# Patient Record
Sex: FEMALE | Race: WHITE | Employment: OTHER | URBAN - NONMETROPOLITAN AREA
[De-identification: names, ages, dates, MRNs, and addresses within clinical notes are randomized per-mention and may not be internally consistent; named-entity substitution may affect disease eponyms.]

---

## 2017-01-03 ENCOUNTER — OFFICE VISIT (OUTPATIENT)
Dept: CARDIOLOGY | Age: 72
End: 2017-01-03

## 2017-01-03 VITALS
DIASTOLIC BLOOD PRESSURE: 52 MMHG | SYSTOLIC BLOOD PRESSURE: 102 MMHG | BODY MASS INDEX: 35.16 KG/M2 | HEART RATE: 56 BPM | WEIGHT: 211 LBS | HEIGHT: 65 IN

## 2017-01-03 DIAGNOSIS — I10 ESSENTIAL HYPERTENSION: Primary | ICD-10-CM

## 2017-01-03 DIAGNOSIS — I42.9 CARDIOMYOPATHY (HCC): ICD-10-CM

## 2017-01-03 PROCEDURE — 99214 OFFICE O/P EST MOD 30 MIN: CPT | Performed by: NUCLEAR MEDICINE

## 2017-01-03 RX ORDER — LOSARTAN POTASSIUM 25 MG/1
25 TABLET ORAL DAILY
COMMUNITY
End: 2017-01-03 | Stop reason: SDUPTHER

## 2017-01-03 RX ORDER — CARVEDILOL 3.12 MG/1
3.12 TABLET ORAL 2 TIMES DAILY WITH MEALS
Qty: 60 TABLET | Refills: 11 | Status: CANCELLED | OUTPATIENT
Start: 2017-01-03

## 2017-01-03 RX ORDER — GLIMEPIRIDE 4 MG/1
4 TABLET ORAL 2 TIMES DAILY
COMMUNITY
End: 2017-05-10

## 2017-01-03 RX ORDER — ENALAPRIL MALEATE 2.5 MG/1
1.25 TABLET ORAL 2 TIMES DAILY
Qty: 30 TABLET | Refills: 11 | Status: CANCELLED | OUTPATIENT
Start: 2017-01-03

## 2017-01-03 RX ORDER — LOSARTAN POTASSIUM 25 MG/1
25 TABLET ORAL DAILY
Qty: 90 TABLET | Refills: 1 | Status: SHIPPED | OUTPATIENT
Start: 2017-01-03 | End: 2017-06-16 | Stop reason: SDUPTHER

## 2017-01-03 RX ORDER — PANTOPRAZOLE SODIUM 40 MG/1
40 GRANULE, DELAYED RELEASE ORAL 2 TIMES DAILY
COMMUNITY
End: 2018-05-09 | Stop reason: ALTCHOICE

## 2017-01-03 RX ORDER — CARVEDILOL 3.12 MG/1
3.12 TABLET ORAL 2 TIMES DAILY WITH MEALS
Qty: 180 TABLET | Refills: 1 | Status: ON HOLD | OUTPATIENT
Start: 2017-01-03 | End: 2017-06-13 | Stop reason: HOSPADM

## 2017-01-04 RX ORDER — LOSARTAN POTASSIUM 25 MG/1
25 TABLET ORAL DAILY
Qty: 30 TABLET | Refills: 0 | Status: ON HOLD | OUTPATIENT
Start: 2017-01-04 | End: 2017-06-13 | Stop reason: HOSPADM

## 2017-01-16 LAB
ALBUMIN SERPL-MCNC: 4.3 G/DL
ALP BLD-CCNC: 120 U/L
ALT SERPL-CCNC: 16 U/L
AST SERPL-CCNC: 24 U/L
BASOPHILS ABSOLUTE: NORMAL /ΜL
BASOPHILS RELATIVE PERCENT: NORMAL %
BILIRUB SERPL-MCNC: 0.5 MG/DL (ref 0.1–1.4)
BUN BLDV-MCNC: 33 MG/DL
CALCIUM SERPL-MCNC: 9.5 MG/DL
CHLORIDE BLD-SCNC: 99 MMOL/L
CO2: 32.2 MMOL/L
CREAT SERPL-MCNC: 1.5 MG/DL
EOSINOPHILS ABSOLUTE: NORMAL /ΜL
EOSINOPHILS RELATIVE PERCENT: NORMAL %
GFR CALCULATED: 36
GLUCOSE BLD-MCNC: 65 MG/DL
HCT VFR BLD CALC: 37.9 % (ref 36–46)
HEMOGLOBIN: 12.7 G/DL (ref 12–16)
LYMPHOCYTES ABSOLUTE: NORMAL /ΜL
LYMPHOCYTES RELATIVE PERCENT: NORMAL %
MCH RBC QN AUTO: NORMAL PG
MCHC RBC AUTO-ENTMCNC: NORMAL G/DL
MCV RBC AUTO: NORMAL FL
MONOCYTES ABSOLUTE: NORMAL /ΜL
MONOCYTES RELATIVE PERCENT: NORMAL %
NEUTROPHILS ABSOLUTE: NORMAL /ΜL
NEUTROPHILS RELATIVE PERCENT: NORMAL %
PLATELET # BLD: 188 K/ΜL
PMV BLD AUTO: NORMAL FL
POTASSIUM SERPL-SCNC: 3.9 MMOL/L
RBC # BLD: 4.12 10^6/ΜL
SODIUM BLD-SCNC: 143 MMOL/L
TOTAL PROTEIN: 7.1
WBC # BLD: 6 10^3/ML

## 2017-03-29 ENCOUNTER — TELEPHONE (OUTPATIENT)
Dept: CARDIOLOGY | Age: 72
End: 2017-03-29

## 2017-05-05 LAB
BUN BLDV-MCNC: 24 MG/DL
CALCIUM SERPL-MCNC: 9 MG/DL
CHLORIDE BLD-SCNC: 102 MMOL/L
CO2: 27.4 MMOL/L
CREAT SERPL-MCNC: 1.2 MG/DL
GFR CALCULATED: 47
GLUCOSE BLD-MCNC: 84 MG/DL
POTASSIUM SERPL-SCNC: 4.1 MMOL/L
SODIUM BLD-SCNC: 145 MMOL/L

## 2017-05-10 ENCOUNTER — OFFICE VISIT (OUTPATIENT)
Dept: NEPHROLOGY | Age: 72
End: 2017-05-10

## 2017-05-10 VITALS
HEART RATE: 74 BPM | BODY MASS INDEX: 37.48 KG/M2 | OXYGEN SATURATION: 92 % | DIASTOLIC BLOOD PRESSURE: 74 MMHG | SYSTOLIC BLOOD PRESSURE: 102 MMHG | WEIGHT: 225.2 LBS

## 2017-05-10 DIAGNOSIS — N28.9 ACUTE ON CHRONIC RENAL INSUFFICIENCY: Primary | ICD-10-CM

## 2017-05-10 DIAGNOSIS — N18.9 ACUTE ON CHRONIC RENAL INSUFFICIENCY: Primary | ICD-10-CM

## 2017-05-10 PROCEDURE — 99213 OFFICE O/P EST LOW 20 MIN: CPT | Performed by: INTERNAL MEDICINE

## 2017-05-10 RX ORDER — CETIRIZINE HYDROCHLORIDE, PSEUDOEPHEDRINE HYDROCHLORIDE 5; 120 MG/1; MG/1
1 TABLET, FILM COATED, EXTENDED RELEASE ORAL DAILY PRN
COMMUNITY
End: 2019-06-09

## 2017-05-10 RX ORDER — DOCUSATE SODIUM 100 MG/1
100 CAPSULE, LIQUID FILLED ORAL 3 TIMES DAILY PRN
COMMUNITY

## 2017-05-10 ASSESSMENT — ENCOUNTER SYMPTOMS
ABDOMINAL DISTENTION: 0
COUGH: 0
COLOR CHANGE: 0
BLOOD IN STOOL: 0
SHORTNESS OF BREATH: 0
WHEEZING: 0
SORE THROAT: 0
VOMITING: 0
ABDOMINAL PAIN: 0
CHEST TIGHTNESS: 0
BACK PAIN: 0
FACIAL SWELLING: 0
GASTROINTESTINAL NEGATIVE: 1
NAUSEA: 0
RESPIRATORY NEGATIVE: 1
CONSTIPATION: 0
DIARRHEA: 0

## 2017-05-30 ENCOUNTER — TELEPHONE (OUTPATIENT)
Dept: CARDIOLOGY | Age: 72
End: 2017-05-30

## 2017-06-15 ENCOUNTER — TELEPHONE (OUTPATIENT)
Dept: CARDIOLOGY | Age: 72
End: 2017-06-15

## 2017-06-16 RX ORDER — LOSARTAN POTASSIUM 25 MG/1
TABLET ORAL
Qty: 90 TABLET | Refills: 0 | Status: SHIPPED | OUTPATIENT
Start: 2017-06-16 | End: 2018-02-01 | Stop reason: ALTCHOICE

## 2017-06-16 RX ORDER — LOSARTAN POTASSIUM 25 MG/1
TABLET ORAL
Qty: 90 TABLET | Refills: 3 | Status: SHIPPED | OUTPATIENT
Start: 2017-06-16 | End: 2017-06-16 | Stop reason: SDUPTHER

## 2017-07-18 ENCOUNTER — OFFICE VISIT (OUTPATIENT)
Dept: CARDIOLOGY CLINIC | Age: 72
End: 2017-07-18
Payer: MEDICARE

## 2017-07-18 VITALS
HEIGHT: 66 IN | WEIGHT: 217 LBS | DIASTOLIC BLOOD PRESSURE: 80 MMHG | BODY MASS INDEX: 34.87 KG/M2 | HEART RATE: 64 BPM | SYSTOLIC BLOOD PRESSURE: 104 MMHG

## 2017-07-18 DIAGNOSIS — R00.1 BRADYCARDIA: ICD-10-CM

## 2017-07-18 DIAGNOSIS — I42.9 CARDIOMYOPATHY (HCC): ICD-10-CM

## 2017-07-18 DIAGNOSIS — I10 ESSENTIAL HYPERTENSION: Primary | ICD-10-CM

## 2017-07-18 PROCEDURE — 99213 OFFICE O/P EST LOW 20 MIN: CPT | Performed by: NUCLEAR MEDICINE

## 2017-08-09 ENCOUNTER — HOSPITAL ENCOUNTER (OUTPATIENT)
Dept: NURSING | Age: 72
Discharge: HOME | End: 2017-08-09
Payer: MEDICARE

## 2017-08-09 VITALS
DIASTOLIC BLOOD PRESSURE: 60 MMHG | HEIGHT: 66 IN | BODY MASS INDEX: 36.32 KG/M2 | WEIGHT: 226 LBS | TEMPERATURE: 97.9 F | SYSTOLIC BLOOD PRESSURE: 111 MMHG | HEART RATE: 63 BPM | RESPIRATION RATE: 18 BRPM | OXYGEN SATURATION: 96 %

## 2017-08-09 DIAGNOSIS — D80.9 SELECTIVE IMMUNOGLOBULIN DEFICIENCY (HCC): ICD-10-CM

## 2017-08-09 PROCEDURE — 96415 CHEMO IV INFUSION ADDL HR: CPT

## 2017-08-09 PROCEDURE — 96413 CHEMO IV INFUSION 1 HR: CPT

## 2017-08-09 PROCEDURE — 6360000002 HC RX W HCPCS: Performed by: INTERNAL MEDICINE

## 2017-08-09 RX ORDER — METHYLPREDNISOLONE SODIUM SUCCINATE 125 MG/2ML
125 INJECTION, POWDER, LYOPHILIZED, FOR SOLUTION INTRAMUSCULAR; INTRAVENOUS ONCE
Status: CANCELLED | OUTPATIENT
Start: 2017-08-09 | End: 2017-08-09

## 2017-08-09 RX ORDER — DEXTROSE MONOHYDRATE 50 MG/ML
INJECTION, SOLUTION INTRAVENOUS ONCE
Status: CANCELLED | OUTPATIENT
Start: 2017-08-09 | End: 2017-08-09

## 2017-08-09 RX ORDER — HEPARIN SODIUM (PORCINE) LOCK FLUSH IV SOLN 100 UNIT/ML 100 UNIT/ML
500 SOLUTION INTRAVENOUS PRN
Status: CANCELLED | OUTPATIENT
Start: 2017-08-09

## 2017-08-09 RX ORDER — 0.9 % SODIUM CHLORIDE 0.9 %
10 VIAL (ML) INJECTION ONCE
Status: CANCELLED | OUTPATIENT
Start: 2017-08-09 | End: 2017-08-09

## 2017-08-09 RX ORDER — 0.9 % SODIUM CHLORIDE 0.9 %
10 VIAL (ML) INJECTION PRN
Status: CANCELLED | OUTPATIENT
Start: 2017-08-09

## 2017-08-09 RX ORDER — DIPHENHYDRAMINE HYDROCHLORIDE 50 MG/ML
50 INJECTION INTRAMUSCULAR; INTRAVENOUS ONCE
Status: CANCELLED | OUTPATIENT
Start: 2017-08-09 | End: 2017-08-09

## 2017-08-09 RX ORDER — 0.9 % SODIUM CHLORIDE 0.9 %
5 VIAL (ML) INJECTION PRN
Status: CANCELLED | OUTPATIENT
Start: 2017-08-09

## 2017-08-09 RX ORDER — DIPHENHYDRAMINE HCL 25 MG
25 TABLET ORAL ONCE
Status: CANCELLED | OUTPATIENT
Start: 2017-08-09 | End: 2017-08-09

## 2017-08-09 RX ORDER — ACETAMINOPHEN 325 MG/1
650 TABLET ORAL ONCE
Status: CANCELLED | OUTPATIENT
Start: 2017-08-09 | End: 2017-08-09

## 2017-08-09 RX ORDER — 0.9 % SODIUM CHLORIDE 0.9 %
5 VIAL (ML) INJECTION PRN
Status: DISCONTINUED | OUTPATIENT
Start: 2017-08-09 | End: 2017-08-10 | Stop reason: HOSPADM

## 2017-08-09 RX ORDER — SODIUM CHLORIDE 9 MG/ML
INJECTION, SOLUTION INTRAVENOUS CONTINUOUS
Status: CANCELLED | OUTPATIENT
Start: 2017-08-09

## 2017-08-09 RX ADMIN — IMMUNE GLOBULIN (HUMAN) 20 G: 10 INJECTION INTRAVENOUS; SUBCUTANEOUS at 12:58

## 2017-08-09 RX ADMIN — IMMUNE GLOBULIN (HUMAN) 20 G: 10 INJECTION INTRAVENOUS; SUBCUTANEOUS at 11:45

## 2017-08-09 ASSESSMENT — PAIN SCALES - GENERAL: PAINLEVEL_OUTOF10: 0

## 2017-08-09 NOTE — PROGRESS NOTES
__m__ Safety:       (Environmental)   [place] to environment   Ensure ID band is correct and in place/ allergy band as needed   Assess for fall risk   Initiate fall precautions as applicable (fall band, side rails, etc.)   Call light within reach   Bed in low position/ wheels locked    ____ Pain:        Assess pain level and characteristics   Administer analgesics as ordered   Assess effectiveness of pain management and report to MD as needed    ____ Knowledge Deficit:   Assess baseline knowledge   Provide teaching at level of understanding   Provide teaching via preferred learning method   Evaluate teaching effectiveness    ____ Hemodynamic/Respiratory Status:       (Pre and Post Procedure Monitoring)   Assess/Monitor vital signs and LOC   Assess Baseline SpO2 prior to any sedation   Obtain weight/height   Assess vital signs/ LOC until patient meets discharge criteria   Monitor procedure site and notify MD of any issues    ____ Infection-Risk of Central Venous Catheter:   Monitor for infection signs and symptoms (catheter site redness, temperature elevation, etc)   Assess for infection risks   Educate regarding infection prevention   Manage central venous catheter (flushes/ dressing changes per protocol)

## 2017-08-09 NOTE — PROGRESS NOTES
Patient being discharged via self in stable condition. Written and verbal instructions given to patient, she voices no questions are concerns.

## 2017-08-09 NOTE — IP AVS SNAPSHOT
Patient Information     Patient Name NAVARRO Webb 1945         This is your updated medication list to keep with you all times      ASK your doctor about these medications     acetaminophen 325 MG tablet   Commonly known as:  TYLENOL   Take 2 tablets by mouth every 4 hours as needed for Pain       ALPRAZolam 0.5 MG tablet   Commonly known as:  XANAX       aspirin 81 MG EC tablet   Take 1 tablet by mouth daily       atorvastatin 20 MG tablet   Commonly known as:  LIPITOR       bumetanide 1 MG tablet   Commonly known as:  BUMEX   Take 1 tablet by mouth daily       cetirizine-psuedoephedrine 5-120 MG per extended release tablet   Commonly known as:  ZYRTEC-D       docusate sodium 100 MG capsule   Commonly known as:  COLACE       ferrous sulfate 325 (65 Fe) MG tablet       Immune Globulin (Human) in dextrose solution       levothyroxine 75 MCG tablet   Commonly known as:  SYNTHROID       losartan 25 MG tablet   Commonly known as:  COZAAR   TAKE 1 TABLET EVERY DAY       pantoprazole sodium 40 MG Pack packet   Commonly known as:  PROTONIX       potassium chloride 20 MEQ extended release tablet   Commonly known as:  KLOR-CON M   Take 0.5 tablets by mouth 2 times daily (with meals)       pregabalin 150 MG capsule   Commonly known as:  Rea Gomez

## 2017-08-09 NOTE — IP AVS SNAPSHOT
After Visit Summary  (Discharge Instructions)    Medication List for Home    Based on the information you provided to us as well as any changes during this visit, the following is your updated medication list.  Compare this with your prescription bottles at home. If you have any questions or concerns, contact your primary care physician's office.              Daily Medication List (This medication list can be shared with any healthcare provider who is helping you manage your medications)      ASK your doctor about these medications if you have questions        Last Dose    Next Dose Due AM NOON PM NIGHT    acetaminophen 325 MG tablet   Commonly known as:  TYLENOL   Take 2 tablets by mouth every 4 hours as needed for Pain                                         ALPRAZolam 0.5 MG tablet   Commonly known as:  XANAX   Take 0.5 mg by mouth 3 times daily as needed for Sleep                                         aspirin 81 MG EC tablet   Take 1 tablet by mouth daily                                         atorvastatin 20 MG tablet   Commonly known as:  LIPITOR   Take 20 mg by mouth daily                                         bumetanide 1 MG tablet   Commonly known as:  BUMEX   Take 1 tablet by mouth daily                                         cetirizine-psuedoephedrine 5-120 MG per extended release tablet   Commonly known as:  ZYRTEC-D   Take 1 tablet by mouth daily as needed for Allergies                                         docusate sodium 100 MG capsule   Commonly known as:  COLACE   Take 100 mg by mouth 2 times daily                                         ferrous sulfate 325 (65 Fe) MG tablet   Take 325 mg by mouth 3 times daily (with meals)                                         Immune Globulin (Human) in dextrose solution   Infuse 10 g intravenously every 30 days                                         levothyroxine 75 MCG tablet   Commonly known as:  SYNTHROID   Take 75 mcg by mouth Daily losartan 25 MG tablet   Commonly known as:  COZAAR   TAKE 1 TABLET EVERY DAY                                         pantoprazole sodium 40 MG Pack packet   Commonly known as:  PROTONIX   Take 40 mg by mouth 2 times daily                                         potassium chloride 20 MEQ extended release tablet   Commonly known as:  KLOR-CON M   Take 0.5 tablets by mouth 2 times daily (with meals)                                         pregabalin 150 MG capsule   Commonly known as:  LYRICA   Take 150 mg by mouth 3 times daily                                                 Allergies as of 2017        Reactions    Silicone Rash      Immunizations as of 2017     Name Date Dose VIS Date Route    Pneumococcal Polysaccharide ([id number]) 3/31/2016 0.5 mL 2015 Intramuscular    Tdap (Boostrix, Adacel) 3/31/2016 0.5 mL 2015 Intramuscular      Last Vitals          Most Recent Value    Temp  97.9 °F (36.6 °C)    Pulse  60    Resp  18    BP  113/63         After Visit Summary    This summary was created for you. Thank you for entrusting your care to us. The following information includes details about your hospital/visit stay along with steps you should take to help with your recovery once you leave the hospital.  In this packet, you will find information about the topics listed below:    · Instructions about your medications including a list of your home medications  · A summary of your hospital visit  · Follow-up appointments once you have left the hospital  · Your care plan at home      You may receive a survey regarding the care you received during your stay. Your input is valuable to us. We encourage you to complete and return your survey in the envelope provided. We hope you will choose us in the future for your healthcare needs.           Patient Information     Patient Name NAVARRO Delgado 1945      Care Provided at:     Name Address Phone [address] [address] [address] [phone]            Your Visit    Here you will find information about your visit, including the reason for your visit. Please take this sheet with you when you visit your doctor or other health care provider in the future. It will help determine the best possible medical care for you at that time. If you have any questions once you leave the hospital, please call the department phone number listed below. Why you were here     Your primary diagnosis was:  Not on File      Visit Information     Date & Time Department Dept. Phone    8/9/2017 [facility] [phone]       Follow-up Appointments    Below is a list of your follow-up and future appointments. This may not be a complete list as you may have made appointments directly with providers that we are not aware of or your providers may have made some for you. Please call your providers to confirm appointments. It is important to keep your appointments. Please bring your current insurance card, photo ID, co-pay, and all medication bottles to your appointment. If self-pay, payment is expected at the time of service. Future Appointments     9/6/2017 11:00 AM     Appointment with STR EXAM ROOM 5 at [facility] [place] [address] ([phone])   Please report to second floor, 2C (main elevators, turn right)   [address] [place] [address]       1/9/2018 1:15 PM     Appointment with Pop Jacinto MD at [facility] ([phone])   Please arrive 15 minutes prior to appointment time, bring insurance card and photo ID. Please arrive 15 minutes prior to appointment time, bring insurance card and photo ID.    [address] [id number]       5/9/2018 1:20 PM     Appointment with Radha Carr DO at [address] [place] [address] ([phone])   Please arrive 15 minutes prior to appointment time, bring insurance card and photo ID. Please arrive 15 minutes prior to appointment time, bring insurance card and photo ID. Amie Lora 4         Preventive Care        Date Due    Diabetic Foot Exam 6/23/1954    Eye Exam By An Eye Doctor 6/23/1955    Mammograms are recommended every 2 years for low/average risk patients aged 48 - 69, and every year for high risk patients per updated national guidelines. However these guidelines can be individualized by your provider. 6/23/1994    Colonoscopy 6/23/1994    Zoster Vaccine 6/23/2004    Osteoporosis screening or a bone density scan (Dexa) is recommended once at age 72. Based upon the results and risk factors for bone loss, your provider will recommend whether this needs to be repeated. 6/23/2009    Cholesterol Screening 3/23/2017    Pneumococcal Vaccines (two) for age 72 years & over with: cerebrospinal fluid leaks, cochlear implants, hemoglobinopathies,  asplenia, immunodeficiencies, HIV infection, or chronic renal failure (2 of 2 - PCV13) 3/31/2017    Hemoglobin A1C (Test For Long-Term Glucose Control) 7/18/2017    Yearly Flu Vaccine (1) 8/1/2017    Tetanus Combination Vaccine (2 - Td) 3/31/2026                 Care Plan Once You Return Home    This section includes instructions you will need to follow once you leave the hospital.  Your care team will discuss these with you, so you and those caring for you know how to best care for your health needs at home. This section may also include educational information about certain health topics that may be of help to you. Discharge Instructions       ACTIVITY:  Continue usual care with your doctor. Call your doctor immediately if any severe problems or go to the nearest emergency room. Next appt Wednesday the 6th of September at 11:00am    I have been treated and hereby acknowledge receiving this instruction sheet.                             Important information for a smoker SMOKING: QUIT SMOKING. THIS IS THE MOST IMPORTANT ACTION YOU CAN TAKE TO IMPROVE YOUR CURRENT AND FUTURE HEALTH. Call the [facility] [place] NOW ([phone])    Smoking harms nonsmokers. When nonsmokers are around people who smoke, they absorb nicotine, carbon monoxide, and other ingredients of tobacco smoke. DO NOT SMOKE AROUND CHILDREN     Children exposed to secondhand smoke are at an increased risk of:  Sudden Infant Death Syndrome (SIDS), acute respiratory infections, inflammation of the middle ear, and severe asthma. Over a longer time, it causes heart disease and lung cancer. There is no safe level of exposure to secondhand smoke. [employer] Signup     Our records indicate that you have an active [employer] account. You can view your After Visit Summary by going to [email or web address] system and logging in with your [employer] username and password. If you don't have a [employer] username and password but a parent or guardian has access to your record, the parent or guardian should login with their own [employer] username and password and access your record to view the After Visit Summary. Additional Information  If you have questions, please contact the physician practice where you receive care. Remember, [employer] is NOT to be used for urgent needs. For medical emergencies, dial 911. For questions regarding your [employer] account call [phone]. If you have a clinical question, please call your doctor's office. View your information online  ? Review your current list of  medications, immunization, and allergies. ? Review your future test results online . ?  Review your discharge instructions provided by your caregivers at discharge    Certain functionality such as prescription refills, scheduling appointments or sending messages to your provider are not activated if your provider does not use [employer] in his/her office    For questions regarding your MyChart account call [phone]. If you have a clinical question, please call your doctor's office. The information on all pages of the After Visit Summary has been reviewed with me, the patient and/or responsible adult, by my health care provider(s). I had the opportunity to ask questions regarding this information. I understand I should dispose of my armband safely at home to protect my health information. A complete copy of the After Visit Summary has been given to me, the patient and/or responsible adult.            Patient Signature/Responsible Adult:____________________    Clinician Signature:_____________________    Date:_____________________    Time:_____________________

## 2017-08-09 NOTE — PROGRESS NOTES
Patient admitted to room 1 for a IV infusion, vitals are stable. Patient offered rights and responsibilities.

## 2017-08-09 NOTE — IP AVS SNAPSHOT
Patient Information     Patient Name NAVARRO Delgado 1945      Important Information for Heart Failure       If your condition worsens or if you have any concerns, call your doctor or seek emergency medical services (dial 9--) as needed. If you have any of the following symptoms/conditions, call your doctor. Call your primary care physician to obtain results of outstanding lab tests, cultures, x-rays, or other tests. If you have a current diagnosis or history of any of the following, please review the information carefully. HEART FAILURE PATIENTS:   DISCHARGE WEIGHT: Weight: 226 lb (102.5 kg)  Record daily weights. Notify your doctor if you have a weight gain 2 pounds in a day, or 3-5 pounds in 1 week. Notify your doctor for increased shortness of breath, or swelling in legs or feet. Follow a low sodium diet. Resume normal activity unless otherwise instructed by your doctor.

## 2017-08-23 RX ORDER — CARVEDILOL 3.12 MG/1
TABLET ORAL
Qty: 180 TABLET | Refills: 1 | Status: SHIPPED | OUTPATIENT
Start: 2017-08-23 | End: 2017-09-12 | Stop reason: ALTCHOICE

## 2017-09-06 ENCOUNTER — HOSPITAL ENCOUNTER (OUTPATIENT)
Dept: NURSING | Age: 72
Discharge: HOME | End: 2017-09-06
Payer: MEDICARE

## 2017-09-06 VITALS
WEIGHT: 226.6 LBS | HEART RATE: 56 BPM | DIASTOLIC BLOOD PRESSURE: 67 MMHG | TEMPERATURE: 98.4 F | BODY MASS INDEX: 36.57 KG/M2 | RESPIRATION RATE: 18 BRPM | SYSTOLIC BLOOD PRESSURE: 105 MMHG

## 2017-09-06 DIAGNOSIS — D80.9 SELECTIVE IMMUNOGLOBULIN DEFICIENCY (HCC): ICD-10-CM

## 2017-09-06 PROCEDURE — 6360000002 HC RX W HCPCS: Performed by: INTERNAL MEDICINE

## 2017-09-06 PROCEDURE — 2580000003 HC RX 258: Performed by: INTERNAL MEDICINE

## 2017-09-06 PROCEDURE — 96413 CHEMO IV INFUSION 1 HR: CPT

## 2017-09-06 PROCEDURE — 6370000000 HC RX 637 (ALT 250 FOR IP): Performed by: INTERNAL MEDICINE

## 2017-09-06 PROCEDURE — 96415 CHEMO IV INFUSION ADDL HR: CPT

## 2017-09-06 RX ORDER — 0.9 % SODIUM CHLORIDE 0.9 %
5 VIAL (ML) INJECTION PRN
Status: CANCELLED | OUTPATIENT
Start: 2017-09-06

## 2017-09-06 RX ORDER — SODIUM CHLORIDE 9 MG/ML
INJECTION, SOLUTION INTRAVENOUS CONTINUOUS
Status: CANCELLED | OUTPATIENT
Start: 2017-09-06

## 2017-09-06 RX ORDER — DIPHENHYDRAMINE HYDROCHLORIDE 50 MG/ML
50 INJECTION INTRAMUSCULAR; INTRAVENOUS ONCE
Status: CANCELLED | OUTPATIENT
Start: 2017-09-06 | End: 2017-09-06

## 2017-09-06 RX ORDER — ACETAMINOPHEN 325 MG/1
650 TABLET ORAL ONCE
Status: CANCELLED | OUTPATIENT
Start: 2017-09-06 | End: 2017-09-06

## 2017-09-06 RX ORDER — 0.9 % SODIUM CHLORIDE 0.9 %
10 VIAL (ML) INJECTION PRN
Status: CANCELLED | OUTPATIENT
Start: 2017-09-06

## 2017-09-06 RX ORDER — DEXTROSE MONOHYDRATE 50 MG/ML
INJECTION, SOLUTION INTRAVENOUS ONCE
Status: CANCELLED | OUTPATIENT
Start: 2017-09-06 | End: 2017-09-06

## 2017-09-06 RX ORDER — DIPHENHYDRAMINE HCL 25 MG
25 TABLET ORAL ONCE
Status: CANCELLED | OUTPATIENT
Start: 2017-09-06 | End: 2017-09-06

## 2017-09-06 RX ORDER — DIPHENHYDRAMINE HCL 25 MG
25 TABLET ORAL ONCE
Status: COMPLETED | OUTPATIENT
Start: 2017-09-06 | End: 2017-09-06

## 2017-09-06 RX ORDER — 0.9 % SODIUM CHLORIDE 0.9 %
10 VIAL (ML) INJECTION ONCE
Status: CANCELLED | OUTPATIENT
Start: 2017-09-06 | End: 2017-09-06

## 2017-09-06 RX ORDER — HEPARIN SODIUM (PORCINE) LOCK FLUSH IV SOLN 100 UNIT/ML 100 UNIT/ML
500 SOLUTION INTRAVENOUS PRN
Status: CANCELLED | OUTPATIENT
Start: 2017-09-06

## 2017-09-06 RX ORDER — SODIUM CHLORIDE 0.9 % (FLUSH) 0.9 %
10 SYRINGE (ML) INJECTION PRN
Status: DISCONTINUED | OUTPATIENT
Start: 2017-09-06 | End: 2017-09-07 | Stop reason: HOSPADM

## 2017-09-06 RX ORDER — ACETAMINOPHEN 325 MG/1
650 TABLET ORAL ONCE
Status: COMPLETED | OUTPATIENT
Start: 2017-09-06 | End: 2017-09-06

## 2017-09-06 RX ORDER — METHYLPREDNISOLONE SODIUM SUCCINATE 125 MG/2ML
125 INJECTION, POWDER, LYOPHILIZED, FOR SOLUTION INTRAMUSCULAR; INTRAVENOUS ONCE
Status: CANCELLED | OUTPATIENT
Start: 2017-09-06 | End: 2017-09-06

## 2017-09-06 RX ADMIN — Medication 10 ML: at 11:40

## 2017-09-06 RX ADMIN — DIPHENHYDRAMINE HCL 25 MG: 25 TABLET ORAL at 11:29

## 2017-09-06 RX ADMIN — ACETAMINOPHEN 650 MG: 325 TABLET ORAL at 11:29

## 2017-09-06 RX ADMIN — IMMUNE GLOBULIN (HUMAN) 20 G: 10 INJECTION INTRAVENOUS; SUBCUTANEOUS at 13:27

## 2017-09-06 RX ADMIN — IMMUNE GLOBULIN (HUMAN) 20 G: 10 INJECTION INTRAVENOUS; SUBCUTANEOUS at 12:01

## 2017-09-06 ASSESSMENT — PAIN SCALES - GENERAL
PAINLEVEL_OUTOF10: 0
PAINLEVEL_OUTOF10: 0

## 2017-09-06 NOTE — IP AVS SNAPSHOT
After Visit Summary  (Discharge Instructions)    Medication List for Home    Based on the information you provided to us as well as any changes during this visit, the following is your updated medication list.  Compare this with your prescription bottles at home. If you have any questions or concerns, contact your primary care physician's office.              Daily Medication List (This medication list can be shared with any healthcare provider who is helping you manage your medications)      ASK your doctor about these medications if you have questions        Last Dose    Next Dose Due AM NOON PM NIGHT    acetaminophen 325 MG tablet   Commonly known as:  TYLENOL   Take 2 tablets by mouth every 4 hours as needed for Pain                650 mg on 9/6/2017 11:29 AM                            ALPRAZolam 0.5 MG tablet   Commonly known as:  XANAX   Take 0.5 mg by mouth 3 times daily as needed for Sleep                                         aspirin 81 MG EC tablet   Take 1 tablet by mouth daily                                         atorvastatin 20 MG tablet   Commonly known as:  LIPITOR   Take 20 mg by mouth daily                                         bumetanide 1 MG tablet   Commonly known as:  BUMEX   Take 1 tablet by mouth daily                                         carvedilol 3.125 MG tablet   Commonly known as:  COREG   TAKE 1 TABLET TWICE DAILY WITH MEALS                                         cetirizine-psuedoephedrine 5-120 MG per extended release tablet   Commonly known as:  ZYRTEC-D   Take 1 tablet by mouth daily as needed for Allergies                                         docusate sodium 100 MG capsule   Commonly known as:  COLACE   Take 100 mg by mouth 2 times daily                                         ferrous sulfate 325 (65 Fe) MG tablet   Take 325 mg by mouth 3 times daily (with meals)                                         Immune Globulin (Human) in dextrose solution Infuse 10 g intravenously every 30 days                                         levothyroxine 75 MCG tablet   Commonly known as:  SYNTHROID   Take 75 mcg by mouth Daily                                         losartan 25 MG tablet   Commonly known as:  COZAAR   TAKE 1 TABLET EVERY DAY                                         pantoprazole sodium 40 MG Pack packet   Commonly known as:  PROTONIX   Take 40 mg by mouth 2 times daily                                         potassium chloride 20 MEQ extended release tablet   Commonly known as:  KLOR-CON M   Take 0.5 tablets by mouth 2 times daily (with meals)                                         pregabalin 150 MG capsule   Commonly known as:  LYRICA   Take 150 mg by mouth 3 times daily                                                 Allergies as of 9/6/2017        Reactions    Silicone Rash      Immunizations as of 9/6/2017     Name Date Dose VIS Date Route    Pneumococcal Polysaccharide ([id number]) 3/31/2016 0.5 mL 4/24/2015 Intramuscular    Tdap (Boostrix, Adacel) 3/31/2016 0.5 mL 2/24/2015 Intramuscular      Last Vitals          Most Recent Value    Temp  98.4 °F (36.9 °C)    Pulse  56    Resp  18    BP  105/67         After Visit Summary    This summary was created for you. Thank you for entrusting your care to us. The following information includes details about your hospital/visit stay along with steps you should take to help with your recovery once you leave the hospital.  In this packet, you will find information about the topics listed below:    · Instructions about your medications including a list of your home medications  · A summary of your hospital visit  · Follow-up appointments once you have left the hospital  · Your care plan at home      You may receive a survey regarding the care you received during your stay. Your input is valuable to us. We encourage you to complete and return your survey in the envelope provided. We hope you will choose us in the future for your healthcare needs. Patient Information     Patient Name NAVARRO Denny 1945      Care Provided at:     Name Address Phone       [address] [phone]            Your Visit    Here you will find information about your visit, including the reason for your visit. Please take this sheet with you when you visit your doctor or other health care provider in the future. It will help determine the best possible medical care for you at that time. If you have any questions once you leave the hospital, please call the department phone number listed below. Why you were here     Your primary diagnosis was:  Not on File      Visit Information     Date & Time Department Dept. Phone    2017 [facility] [phone]       Follow-up Appointments    Below is a list of your follow-up and future appointments. This may not be a complete list as you may have made appointments directly with providers that we are not aware of or your providers may have made some for you. Please call your providers to confirm appointments. It is important to keep your appointments. Please bring your current insurance card, photo ID, co-pay, and all medication bottles to your appointment. If self-pay, payment is expected at the time of service.         Future Appointments     10/4/2017 11:00 AM     Appointment with [facility] EXAM ROOM 1 at [facility] ([phone])   PREPS:  * Bring list of current medications * Please report to second floor, 2C (main elevators, turn right) 10 minutes prior to appointment [facility])   [id number]       2018 1:15 PM     Appointment with Emily Oneil MD at [facility] ([phone])   Please arrive 15 minutes prior to appointment time, bring insurance card and photo ID. Please arrive 15 minutes prior to appointment time, bring insurance card and photo ID. [id number] [id number]       5/9/2018 1:20 PM     Appointment with Etta Dockery DO at [address] [place] [address] ([phone])   Please arrive 15 minutes prior to appointment time, bring insurance card and photo ID. Please arrive 15 minutes prior to appointment time, bring insurance card and photo ID. [address]         Preventive Care        Date Due    Diabetic Foot Exam 6/23/1954    Eye Exam By An Eye Doctor 6/23/1955    Mammograms are recommended every 2 years for low/average risk patients aged 48 - 69, and every year for high risk patients per updated national guidelines. However these guidelines can be individualized by your provider. 6/23/1994    Colonoscopy 6/23/1994    Zoster Vaccine 6/23/2004    Osteoporosis screening or a bone density scan (Dexa) is recommended once at age 72. Based upon the results and risk factors for bone loss, your provider will recommend whether this needs to be repeated. 6/23/2009    Cholesterol Screening 3/23/2017    Pneumococcal Vaccines (two) for age 72 years & over with: cerebrospinal fluid leaks, cochlear implants, hemoglobinopathies,  asplenia, immunodeficiencies, HIV infection, or chronic renal failure (2 of 2 - PCV13) 3/31/2017    Hemoglobin A1C (Test For Long-Term Glucose Control) 7/18/2017    Yearly Flu Vaccine (1) 8/1/2017    Tetanus Combination Vaccine (2 - Td) 3/31/2026                 Care Plan Once You Return Home    This section includes instructions you will need to follow once you leave the hospital.  Your care team will discuss these with you, so you and those caring for you know how to best care for your health needs at home. This section may also include educational information about certain health topics that may be of help to you.           Discharge Instructions ACTIVITY:  Continue usual care with your doctor. Call your doctor immediately if any severe problems or go to the nearest emergency room. I have been treated and hereby acknowledge receiving this instruction sheet. Important information for a smoker       SMOKING: QUIT SMOKING. THIS IS THE MOST IMPORTANT ACTION YOU CAN TAKE TO IMPROVE YOUR CURRENT AND FUTURE HEALTH. Call the [facility] NOW ([phone])    Smoking harms nonsmokers. When nonsmokers are around people who smoke, they absorb nicotine, carbon monoxide, and other ingredients of tobacco smoke. DO NOT SMOKE AROUND CHILDREN     Children exposed to secondhand smoke are at an increased risk of:  Sudden Infant Death Syndrome (SIDS), acute respiratory infections, inflammation of the middle ear, and severe asthma. Over a longer time, it causes heart disease and lung cancer. There is no safe level of exposure to secondhand smoke. [email or web address] Signup     Our records indicate that you have an active [email or web address] account. You can view your After Visit Summary by going to [email or web address] and logging in with your [email or web address] username and password. If you don't have a [employer] username and password but a parent or guardian has access to your record, the parent or guardian should login with their own [email or web address] username and password and access your record to view the After Visit Summary. Additional Information  If you have questions, please contact the physician practice where you receive care. Remember, [email or web address] is NOT to be used for urgent needs. For medical emergencies, dial 911. For questions regarding your [email or web address] account call [phone]. If you have a clinical question, please call your doctor's office. View your information online  ? Review your current list of  medications, immunization, and allergies. ? Review your future test results online . ? Review your discharge instructions provided by your caregivers at discharge    Certain functionality such as prescription refills, scheduling appointments or sending messages to your provider are not activated if your provider does not use [facility] in his/her office    For questions regarding your MyChart account call [phone]. If you have a clinical question, please call your doctor's office. The information on all pages of the After Visit Summary has been reviewed with me, the patient and/or responsible adult, by my health care provider(s). I had the opportunity to ask questions regarding this information. I understand I should dispose of my armband safely at home to protect my health information. A complete copy of the After Visit Summary has been given to me, the patient and/or responsible adult.            Patient Signature/Responsible Adult:____________________    Clinician Signature:_____________________    Date:_____________________    Time:_____________________

## 2017-09-06 NOTE — IP AVS SNAPSHOT
Patient Information     Patient Name NAVARRO Vasquez 1945         This is your updated medication list to keep with you all times      ASK your doctor about these medications     acetaminophen 325 MG tablet   Commonly known as:  TYLENOL   Take 2 tablets by mouth every 4 hours as needed for Pain       ALPRAZolam 0.5 MG tablet   Commonly known as:  XANAX       aspirin 81 MG EC tablet   Take 1 tablet by mouth daily       atorvastatin 20 MG tablet   Commonly known as:  LIPITOR       bumetanide 1 MG tablet   Commonly known as:  BUMEX   Take 1 tablet by mouth daily       carvedilol 3.125 MG tablet   Commonly known as:  COREG   TAKE 1 TABLET TWICE DAILY WITH MEALS       cetirizine-psuedoephedrine 5-120 MG per extended release tablet   Commonly known as:  ZYRTEC-D       docusate sodium 100 MG capsule   Commonly known as:  COLACE       ferrous sulfate 325 (65 Fe) MG tablet       Immune Globulin (Human) in dextrose solution       levothyroxine 75 MCG tablet   Commonly known as:  SYNTHROID       losartan 25 MG tablet   Commonly known as:  COZAAR   TAKE 1 TABLET EVERY DAY       pantoprazole sodium 40 MG Pack packet   Commonly known as:  PROTONIX       potassium chloride 20 MEQ extended release tablet   Commonly known as:  KLOR-CON M   Take 0.5 tablets by mouth 2 times daily (with meals)       pregabalin 150 MG capsule   Commonly known as:  Taisha Urena

## 2017-09-06 NOTE — PROGRESS NOTES
1115:  Patient admitted to [address] for IVIG infusion. PATIENT RIGHTS AND RESPONSIBILITIES SHEET OFFERED TO PT TO READ.  1129:  Pre meds given--see MAR. Call light in reach. 1311:  IV infusing without problems. 2:00 PM  Infusion complete. Patient tolerated well.  1408:  DISCHARGE INSTRUCTIONS PROVIDED TO PATIENT, AND SHE VERBALIZED UNDERSTANDING. PT DISCHARGED AMBULATORY IN SATISFACTORY CONDITION.       __m__ Safety:       (Environmental)   [place]ner to environment   Ensure ID band is correct and in place/ allergy band as needed   Assess for fall risk   Initiate fall precautions as applicable (fall band, side rails, etc.)   Call light within reach   Bed in low position/ wheels locked    __m__ Pain:        Assess pain level and characteristics   Administer analgesics as ordered   Assess effectiveness of pain management and report to MD as needed    __m__ Knowledge Deficit:   Assess baseline knowledge   Provide teaching at level of understanding   Provide teaching via preferred learning method   Evaluate teaching effectiveness    __m__ Hemodynamic/Respiratory Status:       (Pre and Post Procedure Monitoring)   Assess/Monitor vital signs and LOC   Assess Baseline SpO2 prior to any sedation   Obtain weight/height   Assess vital signs/ LOC until patient meets discharge criteria   Monitor procedure site and notify MD of any issues

## 2017-09-06 NOTE — IP AVS SNAPSHOT
Patient Information     Patient Name NAVARRO Powers 1945      Important Information for Heart Failure       If your condition worsens or if you have any concerns, call your doctor or seek emergency medical services (dial 9--) as needed. If you have any of the following symptoms/conditions, call your doctor. Call your primary care physician to obtain results of outstanding lab tests, cultures, x-rays, or other tests. If you have a current diagnosis or history of any of the following, please review the information carefully. HEART FAILURE PATIENTS:   DISCHARGE WEIGHT: Weight: 226 lb 9.6 oz (102.8 kg)  Record daily weights. Notify your doctor if you have a weight gain 2 pounds in a day, or 3-5 pounds in 1 week. Notify your doctor for increased shortness of breath, or swelling in legs or feet. Follow a low sodium diet. Resume normal activity unless otherwise instructed by your doctor.

## 2017-09-07 ENCOUNTER — TELEPHONE (OUTPATIENT)
Dept: CARDIOLOGY CLINIC | Age: 72
End: 2017-09-07

## 2017-09-07 LAB
BASOPHILS ABSOLUTE: NORMAL /ΜL
BASOPHILS RELATIVE PERCENT: NORMAL %
BUN BLDV-MCNC: 50 MG/DL
CALCIUM SERPL-MCNC: NORMAL MG/DL
CHLORIDE BLD-SCNC: 99 MMOL/L
CO2: 29.8 MMOL/L
CREAT SERPL-MCNC: 1.5 MG/DL
CREATININE: NORMAL MG/DL
EOSINOPHILS ABSOLUTE: NORMAL /ΜL
EOSINOPHILS RELATIVE PERCENT: NORMAL %
GFR CALCULATED: NORMAL
GLUCOSE BLD-MCNC: NORMAL MG/DL
HCT VFR BLD CALC: 41.7 % (ref 36–46)
HEMOGLOBIN: 13.6 G/DL (ref 12–16)
LYMPHOCYTES ABSOLUTE: NORMAL /ΜL
LYMPHOCYTES RELATIVE PERCENT: NORMAL %
MCH RBC QN AUTO: NORMAL PG
MCHC RBC AUTO-ENTMCNC: NORMAL G/DL
MCV RBC AUTO: NORMAL FL
MONOCYTES ABSOLUTE: NORMAL /ΜL
MONOCYTES RELATIVE PERCENT: NORMAL %
NEUTROPHILS ABSOLUTE: NORMAL /ΜL
NEUTROPHILS RELATIVE PERCENT: NORMAL %
PLATELET # BLD: 146 K/ΜL
PMV BLD AUTO: NORMAL FL
POTASSIUM SERPL-SCNC: 4.2 MMOL/L
RBC # BLD: 4.32 10^6/ΜL
SODIUM BLD-SCNC: 141 MMOL/L
WBC # BLD: 4 10^3/ML

## 2017-09-11 ENCOUNTER — TELEPHONE (OUTPATIENT)
Dept: NEPHROLOGY | Age: 72
End: 2017-09-11

## 2017-09-11 ENCOUNTER — TELEPHONE (OUTPATIENT)
Dept: CARDIOLOGY CLINIC | Age: 72
End: 2017-09-11

## 2017-09-11 DIAGNOSIS — N18.30 CHRONIC KIDNEY DISEASE, STAGE III (MODERATE) (HCC): Primary | ICD-10-CM

## 2017-09-12 ENCOUNTER — OFFICE VISIT (OUTPATIENT)
Dept: CARDIOLOGY CLINIC | Age: 72
End: 2017-09-12
Payer: MEDICARE

## 2017-09-12 VITALS
HEART RATE: 76 BPM | WEIGHT: 228 LBS | BODY MASS INDEX: 36.64 KG/M2 | HEIGHT: 66 IN | SYSTOLIC BLOOD PRESSURE: 97 MMHG | DIASTOLIC BLOOD PRESSURE: 67 MMHG

## 2017-09-12 DIAGNOSIS — I95.0 IDIOPATHIC HYPOTENSION: ICD-10-CM

## 2017-09-12 DIAGNOSIS — I42.0 DILATED CARDIOMYOPATHY (HCC): Primary | ICD-10-CM

## 2017-09-12 DIAGNOSIS — R42 DIZZINESS: ICD-10-CM

## 2017-09-12 DIAGNOSIS — R00.1 BRADYCARDIA: ICD-10-CM

## 2017-09-12 PROCEDURE — 99214 OFFICE O/P EST MOD 30 MIN: CPT | Performed by: NUCLEAR MEDICINE

## 2017-09-12 RX ORDER — GLIMEPIRIDE 2 MG/1
2 TABLET ORAL 2 TIMES DAILY
Status: ON HOLD | COMMUNITY
End: 2018-05-13

## 2017-09-18 LAB
BUN BLDV-MCNC: 29 MG/DL
CALCIUM SERPL-MCNC: 8.9 MG/DL
CHLORIDE BLD-SCNC: 102 MMOL/L
CO2: 31 MMOL/L
CREAT SERPL-MCNC: 1.1 MG/DL
GFR CALCULATED: 52
GLUCOSE BLD-MCNC: 105 MG/DL
POTASSIUM SERPL-SCNC: 4.2 MMOL/L
SODIUM BLD-SCNC: 145 MMOL/L

## 2017-10-04 ENCOUNTER — HOSPITAL ENCOUNTER (OUTPATIENT)
Dept: NURSING | Age: 72
Discharge: HOME | End: 2017-10-04
Payer: MEDICARE

## 2017-10-04 VITALS
RESPIRATION RATE: 16 BRPM | HEIGHT: 66 IN | HEART RATE: 62 BPM | DIASTOLIC BLOOD PRESSURE: 63 MMHG | BODY MASS INDEX: 37.12 KG/M2 | TEMPERATURE: 97.5 F | OXYGEN SATURATION: 93 % | SYSTOLIC BLOOD PRESSURE: 133 MMHG | WEIGHT: 231 LBS

## 2017-10-04 DIAGNOSIS — D80.9 SELECTIVE IMMUNOGLOBULIN DEFICIENCY (HCC): ICD-10-CM

## 2017-10-04 LAB
IGA: 106 MG/DL (ref 70–400)
IGG: 875 MG/DL (ref 700–1600)
IGM: 48 MG/DL (ref 40–230)

## 2017-10-04 PROCEDURE — 2580000003 HC RX 258: Performed by: INTERNAL MEDICINE

## 2017-10-04 PROCEDURE — 36415 COLL VENOUS BLD VENIPUNCTURE: CPT

## 2017-10-04 PROCEDURE — 96415 CHEMO IV INFUSION ADDL HR: CPT

## 2017-10-04 PROCEDURE — 82784 ASSAY IGA/IGD/IGG/IGM EACH: CPT

## 2017-10-04 PROCEDURE — 6360000002 HC RX W HCPCS: Performed by: INTERNAL MEDICINE

## 2017-10-04 PROCEDURE — 96413 CHEMO IV INFUSION 1 HR: CPT

## 2017-10-04 RX ORDER — DIPHENHYDRAMINE HCL 25 MG
25 TABLET ORAL ONCE
Status: CANCELLED | OUTPATIENT
Start: 2017-10-04 | End: 2017-10-04

## 2017-10-04 RX ORDER — DEXTROSE MONOHYDRATE 50 MG/ML
INJECTION, SOLUTION INTRAVENOUS ONCE
Status: CANCELLED | OUTPATIENT
Start: 2017-10-04 | End: 2017-10-04

## 2017-10-04 RX ORDER — SODIUM CHLORIDE 9 MG/ML
INJECTION, SOLUTION INTRAVENOUS CONTINUOUS
Status: CANCELLED | OUTPATIENT
Start: 2017-10-04

## 2017-10-04 RX ORDER — 0.9 % SODIUM CHLORIDE 0.9 %
10 VIAL (ML) INJECTION ONCE
Status: CANCELLED | OUTPATIENT
Start: 2017-10-04 | End: 2017-10-04

## 2017-10-04 RX ORDER — 0.9 % SODIUM CHLORIDE 0.9 %
10 VIAL (ML) INJECTION PRN
Status: DISCONTINUED | OUTPATIENT
Start: 2017-10-04 | End: 2017-10-05 | Stop reason: HOSPADM

## 2017-10-04 RX ORDER — NITROFURANTOIN 25; 75 MG/1; MG/1
100 CAPSULE ORAL 2 TIMES DAILY
COMMUNITY
End: 2017-10-28

## 2017-10-04 RX ORDER — HEPARIN SODIUM (PORCINE) LOCK FLUSH IV SOLN 100 UNIT/ML 100 UNIT/ML
500 SOLUTION INTRAVENOUS PRN
Status: CANCELLED | OUTPATIENT
Start: 2017-10-04

## 2017-10-04 RX ORDER — DIPHENHYDRAMINE HCL 25 MG
25 TABLET ORAL ONCE
Status: DISCONTINUED | OUTPATIENT
Start: 2017-10-04 | End: 2017-10-05 | Stop reason: HOSPADM

## 2017-10-04 RX ORDER — ACETAMINOPHEN 325 MG/1
650 TABLET ORAL ONCE
Status: CANCELLED | OUTPATIENT
Start: 2017-10-04 | End: 2017-10-04

## 2017-10-04 RX ORDER — ACETAMINOPHEN 325 MG/1
650 TABLET ORAL ONCE
Status: DISCONTINUED | OUTPATIENT
Start: 2017-10-04 | End: 2017-10-05 | Stop reason: HOSPADM

## 2017-10-04 RX ORDER — METHYLPREDNISOLONE SODIUM SUCCINATE 125 MG/2ML
125 INJECTION, POWDER, LYOPHILIZED, FOR SOLUTION INTRAMUSCULAR; INTRAVENOUS ONCE
Status: CANCELLED | OUTPATIENT
Start: 2017-10-04 | End: 2017-10-04

## 2017-10-04 RX ORDER — 0.9 % SODIUM CHLORIDE 0.9 %
5 VIAL (ML) INJECTION PRN
Status: CANCELLED | OUTPATIENT
Start: 2017-10-04

## 2017-10-04 RX ORDER — DIPHENHYDRAMINE HYDROCHLORIDE 50 MG/ML
50 INJECTION INTRAMUSCULAR; INTRAVENOUS ONCE
Status: CANCELLED | OUTPATIENT
Start: 2017-10-04 | End: 2017-10-04

## 2017-10-04 RX ORDER — BENZONATATE 100 MG/1
200 CAPSULE ORAL 3 TIMES DAILY PRN
COMMUNITY
End: 2019-01-21 | Stop reason: ALTCHOICE

## 2017-10-04 RX ORDER — 0.9 % SODIUM CHLORIDE 0.9 %
10 VIAL (ML) INJECTION PRN
Status: CANCELLED | OUTPATIENT
Start: 2017-10-04

## 2017-10-04 RX ADMIN — IMMUNE GLOBULIN (HUMAN) 20 G: 10 INJECTION INTRAVENOUS; SUBCUTANEOUS at 12:51

## 2017-10-04 RX ADMIN — IMMUNE GLOBULIN (HUMAN) 20 G: 10 INJECTION INTRAVENOUS; SUBCUTANEOUS at 11:37

## 2017-10-04 RX ADMIN — Medication 10 ML: at 11:35

## 2017-10-04 NOTE — IP AVS SNAPSHOT
Patient Information     Patient Name NAVARRO Forde 1945      Important Information for Heart Failure       If your condition worsens or if you have any concerns, call your doctor or seek emergency medical services (dial 9-1-1) as needed. If you have any of the following symptoms/conditions, call your doctor. Call your primary care physician to obtain results of outstanding lab tests, cultures, x-rays, or other tests. If you have a current diagnosis or history of any of the following, please review the information carefully. HEART FAILURE PATIENTS:   DISCHARGE WEIGHT: Weight: 231 lb (104.8 kg)  Record daily weights. Notify your doctor if you have a weight gain 2 pounds in a day, or 3-5 pounds in 1 week. Notify your doctor for increased shortness of breath, or swelling in legs or feet. Follow a low sodium diet. Resume normal activity unless otherwise instructed by your doctor.

## 2017-10-04 NOTE — PROGRESS NOTES
Pt here for IVIG. Pt familiar with procedure. Rights and responsibilities reviewed with patient. Pt states at present she is a patient at [facility]. Pt recently was in hospital. Pt had pulmonary emboli and urinary infection. 1130: Lab here. Lab work drawn. 1137: IVIG started as per protocol    1145: Lunch given. No complaints. 1315: Up to bathroom with assistance. Voided. 1332:Infusion complete. Pt tolerated well. Pt assisted to wheelchair.  Pt discharged in stable condition

## 2017-10-04 NOTE — PROGRESS NOTES
_M___ Safety:       (Environmental)   Protivin to environment   Ensure ID band is correct and in place/ allergy band as needed   Assess for fall risk   Initiate fall precautions as applicable (fall band, side rails, etc.)   Call light within reach   Bed in low position/ wheels locked    _M___ Pain:        Assess pain level and characteristics   Administer analgesics as ordered   Assess effectiveness of pain management and report to MD as needed    _M___ Knowledge Deficit:   Assess baseline knowledge   Provide teaching at level of understanding   Provide teaching via preferred learning method   Evaluate teaching effectiveness    _M___ Hemodynamic/Respiratory Status:       (Pre and Post Procedure Monitoring)   Assess/Monitor vital signs and LOC   Assess Baseline SpO2    Obtain weight/height   Assess vital signs/ LOC until patient meets discharge criteria    notify MD of any issues     _

## 2017-10-04 NOTE — IP AVS SNAPSHOT
Patient Information     Patient Name NAVARRO Trotter 1945         This is your updated medication list to keep with you all times      ASK your doctor about these medications     acetaminophen 325 MG tablet   Commonly known as:  TYLENOL   Take 2 tablets by mouth every 4 hours as needed for Pain       ALPRAZolam 0.5 MG tablet   Commonly known as:  XANAX       aspirin 81 MG EC tablet   Take 1 tablet by mouth daily       atorvastatin 20 MG tablet   Commonly known as:  LIPITOR       benzonatate 100 MG capsule   Commonly known as:  TESSALON       bumetanide 1 MG tablet   Commonly known as:  BUMEX   Take 1 tablet by mouth daily       cetirizine-psuedoephedrine 5-120 MG per extended release tablet   Commonly known as:  ZYRTEC-D       docusate sodium 100 MG capsule   Commonly known as:  COLACE       ferrous sulfate 325 (65 Fe) MG tablet       glimepiride 2 MG tablet   Commonly known as:  AMARYL       Immune Globulin (Human) in dextrose solution       levothyroxine 75 MCG tablet   Commonly known as:  SYNTHROID       losartan 25 MG tablet   Commonly known as:  COZAAR   TAKE 1 TABLET EVERY DAY       magnesium hydroxide 2400 MG/10ML Susp   Commonly known as:  MILK OF MAGNESIA CONCENTRATE       nitrofurantoin (macrocrystal-monohydrate) 100 MG capsule   Commonly known as:  MACROBID       pantoprazole sodium 40 MG Pack packet   Commonly known as:  PROTONIX       potassium chloride 20 MEQ extended release tablet   Commonly known as:  KLOR-CON M   Take 0.5 tablets by mouth 2 times daily (with meals)       pregabalin 150 MG capsule   Commonly known as:  LYRICA       rivaroxaban 20 MG Tabs tablet   Commonly known as:  Nimo Meo

## 2017-10-04 NOTE — IP AVS SNAPSHOT
After Visit Summary  (Discharge Instructions)    Medication List for Home    Based on the information you provided to us as well as any changes during this visit, the following is your updated medication list.  Compare this with your prescription bottles at home. If you have any questions or concerns, contact your primary care physician's office.              Daily Medication List (This medication list can be shared with any healthcare provider who is helping you manage your medications)      ASK your doctor about these medications if you have questions        Last Dose    Next Dose Due AM NOON PM NIGHT    acetaminophen 325 MG tablet   Commonly known as:  TYLENOL   Take 2 tablets by mouth every 4 hours as needed for Pain                                         ALPRAZolam 0.5 MG tablet   Commonly known as:  XANAX   Take 0.5 mg by mouth 3 times daily as needed for Sleep                                         aspirin 81 MG EC tablet   Take 1 tablet by mouth daily                                         atorvastatin 20 MG tablet   Commonly known as:  LIPITOR   Take 20 mg by mouth daily                                         benzonatate 100 MG capsule   Commonly known as:  TESSALON   Take 200 mg by mouth 3 times daily as needed for Cough                                         bumetanide 1 MG tablet   Commonly known as:  BUMEX   Take 1 tablet by mouth daily                                         cetirizine-psuedoephedrine 5-120 MG per extended release tablet   Commonly known as:  ZYRTEC-D   Take 1 tablet by mouth daily as needed for Allergies                                         docusate sodium 100 MG capsule   Commonly known as:  COLACE   Take 100 mg by mouth 2 times daily                                         ferrous sulfate 325 (65 Fe) MG tablet   Take 325 mg by mouth 3 times daily (with meals)                                         glimepiride 2 MG tablet   Commonly known as:  AMARYL Take 2 mg by mouth 2 times daily                                         Immune Globulin (Human) in dextrose solution   Infuse 10 g intravenously every 30 days                                         levothyroxine 75 MCG tablet   Commonly known as:  SYNTHROID   Take 75 mcg by mouth Daily                                         losartan 25 MG tablet   Commonly known as:  COZAAR   TAKE 1 TABLET EVERY DAY                                         magnesium hydroxide 2400 MG/10ML Susp   Commonly known as:  MILK OF MAGNESIA CONCENTRATE   Take 30 mLs by mouth once as needed                                         nitrofurantoin (macrocrystal-monohydrate) 100 MG capsule   Commonly known as:  MACROBID   Take 100 mg by mouth 2 times daily Last dose October 6                                         pantoprazole sodium 40 MG Pack packet   Commonly known as:  PROTONIX   Take 40 mg by mouth 2 times daily                                         potassium chloride 20 MEQ extended release tablet   Commonly known as:  KLOR-CON M   Take 0.5 tablets by mouth 2 times daily (with meals)                                         pregabalin 150 MG capsule   Commonly known as:  LYRICA   Take 150 mg by mouth 3 times daily                                         rivaroxaban 20 MG Tabs tablet   Commonly known as:  XARELTO   Take 15 mg by mouth every 24 hours Will start 20 mg on October 18                                                 Allergies as of 10/4/2017        Reactions    Silicone Rash      Immunizations as of 10/4/2017     Name Date Dose VIS Date Route    Pneumococcal Polysaccharide ([id number]) 3/31/2016 0.5 mL 4/24/2015 Intramuscular    Tdap (Boostrix, Adacel) 3/31/2016 0.5 mL 2/24/2015 Intramuscular      Last Vitals          Most Recent Value    Temp  98 °F (36.7 °C)    Pulse  63    Resp  16    BP  (!)  118/55         After Visit Summary    This summary was created for you. Thank you for entrusting your care to us. The following information includes details about your hospital/visit stay along with steps you should take to help with your recovery once you leave the hospital.  In this packet, you will find information about the topics listed below:    · Instructions about your medications including a list of your home medications  · A summary of your hospital visit  · Follow-up appointments once you have left the hospital  · Your care plan at home      You may receive a survey regarding the care you received during your stay. Your input is valuable to us. We encourage you to complete and return your survey in the envelope provided. We hope you will choose us in the future for your healthcare needs. Patient Information     Patient Name NAVARRO Denny 1945      Care Provided at:     Name Address Phone       [address] [phone]            Your Visit    Here you will find information about your visit, including the reason for your visit. Please take this sheet with you when you visit your doctor or other health care provider in the future. It will help determine the best possible medical care for you at that time. If you have any questions once you leave the hospital, please call the department phone number listed below. Why you were here     Your primary diagnosis was:  Not on File      Visit Information     Date & Time Department Dept. Phone    10/4/2017 Sis Larson [facility] [phone]       Follow-up Appointments    Below is a list of your follow-up and future appointments. This may not be a complete list as you may have made appointments directly with providers that we are not aware of or your providers may have made some for you. Please call your providers to confirm appointments. It is important to keep your appointments.  Please bring your current insurance card, photo ID, co-pay, and all medication bottles to your appointment. If self-pay, payment is expected at the time of service. Future Appointments     11/1/2017 11:00 AM     Appointment with [facility] EXAM ROOM 2 at [facility] ([phone])   PATIENT PREPS: * Arrive 30 minutes prior to second floor, 2C (main elevators, turn right)   [phone]       12/19/2017 11:00 AM     Appointment with Ashley Trejo MD at [facility] ([phone])   Please arrive 15 minutes prior to appointment, bring photo ID and insurance card. Please arrive 15 minutes prior to appointment, bring photo ID and insurance card. [phone] [email or web address] [facility]       1/9/2018 1:15 PM     Appointment with Ashley Trejo MD at [facility] ([phone])   Please arrive 15 minutes prior to appointment time, bring insurance card and photo ID. Please arrive 15 minutes prior to appointment time, bring insurance card and photo ID. [phone]       5/9/2018 1:20 PM     Appointment with Edie Wright DO at [address] [place] [address] ([phone])   Please arrive 15 minutes prior to appointment time, bring insurance card and photo ID. Please arrive 15 minutes prior to appointment time, bring insurance card and photo ID. Amie Lora 4         Preventive Care        Date Due    Diabetic Foot Exam 6/23/1954    Eye Exam By An Eye Doctor 6/23/1955    Mammograms are recommended every 2 years for low/average risk patients aged 48 - 69, and every year for high risk patients per updated national guidelines. However these guidelines can be individualized by your provider. 6/23/1994    Colonoscopy 6/23/1994    Zoster Vaccine 6/23/2004    Osteoporosis screening or a bone density scan (Dexa) is recommended once at age 72.   Based upon the results and risk factors for bone loss, your provider will recommend whether this needs to be repeated. 6/23/2009    Cholesterol Screening 3/23/2017    Pneumococcal Vaccines (two) for age 72 years & over with: cerebrospinal fluid leaks, cochlear implants, hemoglobinopathies,  asplenia, immunodeficiencies, HIV infection, or chronic renal failure (2 of 2 - PCV13) 3/31/2017    Hemoglobin A1C (Test For Long-Term Glucose Control) 7/18/2017    Yearly Flu Vaccine (1) 9/1/2017    Tetanus Combination Vaccine (2 - Td) 3/31/2026                 Care Plan Once You Return Home    This section includes instructions you will need to follow once you leave the hospital.  Your care team will discuss these with you, so you and those caring for you know how to best care for your health needs at home. This section may also include educational information about certain health topics that may be of help to you. Discharge Instructions       ACTIVITY:  Continue usual care with your doctor. Call your doctor immediately if any severe problems or go to the nearest emergency room. I have been treated and hereby acknowledge receiving this instruction sheet. RETURN AS SCHEDULED. COPY OF ORDER IN PACKET REGARDING PRE MEDICATIONS BEFORE IVIG                            Important information for a smoker       SMOKING: QUIT SMOKING. THIS IS THE MOST IMPORTANT ACTION YOU CAN TAKE TO IMPROVE YOUR CURRENT AND FUTURE HEALTH. Call the [facility] ([phone])    Smoking harms nonsmokers. When nonsmokers are around people who smoke, they absorb nicotine, carbon monoxide, and other ingredients of tobacco smoke. DO NOT SMOKE AROUND CHILDREN     Children exposed to secondhand smoke are at an increased risk of:  Sudden Infant Death Syndrome (SIDS), acute respiratory infections, inflammation of the middle ear, and severe asthma.   Over a longer time, it causes heart disease and lung cancer. There is no safe level of exposure to secondhand smoke. MyChart Signup     Our records indicate that you have an active Smovehart account. You can view your After Visit Summary by going to [email or web address] and logging in with your [employer] username and password. If you don't have a [employer] username and password but a parent or guardian has access to your record, the parent or guardian should login with their own [employer] username and password and access your record to view the After Visit Summary. Additional Information  If you have questions, please contact the physician practice where you receive care. Remember, Smovehart is NOT to be used for urgent needs. For medical emergencies, dial 911. For questions regarding your Smovehart account call [phone]. If you have a clinical question, please call your doctor's office. View your information online  ? Review your current list of  medications, immunization, and allergies. ? Review your future test results online . ? Review your discharge instructions provided by your caregivers at discharge    Certain functionality such as prescription refills, scheduling appointments or sending messages to your provider are not activated if your provider does not use [employer] in his/her office    For questions regarding your MyChart account call [phone]. If you have a clinical question, please call your doctor's office. The information on all pages of the After Visit Summary has been reviewed with me, the patient and/or responsible adult, by my health care provider(s). I had the opportunity to ask questions regarding this information. I understand I should dispose of my armband safely at home to protect my health information. A complete copy of the After Visit Summary has been given to me, the patient and/or responsible adult.

## 2017-10-28 ENCOUNTER — HOSPITAL ENCOUNTER (INPATIENT)
Age: 72
LOS: 4 days | Discharge: HOME | DRG: 261 | End: 2017-11-01
Attending: INTERNAL MEDICINE | Admitting: INTERNAL MEDICINE
Payer: MEDICARE

## 2017-10-28 ENCOUNTER — APPOINTMENT (OUTPATIENT)
Dept: GENERAL RADIOLOGY | Age: 72
DRG: 261 | End: 2017-10-28
Payer: MEDICARE

## 2017-10-28 DIAGNOSIS — I49.8 OTHER CARDIAC ARRHYTHMIA: Primary | ICD-10-CM

## 2017-10-28 DIAGNOSIS — R00.1 SYMPTOMATIC BRADYCARDIA: ICD-10-CM

## 2017-10-28 DIAGNOSIS — R53.1 GENERAL WEAKNESS: ICD-10-CM

## 2017-10-28 DIAGNOSIS — R00.8 VENTRICULAR BIGEMINY SEEN ON CARDIAC MONITOR: ICD-10-CM

## 2017-10-28 DIAGNOSIS — R53.83 FATIGUE, UNSPECIFIED TYPE: ICD-10-CM

## 2017-10-28 PROBLEM — I49.5 TACHYCARDIA-BRADYCARDIA SYNDROME (HCC): Status: ACTIVE | Noted: 2017-10-28

## 2017-10-28 LAB
ALBUMIN SERPL-MCNC: 3.7 G/DL (ref 3.5–5.1)
ALLEN TEST: POSITIVE
ALP BLD-CCNC: 89 U/L (ref 38–126)
ALT SERPL-CCNC: 13 U/L (ref 11–66)
ANION GAP SERPL CALCULATED.3IONS-SCNC: 15 MEQ/L (ref 8–16)
AST SERPL-CCNC: 24 U/L (ref 5–40)
BACTERIA: ABNORMAL
BASE EXCESS (CALCULATED): 2.7 MMOL/L (ref -2.5–2.5)
BASOPHILS # BLD: 0.9 %
BASOPHILS ABSOLUTE: 0 THOU/MM3 (ref 0–0.1)
BILIRUB SERPL-MCNC: 0.6 MG/DL (ref 0.3–1.2)
BILIRUBIN DIRECT: < 0.2 MG/DL (ref 0–0.3)
BILIRUBIN URINE: NEGATIVE
BLOOD, URINE: ABNORMAL
BUN BLDV-MCNC: 24 MG/DL (ref 7–22)
CALCIUM SERPL-MCNC: 9.2 MG/DL (ref 8.5–10.5)
CASTS: ABNORMAL /LPF
CASTS: ABNORMAL /LPF
CHARACTER, URINE: CLEAR
CHLORIDE BLD-SCNC: 102 MEQ/L (ref 98–111)
CO2: 25 MEQ/L (ref 23–33)
COLLECTED BY:: ABNORMAL
COLOR: YELLOW
CREAT SERPL-MCNC: 0.9 MG/DL (ref 0.4–1.2)
CRYSTALS: ABNORMAL
DEVICE: ABNORMAL
EOSINOPHIL # BLD: 2.7 %
EOSINOPHILS ABSOLUTE: 0.1 THOU/MM3 (ref 0–0.4)
EPITHELIAL CELLS, UA: ABNORMAL /HPF
GFR SERPL CREATININE-BSD FRML MDRD: 61 ML/MIN/1.73M2
GLUCOSE BLD-MCNC: 139 MG/DL (ref 70–108)
GLUCOSE, URINE: NEGATIVE MG/DL
HCO3: 28 MMOL/L (ref 23–28)
HCT VFR BLD CALC: 41.8 % (ref 37–47)
HEMOGLOBIN: 14.3 GM/DL (ref 12–16)
IFIO2: 28
KETONES, URINE: NEGATIVE
LEUKOCYTE ESTERASE, URINE: NEGATIVE
LYMPHOCYTES # BLD: 27.1 %
LYMPHOCYTES ABSOLUTE: 1.4 THOU/MM3 (ref 1–4.8)
MAGNESIUM: 2 MG/DL (ref 1.6–2.4)
MCH RBC QN AUTO: 31.8 PG (ref 27–31)
MCHC RBC AUTO-ENTMCNC: 34.3 GM/DL (ref 33–37)
MCV RBC AUTO: 92.8 FL (ref 81–99)
MISCELLANEOUS LAB TEST RESULT: ABNORMAL
MONOCYTES # BLD: 10.8 %
MONOCYTES ABSOLUTE: 0.6 THOU/MM3 (ref 0.4–1.3)
NITRITE, URINE: NEGATIVE
NUCLEATED RED BLOOD CELLS: 0 /100 WBC
O2 SATURATION: 96 %
OSMOLALITY CALCULATION: 289.4 MOSMOL/KG (ref 275–300)
PCO2: 45 MMHG (ref 35–45)
PDW BLD-RTO: 13.3 % (ref 11.5–14.5)
PH BLOOD GAS: 7.4 (ref 7.35–7.45)
PH UA: 5.5
PLATELET # BLD: 168 THOU/MM3 (ref 130–400)
PMV BLD AUTO: 8.2 MCM (ref 7.4–10.4)
PO2: 80 MMHG (ref 71–104)
POTASSIUM SERPL-SCNC: 4.4 MEQ/L (ref 3.5–5.2)
PRO-BNP: 380.1 PG/ML (ref 0–900)
PROTEIN UA: NEGATIVE MG/DL
RBC # BLD: 4.51 MILL/MM3 (ref 4.2–5.4)
RBC URINE: ABNORMAL /HPF
RENAL EPITHELIAL, UA: ABNORMAL
SEG NEUTROPHILS: 58.5 %
SEGMENTED NEUTROPHILS ABSOLUTE COUNT: 3.1 THOU/MM3 (ref 1.8–7.7)
SODIUM BLD-SCNC: 142 MEQ/L (ref 135–145)
SOURCE, BLOOD GAS: ABNORMAL
SPECIFIC GRAVITY UA: 1.02 (ref 1–1.03)
TOTAL PROTEIN: 6.9 G/DL (ref 6.1–8)
TROPONIN T: < 0.01 NG/ML
TSH SERPL DL<=0.05 MIU/L-ACNC: 1.18 UIU/ML (ref 0.4–4.2)
UROBILINOGEN, URINE: 0.2 EU/DL
WBC # BLD: 5.3 THOU/MM3 (ref 4.8–10.8)
WBC UA: ABNORMAL /HPF
YEAST: ABNORMAL

## 2017-10-28 PROCEDURE — 2580000003 HC RX 258: Performed by: INTERNAL MEDICINE

## 2017-10-28 PROCEDURE — 36415 COLL VENOUS BLD VENIPUNCTURE: CPT

## 2017-10-28 PROCEDURE — 83880 ASSAY OF NATRIURETIC PEPTIDE: CPT

## 2017-10-28 PROCEDURE — 6370000000 HC RX 637 (ALT 250 FOR IP): Performed by: NURSE PRACTITIONER

## 2017-10-28 PROCEDURE — 82803 BLOOD GASES ANY COMBINATION: CPT

## 2017-10-28 PROCEDURE — 80053 COMPREHEN METABOLIC PANEL: CPT

## 2017-10-28 PROCEDURE — 6370000000 HC RX 637 (ALT 250 FOR IP): Performed by: INTERNAL MEDICINE

## 2017-10-28 PROCEDURE — 1200000003 HC TELEMETRY R&B

## 2017-10-28 PROCEDURE — 84484 ASSAY OF TROPONIN QUANT: CPT

## 2017-10-28 PROCEDURE — 71020 XR CHEST STANDARD TWO VW: CPT

## 2017-10-28 PROCEDURE — 93005 ELECTROCARDIOGRAM TRACING: CPT

## 2017-10-28 PROCEDURE — 84443 ASSAY THYROID STIM HORMONE: CPT

## 2017-10-28 PROCEDURE — 2580000003 HC RX 258: Performed by: NURSE PRACTITIONER

## 2017-10-28 PROCEDURE — 81001 URINALYSIS AUTO W/SCOPE: CPT

## 2017-10-28 PROCEDURE — 83735 ASSAY OF MAGNESIUM: CPT

## 2017-10-28 PROCEDURE — 82248 BILIRUBIN DIRECT: CPT

## 2017-10-28 PROCEDURE — 99285 EMERGENCY DEPT VISIT HI MDM: CPT

## 2017-10-28 PROCEDURE — 36600 WITHDRAWAL OF ARTERIAL BLOOD: CPT

## 2017-10-28 PROCEDURE — 85025 COMPLETE CBC W/AUTO DIFF WBC: CPT

## 2017-10-28 PROCEDURE — 99222 1ST HOSP IP/OBS MODERATE 55: CPT | Performed by: INTERNAL MEDICINE

## 2017-10-28 RX ORDER — ASPIRIN 81 MG/1
81 TABLET ORAL DAILY
Status: DISCONTINUED | OUTPATIENT
Start: 2017-10-28 | End: 2017-11-01 | Stop reason: HOSPADM

## 2017-10-28 RX ORDER — POTASSIUM CHLORIDE 20 MEQ/1
10 TABLET, EXTENDED RELEASE ORAL 2 TIMES DAILY WITH MEALS
Status: DISCONTINUED | OUTPATIENT
Start: 2017-10-28 | End: 2017-11-01 | Stop reason: HOSPADM

## 2017-10-28 RX ORDER — HYDROCODONE BITARTRATE AND ACETAMINOPHEN 5; 325 MG/1; MG/1
1 TABLET ORAL EVERY 6 HOURS PRN
Status: DISCONTINUED | OUTPATIENT
Start: 2017-10-28 | End: 2017-10-30

## 2017-10-28 RX ORDER — 0.9 % SODIUM CHLORIDE 0.9 %
1000 INTRAVENOUS SOLUTION INTRAVENOUS ONCE
Status: COMPLETED | OUTPATIENT
Start: 2017-10-28 | End: 2017-10-28

## 2017-10-28 RX ORDER — ONDANSETRON 2 MG/ML
4 INJECTION INTRAMUSCULAR; INTRAVENOUS EVERY 6 HOURS PRN
Status: DISCONTINUED | OUTPATIENT
Start: 2017-10-28 | End: 2017-10-30

## 2017-10-28 RX ORDER — LOSARTAN POTASSIUM 25 MG/1
25 TABLET ORAL DAILY
Status: DISCONTINUED | OUTPATIENT
Start: 2017-10-28 | End: 2017-11-01 | Stop reason: HOSPADM

## 2017-10-28 RX ORDER — PREGABALIN 75 MG/1
150 CAPSULE ORAL 3 TIMES DAILY
Status: DISCONTINUED | OUTPATIENT
Start: 2017-10-28 | End: 2017-11-01 | Stop reason: HOSPADM

## 2017-10-28 RX ORDER — GLIMEPIRIDE 2 MG/1
2 TABLET ORAL 2 TIMES DAILY
Status: DISCONTINUED | OUTPATIENT
Start: 2017-10-28 | End: 2017-11-01 | Stop reason: HOSPADM

## 2017-10-28 RX ORDER — SODIUM CHLORIDE 0.9 % (FLUSH) 0.9 %
10 SYRINGE (ML) INJECTION EVERY 12 HOURS SCHEDULED
Status: DISCONTINUED | OUTPATIENT
Start: 2017-10-28 | End: 2017-10-30

## 2017-10-28 RX ORDER — SODIUM CHLORIDE 0.9 % (FLUSH) 0.9 %
10 SYRINGE (ML) INJECTION PRN
Status: DISCONTINUED | OUTPATIENT
Start: 2017-10-28 | End: 2017-10-30

## 2017-10-28 RX ORDER — BUMETANIDE 1 MG/1
1 TABLET ORAL DAILY
Status: DISCONTINUED | OUTPATIENT
Start: 2017-10-28 | End: 2017-11-01 | Stop reason: HOSPADM

## 2017-10-28 RX ORDER — LEVOTHYROXINE SODIUM 0.07 MG/1
75 TABLET ORAL DAILY
Status: DISCONTINUED | OUTPATIENT
Start: 2017-10-28 | End: 2017-11-01 | Stop reason: HOSPADM

## 2017-10-28 RX ORDER — ACETAMINOPHEN 325 MG/1
650 TABLET ORAL ONCE
Status: COMPLETED | OUTPATIENT
Start: 2017-10-28 | End: 2017-10-28

## 2017-10-28 RX ORDER — FENTANYL 25 UG/H
1 PATCH TRANSDERMAL
Status: DISCONTINUED | OUTPATIENT
Start: 2017-10-29 | End: 2017-10-28 | Stop reason: ALTCHOICE

## 2017-10-28 RX ADMIN — GLIMEPIRIDE 2 MG: 2 TABLET ORAL at 20:42

## 2017-10-28 RX ADMIN — ASPIRIN 81 MG: 81 TABLET ORAL at 17:35

## 2017-10-28 RX ADMIN — ACETAMINOPHEN 650 MG: 325 TABLET ORAL at 15:09

## 2017-10-28 RX ADMIN — PREGABALIN 150 MG: 75 CAPSULE ORAL at 20:42

## 2017-10-28 RX ADMIN — LEVOTHYROXINE SODIUM 75 MCG: 75 TABLET ORAL at 17:35

## 2017-10-28 RX ADMIN — LOSARTAN POTASSIUM 25 MG: 25 TABLET, FILM COATED ORAL at 17:35

## 2017-10-28 RX ADMIN — SODIUM CHLORIDE 1000 ML: 9 INJECTION, SOLUTION INTRAVENOUS at 12:10

## 2017-10-28 RX ADMIN — Medication 10 ML: at 20:42

## 2017-10-28 RX ADMIN — POTASSIUM CHLORIDE 10 MEQ: 1500 TABLET, EXTENDED RELEASE ORAL at 17:35

## 2017-10-28 RX ADMIN — RIVAROXABAN 15 MG: 15 TABLET, FILM COATED ORAL at 17:35

## 2017-10-28 RX ADMIN — BUMETANIDE 1 MG: 1 TABLET ORAL at 17:35

## 2017-10-28 RX ADMIN — PREGABALIN 150 MG: 75 CAPSULE ORAL at 17:34

## 2017-10-28 ASSESSMENT — ENCOUNTER SYMPTOMS
ABDOMINAL DISTENTION: 0
WHEEZING: 0
SORE THROAT: 0
ABDOMINAL PAIN: 0
BACK PAIN: 1
PHOTOPHOBIA: 0
BLOOD IN STOOL: 0
SHORTNESS OF BREATH: 1
DIARRHEA: 0
COLOR CHANGE: 0
CHEST TIGHTNESS: 0
SINUS PRESSURE: 0
VOMITING: 0
RHINORRHEA: 0
VOICE CHANGE: 0
NAUSEA: 0
CONSTIPATION: 0
COUGH: 1
EYE REDNESS: 0

## 2017-10-28 ASSESSMENT — PAIN SCALES - GENERAL
PAINLEVEL_OUTOF10: 4
PAINLEVEL_OUTOF10: 0
PAINLEVEL_OUTOF10: 2
PAINLEVEL_OUTOF10: 4

## 2017-10-28 ASSESSMENT — PAIN DESCRIPTION - DESCRIPTORS: DESCRIPTORS: ACHING

## 2017-10-28 ASSESSMENT — PAIN DESCRIPTION - LOCATION
LOCATION: BACK
LOCATION: FOOT;BACK
LOCATION: BACK

## 2017-10-28 ASSESSMENT — PAIN DESCRIPTION - PAIN TYPE
TYPE: CHRONIC PAIN
TYPE: ACUTE PAIN
TYPE: CHRONIC PAIN

## 2017-10-28 NOTE — H&P
History & Physical        Patient:  Harper Gomez  YOB: 1945    MRN: [id number]     Acct: [de-identified]    PCP: Sharron Ahuja DO    Date of Admission: 10/28/2017    Date of Service: Pt seen/examined on 10/28/17 and Admitted to Inpatient with expected LOS greater than two midnights due to medical therapy. Chief Complaint:  Generalized weakness, severe fatigue, shortness of breath. History Of Present Illness:     67 y.o. female whose past medical history is significant for multiple comorbidities, as seen below. Approximately 2 weeks ago patient was diagnosed with a pulmonary embolus, hospitalized, acutely treated and continued on Xarelto, subsequently requiring continued physical therapy at a skilled nursing facility. Patient's family reports that she did very well and was discharged home. For the past 4 days however patient's physical status has acutely declined. She complains of generalized weakness and severe fatigue, reporting that she can barely turn herself in bed. Patient has a history of chronic back pain and myalgias but her presenting symptoms are superimposed. In the emergency department patient initially had significant bradycardia with heart rates in the 40s, PVCs and bigeminy. A later EKG shows a heart rate of 83 and a sinus rhythm. Patient seems to episodically revert to the bradycardic arrhythmia. Cardiac enzymes were negative. Patient is being admitted for symptomatic bradycardia and will be seen by Dr. Enzo Deluna, her cardiologist.    I asked patient about her frequently changing cardiac rhythm and she told me that Dr. Enzo Deluna is aware of the phenomenon. However, her presenting symptoms of severe fatigue has actually been going on for a long time and is completely independent of the rhythm changes. She admits that she has never informed Dr Enzo Deluna of these episodes, which generally last about 4 days and have been going on for years.     Past Medical History:          Diagnosis Date    Anemia     CAD (coronary artery disease)     CHF (congestive heart failure) (HCC)     Chronic kidney disease     stage 3 kidney     DDD (degenerative disc disease), lumbar     Depression     GERD (gastroesophageal reflux disease)     History of blood transfusion     Hx of blood clots 09/2017    pulmonary emboli    Hyperlipidemia     Hypertension     Hyperthyroidism     Movement disorder     DDD    Neuropathy ([address]     Obesity     Pneumonia 2016    sepsis    Sleep apnea     usually wears cpap at night    Status post right and left heart catheterization     Type II or unspecified type diabetes mellitus without mention of complication, not stated as uncontrolled        Past Surgical History:          Procedure Laterality Date    ABDOMEN SURGERY      ACHILLES TENDON SURGERY Bilateral     BLADDER SUSPENSION      CARDIAC SURGERY  2016    [facility]    HAMMER TOE SURGERY Right     HERNIA REPAIR      HIATAL HERNIA REPAIR  09/06/2016    Laparoscopic Robotic with Nissen Fundoplication - Dr. Liz Baxter      right    TENDON RELEASE Left 08/09/2016    left foot    TUBAL LIGATION         Medications Prior to Admission:      Prior to Admission medications    Medication Sig Start Date End Date Taking?  Authorizing Provider   rivaroxaban (XARELTO) 20 MG TABS tablet Take 15 mg by mouth every 24 hours Will start 20 mg on October 18   Yes Historical Provider, MD   benzonatate (TESSALON) 100 MG capsule Take 200 mg by mouth 3 times daily as needed for Cough   Yes Historical Provider, MD   glimepiride (AMARYL) 2 MG tablet Take 2 mg by mouth 2 times daily   Yes Historical Provider, MD   losartan (COZAAR) 25 MG tablet TAKE 1 TABLET EVERY DAY 6/16/17  Yes Neo Gan MD   cetirizine-psuedoephedrine (ZYRTEC-D) 5-120 MG per extended release tablet Take 1 tablet by mouth daily as needed for Allergies   Yes Historical Provider, MD   pantoprazole sodium (PROTONIX) 40 MG PACK packet Take 40 mg by mouth 2 times daily    Yes Historical Provider, MD   ALPRAZolam (XANAX) 0.5 MG tablet Take 0.5 mg by mouth 3 times daily as needed for Sleep   Yes Historical Provider, MD   atorvastatin (LIPITOR) 20 MG tablet Take 20 mg by mouth daily   Yes Historical Provider, MD   bumetanide (BUMEX) 1 MG tablet Take 1 tablet by mouth daily 5/6/16  Yes Darinel Storey MD   potassium chloride SA (K-DUR;KLOR-CON M) 20 MEQ tablet Take 0.5 tablets by mouth 2 times daily (with meals) 5/6/16  Yes Darinel Storey MD   aspirin 81 MG EC tablet Take 1 tablet by mouth daily 4/5/16  Yes Diann Winters PA-C   ferrous sulfate 325 (65 FE) MG tablet Take 325 mg by mouth 3 times daily (with meals)   Yes Historical Provider, MD   levothyroxine (SYNTHROID) 75 MCG tablet Take 75 mcg by mouth Daily   Yes Historical Provider, MD   pregabalin (LYRICA) 150 MG capsule Take 150 mg by mouth 3 times daily   Yes Historical Provider, MD   magnesium hydroxide (MILK OF MAGNESIA CONCENTRATE) 2400 MG/10ML SUSP Take 30 mLs by mouth once as needed    Historical Provider, MD   Immune Globulin (Human) in dextrose solution Infuse 10 g intravenously every 30 days    Historical Provider, MD   docusate sodium (COLACE) 100 MG capsule Take 100 mg by mouth 2 times daily    Historical Provider, MD   acetaminophen (TYLENOL) 325 MG tablet Take 2 tablets by mouth every 4 hours as needed for Pain 8/18/16   Callie Paris MD       Allergies:  Silicone    Social History:      The patient currently lives at home. TOBACCO:   reports that she quit smoking about 11 years ago. Her smoking use included Cigarettes. She has a 30.00 pack-year smoking history. She has never used smokeless tobacco.  ETOH:   reports that she does not drink alcohol.       Family History:      Positive as follows:        Problem Relation Age of Onset    Cancer Mother     Heart Disease Mother     High Blood Pressure Mother    [employer] Diabetes Mother     Vision Loss Mother     Stroke Mother     COPD Father     Diabetes Father     COPD Brother        Diet:  DIET GENERAL; No Added Salt (3-4 GM)    REVIEW OF SYSTEMS:   Pertinent positives as noted in the HPI. All other systems reviewed and negative. PHYSICAL EXAM:    /65   Pulse 74   Temp 97.9 °F (36.6 °C) (Oral)   Resp 16   Ht 5' 5\" (1.651 m)   Wt 241 lb 6.5 oz (109.5 kg)   SpO2 91%   BMI 40.17 kg/m²     General appearance: Exogenously obese. Some discomfort due to chronic back pain, appears stated age and cooperative. HEENT:  Normal cephalic, atraumatic without obvious deformity. Pupils equal, round, and reactive to light. Extra ocular muscles intact. Conjunctivae/corneas clear. Neck: Supple, with full range of motion. No jugular venous distention. Trachea midline. Respiratory:  Normal respiratory effort. Clear to auscultation, bilaterally without Rales/Wheezes/Rhonchi. Cardiovascular:  Regular rate and rhythm with normal S1/S2 without murmurs, rubs or gallops. Abdomen: Obese, soft, non-tender, non-distended with normal bowel sounds. Musculoskeletal:  No clubbing, cyanosis or edema bilaterally. Full range of motion without deformity. Skin: Skin color, texture, turgor normal.  No rashes or lesions. Neurologic:  Neurovascularly intact without any focal sensory/motor deficits. Cranial nerves: II-XII intact, grossly non-focal.  Psychiatric:  Alert and oriented, thought content appropriate, normal insight  Capillary Refill: Brisk,< 3 seconds   Peripheral Pulses: +2 palpable, equal bilaterally       Labs:     Recent Labs      10/28/17   1210   WBC  5.3   HGB  14.3   HCT  41.8   PLT  168     Recent Labs      10/28/17   1210   NA  142   K  4.4   CL  102   CO2  25   BUN  24*   CREATININE  0.9   CALCIUM  9.2     Recent Labs      10/28/17   1210   AST  24   ALT  13   BILIDIR  <0.2   BILITOT  0.6   ALKPHOS  89     No results for input(s): INR in the last 72 hours.   No results for input(s): Machelle Pruitt in the last 72 hours. Urinalysis:      Lab Results   Component Value Date    NITRU NEGATIVE 10/28/2017    WBCUA NONE SEEN 10/28/2017    BACTERIA NONE 10/28/2017    RBCUA 3-5 10/28/2017    BLOODU MODERATE 10/28/2017    SPECGRAV 1.019 10/28/2017    GLUCOSEU NEGATIVE 08/11/2016       Radiology:       XR CHEST STANDARD (2 VW)   Final Result      Low lung volumes and linear markings in the lung bases which likely represents scarring or atelectasis. Underlying infectious infiltrate is not excluded. **This report has been created using voice recognition software. It may contain minor errors which are inherent in voice recognition technology. **      Final report electronically signed by Dr. Nadeen Campos on 10/28/2017 12:53 PM               DVT prophylaxis: [] Lovenox                                 [] SCDs                                 [] SQ Heparin                                 [] Encourage ambulation           [] Already on Anticoagulation    Code Status: Full Code      PT/OT Eval Status: Requested. Disposition:    [] Home       [] TCU       [] Rehab       [] Psych       [] SNF       [] [place]       [] Other-    ASSESSMENT:    Active Hospital Problems    Diagnosis Date Noted    Tachycardia-bradycardia syndrome ([facility] [address] [I49.5] 10/28/2017     Priority: Medium    Symptomatic sinus bradycardia [R00.1] 10/28/2017    Cardiomyopathy, dilated ([address] [I42.0]     Coronary artery disease involving native coronary artery of native heart without angina pectoris [I25.10]     Type 2 diabetes mellitus without complication ([address] [U49.0]     Essential (primary) hypertension [I10] 02/14/2016    CKD (chronic kidney disease) stage 3, GFR 30-59 ml/min [N18.3] 02/27/2013       PLAN:    Patient possibly has the bradycardia, tachycardia-type syndrome or a variant. However the periodic onset of severe fatigue does not appear directly related.   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 Dr. Figueroa Sylvain he is consulted. Chronic back pain. I had a discussion with patient and she is hesitant to take by mouth narcotics for her discomfort. She has tried locally placed lidocaine patches but reports that these do not stay on her back. I suggested the trial of a fentanyl patch. We will request physical therapy evaluation. Thank you Estela Elizalde DO for the opportunity to be involved in this patient's care.     Electronically signed by Pinkey Leyden, MD on 10/28/2017 at 11:22 PM

## 2017-10-28 NOTE — ED PROVIDER NOTES
change. Generalized malaise    HENT: Negative for congestion, hearing loss, postnasal drip, rhinorrhea, sinus pressure, sore throat and voice change. Eyes: Negative for photophobia, redness and visual disturbance. Respiratory: Positive for cough and shortness of breath. Negative for chest tightness and wheezing. Cardiovascular: Negative for chest pain and palpitations. Gastrointestinal: Negative for abdominal distention, abdominal pain, blood in stool, constipation, diarrhea, nausea and vomiting. Endocrine: Negative for cold intolerance, heat intolerance, polydipsia, polyphagia and polyuria. Genitourinary: Negative for difficulty urinating, dysuria, flank pain, frequency and vaginal pain. Musculoskeletal: Positive for back pain. Negative for arthralgias, gait problem, joint swelling, neck pain and neck stiffness. Skin: Negative for color change and rash. Allergic/Immunologic: Negative for immunocompromised state. Neurological: Positive for weakness. Negative for dizziness, tremors, light-headedness, numbness and headaches. Hematological: Does not bruise/bleed easily. Psychiatric/Behavioral: Negative for behavioral problems, confusion, decreased concentration, hallucinations, self-injury and suicidal ideas. The patient is not nervous/anxious. PAST MEDICAL HISTORY    has a past medical history of Anemia; CAD (coronary artery disease); CHF (congestive heart failure) ([address] Chronic kidney disease; DDD (degenerative disc disease), lumbar; Depression; GERD (gastroesophageal reflux disease); History of blood transfusion; Hx of blood clots; Hyperlipidemia; Hypertension; Hyperthyroidism; Movement disorder; Neuropathy ([address] Obesity; Pneumonia; Sleep apnea; Status post right and left heart catheterization; and Type II or unspecified type diabetes mellitus without mention of complication, not stated as uncontrolled.     SURGICAL HISTORY      has a past surgical history that includes Rotator cuff repair; Tubal ligation; Hysterectomy; bladder suspension; tendon release (Left, 08/09/2016); Achilles tendon surgery (Bilateral); Cardiac surgery (2016); Hammer toe surgery (Right); and hiatal hernia repair (09/06/2016). CURRENT MEDICATIONS       Previous Medications    ACETAMINOPHEN (TYLENOL) 325 MG TABLET    Take 2 tablets by mouth every 4 hours as needed for Pain    ALPRAZOLAM (XANAX) 0.5 MG TABLET    Take 0.5 mg by mouth 3 times daily as needed for Sleep    ASPIRIN 81 MG EC TABLET    Take 1 tablet by mouth daily    ATORVASTATIN (LIPITOR) 20 MG TABLET    Take 20 mg by mouth daily    BENZONATATE (TESSALON) 100 MG CAPSULE    Take 200 mg by mouth 3 times daily as needed for Cough    BUMETANIDE (BUMEX) 1 MG TABLET    Take 1 tablet by mouth daily    CETIRIZINE-PSUEDOEPHEDRINE (ZYRTEC-D) 5-120 MG PER EXTENDED RELEASE TABLET    Take 1 tablet by mouth daily as needed for Allergies    DOCUSATE SODIUM (COLACE) 100 MG CAPSULE    Take 100 mg by mouth 2 times daily    FERROUS SULFATE 325 (65 FE) MG TABLET    Take 325 mg by mouth 3 times daily (with meals)    GLIMEPIRIDE (AMARYL) 2 MG TABLET    Take 2 mg by mouth 2 times daily    IMMUNE GLOBULIN (HUMAN) IN DEXTROSE SOLUTION    Infuse 10 g intravenously every 30 days    LEVOTHYROXINE (SYNTHROID) 75 MCG TABLET    Take 75 mcg by mouth Daily    LOSARTAN (COZAAR) 25 MG TABLET    TAKE 1 TABLET EVERY DAY    MAGNESIUM HYDROXIDE (MILK OF MAGNESIA CONCENTRATE) 2400 MG/10ML SUSP    Take 30 mLs by mouth once as needed    PANTOPRAZOLE SODIUM (PROTONIX) 40 MG PACK PACKET    Take 40 mg by mouth 2 times daily     POTASSIUM CHLORIDE SA (K-DUR;KLOR-CON M) 20 MEQ TABLET    Take 0.5 tablets by mouth 2 times daily (with meals)    PREGABALIN (LYRICA) 150 MG CAPSULE    Take 150 mg by mouth 3 times daily    RIVAROXABAN (XARELTO) 20 MG TABS TABLET    Take 15 mg by mouth every 24 hours Will start 20 mg on October 18       ALLERGIES     is allergic to silicone.     FAMILY HISTORY final impression and disposition and plan was determined by myself. CRITICAL CARE:   None    CONSULTS:  Dr. Umer Gardner:  None    FINAL IMPRESSION      1. Other cardiac arrhythmia    2. Ventricular bigeminy seen on cardiac monitor    3. Symptomatic bradycardia    4. Fatigue, unspecified type    5. General weakness          DISPOSITION/PLAN   Admission     PATIENT REFERRED TO:  Andregonzalez Fernandez DO [id number]            DISCHARGE MEDICATIONS:  New Prescriptions    No medications on file       (Please note that portions of this note were completed with a voice recognition program.  Efforts were made to edit the dictations but occasionally words are mis-transcribed.)    This document serves as a record of the services and decisions personally performed and made by [facility] RADHA Ramirez. It was created on their behalf by Kaveh Kiran, a trained medical scribe. The creation of this document is based the provider's statements to the medical scribe. Signed by: Jose Elias Jean Baptiste, 3:17 PM 10/28/17     Provider: The information in this document, created by the medical scribe for me, accurately reflects the services I personally performed and the decisions made by me.       Angelo Ramirez CNP 10/28/17 3:17 PM             Angelo Ramirez NP  10/28/17 1537

## 2017-10-29 ENCOUNTER — APPOINTMENT (OUTPATIENT)
Dept: GENERAL RADIOLOGY | Age: 72
DRG: 261 | End: 2017-10-29
Payer: MEDICARE

## 2017-10-29 LAB
EKG ATRIAL RATE: 80 BPM
EKG ATRIAL RATE: 83 BPM
EKG P AXIS: 26 DEGREES
EKG P AXIS: 26 DEGREES
EKG P-R INTERVAL: 150 MS
EKG P-R INTERVAL: 160 MS
EKG Q-T INTERVAL: 376 MS
EKG Q-T INTERVAL: 398 MS
EKG QRS DURATION: 88 MS
EKG QRS DURATION: 94 MS
EKG QTC CALCULATION (BAZETT): 441 MS
EKG QTC CALCULATION (BAZETT): 459 MS
EKG R AXIS: -45 DEGREES
EKG R AXIS: -59 DEGREES
EKG T AXIS: 47 DEGREES
EKG T AXIS: 58 DEGREES
EKG VENTRICULAR RATE: 80 BPM
EKG VENTRICULAR RATE: 83 BPM
FOLATE: 19.3 NG/ML (ref 4.8–24.2)
HCT VFR BLD CALC: 38.2 % (ref 37–47)
HEMOGLOBIN: 12.9 GM/DL (ref 12–16)
MCH RBC QN AUTO: 30.9 PG (ref 27–31)
MCHC RBC AUTO-ENTMCNC: 33.7 GM/DL (ref 33–37)
MCV RBC AUTO: 91.8 FL (ref 81–99)
PDW BLD-RTO: 13.5 % (ref 11.5–14.5)
PLATELET # BLD: 168 THOU/MM3 (ref 130–400)
PMV BLD AUTO: 7.9 MCM (ref 7.4–10.4)
RBC # BLD: 4.16 MILL/MM3 (ref 4.2–5.4)
TOTAL CK: 58 U/L (ref 30–135)
VITAMIN B-12: 666 PG/ML (ref 211–911)
WBC # BLD: 4.2 THOU/MM3 (ref 4.8–10.8)

## 2017-10-29 PROCEDURE — 85027 COMPLETE CBC AUTOMATED: CPT

## 2017-10-29 PROCEDURE — 82550 ASSAY OF CK (CPK): CPT

## 2017-10-29 PROCEDURE — 36415 COLL VENOUS BLD VENIPUNCTURE: CPT

## 2017-10-29 PROCEDURE — 6370000000 HC RX 637 (ALT 250 FOR IP): Performed by: INTERNAL MEDICINE

## 2017-10-29 PROCEDURE — 72220 X-RAY EXAM SACRUM TAILBONE: CPT

## 2017-10-29 PROCEDURE — 1200000003 HC TELEMETRY R&B

## 2017-10-29 PROCEDURE — 99223 1ST HOSP IP/OBS HIGH 75: CPT | Performed by: INTERNAL MEDICINE

## 2017-10-29 PROCEDURE — 72100 X-RAY EXAM L-S SPINE 2/3 VWS: CPT

## 2017-10-29 PROCEDURE — 99232 SBSQ HOSP IP/OBS MODERATE 35: CPT | Performed by: FAMILY MEDICINE

## 2017-10-29 PROCEDURE — 82746 ASSAY OF FOLIC ACID SERUM: CPT

## 2017-10-29 PROCEDURE — 82607 VITAMIN B-12: CPT

## 2017-10-29 PROCEDURE — 93005 ELECTROCARDIOGRAM TRACING: CPT

## 2017-10-29 PROCEDURE — 2580000003 HC RX 258: Performed by: INTERNAL MEDICINE

## 2017-10-29 RX ADMIN — Medication 10 ML: at 20:15

## 2017-10-29 RX ADMIN — GLIMEPIRIDE 2 MG: 2 TABLET ORAL at 08:48

## 2017-10-29 RX ADMIN — ASPIRIN 81 MG: 81 TABLET ORAL at 08:48

## 2017-10-29 RX ADMIN — POTASSIUM CHLORIDE 10 MEQ: 1500 TABLET, EXTENDED RELEASE ORAL at 08:48

## 2017-10-29 RX ADMIN — LOSARTAN POTASSIUM 25 MG: 25 TABLET, FILM COATED ORAL at 08:48

## 2017-10-29 RX ADMIN — BUMETANIDE 1 MG: 1 TABLET ORAL at 08:48

## 2017-10-29 RX ADMIN — GLIMEPIRIDE 2 MG: 2 TABLET ORAL at 20:15

## 2017-10-29 RX ADMIN — PREGABALIN 150 MG: 75 CAPSULE ORAL at 20:15

## 2017-10-29 RX ADMIN — PREGABALIN 150 MG: 75 CAPSULE ORAL at 12:15

## 2017-10-29 RX ADMIN — PREGABALIN 150 MG: 75 CAPSULE ORAL at 08:48

## 2017-10-29 RX ADMIN — HYDROCODONE BITARTRATE AND ACETAMINOPHEN 1 TABLET: 5; 325 TABLET ORAL at 18:07

## 2017-10-29 RX ADMIN — HYDROCODONE BITARTRATE AND ACETAMINOPHEN 1 TABLET: 5; 325 TABLET ORAL at 00:02

## 2017-10-29 RX ADMIN — HYDROCODONE BITARTRATE AND ACETAMINOPHEN 1 TABLET: 5; 325 TABLET ORAL at 12:15

## 2017-10-29 RX ADMIN — LEVOTHYROXINE SODIUM 75 MCG: 75 TABLET ORAL at 05:29

## 2017-10-29 ASSESSMENT — PAIN DESCRIPTION - PAIN TYPE
TYPE: CHRONIC PAIN

## 2017-10-29 ASSESSMENT — PAIN SCALES - GENERAL
PAINLEVEL_OUTOF10: 4
PAINLEVEL_OUTOF10: 2
PAINLEVEL_OUTOF10: 0
PAINLEVEL_OUTOF10: 5
PAINLEVEL_OUTOF10: 5
PAINLEVEL_OUTOF10: 2
PAINLEVEL_OUTOF10: 0

## 2017-10-29 ASSESSMENT — PAIN DESCRIPTION - LOCATION
LOCATION: BACK

## 2017-10-29 NOTE — PROGRESS NOTES
Hospitalist Progress Note    Patient:  Armond Nolasco      Unit/Bed:6K-10/010-A    YOB: 1945    MRN: [id number]       Acct: [de-identified]     PCP: Kameron Khan DO    Date of Admission: 10/28/2017    Chief Complaint: F/U     Subjective: Pt seen and examined bedside. Pt still having low back pain. She states she fell a few days ago and since then she has been having low back pain and feels weak. Medications:  Reviewed    Infusion Medications    Scheduled Medications    [START ON 10/30/2017] influenza virus vaccine  0.5 mL Intramuscular Once    aspirin  81 mg Oral Daily    bumetanide  1 mg Oral Daily    glimepiride  2 mg Oral BID    levothyroxine  75 mcg Oral Daily    losartan  25 mg Oral Daily    potassium chloride  10 mEq Oral BID WC    pregabalin  150 mg Oral TID    rivaroxaban  15 mg Oral Q24H    sodium chloride flush  10 mL Intravenous 2 times per day     PRN Meds: sodium chloride flush, magnesium hydroxide, ondansetron, HYDROcodone 5 mg - acetaminophen      Intake/Output Summary (Last 24 hours) at 10/29/17 0956  Last data filed at 10/29/17 0322   Gross per 24 hour   Intake              510 ml   Output             1750 ml   Net            -1240 ml       Diet:  DIET GENERAL; No Added Salt (3-4 GM)    Exam:  /65   Pulse 73   Temp 98 °F (36.7 °C) (Oral)   Resp 16   Ht 5' 5\" (1.651 m)   Wt 237 lb 10.5 oz (107.8 kg)   SpO2 91%   BMI 39.55 kg/m²     General appearance: No apparent distress, appears stated age and cooperative. HEENT: Pupils equal, round, and reactive to light. Conjunctivae/corneas clear. Neck: Supple, with full range of motion. No jugular venous distention. Trachea midline. Respiratory:  Normal respiratory effort. Clear to auscultation, bilaterally without Rales/Wheezes/Rhonchi. Cardiovascular: Regular rate and rhythm with normal S1/S2 without murmurs, rubs or gallops.   Abdomen: Soft, non-tender, non-distended with normal bowel Care Facility       [] Other-    Code Status: Full Code    PT/OT Eval Status: ordered    Assessment/Plan:  1) Tachy-Jose Alejandro Syndrome  - Cardiology consulted for further input --> pending    2) Weakness  - PT consulted --> pending  - TSH WNL  - Check B12 and folate levels    3) Back pain  - Pt stated that she had a recent fall  - Low back pain is uncontrolled  - I will get a lumbar spine/sacral Xray  - Start patient on PRN tramadol and percocet for moderate/severe pain    4) DM2  - Continue with Amaryl    5) HTN  - Continue with Cozaar    6) Hypothyroidism  - Continue with synthroid  - TSH WNL    Electronically signed by Noah Hart MD on 10/29/2017 at 9:56 AM

## 2017-10-29 NOTE — CONSULTS
Aggressive risk factor management. Medical management. Echocardiogram: 6/2017:  Technically difficult examination.   Ejection fraction is visually estimated at 45%.   There was mild global hypokinesis of the left ventricle.   Moderately dilated left atrium.   Mild mitral regurgitation is present. Exercise stress test None. Holter monitor 6/2017:  CONCLUSION:  1. Sinus rhythm. 2.  Occasional to frequent PVCs. 3.  No sustained arrhythmias. 4.  No pathologic bradycardia  5.   Clinical correlation is recommended    EKG: normal sinus rhythm, LAFB    Past Medical History:    Past Medical History:   Diagnosis Date    Anemia     CAD (coronary artery disease)     CHF (congestive heart failure) (HCC)     Chronic kidney disease     stage 3 kidney     DDD (degenerative disc disease), lumbar     Depression     GERD (gastroesophageal reflux disease)     History of blood transfusion     Hx of blood clots 09/2017    pulmonary emboli    Hyperlipidemia     Hypertension     Hyperthyroidism     Movement disorder     DDD    Neuropathy ([address]     Obesity     Pneumonia 2016    sepsis    Sleep apnea     usually wears cpap at night    Status post right and left heart catheterization     Type II or unspecified type diabetes mellitus without mention of complication, not stated as uncontrolled        Past Surgical History:    Past Surgical History:   Procedure Laterality Date    ABDOMEN SURGERY      ACHILLES TENDON SURGERY Bilateral     BLADDER SUSPENSION      CARDIAC SURGERY  2016    [facility]    HAMMER TOE SURGERY Right     HERNIA REPAIR      HIATAL HERNIA REPAIR  09/06/2016    Laparoscopic Robotic with Nissen Fundoplication - Dr. Mehreen Albert      right    TENDON RELEASE Left 08/09/2016    left foot    TUBAL LIGATION         Medications Prior to Admission:    Prescriptions Prior to Admission: rivaroxaban (XARELTO) 20 MG TABS tablet, Take 15 mg by mouth every 24 hours Will start 20 mg on October 18  benzonatate (TESSALON) 100 MG capsule, Take 200 mg by mouth 3 times daily as needed for Cough  glimepiride (AMARYL) 2 MG tablet, Take 2 mg by mouth 2 times daily  losartan (COZAAR) 25 MG tablet, TAKE 1 TABLET EVERY DAY  cetirizine-psuedoephedrine (ZYRTEC-D) 5-120 MG per extended release tablet, Take 1 tablet by mouth daily as needed for Allergies  pantoprazole sodium (PROTONIX) 40 MG PACK packet, Take 40 mg by mouth 2 times daily   ALPRAZolam (XANAX) 0.5 MG tablet, Take 0.5 mg by mouth 3 times daily as needed for Sleep  atorvastatin (LIPITOR) 20 MG tablet, Take 20 mg by mouth daily  bumetanide (BUMEX) 1 MG tablet, Take 1 tablet by mouth daily  potassium chloride SA (K-DUR;KLOR-CON M) 20 MEQ tablet, Take 0.5 tablets by mouth 2 times daily (with meals)  aspirin 81 MG EC tablet, Take 1 tablet by mouth daily  ferrous sulfate 325 (65 FE) MG tablet, Take 325 mg by mouth 3 times daily (with meals)  levothyroxine (SYNTHROID) 75 MCG tablet, Take 75 mcg by mouth Daily  pregabalin (LYRICA) 150 MG capsule, Take 150 mg by mouth 3 times daily  magnesium hydroxide (MILK OF MAGNESIA CONCENTRATE) 2400 MG/10ML SUSP, Take 30 mLs by mouth once as needed  Immune Globulin (Human) in dextrose solution, Infuse 10 g intravenously every 30 days  docusate sodium (COLACE) 100 MG capsule, Take 100 mg by mouth 2 times daily  acetaminophen (TYLENOL) 325 MG tablet, Take 2 tablets by mouth every 4 hours as needed for Pain    Allergies:    Silicone    Social History:    reports that she quit smoking about 11 years ago. Her smoking use included Cigarettes. She has a 30.00 pack-year smoking history. She has never used smokeless tobacco. She reports that she does not drink alcohol or use drugs.     Family History:   family history includes COPD in her brother and father; Cancer in her mother; Diabetes in her father and mother; Heart Disease in her mother; High Blood Pressure in her mother; Stroke in her mother; Vision Loss in her mother. REVIEW OF SYSTEMS:  As above in the HPI, otherwise negative    PHYSICAL EXAM:    Vitals:  /84   Pulse 93   Temp 98.9 °F (37.2 °C) (Oral)   Resp 18   Ht 5' 5\" (1.651 m)   Wt 237 lb 10.5 oz (107.8 kg)   SpO2 90%   BMI 39.55 kg/m²     General Appearance: Alert and responsive, well developed and well- nourished, in no form of acute distress  Head: normocephalic and atraumatic  Eyes: pupils equal, round, and reactive to light, extraocular eye movements intact, conjunctivae normal  Neck: supple and non-tender without mass, no thyromegaly or thyroid nodules, no cervical lymphadenopathy  Pulmonary/Chest: clear to auscultation bilaterally- no wheezes, rales or rhonchi, normal air movement, no respiratory distress  Cardiovascular: Carotid and Femoral arteries are well felt, No bruit, NO JVD,   normal rate, regular rhythm, normal S1 and S2, no murmurs, rubs, clicks, or gallops,   Abdomen: soft, non-tender, non-distended, normal bowel sounds, no masses or organomegaly  OLLIE: no flank or CVA tenderness  Skin: warm and dry, no rash or erythema, No subcutaneous nodules  Extremities: no cyanosis or clubbing , NO Pedal/ptetibial edema, NO muscle or joint tenderness  Musculoskeletal: normal range of motion, no joint swelling, deformity or tenderness  CNS: AAOx3, Cranial nerves intact, sensation intact to touch and pain, Normal muscle strength and tone, DTR intact, No focal sign   Psychiatry: Normal Judgement, appropriate mood and affect.   LABS:  Lab Results   Component Value Date    CKTOTAL 58 10/29/2017    CKTOTAL 1,071 03/23/2016    CKTOTAL 1,473 03/23/2016    TROPONINT < 0.010 10/28/2017    TROPONINT 0.174 08/11/2016    TROPONINT 0.234 08/11/2016     CBC:   Lab Results   Component Value Date    WBC 4.2 10/29/2017    RBC 4.16 10/29/2017    HGB 12.9 10/29/2017    HCT 38.2 10/29/2017    MCV 91.8 10/29/2017    MCH 30.9 10/29/2017    MCHC 33.7 10/29/2017    RDW 13.5 10/29/2017    PLT 168 10/29/2017    MPV 7.9 10/29/2017     BMP:    Lab Results   Component Value Date     10/28/2017    K 4.4 10/28/2017     10/28/2017    CO2 25 10/28/2017    BUN 24 10/28/2017    LABALBU 3.7 10/28/2017    CREATININE 0.9 10/28/2017    CALCIUM 9.2 10/28/2017    LABGLOM 61 10/28/2017    GLUCOSE 139 10/28/2017     Magnesium:    Lab Results   Component Value Date    MG 2.0 10/28/2017    MG 1.8 06/13/2017    MG 1.9 06/12/2017     Creatinine:   Lab Results   Component Value Date    CREATININE 0.9 10/28/2017    CREATININE 1.1 09/18/2017    CREATININE 1.5 09/07/2017     Hepatic Function Panel:    Lab Results   Component Value Date    ALKPHOS 89 10/28/2017    ALT 13 10/28/2017    AST 24 10/28/2017    PROT 6.9 10/28/2017    BILITOT 0.6 10/28/2017    BILIDIR <0.2 10/28/2017    LABALBU 3.7 10/28/2017       Warfarin PT/INR:   Lab Results   Component Value Date    INR 1.03 08/11/2016    INR 1.13 07/18/2016    INR 0.97 06/06/2016     HgBA1c:    Lab Results   Component Value Date    LABA1C 6.0 07/18/2016     FLP:    Lab Results   Component Value Date    TRIG 77 03/23/2016    HDL 49 03/23/2016    LDLCALC 63 03/23/2016     TSH:    Lab Results   Component Value Date    TSH 1.180 10/28/2017         ASSESSMENT:    The patient symptoms are non-specific for a cardiac cause. She is in SR now with HR in the 60's and she feels tired  Her LVEF improved significantly according to last echocardiogram in 6/2017 with LVEF of 45%. I think we need more documentation of bradycardia before we insert a permanent pacemaker. She did have a HOlter monitor in June which showed no significant bradycardia.       Patient Active Problem List    Diagnosis Date Noted    Symptomatic sinus bradycardia 10/28/2017     Priority: Low    Tachycardia-bradycardia syndrome ([facility] 10/28/2017     Priority: Low    Bradycardia      Priority: Low    S/P Nissen fundoplication (without gastrostomy tube) procedure      Priority: Low    Gastroesophageal reflux disease involving native coronary artery of native heart without angina pectoris      Priority: Low    Left ventricular systolic dysfunction 13/93/3575     Priority: Low    Pneumonia of left lung due to infectious organism 03/23/2016     Priority: Low    Type 2 diabetes mellitus without complication ([facility]      Priority: Low    Septic shock (HCC) 03/22/2016     Priority: Low    Elevated troponin 03/22/2016     Priority: Low    Pneumonia of both lower lobes due to infectious organism 03/22/2016     Priority: Low    GABBY (acute kidney injury) ([facility]      Priority: Low    Essential (primary) hypertension 02/14/2016     Priority: Low    Sepsis with hypotension ([facility] 02/14/2016     Priority: Low    CKD (chronic kidney disease) stage 3, GFR 30-59 ml/min 02/27/2013     Priority: Low    Benign essential tremor 10/10/2012     Priority: Low         PLAN:    Plan loop recorder implantation in a.m. Continue to monitor cardiac rhythm. Of note, the patient is on Xarelto and she took it this morning at 9:30 a.m. We will proceed with Loop implant tomorrow after noon.       Aubree Phillips MD, [facility] [place]  12:29 PM  10/29/2017

## 2017-10-29 NOTE — PROGRESS NOTES
Pt in Ventricular Bigeminy. Dr Greer Coe notified and he said he notified Dr Jameson Favre. EKG obtained. No new orders.

## 2017-10-29 NOTE — PLAN OF CARE
Problem: Pain:  Goal: Pain level will decrease  Pain level will decrease   Outcome: Ongoing  Pt receiving prn Norco for back pain. Pain goal 2-3. Problem: Falls - Risk of  Goal: Absence of falls  Outcome: Ongoing  No falls this shift. Call light in reach. Bed alarm on. Falling star protocol in place. Pt pivots to bedside commode with x1 assist.     Problem: Discharge Planning:  Goal: Participates in care planning  Participates in care planning   Outcome: Ongoing  Pt and family active in care planning    Problem: Tissue Perfusion, Altered:  Goal: Circulatory function within specified parameters  Circulatory function within specified parameters   Outcome: Ongoing  Cardiology consulted. Cardiac monitor being placed tomorrow afternoon. Comments: Care plan reviewed with patient and family. Patient and family verbalize understanding of the plan of care and contribute to goal setting.

## 2017-10-30 ENCOUNTER — APPOINTMENT (OUTPATIENT)
Dept: CARDIAC CATH/INVASIVE PROCEDURES | Age: 72
DRG: 261 | End: 2017-10-30
Payer: MEDICARE

## 2017-10-30 LAB
APTT: 27 SECONDS (ref 22–38)
EKG ATRIAL RATE: 70 BPM
EKG ATRIAL RATE: 84 BPM
EKG P AXIS: 29 DEGREES
EKG P AXIS: 35 DEGREES
EKG P-R INTERVAL: 154 MS
EKG P-R INTERVAL: 170 MS
EKG Q-T INTERVAL: 380 MS
EKG Q-T INTERVAL: 404 MS
EKG QRS DURATION: 88 MS
EKG QRS DURATION: 94 MS
EKG QTC CALCULATION (BAZETT): 410 MS
EKG QTC CALCULATION (BAZETT): 477 MS
EKG R AXIS: -45 DEGREES
EKG R AXIS: -61 DEGREES
EKG T AXIS: 62 DEGREES
EKG T AXIS: 98 DEGREES
EKG VENTRICULAR RATE: 70 BPM
EKG VENTRICULAR RATE: 84 BPM
INR BLD: 1 (ref 0.85–1.13)

## 2017-10-30 PROCEDURE — 1200000003 HC TELEMETRY R&B

## 2017-10-30 PROCEDURE — 6370000000 HC RX 637 (ALT 250 FOR IP): Performed by: INTERNAL MEDICINE

## 2017-10-30 PROCEDURE — A6219 GAUZE <= 16 SQ IN W/BORDER: HCPCS

## 2017-10-30 PROCEDURE — 33282 HC IMPLANTABLE LOOP RECORDER: CPT | Performed by: INTERNAL MEDICINE

## 2017-10-30 PROCEDURE — 93005 ELECTROCARDIOGRAM TRACING: CPT

## 2017-10-30 PROCEDURE — 2580000003 HC RX 258: Performed by: INTERNAL MEDICINE

## 2017-10-30 PROCEDURE — 6360000002 HC RX W HCPCS: Performed by: INTERNAL MEDICINE

## 2017-10-30 PROCEDURE — 2500000003 HC RX 250 WO HCPCS

## 2017-10-30 PROCEDURE — 85730 THROMBOPLASTIN TIME PARTIAL: CPT

## 2017-10-30 PROCEDURE — 33282 PR IMPLANTATION PT-ACTIVATED CARDIAC EVENT RECORDER: CPT | Performed by: INTERNAL MEDICINE

## 2017-10-30 PROCEDURE — 85610 PROTHROMBIN TIME: CPT

## 2017-10-30 PROCEDURE — C1764 EVENT RECORDER, CARDIAC: HCPCS

## 2017-10-30 PROCEDURE — 0JH602Z INSERTION OF MONITORING DEVICE INTO CHEST SUBCUTANEOUS TISSUE AND FASCIA, OPEN APPROACH: ICD-10-PCS | Performed by: INTERNAL MEDICINE

## 2017-10-30 PROCEDURE — 36415 COLL VENOUS BLD VENIPUNCTURE: CPT

## 2017-10-30 PROCEDURE — 6360000002 HC RX W HCPCS

## 2017-10-30 PROCEDURE — 99233 SBSQ HOSP IP/OBS HIGH 50: CPT | Performed by: INTERNAL MEDICINE

## 2017-10-30 RX ORDER — SODIUM CHLORIDE 0.9 % (FLUSH) 0.9 %
10 SYRINGE (ML) INJECTION EVERY 12 HOURS SCHEDULED
Status: DISCONTINUED | OUTPATIENT
Start: 2017-10-30 | End: 2017-11-01 | Stop reason: HOSPADM

## 2017-10-30 RX ORDER — SODIUM CHLORIDE 0.9 % (FLUSH) 0.9 %
10 SYRINGE (ML) INJECTION PRN
Status: DISCONTINUED | OUTPATIENT
Start: 2017-10-30 | End: 2017-11-01 | Stop reason: HOSPADM

## 2017-10-30 RX ORDER — SODIUM CHLORIDE 0.9 % (FLUSH) 0.9 %
10 SYRINGE (ML) INJECTION PRN
Status: DISCONTINUED | OUTPATIENT
Start: 2017-10-30 | End: 2017-10-30 | Stop reason: SDUPTHER

## 2017-10-30 RX ORDER — HYDROCODONE BITARTRATE AND ACETAMINOPHEN 5; 325 MG/1; MG/1
2 TABLET ORAL EVERY 4 HOURS PRN
Status: DISCONTINUED | OUTPATIENT
Start: 2017-10-30 | End: 2017-11-01 | Stop reason: HOSPADM

## 2017-10-30 RX ORDER — ACETAMINOPHEN 325 MG/1
650 TABLET ORAL EVERY 4 HOURS PRN
Status: DISCONTINUED | OUTPATIENT
Start: 2017-10-30 | End: 2017-11-01 | Stop reason: HOSPADM

## 2017-10-30 RX ORDER — HYDROCODONE BITARTRATE AND ACETAMINOPHEN 5; 325 MG/1; MG/1
1 TABLET ORAL EVERY 4 HOURS PRN
Status: DISCONTINUED | OUTPATIENT
Start: 2017-10-30 | End: 2017-11-01 | Stop reason: HOSPADM

## 2017-10-30 RX ORDER — SODIUM CHLORIDE 0.9 % (FLUSH) 0.9 %
10 SYRINGE (ML) INJECTION EVERY 12 HOURS SCHEDULED
Status: DISCONTINUED | OUTPATIENT
Start: 2017-10-30 | End: 2017-10-30 | Stop reason: SDUPTHER

## 2017-10-30 RX ORDER — SODIUM CHLORIDE 9 MG/ML
INJECTION, SOLUTION INTRAVENOUS CONTINUOUS
Status: DISCONTINUED | OUTPATIENT
Start: 2017-10-30 | End: 2017-10-30

## 2017-10-30 RX ORDER — CHLORHEXIDINE GLUCONATE 4 G/100ML
SOLUTION TOPICAL ONCE
Status: COMPLETED | OUTPATIENT
Start: 2017-10-30 | End: 2017-10-30

## 2017-10-30 RX ORDER — ONDANSETRON 2 MG/ML
4 INJECTION INTRAMUSCULAR; INTRAVENOUS EVERY 6 HOURS PRN
Status: DISCONTINUED | OUTPATIENT
Start: 2017-10-30 | End: 2017-11-01 | Stop reason: HOSPADM

## 2017-10-30 RX ORDER — DOCUSATE SODIUM 100 MG/1
100 CAPSULE, LIQUID FILLED ORAL 2 TIMES DAILY
Status: DISCONTINUED | OUTPATIENT
Start: 2017-10-30 | End: 2017-11-01 | Stop reason: HOSPADM

## 2017-10-30 RX ADMIN — HYDROCODONE BITARTRATE AND ACETAMINOPHEN 1 TABLET: 5; 325 TABLET ORAL at 14:24

## 2017-10-30 RX ADMIN — DOCUSATE SODIUM 100 MG: 100 CAPSULE ORAL at 19:54

## 2017-10-30 RX ADMIN — SODIUM CHLORIDE: 9 INJECTION, SOLUTION INTRAVENOUS at 03:50

## 2017-10-30 RX ADMIN — HYDROCODONE BITARTRATE AND ACETAMINOPHEN 1 TABLET: 5; 325 TABLET ORAL at 16:11

## 2017-10-30 RX ADMIN — PREGABALIN 150 MG: 75 CAPSULE ORAL at 19:54

## 2017-10-30 RX ADMIN — HYDROCODONE BITARTRATE AND ACETAMINOPHEN 1 TABLET: 5; 325 TABLET ORAL at 08:30

## 2017-10-30 RX ADMIN — LEVOTHYROXINE SODIUM 75 MCG: 75 TABLET ORAL at 05:28

## 2017-10-30 RX ADMIN — PREGABALIN 150 MG: 75 CAPSULE ORAL at 08:29

## 2017-10-30 RX ADMIN — PREGABALIN 150 MG: 75 CAPSULE ORAL at 16:11

## 2017-10-30 RX ADMIN — CEFAZOLIN SODIUM 2 G: 2 SOLUTION INTRAVENOUS at 16:20

## 2017-10-30 RX ADMIN — CEFAZOLIN SODIUM 2 G: 2 SOLUTION INTRAVENOUS at 21:52

## 2017-10-30 RX ADMIN — CHLORHEXIDINE GLUCONATE: 4 SOLUTION TOPICAL at 04:00

## 2017-10-30 RX ADMIN — GLIMEPIRIDE 2 MG: 2 TABLET ORAL at 19:54

## 2017-10-30 RX ADMIN — LOSARTAN POTASSIUM 25 MG: 25 TABLET, FILM COATED ORAL at 08:29

## 2017-10-30 ASSESSMENT — PAIN SCALES - GENERAL
PAINLEVEL_OUTOF10: 3
PAINLEVEL_OUTOF10: 2
PAINLEVEL_OUTOF10: 0
PAINLEVEL_OUTOF10: 3
PAINLEVEL_OUTOF10: 5
PAINLEVEL_OUTOF10: 6
PAINLEVEL_OUTOF10: 5
PAINLEVEL_OUTOF10: 9
PAINLEVEL_OUTOF10: 0
PAINLEVEL_OUTOF10: 5
PAINLEVEL_OUTOF10: 0

## 2017-10-30 ASSESSMENT — PAIN DESCRIPTION - PAIN TYPE
TYPE: CHRONIC PAIN
TYPE: CHRONIC PAIN

## 2017-10-30 ASSESSMENT — PAIN DESCRIPTION - LOCATION: LOCATION: BACK

## 2017-10-30 NOTE — OP NOTE
[address]         CARDIAC CATHETERIZATION LABORATORY       PATIENT NAME: Harley Issa      : 1945      MEDICAL RECORD NO: [id number]      ROOM: 6K-10[facility] [place]      ACCOUNT NUMBER: [de-identified]      DATE of the procedure: 10/30/17        CARDIOLOGIST: James Borden M.D., [facility] [place]    PROCEDURE PERFORMED:     1. Loop recorder implantation using  Medtronic Reveal Flukle, Model number [id number] serial number [id number]. 2. Device programming. BRIEF HISTORY: Harley Issa is a 67 y.o. y.o. who was evaluated for recurrent weakness and bradycardia episodes. No arrhythmia was documented. We decided to proceed with loop recorder implantation to rule out arrhythmic cause of her symptoms that might benefit from pacemaker implantation. .    DESCRIPTION OF PROCEDURE:   After informed written consent was obtained, the patient was brought to the Cardiac Catheterization Laboratory in no apparent distress. The left chest was prepped and draped in a sterile fashion and 2 percent lidocaine was used anesthetize the skin and subcutaneous tissues along 1 cm segment which was perpendicular to the midclavicular line and slightly above and medial to the left nipple. An incision was created using the blade provided by the Reveal LINQ device and the device was inserted under the skin using the provided kit. Staples were applied at the end  of the procedure over the small incision created by the blade. The patient tolerated procedure well and was transferred to her room in  stable condition. The device used in this setting was a Medtronic [employer], serial number [id number]. The device was programmed in fast VT zone of 260 msec and a VT zone of 340 msec. Also it was programmed in savanna zone of 40 beats per minute and in asystole for 3 seconds. CONCLUSION: Successful uncomplicated loop recorder implantation using Medtronic Reveal LINQ as described above.     James Borden M.D.,

## 2017-10-30 NOTE — CARE COORDINATION
10/30/17, 11:32 AM      Robson Miller       Admitted from:  ED 10/28/2017/ 1118 Hospital day: 2   Location: -10/010-A Reason for admit: Symptomatic bradycardia [R00.1] Status: IP  Admit order signed?: yes  PMH:  has a past medical history of Anemia; CAD (coronary artery disease); CHF (congestive heart failure) ([address] Chronic kidney disease; DDD (degenerative disc disease), lumbar; Depression; GERD (gastroesophageal reflux disease); History of blood transfusion; Hx of blood clots; Hyperlipidemia; Hypertension; Hyperthyroidism; Movement disorder; Neuropathy ([address] Obesity; Pneumonia; Sleep apnea; Status post right and left heart catheterization; and Type II or unspecified type diabetes mellitus without mention of complication, not stated as uncontrolled. Procedure: Planning loop recorder today  Medications:  Scheduled Meds:   ceFAZolin (ANCEF) IVPB  2 g Intravenous On Call to OR    influenza virus vaccine  0.5 mL Intramuscular Once    aspirin  81 mg Oral Daily    bumetanide  1 mg Oral Daily    glimepiride  2 mg Oral BID    levothyroxine  75 mcg Oral Daily    losartan  25 mg Oral Daily    potassium chloride  10 mEq Oral BID WC    pregabalin  150 mg Oral TID    rivaroxaban  15 mg Oral Q24H    sodium chloride flush  10 mL Intravenous 2 times per day     Continuous Infusions:   sodium chloride 75 mL/hr at 10/30/17 0350      Pertinent Info/Orders/Treatment Plan:   IVF. Cardiology consult. Telemetry. O2. PT. Diet: Diet NPO Effective Now   DVT Prophylaxis: Xarelto stand-alone if home medication  Smoking status:  reports that she quit smoking about 11 years ago. Her smoking use included Cigarettes. She has a 30.00 pack-year smoking history.  She has never used smokeless tobacco.   Influenza Vaccination Screening Completed: yes  Pneumonia Vaccination Screening Completed: yes  PCP: Jorje Ghosh,   Readmission:   no  Risk Score: 27.75     Discharge Planning  Current Residence:  Private Residence  Living

## 2017-10-30 NOTE — PLAN OF CARE
Problem: Pain:  Goal: Pain level will decrease  Pain level will decrease   Outcome: Ongoing  Pt complaining of chronic back pain. Norco available q4h prn. Patient satisfied. Goal: Control of acute pain  Control of acute pain   Outcome: Met This Shift  Denies presence of any acute pain. Goal: Control of chronic pain  Control of chronic pain   Outcome: Ongoing  Pt complaining of chronic back pain. Norco available q4h prn. Patient satisfied. Problem: Falls - Risk of  Intervention: Fall risk assessment  Pt able to ambulate 1 assist to [facility]. Intervention: Fall precautions  Hourly rounding and bed alarm in place. Call light reinforced. Goal: Absence of falls  Outcome: Ongoing  No falls this shift, bed alarm on, and falling star precautions in place. Problem: Discharge Planning:  Goal: Participates in care planning  Participates in care planning   Outcome: Ongoing  Pt updated on plan for loop recorder placement tomorrow, encouraged to express concerns. Verbalizes understanding. Goal: Discharged to appropriate level of care  Discharged to appropriate level of care   Outcome: Ongoing  Pt plans for return home with daughter. Remains very weak at this time. Only able to get up to bedside commode. Problem: Tissue Perfusion, Altered:  Goal: Circulatory function within specified parameters  Circulatory function within specified parameters   Outcome: Ongoing  Vitals stable. Pt switching between sinus rhythm and vent. Bigeminy throughout night. Plan for loop recorder placement tomorrow afternoon. Comments: Care plan reviewed with patient. Patient verbalizes understanding of the plan of care and contribute to goal setting.

## 2017-10-30 NOTE — CARE COORDINATION
DISCHARGE BARRIERS  10/30/17, 11:35 AM    Reason for Referral: Current with [facility]  Mental Status: Patient is alert and oriented  Decision Making: Patient is making her own decisions along with children. Family/Social/Home Environment: Assessment completed with Patient. Patient resides with her daughter who assists her with some daily ADL's depending on how she feels. Patient stated that she will do her own laundry and does cook some meals herself. Patient denied any other needs at home. Patient was recently discharged from [facility] in [place], [place]. Current Services: [facility] [address] and PT/OT and was admitted just over a week ago. Current Equipment:walker, cane  Payment Source: [employer]  Concerns or Barriers to Discharge: not at this time  Collabrative List of ECF/HH were provided: current with home health. Teach Back Method used with Patient regarding care plan and discharge plan. Patient and son verbalize understanding of the plan of care and contribute to goal setting. Anticipated Needs/Discharge Plan: Patient discharge home with daughter and current with [facility].     Electronically signed by KRISTI Bowman, JESSIE on 10/30/2017 at 11:35 AM

## 2017-10-30 NOTE — PROGRESS NOTES
Hospitalist Progress Note    Patient:  Armond Nolasco      Unit/Bed:6K-10/010-A    YOB: 1945    MRN: [id number]       Acct: [de-identified]     PCP: Kameron Khan DO    Date of Admission: 10/28/2017    Chief Complaint: weak, bradycardia (similar sxs on prior admission)    Hospital Course: Adm with above; no savanna whiile here. To have event recorder. PT/OT       Subjective:  Still feels weak       Medications:  Reviewed    Infusion Medications    sodium chloride 75 mL/hr at 10/30/17 0350     Scheduled Medications    ceFAZolin (ANCEF) IVPB  2 g Intravenous On Call to OR    rivaroxaban  15 mg Oral Q24H    influenza virus vaccine  0.5 mL Intramuscular Once    aspirin  81 mg Oral Daily    bumetanide  1 mg Oral Daily    glimepiride  2 mg Oral BID    levothyroxine  75 mcg Oral Daily    losartan  25 mg Oral Daily    potassium chloride  10 mEq Oral BID WC    pregabalin  150 mg Oral TID    sodium chloride flush  10 mL Intravenous 2 times per day     PRN Meds: sodium chloride flush, magnesium hydroxide, ondansetron, HYDROcodone 5 mg - acetaminophen      Intake/Output Summary (Last 24 hours) at 10/30/17 1200  Last data filed at 10/30/17 0409   Gross per 24 hour   Intake              650 ml   Output              400 ml   Net              250 ml       Diet:  Diet NPO Effective Now    Exam:  BP (!) 98/57   Pulse 66   Temp 97.5 °F (36.4 °C) (Oral)   Resp 16   Ht 5' 5\" (1.651 m)   Wt 236 lb 5.3 oz (107.2 kg)   SpO2 95%   BMI 39.33 kg/m²     General appearance: No apparent distress, appears stated age and cooperative. overweight  HEENT: Pupils equal, round, and reactive to light. Conjunctivae/corneas clear. Neck: Supple, with full range of motion. No jugular venous distention. Trachea midline. Respiratory:  Normal respiratory effort. Clear to auscultation, bilaterally without Rales/Wheezes/Rhonchi.   Cardiovascular: Regular rate and rhythm with normal S1/S2 without murmurs, rubs or gallops. Abdomen: Soft, non-tender, non-distended with normal bowel sounds. obese  Musculoskeletal: No clubbing, cyanosis or edema bilaterally. Full range of motion without deformity. Skin: Skin color, texture, turgor normal.  No rashes or lesions. Neurologic:  Neurovascularly intact without any focal sensory/motor deficits. Cranial nerves: II-XII intact, grossly non-focal.  Psychiatric: Alert and oriented, thought content appropriate, normal insight        Labs:   Recent Labs      10/28/17   1210  10/29/17   0528   WBC  5.3  4.2*   HGB  14.3  12.9   HCT  41.8  38.2   PLT  168  168     Recent Labs      10/28/17   1210   NA  142   K  4.4   CL  102   CO2  25   BUN  24*   CREATININE  0.9   CALCIUM  9.2     Recent Labs      10/28/17   1210   AST  24   ALT  13   BILIDIR  <0.2   BILITOT  0.6   ALKPHOS  89     Recent Labs      10/30/17   0357   INR  1.00     Recent Labs      10/29/17   1020   CKTOTAL  58       Urinalysis:    Lab Results   Component Value Date    NITRU NEGATIVE 10/28/2017    WBCUA NONE SEEN 10/28/2017    BACTERIA NONE 10/28/2017    RBCUA 3-5 10/28/2017    BLOODU MODERATE 10/28/2017    SPECGRAV 1.019 10/28/2017    GLUCOSEU NEGATIVE 08/11/2016       Radiology:  XR SACRUM COCCYX (MIN 2 VIEWS)   Final Result   1. No acute fracture or malalignment. **This report has been created using voice recognition software. It may contain minor errors which are inherent in voice recognition technology. **      Final report electronically signed by Dr. Viji Anglin on 10/29/2017 12:39 PM      XR LUMBAR SPINE (2-3 VIEWS)   Final Result   1. No acute fracture or malalignment. 2. Multilevel degenerative disc disease, most pronounced at the L1-L2 level. 3. Moderate lower lumbar facet arthrosis. **This report has been created using voice recognition software. It may contain minor errors which are inherent in voice recognition technology. **      Final report electronically signed by Dr. Viji Anglin on 10/29/2017 12:37 PM      XR CHEST STANDARD (2 VW)   Final Result      Low lung volumes and linear markings in the lung bases which likely represents scarring or atelectasis. Underlying infectious infiltrate is not excluded. **This report has been created using voice recognition software. It may contain minor errors which are inherent in voice recognition technology. **      Final report electronically signed by Dr. Mic Dai on 10/28/2017 12:53 PM          Diet: Diet NPO Effective Now    DVT prophylaxis: [] Lovenox                                 [] SCDs                                 [] SQ Heparin                                 [] Encourage ambulation           [x] Already on Anticoagulation     Disposition:    [x] Home       [] TCU       [] Rehab       [] Psych       [] SNF       [] [place]       [] Other-    Code Status: Full Code    PT/OT Eval Status:  ordered      Assessment/Plan:    Anticipated Discharge in : several days    C[address] Problems    Diagnosis Date Noted    Arrhythmia [I49.9]      Priority: High    Symptomatic sinus bradycardia [R00.1] 10/28/2017    Tachycardia-bradycardia syndrome ([address] [I49.5] 10/28/2017    Cardiomyopathy, dilated ([address] [I42.0]     Coronary artery disease involving native coronary artery of native heart without angina pectoris [I25.10]     Type 2 diabetes mellitus without complication ([address] [U20.3]     Essential (primary) hypertension [I10] 02/14/2016    CKD (chronic kidney disease) stage 3, GFR 30-59 ml/min [N18.3] 02/27/2013       1. Carol Loots-? Due to bigem  2. Weakness-PT/OT  3.  Dm- fairly well controlled        Electronically signed by Madalyn Moarn MD on 10/30/2017 at 12:00 PM

## 2017-10-30 NOTE — FLOWSHEET NOTE
1633- Time out complete. 1634-Procedure started. Versed and Fentanyl given per Melvin Flores RN.  1636-Lidocaine 2% 20 ml given SQ in left chest for LINQ insertion  1637-LINQ device deployed under skin in left chest.  6 staples put in site. 1638-Procedure complete. Dressing applied of folded 4 x 4 and tegaderm dressing. 1640-Report given to 6K RN and care turned over to [facility].

## 2017-10-30 NOTE — H&P
information:       MEDICATIONS   Coumadin Use Last 7 Days [x]No []Yes  Antiplatelet drug therapy use last 7 days  []No [x]Yes  Other anticoagulant use last 7 days  [x]No []Yes      Current Facility-Administered Medications:     0.9 % sodium chloride infusion, , Intravenous, Continuous, Melanie Ku MD, Last Rate: 75 mL/hr at 10/30/17 0350    ceFAZolin (ANCEF) 2 g in dextrose 3 % 50 mL IVPB (duplex), 2 g, Intravenous, On Call to OR, Rukhsana Hou MD, Last Rate: 100 mL/hr at 10/30/17 1620, 2 g at 10/30/17 1620    [START ON 10/31/2017] rivaroxaban (XARELTO) tablet 15 mg, 15 mg, Oral, Q24H, Melanie Ku MD    HYDROcodone-acetaminophen (NORCO) 5-325 MG per tablet 1 tablet, 1 tablet, Oral, Q4H PRN, 1 tablet at 10/30/17 1611 **OR** HYDROcodone-acetaminophen (NORCO) 5-325 MG per tablet 2 tablet, 2 tablet, Oral, Q4H PRN, Melanie Ku MD    influenza quadrivalent split vaccine (FLUZONE;FLUARIX;FLULAVAL;AFLURIA) injection 0.5 mL, 0.5 mL, Intramuscular, Once, Amy Hendricks MD    aspirin EC tablet 81 mg, 81 mg, Oral, Daily, Sawyer Rojas MD, 81 mg at 10/29/17 0848    bumetanide (BUMEX) tablet 1 mg, 1 mg, Oral, Daily, Sawyer Rojas MD, 1 mg at 10/29/17 0848    glimepiride (AMARYL) tablet 2 mg, 2 mg, Oral, BID, Sawyer Rojas MD, 2 mg at 10/29/17 2015    levothyroxine (SYNTHROID) tablet 75 mcg, 75 mcg, Oral, Daily, Sawyer Rojas MD, 75 mcg at 10/30/17 2861    losartan (COZAAR) tablet 25 mg, 25 mg, Oral, Daily, Sawyer Rojas MD, 25 mg at 10/30/17 4984    potassium chloride (KLOR-CON M) extended release tablet 10 mEq, 10 mEq, Oral, BID WC, Sawyer Rojas MD, 10 mEq at 10/29/17 0848    pregabalin (LYRICA) capsule 150 mg, 150 mg, Oral, TID, Sawyer Rojas MD, 150 mg at 10/30/17 1611    sodium chloride flush 0.9 % injection 10 mL, 10 mL, Intravenous, 2 times per day, Tomeka Marinelli MD, 10 mL at 10/29/17 2015    sodium chloride flush 0.9 % injection 10 mL, 10 mL, Intravenous, PRN, Sawyer Rueda MD    magnesium hydroxide (MILK OF MAGNESIA) 400 MG/5ML suspension 30 mL, 30 mL, Oral, Daily PRN, Sawyer Rueda MD    ondansetron [facility]) injection 4 mg, 4 mg, Intravenous, Q6H PRN, Sawyer Rueda MD  Prior to Admission medications    Medication Sig Start Date End Date Taking?  Authorizing Provider   rivaroxaban (XARELTO) 20 MG TABS tablet Take 15 mg by mouth every 24 hours Will start 20 mg on October 18   Yes Historical Provider, MD   benzonatate (TESSALON) 100 MG capsule Take 200 mg by mouth 3 times daily as needed for Cough   Yes Historical Provider, MD   glimepiride (AMARYL) 2 MG tablet Take 2 mg by mouth 2 times daily   Yes Historical Provider, MD   losartan (COZAAR) 25 MG tablet TAKE 1 TABLET EVERY DAY 6/16/17  Yes Teddy Fagan MD   cetirizine-psuedoephedrine (ZYRTEC-D) 5-120 MG per extended release tablet Take 1 tablet by mouth daily as needed for Allergies   Yes Historical Provider, MD   pantoprazole sodium (PROTONIX) 40 MG PACK packet Take 40 mg by mouth 2 times daily    Yes Historical Provider, MD   ALPRAZolam (XANAX) 0.5 MG tablet Take 0.5 mg by mouth 3 times daily as needed for Sleep   Yes Historical Provider, MD   atorvastatin (LIPITOR) 20 MG tablet Take 20 mg by mouth daily   Yes Historical Provider, MD   bumetanide (BUMEX) 1 MG tablet Take 1 tablet by mouth daily 5/6/16  Yes Ryan Mitchell MD   potassium chloride SA (K-DUR;KLOR-CON M) 20 MEQ tablet Take 0.5 tablets by mouth 2 times daily (with meals) 5/6/16  Yes yRan Mitchell MD   aspirin 81 MG EC tablet Take 1 tablet by mouth daily 4/5/16  Yes Yuan Correa PA-C   ferrous sulfate 325 (65 FE) MG tablet Take 325 mg by mouth 3 times daily (with meals)   Yes Historical Provider, MD   levothyroxine (SYNTHROID) 75 MCG tablet Take 75 mcg by mouth Daily   Yes Historical Provider, MD   pregabalin (LYRICA) 150 MG capsule Take 150 mg by mouth 3 times daily   Yes Historical Provider, MD   magnesium hydroxide (MILK OF MAGNESIA CONCENTRATE) 2400 MG/10ML SUSP Take 30 mLs by mouth once as needed    Historical Provider, MD   Immune Globulin (Human) in dextrose solution Infuse 10 g intravenously every 30 days    Historical Provider, MD   docusate sodium (COLACE) 100 MG capsule Take 100 mg by mouth 2 times daily    Historical Provider, MD   acetaminophen (TYLENOL) 325 MG tablet Take 2 tablets by mouth every 4 hours as needed for Pain 8/18/16   Jaxon Burns MD     Additional information:       VITAL SIGNS   Vitals:    10/30/17 1600   BP: (!) 102/54   Pulse: 56   Resp: 16   Temp: 97.3 °F (36.3 °C)   SpO2: 92%       PHYSICAL:   Heart:  [x]Regular rate and rhythm  []Other:    Lungs:  [x]Clear    []Other:    Abdomen: [x]Soft    []Other:    Mental Status: [x]Alert & Oriented  []Other:      PLANNED PROCEDURE   []JAYLEN  []Pacemaker/ICD []Cardioversion  []Cath  []PCI   [x]LOOP RECORDER INSERTION OR REMOVAL  []Peripheral angiography      SEDATION  Planned agent:[x]Midazolam []Meperidine [x]Sublimaze []Morphine  []Diazepam  []Other:     ASA Classification:  []1 []2 [x]3 []4 []5  Class 1: A normal healthy patient  Class 2: Pt with mild to moderate systemic disease  Class 3: Severe systemic disease or disturbance  Class 4: Severe systemic disorders that are already life threatening. Class 5: Moribund pt with little chances of survival, for more than 24 hours. Mallampati I Airway Classification:   []1 []2 [x]3 []4    [x]Pre-procedure diagnostic studies complete and results available. Comment:    [x]Previous sedation/anesthesia experiences assessed. Comment:    [x]The patient is an appropriate candidate to undergo the planned procedure sedation and anesthesia. (Refer to nursing sedation/analgesia documentation record)  [x]Formulation and discussion of sedation/procedure plan, risks, and expectations with patient and/or responsible adult completed.   [x]Patient

## 2017-10-31 LAB — CORTISOL: 2.98 UG/DL

## 2017-10-31 PROCEDURE — 6360000002 HC RX W HCPCS: Performed by: INTERNAL MEDICINE

## 2017-10-31 PROCEDURE — 6370000000 HC RX 637 (ALT 250 FOR IP): Performed by: INTERNAL MEDICINE

## 2017-10-31 PROCEDURE — 90686 IIV4 VACC NO PRSV 0.5 ML IM: CPT | Performed by: FAMILY MEDICINE

## 2017-10-31 PROCEDURE — G8978 MOBILITY CURRENT STATUS: HCPCS

## 2017-10-31 PROCEDURE — 97530 THERAPEUTIC ACTIVITIES: CPT

## 2017-10-31 PROCEDURE — 97161 PT EVAL LOW COMPLEX 20 MIN: CPT

## 2017-10-31 PROCEDURE — 97535 SELF CARE MNGMENT TRAINING: CPT

## 2017-10-31 PROCEDURE — 99232 SBSQ HOSP IP/OBS MODERATE 35: CPT | Performed by: INTERNAL MEDICINE

## 2017-10-31 PROCEDURE — 6360000002 HC RX W HCPCS: Performed by: FAMILY MEDICINE

## 2017-10-31 PROCEDURE — 99024 POSTOP FOLLOW-UP VISIT: CPT | Performed by: NURSE PRACTITIONER

## 2017-10-31 PROCEDURE — 97166 OT EVAL MOD COMPLEX 45 MIN: CPT

## 2017-10-31 PROCEDURE — 36415 COLL VENOUS BLD VENIPUNCTURE: CPT

## 2017-10-31 PROCEDURE — 82533 TOTAL CORTISOL: CPT

## 2017-10-31 PROCEDURE — G8987 SELF CARE CURRENT STATUS: HCPCS

## 2017-10-31 PROCEDURE — G8979 MOBILITY GOAL STATUS: HCPCS

## 2017-10-31 PROCEDURE — G0008 ADMIN INFLUENZA VIRUS VAC: HCPCS | Performed by: FAMILY MEDICINE

## 2017-10-31 PROCEDURE — 2580000003 HC RX 258: Performed by: INTERNAL MEDICINE

## 2017-10-31 PROCEDURE — G8988 SELF CARE GOAL STATUS: HCPCS

## 2017-10-31 PROCEDURE — 1200000003 HC TELEMETRY R&B

## 2017-10-31 RX ADMIN — INFLUENZA A VIRUS A/SINGAPORE/GP1908/2015 IVR-180A (H1N1) ANTIGEN (PROPIOLACTONE INACTIVATED), INFLUENZA A VIRUS A/HONG KONG/4801/2014 X-263B (H3N2) ANTIGEN (PROPIOLACTONE INACTIVATED), INFLUENZA B VIRUS B/BRISBANE/46/2015 ANTIGEN (PROPIOLACTONE INACTIVATED), AND INFLUENZA B VIRUS B/PHUKET/3073/2013 BVR-1B ANTIGEN (PROPIOLACTONE INACTIVATED) 0.5 ML: 15; 15; 15; 15 INJECTION, SUSPENSION INTRAMUSCULAR at 12:36

## 2017-10-31 RX ADMIN — BUMETANIDE 1 MG: 1 TABLET ORAL at 09:35

## 2017-10-31 RX ADMIN — LOSARTAN POTASSIUM 25 MG: 25 TABLET, FILM COATED ORAL at 09:36

## 2017-10-31 RX ADMIN — POTASSIUM CHLORIDE 10 MEQ: 1500 TABLET, EXTENDED RELEASE ORAL at 17:24

## 2017-10-31 RX ADMIN — PREGABALIN 150 MG: 75 CAPSULE ORAL at 14:38

## 2017-10-31 RX ADMIN — PREGABALIN 150 MG: 75 CAPSULE ORAL at 20:46

## 2017-10-31 RX ADMIN — DOCUSATE SODIUM 100 MG: 100 CAPSULE ORAL at 09:36

## 2017-10-31 RX ADMIN — Medication 10 ML: at 22:36

## 2017-10-31 RX ADMIN — GLIMEPIRIDE 2 MG: 2 TABLET ORAL at 20:36

## 2017-10-31 RX ADMIN — HYDROCODONE BITARTRATE AND ACETAMINOPHEN 2 TABLET: 5; 325 TABLET ORAL at 14:38

## 2017-10-31 RX ADMIN — Medication 10 ML: at 09:36

## 2017-10-31 RX ADMIN — HYDROCODONE BITARTRATE AND ACETAMINOPHEN 2 TABLET: 5; 325 TABLET ORAL at 20:37

## 2017-10-31 RX ADMIN — HYDROCODONE BITARTRATE AND ACETAMINOPHEN 1 TABLET: 5; 325 TABLET ORAL at 04:21

## 2017-10-31 RX ADMIN — CEFAZOLIN SODIUM 2 G: 2 SOLUTION INTRAVENOUS at 05:34

## 2017-10-31 RX ADMIN — DOCUSATE SODIUM 100 MG: 100 CAPSULE ORAL at 20:36

## 2017-10-31 RX ADMIN — POTASSIUM CHLORIDE 10 MEQ: 1500 TABLET, EXTENDED RELEASE ORAL at 09:36

## 2017-10-31 RX ADMIN — PREGABALIN 150 MG: 75 CAPSULE ORAL at 09:36

## 2017-10-31 RX ADMIN — ENOXAPARIN SODIUM 40 MG: 40 INJECTION SUBCUTANEOUS at 12:34

## 2017-10-31 RX ADMIN — LEVOTHYROXINE SODIUM 75 MCG: 75 TABLET ORAL at 04:22

## 2017-10-31 RX ADMIN — ASPIRIN 81 MG: 81 TABLET ORAL at 09:35

## 2017-10-31 RX ADMIN — GLIMEPIRIDE 2 MG: 2 TABLET ORAL at 09:36

## 2017-10-31 ASSESSMENT — PAIN DESCRIPTION - LOCATION
LOCATION: BACK
LOCATION: BACK

## 2017-10-31 ASSESSMENT — PAIN DESCRIPTION - PAIN TYPE
TYPE: CHRONIC PAIN
TYPE: CHRONIC PAIN

## 2017-10-31 ASSESSMENT — PAIN SCALES - GENERAL
PAINLEVEL_OUTOF10: 5
PAINLEVEL_OUTOF10: 6
PAINLEVEL_OUTOF10: 7
PAINLEVEL_OUTOF10: 5
PAINLEVEL_OUTOF10: 4
PAINLEVEL_OUTOF10: 0
PAINLEVEL_OUTOF10: 4

## 2017-10-31 ASSESSMENT — PAIN DESCRIPTION - ORIENTATION
ORIENTATION: LOWER
ORIENTATION: LOWER

## 2017-10-31 NOTE — PROGRESS NOTES
Patient alert and oriented x 3. PERRL 5mm to 3mm. Upper extremities full ROM. Skin turgor <3 secs. Cap refill <3 secs. Skin is warm dry and intact. HR 61 normal sinus rhythm, low amplitude. /74. R 16. No shortness of breath, lung sounds clear. Abdominal sounds heard x 4. No masses or tenderness upon palpitation. Lower extremities full ROM. 1 assist walker. Skin turgor <3 secs. Cap refill <3 secs. Pedal pulses palpable. Pt complains of pain located in lower back. Rates 3 on scale 0-10. Pt given heating pad. Call light in reach, bed low position.

## 2017-10-31 NOTE — PROGRESS NOTES
(degenerative disc disease), lumbar     Depression     GERD (gastroesophageal reflux disease)     History of blood transfusion     Hx of blood clots 09/2017    pulmonary emboli    Hyperlipidemia     Hypertension     Hyperthyroidism     Movement disorder     DDD    Neuropathy [address]     Obesity     Pneumonia 2016    sepsis    Sleep apnea     usually wears cpap at night    Status post right and left heart catheterization     Type II or unspecified type diabetes mellitus without mention of complication, not stated as uncontrolled      Past Surgical History:   Procedure Laterality Date    ABDOMEN SURGERY      ACHILLES TENDON SURGERY Bilateral     BLADDER SUSPENSION      CARDIAC SURGERY  2016    [facility]    HAMMER TOE SURGERY Right     HERNIA REPAIR      HIATAL HERNIA REPAIR  09/06/2016    Laparoscopic Robotic with Nissen Fundoplication - Dr. Adhikari Splinter      right    TENDON RELEASE Left 08/09/2016    left foot    TUBAL LIGATION             Subjective  Chart Reviewed: Yes  Patient assessed for rehabilitation services?: Yes  Family / Caregiver Present: Yes    Subjective: RN okayed OT session. Upon arrival patient was sitting up in recliner. Pt was agreeable to OT session.      General:  Overall Orientation Status: Within Functional Limits    Vision: Impaired    Hearing: Within functional limits    Pain:  Pain Assessment  Patient Currently in Pain: Denies       Social/Functional:  Lives With: Daughter (and son in-law)  Type of Home: House  Home Layout: One level  Home Access: Stairs to enter without rails  Entrance Stairs - Number of Steps: 1 SHARON  Home Equipment: Rolling walker     Bathroom Shower/Tub: Tub/Shower unit  Bathroom Toilet: Standard  Bathroom Equipment: Grab bars in shower  Bathroom Accessibility: Accessible     ADL Assistance: Independent  Homemaking Assistance: Needs assistance (Patient reports indep with cooking, Daughter completes

## 2017-10-31 NOTE — PROGRESS NOTES
mention of complication, not stated as uncontrolled      Past Surgical History:   Procedure Laterality Date    ABDOMEN SURGERY      ACHILLES TENDON SURGERY Bilateral     BLADDER SUSPENSION      CARDIAC SURGERY  2016    [facility]    HAMMER TOE SURGERY Right     HERNIA REPAIR      HIATAL HERNIA REPAIR  09/06/2016    Laparoscopic Robotic with Nissen Fundoplication - Dr. Shane Espinosa      right    TENDON RELEASE Left 08/09/2016    left foot    TUBAL LIGATION         Restrictions/Precautions:  Restrictions/Precautions: General Precautions, Fall Risk  Implants present? :  (loop recorded placement. )          Subjective:  Chart Reviewed: Yes  Patient assessed for rehabilitation services?: Yes  Comments: Pt is scheduled for IgG therapy tomorrow (11-1). Subjective: Pt in bed and agrees to therapy. Pt hoping to go home soon. General:  Overall Orientation Status: Within Normal Limits    Vision: Impaired  Vision Exceptions: Wears glasses at all times    Hearing: Within functional limits       Pain:  Yes.   Pain Assessment  Pain Assessment: 0-10  Pain Level: 4 (with transitions)  Pain Type: Chronic pain  Pain Location: Back  Pain Orientation: Lower       Social/Functional History:    Lives With: Daughter (and son in-law)  Type of Home: House  Home Layout: One level  Home Access: Stairs to enter without rails  Entrance Stairs - Number of Steps: 1 SHARON  Home Equipment: Rolling walker     Bathroom Shower/Tub: Tub/Shower unit  Bathroom Toilet: Standard  Bathroom Equipment: Grab bars in shower  Bathroom Accessibility: Accessible       ADL Assistance: Independent  Homemaking Assistance: Needs assistance (Patient reports indep with cooking, Daughter completes cleaning and laundry. )  Homemaking Responsibilities: Yes  Ambulation Assistance: Independent  Transfer Assistance: Independent    Active : No  Occupation: Retired  Additional Comments: PTA pt reports was Indep and measurable functional outcomes    REQUIRES PT FOLLOW UP: Yes  Discharge Recommendations: Continue to assess pending progress, Home with Home health PT, Patient would benefit from continued therapy after discharge    Patient Education:  Patient Education: plan of care and LE exercises    Equipment Recommendations:  Equipment Needed: No    Safety:  Type of devices: All fall risk precautions in place, Call light within reach, Patient at risk for falls, Left in bed, Nurse notified, Bed alarm in place    Plan:  Times per week: 5x GM  Times per day: Daily  Current Treatment Recommendations: Strengthening, Functional Mobility Training, Transfer Training, Endurance Training, Stair training, Gait Training, Safety Education & Training, Patient/Caregiver Education & Training, Home Exercise Program    Goals:  Patient goals : go home  Short term goals  Time Frame for Short term goals: 3 days  Short term goal 1: Pt to be Mod I with supine <> sit to get in/out of bed  Short term goal 2: Pt to be Mod I with sit <> stand to get up to ambulate  Short term goal 3: Pt to ambulate > 300 ft with RW or least restrictive device with Mod I for household and community distances  Short term goal 4: Pt to negotiate 1 step with no rail with AD with SBA for home access  Long term goals  Time Frame for Long term goals : not set due to short ELOS    Evaluation Complexity: Based on the findings of patient history, examination, clinical presentation, and decision making during this evaluation, the evaluation of Jenna Franklin  is of low complexity. PT G-Codes  Functional Limitation: Mobility: Walking and moving around  Mobility: Walking and Moving Around Current Status (): At least 40 percent but less than 60 percent impaired, limited or restricted  Mobility: Walking and Moving Around Goal Status ():  At least 20 percent but less than 40 percent impaired, limited or restricted    AM-PAC Inpatient Mobility without Stair Climbing Raw Score : 16  AM-PAC Inpatient without Stair Climbing T-Scale Score : 45.54  Mobility Inpatient CMS 0-100% Score: 40.64  Mobility Inpatient without Stair CMS G-Code Modifier : Carmen Perez [place] [address]

## 2017-10-31 NOTE — PROGRESS NOTES
no focal findings or movement disorder noted    Medications:    enoxaparin  40 mg Subcutaneous Daily    sodium chloride flush  10 mL Intravenous 2 times per day    docusate sodium  100 mg Oral BID    aspirin  81 mg Oral Daily    bumetanide  1 mg Oral Daily    glimepiride  2 mg Oral BID    levothyroxine  75 mcg Oral Daily    losartan  25 mg Oral Daily    potassium chloride  10 mEq Oral BID WC    pregabalin  150 mg Oral TID          HYDROcodone 5 mg - acetaminophen 1 tablet Q4H PRN   Or     HYDROcodone 5 mg - acetaminophen 2 tablet Q4H PRN   sodium chloride flush 10 mL PRN   acetaminophen 650 mg Q4H PRN   ondansetron 4 mg Q6H PRN       Diagnostics:  EKG:      Echo:   Summary   Technically difficult examination.   Ejection fraction is visually estimated at 45%.   There was mild global hypokinesis of the left ventricle.   Moderately dilated left atrium.   Mild mitral regurgitation is present.      Signature      ----------------------------------------------------------------   Electronically signed by Kena Roach MD (Interpreting   physician) on 06/12/2017           Left Heart Cath: Conclusions      Procedure Summary   Insignificant coronary disease edited. Severely impaired ventricular function. ? TAKOTSUBO SYNDROME. Recommendations   Aggressive risk factor management. Medical management. Estimated Blood Loss:15 ml. Complications:No complications.       Signatures      ----------------------------------------------------------------   Electronically signed by Rafy Vaca MD (Performing   Physician) on 03/23/2016     Lab Data:    Cardiac Enzymes:  Recent Labs      10/29/17   1020   CKTOTAL  58       CBC:   Lab Results   Component Value Date    WBC 4.2 10/29/2017    RBC 4.16 10/29/2017    HGB 12.9 10/29/2017    HCT 38.2 10/29/2017     10/29/2017       CMP:  Lab Results   Component Value Date     10/28/2017    K 4.4 10/28/2017     10/28/2017    CO2 25 10/28/2017

## 2017-11-01 ENCOUNTER — TELEPHONE (OUTPATIENT)
Dept: CARDIOLOGY CLINIC | Age: 72
End: 2017-11-01

## 2017-11-01 VITALS
WEIGHT: 232.5 LBS | TEMPERATURE: 98.2 F | SYSTOLIC BLOOD PRESSURE: 107 MMHG | OXYGEN SATURATION: 94 % | BODY MASS INDEX: 38.74 KG/M2 | HEART RATE: 66 BPM | DIASTOLIC BLOOD PRESSURE: 64 MMHG | HEIGHT: 65 IN | RESPIRATION RATE: 18 BRPM

## 2017-11-01 PROCEDURE — 97110 THERAPEUTIC EXERCISES: CPT

## 2017-11-01 PROCEDURE — 6370000000 HC RX 637 (ALT 250 FOR IP): Performed by: INTERNAL MEDICINE

## 2017-11-01 PROCEDURE — 2580000003 HC RX 258: Performed by: INTERNAL MEDICINE

## 2017-11-01 PROCEDURE — 99238 HOSP IP/OBS DSCHRG MGMT 30/<: CPT | Performed by: INTERNAL MEDICINE

## 2017-11-01 PROCEDURE — 97535 SELF CARE MNGMENT TRAINING: CPT

## 2017-11-01 PROCEDURE — 97116 GAIT TRAINING THERAPY: CPT

## 2017-11-01 PROCEDURE — 6360000002 HC RX W HCPCS: Performed by: INTERNAL MEDICINE

## 2017-11-01 RX ADMIN — PREGABALIN 150 MG: 75 CAPSULE ORAL at 08:45

## 2017-11-01 RX ADMIN — POTASSIUM CHLORIDE 10 MEQ: 1500 TABLET, EXTENDED RELEASE ORAL at 08:45

## 2017-11-01 RX ADMIN — DOCUSATE SODIUM 100 MG: 100 CAPSULE ORAL at 08:45

## 2017-11-01 RX ADMIN — Medication 10 ML: at 08:45

## 2017-11-01 RX ADMIN — ENOXAPARIN SODIUM 40 MG: 40 INJECTION SUBCUTANEOUS at 08:45

## 2017-11-01 RX ADMIN — ASPIRIN 81 MG: 81 TABLET ORAL at 08:45

## 2017-11-01 RX ADMIN — LOSARTAN POTASSIUM 25 MG: 25 TABLET, FILM COATED ORAL at 08:45

## 2017-11-01 RX ADMIN — LEVOTHYROXINE SODIUM 75 MCG: 75 TABLET ORAL at 03:50

## 2017-11-01 RX ADMIN — BUMETANIDE 1 MG: 1 TABLET ORAL at 08:45

## 2017-11-01 RX ADMIN — GLIMEPIRIDE 2 MG: 2 TABLET ORAL at 08:45

## 2017-11-01 RX ADMIN — HYDROCODONE BITARTRATE AND ACETAMINOPHEN 2 TABLET: 5; 325 TABLET ORAL at 00:49

## 2017-11-01 ASSESSMENT — PAIN SCALES - GENERAL
PAINLEVEL_OUTOF10: 7
PAINLEVEL_OUTOF10: 0
PAINLEVEL_OUTOF10: 0

## 2017-11-01 NOTE — CARE COORDINATION
11/1/17, 2:36 PM    DISCHARGE BARRIERS    SW notified Tawanna Knapp that patient was discharged today. AVS has been faxed.

## 2017-11-01 NOTE — PROGRESS NOTES
Discharge teaching and instructions for diagnosis/procedure of bradycardia completed with patient using teachback method. AVS reviewed. Printed prescriptions given to patient. Patient voiced understanding regarding prescriptions, follow up appointments, and care of self at home.  Discharged in a wheelchair to  home with support per family

## 2017-11-01 NOTE — CARE COORDINATION
11/1/17, 2:37 PM    Discharge plan discussed by  and . Discharge plan reviewed with patient/ family. Patient/ family verbalize understanding of discharge plan and are in agreement with plan. Understanding was demonstrated using the teach back method.        IMM Letter  IMM Letter date given[de-identified] 11/01/17  IMM Letter time given[de-identified] 6465

## 2017-11-01 NOTE — PROGRESS NOTES
[facility]  INPATIENT OCCUPATIONAL THERAPY  STRZ RENAL TELEMETRY 6K  DAILY NOTE    Time:  Time In: 5920  Time Out: 1109  Timed Code Treatment Minutes: 55 Minutes  Minutes: 46     Date: 2017  Patient Name: Armond Nolasco,   Gender: female      Room: [facility]10010-A  MRN: [id number]  : 1945  (67 y.o.)  Referring Practitioner: Peggy Reed MD  Diagnosis: Symptomatic Bradycardia  Additional Pertinent Hx: Per ED Note: Armond Nolasco is a 67 y.o. female with a past medical history of DM, Hyperlipidemia, Hypertension, GERD, DDD, CAD, CHF, and CKD who presents to the ED for evaluation of fatigue. The patient has a four day history of severe fatigue, generalized weakness, and shortness of breath. The patient is status post physical and occupational rehabilitation following nursing home placement for a PE. Per family, the patient was doing very well following the physical therapy, however over the past four days has had a significant decline in her energy and mobility. The patient states she has been so fatigued and weak that she can hardly stand, walk, or even roll over in bed. She complains of generalized myalgias, back pain which is chronic and unchanged from baseline, and shortness of breath. Her shortness of breath is mild, however present with any exertion. She does not have any chest pain, fevers, or URI symptoms associated with the shortness of breath. The patient does report having a nonproductive cough and chills. No nausea, emesis, diarrhea, abdominal pain, or urinary symptoms. The patient is still taking Xarelto for treatment of the PE.  She is followed by Dr. Qi Smith (Cardiology)    Restrictions/Precautions:  Restrictions/Precautions: General Precautions, Fall Risk  Implants present? :  (loop recorded placement. )       Past Medical History:   Diagnosis Date    Anemia     CAD (coronary artery disease)     CHF (congestive heart failure) (HCC)     Chronic kidney disease     stage 3 resistive exercises with min vc for safety to increase indep with self cares and transfers. Short term goal 2: Pt will complete standing tolerance x 4 minutes with SBA and min vc for safety to increase indep with grooming tasks. Short term goal 3: Pt will complete functional mobility to/from BR and HH distances with SBA and min vc for safety to increase indep with toileting. Short term goal 4: Pt will complete LB dressing with LHAE PRN and min A to increase indep within home environment. Long term goals  Time Frame for Long term goals : No LTGs d/t short estimated length of stay.

## 2017-11-01 NOTE — PROGRESS NOTES
[facility]  INPATIENT PHYSICAL THERAPY  DAILYNOTE  STRZ RENAL TELEMETRY 6K    Time In: 8349  Time Out: 0800  Timed Code Treatment Minutes: 25 Minutes  Minutes: 25          Date: 2017  Patient Name: Maurice Burns,  Gender:  female        MRN: [id number]  : 1945  (67 y.o.)     Referring Practitioner: Jeff Pal. Martha Sellers MD  Diagnosis: Symptomatic Bradycardia  Additional Pertinent Hx: Per ED note, pt  is a 67 y.o. female with a past medical history of DM, Hyperlipidemia, Hypertension, GERD, DDD, CAD, CHF, and CKD who presents to the ED for evaluation of fatigue. The patient has a four day history of severe fatigue, generalized weakness, and shortness of breath. The patient is status post physical and occupational rehabilitation following nursing home placement for a PE. Per family, the patient was doing very well following the physical therapy, however over the past four days has had a significant decline in her energy and mobility. The patient states she has been so fatigued and weak that she can hardly stand, walk, or even roll over in bed. She complains of generalized myalgias, back pain which is chronic and unchanged from baseline, and shortness of breath. Her shortness of breath is mild, however present with any exertion.      Past Medical History:   Diagnosis Date    Anemia     CAD (coronary artery disease)     CHF (congestive heart failure) (HCC)     Chronic kidney disease     stage 3 kidney     DDD (degenerative disc disease), lumbar     Depression     GERD (gastroesophageal reflux disease)     History of blood transfusion     Hx of blood clots 2017    pulmonary emboli    Hyperlipidemia     Hypertension     Hyperthyroidism     Movement disorder     DDD    Neuropathy ([facility]     Obesity     Pneumonia 2016    sepsis    Sleep apnea     usually wears cpap at night    Status post right and left heart catheterization     Type II or unspecified type diabetes mellitus without

## 2017-11-02 ENCOUNTER — TELEPHONE (OUTPATIENT)
Dept: CARDIOLOGY CLINIC | Age: 72
End: 2017-11-02

## 2017-11-03 NOTE — DISCHARGE SUMMARY
Hospital Medicine Discharge Summary      Patient Identification:   Kena Zuñiga   : 1945  MRN: [id number]   Account: [de-identified]      Patient's PCP: Raysa Ortiz DO    Admit Date: 10/28/2017     Discharge Date: 2017    Admitting Physician: Leny Sheriff MD     Discharge Physician: Reynaldo Pike MD     Discharge Diagnoses: Active Hospital Problems    Diagnosis Date Noted    Arrhythmia [I49.9]      Priority: High    Symptomatic sinus bradycardia [R00.1] 10/28/2017    Tachycardia-bradycardia syndrome ([facility] [address] [I49.5] 10/28/2017    Cardiomyopathy, dilated ([facility] [facility] [I42.0]     Coronary artery disease involving native coronary artery of native heart without angina pectoris [I25.10]     Type 2 diabetes mellitus without complication ([facility] [address] [V04.7]     Essential (primary) hypertension [I10] 2016    CKD (chronic kidney disease) stage 3, GFR 30-59 ml/min [N18.3] 2013       The patient was seen and examined on day of discharge and this discharge summary is in conjunction with any daily progress note from day of discharge. Hospital Course:   Kena Zuñiga is a 67 y.o. female admitted to [address] [place] [address] on 10/28/2017 for feelings of weakness, fatigue, perception of bradycardia at home; these are recurring sxs. She came to ER and [facility] was asked to admit the patient. She was seen by Cardiology who was not convinced she needed any intervention re a bradyarrythmia. A loop recorder was implanted for further monitoring. She has had spells of weakness in the past without obvious explanation. She had had a recent admission at [facility] for dyspnea and was diagnosed with a pulmonary embolus although dopplers were negative. Her xarelto was withheld for the recorder implant and she is scheduled to have a port implanted so it was held till that takes place. She will follow up with her pcp and Cardiology.   She also follows with someone for immunoglobulin infusionsl. Exam:     Vitals:  Vitals:    10/31/17 2145 10/31/17 2355 11/01/17 0345 11/01/17 0830   BP: (!) 146/76 123/65 (!) 101/55 107/64   Pulse: 62 84 60 66   Resp: 18 18 17 18   Temp: 98.7 °F (37.1 °C) 98.7 °F (37.1 °C) 98.4 °F (36.9 °C) 98.2 °F (36.8 °C)   TempSrc: Oral Oral Oral Oral   SpO2: 94% 93% 94% 94%   Weight:   232 lb 8 oz (105.5 kg)    Height:         Weight: Weight: 232 lb 8 oz (105.5 kg)     24 hour intake/output:No intake or output data in the 24 hours ending 11/03/17 1117      General appearance:  No apparent distress, appears stated age and cooperative. Chronically ill appearing. obese      HEENT:  Normal cephalic, atraumatic without obvious deformity. Pupils equal, round, and reactive to light. Extra ocular muscles intact. Conjunctivae/corneas clear. Neck: Supple, with full range of motion. No jugular venous distention. Trachea midline. Respiratory:  Normal respiratory effort. Clear to auscultation, bilaterally without Rales/Wheezes/Rhonchi. Cardiovascular:  Regular rate and rhythm with normal S1/S2 without murmurs, rubs or gallops. Abdomen: Soft, non-tender, non-distended with normal bowel sounds. Musculoskeletal:  No clubbing, cyanosis or edema bilaterally. Full range of motion without deformity. Skin: Skin color, texture, turgor normal.  No rashes or lesions. Neurologic:  Neurovascularly intact without any focal sensory/motor deficits. Cranial nerves: II-XII intact, grossly non-focal.  Psychiatric:  Alert and oriented, thought content appropriate, normal insight        Labs:  For convenience and continuity at follow-up the following most recent labs are provided:      CBC:    Lab Results   Component Value Date    WBC 4.2 10/29/2017    HGB 12.9 10/29/2017    HCT 38.2 10/29/2017     10/29/2017       Renal:    Lab Results   Component Value Date     10/28/2017    K 4.4 10/28/2017     10/28/2017    CO2 25 10/28/2017    BUN 24 10/28/2017 rivaroxaban (XARELTO) 20 MG TABS tablet  Take 1 tablet by mouth every 24 hours Resume day after port placement                 Time Spent on discharge is more than 30 minutes in the examination, evaluation, counseling and review of medications and discharge plan. Signed: Thank you Olivier Interiano DO for the opportunity to be involved in this patient's care.     Electronically signed by Linda Pedroza MD on 11/3/2017 at 11:17 AM

## 2017-11-06 ENCOUNTER — HOSPITAL ENCOUNTER (OUTPATIENT)
Dept: NURSING | Age: 72
Discharge: HOME | End: 2017-11-06

## 2017-11-07 ENCOUNTER — HOSPITAL ENCOUNTER (OUTPATIENT)
Dept: NURSING | Age: 72
Discharge: HOME | End: 2017-11-07
Payer: MEDICARE

## 2017-11-07 VITALS
WEIGHT: 236.6 LBS | HEIGHT: 65 IN | DIASTOLIC BLOOD PRESSURE: 67 MMHG | SYSTOLIC BLOOD PRESSURE: 134 MMHG | RESPIRATION RATE: 18 BRPM | HEART RATE: 55 BPM | TEMPERATURE: 98.1 F | BODY MASS INDEX: 39.42 KG/M2

## 2017-11-07 DIAGNOSIS — D80.9 SELECTIVE IMMUNOGLOBULIN DEFICIENCY (HCC): ICD-10-CM

## 2017-11-07 PROCEDURE — 96415 CHEMO IV INFUSION ADDL HR: CPT

## 2017-11-07 PROCEDURE — 96413 CHEMO IV INFUSION 1 HR: CPT

## 2017-11-07 PROCEDURE — 6360000002 HC RX W HCPCS: Performed by: INTERNAL MEDICINE

## 2017-11-07 RX ORDER — 0.9 % SODIUM CHLORIDE 0.9 %
10 VIAL (ML) INJECTION ONCE
Status: CANCELLED | OUTPATIENT
Start: 2017-11-07 | End: 2017-11-07

## 2017-11-07 RX ORDER — DIPHENHYDRAMINE HYDROCHLORIDE 50 MG/ML
50 INJECTION INTRAMUSCULAR; INTRAVENOUS ONCE
Status: CANCELLED | OUTPATIENT
Start: 2017-11-07 | End: 2017-11-07

## 2017-11-07 RX ORDER — SODIUM CHLORIDE 9 MG/ML
INJECTION, SOLUTION INTRAVENOUS CONTINUOUS
Status: CANCELLED | OUTPATIENT
Start: 2017-11-07

## 2017-11-07 RX ORDER — 0.9 % SODIUM CHLORIDE 0.9 %
5 VIAL (ML) INJECTION PRN
Status: CANCELLED | OUTPATIENT
Start: 2017-11-07

## 2017-11-07 RX ORDER — HEPARIN SODIUM (PORCINE) LOCK FLUSH IV SOLN 100 UNIT/ML 100 UNIT/ML
500 SOLUTION INTRAVENOUS PRN
Status: CANCELLED | OUTPATIENT
Start: 2017-11-07

## 2017-11-07 RX ORDER — EPINEPHRINE 1 MG/ML
0.3 INJECTION, SOLUTION, CONCENTRATE INTRAVENOUS PRN
Status: CANCELLED | OUTPATIENT
Start: 2017-11-07

## 2017-11-07 RX ORDER — METHYLPREDNISOLONE SODIUM SUCCINATE 125 MG/2ML
125 INJECTION, POWDER, LYOPHILIZED, FOR SOLUTION INTRAMUSCULAR; INTRAVENOUS ONCE
Status: CANCELLED | OUTPATIENT
Start: 2017-11-07 | End: 2017-11-07

## 2017-11-07 RX ORDER — DIPHENHYDRAMINE HCL 25 MG
25 TABLET ORAL ONCE
Status: CANCELLED | OUTPATIENT
Start: 2017-11-07 | End: 2017-11-07

## 2017-11-07 RX ORDER — 0.9 % SODIUM CHLORIDE 0.9 %
10 VIAL (ML) INJECTION PRN
Status: CANCELLED | OUTPATIENT
Start: 2017-11-07

## 2017-11-07 RX ORDER — ACETAMINOPHEN 325 MG/1
650 TABLET ORAL ONCE
Status: CANCELLED | OUTPATIENT
Start: 2017-11-07 | End: 2017-11-07

## 2017-11-07 RX ORDER — DEXTROSE MONOHYDRATE 50 MG/ML
INJECTION, SOLUTION INTRAVENOUS ONCE
Status: CANCELLED | OUTPATIENT
Start: 2017-11-07 | End: 2017-11-07

## 2017-11-07 RX ADMIN — IMMUNE GLOBULIN (HUMAN) 20 G: 10 INJECTION INTRAVENOUS; SUBCUTANEOUS at 11:30

## 2017-11-07 RX ADMIN — IMMUNE GLOBULIN (HUMAN) 20 G: 10 INJECTION INTRAVENOUS; SUBCUTANEOUS at 12:51

## 2017-11-07 NOTE — PROGRESS NOTES
10:57 AM PT ADMITTED TO [facility] FOR IVIG INFUSION  10:58 AM PATIENT RIGHTS AND RESPONSIBILITIES SHEET OFFERED TO PT TO READ.   10:58 AM PT STATES THAT SHE HAD A MEDIPORT INSERTED YESTERDAY AT [address]

## 2017-11-07 NOTE — PROGRESS NOTES
1335 Infusion complete. Pt tolerated infusion well with no complaints. Pt discharged ambulatory with instructions with no complaints.        __m__ Safety:       (Environmental)   [place] to environment   Ensure ID band is correct and in place/ allergy band as needed   Assess for fall risk   Initiate fall precautions as applicable (fall band, side rails, etc.)   Call light within reach   Bed in low position/ wheels locked    __m__ Pain:        Assess pain level and characteristics   Administer analgesics as ordered   Assess effectiveness of pain management and report to MD as needed    _m___ Knowledge Deficit:   Assess baseline knowledge   Provide teaching at level of understanding   Provide teaching via preferred learning method   Evaluate teaching effectiveness    __m__ Hemodynamic/Respiratory Status:       (Pre and Post Procedure Monitoring)   Assess/Monitor vital signs and LOC   Assess Baseline SpO2 prior to any sedation   Obtain weight/height   Assess vital signs/ LOC until patient meets discharge criteria   Monitor procedure site and notify MD of any issues

## 2017-11-09 ENCOUNTER — PROCEDURE VISIT (OUTPATIENT)
Dept: CARDIOLOGY CLINIC | Age: 72
End: 2017-11-09

## 2017-11-09 DIAGNOSIS — Z45.09 ENCOUNTER FOR ELECTRONIC ANALYSIS OF REVEAL EVENT RECORDER: Primary | ICD-10-CM

## 2017-11-09 PROBLEM — I48.91 ATRIAL FIBRILLATION (HCC): Status: ACTIVE | Noted: 2017-11-09

## 2017-11-10 ENCOUNTER — NURSE ONLY (OUTPATIENT)
Dept: CARDIOLOGY CLINIC | Age: 72
End: 2017-11-10

## 2017-11-10 PROBLEM — Z45.09 ENCOUNTER FOR ELECTRONIC ANALYSIS OF REVEAL EVENT RECORDER: Status: ACTIVE | Noted: 2017-11-10

## 2017-12-05 ENCOUNTER — HOSPITAL ENCOUNTER (OUTPATIENT)
Dept: NURSING | Age: 72
Discharge: HOME | End: 2017-12-05
Payer: MEDICARE

## 2017-12-05 VITALS
DIASTOLIC BLOOD PRESSURE: 76 MMHG | OXYGEN SATURATION: 95 % | HEART RATE: 63 BPM | TEMPERATURE: 98 F | BODY MASS INDEX: 38.77 KG/M2 | RESPIRATION RATE: 18 BRPM | SYSTOLIC BLOOD PRESSURE: 114 MMHG | WEIGHT: 233 LBS

## 2017-12-05 DIAGNOSIS — D80.9 SELECTIVE IMMUNOGLOBULIN DEFICIENCY (HCC): ICD-10-CM

## 2017-12-05 PROCEDURE — 96413 CHEMO IV INFUSION 1 HR: CPT

## 2017-12-05 PROCEDURE — 96415 CHEMO IV INFUSION ADDL HR: CPT

## 2017-12-05 PROCEDURE — 2580000003 HC RX 258: Performed by: INTERNAL MEDICINE

## 2017-12-05 PROCEDURE — 6370000000 HC RX 637 (ALT 250 FOR IP): Performed by: INTERNAL MEDICINE

## 2017-12-05 PROCEDURE — 6360000002 HC RX W HCPCS: Performed by: INTERNAL MEDICINE

## 2017-12-05 RX ORDER — HEPARIN SODIUM (PORCINE) LOCK FLUSH IV SOLN 100 UNIT/ML 100 UNIT/ML
500 SOLUTION INTRAVENOUS PRN
Status: DISCONTINUED | OUTPATIENT
Start: 2017-12-05 | End: 2017-12-06 | Stop reason: HOSPADM

## 2017-12-05 RX ORDER — ACETAMINOPHEN 325 MG/1
650 TABLET ORAL ONCE
Status: CANCELLED | OUTPATIENT
Start: 2017-12-05 | End: 2017-12-05

## 2017-12-05 RX ORDER — 0.9 % SODIUM CHLORIDE 0.9 %
10 VIAL (ML) INJECTION PRN
Status: DISCONTINUED | OUTPATIENT
Start: 2017-12-05 | End: 2017-12-06 | Stop reason: HOSPADM

## 2017-12-05 RX ORDER — DIPHENHYDRAMINE HYDROCHLORIDE 50 MG/ML
50 INJECTION INTRAMUSCULAR; INTRAVENOUS ONCE
Status: CANCELLED | OUTPATIENT
Start: 2017-12-05 | End: 2017-12-05

## 2017-12-05 RX ORDER — 0.9 % SODIUM CHLORIDE 0.9 %
10 VIAL (ML) INJECTION ONCE
Status: CANCELLED | OUTPATIENT
Start: 2017-12-05 | End: 2017-12-05

## 2017-12-05 RX ORDER — HEPARIN SODIUM (PORCINE) LOCK FLUSH IV SOLN 100 UNIT/ML 100 UNIT/ML
500 SOLUTION INTRAVENOUS PRN
Status: CANCELLED | OUTPATIENT
Start: 2017-12-05

## 2017-12-05 RX ORDER — METHYLPREDNISOLONE SODIUM SUCCINATE 125 MG/2ML
125 INJECTION, POWDER, LYOPHILIZED, FOR SOLUTION INTRAMUSCULAR; INTRAVENOUS ONCE
Status: CANCELLED | OUTPATIENT
Start: 2017-12-05 | End: 2017-12-05

## 2017-12-05 RX ORDER — 0.9 % SODIUM CHLORIDE 0.9 %
5 VIAL (ML) INJECTION PRN
Status: CANCELLED | OUTPATIENT
Start: 2017-12-05

## 2017-12-05 RX ORDER — 0.9 % SODIUM CHLORIDE 0.9 %
10 VIAL (ML) INJECTION PRN
Status: CANCELLED | OUTPATIENT
Start: 2017-12-05

## 2017-12-05 RX ORDER — DEXTROSE MONOHYDRATE 50 MG/ML
INJECTION, SOLUTION INTRAVENOUS ONCE
Status: CANCELLED | OUTPATIENT
Start: 2017-12-05 | End: 2017-12-05

## 2017-12-05 RX ORDER — SODIUM CHLORIDE 9 MG/ML
INJECTION, SOLUTION INTRAVENOUS CONTINUOUS
Status: CANCELLED | OUTPATIENT
Start: 2017-12-05

## 2017-12-05 RX ORDER — DIPHENHYDRAMINE HCL 25 MG
25 TABLET ORAL ONCE
Status: COMPLETED | OUTPATIENT
Start: 2017-12-05 | End: 2017-12-05

## 2017-12-05 RX ORDER — EPINEPHRINE 1 MG/ML
0.3 INJECTION, SOLUTION, CONCENTRATE INTRAVENOUS PRN
Status: CANCELLED | OUTPATIENT
Start: 2017-12-05

## 2017-12-05 RX ORDER — ACETAMINOPHEN 325 MG/1
650 TABLET ORAL ONCE
Status: COMPLETED | OUTPATIENT
Start: 2017-12-05 | End: 2017-12-05

## 2017-12-05 RX ORDER — DIPHENHYDRAMINE HCL 25 MG
25 TABLET ORAL ONCE
Status: CANCELLED | OUTPATIENT
Start: 2017-12-05 | End: 2017-12-05

## 2017-12-05 RX ADMIN — ACETAMINOPHEN 650 MG: 325 TABLET ORAL at 11:00

## 2017-12-05 RX ADMIN — IMMUNE GLOBULIN (HUMAN) 20 G: 10 INJECTION INTRAVENOUS; SUBCUTANEOUS at 12:37

## 2017-12-05 RX ADMIN — Medication 10 ML: at 11:12

## 2017-12-05 RX ADMIN — DIPHENHYDRAMINE HCL 25 MG: 25 TABLET ORAL at 11:00

## 2017-12-05 RX ADMIN — Medication 10 ML: at 13:16

## 2017-12-05 RX ADMIN — HEPARIN 500 UNITS: 100 SYRINGE at 13:16

## 2017-12-05 RX ADMIN — IMMUNE GLOBULIN (HUMAN) 20 G: 10 INJECTION INTRAVENOUS; SUBCUTANEOUS at 11:22

## 2017-12-05 ASSESSMENT — PAIN SCALES - GENERAL: PAINLEVEL_OUTOF10: 0

## 2017-12-05 NOTE — PROGRESS NOTES
1100 pt arrives ambulatory for IVIG infusion. Procedure explained to pt and questions answered. PT RIGHTS AND RESPONSIBILITIES OFFERED TO PT. Pt provided with coffee and blanket. 1200 Pt tolerating infusion well with no complaints. Pt provided with turkey sandwich. 1300 Pt denies needs at this time. 1316 Infusion complete. Pt tolerated it well with no complaints. Vitals stable. Pt discharged ambulatory with instructions with no complaints.      _m___ Safety:       (Environmental)   [place] to environment   Ensure ID band is correct and in place/ allergy band as needed   Assess for fall risk   Initiate fall precautions as applicable (fall band, side rails, etc.)   Call light within reach   Bed in low position/ wheels locked    __m__ Pain:        Assess pain level and characteristics   Administer analgesics as ordered   Assess effectiveness of pain management and report to MD as needed    _m___ Knowledge Deficit:   Assess baseline knowledge   Provide teaching at level of understanding   Provide teaching via preferred learning method   Evaluate teaching effectiveness    m____ Hemodynamic/Respiratory Status:       (Pre and Post Procedure Monitoring)   Assess/Monitor vital signs and LOC   Assess Baseline SpO2 prior to any sedation   Obtain weight/height   Assess vital signs/ LOC until patient meets discharge criteria   Monitor procedure site and notify MD of any issues

## 2017-12-13 ENCOUNTER — TELEPHONE (OUTPATIENT)
Dept: CARDIOLOGY CLINIC | Age: 72
End: 2017-12-13

## 2017-12-13 ENCOUNTER — PROCEDURE VISIT (OUTPATIENT)
Dept: CARDIOLOGY CLINIC | Age: 72
End: 2017-12-13
Payer: MEDICARE

## 2017-12-13 DIAGNOSIS — Z45.09 ENCOUNTER FOR ELECTRONIC ANALYSIS OF REVEAL EVENT RECORDER: Primary | ICD-10-CM

## 2017-12-13 DIAGNOSIS — I49.3 FREQUENT PVCS: ICD-10-CM

## 2017-12-13 PROCEDURE — 93298 REM INTERROG DEV EVAL SCRMS: CPT | Performed by: NUCLEAR MEDICINE

## 2017-12-13 NOTE — TELEPHONE ENCOUNTER
FYI:  Loop    Progress Notes        Battery ok   Known at [facility]/xarelto   4 symptom episodes   Showing NSR/frequent (bigeminal PVC's)

## 2018-01-02 ENCOUNTER — HOSPITAL ENCOUNTER (OUTPATIENT)
Dept: NURSING | Age: 73
Discharge: HOME | End: 2018-01-02
Payer: MEDICARE

## 2018-01-02 VITALS
HEART RATE: 63 BPM | DIASTOLIC BLOOD PRESSURE: 60 MMHG | WEIGHT: 240 LBS | BODY MASS INDEX: 39.94 KG/M2 | RESPIRATION RATE: 20 BRPM | TEMPERATURE: 98 F | SYSTOLIC BLOOD PRESSURE: 145 MMHG

## 2018-01-02 DIAGNOSIS — D80.9 SELECTIVE IMMUNOGLOBULIN DEFICIENCY (HCC): ICD-10-CM

## 2018-01-02 PROCEDURE — 96415 CHEMO IV INFUSION ADDL HR: CPT

## 2018-01-02 PROCEDURE — 6370000000 HC RX 637 (ALT 250 FOR IP): Performed by: INTERNAL MEDICINE

## 2018-01-02 PROCEDURE — 6360000002 HC RX W HCPCS: Performed by: INTERNAL MEDICINE

## 2018-01-02 PROCEDURE — 2580000003 HC RX 258: Performed by: INTERNAL MEDICINE

## 2018-01-02 PROCEDURE — 96413 CHEMO IV INFUSION 1 HR: CPT

## 2018-01-02 RX ORDER — 0.9 % SODIUM CHLORIDE 0.9 %
10 VIAL (ML) INJECTION PRN
Status: CANCELLED | OUTPATIENT
Start: 2018-01-02

## 2018-01-02 RX ORDER — METHYLPREDNISOLONE SODIUM SUCCINATE 125 MG/2ML
125 INJECTION, POWDER, LYOPHILIZED, FOR SOLUTION INTRAMUSCULAR; INTRAVENOUS ONCE
Status: CANCELLED | OUTPATIENT
Start: 2018-01-02 | End: 2018-01-02

## 2018-01-02 RX ORDER — 0.9 % SODIUM CHLORIDE 0.9 %
5 VIAL (ML) INJECTION PRN
Status: CANCELLED | OUTPATIENT
Start: 2018-01-02

## 2018-01-02 RX ORDER — DIPHENHYDRAMINE HYDROCHLORIDE 50 MG/ML
50 INJECTION INTRAMUSCULAR; INTRAVENOUS ONCE
Status: CANCELLED | OUTPATIENT
Start: 2018-01-02 | End: 2018-01-02

## 2018-01-02 RX ORDER — ACETAMINOPHEN 325 MG/1
650 TABLET ORAL ONCE
Status: CANCELLED | OUTPATIENT
Start: 2018-01-02 | End: 2018-01-02

## 2018-01-02 RX ORDER — HEPARIN SODIUM (PORCINE) LOCK FLUSH IV SOLN 100 UNIT/ML 100 UNIT/ML
500 SOLUTION INTRAVENOUS PRN
Status: CANCELLED | OUTPATIENT
Start: 2018-01-02

## 2018-01-02 RX ORDER — 0.9 % SODIUM CHLORIDE 0.9 %
10 VIAL (ML) INJECTION ONCE
Status: CANCELLED | OUTPATIENT
Start: 2018-01-02 | End: 2018-01-02

## 2018-01-02 RX ORDER — 0.9 % SODIUM CHLORIDE 0.9 %
10 VIAL (ML) INJECTION PRN
Status: DISCONTINUED | OUTPATIENT
Start: 2018-01-02 | End: 2018-01-03 | Stop reason: HOSPADM

## 2018-01-02 RX ORDER — HEPARIN SODIUM (PORCINE) LOCK FLUSH IV SOLN 100 UNIT/ML 100 UNIT/ML
500 SOLUTION INTRAVENOUS PRN
Status: DISCONTINUED | OUTPATIENT
Start: 2018-01-02 | End: 2018-01-03 | Stop reason: HOSPADM

## 2018-01-02 RX ORDER — SODIUM CHLORIDE 9 MG/ML
INJECTION, SOLUTION INTRAVENOUS CONTINUOUS
Status: CANCELLED | OUTPATIENT
Start: 2018-01-02

## 2018-01-02 RX ORDER — DIPHENHYDRAMINE HCL 25 MG
25 TABLET ORAL ONCE
Status: COMPLETED | OUTPATIENT
Start: 2018-01-02 | End: 2018-01-02

## 2018-01-02 RX ORDER — ACETAMINOPHEN 325 MG/1
650 TABLET ORAL ONCE
Status: COMPLETED | OUTPATIENT
Start: 2018-01-02 | End: 2018-01-02

## 2018-01-02 RX ORDER — DEXTROSE MONOHYDRATE 50 MG/ML
INJECTION, SOLUTION INTRAVENOUS ONCE
Status: CANCELLED | OUTPATIENT
Start: 2018-01-02 | End: 2018-01-02

## 2018-01-02 RX ORDER — DIPHENHYDRAMINE HCL 25 MG
25 TABLET ORAL ONCE
Status: CANCELLED | OUTPATIENT
Start: 2018-01-02 | End: 2018-01-02

## 2018-01-02 RX ORDER — EPINEPHRINE 1 MG/ML
0.3 INJECTION, SOLUTION, CONCENTRATE INTRAVENOUS PRN
Status: CANCELLED | OUTPATIENT
Start: 2018-01-02

## 2018-01-02 RX ADMIN — ACETAMINOPHEN 650 MG: 325 TABLET ORAL at 10:17

## 2018-01-02 RX ADMIN — HEPARIN 500 UNITS: 100 SYRINGE at 12:40

## 2018-01-02 RX ADMIN — IMMUNE GLOBULIN (HUMAN) 20 G: 10 INJECTION INTRAVENOUS; SUBCUTANEOUS at 10:55

## 2018-01-02 RX ADMIN — Medication 10 ML: at 12:40

## 2018-01-02 RX ADMIN — DIPHENHYDRAMINE HCL 25 MG: 25 TABLET ORAL at 10:17

## 2018-01-02 RX ADMIN — IMMUNE GLOBULIN (HUMAN) 5 G: 10 INJECTION INTRAVENOUS; SUBCUTANEOUS at 10:26

## 2018-01-02 RX ADMIN — IMMUNE GLOBULIN (HUMAN) 20 G: 10 INJECTION INTRAVENOUS; SUBCUTANEOUS at 11:58

## 2018-01-02 RX ADMIN — Medication 10 ML: at 10:23

## 2018-01-02 ASSESSMENT — PAIN SCALES - GENERAL: PAINLEVEL_OUTOF10: 0

## 2018-01-02 ASSESSMENT — PAIN - FUNCTIONAL ASSESSMENT: PAIN_FUNCTIONAL_ASSESSMENT: 0-10

## 2018-01-02 NOTE — PROGRESS NOTES
1005:  ARRIVES AMBULATORY FOR IVIG. PROCESS REVIEWED AND PT RIGHTS AND RESPONSIBILITIES OFFERED TO PT.  1100:  DRINKING FLUIDS NO COMPLAINTS  1130:  WATCHING TV.  NO COMPLAINTS. 1251:  TOLERATED INFUSION WELL. NO COMPLAINTS.   PT DISCHARGED AMBULATORY WITH INSTRUCTIONS.     _M___ Safety:       (Environmental)   [place] to environment   Ensure ID band is correct and in place/ allergy band as needed   Assess for fall risk   Initiate fall precautions as applicable (fall band, side rails, etc.)   Call light within reach   Bed in low position/ wheels locked    __M__ Pain:        Assess pain level and characteristics   Administer analgesics as ordered   Assess effectiveness of pain management and report to MD as needed    __M__ Knowledge Deficit:   Assess baseline knowledge   Provide teaching at level of understanding   Provide teaching via preferred learning method   Evaluate teaching effectiveness    _M___ Hemodynamic/Respiratory Status:       (Pre and Post Procedure Monitoring)   Assess/Monitor vital signs and LOC   Assess Baseline SpO2 prior to any sedation   Obtain weight/height   Assess vital signs/ LOC until patient meets discharge criteria   Monitor procedure site and notify MD of any issues    _M___ Infection-Risk of Central Venous Catheter:  PORT LEFT CHEST   Monitor for infection signs and symptoms (catheter site redness, temperature elevation, etc)   Assess for infection risks   Educate regarding infection prevention   Manage central venous catheter (flushes/ dressing changes per protocol)

## 2018-01-09 ENCOUNTER — OFFICE VISIT (OUTPATIENT)
Dept: CARDIOLOGY CLINIC | Age: 73
End: 2018-01-09
Payer: MEDICARE

## 2018-01-09 VITALS
HEART RATE: 60 BPM | HEIGHT: 66 IN | SYSTOLIC BLOOD PRESSURE: 98 MMHG | WEIGHT: 235 LBS | BODY MASS INDEX: 37.77 KG/M2 | DIASTOLIC BLOOD PRESSURE: 68 MMHG

## 2018-01-09 DIAGNOSIS — I25.10 CORONARY ARTERY DISEASE INVOLVING NATIVE CORONARY ARTERY OF NATIVE HEART WITHOUT ANGINA PECTORIS: ICD-10-CM

## 2018-01-09 DIAGNOSIS — I95.0 IDIOPATHIC HYPOTENSION: ICD-10-CM

## 2018-01-09 DIAGNOSIS — I42.0 DILATED CARDIOMYOPATHY (HCC): Primary | ICD-10-CM

## 2018-01-09 DIAGNOSIS — I48.0 PAROXYSMAL ATRIAL FIBRILLATION (HCC): ICD-10-CM

## 2018-01-09 PROCEDURE — 4040F PNEUMOC VAC/ADMIN/RCVD: CPT | Performed by: NUCLEAR MEDICINE

## 2018-01-09 PROCEDURE — G8417 CALC BMI ABV UP PARAM F/U: HCPCS | Performed by: NUCLEAR MEDICINE

## 2018-01-09 PROCEDURE — G8400 PT W/DXA NO RESULTS DOC: HCPCS | Performed by: NUCLEAR MEDICINE

## 2018-01-09 PROCEDURE — G8427 DOCREV CUR MEDS BY ELIG CLIN: HCPCS | Performed by: NUCLEAR MEDICINE

## 2018-01-09 PROCEDURE — 99214 OFFICE O/P EST MOD 30 MIN: CPT | Performed by: NUCLEAR MEDICINE

## 2018-01-09 PROCEDURE — G8484 FLU IMMUNIZE NO ADMIN: HCPCS | Performed by: NUCLEAR MEDICINE

## 2018-01-09 PROCEDURE — 1090F PRES/ABSN URINE INCON ASSESS: CPT | Performed by: NUCLEAR MEDICINE

## 2018-01-09 PROCEDURE — 1123F ACP DISCUSS/DSCN MKR DOCD: CPT | Performed by: NUCLEAR MEDICINE

## 2018-01-09 PROCEDURE — 3014F SCREEN MAMMO DOC REV: CPT | Performed by: NUCLEAR MEDICINE

## 2018-01-09 PROCEDURE — G8599 NO ASA/ANTIPLAT THER USE RNG: HCPCS | Performed by: NUCLEAR MEDICINE

## 2018-01-09 PROCEDURE — 3017F COLORECTAL CA SCREEN DOC REV: CPT | Performed by: NUCLEAR MEDICINE

## 2018-01-09 PROCEDURE — 1036F TOBACCO NON-USER: CPT | Performed by: NUCLEAR MEDICINE

## 2018-01-09 NOTE — PROGRESS NOTES
[facility]  [address]  [place] [address]  Dept: [phone]  Dept Fax: [phone]  Loc: [phone]    Visit Date: 1/9/2018    Neha Cabrera is a 67 y.o. female who presents today for:  Chief Complaint   Patient presents with    6 Month Follow-Up    Cardiomyopathy    Hypotension     Fatigue  Tired  Low energy   Dyspnea  Known CMP  Improved EF  Arrhythmia  Has reveal device in   No chest pain   Cath with mild CAD  Has some immune deficiency   Tolerance issues to beta blockers  Intermittent A fib on monitor     HPI:  HPI  Past Medical History:   Diagnosis Date    Anemia     Atrial fibrillation ([address] 11/9/2017    CAD (coronary artery disease)     CHF (congestive heart failure) ([address]     Chronic kidney disease     stage 3 kidney     DDD (degenerative disc disease), lumbar     Depression     Frequent PVCs 12/13/2017    GERD (gastroesophageal reflux disease)     History of blood transfusion     Hx of blood clots 09/2017    pulmonary emboli    Hyperlipidemia     Hypertension     Hyperthyroidism     Movement disorder     DDD    Neuropathy ([address]     Obesity     Pneumonia 2016    sepsis    Sleep apnea     usually wears cpap at night    Status post right and left heart catheterization     Type II or unspecified type diabetes mellitus without mention of complication, not stated as uncontrolled       Past Surgical History:   Procedure Laterality Date    ABDOMEN SURGERY      ACHILLES TENDON SURGERY Bilateral     BLADDER SUSPENSION      CARDIAC SURGERY  2016    [facility]    HAMMER TOE SURGERY Right     HERNIA REPAIR      HIATAL HERNIA REPAIR  09/06/2016    Laparoscopic Robotic with Nissen Fundoplication - Dr. Pascale Fairbanks      right    TENDON RELEASE Left 08/09/2016    left foot    TUBAL LIGATION      TUNNELED VENOUS PORT PLACEMENT  11/06/2017     Family History   Problem Relation

## 2018-01-18 DIAGNOSIS — I25.10 CORONARY ARTERY DISEASE INVOLVING NATIVE CORONARY ARTERY OF NATIVE HEART WITHOUT ANGINA PECTORIS: ICD-10-CM

## 2018-01-18 DIAGNOSIS — I95.0 IDIOPATHIC HYPOTENSION: ICD-10-CM

## 2018-01-18 DIAGNOSIS — I48.0 PAROXYSMAL ATRIAL FIBRILLATION (HCC): ICD-10-CM

## 2018-01-18 DIAGNOSIS — I42.0 DILATED CARDIOMYOPATHY (HCC): ICD-10-CM

## 2018-01-24 ENCOUNTER — PROCEDURE VISIT (OUTPATIENT)
Dept: CARDIOLOGY CLINIC | Age: 73
End: 2018-01-24
Payer: MEDICARE

## 2018-01-24 DIAGNOSIS — Z45.09 ENCOUNTER FOR ELECTRONIC ANALYSIS OF REVEAL EVENT RECORDER: Primary | ICD-10-CM

## 2018-01-24 PROCEDURE — 93298 REM INTERROG DEV EVAL SCRMS: CPT | Performed by: INTERNAL MEDICINE

## 2018-01-30 ENCOUNTER — HOSPITAL ENCOUNTER (OUTPATIENT)
Dept: NURSING | Age: 73
Discharge: HOME | End: 2018-01-30
Payer: MEDICARE

## 2018-01-30 VITALS
SYSTOLIC BLOOD PRESSURE: 111 MMHG | HEART RATE: 52 BPM | BODY MASS INDEX: 37.32 KG/M2 | TEMPERATURE: 98.5 F | OXYGEN SATURATION: 94 % | DIASTOLIC BLOOD PRESSURE: 75 MMHG | WEIGHT: 231.2 LBS | RESPIRATION RATE: 18 BRPM

## 2018-01-30 DIAGNOSIS — D80.9 SELECTIVE IMMUNOGLOBULIN DEFICIENCY (HCC): ICD-10-CM

## 2018-01-30 LAB
IGA: 115 MG/DL (ref 70–400)
IGG: 1088 MG/DL (ref 700–1600)
IGM: 59 MG/DL (ref 40–230)

## 2018-01-30 PROCEDURE — 36591 DRAW BLOOD OFF VENOUS DEVICE: CPT

## 2018-01-30 PROCEDURE — 6360000002 HC RX W HCPCS: Performed by: INTERNAL MEDICINE

## 2018-01-30 PROCEDURE — 96413 CHEMO IV INFUSION 1 HR: CPT

## 2018-01-30 PROCEDURE — 96415 CHEMO IV INFUSION ADDL HR: CPT

## 2018-01-30 PROCEDURE — 2580000003 HC RX 258: Performed by: INTERNAL MEDICINE

## 2018-01-30 PROCEDURE — 82784 ASSAY IGA/IGD/IGG/IGM EACH: CPT

## 2018-01-30 RX ORDER — SODIUM CHLORIDE 9 MG/ML
INJECTION, SOLUTION INTRAVENOUS CONTINUOUS
Status: CANCELLED | OUTPATIENT
Start: 2018-01-30

## 2018-01-30 RX ORDER — DIPHENHYDRAMINE HCL 25 MG
25 TABLET ORAL ONCE
Status: CANCELLED | OUTPATIENT
Start: 2018-01-30 | End: 2018-01-30

## 2018-01-30 RX ORDER — DEXTROSE MONOHYDRATE 50 MG/ML
INJECTION, SOLUTION INTRAVENOUS ONCE
Status: CANCELLED | OUTPATIENT
Start: 2018-01-30 | End: 2018-01-30

## 2018-01-30 RX ORDER — 0.9 % SODIUM CHLORIDE 0.9 %
10 VIAL (ML) INJECTION ONCE
Status: CANCELLED | OUTPATIENT
Start: 2018-01-30 | End: 2018-01-30

## 2018-01-30 RX ORDER — METHYLPREDNISOLONE SODIUM SUCCINATE 125 MG/2ML
125 INJECTION, POWDER, LYOPHILIZED, FOR SOLUTION INTRAMUSCULAR; INTRAVENOUS ONCE
Status: CANCELLED | OUTPATIENT
Start: 2018-01-30 | End: 2018-01-30

## 2018-01-30 RX ORDER — 0.9 % SODIUM CHLORIDE 0.9 %
5 VIAL (ML) INJECTION PRN
Status: CANCELLED | OUTPATIENT
Start: 2018-01-30

## 2018-01-30 RX ORDER — HEPARIN SODIUM (PORCINE) LOCK FLUSH IV SOLN 100 UNIT/ML 100 UNIT/ML
500 SOLUTION INTRAVENOUS PRN
Status: CANCELLED | OUTPATIENT
Start: 2018-01-30

## 2018-01-30 RX ORDER — ACETAMINOPHEN 325 MG/1
650 TABLET ORAL ONCE
Status: CANCELLED | OUTPATIENT
Start: 2018-01-30 | End: 2018-01-30

## 2018-01-30 RX ORDER — SODIUM CHLORIDE 0.9 % (FLUSH) 0.9 %
10 SYRINGE (ML) INJECTION PRN
Status: DISCONTINUED | OUTPATIENT
Start: 2018-01-30 | End: 2018-01-31 | Stop reason: HOSPADM

## 2018-01-30 RX ORDER — DIPHENHYDRAMINE HYDROCHLORIDE 50 MG/ML
50 INJECTION INTRAMUSCULAR; INTRAVENOUS ONCE
Status: CANCELLED | OUTPATIENT
Start: 2018-01-30 | End: 2018-01-30

## 2018-01-30 RX ORDER — 0.9 % SODIUM CHLORIDE 0.9 %
10 VIAL (ML) INJECTION PRN
Status: DISCONTINUED | OUTPATIENT
Start: 2018-01-30 | End: 2018-01-31 | Stop reason: HOSPADM

## 2018-01-30 RX ORDER — HEPARIN SODIUM (PORCINE) LOCK FLUSH IV SOLN 100 UNIT/ML 100 UNIT/ML
500 SOLUTION INTRAVENOUS PRN
Status: DISCONTINUED | OUTPATIENT
Start: 2018-01-30 | End: 2018-01-31 | Stop reason: HOSPADM

## 2018-01-30 RX ORDER — 0.9 % SODIUM CHLORIDE 0.9 %
10 VIAL (ML) INJECTION PRN
Status: CANCELLED | OUTPATIENT
Start: 2018-01-30

## 2018-01-30 RX ADMIN — IMMUNE GLOBULIN (HUMAN) 20 G: 10 INJECTION INTRAVENOUS; SUBCUTANEOUS at 10:25

## 2018-01-30 RX ADMIN — IMMUNE GLOBULIN (HUMAN) 20 G: 10 INJECTION INTRAVENOUS; SUBCUTANEOUS at 11:38

## 2018-01-30 RX ADMIN — Medication 10 ML: at 10:24

## 2018-01-30 RX ADMIN — SODIUM CHLORIDE, PRESERVATIVE FREE 500 UNITS: 5 INJECTION INTRAVENOUS at 12:24

## 2018-01-30 RX ADMIN — Medication 10 ML: at 12:16

## 2018-01-30 ASSESSMENT — PAIN SCALES - GENERAL: PAINLEVEL_OUTOF10: 2

## 2018-01-30 ASSESSMENT — PAIN - FUNCTIONAL ASSESSMENT: PAIN_FUNCTIONAL_ASSESSMENT: 0-10

## 2018-01-30 ASSESSMENT — PAIN DESCRIPTION - DESCRIPTORS: DESCRIPTORS: ACHING;DULL

## 2018-01-30 NOTE — PROGRESS NOTES
1005 Patient arrived in [facility] ambulatory for IVIG treatment. PT RIGHTS AND RESPONSIBILITIES OFFERED TO PT.  1100 Patient denies any needs at this time. [place] patient to call out for any needs.  Denies any needs at this time    __m__ Safety:       (Environmental)  [place] to environment   Ensure ID band is correct and in place/ allergy band as needed   Assess for fall risk   Initiate fall precautions as applicable (fall band, side rails, etc.)   Call light within reach   Bed in low position/ wheels locked    __m__ Pain:        Assess pain level and characteristics   Administer analgesics as ordered   Assess effectiveness of pain management and report to MD as needed    __m__ Knowledge Deficit:   Assess baseline knowledge   Provide teaching at level of understanding   Provide teaching via preferred learning method   Evaluate teaching effectiveness    _m___ Hemodynamic/Respiratory Status:       (Pre and Post Procedure Monitoring)   Assess/Monitor vital signs and LOC   Assess Baseline SpO2 prior to any sedation   Obtain weight/height   Assess vital signs/ LOC until patient meets discharge criteria   Monitor procedure site and notify MD of any issues    ___m_ Infection-Risk of Central Venous Catheter:   Monitor for infection signs and symptoms (catheter site redness, temperature elevation, etc)   Assess for infection risks   Educate regarding infection prevention   Manage central venous catheter (flushes/ dressing changes per protocol)

## 2018-01-31 ENCOUNTER — TELEPHONE (OUTPATIENT)
Dept: CARDIOLOGY CLINIC | Age: 73
End: 2018-01-31

## 2018-02-27 ENCOUNTER — HOSPITAL ENCOUNTER (OUTPATIENT)
Dept: NURSING | Age: 73
Discharge: HOME | End: 2018-02-27
Payer: MEDICARE

## 2018-02-27 VITALS
HEART RATE: 61 BPM | TEMPERATURE: 97.7 F | OXYGEN SATURATION: 94 % | SYSTOLIC BLOOD PRESSURE: 134 MMHG | BODY MASS INDEX: 37.67 KG/M2 | RESPIRATION RATE: 18 BRPM | WEIGHT: 233.4 LBS | DIASTOLIC BLOOD PRESSURE: 89 MMHG

## 2018-02-27 DIAGNOSIS — D80.9 SELECTIVE IMMUNOGLOBULIN DEFICIENCY (HCC): ICD-10-CM

## 2018-02-27 PROCEDURE — 96413 CHEMO IV INFUSION 1 HR: CPT

## 2018-02-27 PROCEDURE — 2580000003 HC RX 258: Performed by: INTERNAL MEDICINE

## 2018-02-27 PROCEDURE — 96415 CHEMO IV INFUSION ADDL HR: CPT

## 2018-02-27 PROCEDURE — 6360000002 HC RX W HCPCS: Performed by: INTERNAL MEDICINE

## 2018-02-27 RX ORDER — DIPHENHYDRAMINE HCL 25 MG
25 TABLET ORAL ONCE
Status: CANCELLED | OUTPATIENT
Start: 2018-02-27 | End: 2018-02-27

## 2018-02-27 RX ORDER — 0.9 % SODIUM CHLORIDE 0.9 %
10 VIAL (ML) INJECTION PRN
Status: DISCONTINUED | OUTPATIENT
Start: 2018-02-27 | End: 2018-02-28 | Stop reason: HOSPADM

## 2018-02-27 RX ORDER — HEPARIN SODIUM (PORCINE) LOCK FLUSH IV SOLN 100 UNIT/ML 100 UNIT/ML
500 SOLUTION INTRAVENOUS PRN
Status: CANCELLED | OUTPATIENT
Start: 2018-02-27

## 2018-02-27 RX ORDER — 0.9 % SODIUM CHLORIDE 0.9 %
10 VIAL (ML) INJECTION PRN
Status: CANCELLED | OUTPATIENT
Start: 2018-02-27

## 2018-02-27 RX ORDER — DIPHENHYDRAMINE HYDROCHLORIDE 50 MG/ML
50 INJECTION INTRAMUSCULAR; INTRAVENOUS ONCE
Status: CANCELLED | OUTPATIENT
Start: 2018-02-27 | End: 2018-02-27

## 2018-02-27 RX ORDER — HEPARIN SODIUM (PORCINE) LOCK FLUSH IV SOLN 100 UNIT/ML 100 UNIT/ML
500 SOLUTION INTRAVENOUS PRN
Status: DISCONTINUED | OUTPATIENT
Start: 2018-02-27 | End: 2018-02-28 | Stop reason: HOSPADM

## 2018-02-27 RX ORDER — ACETAMINOPHEN 325 MG/1
650 TABLET ORAL ONCE
Status: CANCELLED | OUTPATIENT
Start: 2018-02-27 | End: 2018-02-27

## 2018-02-27 RX ORDER — DEXTROSE MONOHYDRATE 50 MG/ML
INJECTION, SOLUTION INTRAVENOUS ONCE
Status: CANCELLED | OUTPATIENT
Start: 2018-02-27 | End: 2018-02-27

## 2018-02-27 RX ORDER — SODIUM CHLORIDE 9 MG/ML
INJECTION, SOLUTION INTRAVENOUS CONTINUOUS
Status: CANCELLED | OUTPATIENT
Start: 2018-02-27

## 2018-02-27 RX ORDER — 0.9 % SODIUM CHLORIDE 0.9 %
10 VIAL (ML) INJECTION ONCE
Status: CANCELLED | OUTPATIENT
Start: 2018-02-27 | End: 2018-02-27

## 2018-02-27 RX ORDER — 0.9 % SODIUM CHLORIDE 0.9 %
5 VIAL (ML) INJECTION PRN
Status: CANCELLED | OUTPATIENT
Start: 2018-02-27

## 2018-02-27 RX ORDER — METHYLPREDNISOLONE SODIUM SUCCINATE 125 MG/2ML
125 INJECTION, POWDER, LYOPHILIZED, FOR SOLUTION INTRAMUSCULAR; INTRAVENOUS ONCE
Status: CANCELLED | OUTPATIENT
Start: 2018-02-27 | End: 2018-02-27

## 2018-02-27 RX ADMIN — HEPARIN 500 UNITS: 100 SYRINGE at 12:17

## 2018-02-27 RX ADMIN — IMMUNE GLOBULIN (HUMAN) 20 G: 10 INJECTION INTRAVENOUS; SUBCUTANEOUS at 10:23

## 2018-02-27 RX ADMIN — IMMUNE GLOBULIN (HUMAN) 20 G: 10 INJECTION INTRAVENOUS; SUBCUTANEOUS at 11:39

## 2018-02-27 RX ADMIN — Medication 10 ML: at 12:17

## 2018-02-27 RX ADMIN — Medication 10 ML: at 10:16

## 2018-02-28 ENCOUNTER — PROCEDURE VISIT (OUTPATIENT)
Dept: CARDIOLOGY CLINIC | Age: 73
End: 2018-02-28
Payer: MEDICARE

## 2018-02-28 DIAGNOSIS — Z45.09 ENCOUNTER FOR ELECTRONIC ANALYSIS OF REVEAL EVENT RECORDER: Primary | ICD-10-CM

## 2018-02-28 PROCEDURE — 93298 REM INTERROG DEV EVAL SCRMS: CPT | Performed by: NUCLEAR MEDICINE

## 2018-03-27 ENCOUNTER — HOSPITAL ENCOUNTER (OUTPATIENT)
Dept: NURSING | Age: 73
Discharge: HOME | End: 2018-03-27
Payer: MEDICARE

## 2018-03-27 VITALS
WEIGHT: 230 LBS | RESPIRATION RATE: 16 BRPM | TEMPERATURE: 96.6 F | DIASTOLIC BLOOD PRESSURE: 70 MMHG | HEART RATE: 56 BPM | SYSTOLIC BLOOD PRESSURE: 106 MMHG | BODY MASS INDEX: 37.12 KG/M2

## 2018-03-27 DIAGNOSIS — D80.9 SELECTIVE IMMUNOGLOBULIN DEFICIENCY (HCC): ICD-10-CM

## 2018-03-27 PROCEDURE — 96413 CHEMO IV INFUSION 1 HR: CPT

## 2018-03-27 PROCEDURE — 6360000002 HC RX W HCPCS: Performed by: INTERNAL MEDICINE

## 2018-03-27 PROCEDURE — 96415 CHEMO IV INFUSION ADDL HR: CPT

## 2018-03-27 PROCEDURE — 2580000003 HC RX 258: Performed by: INTERNAL MEDICINE

## 2018-03-27 RX ORDER — HEPARIN SODIUM (PORCINE) LOCK FLUSH IV SOLN 100 UNIT/ML 100 UNIT/ML
500 SOLUTION INTRAVENOUS PRN
Status: CANCELLED | OUTPATIENT
Start: 2018-03-27

## 2018-03-27 RX ORDER — HEPARIN SODIUM (PORCINE) LOCK FLUSH IV SOLN 100 UNIT/ML 100 UNIT/ML
500 SOLUTION INTRAVENOUS PRN
Status: DISCONTINUED | OUTPATIENT
Start: 2018-03-27 | End: 2018-03-28 | Stop reason: HOSPADM

## 2018-03-27 RX ORDER — 0.9 % SODIUM CHLORIDE 0.9 %
5 VIAL (ML) INJECTION PRN
Status: CANCELLED | OUTPATIENT
Start: 2018-03-27

## 2018-03-27 RX ORDER — DIPHENHYDRAMINE HYDROCHLORIDE 50 MG/ML
50 INJECTION INTRAMUSCULAR; INTRAVENOUS ONCE
Status: CANCELLED | OUTPATIENT
Start: 2018-03-27 | End: 2018-03-27

## 2018-03-27 RX ORDER — ESCITALOPRAM OXALATE 10 MG/1
10 TABLET ORAL DAILY
COMMUNITY
End: 2018-06-15

## 2018-03-27 RX ORDER — DEXTROSE MONOHYDRATE 50 MG/ML
INJECTION, SOLUTION INTRAVENOUS ONCE
Status: CANCELLED | OUTPATIENT
Start: 2018-03-27 | End: 2018-03-27

## 2018-03-27 RX ORDER — SODIUM CHLORIDE 9 MG/ML
INJECTION, SOLUTION INTRAVENOUS CONTINUOUS
Status: CANCELLED | OUTPATIENT
Start: 2018-03-27

## 2018-03-27 RX ORDER — 0.9 % SODIUM CHLORIDE 0.9 %
10 VIAL (ML) INJECTION PRN
Status: DISCONTINUED | OUTPATIENT
Start: 2018-03-27 | End: 2018-03-28 | Stop reason: HOSPADM

## 2018-03-27 RX ORDER — ACETAMINOPHEN 325 MG/1
650 TABLET ORAL ONCE
Status: CANCELLED | OUTPATIENT
Start: 2018-03-27 | End: 2018-03-27

## 2018-03-27 RX ORDER — DIPHENHYDRAMINE HCL 25 MG
25 TABLET ORAL ONCE
Status: CANCELLED | OUTPATIENT
Start: 2018-03-27 | End: 2018-03-27

## 2018-03-27 RX ORDER — 0.9 % SODIUM CHLORIDE 0.9 %
10 VIAL (ML) INJECTION PRN
Status: CANCELLED | OUTPATIENT
Start: 2018-03-27

## 2018-03-27 RX ORDER — METHYLPREDNISOLONE SODIUM SUCCINATE 125 MG/2ML
125 INJECTION, POWDER, LYOPHILIZED, FOR SOLUTION INTRAMUSCULAR; INTRAVENOUS ONCE
Status: CANCELLED | OUTPATIENT
Start: 2018-03-27 | End: 2018-03-27

## 2018-03-27 RX ORDER — 0.9 % SODIUM CHLORIDE 0.9 %
10 VIAL (ML) INJECTION ONCE
Status: CANCELLED | OUTPATIENT
Start: 2018-03-27 | End: 2018-03-27

## 2018-03-27 RX ADMIN — HEPARIN 500 UNITS: 100 SYRINGE at 12:21

## 2018-03-27 RX ADMIN — IMMUNE GLOBULIN (HUMAN) 20 G: 10 INJECTION INTRAVENOUS; SUBCUTANEOUS at 11:37

## 2018-03-27 RX ADMIN — Medication 10 ML: at 12:21

## 2018-03-27 RX ADMIN — IMMUNE GLOBULIN (HUMAN) 20 G: 10 INJECTION INTRAVENOUS; SUBCUTANEOUS at 10:24

## 2018-03-27 ASSESSMENT — PAIN - FUNCTIONAL ASSESSMENT: PAIN_FUNCTIONAL_ASSESSMENT: 0-10

## 2018-03-27 ASSESSMENT — PAIN SCALES - GENERAL: PAINLEVEL_OUTOF10: 0

## 2018-03-27 NOTE — PROGRESS NOTES
1230:  PT TOLERATED INFUSION WELL.   PT DISCHARGED AMBULATORY WITH INSTRUCTIONS.      _M___ Safety:       (Environmental)   [place] to environment   Ensure ID band is correct and in place/ allergy band as needed   Assess for fall risk   Initiate fall precautions as applicable (fall band, side rails, etc.)   Call light within reach   Bed in low position/ wheels locked    _M___ Pain:        Assess pain level and characteristics   Administer analgesics as ordered   Assess effectiveness of pain management and report to MD as needed    _M___ Knowledge Deficit:   Assess baseline knowledge   Provide teaching at level of understanding   Provide teaching via preferred learning method   Evaluate teaching effectiveness    _M___ Hemodynamic/Respiratory Status:       (Pre and Post Procedure Monitoring)   Assess/Monitor vital signs and LOC   Assess Baseline SpO2 prior to any sedation   Obtain weight/height   Assess vital signs/ LOC until patient meets discharge criteria   Monitor procedure site and notify MD of any issues    __M__ Infection-Risk of Central Venous Catheter:   Monitor for infection signs and symptoms (catheter site redness, temperature elevation, etc)   Assess for infection risks   Educate regarding infection prevention   Manage central venous catheter (flushes/ dressing changes per protocol)

## 2018-03-27 NOTE — PROGRESS NOTES
36 Alert female admitted for IVIg infusion procedure reviewed with pt verbalized understanding. Pt rights and responsibilities offered to pt to read. Pt takes pre meds prior to arrival.        ___met_ Safety:       (Environmental)  [place] to environment   Ensure ID band is correct and in place/ allergy band as needed   Assess for fall risk   Initiate fall precautions as applicable (fall band, side rails, etc.)   Call light within reach   Bed in low position/ wheels locked    _met___ Pain:        Assess pain level and characteristics   Administer analgesics as ordered   Assess effectiveness of pain management and report to MD as needed    __met__ Knowledge Deficit:   Assess baseline knowledge   Provide teaching at level of understanding   Provide teaching via preferred learning method   Evaluate teaching effectiveness    ___

## 2018-04-05 ENCOUNTER — PROCEDURE VISIT (OUTPATIENT)
Dept: CARDIOLOGY CLINIC | Age: 73
End: 2018-04-05

## 2018-04-05 ENCOUNTER — PROCEDURE VISIT (OUTPATIENT)
Dept: CARDIOLOGY CLINIC | Age: 73
End: 2018-04-05
Payer: MEDICARE

## 2018-04-05 DIAGNOSIS — Z45.09 ENCOUNTER FOR ELECTRONIC ANALYSIS OF REVEAL EVENT RECORDER: Primary | ICD-10-CM

## 2018-04-05 PROCEDURE — 93298 REM INTERROG DEV EVAL SCRMS: CPT | Performed by: NUCLEAR MEDICINE

## 2018-04-24 ENCOUNTER — HOSPITAL ENCOUNTER (OUTPATIENT)
Dept: NURSING | Age: 73
Discharge: HOME | End: 2018-04-24
Payer: MEDICARE

## 2018-04-24 VITALS
TEMPERATURE: 98 F | SYSTOLIC BLOOD PRESSURE: 110 MMHG | RESPIRATION RATE: 16 BRPM | BODY MASS INDEX: 36.93 KG/M2 | DIASTOLIC BLOOD PRESSURE: 53 MMHG | HEART RATE: 64 BPM | OXYGEN SATURATION: 95 % | WEIGHT: 228.8 LBS

## 2018-04-24 DIAGNOSIS — D80.9 SELECTIVE IMMUNOGLOBULIN DEFICIENCY (HCC): ICD-10-CM

## 2018-04-24 PROCEDURE — 96415 CHEMO IV INFUSION ADDL HR: CPT

## 2018-04-24 PROCEDURE — 6360000002 HC RX W HCPCS: Performed by: INTERNAL MEDICINE

## 2018-04-24 PROCEDURE — 96413 CHEMO IV INFUSION 1 HR: CPT

## 2018-04-24 PROCEDURE — 2580000003 HC RX 258: Performed by: INTERNAL MEDICINE

## 2018-04-24 RX ORDER — HEPARIN SODIUM (PORCINE) LOCK FLUSH IV SOLN 100 UNIT/ML 100 UNIT/ML
500 SOLUTION INTRAVENOUS PRN
Status: DISCONTINUED | OUTPATIENT
Start: 2018-04-24 | End: 2018-04-25 | Stop reason: HOSPADM

## 2018-04-24 RX ORDER — DEXTROSE MONOHYDRATE 50 MG/ML
INJECTION, SOLUTION INTRAVENOUS ONCE
Status: CANCELLED | OUTPATIENT
Start: 2018-04-24 | End: 2018-04-24

## 2018-04-24 RX ORDER — 0.9 % SODIUM CHLORIDE 0.9 %
10 VIAL (ML) INJECTION ONCE
Status: CANCELLED | OUTPATIENT
Start: 2018-04-24 | End: 2018-04-24

## 2018-04-24 RX ORDER — DIPHENHYDRAMINE HCL 25 MG
25 TABLET ORAL ONCE
Status: CANCELLED | OUTPATIENT
Start: 2018-04-24 | End: 2018-04-24

## 2018-04-24 RX ORDER — 0.9 % SODIUM CHLORIDE 0.9 %
10 VIAL (ML) INJECTION PRN
Status: CANCELLED | OUTPATIENT
Start: 2018-04-24

## 2018-04-24 RX ORDER — METHYLPREDNISOLONE SODIUM SUCCINATE 125 MG/2ML
125 INJECTION, POWDER, LYOPHILIZED, FOR SOLUTION INTRAMUSCULAR; INTRAVENOUS ONCE
Status: CANCELLED | OUTPATIENT
Start: 2018-04-24 | End: 2018-04-24

## 2018-04-24 RX ORDER — ACETAMINOPHEN 325 MG/1
650 TABLET ORAL ONCE
Status: CANCELLED | OUTPATIENT
Start: 2018-04-24 | End: 2018-04-24

## 2018-04-24 RX ORDER — 0.9 % SODIUM CHLORIDE 0.9 %
10 VIAL (ML) INJECTION PRN
Status: DISCONTINUED | OUTPATIENT
Start: 2018-04-24 | End: 2018-04-25 | Stop reason: HOSPADM

## 2018-04-24 RX ORDER — SODIUM CHLORIDE 9 MG/ML
INJECTION, SOLUTION INTRAVENOUS CONTINUOUS
Status: CANCELLED | OUTPATIENT
Start: 2018-04-24

## 2018-04-24 RX ORDER — HEPARIN SODIUM (PORCINE) LOCK FLUSH IV SOLN 100 UNIT/ML 100 UNIT/ML
500 SOLUTION INTRAVENOUS PRN
Status: CANCELLED | OUTPATIENT
Start: 2018-04-24

## 2018-04-24 RX ORDER — 0.9 % SODIUM CHLORIDE 0.9 %
5 VIAL (ML) INJECTION PRN
Status: CANCELLED | OUTPATIENT
Start: 2018-04-24

## 2018-04-24 RX ORDER — DIPHENHYDRAMINE HYDROCHLORIDE 50 MG/ML
50 INJECTION INTRAMUSCULAR; INTRAVENOUS ONCE
Status: CANCELLED | OUTPATIENT
Start: 2018-04-24 | End: 2018-04-24

## 2018-04-24 RX ADMIN — IMMUNE GLOBULIN (HUMAN) 20 G: 10 INJECTION INTRAVENOUS; SUBCUTANEOUS at 10:35

## 2018-04-24 RX ADMIN — Medication 10 ML: at 12:28

## 2018-04-24 RX ADMIN — IMMUNE GLOBULIN (HUMAN) 20 G: 10 INJECTION INTRAVENOUS; SUBCUTANEOUS at 11:49

## 2018-04-24 RX ADMIN — HEPARIN 500 UNITS: 100 SYRINGE at 12:30

## 2018-04-24 RX ADMIN — Medication 10 ML: at 10:28

## 2018-04-24 ASSESSMENT — PAIN - FUNCTIONAL ASSESSMENT: PAIN_FUNCTIONAL_ASSESSMENT: 0-10

## 2018-05-01 LAB
AVERAGE GLUCOSE: NORMAL
BUN BLDV-MCNC: 44 MG/DL
CALCIUM SERPL-MCNC: 9.1 MG/DL
CHLORIDE BLD-SCNC: 99 MMOL/L
CO2: 29 MMOL/L
CREAT SERPL-MCNC: 1.5 MG/DL
CREATININE, URINE: 99.8
GFR CALCULATED: 36
GLUCOSE BLD-MCNC: 140 MG/DL
GLUCOSE BLD-MCNC: 143 MG/DL
HBA1C MFR BLD: 6.5 %
MICROALBUMIN/CREAT 24H UR: <1.2 MG/G{CREAT}
MICROALBUMIN/CREAT UR-RTO: <12
POTASSIUM SERPL-SCNC: 4.4 MMOL/L
SODIUM BLD-SCNC: 142 MMOL/L

## 2018-05-09 ENCOUNTER — HOSPITAL ENCOUNTER (INPATIENT)
Age: 73
LOS: 4 days | Discharge: HOME | DRG: 377 | End: 2018-05-13
Attending: FAMILY MEDICINE | Admitting: INTERNAL MEDICINE
Payer: MEDICARE

## 2018-05-09 ENCOUNTER — OFFICE VISIT (OUTPATIENT)
Dept: NEPHROLOGY | Age: 73
End: 2018-05-09
Payer: MEDICARE

## 2018-05-09 VITALS
HEART RATE: 43 BPM | OXYGEN SATURATION: 96 % | WEIGHT: 229.8 LBS | BODY MASS INDEX: 37.09 KG/M2 | DIASTOLIC BLOOD PRESSURE: 62 MMHG | SYSTOLIC BLOOD PRESSURE: 110 MMHG

## 2018-05-09 DIAGNOSIS — K92.2 LOWER GI BLEED: Primary | ICD-10-CM

## 2018-05-09 DIAGNOSIS — K62.5 RECTAL BLEEDING: ICD-10-CM

## 2018-05-09 DIAGNOSIS — N39.0 URINARY TRACT INFECTION WITH HEMATURIA, SITE UNSPECIFIED: Primary | ICD-10-CM

## 2018-05-09 DIAGNOSIS — D62 ACUTE BLOOD LOSS ANEMIA: ICD-10-CM

## 2018-05-09 DIAGNOSIS — R31.9 URINARY TRACT INFECTION WITH HEMATURIA, SITE UNSPECIFIED: Primary | ICD-10-CM

## 2018-05-09 LAB
ABO: NORMAL
ALBUMIN SERPL-MCNC: 3.4 G/DL (ref 3.5–5.1)
ALP BLD-CCNC: 82 U/L (ref 38–126)
ALT SERPL-CCNC: 10 U/L (ref 11–66)
AMORPHOUS: ABNORMAL
ANION GAP SERPL CALCULATED.3IONS-SCNC: 12 MEQ/L (ref 8–16)
ANTIBODY SCREEN: NORMAL
APTT: 70.9 SECONDS (ref 22–38)
AST SERPL-CCNC: 16 U/L (ref 5–40)
BACTERIA: ABNORMAL /HPF
BILIRUB SERPL-MCNC: 0.2 MG/DL (ref 0.3–1.2)
BILIRUBIN URINE: NEGATIVE
BLOOD, URINE: ABNORMAL
BUN BLDV-MCNC: 41 MG/DL (ref 7–22)
CALCIUM SERPL-MCNC: 8.4 MG/DL (ref 8.5–10.5)
CASTS 2: ABNORMAL /LPF
CASTS UA: ABNORMAL /LPF
CHARACTER, URINE: ABNORMAL
CHLORIDE BLD-SCNC: 103 MEQ/L (ref 98–111)
CO2: 26 MEQ/L (ref 23–33)
COLOR: YELLOW
CREAT SERPL-MCNC: 1.3 MG/DL (ref 0.4–1.2)
CRYSTALS, UA: ABNORMAL
DIFFERENTIAL, MANUAL: NORMAL
EPITHELIAL CELLS, UA: ABNORMAL /HPF
GFR SERPL CREATININE-BSD FRML MDRD: 40 ML/MIN/1.73M2
GLUCOSE BLD-MCNC: 100 MG/DL (ref 70–108)
GLUCOSE BLD-MCNC: 171 MG/DL (ref 70–108)
GLUCOSE URINE: NEGATIVE MG/DL
HEMOCCULT STL QL: POSITIVE
INR BLD: 1.49 (ref 0.85–1.13)
KETONES, URINE: NEGATIVE
LEUKOCYTE ESTERASE, URINE: ABNORMAL
MISCELLANEOUS 2: ABNORMAL
NITRITE, URINE: NEGATIVE
OSMOLALITY CALCULATION: 295.4 MOSMOL/KG (ref 275–300)
PH UA: 5.5
POTASSIUM SERPL-SCNC: 3.8 MEQ/L (ref 3.5–5.2)
PROTEIN UA: NEGATIVE
RBC URINE: ABNORMAL /HPF
RENAL EPITHELIAL, UA: ABNORMAL
RH FACTOR: NORMAL
SODIUM BLD-SCNC: 141 MEQ/L (ref 135–145)
SPECIFIC GRAVITY, URINE: 1.01 (ref 1–1.03)
TOTAL PROTEIN: 5.6 G/DL (ref 6.1–8)
UROBILINOGEN, URINE: 0.2 EU/DL
WBC UA: ABNORMAL /HPF
YEAST: ABNORMAL

## 2018-05-09 PROCEDURE — 81001 URINALYSIS AUTO W/SCOPE: CPT

## 2018-05-09 PROCEDURE — 93005 ELECTROCARDIOGRAM TRACING: CPT | Performed by: INTERNAL MEDICINE

## 2018-05-09 PROCEDURE — 1036F TOBACCO NON-USER: CPT | Performed by: INTERNAL MEDICINE

## 2018-05-09 PROCEDURE — 1123F ACP DISCUSS/DSCN MKR DOCD: CPT | Performed by: INTERNAL MEDICINE

## 2018-05-09 PROCEDURE — G8427 DOCREV CUR MEDS BY ELIG CLIN: HCPCS | Performed by: INTERNAL MEDICINE

## 2018-05-09 PROCEDURE — 85610 PROTHROMBIN TIME: CPT

## 2018-05-09 PROCEDURE — 87077 CULTURE AEROBIC IDENTIFY: CPT

## 2018-05-09 PROCEDURE — G8599 NO ASA/ANTIPLAT THER USE RNG: HCPCS | Performed by: INTERNAL MEDICINE

## 2018-05-09 PROCEDURE — 86900 BLOOD TYPING SEROLOGIC ABO: CPT

## 2018-05-09 PROCEDURE — G8400 PT W/DXA NO RESULTS DOC: HCPCS | Performed by: INTERNAL MEDICINE

## 2018-05-09 PROCEDURE — 3017F COLORECTAL CA SCREEN DOC REV: CPT | Performed by: INTERNAL MEDICINE

## 2018-05-09 PROCEDURE — 82948 REAGENT STRIP/BLOOD GLUCOSE: CPT

## 2018-05-09 PROCEDURE — 99223 1ST HOSP IP/OBS HIGH 75: CPT | Performed by: INTERNAL MEDICINE

## 2018-05-09 PROCEDURE — 2580000003 HC RX 258: Performed by: INTERNAL MEDICINE

## 2018-05-09 PROCEDURE — 86923 COMPATIBILITY TEST ELECTRIC: CPT

## 2018-05-09 PROCEDURE — 96374 THER/PROPH/DIAG INJ IV PUSH: CPT

## 2018-05-09 PROCEDURE — P9016 RBC LEUKOCYTES REDUCED: HCPCS

## 2018-05-09 PROCEDURE — 36415 COLL VENOUS BLD VENIPUNCTURE: CPT

## 2018-05-09 PROCEDURE — 6370000000 HC RX 637 (ALT 250 FOR IP): Performed by: INTERNAL MEDICINE

## 2018-05-09 PROCEDURE — 4040F PNEUMOC VAC/ADMIN/RCVD: CPT | Performed by: INTERNAL MEDICINE

## 2018-05-09 PROCEDURE — 87186 SC STD MICRODIL/AGAR DIL: CPT

## 2018-05-09 PROCEDURE — 87184 SC STD DISK METHOD PER PLATE: CPT

## 2018-05-09 PROCEDURE — 6360000002 HC RX W HCPCS: Performed by: FAMILY MEDICINE

## 2018-05-09 PROCEDURE — 87086 URINE CULTURE/COLONY COUNT: CPT

## 2018-05-09 PROCEDURE — 99213 OFFICE O/P EST LOW 20 MIN: CPT | Performed by: INTERNAL MEDICINE

## 2018-05-09 PROCEDURE — G8417 CALC BMI ABV UP PARAM F/U: HCPCS | Performed by: INTERNAL MEDICINE

## 2018-05-09 PROCEDURE — C9113 INJ PANTOPRAZOLE SODIUM, VIA: HCPCS | Performed by: FAMILY MEDICINE

## 2018-05-09 PROCEDURE — 99285 EMERGENCY DEPT VISIT HI MDM: CPT

## 2018-05-09 PROCEDURE — 2140000000 HC CCU INTERMEDIATE R&B

## 2018-05-09 PROCEDURE — 6360000002 HC RX W HCPCS: Performed by: INTERNAL MEDICINE

## 2018-05-09 PROCEDURE — 82272 OCCULT BLD FECES 1-3 TESTS: CPT

## 2018-05-09 PROCEDURE — 86901 BLOOD TYPING SEROLOGIC RH(D): CPT

## 2018-05-09 PROCEDURE — 85025 COMPLETE CBC W/AUTO DIFF WBC: CPT

## 2018-05-09 PROCEDURE — C9113 INJ PANTOPRAZOLE SODIUM, VIA: HCPCS | Performed by: INTERNAL MEDICINE

## 2018-05-09 PROCEDURE — 80053 COMPREHEN METABOLIC PANEL: CPT

## 2018-05-09 PROCEDURE — 85730 THROMBOPLASTIN TIME PARTIAL: CPT

## 2018-05-09 PROCEDURE — 1090F PRES/ABSN URINE INCON ASSESS: CPT | Performed by: INTERNAL MEDICINE

## 2018-05-09 PROCEDURE — 36430 TRANSFUSION BLD/BLD COMPNT: CPT

## 2018-05-09 PROCEDURE — 86850 RBC ANTIBODY SCREEN: CPT

## 2018-05-09 RX ORDER — DEXTROSE MONOHYDRATE 50 MG/ML
100 INJECTION, SOLUTION INTRAVENOUS PRN
Status: DISCONTINUED | OUTPATIENT
Start: 2018-05-09 | End: 2018-05-13 | Stop reason: HOSPADM

## 2018-05-09 RX ORDER — LEVOTHYROXINE SODIUM 0.07 MG/1
75 TABLET ORAL DAILY
Status: DISCONTINUED | OUTPATIENT
Start: 2018-05-10 | End: 2018-05-13 | Stop reason: HOSPADM

## 2018-05-09 RX ORDER — ESCITALOPRAM OXALATE 10 MG/1
10 TABLET ORAL DAILY
Status: DISCONTINUED | OUTPATIENT
Start: 2018-05-10 | End: 2018-05-13 | Stop reason: HOSPADM

## 2018-05-09 RX ORDER — SODIUM CHLORIDE 0.9 % (FLUSH) 0.9 %
10 SYRINGE (ML) INJECTION EVERY 12 HOURS SCHEDULED
Status: DISCONTINUED | OUTPATIENT
Start: 2018-05-09 | End: 2018-05-13 | Stop reason: HOSPADM

## 2018-05-09 RX ORDER — ATORVASTATIN CALCIUM 20 MG/1
20 TABLET, FILM COATED ORAL NIGHTLY
Status: DISCONTINUED | OUTPATIENT
Start: 2018-05-09 | End: 2018-05-13 | Stop reason: HOSPADM

## 2018-05-09 RX ORDER — 0.9 % SODIUM CHLORIDE 0.9 %
250 INTRAVENOUS SOLUTION INTRAVENOUS ONCE
Status: DISCONTINUED | OUTPATIENT
Start: 2018-05-09 | End: 2018-05-09 | Stop reason: HOSPADM

## 2018-05-09 RX ORDER — ONDANSETRON 2 MG/ML
4 INJECTION INTRAMUSCULAR; INTRAVENOUS EVERY 6 HOURS PRN
Status: DISCONTINUED | OUTPATIENT
Start: 2018-05-09 | End: 2018-05-13 | Stop reason: HOSPADM

## 2018-05-09 RX ORDER — 0.9 % SODIUM CHLORIDE 0.9 %
250 INTRAVENOUS SOLUTION INTRAVENOUS ONCE
Status: DISCONTINUED | OUTPATIENT
Start: 2018-05-09 | End: 2018-05-12

## 2018-05-09 RX ORDER — DEXTROSE MONOHYDRATE 25 G/50ML
12.5 INJECTION, SOLUTION INTRAVENOUS PRN
Status: DISCONTINUED | OUTPATIENT
Start: 2018-05-09 | End: 2018-05-13 | Stop reason: HOSPADM

## 2018-05-09 RX ORDER — PANTOPRAZOLE SODIUM 40 MG/10ML
40 INJECTION, POWDER, LYOPHILIZED, FOR SOLUTION INTRAVENOUS DAILY
Status: DISCONTINUED | OUTPATIENT
Start: 2018-05-09 | End: 2018-05-09

## 2018-05-09 RX ORDER — SODIUM CHLORIDE 0.9 % (FLUSH) 0.9 %
10 SYRINGE (ML) INJECTION PRN
Status: DISCONTINUED | OUTPATIENT
Start: 2018-05-09 | End: 2018-05-13 | Stop reason: HOSPADM

## 2018-05-09 RX ORDER — PANTOPRAZOLE SODIUM 40 MG/10ML
40 INJECTION, POWDER, LYOPHILIZED, FOR SOLUTION INTRAVENOUS 2 TIMES DAILY
Status: DISCONTINUED | OUTPATIENT
Start: 2018-05-10 | End: 2018-05-09

## 2018-05-09 RX ORDER — NICOTINE POLACRILEX 4 MG
15 LOZENGE BUCCAL PRN
Status: DISCONTINUED | OUTPATIENT
Start: 2018-05-09 | End: 2018-05-13 | Stop reason: HOSPADM

## 2018-05-09 RX ORDER — ALPRAZOLAM 0.5 MG/1
0.5 TABLET ORAL NIGHTLY PRN
Status: ON HOLD | COMMUNITY
Start: 2018-04-11 | End: 2018-09-22

## 2018-05-09 RX ORDER — ACETAMINOPHEN 325 MG/1
650 TABLET ORAL EVERY 4 HOURS PRN
Status: DISCONTINUED | OUTPATIENT
Start: 2018-05-09 | End: 2018-05-13 | Stop reason: HOSPADM

## 2018-05-09 RX ORDER — PREGABALIN 75 MG/1
150 CAPSULE ORAL 3 TIMES DAILY
Status: DISCONTINUED | OUTPATIENT
Start: 2018-05-09 | End: 2018-05-13 | Stop reason: HOSPADM

## 2018-05-09 RX ORDER — ALPRAZOLAM 0.5 MG/1
0.5 TABLET ORAL NIGHTLY PRN
Status: DISCONTINUED | OUTPATIENT
Start: 2018-05-09 | End: 2018-05-13 | Stop reason: HOSPADM

## 2018-05-09 RX ADMIN — Medication 10 ML: at 23:53

## 2018-05-09 RX ADMIN — ATORVASTATIN CALCIUM 20 MG: 20 TABLET, FILM COATED ORAL at 23:53

## 2018-05-09 RX ADMIN — SODIUM CHLORIDE 8 MG/HR: 9 INJECTION, SOLUTION INTRAVENOUS at 20:48

## 2018-05-09 RX ADMIN — PREGABALIN 150 MG: 75 CAPSULE ORAL at 23:53

## 2018-05-09 RX ADMIN — PANTOPRAZOLE SODIUM 40 MG: 40 INJECTION, POWDER, FOR SOLUTION INTRAVENOUS at 17:50

## 2018-05-09 ASSESSMENT — PAIN SCALES - GENERAL
PAINLEVEL_OUTOF10: 0
PAINLEVEL_OUTOF10: 0
PAINLEVEL_OUTOF10: 7

## 2018-05-09 ASSESSMENT — ENCOUNTER SYMPTOMS
NAUSEA: 0
BACK PAIN: 0
BLOOD IN STOOL: 0
SHORTNESS OF BREATH: 1
CHEST TIGHTNESS: 0
ABDOMINAL PAIN: 0
COLOR CHANGE: 0
DIARRHEA: 1
SORE THROAT: 0
CONSTIPATION: 0
RESPIRATORY NEGATIVE: 1
SHORTNESS OF BREATH: 0
COUGH: 0
BLOOD IN STOOL: 1
ABDOMINAL PAIN: 1
NAUSEA: 0
DIARRHEA: 0
COUGH: 0
BACK PAIN: 0
ABDOMINAL DISTENTION: 0
CHEST TIGHTNESS: 0
VOMITING: 0
VOMITING: 0
RHINORRHEA: 0
SORE THROAT: 0
CONSTIPATION: 0
FACIAL SWELLING: 0
WHEEZING: 0
WHEEZING: 0

## 2018-05-09 ASSESSMENT — PAIN DESCRIPTION - ORIENTATION: ORIENTATION: ANTERIOR

## 2018-05-09 ASSESSMENT — PAIN DESCRIPTION - DESCRIPTORS: DESCRIPTORS: ACHING

## 2018-05-09 ASSESSMENT — PAIN DESCRIPTION - LOCATION: LOCATION: HEAD

## 2018-05-09 ASSESSMENT — PAIN DESCRIPTION - PAIN TYPE: TYPE: ACUTE PAIN

## 2018-05-10 ENCOUNTER — ANESTHESIA (OUTPATIENT)
Dept: ENDOSCOPY | Age: 73
DRG: 377 | End: 2018-05-10
Payer: MEDICARE

## 2018-05-10 ENCOUNTER — ANESTHESIA EVENT (OUTPATIENT)
Dept: ENDOSCOPY | Age: 73
DRG: 377 | End: 2018-05-10
Payer: MEDICARE

## 2018-05-10 VITALS
OXYGEN SATURATION: 96 % | DIASTOLIC BLOOD PRESSURE: 61 MMHG | RESPIRATION RATE: 10 BRPM | SYSTOLIC BLOOD PRESSURE: 118 MMHG

## 2018-05-10 LAB
ANION GAP SERPL CALCULATED.3IONS-SCNC: 7 MEQ/L (ref 8–16)
ANISOCYTOSIS: ABNORMAL
ANISOCYTOSIS: ABNORMAL
BASOPHILIA: SLIGHT
BASOPHILS # BLD: 0.4 %
BASOPHILS # BLD: 1 %
BASOPHILS ABSOLUTE: 0 THOU/MM3 (ref 0–0.1)
BASOPHILS ABSOLUTE: 0.1 THOU/MM3 (ref 0–0.1)
BUN BLDV-MCNC: 33 MG/DL (ref 7–22)
CALCIUM SERPL-MCNC: 7.9 MG/DL (ref 8.5–10.5)
CHLORIDE BLD-SCNC: 109 MEQ/L (ref 98–111)
CO2: 27 MEQ/L (ref 23–33)
CREAT SERPL-MCNC: 1.1 MG/DL (ref 0.4–1.2)
EKG ATRIAL RATE: 62 BPM
EKG P AXIS: 34 DEGREES
EKG P-R INTERVAL: 158 MS
EKG Q-T INTERVAL: 402 MS
EKG QRS DURATION: 94 MS
EKG QTC CALCULATION (BAZETT): 475 MS
EKG R AXIS: -24 DEGREES
EKG T AXIS: 81 DEGREES
EKG VENTRICULAR RATE: 84 BPM
EOSINOPHIL # BLD: 0 %
EOSINOPHIL # BLD: 2 %
EOSINOPHILS ABSOLUTE: 0 THOU/MM3 (ref 0–0.4)
EOSINOPHILS ABSOLUTE: 0.1 THOU/MM3 (ref 0–0.4)
GFR SERPL CREATININE-BSD FRML MDRD: 49 ML/MIN/1.73M2
GLUCOSE BLD-MCNC: 106 MG/DL (ref 70–108)
GLUCOSE BLD-MCNC: 142 MG/DL (ref 70–108)
GLUCOSE BLD-MCNC: 78 MG/DL (ref 70–108)
GLUCOSE BLD-MCNC: 83 MG/DL (ref 70–108)
GLUCOSE BLD-MCNC: 89 MG/DL (ref 70–108)
GLUCOSE BLD-MCNC: 90 MG/DL (ref 70–108)
HCT VFR BLD CALC: 18.7 % (ref 37–47)
HCT VFR BLD CALC: 20 % (ref 37–47)
HCT VFR BLD CALC: 22.3 % (ref 37–47)
HCT VFR BLD CALC: 23.3 % (ref 37–47)
HEMOGLOBIN: 6.5 GM/DL (ref 12–16)
HEMOGLOBIN: 6.8 GM/DL (ref 12–16)
HEMOGLOBIN: 7.7 GM/DL (ref 12–16)
HEMOGLOBIN: 7.8 GM/DL (ref 12–16)
INR BLD: 1.23 (ref 0.85–1.13)
LACTIC ACID: 0.9 MMOL/L (ref 0.5–2.2)
LYMPHOCYTES # BLD: 28 %
LYMPHOCYTES # BLD: 37.2 %
LYMPHOCYTES ABSOLUTE: 1.7 THOU/MM3 (ref 1–4.8)
LYMPHOCYTES ABSOLUTE: 1.9 THOU/MM3 (ref 1–4.8)
MCH RBC QN AUTO: 30.6 PG (ref 27–31)
MCH RBC QN AUTO: 32.5 PG (ref 27–31)
MCHC RBC AUTO-ENTMCNC: 33.9 GM/DL (ref 33–37)
MCHC RBC AUTO-ENTMCNC: 34.6 GM/DL (ref 33–37)
MCV RBC AUTO: 90.5 FL (ref 81–99)
MCV RBC AUTO: 94 FL (ref 81–99)
MONOCYTES # BLD: 4 %
MONOCYTES # BLD: 6.5 %
MONOCYTES ABSOLUTE: 0.2 THOU/MM3 (ref 0.4–1.3)
MONOCYTES ABSOLUTE: 0.3 THOU/MM3 (ref 0.4–1.3)
NUCLEATED RED BLOOD CELLS: 0 /100 WBC
NUCLEATED RED BLOOD CELLS: 2 /100 WBC
OSMOLALITY CALCULATION: 291.1 MOSMOL/KG (ref 275–300)
PATHOLOGIST REVIEW: ABNORMAL
PDW BLD-RTO: 14.7 % (ref 11.5–14.5)
PDW BLD-RTO: 17 % (ref 11.5–14.5)
PLATELET # BLD: 172 THOU/MM3 (ref 130–400)
PLATELET # BLD: 190 THOU/MM3 (ref 130–400)
PLATELET ESTIMATE: ADEQUATE
PMV BLD AUTO: 8.2 FL (ref 7.4–10.4)
PMV BLD AUTO: 8.8 FL (ref 7.4–10.4)
POIKILOCYTES: SLIGHT
POTASSIUM SERPL-SCNC: 3.6 MEQ/L (ref 3.5–5.2)
RBC # BLD: 1.99 MILL/MM3 (ref 4.2–5.4)
RBC # BLD: 2.21 MILL/MM3 (ref 4.2–5.4)
SEG NEUTROPHILS: 53.9 %
SEG NEUTROPHILS: 67 %
SEGMENTED NEUTROPHILS ABSOLUTE COUNT: 2.7 THOU/MM3 (ref 1.8–7.7)
SEGMENTED NEUTROPHILS ABSOLUTE COUNT: 4.2 THOU/MM3 (ref 1.8–7.7)
SODIUM BLD-SCNC: 143 MEQ/L (ref 135–145)
WBC # BLD: 5.1 THOU/MM3 (ref 4.8–10.8)
WBC # BLD: 6.2 THOU/MM3 (ref 4.8–10.8)

## 2018-05-10 PROCEDURE — 99232 SBSQ HOSP IP/OBS MODERATE 35: CPT | Performed by: INTERNAL MEDICINE

## 2018-05-10 PROCEDURE — 6370000000 HC RX 637 (ALT 250 FOR IP): Performed by: NURSE PRACTITIONER

## 2018-05-10 PROCEDURE — 2580000003 HC RX 258: Performed by: INTERNAL MEDICINE

## 2018-05-10 PROCEDURE — 3700000000 HC ANESTHESIA ATTENDED CARE: Performed by: INTERNAL MEDICINE

## 2018-05-10 PROCEDURE — 3609012400 HC EGD TRANSORAL BIOPSY SINGLE/MULTIPLE: Performed by: INTERNAL MEDICINE

## 2018-05-10 PROCEDURE — 85025 COMPLETE CBC W/AUTO DIFF WBC: CPT

## 2018-05-10 PROCEDURE — 82948 REAGENT STRIP/BLOOD GLUCOSE: CPT

## 2018-05-10 PROCEDURE — 2500000003 HC RX 250 WO HCPCS: Performed by: NURSE ANESTHETIST, CERTIFIED REGISTERED

## 2018-05-10 PROCEDURE — 85610 PROTHROMBIN TIME: CPT

## 2018-05-10 PROCEDURE — P9016 RBC LEUKOCYTES REDUCED: HCPCS

## 2018-05-10 PROCEDURE — 85014 HEMATOCRIT: CPT

## 2018-05-10 PROCEDURE — 0DB48ZX EXCISION OF ESOPHAGOGASTRIC JUNCTION, VIA NATURAL OR ARTIFICIAL OPENING ENDOSCOPIC, DIAGNOSTIC: ICD-10-PCS | Performed by: INTERNAL MEDICINE

## 2018-05-10 PROCEDURE — 6370000000 HC RX 637 (ALT 250 FOR IP): Performed by: INTERNAL MEDICINE

## 2018-05-10 PROCEDURE — 88305 TISSUE EXAM BY PATHOLOGIST: CPT

## 2018-05-10 PROCEDURE — 3700000001 HC ADD 15 MINUTES (ANESTHESIA): Performed by: INTERNAL MEDICINE

## 2018-05-10 PROCEDURE — 6360000002 HC RX W HCPCS: Performed by: INTERNAL MEDICINE

## 2018-05-10 PROCEDURE — 83605 ASSAY OF LACTIC ACID: CPT

## 2018-05-10 PROCEDURE — 36415 COLL VENOUS BLD VENIPUNCTURE: CPT

## 2018-05-10 PROCEDURE — 93010 ELECTROCARDIOGRAM REPORT: CPT | Performed by: INTERNAL MEDICINE

## 2018-05-10 PROCEDURE — 6360000002 HC RX W HCPCS: Performed by: NURSE ANESTHETIST, CERTIFIED REGISTERED

## 2018-05-10 PROCEDURE — C9113 INJ PANTOPRAZOLE SODIUM, VIA: HCPCS | Performed by: INTERNAL MEDICINE

## 2018-05-10 PROCEDURE — 2580000003 HC RX 258: Performed by: NURSE PRACTITIONER

## 2018-05-10 PROCEDURE — 7100000000 HC PACU RECOVERY - FIRST 15 MIN: Performed by: INTERNAL MEDICINE

## 2018-05-10 PROCEDURE — 80048 BASIC METABOLIC PNL TOTAL CA: CPT

## 2018-05-10 PROCEDURE — 36430 TRANSFUSION BLD/BLD COMPNT: CPT

## 2018-05-10 PROCEDURE — 2140000000 HC CCU INTERMEDIATE R&B

## 2018-05-10 PROCEDURE — 0DB78ZX EXCISION OF STOMACH, PYLORUS, VIA NATURAL OR ARTIFICIAL OPENING ENDOSCOPIC, DIAGNOSTIC: ICD-10-PCS | Performed by: INTERNAL MEDICINE

## 2018-05-10 PROCEDURE — 0DB98ZX EXCISION OF DUODENUM, VIA NATURAL OR ARTIFICIAL OPENING ENDOSCOPIC, DIAGNOSTIC: ICD-10-PCS | Performed by: INTERNAL MEDICINE

## 2018-05-10 PROCEDURE — A4614 HAND-HELD PEFR METER: HCPCS

## 2018-05-10 PROCEDURE — 85018 HEMOGLOBIN: CPT

## 2018-05-10 RX ORDER — LIDOCAINE HYDROCHLORIDE 20 MG/ML
INJECTION, SOLUTION EPIDURAL; INFILTRATION; INTRACAUDAL; PERINEURAL PRN
Status: DISCONTINUED | OUTPATIENT
Start: 2018-05-10 | End: 2018-05-10 | Stop reason: SDUPTHER

## 2018-05-10 RX ORDER — 0.9 % SODIUM CHLORIDE 0.9 %
10 VIAL (ML) INJECTION EVERY 12 HOURS SCHEDULED
Status: DISCONTINUED | OUTPATIENT
Start: 2018-05-10 | End: 2018-05-13 | Stop reason: HOSPADM

## 2018-05-10 RX ORDER — 0.9 % SODIUM CHLORIDE 0.9 %
250 INTRAVENOUS SOLUTION INTRAVENOUS ONCE
Status: COMPLETED | OUTPATIENT
Start: 2018-05-10 | End: 2018-05-10

## 2018-05-10 RX ORDER — SENNA PLUS 8.6 MG/1
15 TABLET ORAL ONCE
Status: COMPLETED | OUTPATIENT
Start: 2018-05-10 | End: 2018-05-10

## 2018-05-10 RX ORDER — POLYETHYLENE GLYCOL 3350 17 G/17G
238 POWDER, FOR SOLUTION ORAL ONCE
Status: COMPLETED | OUTPATIENT
Start: 2018-05-10 | End: 2018-05-10

## 2018-05-10 RX ORDER — 0.9 % SODIUM CHLORIDE 0.9 %
250 INTRAVENOUS SOLUTION INTRAVENOUS ONCE
Status: DISCONTINUED | OUTPATIENT
Start: 2018-05-10 | End: 2018-05-12

## 2018-05-10 RX ORDER — 0.9 % SODIUM CHLORIDE 0.9 %
10 VIAL (ML) INJECTION PRN
Status: DISCONTINUED | OUTPATIENT
Start: 2018-05-10 | End: 2018-05-13 | Stop reason: HOSPADM

## 2018-05-10 RX ORDER — SODIUM CHLORIDE 450 MG/100ML
INJECTION, SOLUTION INTRAVENOUS CONTINUOUS
Status: DISCONTINUED | OUTPATIENT
Start: 2018-05-10 | End: 2018-05-11

## 2018-05-10 RX ADMIN — ACETAMINOPHEN 650 MG: 325 TABLET ORAL at 01:15

## 2018-05-10 RX ADMIN — LEVOTHYROXINE SODIUM 75 MCG: 75 TABLET ORAL at 06:42

## 2018-05-10 RX ADMIN — ALPRAZOLAM 0.5 MG: 0.5 TABLET ORAL at 01:16

## 2018-05-10 RX ADMIN — ACETAMINOPHEN 650 MG: 325 TABLET ORAL at 22:35

## 2018-05-10 RX ADMIN — SODIUM CHLORIDE 250 ML: 9 INJECTION, SOLUTION INTRAVENOUS at 16:58

## 2018-05-10 RX ADMIN — POLYETHYLENE GLYCOL 3350 238 G: 17 POWDER, FOR SOLUTION ORAL at 19:37

## 2018-05-10 RX ADMIN — LIDOCAINE HYDROCHLORIDE 100 MG: 20 INJECTION, SOLUTION EPIDURAL; INFILTRATION; INTRACAUDAL; PERINEURAL at 14:42

## 2018-05-10 RX ADMIN — SENNA 129 MG: 8.6 TABLET, COATED ORAL at 16:54

## 2018-05-10 RX ADMIN — ALPRAZOLAM 0.5 MG: 0.5 TABLET ORAL at 22:35

## 2018-05-10 RX ADMIN — CITROMA MAGNESIUM CITRATE 296 ML: 1.75 LIQUID ORAL at 18:11

## 2018-05-10 RX ADMIN — SODIUM CHLORIDE: 4.5 INJECTION, SOLUTION INTRAVENOUS at 14:12

## 2018-05-10 RX ADMIN — ONDANSETRON 4 MG: 2 INJECTION INTRAMUSCULAR; INTRAVENOUS at 18:28

## 2018-05-10 RX ADMIN — SODIUM CHLORIDE: 4.5 INJECTION, SOLUTION INTRAVENOUS at 22:48

## 2018-05-10 RX ADMIN — ATORVASTATIN CALCIUM 20 MG: 20 TABLET, FILM COATED ORAL at 22:35

## 2018-05-10 RX ADMIN — PREGABALIN 150 MG: 75 CAPSULE ORAL at 09:16

## 2018-05-10 RX ADMIN — PREGABALIN 150 MG: 75 CAPSULE ORAL at 16:53

## 2018-05-10 RX ADMIN — PROPOFOL 50 MG: 10 INJECTION, EMULSION INTRAVENOUS at 14:46

## 2018-05-10 RX ADMIN — PREGABALIN 150 MG: 75 CAPSULE ORAL at 22:35

## 2018-05-10 RX ADMIN — ESCITALOPRAM 10 MG: 10 TABLET, FILM COATED ORAL at 09:16

## 2018-05-10 RX ADMIN — PROPOFOL 50 MG: 10 INJECTION, EMULSION INTRAVENOUS at 14:42

## 2018-05-10 RX ADMIN — SODIUM CHLORIDE 8 MG/HR: 9 INJECTION, SOLUTION INTRAVENOUS at 10:46

## 2018-05-10 ASSESSMENT — PAIN SCALES - GENERAL
PAINLEVEL_OUTOF10: 0
PAINLEVEL_OUTOF10: 5
PAINLEVEL_OUTOF10: 0

## 2018-05-10 ASSESSMENT — PAIN - FUNCTIONAL ASSESSMENT: PAIN_FUNCTIONAL_ASSESSMENT: 0-10

## 2018-05-11 ENCOUNTER — ANESTHESIA (OUTPATIENT)
Dept: ENDOSCOPY | Age: 73
DRG: 377 | End: 2018-05-11
Payer: MEDICARE

## 2018-05-11 ENCOUNTER — PROCEDURE VISIT (OUTPATIENT)
Dept: CARDIOLOGY CLINIC | Age: 73
End: 2018-05-11
Payer: MEDICARE

## 2018-05-11 ENCOUNTER — ANESTHESIA EVENT (OUTPATIENT)
Dept: ENDOSCOPY | Age: 73
DRG: 377 | End: 2018-05-11
Payer: MEDICARE

## 2018-05-11 ENCOUNTER — APPOINTMENT (OUTPATIENT)
Dept: GENERAL RADIOLOGY | Age: 73
DRG: 377 | End: 2018-05-11
Payer: MEDICARE

## 2018-05-11 VITALS
DIASTOLIC BLOOD PRESSURE: 58 MMHG | RESPIRATION RATE: 11 BRPM | OXYGEN SATURATION: 98 % | SYSTOLIC BLOOD PRESSURE: 99 MMHG

## 2018-05-11 DIAGNOSIS — Z45.09 ENCOUNTER FOR ELECTRONIC ANALYSIS OF REVEAL EVENT RECORDER: Primary | ICD-10-CM

## 2018-05-11 LAB
ABO: NORMAL
ANION GAP SERPL CALCULATED.3IONS-SCNC: 9 MEQ/L (ref 8–16)
ANTIBODY SCREEN: NORMAL
BUN BLDV-MCNC: 18 MG/DL (ref 7–22)
CALCIUM SERPL-MCNC: 8 MG/DL (ref 8.5–10.5)
CHLORIDE BLD-SCNC: 110 MEQ/L (ref 98–111)
CO2: 26 MEQ/L (ref 23–33)
CREAT SERPL-MCNC: 1 MG/DL (ref 0.4–1.2)
GFR SERPL CREATININE-BSD FRML MDRD: 54 ML/MIN/1.73M2
GLUCOSE BLD-MCNC: 118 MG/DL (ref 70–108)
GLUCOSE BLD-MCNC: 160 MG/DL (ref 70–108)
GLUCOSE BLD-MCNC: 172 MG/DL (ref 70–108)
GLUCOSE BLD-MCNC: 77 MG/DL (ref 70–108)
GLUCOSE BLD-MCNC: 96 MG/DL (ref 70–108)
HCT VFR BLD CALC: 25.9 % (ref 37–47)
HCT VFR BLD CALC: 26.4 % (ref 37–47)
HEMOGLOBIN: 8.5 GM/DL (ref 12–16)
HEMOGLOBIN: 8.7 GM/DL (ref 12–16)
MAGNESIUM: 2.4 MG/DL (ref 1.6–2.4)
ORGANISM: ABNORMAL
POTASSIUM SERPL-SCNC: 3.7 MEQ/L (ref 3.5–5.2)
RH FACTOR: NORMAL
SODIUM BLD-SCNC: 145 MEQ/L (ref 135–145)
URINE CULTURE REFLEX: ABNORMAL

## 2018-05-11 PROCEDURE — 6370000000 HC RX 637 (ALT 250 FOR IP): Performed by: INTERNAL MEDICINE

## 2018-05-11 PROCEDURE — 2580000003 HC RX 258: Performed by: NURSE ANESTHETIST, CERTIFIED REGISTERED

## 2018-05-11 PROCEDURE — 83735 ASSAY OF MAGNESIUM: CPT

## 2018-05-11 PROCEDURE — 36415 COLL VENOUS BLD VENIPUNCTURE: CPT

## 2018-05-11 PROCEDURE — 0DBK8ZX EXCISION OF ASCENDING COLON, VIA NATURAL OR ARTIFICIAL OPENING ENDOSCOPIC, DIAGNOSTIC: ICD-10-PCS | Performed by: INTERNAL MEDICINE

## 2018-05-11 PROCEDURE — 85014 HEMATOCRIT: CPT

## 2018-05-11 PROCEDURE — 2500000003 HC RX 250 WO HCPCS: Performed by: INTERNAL MEDICINE

## 2018-05-11 PROCEDURE — 6360000002 HC RX W HCPCS: Performed by: INTERNAL MEDICINE

## 2018-05-11 PROCEDURE — 2580000003 HC RX 258: Performed by: NURSE PRACTITIONER

## 2018-05-11 PROCEDURE — 88305 TISSUE EXAM BY PATHOLOGIST: CPT

## 2018-05-11 PROCEDURE — 2140000000 HC CCU INTERMEDIATE R&B

## 2018-05-11 PROCEDURE — 93298 REM INTERROG DEV EVAL SCRMS: CPT | Performed by: NUCLEAR MEDICINE

## 2018-05-11 PROCEDURE — 7100000000 HC PACU RECOVERY - FIRST 15 MIN: Performed by: INTERNAL MEDICINE

## 2018-05-11 PROCEDURE — 86901 BLOOD TYPING SEROLOGIC RH(D): CPT

## 2018-05-11 PROCEDURE — 86850 RBC ANTIBODY SCREEN: CPT

## 2018-05-11 PROCEDURE — 2580000003 HC RX 258: Performed by: INTERNAL MEDICINE

## 2018-05-11 PROCEDURE — C9113 INJ PANTOPRAZOLE SODIUM, VIA: HCPCS | Performed by: INTERNAL MEDICINE

## 2018-05-11 PROCEDURE — 86900 BLOOD TYPING SEROLOGIC ABO: CPT

## 2018-05-11 PROCEDURE — 3700000000 HC ANESTHESIA ATTENDED CARE: Performed by: INTERNAL MEDICINE

## 2018-05-11 PROCEDURE — 71045 X-RAY EXAM CHEST 1 VIEW: CPT

## 2018-05-11 PROCEDURE — 0DBM8ZX EXCISION OF DESCENDING COLON, VIA NATURAL OR ARTIFICIAL OPENING ENDOSCOPIC, DIAGNOSTIC: ICD-10-PCS | Performed by: INTERNAL MEDICINE

## 2018-05-11 PROCEDURE — 7100000001 HC PACU RECOVERY - ADDTL 15 MIN: Performed by: INTERNAL MEDICINE

## 2018-05-11 PROCEDURE — 82948 REAGENT STRIP/BLOOD GLUCOSE: CPT

## 2018-05-11 PROCEDURE — 6360000002 HC RX W HCPCS: Performed by: NURSE ANESTHETIST, CERTIFIED REGISTERED

## 2018-05-11 PROCEDURE — 3609010600 HC COLONOSCOPY POLYPECTOMY SNARE/COLD BIOPSY: Performed by: INTERNAL MEDICINE

## 2018-05-11 PROCEDURE — 85018 HEMOGLOBIN: CPT

## 2018-05-11 PROCEDURE — 93299 PR REM INTERROG ICPMS/SCRMS <30 D TECH REVIEW: CPT | Performed by: NUCLEAR MEDICINE

## 2018-05-11 PROCEDURE — 2700000000 HC OXYGEN THERAPY PER DAY

## 2018-05-11 PROCEDURE — 80048 BASIC METABOLIC PNL TOTAL CA: CPT

## 2018-05-11 PROCEDURE — 99232 SBSQ HOSP IP/OBS MODERATE 35: CPT | Performed by: INTERNAL MEDICINE

## 2018-05-11 PROCEDURE — 3700000001 HC ADD 15 MINUTES (ANESTHESIA): Performed by: INTERNAL MEDICINE

## 2018-05-11 RX ORDER — CIPROFLOXACIN 500 MG/1
500 TABLET, FILM COATED ORAL EVERY 12 HOURS SCHEDULED
Status: DISCONTINUED | OUTPATIENT
Start: 2018-05-11 | End: 2018-05-13 | Stop reason: HOSPADM

## 2018-05-11 RX ORDER — SODIUM CHLORIDE 450 MG/100ML
INJECTION, SOLUTION INTRAVENOUS CONTINUOUS PRN
Status: DISCONTINUED | OUTPATIENT
Start: 2018-05-11 | End: 2018-05-11 | Stop reason: SDUPTHER

## 2018-05-11 RX ORDER — PANTOPRAZOLE SODIUM 40 MG/1
40 TABLET, DELAYED RELEASE ORAL 2 TIMES DAILY
Status: DISCONTINUED | OUTPATIENT
Start: 2018-05-11 | End: 2018-05-13 | Stop reason: HOSPADM

## 2018-05-11 RX ORDER — PROPOFOL 10 MG/ML
INJECTION, EMULSION INTRAVENOUS PRN
Status: DISCONTINUED | OUTPATIENT
Start: 2018-05-11 | End: 2018-05-11 | Stop reason: SDUPTHER

## 2018-05-11 RX ORDER — DEXTROSE, SODIUM CHLORIDE, AND POTASSIUM CHLORIDE 5; .45; .15 G/100ML; G/100ML; G/100ML
INJECTION INTRAVENOUS CONTINUOUS
Status: DISCONTINUED | OUTPATIENT
Start: 2018-05-11 | End: 2018-05-11

## 2018-05-11 RX ORDER — BUMETANIDE 1 MG/1
1 TABLET ORAL DAILY
Status: DISCONTINUED | OUTPATIENT
Start: 2018-05-11 | End: 2018-05-13 | Stop reason: HOSPADM

## 2018-05-11 RX ADMIN — SODIUM CHLORIDE 8 MG/HR: 9 INJECTION, SOLUTION INTRAVENOUS at 03:10

## 2018-05-11 RX ADMIN — PROPOFOL 50 MG: 10 INJECTION, EMULSION INTRAVENOUS at 14:23

## 2018-05-11 RX ADMIN — PROPOFOL 50 MG: 10 INJECTION, EMULSION INTRAVENOUS at 14:42

## 2018-05-11 RX ADMIN — ALPRAZOLAM 0.5 MG: 0.5 TABLET ORAL at 22:00

## 2018-05-11 RX ADMIN — POTASSIUM CHLORIDE, DEXTROSE MONOHYDRATE AND SODIUM CHLORIDE: 150; 5; 450 INJECTION, SOLUTION INTRAVENOUS at 09:04

## 2018-05-11 RX ADMIN — PREGABALIN 150 MG: 75 CAPSULE ORAL at 09:04

## 2018-05-11 RX ADMIN — PREGABALIN 150 MG: 75 CAPSULE ORAL at 17:11

## 2018-05-11 RX ADMIN — CIPROFLOXACIN 500 MG: 500 TABLET, FILM COATED ORAL at 09:04

## 2018-05-11 RX ADMIN — PANTOPRAZOLE SODIUM 40 MG: 40 TABLET, DELAYED RELEASE ORAL at 17:12

## 2018-05-11 RX ADMIN — PROPOFOL 50 MG: 10 INJECTION, EMULSION INTRAVENOUS at 14:26

## 2018-05-11 RX ADMIN — Medication 10 ML: at 22:00

## 2018-05-11 RX ADMIN — PROPOFOL 50 MG: 10 INJECTION, EMULSION INTRAVENOUS at 14:17

## 2018-05-11 RX ADMIN — PROPOFOL 50 MG: 10 INJECTION, EMULSION INTRAVENOUS at 14:37

## 2018-05-11 RX ADMIN — CIPROFLOXACIN 500 MG: 500 TABLET, FILM COATED ORAL at 22:00

## 2018-05-11 RX ADMIN — SODIUM CHLORIDE: 4.5 INJECTION, SOLUTION INTRAVENOUS at 14:09

## 2018-05-11 RX ADMIN — PROPOFOL 50 MG: 10 INJECTION, EMULSION INTRAVENOUS at 14:11

## 2018-05-11 RX ADMIN — ESCITALOPRAM 10 MG: 10 TABLET, FILM COATED ORAL at 09:04

## 2018-05-11 RX ADMIN — BUMETANIDE 1 MG: 1 TABLET ORAL at 17:11

## 2018-05-11 RX ADMIN — PREGABALIN 150 MG: 75 CAPSULE ORAL at 21:59

## 2018-05-11 RX ADMIN — SODIUM CHLORIDE 8 MG/HR: 9 INJECTION, SOLUTION INTRAVENOUS at 12:57

## 2018-05-11 RX ADMIN — ACETAMINOPHEN 650 MG: 325 TABLET ORAL at 21:59

## 2018-05-11 RX ADMIN — ATORVASTATIN CALCIUM 20 MG: 20 TABLET, FILM COATED ORAL at 22:00

## 2018-05-11 RX ADMIN — LEVOTHYROXINE SODIUM 75 MCG: 75 TABLET ORAL at 06:15

## 2018-05-11 RX ADMIN — PROPOFOL 50 MG: 10 INJECTION, EMULSION INTRAVENOUS at 14:13

## 2018-05-11 ASSESSMENT — PAIN SCALES - GENERAL
PAINLEVEL_OUTOF10: 0

## 2018-05-12 ENCOUNTER — APPOINTMENT (OUTPATIENT)
Dept: ULTRASOUND IMAGING | Age: 73
DRG: 377 | End: 2018-05-12
Payer: MEDICARE

## 2018-05-12 ENCOUNTER — APPOINTMENT (OUTPATIENT)
Dept: CT IMAGING | Age: 73
DRG: 377 | End: 2018-05-12
Payer: MEDICARE

## 2018-05-12 LAB
ANION GAP SERPL CALCULATED.3IONS-SCNC: 7 MEQ/L (ref 8–16)
BUN BLDV-MCNC: 15 MG/DL (ref 7–22)
CALCIUM SERPL-MCNC: 7.7 MG/DL (ref 8.5–10.5)
CHLORIDE BLD-SCNC: 107 MEQ/L (ref 98–111)
CO2: 28 MEQ/L (ref 23–33)
CREAT SERPL-MCNC: 1.3 MG/DL (ref 0.4–1.2)
GFR SERPL CREATININE-BSD FRML MDRD: 40 ML/MIN/1.73M2
GLUCOSE BLD-MCNC: 107 MG/DL (ref 70–108)
GLUCOSE BLD-MCNC: 114 MG/DL (ref 70–108)
GLUCOSE BLD-MCNC: 154 MG/DL (ref 70–108)
GLUCOSE BLD-MCNC: 159 MG/DL (ref 70–108)
GLUCOSE BLD-MCNC: 163 MG/DL (ref 70–108)
HCT VFR BLD CALC: 26.7 % (ref 37–47)
HEMOGLOBIN: 8.9 GM/DL (ref 12–16)
MCH RBC QN AUTO: 30.7 PG (ref 27–31)
MCHC RBC AUTO-ENTMCNC: 33.3 GM/DL (ref 33–37)
MCV RBC AUTO: 92.1 FL (ref 81–99)
PDW BLD-RTO: 16 % (ref 11.5–14.5)
PLATELET # BLD: 173 THOU/MM3 (ref 130–400)
PMV BLD AUTO: 8.1 FL (ref 7.4–10.4)
POTASSIUM SERPL-SCNC: 3.9 MEQ/L (ref 3.5–5.2)
RBC # BLD: 2.9 MILL/MM3 (ref 4.2–5.4)
SODIUM BLD-SCNC: 142 MEQ/L (ref 135–145)
WBC # BLD: 5.5 THOU/MM3 (ref 4.8–10.8)

## 2018-05-12 PROCEDURE — 99232 SBSQ HOSP IP/OBS MODERATE 35: CPT | Performed by: INTERNAL MEDICINE

## 2018-05-12 PROCEDURE — 2580000003 HC RX 258: Performed by: NURSE PRACTITIONER

## 2018-05-12 PROCEDURE — 76770 US EXAM ABDO BACK WALL COMP: CPT

## 2018-05-12 PROCEDURE — 80048 BASIC METABOLIC PNL TOTAL CA: CPT

## 2018-05-12 PROCEDURE — 6370000000 HC RX 637 (ALT 250 FOR IP): Performed by: INTERNAL MEDICINE

## 2018-05-12 PROCEDURE — C1751 CATH, INF, PER/CENT/MIDLINE: HCPCS

## 2018-05-12 PROCEDURE — 2140000000 HC CCU INTERMEDIATE R&B

## 2018-05-12 PROCEDURE — 36591 DRAW BLOOD OFF VENOUS DEVICE: CPT

## 2018-05-12 PROCEDURE — 74176 CT ABD & PELVIS W/O CONTRAST: CPT

## 2018-05-12 PROCEDURE — 85027 COMPLETE CBC AUTOMATED: CPT

## 2018-05-12 PROCEDURE — 2580000003 HC RX 258: Performed by: INTERNAL MEDICINE

## 2018-05-12 PROCEDURE — 82948 REAGENT STRIP/BLOOD GLUCOSE: CPT

## 2018-05-12 RX ORDER — GLIMEPIRIDE 2 MG/1
2 TABLET ORAL 2 TIMES DAILY WITH MEALS
Status: DISCONTINUED | OUTPATIENT
Start: 2018-05-12 | End: 2018-05-13 | Stop reason: HOSPADM

## 2018-05-12 RX ORDER — POTASSIUM CHLORIDE 20 MEQ/1
10 TABLET, EXTENDED RELEASE ORAL 2 TIMES DAILY WITH MEALS
Status: DISCONTINUED | OUTPATIENT
Start: 2018-05-12 | End: 2018-05-13 | Stop reason: HOSPADM

## 2018-05-12 RX ORDER — POLYVINYL ALCOHOL 14 MG/ML
1 SOLUTION/ DROPS OPHTHALMIC PRN
Status: DISCONTINUED | OUTPATIENT
Start: 2018-05-12 | End: 2018-05-13 | Stop reason: HOSPADM

## 2018-05-12 RX ADMIN — PREGABALIN 150 MG: 75 CAPSULE ORAL at 21:48

## 2018-05-12 RX ADMIN — INSULIN LISPRO 1 UNITS: 100 INJECTION, SOLUTION INTRAVENOUS; SUBCUTANEOUS at 08:43

## 2018-05-12 RX ADMIN — PREGABALIN 150 MG: 75 CAPSULE ORAL at 08:43

## 2018-05-12 RX ADMIN — POTASSIUM CHLORIDE 10 MEQ: 20 TABLET, EXTENDED RELEASE ORAL at 18:27

## 2018-05-12 RX ADMIN — ESCITALOPRAM 10 MG: 10 TABLET, FILM COATED ORAL at 08:43

## 2018-05-12 RX ADMIN — PANTOPRAZOLE SODIUM 40 MG: 40 TABLET, DELAYED RELEASE ORAL at 04:13

## 2018-05-12 RX ADMIN — ALPRAZOLAM 0.5 MG: 0.5 TABLET ORAL at 21:49

## 2018-05-12 RX ADMIN — INSULIN LISPRO 1 UNITS: 100 INJECTION, SOLUTION INTRAVENOUS; SUBCUTANEOUS at 18:27

## 2018-05-12 RX ADMIN — BUMETANIDE 1 MG: 1 TABLET ORAL at 08:43

## 2018-05-12 RX ADMIN — Medication 10 ML: at 04:14

## 2018-05-12 RX ADMIN — CIPROFLOXACIN 500 MG: 500 TABLET, FILM COATED ORAL at 21:49

## 2018-05-12 RX ADMIN — PANTOPRAZOLE SODIUM 40 MG: 40 TABLET, DELAYED RELEASE ORAL at 18:26

## 2018-05-12 RX ADMIN — Medication 1 UNITS: at 21:49

## 2018-05-12 RX ADMIN — ACETAMINOPHEN 650 MG: 325 TABLET ORAL at 21:49

## 2018-05-12 RX ADMIN — POTASSIUM CHLORIDE 10 MEQ: 20 TABLET, EXTENDED RELEASE ORAL at 14:09

## 2018-05-12 RX ADMIN — ATORVASTATIN CALCIUM 20 MG: 20 TABLET, FILM COATED ORAL at 21:49

## 2018-05-12 RX ADMIN — PREGABALIN 150 MG: 75 CAPSULE ORAL at 14:09

## 2018-05-12 RX ADMIN — Medication 10 ML: at 21:54

## 2018-05-12 RX ADMIN — Medication 10 ML: at 08:45

## 2018-05-12 RX ADMIN — GLIMEPIRIDE 2 MG: 2 TABLET ORAL at 18:26

## 2018-05-12 RX ADMIN — LEVOTHYROXINE SODIUM 75 MCG: 75 TABLET ORAL at 04:13

## 2018-05-12 RX ADMIN — CIPROFLOXACIN 500 MG: 500 TABLET, FILM COATED ORAL at 08:43

## 2018-05-12 ASSESSMENT — PAIN SCALES - GENERAL: PAINLEVEL_OUTOF10: 0

## 2018-05-13 VITALS
TEMPERATURE: 98.4 F | HEIGHT: 66 IN | BODY MASS INDEX: 37.21 KG/M2 | DIASTOLIC BLOOD PRESSURE: 56 MMHG | OXYGEN SATURATION: 95 % | RESPIRATION RATE: 16 BRPM | HEART RATE: 64 BPM | SYSTOLIC BLOOD PRESSURE: 116 MMHG | WEIGHT: 231.5 LBS

## 2018-05-13 LAB
ANION GAP SERPL CALCULATED.3IONS-SCNC: 9 MEQ/L (ref 8–16)
BUN BLDV-MCNC: 19 MG/DL (ref 7–22)
CALCIUM SERPL-MCNC: 8 MG/DL (ref 8.5–10.5)
CHLORIDE BLD-SCNC: 104 MEQ/L (ref 98–111)
CO2: 29 MEQ/L (ref 23–33)
CREAT SERPL-MCNC: 1.2 MG/DL (ref 0.4–1.2)
GFR SERPL CREATININE-BSD FRML MDRD: 44 ML/MIN/1.73M2
GLUCOSE BLD-MCNC: 113 MG/DL (ref 70–108)
GLUCOSE BLD-MCNC: 98 MG/DL (ref 70–108)
HCT VFR BLD CALC: 26.2 % (ref 37–47)
HEMOGLOBIN: 8.7 GM/DL (ref 12–16)
MCH RBC QN AUTO: 30.3 PG (ref 27–31)
MCHC RBC AUTO-ENTMCNC: 33.3 GM/DL (ref 33–37)
MCV RBC AUTO: 91.2 FL (ref 81–99)
PDW BLD-RTO: 16.1 % (ref 11.5–14.5)
PLATELET # BLD: 173 THOU/MM3 (ref 130–400)
PMV BLD AUTO: 7.6 FL (ref 7.4–10.4)
POTASSIUM SERPL-SCNC: 3.8 MEQ/L (ref 3.5–5.2)
RBC # BLD: 2.88 MILL/MM3 (ref 4.2–5.4)
SODIUM BLD-SCNC: 142 MEQ/L (ref 135–145)
WBC # BLD: 4.1 THOU/MM3 (ref 4.8–10.8)

## 2018-05-13 PROCEDURE — 99239 HOSP IP/OBS DSCHRG MGMT >30: CPT | Performed by: INTERNAL MEDICINE

## 2018-05-13 PROCEDURE — 97166 OT EVAL MOD COMPLEX 45 MIN: CPT

## 2018-05-13 PROCEDURE — G8988 SELF CARE GOAL STATUS: HCPCS

## 2018-05-13 PROCEDURE — 6370000000 HC RX 637 (ALT 250 FOR IP): Performed by: INTERNAL MEDICINE

## 2018-05-13 PROCEDURE — 82948 REAGENT STRIP/BLOOD GLUCOSE: CPT

## 2018-05-13 PROCEDURE — G8987 SELF CARE CURRENT STATUS: HCPCS

## 2018-05-13 PROCEDURE — 80048 BASIC METABOLIC PNL TOTAL CA: CPT

## 2018-05-13 PROCEDURE — 85027 COMPLETE CBC AUTOMATED: CPT

## 2018-05-13 PROCEDURE — 97530 THERAPEUTIC ACTIVITIES: CPT

## 2018-05-13 PROCEDURE — 36591 DRAW BLOOD OFF VENOUS DEVICE: CPT

## 2018-05-13 PROCEDURE — G8989 SELF CARE D/C STATUS: HCPCS

## 2018-05-13 PROCEDURE — 6360000002 HC RX W HCPCS: Performed by: INTERNAL MEDICINE

## 2018-05-13 RX ORDER — GLIMEPIRIDE 2 MG/1
2 TABLET ORAL
Qty: 30 TABLET | Refills: 3 | Status: SHIPPED | OUTPATIENT
Start: 2018-05-13 | End: 2018-06-12

## 2018-05-13 RX ORDER — PANTOPRAZOLE SODIUM 40 MG/1
40 TABLET, DELAYED RELEASE ORAL 2 TIMES DAILY
Qty: 60 TABLET | Refills: 0 | Status: ON HOLD | OUTPATIENT
Start: 2018-05-13 | End: 2018-09-22 | Stop reason: HOSPADM

## 2018-05-13 RX ORDER — CIPROFLOXACIN 500 MG/1
500 TABLET, FILM COATED ORAL EVERY 12 HOURS SCHEDULED
Qty: 10 TABLET | Refills: 0 | Status: SHIPPED | OUTPATIENT
Start: 2018-05-13 | End: 2018-05-18

## 2018-05-13 RX ORDER — ATORVASTATIN CALCIUM 20 MG/1
20 TABLET, FILM COATED ORAL NIGHTLY
Qty: 30 TABLET | Refills: 3
Start: 2018-05-13

## 2018-05-13 RX ADMIN — GLIMEPIRIDE 2 MG: 2 TABLET ORAL at 08:34

## 2018-05-13 RX ADMIN — CIPROFLOXACIN 500 MG: 500 TABLET, FILM COATED ORAL at 08:34

## 2018-05-13 RX ADMIN — ESCITALOPRAM 10 MG: 10 TABLET, FILM COATED ORAL at 08:34

## 2018-05-13 RX ADMIN — POTASSIUM CHLORIDE 10 MEQ: 20 TABLET, EXTENDED RELEASE ORAL at 08:35

## 2018-05-13 RX ADMIN — PREGABALIN 150 MG: 75 CAPSULE ORAL at 08:34

## 2018-05-13 RX ADMIN — BUMETANIDE 1 MG: 1 TABLET ORAL at 08:34

## 2018-05-13 RX ADMIN — HEPARIN 500 UNITS: 100 SYRINGE at 11:39

## 2018-05-13 RX ADMIN — PANTOPRAZOLE SODIUM 40 MG: 40 TABLET, DELAYED RELEASE ORAL at 05:35

## 2018-05-13 RX ADMIN — LEVOTHYROXINE SODIUM 75 MCG: 75 TABLET ORAL at 05:35

## 2018-05-13 ASSESSMENT — PAIN SCALES - GENERAL
PAINLEVEL_OUTOF10: 0
PAINLEVEL_OUTOF10: 0

## 2018-05-22 ENCOUNTER — HOSPITAL ENCOUNTER (OUTPATIENT)
Dept: NURSING | Age: 73
Discharge: HOME | End: 2018-05-22
Payer: MEDICARE

## 2018-05-22 VITALS
SYSTOLIC BLOOD PRESSURE: 135 MMHG | TEMPERATURE: 98.2 F | BODY MASS INDEX: 37.28 KG/M2 | RESPIRATION RATE: 16 BRPM | WEIGHT: 231 LBS | DIASTOLIC BLOOD PRESSURE: 83 MMHG | HEART RATE: 54 BPM

## 2018-05-22 DIAGNOSIS — D83.0 COM VARIAB IMMUNODEF W PREDOM ABNLT OF B-CELL NUMS & FUNCTN (HCC): ICD-10-CM

## 2018-05-22 DIAGNOSIS — D80.9 SELECTIVE IMMUNOGLOBULIN DEFICIENCY (HCC): ICD-10-CM

## 2018-05-22 LAB
IGA: 87 MG/DL (ref 70–400)
IGG: 774 MG/DL (ref 700–1600)
IGM: 45 MG/DL (ref 40–230)

## 2018-05-22 PROCEDURE — 6360000002 HC RX W HCPCS: Performed by: INTERNAL MEDICINE

## 2018-05-22 PROCEDURE — 96415 CHEMO IV INFUSION ADDL HR: CPT

## 2018-05-22 PROCEDURE — 2580000003 HC RX 258: Performed by: INTERNAL MEDICINE

## 2018-05-22 PROCEDURE — 36591 DRAW BLOOD OFF VENOUS DEVICE: CPT

## 2018-05-22 PROCEDURE — 82784 ASSAY IGA/IGD/IGG/IGM EACH: CPT

## 2018-05-22 PROCEDURE — 6370000000 HC RX 637 (ALT 250 FOR IP): Performed by: INTERNAL MEDICINE

## 2018-05-22 PROCEDURE — 96413 CHEMO IV INFUSION 1 HR: CPT

## 2018-05-22 RX ORDER — METHYLPREDNISOLONE SODIUM SUCCINATE 125 MG/2ML
125 INJECTION, POWDER, LYOPHILIZED, FOR SOLUTION INTRAMUSCULAR; INTRAVENOUS ONCE
Status: CANCELLED | OUTPATIENT
Start: 2018-05-22 | End: 2018-05-22

## 2018-05-22 RX ORDER — DIPHENHYDRAMINE HCL 25 MG
25 TABLET ORAL ONCE
Status: CANCELLED | OUTPATIENT
Start: 2018-05-22 | End: 2018-05-22

## 2018-05-22 RX ORDER — BUPROPION HYDROCHLORIDE 150 MG/1
150 TABLET ORAL EVERY MORNING
Status: ON HOLD | COMMUNITY
End: 2018-06-15 | Stop reason: HOSPADM

## 2018-05-22 RX ORDER — METHYLPREDNISOLONE SODIUM SUCCINATE 125 MG/2ML
125 INJECTION, POWDER, LYOPHILIZED, FOR SOLUTION INTRAMUSCULAR; INTRAVENOUS ONCE
Status: CANCELLED | OUTPATIENT
Start: 2018-06-22 | End: 2018-06-22

## 2018-05-22 RX ORDER — EPINEPHRINE 1 MG/ML
0.3 INJECTION, SOLUTION, CONCENTRATE INTRAVENOUS PRN
Status: CANCELLED | OUTPATIENT
Start: 2018-06-22

## 2018-05-22 RX ORDER — ACETAMINOPHEN 325 MG/1
650 TABLET ORAL ONCE
Status: COMPLETED | OUTPATIENT
Start: 2018-05-22 | End: 2018-05-22

## 2018-05-22 RX ORDER — 0.9 % SODIUM CHLORIDE 0.9 %
10 VIAL (ML) INJECTION ONCE
Status: CANCELLED | OUTPATIENT
Start: 2018-06-22 | End: 2018-06-22

## 2018-05-22 RX ORDER — SODIUM CHLORIDE 9 MG/ML
INJECTION, SOLUTION INTRAVENOUS CONTINUOUS
Status: CANCELLED | OUTPATIENT
Start: 2018-06-22

## 2018-05-22 RX ORDER — DIPHENHYDRAMINE HCL 25 MG
25 TABLET ORAL ONCE
Status: COMPLETED | OUTPATIENT
Start: 2018-05-22 | End: 2018-05-22

## 2018-05-22 RX ORDER — 0.9 % SODIUM CHLORIDE 0.9 %
5 VIAL (ML) INJECTION PRN
Status: CANCELLED | OUTPATIENT
Start: 2018-05-22

## 2018-05-22 RX ORDER — DIPHENHYDRAMINE HCL 25 MG
25 TABLET ORAL ONCE
Status: CANCELLED | OUTPATIENT
Start: 2018-06-22 | End: 2018-06-22

## 2018-05-22 RX ORDER — SODIUM CHLORIDE 9 MG/ML
INJECTION, SOLUTION INTRAVENOUS CONTINUOUS
Status: CANCELLED | OUTPATIENT
Start: 2018-05-22

## 2018-05-22 RX ORDER — DEXTROSE MONOHYDRATE 50 MG/ML
INJECTION, SOLUTION INTRAVENOUS ONCE
Status: CANCELLED | OUTPATIENT
Start: 2018-05-22 | End: 2018-05-22

## 2018-05-22 RX ORDER — 0.9 % SODIUM CHLORIDE 0.9 %
10 VIAL (ML) INJECTION PRN
Status: DISCONTINUED | OUTPATIENT
Start: 2018-05-22 | End: 2018-05-23 | Stop reason: HOSPADM

## 2018-05-22 RX ORDER — SODIUM CHLORIDE 0.9 % (FLUSH) 0.9 %
10 SYRINGE (ML) INJECTION PRN
Status: CANCELLED | OUTPATIENT
Start: 2018-06-22

## 2018-05-22 RX ORDER — DIPHENHYDRAMINE HYDROCHLORIDE 50 MG/ML
50 INJECTION INTRAMUSCULAR; INTRAVENOUS ONCE
Status: CANCELLED | OUTPATIENT
Start: 2018-06-22 | End: 2018-06-22

## 2018-05-22 RX ORDER — ACETAMINOPHEN 325 MG/1
650 TABLET ORAL ONCE
Status: CANCELLED | OUTPATIENT
Start: 2018-06-22 | End: 2018-06-22

## 2018-05-22 RX ORDER — DIPHENHYDRAMINE HYDROCHLORIDE 50 MG/ML
50 INJECTION INTRAMUSCULAR; INTRAVENOUS ONCE
Status: CANCELLED | OUTPATIENT
Start: 2018-05-22 | End: 2018-05-22

## 2018-05-22 RX ORDER — ACETAMINOPHEN 325 MG/1
650 TABLET ORAL ONCE
Status: CANCELLED | OUTPATIENT
Start: 2018-05-22 | End: 2018-05-22

## 2018-05-22 RX ORDER — 0.9 % SODIUM CHLORIDE 0.9 %
10 VIAL (ML) INJECTION PRN
Status: CANCELLED | OUTPATIENT
Start: 2018-05-22

## 2018-05-22 RX ORDER — HEPARIN SODIUM (PORCINE) LOCK FLUSH IV SOLN 100 UNIT/ML 100 UNIT/ML
500 SOLUTION INTRAVENOUS PRN
Status: CANCELLED | OUTPATIENT
Start: 2018-05-22

## 2018-05-22 RX ORDER — HEPARIN SODIUM (PORCINE) LOCK FLUSH IV SOLN 100 UNIT/ML 100 UNIT/ML
500 SOLUTION INTRAVENOUS PRN
Status: DISCONTINUED | OUTPATIENT
Start: 2018-05-22 | End: 2018-05-23 | Stop reason: HOSPADM

## 2018-05-22 RX ORDER — 0.9 % SODIUM CHLORIDE 0.9 %
10 VIAL (ML) INJECTION ONCE
Status: CANCELLED | OUTPATIENT
Start: 2018-05-22 | End: 2018-05-22

## 2018-05-22 RX ADMIN — DIPHENHYDRAMINE HCL 25 MG: 25 TABLET ORAL at 10:21

## 2018-05-22 RX ADMIN — IMMUNE GLOBULIN (HUMAN) 20 G: 10 INJECTION INTRAVENOUS; SUBCUTANEOUS at 11:56

## 2018-05-22 RX ADMIN — HEPARIN 500 UNITS: 100 SYRINGE at 12:37

## 2018-05-22 RX ADMIN — Medication 10 ML: at 12:37

## 2018-05-22 RX ADMIN — Medication 10 ML: at 10:29

## 2018-05-22 RX ADMIN — ACETAMINOPHEN 650 MG: 325 TABLET ORAL at 10:21

## 2018-05-22 RX ADMIN — IMMUNE GLOBULIN (HUMAN) 20 G: 10 INJECTION INTRAVENOUS; SUBCUTANEOUS at 10:45

## 2018-05-22 ASSESSMENT — PAIN - FUNCTIONAL ASSESSMENT: PAIN_FUNCTIONAL_ASSESSMENT: 0-10

## 2018-05-22 ASSESSMENT — PAIN SCALES - GENERAL: PAINLEVEL_OUTOF10: 0

## 2018-05-22 NOTE — PROGRESS NOTES
[id number] Alert female admitted for IVIG infusion, procedure reviewed with pt verbalized understanding. No new complaints voiced. 1236 Infusion complete tolerated well. Home instructions to pt verbalized understanding. Gait steady. 1240 Discharged ambulatory stable home.                 __met__ Safety:       (Environmental)   [place] to environment   Ensure ID band is correct and in place/ allergy band as needed   Assess for fall risk   Initiate fall precautions as applicable (fall band, side rails, etc.)   Call light within reach   Bed in low position/ wheels locked    __met__ Pain:        Assess pain level and characteristics   Administer analgesics as ordered   Assess effectiveness of pain management and report to MD as needed    __met__ Knowledge Deficit:   Assess baseline knowledge   Provide teaching at level of understanding   Provide teaching via preferred learning method   Evaluate teaching effectiveness    __met____ Infection-Risk of Central Venous Catheter:   Monitor for infection signs and symptoms (catheter site redness, temperature elevation, etc)   Assess for infection risks   Educate regarding infection prevention   Manage central venous catheter (flushes/ dressing changes per protocol)

## 2018-06-12 ENCOUNTER — APPOINTMENT (OUTPATIENT)
Dept: GENERAL RADIOLOGY | Age: 73
DRG: 640 | End: 2018-06-12
Payer: MEDICARE

## 2018-06-12 ENCOUNTER — APPOINTMENT (OUTPATIENT)
Dept: CT IMAGING | Age: 73
DRG: 640 | End: 2018-06-12
Payer: MEDICARE

## 2018-06-12 ENCOUNTER — HOSPITAL ENCOUNTER (INPATIENT)
Age: 73
LOS: 3 days | Discharge: NURSING, REHAB OR LONG-TERM CARE | DRG: 640 | End: 2018-06-15
Attending: FAMILY MEDICINE | Admitting: INTERNAL MEDICINE
Payer: MEDICARE

## 2018-06-12 DIAGNOSIS — R41.82 ALTERED MENTAL STATUS, UNSPECIFIED ALTERED MENTAL STATUS TYPE: Primary | ICD-10-CM

## 2018-06-12 DIAGNOSIS — E86.0 DEHYDRATION: ICD-10-CM

## 2018-06-12 LAB
ALBUMIN SERPL-MCNC: 3.7 G/DL (ref 3.5–5.1)
ALLEN TEST: POSITIVE
ALP BLD-CCNC: 87 U/L (ref 38–126)
ALT SERPL-CCNC: 9 U/L (ref 11–66)
AMORPHOUS: ABNORMAL
AMPHETAMINE+METHAMPHETAMINE URINE SCREEN: NEGATIVE
ANION GAP SERPL CALCULATED.3IONS-SCNC: 15 MEQ/L (ref 8–16)
AST SERPL-CCNC: 20 U/L (ref 5–40)
BACTERIA: ABNORMAL /HPF
BARBITURATE QUANTITATIVE URINE: NEGATIVE
BASE EXCESS (CALCULATED): 2.4 MMOL/L (ref -2.5–2.5)
BENZODIAZEPINE QUANTITATIVE URINE: POSITIVE
BILIRUB SERPL-MCNC: 0.4 MG/DL (ref 0.3–1.2)
BILIRUBIN DIRECT: < 0.2 MG/DL (ref 0–0.3)
BILIRUBIN URINE: NEGATIVE
BLOOD, URINE: ABNORMAL
BUN BLDV-MCNC: 19 MG/DL (ref 7–22)
CALCIUM SERPL-MCNC: 8.8 MG/DL (ref 8.5–10.5)
CANNABINOID QUANTITATIVE URINE: NEGATIVE
CASTS 2: ABNORMAL /LPF
CASTS UA: ABNORMAL /LPF
CHARACTER, URINE: CLEAR
CHLORIDE BLD-SCNC: 105 MEQ/L (ref 98–111)
CO2: 22 MEQ/L (ref 23–33)
COCAINE METABOLITE QUANTITATIVE URINE: NEGATIVE
COLLECTED BY:: ABNORMAL
COLOR: YELLOW
CREAT SERPL-MCNC: 1 MG/DL (ref 0.4–1.2)
CRYSTALS, UA: ABNORMAL
DEVICE: ABNORMAL
EKG ATRIAL RATE: 31 BPM
EKG Q-T INTERVAL: 404 MS
EKG QRS DURATION: 98 MS
EKG QTC CALCULATION (BAZETT): 472 MS
EKG R AXIS: -37 DEGREES
EKG T AXIS: 80 DEGREES
EKG VENTRICULAR RATE: 82 BPM
EPITHELIAL CELLS, UA: ABNORMAL /HPF
GFR SERPL CREATININE-BSD FRML MDRD: 54 ML/MIN/1.73M2
GLUCOSE BLD-MCNC: 118 MG/DL (ref 70–108)
GLUCOSE URINE: NEGATIVE MG/DL
GLUCOSE, WHOLE BLOOD: 115 MG/DL (ref 70–108)
HCO3: 28 MMOL/L (ref 23–28)
IFIO2: 2
KETONES, URINE: NEGATIVE
LACTIC ACID, SEPSIS: 1.5 MMOL/L (ref 0.5–1.9)
LEUKOCYTE ESTERASE, URINE: NEGATIVE
LIPASE: 18.6 U/L (ref 5.6–51.3)
MAGNESIUM: 2.1 MG/DL (ref 1.6–2.4)
MISCELLANEOUS 2: ABNORMAL
NITRITE, URINE: NEGATIVE
O2 SATURATION: 95 %
OPIATES, URINE: NEGATIVE
OSMOLALITY CALCULATION: 286.5 MOSMOL/KG (ref 275–300)
OXYCODONE: NEGATIVE
PCO2: 47 MMHG (ref 35–45)
PH BLOOD GAS: 7.38 (ref 7.35–7.45)
PH UA: 5.5
PHENCYCLIDINE QUANTITATIVE URINE: NEGATIVE
PO2: 80 MMHG (ref 71–104)
POTASSIUM SERPL-SCNC: 4 MEQ/L (ref 3.5–5.2)
PRO-BNP: 515.3 PG/ML (ref 0–900)
PROTEIN UA: NEGATIVE
RBC URINE: ABNORMAL /HPF
RENAL EPITHELIAL, UA: ABNORMAL
SCAN OF BLOOD SMEAR: NORMAL
SODIUM BLD-SCNC: 142 MEQ/L (ref 135–145)
SOURCE, BLOOD GAS: ABNORMAL
SPECIFIC GRAVITY, URINE: 1.01 (ref 1–1.03)
TOTAL CK: 54 U/L (ref 30–135)
TOTAL PROTEIN: 6.9 G/DL (ref 6.1–8)
TROPONIN T: < 0.01 NG/ML
UROBILINOGEN, URINE: 0.2 EU/DL
WBC UA: ABNORMAL /HPF
YEAST: ABNORMAL

## 2018-06-12 PROCEDURE — 83735 ASSAY OF MAGNESIUM: CPT

## 2018-06-12 PROCEDURE — 6360000002 HC RX W HCPCS: Performed by: FAMILY MEDICINE

## 2018-06-12 PROCEDURE — 83605 ASSAY OF LACTIC ACID: CPT

## 2018-06-12 PROCEDURE — 84484 ASSAY OF TROPONIN QUANT: CPT

## 2018-06-12 PROCEDURE — 82550 ASSAY OF CK (CPK): CPT

## 2018-06-12 PROCEDURE — 93005 ELECTROCARDIOGRAM TRACING: CPT | Performed by: FAMILY MEDICINE

## 2018-06-12 PROCEDURE — 80307 DRUG TEST PRSMV CHEM ANLYZR: CPT

## 2018-06-12 PROCEDURE — 71045 X-RAY EXAM CHEST 1 VIEW: CPT

## 2018-06-12 PROCEDURE — 2580000003 HC RX 258: Performed by: INTERNAL MEDICINE

## 2018-06-12 PROCEDURE — 83690 ASSAY OF LIPASE: CPT

## 2018-06-12 PROCEDURE — 82248 BILIRUBIN DIRECT: CPT

## 2018-06-12 PROCEDURE — 82803 BLOOD GASES ANY COMBINATION: CPT

## 2018-06-12 PROCEDURE — 36600 WITHDRAWAL OF ARTERIAL BLOOD: CPT

## 2018-06-12 PROCEDURE — 70450 CT HEAD/BRAIN W/O DYE: CPT

## 2018-06-12 PROCEDURE — 83880 ASSAY OF NATRIURETIC PEPTIDE: CPT

## 2018-06-12 PROCEDURE — 85025 COMPLETE CBC W/AUTO DIFF WBC: CPT

## 2018-06-12 PROCEDURE — 93010 ELECTROCARDIOGRAM REPORT: CPT | Performed by: INTERNAL MEDICINE

## 2018-06-12 PROCEDURE — 36415 COLL VENOUS BLD VENIPUNCTURE: CPT

## 2018-06-12 PROCEDURE — 87040 BLOOD CULTURE FOR BACTERIA: CPT

## 2018-06-12 PROCEDURE — 81001 URINALYSIS AUTO W/SCOPE: CPT

## 2018-06-12 PROCEDURE — 6370000000 HC RX 637 (ALT 250 FOR IP): Performed by: INTERNAL MEDICINE

## 2018-06-12 PROCEDURE — 99223 1ST HOSP IP/OBS HIGH 75: CPT | Performed by: INTERNAL MEDICINE

## 2018-06-12 PROCEDURE — 99285 EMERGENCY DEPT VISIT HI MDM: CPT

## 2018-06-12 PROCEDURE — 82947 ASSAY GLUCOSE BLOOD QUANT: CPT

## 2018-06-12 PROCEDURE — 96365 THER/PROPH/DIAG IV INF INIT: CPT

## 2018-06-12 PROCEDURE — 1210000002 HC MED SURG R&B - NEUROSCIENCE

## 2018-06-12 PROCEDURE — 2580000003 HC RX 258: Performed by: FAMILY MEDICINE

## 2018-06-12 PROCEDURE — 80053 COMPREHEN METABOLIC PANEL: CPT

## 2018-06-12 RX ORDER — ATORVASTATIN CALCIUM 20 MG/1
20 TABLET, FILM COATED ORAL NIGHTLY
Status: DISCONTINUED | OUTPATIENT
Start: 2018-06-13 | End: 2018-06-15 | Stop reason: HOSPADM

## 2018-06-12 RX ORDER — PANTOPRAZOLE SODIUM 40 MG/1
40 TABLET, DELAYED RELEASE ORAL 2 TIMES DAILY
Status: DISCONTINUED | OUTPATIENT
Start: 2018-06-12 | End: 2018-06-12

## 2018-06-12 RX ORDER — DEXTROSE MONOHYDRATE 25 G/50ML
12.5 INJECTION, SOLUTION INTRAVENOUS PRN
Status: DISCONTINUED | OUTPATIENT
Start: 2018-06-12 | End: 2018-06-15 | Stop reason: HOSPADM

## 2018-06-12 RX ORDER — DEXTROSE MONOHYDRATE 50 MG/ML
100 INJECTION, SOLUTION INTRAVENOUS PRN
Status: DISCONTINUED | OUTPATIENT
Start: 2018-06-12 | End: 2018-06-15 | Stop reason: HOSPADM

## 2018-06-12 RX ORDER — POTASSIUM CHLORIDE 20 MEQ/1
40 TABLET, EXTENDED RELEASE ORAL PRN
Status: DISCONTINUED | OUTPATIENT
Start: 2018-06-12 | End: 2018-06-15 | Stop reason: HOSPADM

## 2018-06-12 RX ORDER — NICOTINE POLACRILEX 4 MG
15 LOZENGE BUCCAL PRN
Status: DISCONTINUED | OUTPATIENT
Start: 2018-06-12 | End: 2018-06-15 | Stop reason: HOSPADM

## 2018-06-12 RX ORDER — SODIUM CHLORIDE 9 MG/ML
INJECTION, SOLUTION INTRAVENOUS CONTINUOUS
Status: DISCONTINUED | OUTPATIENT
Start: 2018-06-12 | End: 2018-06-15 | Stop reason: HOSPADM

## 2018-06-12 RX ORDER — SODIUM CHLORIDE 0.9 % (FLUSH) 0.9 %
10 SYRINGE (ML) INJECTION PRN
Status: DISCONTINUED | OUTPATIENT
Start: 2018-06-12 | End: 2018-06-15 | Stop reason: HOSPADM

## 2018-06-12 RX ORDER — SODIUM CHLORIDE 0.9 % (FLUSH) 0.9 %
10 SYRINGE (ML) INJECTION EVERY 12 HOURS SCHEDULED
Status: DISCONTINUED | OUTPATIENT
Start: 2018-06-12 | End: 2018-06-15 | Stop reason: HOSPADM

## 2018-06-12 RX ORDER — POTASSIUM CHLORIDE 7.45 MG/ML
10 INJECTION INTRAVENOUS PRN
Status: DISCONTINUED | OUTPATIENT
Start: 2018-06-12 | End: 2018-06-15 | Stop reason: HOSPADM

## 2018-06-12 RX ORDER — PREGABALIN 75 MG/1
150 CAPSULE ORAL 2 TIMES DAILY
Status: DISCONTINUED | OUTPATIENT
Start: 2018-06-13 | End: 2018-06-15 | Stop reason: HOSPADM

## 2018-06-12 RX ORDER — LEVOTHYROXINE SODIUM 0.07 MG/1
75 TABLET ORAL DAILY
Status: DISCONTINUED | OUTPATIENT
Start: 2018-06-13 | End: 2018-06-15 | Stop reason: HOSPADM

## 2018-06-12 RX ORDER — POTASSIUM CHLORIDE 20MEQ/15ML
40 LIQUID (ML) ORAL PRN
Status: DISCONTINUED | OUTPATIENT
Start: 2018-06-12 | End: 2018-06-15 | Stop reason: HOSPADM

## 2018-06-12 RX ORDER — ALPRAZOLAM 0.5 MG/1
0.5 TABLET ORAL NIGHTLY PRN
Status: DISCONTINUED | OUTPATIENT
Start: 2018-06-13 | End: 2018-06-15 | Stop reason: HOSPADM

## 2018-06-12 RX ORDER — ACETAMINOPHEN 325 MG/1
650 TABLET ORAL EVERY 4 HOURS PRN
Status: DISCONTINUED | OUTPATIENT
Start: 2018-06-12 | End: 2018-06-15 | Stop reason: HOSPADM

## 2018-06-12 RX ORDER — ONDANSETRON 2 MG/ML
4 INJECTION INTRAMUSCULAR; INTRAVENOUS EVERY 6 HOURS PRN
Status: DISCONTINUED | OUTPATIENT
Start: 2018-06-12 | End: 2018-06-15 | Stop reason: HOSPADM

## 2018-06-12 RX ORDER — PANTOPRAZOLE SODIUM 40 MG/10ML
40 INJECTION, POWDER, LYOPHILIZED, FOR SOLUTION INTRAVENOUS 2 TIMES DAILY
Status: DISCONTINUED | OUTPATIENT
Start: 2018-06-13 | End: 2018-06-14 | Stop reason: ALTCHOICE

## 2018-06-12 RX ORDER — 0.9 % SODIUM CHLORIDE 0.9 %
1000 INTRAVENOUS SOLUTION INTRAVENOUS ONCE
Status: COMPLETED | OUTPATIENT
Start: 2018-06-12 | End: 2018-06-12

## 2018-06-12 RX ORDER — FERROUS SULFATE 325(65) MG
325 TABLET ORAL
Status: DISCONTINUED | OUTPATIENT
Start: 2018-06-13 | End: 2018-06-15 | Stop reason: HOSPADM

## 2018-06-12 RX ORDER — BUPROPION HYDROCHLORIDE 150 MG/1
150 TABLET ORAL EVERY MORNING
Status: DISCONTINUED | OUTPATIENT
Start: 2018-06-13 | End: 2018-06-14

## 2018-06-12 RX ADMIN — SODIUM CHLORIDE: 9 INJECTION, SOLUTION INTRAVENOUS at 23:57

## 2018-06-12 RX ADMIN — CEFEPIME HYDROCHLORIDE 2 G: 2 INJECTION, POWDER, FOR SOLUTION INTRAVENOUS at 17:46

## 2018-06-12 RX ADMIN — SODIUM CHLORIDE 1000 ML: 900 INJECTION, SOLUTION INTRAVENOUS at 15:37

## 2018-06-13 ENCOUNTER — APPOINTMENT (OUTPATIENT)
Dept: MRI IMAGING | Age: 73
DRG: 640 | End: 2018-06-13
Payer: MEDICARE

## 2018-06-13 ENCOUNTER — APPOINTMENT (OUTPATIENT)
Dept: INTERVENTIONAL RADIOLOGY/VASCULAR | Age: 73
DRG: 640 | End: 2018-06-13
Payer: MEDICARE

## 2018-06-13 LAB
ANION GAP SERPL CALCULATED.3IONS-SCNC: 11 MEQ/L (ref 8–16)
ANISOCYTOSIS: ABNORMAL
ANISOCYTOSIS: ABNORMAL
AVERAGE GLUCOSE: 102 MG/DL (ref 70–126)
BASOPHILS # BLD: 0.3 %
BASOPHILS # BLD: 0.7 %
BASOPHILS ABSOLUTE: 0 THOU/MM3 (ref 0–0.1)
BASOPHILS ABSOLUTE: 0 THOU/MM3 (ref 0–0.1)
BUN BLDV-MCNC: 14 MG/DL (ref 7–22)
CALCIUM SERPL-MCNC: 8.7 MG/DL (ref 8.5–10.5)
CHLORIDE BLD-SCNC: 110 MEQ/L (ref 98–111)
CO2: 26 MEQ/L (ref 23–33)
CREAT SERPL-MCNC: 0.9 MG/DL (ref 0.4–1.2)
EOSINOPHIL # BLD: 1.4 %
EOSINOPHIL # BLD: 1.9 %
EOSINOPHILS ABSOLUTE: 0.1 THOU/MM3 (ref 0–0.4)
EOSINOPHILS ABSOLUTE: 0.1 THOU/MM3 (ref 0–0.4)
GFR SERPL CREATININE-BSD FRML MDRD: 61 ML/MIN/1.73M2
GLUCOSE BLD-MCNC: 130 MG/DL (ref 70–108)
GLUCOSE BLD-MCNC: 131 MG/DL (ref 70–108)
GLUCOSE BLD-MCNC: 132 MG/DL (ref 70–108)
GLUCOSE BLD-MCNC: 133 MG/DL (ref 70–108)
GLUCOSE BLD-MCNC: 142 MG/DL (ref 70–108)
GLUCOSE BLD-MCNC: 148 MG/DL (ref 70–108)
HBA1C MFR BLD: 5.4 % (ref 4.4–6.4)
HCT VFR BLD CALC: 33.8 % (ref 37–47)
HCT VFR BLD CALC: 33.9 % (ref 37–47)
HEMOGLOBIN: 11.3 GM/DL (ref 12–16)
HEMOGLOBIN: 11.3 GM/DL (ref 12–16)
LYMPHOCYTES # BLD: 12.2 %
LYMPHOCYTES # BLD: 16.8 %
LYMPHOCYTES ABSOLUTE: 0.7 THOU/MM3 (ref 1–4.8)
LYMPHOCYTES ABSOLUTE: 0.8 THOU/MM3 (ref 1–4.8)
MCH RBC QN AUTO: 31 PG (ref 27–31)
MCH RBC QN AUTO: 31.2 PG (ref 27–31)
MCHC RBC AUTO-ENTMCNC: 33.3 GM/DL (ref 33–37)
MCHC RBC AUTO-ENTMCNC: 33.5 GM/DL (ref 33–37)
MCV RBC AUTO: 93.1 FL (ref 81–99)
MCV RBC AUTO: 93.1 FL (ref 81–99)
MONOCYTES # BLD: 5.8 %
MONOCYTES # BLD: 6.3 %
MONOCYTES ABSOLUTE: 0.2 THOU/MM3 (ref 0.4–1.3)
MONOCYTES ABSOLUTE: 0.4 THOU/MM3 (ref 0.4–1.3)
NUCLEATED RED BLOOD CELLS: 0 /100 WBC
NUCLEATED RED BLOOD CELLS: 0 /100 WBC
PATHOLOGIST REVIEW: ABNORMAL
PDW BLD-RTO: 15.2 % (ref 11.5–14.5)
PDW BLD-RTO: 15.9 % (ref 11.5–14.5)
PLATELET # BLD: 156 THOU/MM3 (ref 130–400)
PLATELET # BLD: 74 THOU/MM3 (ref 130–400)
PLATELET ESTIMATE: ABNORMAL
PMV BLD AUTO: 8.4 FL (ref 7.4–10.4)
PMV BLD AUTO: 8.9 FL (ref 7.4–10.4)
POTASSIUM REFLEX MAGNESIUM: 3.8 MEQ/L (ref 3.5–5.2)
RBC # BLD: 3.63 MILL/MM3 (ref 4.2–5.4)
RBC # BLD: 3.64 MILL/MM3 (ref 4.2–5.4)
SEG NEUTROPHILS: 74.8 %
SEG NEUTROPHILS: 79.8 %
SEGMENTED NEUTROPHILS ABSOLUTE COUNT: 3.1 THOU/MM3 (ref 1.8–7.7)
SEGMENTED NEUTROPHILS ABSOLUTE COUNT: 5.4 THOU/MM3 (ref 1.8–7.7)
SODIUM BLD-SCNC: 147 MEQ/L (ref 135–145)
WBC # BLD: 4.2 THOU/MM3 (ref 4.8–10.8)
WBC # BLD: 6.8 THOU/MM3 (ref 4.8–10.8)

## 2018-06-13 PROCEDURE — 6370000000 HC RX 637 (ALT 250 FOR IP): Performed by: INTERNAL MEDICINE

## 2018-06-13 PROCEDURE — 6360000002 HC RX W HCPCS: Performed by: HOSPITALIST

## 2018-06-13 PROCEDURE — C9113 INJ PANTOPRAZOLE SODIUM, VIA: HCPCS | Performed by: HOSPITALIST

## 2018-06-13 PROCEDURE — 2580000003 HC RX 258: Performed by: FAMILY MEDICINE

## 2018-06-13 PROCEDURE — 87040 BLOOD CULTURE FOR BACTERIA: CPT

## 2018-06-13 PROCEDURE — 83036 HEMOGLOBIN GLYCOSYLATED A1C: CPT

## 2018-06-13 PROCEDURE — 1210000002 HC MED SURG R&B - NEUROSCIENCE

## 2018-06-13 PROCEDURE — C1751 CATH, INF, PER/CENT/MIDLINE: HCPCS

## 2018-06-13 PROCEDURE — 93880 EXTRACRANIAL BILAT STUDY: CPT

## 2018-06-13 PROCEDURE — 99232 SBSQ HOSP IP/OBS MODERATE 35: CPT | Performed by: INTERNAL MEDICINE

## 2018-06-13 PROCEDURE — 82948 REAGENT STRIP/BLOOD GLUCOSE: CPT

## 2018-06-13 PROCEDURE — 70551 MRI BRAIN STEM W/O DYE: CPT

## 2018-06-13 PROCEDURE — 2580000003 HC RX 258: Performed by: INTERNAL MEDICINE

## 2018-06-13 PROCEDURE — 85025 COMPLETE CBC W/AUTO DIFF WBC: CPT

## 2018-06-13 PROCEDURE — 80048 BASIC METABOLIC PNL TOTAL CA: CPT

## 2018-06-13 PROCEDURE — 6360000002 HC RX W HCPCS: Performed by: FAMILY MEDICINE

## 2018-06-13 PROCEDURE — 36415 COLL VENOUS BLD VENIPUNCTURE: CPT

## 2018-06-13 RX ADMIN — Medication 10 ML: at 20:05

## 2018-06-13 RX ADMIN — PANTOPRAZOLE SODIUM 40 MG: 40 INJECTION, POWDER, FOR SOLUTION INTRAVENOUS at 20:06

## 2018-06-13 RX ADMIN — PREGABALIN 150 MG: 75 CAPSULE ORAL at 20:06

## 2018-06-13 RX ADMIN — VANCOMYCIN HYDROCHLORIDE 1500 MG: 1 INJECTION, POWDER, LYOPHILIZED, FOR SOLUTION INTRAVENOUS at 00:33

## 2018-06-13 RX ADMIN — PANTOPRAZOLE SODIUM 40 MG: 40 INJECTION, POWDER, FOR SOLUTION INTRAVENOUS at 09:13

## 2018-06-13 RX ADMIN — CEFEPIME HYDROCHLORIDE 2 G: 2 INJECTION, POWDER, FOR SOLUTION INTRAVENOUS at 18:47

## 2018-06-13 RX ADMIN — RIVAROXABAN 20 MG: 20 TABLET, FILM COATED ORAL at 20:05

## 2018-06-13 RX ADMIN — CEFEPIME HYDROCHLORIDE 2 G: 2 INJECTION, POWDER, FOR SOLUTION INTRAVENOUS at 05:56

## 2018-06-13 RX ADMIN — Medication 10 ML: at 01:25

## 2018-06-13 RX ADMIN — Medication 10 ML: at 09:13

## 2018-06-13 RX ADMIN — SODIUM CHLORIDE: 9 INJECTION, SOLUTION INTRAVENOUS at 18:44

## 2018-06-13 RX ADMIN — ATORVASTATIN CALCIUM 20 MG: 20 TABLET, FILM COATED ORAL at 20:06

## 2018-06-13 ASSESSMENT — PAIN SCALES - GENERAL
PAINLEVEL_OUTOF10: 0

## 2018-06-14 LAB
GLUCOSE BLD-MCNC: 115 MG/DL (ref 70–108)
GLUCOSE BLD-MCNC: 144 MG/DL (ref 70–108)
GLUCOSE BLD-MCNC: 153 MG/DL (ref 70–108)
GLUCOSE BLD-MCNC: 167 MG/DL (ref 70–108)
GLUCOSE BLD-MCNC: 180 MG/DL (ref 70–108)

## 2018-06-14 PROCEDURE — C9113 INJ PANTOPRAZOLE SODIUM, VIA: HCPCS | Performed by: HOSPITALIST

## 2018-06-14 PROCEDURE — 2580000003 HC RX 258: Performed by: INTERNAL MEDICINE

## 2018-06-14 PROCEDURE — 6370000000 HC RX 637 (ALT 250 FOR IP): Performed by: HOSPITALIST

## 2018-06-14 PROCEDURE — 92523 SPEECH SOUND LANG COMPREHEN: CPT

## 2018-06-14 PROCEDURE — 97530 THERAPEUTIC ACTIVITIES: CPT

## 2018-06-14 PROCEDURE — 6370000000 HC RX 637 (ALT 250 FOR IP): Performed by: INTERNAL MEDICINE

## 2018-06-14 PROCEDURE — 97161 PT EVAL LOW COMPLEX 20 MIN: CPT

## 2018-06-14 PROCEDURE — A4614 HAND-HELD PEFR METER: HCPCS

## 2018-06-14 PROCEDURE — 6360000002 HC RX W HCPCS: Performed by: HOSPITALIST

## 2018-06-14 PROCEDURE — G8978 MOBILITY CURRENT STATUS: HCPCS

## 2018-06-14 PROCEDURE — 2580000003 HC RX 258: Performed by: FAMILY MEDICINE

## 2018-06-14 PROCEDURE — 99232 SBSQ HOSP IP/OBS MODERATE 35: CPT | Performed by: INTERNAL MEDICINE

## 2018-06-14 PROCEDURE — 82948 REAGENT STRIP/BLOOD GLUCOSE: CPT

## 2018-06-14 PROCEDURE — G8979 MOBILITY GOAL STATUS: HCPCS

## 2018-06-14 PROCEDURE — 1210000002 HC MED SURG R&B - NEUROSCIENCE

## 2018-06-14 PROCEDURE — 6360000002 HC RX W HCPCS: Performed by: FAMILY MEDICINE

## 2018-06-14 RX ORDER — PANTOPRAZOLE SODIUM 40 MG/1
40 TABLET, DELAYED RELEASE ORAL
Status: DISCONTINUED | OUTPATIENT
Start: 2018-06-14 | End: 2018-06-15 | Stop reason: HOSPADM

## 2018-06-14 RX ADMIN — PANTOPRAZOLE SODIUM 40 MG: 40 INJECTION, POWDER, FOR SOLUTION INTRAVENOUS at 09:20

## 2018-06-14 RX ADMIN — LEVOTHYROXINE SODIUM 75 MCG: 75 TABLET ORAL at 11:05

## 2018-06-14 RX ADMIN — RIVAROXABAN 20 MG: 20 TABLET, FILM COATED ORAL at 18:31

## 2018-06-14 RX ADMIN — Medication 1 UNITS: at 21:58

## 2018-06-14 RX ADMIN — ACETAMINOPHEN 650 MG: 325 TABLET ORAL at 21:51

## 2018-06-14 RX ADMIN — ACETAMINOPHEN 650 MG: 325 TABLET ORAL at 08:19

## 2018-06-14 RX ADMIN — PANTOPRAZOLE SODIUM 40 MG: 40 TABLET, DELAYED RELEASE ORAL at 18:31

## 2018-06-14 RX ADMIN — CEFEPIME HYDROCHLORIDE 2 G: 2 INJECTION, POWDER, FOR SOLUTION INTRAVENOUS at 09:19

## 2018-06-14 RX ADMIN — SODIUM CHLORIDE: 9 INJECTION, SOLUTION INTRAVENOUS at 09:20

## 2018-06-14 RX ADMIN — PREGABALIN 150 MG: 75 CAPSULE ORAL at 21:52

## 2018-06-14 RX ADMIN — CEFEPIME HYDROCHLORIDE 2 G: 2 INJECTION, POWDER, FOR SOLUTION INTRAVENOUS at 21:52

## 2018-06-14 RX ADMIN — ATORVASTATIN CALCIUM 20 MG: 20 TABLET, FILM COATED ORAL at 21:51

## 2018-06-14 RX ADMIN — INSULIN LISPRO 1 UNITS: 100 INJECTION, SOLUTION INTRAVENOUS; SUBCUTANEOUS at 18:31

## 2018-06-14 RX ADMIN — PREGABALIN 150 MG: 75 CAPSULE ORAL at 11:05

## 2018-06-14 RX ADMIN — BUPROPION HYDROCHLORIDE 150 MG: 150 TABLET, EXTENDED RELEASE ORAL at 11:05

## 2018-06-14 RX ADMIN — ALPRAZOLAM 0.5 MG: 0.5 TABLET ORAL at 21:53

## 2018-06-14 ASSESSMENT — PAIN SCALES - GENERAL
PAINLEVEL_OUTOF10: 4
PAINLEVEL_OUTOF10: 8
PAINLEVEL_OUTOF10: 0
PAINLEVEL_OUTOF10: 4
PAINLEVEL_OUTOF10: 3

## 2018-06-14 ASSESSMENT — PAIN DESCRIPTION - LOCATION: LOCATION: BACK

## 2018-06-14 ASSESSMENT — PAIN DESCRIPTION - PAIN TYPE: TYPE: CHRONIC PAIN

## 2018-06-15 VITALS
HEIGHT: 63 IN | HEART RATE: 87 BPM | RESPIRATION RATE: 18 BRPM | BODY MASS INDEX: 38.82 KG/M2 | TEMPERATURE: 98.3 F | DIASTOLIC BLOOD PRESSURE: 71 MMHG | SYSTOLIC BLOOD PRESSURE: 153 MMHG | OXYGEN SATURATION: 95 % | WEIGHT: 219.1 LBS

## 2018-06-15 LAB
GLUCOSE BLD-MCNC: 153 MG/DL (ref 70–108)
GLUCOSE BLD-MCNC: 155 MG/DL (ref 70–108)

## 2018-06-15 PROCEDURE — 97127 HC SP THER IVNTJ W/FOCUS COG FUNCJ: CPT

## 2018-06-15 PROCEDURE — 99239 HOSP IP/OBS DSCHRG MGMT >30: CPT | Performed by: INTERNAL MEDICINE

## 2018-06-15 PROCEDURE — 2580000003 HC RX 258: Performed by: FAMILY MEDICINE

## 2018-06-15 PROCEDURE — 97530 THERAPEUTIC ACTIVITIES: CPT

## 2018-06-15 PROCEDURE — 6370000000 HC RX 637 (ALT 250 FOR IP): Performed by: INTERNAL MEDICINE

## 2018-06-15 PROCEDURE — 97535 SELF CARE MNGMENT TRAINING: CPT

## 2018-06-15 PROCEDURE — 99221 1ST HOSP IP/OBS SF/LOW 40: CPT | Performed by: NURSE PRACTITIONER

## 2018-06-15 PROCEDURE — 97166 OT EVAL MOD COMPLEX 45 MIN: CPT

## 2018-06-15 PROCEDURE — G8988 SELF CARE GOAL STATUS: HCPCS

## 2018-06-15 PROCEDURE — 6360000002 HC RX W HCPCS: Performed by: FAMILY MEDICINE

## 2018-06-15 PROCEDURE — 97116 GAIT TRAINING THERAPY: CPT

## 2018-06-15 PROCEDURE — 97110 THERAPEUTIC EXERCISES: CPT

## 2018-06-15 PROCEDURE — 82948 REAGENT STRIP/BLOOD GLUCOSE: CPT

## 2018-06-15 PROCEDURE — 2580000003 HC RX 258: Performed by: INTERNAL MEDICINE

## 2018-06-15 PROCEDURE — 6370000000 HC RX 637 (ALT 250 FOR IP): Performed by: HOSPITALIST

## 2018-06-15 PROCEDURE — 6360000002 HC RX W HCPCS: Performed by: INTERNAL MEDICINE

## 2018-06-15 PROCEDURE — G8987 SELF CARE CURRENT STATUS: HCPCS

## 2018-06-15 RX ORDER — QUETIAPINE FUMARATE 25 MG/1
12.5 TABLET, FILM COATED ORAL 2 TIMES DAILY
Qty: 60 TABLET | Refills: 3 | Status: SHIPPED | OUTPATIENT
Start: 2018-06-15 | End: 2018-07-24

## 2018-06-15 RX ORDER — QUETIAPINE FUMARATE 25 MG/1
12.5 TABLET, FILM COATED ORAL 2 TIMES DAILY
Status: DISCONTINUED | OUTPATIENT
Start: 2018-06-15 | End: 2018-06-15 | Stop reason: HOSPADM

## 2018-06-15 RX ORDER — ESCITALOPRAM OXALATE 10 MG/1
10 TABLET ORAL DAILY
Qty: 30 TABLET | Refills: 3 | Status: SHIPPED | OUTPATIENT
Start: 2018-06-15 | End: 2021-08-26

## 2018-06-15 RX ORDER — ESCITALOPRAM OXALATE 10 MG/1
10 TABLET ORAL DAILY
Status: DISCONTINUED | OUTPATIENT
Start: 2018-06-15 | End: 2018-06-15 | Stop reason: HOSPADM

## 2018-06-15 RX ORDER — HEPARIN SODIUM (PORCINE) LOCK FLUSH IV SOLN 100 UNIT/ML 100 UNIT/ML
500 SOLUTION INTRAVENOUS PRN
Status: DISCONTINUED | OUTPATIENT
Start: 2018-06-15 | End: 2018-06-15 | Stop reason: HOSPADM

## 2018-06-15 RX ADMIN — LEVOTHYROXINE SODIUM 75 MCG: 75 TABLET ORAL at 06:20

## 2018-06-15 RX ADMIN — SODIUM CHLORIDE, PRESERVATIVE FREE 500 UNITS: 5 INJECTION INTRAVENOUS at 14:53

## 2018-06-15 RX ADMIN — SODIUM CHLORIDE: 9 INJECTION, SOLUTION INTRAVENOUS at 12:01

## 2018-06-15 RX ADMIN — PREGABALIN 150 MG: 75 CAPSULE ORAL at 12:01

## 2018-06-15 RX ADMIN — PANTOPRAZOLE SODIUM 40 MG: 40 TABLET, DELAYED RELEASE ORAL at 06:20

## 2018-06-15 RX ADMIN — CEFEPIME HYDROCHLORIDE 2 G: 2 INJECTION, POWDER, FOR SOLUTION INTRAVENOUS at 12:01

## 2018-06-15 RX ADMIN — SODIUM CHLORIDE: 9 INJECTION, SOLUTION INTRAVENOUS at 00:24

## 2018-06-15 RX ADMIN — Medication 10 ML: at 14:53

## 2018-06-15 ASSESSMENT — PAIN SCALES - GENERAL: PAINLEVEL_OUTOF10: 0

## 2018-06-18 LAB
BLOOD CULTURE, ROUTINE: NORMAL
BLOOD CULTURE, ROUTINE: NORMAL

## 2018-06-19 ENCOUNTER — HOSPITAL ENCOUNTER (OUTPATIENT)
Dept: NURSING | Age: 73
Discharge: HOME | End: 2018-06-19
Payer: MEDICARE

## 2018-06-19 LAB
BLOOD CULTURE, ROUTINE: NORMAL
BLOOD CULTURE, ROUTINE: NORMAL

## 2018-06-22 ENCOUNTER — PROCEDURE VISIT (OUTPATIENT)
Dept: CARDIOLOGY CLINIC | Age: 73
End: 2018-06-22
Payer: MEDICARE

## 2018-06-22 DIAGNOSIS — Z45.09 ENCOUNTER FOR ELECTRONIC ANALYSIS OF REVEAL EVENT RECORDER: Primary | ICD-10-CM

## 2018-06-22 PROCEDURE — 93298 REM INTERROG DEV EVAL SCRMS: CPT | Performed by: NUCLEAR MEDICINE

## 2018-06-22 PROCEDURE — 93299 PR REM INTERROG ICPMS/SCRMS <30 D TECH REVIEW: CPT | Performed by: NUCLEAR MEDICINE

## 2018-06-26 ENCOUNTER — HOSPITAL ENCOUNTER (OUTPATIENT)
Dept: NURSING | Age: 73
Discharge: HOME | End: 2018-06-26
Payer: COMMERCIAL

## 2018-06-26 VITALS
SYSTOLIC BLOOD PRESSURE: 126 MMHG | BODY MASS INDEX: 38.97 KG/M2 | TEMPERATURE: 98.5 F | DIASTOLIC BLOOD PRESSURE: 62 MMHG | OXYGEN SATURATION: 99 % | RESPIRATION RATE: 16 BRPM | WEIGHT: 220 LBS | HEART RATE: 69 BPM

## 2018-06-26 DIAGNOSIS — D80.9 SELECTIVE IMMUNOGLOBULIN DEFICIENCY (HCC): ICD-10-CM

## 2018-06-26 PROCEDURE — 6360000002 HC RX W HCPCS: Performed by: INTERNAL MEDICINE

## 2018-06-26 PROCEDURE — 6370000000 HC RX 637 (ALT 250 FOR IP)

## 2018-06-26 PROCEDURE — 96413 CHEMO IV INFUSION 1 HR: CPT

## 2018-06-26 PROCEDURE — 96415 CHEMO IV INFUSION ADDL HR: CPT

## 2018-06-26 PROCEDURE — 2580000003 HC RX 258: Performed by: INTERNAL MEDICINE

## 2018-06-26 RX ORDER — 0.9 % SODIUM CHLORIDE 0.9 %
10 VIAL (ML) INJECTION ONCE
Status: CANCELLED | OUTPATIENT
Start: 2018-06-26 | End: 2018-06-26

## 2018-06-26 RX ORDER — EPINEPHRINE 1 MG/ML
0.3 INJECTION, SOLUTION, CONCENTRATE INTRAVENOUS PRN
Status: CANCELLED | OUTPATIENT
Start: 2018-06-26

## 2018-06-26 RX ORDER — SODIUM CHLORIDE 0.9 % (FLUSH) 0.9 %
10 SYRINGE (ML) INJECTION PRN
Status: CANCELLED | OUTPATIENT
Start: 2018-06-26

## 2018-06-26 RX ORDER — SODIUM CHLORIDE 9 MG/ML
INJECTION, SOLUTION INTRAVENOUS CONTINUOUS
Status: CANCELLED | OUTPATIENT
Start: 2018-06-26

## 2018-06-26 RX ORDER — DIPHENHYDRAMINE HCL 25 MG
25 TABLET ORAL ONCE
Status: CANCELLED | OUTPATIENT
Start: 2018-06-26 | End: 2018-06-26

## 2018-06-26 RX ORDER — DIPHENHYDRAMINE HCL 25 MG
TABLET ORAL
Status: COMPLETED
Start: 2018-06-26 | End: 2018-06-26

## 2018-06-26 RX ORDER — SODIUM CHLORIDE 0.9 % (FLUSH) 0.9 %
10 SYRINGE (ML) INJECTION PRN
Status: DISCONTINUED | OUTPATIENT
Start: 2018-06-26 | End: 2018-06-27 | Stop reason: HOSPADM

## 2018-06-26 RX ORDER — ACETAMINOPHEN 325 MG/1
650 TABLET ORAL ONCE
Status: CANCELLED | OUTPATIENT
Start: 2018-06-26 | End: 2018-06-26

## 2018-06-26 RX ORDER — METHYLPREDNISOLONE SODIUM SUCCINATE 125 MG/2ML
125 INJECTION, POWDER, LYOPHILIZED, FOR SOLUTION INTRAMUSCULAR; INTRAVENOUS ONCE
Status: CANCELLED | OUTPATIENT
Start: 2018-06-26 | End: 2018-06-26

## 2018-06-26 RX ORDER — ACETAMINOPHEN 325 MG/1
TABLET ORAL
Status: COMPLETED
Start: 2018-06-26 | End: 2018-06-26

## 2018-06-26 RX ORDER — DIPHENHYDRAMINE HCL 25 MG
25 TABLET ORAL ONCE
Status: COMPLETED | OUTPATIENT
Start: 2018-06-26 | End: 2018-06-26

## 2018-06-26 RX ORDER — ACETAMINOPHEN 325 MG/1
650 TABLET ORAL ONCE
Status: COMPLETED | OUTPATIENT
Start: 2018-06-26 | End: 2018-06-26

## 2018-06-26 RX ORDER — DIPHENHYDRAMINE HYDROCHLORIDE 50 MG/ML
50 INJECTION INTRAMUSCULAR; INTRAVENOUS ONCE
Status: CANCELLED | OUTPATIENT
Start: 2018-06-26 | End: 2018-06-26

## 2018-06-26 RX ADMIN — Medication 10 ML: at 12:21

## 2018-06-26 RX ADMIN — ACETAMINOPHEN 650 MG: 325 TABLET ORAL at 09:55

## 2018-06-26 RX ADMIN — SODIUM CHLORIDE, PRESERVATIVE FREE 500 UNITS: 5 INJECTION INTRAVENOUS at 12:21

## 2018-06-26 RX ADMIN — Medication 25 MG: at 09:55

## 2018-06-26 RX ADMIN — IMMUNE GLOBULIN (HUMAN) 20 G: 10 INJECTION INTRAVENOUS; SUBCUTANEOUS at 10:30

## 2018-06-26 RX ADMIN — Medication 10 ML: at 10:02

## 2018-06-26 RX ADMIN — IMMUNE GLOBULIN (HUMAN) 20 G: 10 INJECTION INTRAVENOUS; SUBCUTANEOUS at 11:42

## 2018-06-26 RX ADMIN — DIPHENHYDRAMINE HCL 25 MG: 25 TABLET ORAL at 09:55

## 2018-06-26 NOTE — PROGRESS NOTES
0940 Pt arrives to [facility] for IVIG infusion. All questions and concerns addressed. Pt is currently a resident at [facility], ambulated in without assistance  0941PATIENT RIGHTS AND RESPONSIBILITIES SHEET OFFERED TO PT TO READ.  1000 pre meds given, side rail up x1, call light in reach. Refreshments offered  1030 IVIG initiated. 1035 lunch given, side rail up x1, call light in reach  1045 __m__ Safety:       (Environmental)  [place] to environment   Ensure ID band is correct and in place/ allergy band as needed   Assess for fall risk   Initiate fall precautions as applicable (fall band, side rails, etc.)   Call light within reach   Bed in low position/ wheels locked    _m___ Pain:        Assess pain level and characteristics   Administer analgesics as ordered   Assess effectiveness of pain management and report to MD as needed    _m___ Knowledge Deficit:   Assess baseline knowledge   Provide teaching at level of understanding   Provide teaching via preferred learning method   Evaluate teaching effectiveness    __m__ Hemodynamic/Respiratory Status:       (Pre and Post Procedure Monitoring)   Assess/Monitor vital signs and LOC   Assess Baseline SpO2 prior to any sedation   Obtain weight/height   Assess vital signs/ LOC until patient meets discharge criteria   Monitor procedure site and notify MD of any issues    __m__ Infection-Risk of Central Venous Catheter:   Monitor for infection signs and symptoms (catheter site redness, temperature elevation, etc)   Assess for infection risks   Educate regarding infection prevention   Manage central venous catheter (flushes/ dressing changes per protocol)    1115 denies needs, call light in reach.      1145 resting in bed, denies needs    1200 [address] PT-VERBALIZES UNDERSTANDING    1209 attempted to call report to [facility], they will call back  1230 PT DISCHARGED AMBULATORY IN SATISFACTORY CONDITION

## 2018-06-29 ENCOUNTER — CLINICAL DOCUMENTATION (OUTPATIENT)
Dept: FAMILY MEDICINE CLINIC | Age: 73
End: 2018-06-29

## 2018-06-29 VITALS
BODY MASS INDEX: 38.8 KG/M2 | TEMPERATURE: 97.2 F | DIASTOLIC BLOOD PRESSURE: 73 MMHG | HEIGHT: 63 IN | HEART RATE: 56 BPM | SYSTOLIC BLOOD PRESSURE: 136 MMHG | WEIGHT: 219 LBS | RESPIRATION RATE: 18 BRPM

## 2018-06-29 DIAGNOSIS — G47.33 OSA ON CPAP: ICD-10-CM

## 2018-06-29 DIAGNOSIS — R09.02 HYPOXEMIA: ICD-10-CM

## 2018-06-29 DIAGNOSIS — E78.5 DYSLIPIDEMIA: ICD-10-CM

## 2018-06-29 DIAGNOSIS — N17.9 AKI (ACUTE KIDNEY INJURY) (HCC): ICD-10-CM

## 2018-06-29 DIAGNOSIS — I48.0 PAROXYSMAL ATRIAL FIBRILLATION (HCC): ICD-10-CM

## 2018-06-29 DIAGNOSIS — R45.1 AGITATION: ICD-10-CM

## 2018-06-29 DIAGNOSIS — K21.9 GASTROESOPHAGEAL REFLUX DISEASE, ESOPHAGITIS PRESENCE NOT SPECIFIED: ICD-10-CM

## 2018-06-29 DIAGNOSIS — Z87.19 HISTORY OF LOWER GI BLEEDING: ICD-10-CM

## 2018-06-29 DIAGNOSIS — I25.10 ASCVD (ARTERIOSCLEROTIC CARDIOVASCULAR DISEASE): ICD-10-CM

## 2018-06-29 DIAGNOSIS — I50.30 HEART FAILURE WITH PRESERVED EJECTION FRACTION (HCC): ICD-10-CM

## 2018-06-29 DIAGNOSIS — Z86.711 HISTORY OF PULMONARY EMBOLISM: ICD-10-CM

## 2018-06-29 DIAGNOSIS — R41.82 ALTERED MENTAL STATUS, UNSPECIFIED ALTERED MENTAL STATUS TYPE: Primary | ICD-10-CM

## 2018-06-29 DIAGNOSIS — N18.30 CKD (CHRONIC KIDNEY DISEASE) STAGE 3, GFR 30-59 ML/MIN (HCC): ICD-10-CM

## 2018-06-29 DIAGNOSIS — D80.9 SELECTIVE IMMUNOGLOBULIN DEFICIENCY (HCC): ICD-10-CM

## 2018-06-29 DIAGNOSIS — Z86.79 HISTORY OF HYPERTENSION: ICD-10-CM

## 2018-06-29 DIAGNOSIS — F39 MOOD DISORDER (HCC): ICD-10-CM

## 2018-06-29 DIAGNOSIS — E11.9 DIABETES MELLITUS TYPE 2, DIET-CONTROLLED (HCC): ICD-10-CM

## 2018-06-29 DIAGNOSIS — F41.1 GAD (GENERALIZED ANXIETY DISORDER): ICD-10-CM

## 2018-06-29 DIAGNOSIS — R53.81 PHYSICAL DECONDITIONING: ICD-10-CM

## 2018-06-29 DIAGNOSIS — E11.42 DIABETIC PERIPHERAL NEUROPATHY ASSOCIATED WITH TYPE 2 DIABETES MELLITUS (HCC): ICD-10-CM

## 2018-06-29 DIAGNOSIS — Z99.89 OSA ON CPAP: ICD-10-CM

## 2018-06-29 DIAGNOSIS — E03.9 ACQUIRED HYPOTHYROIDISM: ICD-10-CM

## 2018-07-17 ENCOUNTER — OFFICE VISIT (OUTPATIENT)
Dept: CARDIOLOGY CLINIC | Age: 73
End: 2018-07-17
Payer: MEDICARE

## 2018-07-17 VITALS
HEIGHT: 66 IN | SYSTOLIC BLOOD PRESSURE: 122 MMHG | DIASTOLIC BLOOD PRESSURE: 64 MMHG | WEIGHT: 220.8 LBS | HEART RATE: 80 BPM | BODY MASS INDEX: 35.48 KG/M2

## 2018-07-17 DIAGNOSIS — I42.0 DILATED CARDIOMYOPATHY (HCC): ICD-10-CM

## 2018-07-17 DIAGNOSIS — E78.01 FAMILIAL HYPERCHOLESTEROLEMIA: ICD-10-CM

## 2018-07-17 DIAGNOSIS — I10 ESSENTIAL HYPERTENSION: Primary | ICD-10-CM

## 2018-07-17 PROCEDURE — 1090F PRES/ABSN URINE INCON ASSESS: CPT | Performed by: NUCLEAR MEDICINE

## 2018-07-17 PROCEDURE — 3017F COLORECTAL CA SCREEN DOC REV: CPT | Performed by: NUCLEAR MEDICINE

## 2018-07-17 PROCEDURE — G8598 ASA/ANTIPLAT THER USED: HCPCS | Performed by: NUCLEAR MEDICINE

## 2018-07-17 PROCEDURE — G8427 DOCREV CUR MEDS BY ELIG CLIN: HCPCS | Performed by: NUCLEAR MEDICINE

## 2018-07-17 PROCEDURE — 99213 OFFICE O/P EST LOW 20 MIN: CPT | Performed by: NUCLEAR MEDICINE

## 2018-07-17 PROCEDURE — 1123F ACP DISCUSS/DSCN MKR DOCD: CPT | Performed by: NUCLEAR MEDICINE

## 2018-07-17 PROCEDURE — G8417 CALC BMI ABV UP PARAM F/U: HCPCS | Performed by: NUCLEAR MEDICINE

## 2018-07-17 PROCEDURE — 4040F PNEUMOC VAC/ADMIN/RCVD: CPT | Performed by: NUCLEAR MEDICINE

## 2018-07-17 PROCEDURE — 1036F TOBACCO NON-USER: CPT | Performed by: NUCLEAR MEDICINE

## 2018-07-17 PROCEDURE — 1101F PT FALLS ASSESS-DOCD LE1/YR: CPT | Performed by: NUCLEAR MEDICINE

## 2018-07-17 PROCEDURE — G8400 PT W/DXA NO RESULTS DOC: HCPCS | Performed by: NUCLEAR MEDICINE

## 2018-07-17 NOTE — PROGRESS NOTES
[facility]  [facility]  [address] [address]  Dept: [phone]  Dept Fax: [phone]  Loc: [phone]    Visit Date: 7/17/2018    Abelardo Miranda is a 75 y.o. female who presents today for:  Chief Complaint   Patient presents with    Check-Up    Hyperlipidemia    Cardiomyopathy    Hypertension     A fib is fair  Improved CMP  Had issues with beta blockers  Does have pulmonary issues   Chronic dyspnea  On home O2  Has a linq  Following for possible pacer  No indication so far  Chronic dyspnea      HPI:  HPI  Past Medical History:   Diagnosis Date    Anemia     Atrial fibrillation ([address] 11/9/2017    CAD (coronary artery disease)     CHF (congestive heart failure) (HCC)     Chronic kidney disease     stage 3 kidney     DDD (degenerative disc disease), lumbar     Depression     Frequent PVCs 12/13/2017    GERD (gastroesophageal reflux disease)     History of blood transfusion     Hx of blood clots 09/2017    pulmonary emboli    Hyperlipidemia     Hypertension     Hyperthyroidism     Movement disorder     DDD    Neuropathy ([address]     Obesity     Pneumonia 2016    sepsis    Sleep apnea     usually wears cpap at night    Status post right and left heart catheterization     Type II or unspecified type diabetes mellitus without mention of complication, not stated as uncontrolled       Past Surgical History:   Procedure Laterality Date    ABDOMEN SURGERY      ACHILLES TENDON SURGERY Bilateral     BLADDER SUSPENSION      [facility]  [facility]    [facility]    COLONOSCOPY Left 5/11/2018    COLONOSCOPY POLYPECTOMY SNARE/COLD BIOPSY performed by Randell Subramanian MD at [facility]    ENDOSCOPY, COLON, DIAGNOSTIC      GASTRIC FUNDOPLICATION      HAMMER TOE SURGERY Right     HERNIA REPAIR      HIATAL HERNIA REPAIR  09/06/2016    Laparoscopic Robotic with Nissen Fundoplication - Dr. Silverio Mooney 4 hours as needed for Pain 120 tablet 3    bumetanide (BUMEX) 1 MG tablet Take 1 tablet by mouth daily 60 tablet 0    potassium chloride SA (K-DUR;KLOR-CON M) 20 MEQ tablet Take 0.5 tablets by mouth 2 times daily (with meals) 60 tablet 0    ferrous sulfate 325 (65 FE) MG tablet Take 325 mg by mouth 3 times daily (with meals)      levothyroxine (SYNTHROID) 75 MCG tablet Take 75 mcg by mouth Daily      pregabalin (LYRICA) 150 MG capsule Take 150 mg by mouth 3 times daily       No current facility-administered medications for this visit.       Allergies   Allergen Reactions    Bactrim [Sulfamethoxazole-Trimethoprim]      TOLD BY  NEVER TO TAKE AGAIN    Wellbutrin [Bupropion] Other (See Comments)     hallucinations    Silicone Rash     Health Maintenance   Topic Date Due    Diabetic foot exam  06/23/1955    Diabetic retinal exam  06/23/1955    Breast cancer screen  06/23/1994    DEXA (modify frequency per FRAX score)  06/23/2009    Lipid screen  03/23/2017    Low dose CT lung screening  06/07/2017    Flu vaccine (1) 09/01/2018    Diabetic microalbuminuria test  05/01/2019    A1C test (Diabetic or Prediabetic)  06/13/2019    Potassium monitoring  06/13/2019    Creatinine monitoring  06/13/2019    DTaP/Tdap/Td vaccine (2 - Td) 03/31/2026    Colon cancer screen colonoscopy  05/11/2028    Pneumococcal high/highest risk  Completed       Subjective:  Review of Systems  General:   No fever, no chills, some fatigue or weight loss  Pulmonary:    some dyspnea, no wheezing  Cardiac:    Denies recent chest pain,   GI:     No nausea or vomiting, no abdominal pain  Neuro:    No dizziness or light headedness,   Musculoskeletal:  No recent active issues  Extremities:   No edema, good peripheral pulses      Objective:  Physical Exam  BP (!) 160/62   Pulse 80   Ht 5' 6\" (1.676 m)   Wt 220 lb 12.8 oz (100.2 kg)   BMI 35.64 kg/m²   General:   Well developed, well nourished  Lungs:   Clear to auscultation  Heart: Normal S1 S2, Slight murmur. no rubs, no gallops  Abdomen:   Soft, non tender, no organomegalies, positive bowel sounds  Extremities:   No edema, no cyanosis, good peripheral pulses  Neurological:   Awake, alert, oriented. No obvious focal deficits  Musculoskelatal:  No obvious deformities    Assessment:      Diagnosis Orders   1. Essential hypertension     2. Familial hypercholesterolemia     3. Dilated cardiomyopathy ([address]     cardiac fair for now   Needs Bp monitored for now    Plan:  No Follow-up on file. As above  Continue risk factor modification and medical management  Thank you for allowing me to participate in the care of your patient. Please don't hesitate to contact me regarding any further issues related to the patient care    Orders Placed:  No orders of the defined types were placed in this encounter. Medications Prescribed:  No orders of the defined types were placed in this encounter. Discussed use, benefit, and side effects of prescribed medications. All patient questions answered. Pt voiced understanding. Instructed to continue current medications, diet and exercise. Continue risk factor modification and medical management. Patient agreed with treatment plan. Follow up as directed.     Electronically signed by Dominik Wan MD on 7/17/2018 at 1:13 PM

## 2018-07-23 ENCOUNTER — PROCEDURE VISIT (OUTPATIENT)
Dept: CARDIOLOGY CLINIC | Age: 73
End: 2018-07-23
Payer: MEDICARE

## 2018-07-23 DIAGNOSIS — Z45.09 ENCOUNTER FOR ELECTRONIC ANALYSIS OF REVEAL EVENT RECORDER: Primary | ICD-10-CM

## 2018-07-23 PROCEDURE — 93298 REM INTERROG DEV EVAL SCRMS: CPT | Performed by: NUCLEAR MEDICINE

## 2018-07-24 ENCOUNTER — TELEPHONE (OUTPATIENT)
Dept: CARDIOLOGY CLINIC | Age: 73
End: 2018-07-24

## 2018-07-24 ENCOUNTER — HOSPITAL ENCOUNTER (OUTPATIENT)
Dept: NURSING | Age: 73
Discharge: HOME | End: 2018-07-24
Payer: MEDICARE

## 2018-07-24 VITALS
RESPIRATION RATE: 16 BRPM | BODY MASS INDEX: 36.7 KG/M2 | DIASTOLIC BLOOD PRESSURE: 79 MMHG | WEIGHT: 227.4 LBS | TEMPERATURE: 97.5 F | HEART RATE: 54 BPM | OXYGEN SATURATION: 99 % | SYSTOLIC BLOOD PRESSURE: 135 MMHG

## 2018-07-24 DIAGNOSIS — D80.9 SELECTIVE IMMUNOGLOBULIN DEFICIENCY (HCC): ICD-10-CM

## 2018-07-24 PROCEDURE — 96365 THER/PROPH/DIAG IV INF INIT: CPT

## 2018-07-24 PROCEDURE — 96366 THER/PROPH/DIAG IV INF ADDON: CPT

## 2018-07-24 PROCEDURE — 6360000002 HC RX W HCPCS: Performed by: INTERNAL MEDICINE

## 2018-07-24 PROCEDURE — 2709999900 HC NON-CHARGEABLE SUPPLY

## 2018-07-24 PROCEDURE — 6370000000 HC RX 637 (ALT 250 FOR IP): Performed by: INTERNAL MEDICINE

## 2018-07-24 PROCEDURE — 2580000003 HC RX 258: Performed by: INTERNAL MEDICINE

## 2018-07-24 PROCEDURE — 96415 CHEMO IV INFUSION ADDL HR: CPT

## 2018-07-24 PROCEDURE — 96417 CHEMO IV INFUS EACH ADDL SEQ: CPT

## 2018-07-24 RX ORDER — 0.9 % SODIUM CHLORIDE 0.9 %
10 VIAL (ML) INJECTION ONCE
Status: CANCELLED | OUTPATIENT
Start: 2018-07-24 | End: 2018-07-24

## 2018-07-24 RX ORDER — DIPHENHYDRAMINE HCL 25 MG
25 TABLET ORAL ONCE
Status: COMPLETED | OUTPATIENT
Start: 2018-07-24 | End: 2018-07-24

## 2018-07-24 RX ORDER — DIPHENHYDRAMINE HCL 25 MG
25 TABLET ORAL ONCE
Status: CANCELLED | OUTPATIENT
Start: 2018-07-24 | End: 2018-07-24

## 2018-07-24 RX ORDER — SODIUM CHLORIDE 9 MG/ML
INJECTION, SOLUTION INTRAVENOUS CONTINUOUS
Status: CANCELLED | OUTPATIENT
Start: 2018-07-24

## 2018-07-24 RX ORDER — ACETAMINOPHEN 325 MG/1
650 TABLET ORAL ONCE
Status: CANCELLED | OUTPATIENT
Start: 2018-07-24 | End: 2018-07-24

## 2018-07-24 RX ORDER — SODIUM CHLORIDE 0.9 % (FLUSH) 0.9 %
10 SYRINGE (ML) INJECTION PRN
Status: CANCELLED | OUTPATIENT
Start: 2018-07-24

## 2018-07-24 RX ORDER — SODIUM CHLORIDE 0.9 % (FLUSH) 0.9 %
10 SYRINGE (ML) INJECTION PRN
Status: DISCONTINUED | OUTPATIENT
Start: 2018-07-24 | End: 2018-07-25 | Stop reason: HOSPADM

## 2018-07-24 RX ORDER — METHYLPREDNISOLONE SODIUM SUCCINATE 125 MG/2ML
125 INJECTION, POWDER, LYOPHILIZED, FOR SOLUTION INTRAMUSCULAR; INTRAVENOUS ONCE
Status: CANCELLED | OUTPATIENT
Start: 2018-07-24 | End: 2018-07-24

## 2018-07-24 RX ORDER — FUROSEMIDE 20 MG/1
20 TABLET ORAL 2 TIMES DAILY
COMMUNITY
End: 2018-07-25 | Stop reason: ALTCHOICE

## 2018-07-24 RX ORDER — DIPHENHYDRAMINE HYDROCHLORIDE 50 MG/ML
50 INJECTION INTRAMUSCULAR; INTRAVENOUS ONCE
Status: CANCELLED | OUTPATIENT
Start: 2018-07-24 | End: 2018-07-24

## 2018-07-24 RX ORDER — HEPARIN SODIUM (PORCINE) LOCK FLUSH IV SOLN 100 UNIT/ML 100 UNIT/ML
500 SOLUTION INTRAVENOUS ONCE
Status: COMPLETED | OUTPATIENT
Start: 2018-07-24 | End: 2018-07-24

## 2018-07-24 RX ORDER — EPINEPHRINE 1 MG/ML
0.3 INJECTION, SOLUTION, CONCENTRATE INTRAVENOUS PRN
Status: CANCELLED | OUTPATIENT
Start: 2018-07-24

## 2018-07-24 RX ORDER — ACETAMINOPHEN 325 MG/1
650 TABLET ORAL ONCE
Status: COMPLETED | OUTPATIENT
Start: 2018-07-24 | End: 2018-07-24

## 2018-07-24 RX ADMIN — IMMUNE GLOBULIN (HUMAN) 20 G: 10 INJECTION INTRAVENOUS; SUBCUTANEOUS at 10:45

## 2018-07-24 RX ADMIN — DIPHENHYDRAMINE HCL 25 MG: 25 TABLET ORAL at 10:21

## 2018-07-24 RX ADMIN — ACETAMINOPHEN 650 MG: 325 TABLET ORAL at 10:22

## 2018-07-24 RX ADMIN — IMMUNE GLOBULIN (HUMAN) 20 G: 10 INJECTION INTRAVENOUS; SUBCUTANEOUS at 11:59

## 2018-07-24 RX ADMIN — Medication 10 ML: at 12:39

## 2018-07-24 RX ADMIN — Medication 10 ML: at 10:19

## 2018-07-24 RX ADMIN — HEPARIN 500 UNITS: 100 SYRINGE at 12:39

## 2018-07-24 ASSESSMENT — PAIN - FUNCTIONAL ASSESSMENT: PAIN_FUNCTIONAL_ASSESSMENT: 0-10

## 2018-07-24 ASSESSMENT — PAIN SCALES - GENERAL: PAINLEVEL_OUTOF10: 0

## 2018-07-24 NOTE — PROGRESS NOTES
1005 Patient arrived to [facility] via wheelchair for IVIG infusion  PT RIGHTS AND RESPONSIBILITIES OFFERED TO PT.  1045 IVIG started as ordered  1130 Patient denies any needs at this time  1230 Patient resting quietly at this time. 1238 IVIG completed patient tolerated well.    1241 Discharge instructions given to richard verbalized understanding  1315 Patient discharged to nursing home via wheelchair per [facility]    __m__ Safety:       (Environmental)  Oziel Lemus to environment   Ensure ID band is correct and in place/ allergy band as needed   Assess for fall risk   Initiate fall precautions as applicable (fall band, side rails, etc.)   Call light within reach   Bed in low position/ wheels locked    _m___ Pain:        Assess pain level and characteristics   Administer analgesics as ordered   Assess effectiveness of pain management and report to MD as needed    _m___ Knowledge Deficit:   Assess baseline knowledge   Provide teaching at level of understanding   Provide teaching via preferred learning method   Evaluate teaching effectiveness    ___m_ Hemodynamic/Respiratory Status:       (Pre and Post Procedure Monitoring)   Assess/Monitor vital signs and LOC   Assess Baseline SpO2 prior to any sedation   Obtain weight/height   Assess vital signs/ LOC until patient meets discharge criteria   Monitor procedure site and notify MD of any issues    __m__ Infection-Risk of Central Venous Catheter:   Monitor for infection signs and symptoms (catheter site redness, temperature elevation, etc)   Assess for infection risks   Educate regarding infection prevention   Manage central venous catheter (flushes/ dressing changes per protocol)

## 2018-07-25 NOTE — TELEPHONE ENCOUNTER
Sebastian Tyler from [facility] called and stated patient doesn't take Lasix she is on Bumex 1mg daily. Verbal order from Dr. Rose Rothman- Bandar Murdock for 2 days. Sebastian Tyler from [facility] notified. Med list updated. Please agree Dr. Rose Rothman.

## 2018-07-31 NOTE — PROGRESS NOTES
[facility] Progress Note    NAME: Amanda Prince  DATE: 18  ROOM #: [id number]  : 1945  REASON FOR VISIT: Regular Visit  CODE STATUS: FULL CODE    History obtained from chart review, the patient and nursing staff. SUBJECTIVE:  HPI: Amanda Prince is a 75 y.o. female. Pt seen and examined at bedside. LAST VISIT:   Patient admitted to [facility] from  to 6/15 for AMS and GABBY. D/c summary:  Hospital Course:   Stefany Hernandez a 67 y. o. female admitted to [facility] on 2018 for alteration in mental status after she was found down at home. Kalli Manzo was ruled out for CVA.  Per history, patient had recently been prescribed oxygen however oxygen had not been delivered to her home prior to this admission. Yusra Canales does use a CPAP at home.  This was disconnected, patient was hypoxic.  She did have garbled speech on admission.  During the course of her admission, patient was noted to be quite agitated, with intense paranoia.  She was seen by psychiatry who recommended resuming patient's Lexapro, and adding Seroquel.  Patient is being discharged to [facility] for further rehabilitation.     Since admission has done well. Agitation and paranoia have resolved. She is back to baseline mentation. Pt states she'd started on wellbutrin 1 to 2 wks prior to admission and she feels this is what caused her issues. He was on lexapro prior. She remembers bits and pieces of hospitalization. Today she is feeling great. No paranoia . No hallucinations. Asking if can stop Seroquel, doesn't like how it makes her feels. Less anxiety. Not feeling depressed. Denies SI/HI. Using alprazolam a little. Denies SI/HI. Eating and drinking well. Bowels moving fine.       UPDATE TODAY: Mental status con't to be clear. No agitation. No issues since stopping her seroquel. Labs improved, renal fxn normal. Eating well, drinking well. Good progress with PT.  Off O2      DM2: Januvia started last visit at pt request was LABA1C 6.0 07/18/2016       ASSESSMENT & PLAN  1. Altered mental status, unspecified altered mental status type     Resolved  Unclear etiology  Could have been hypoxia or idiosyncratic rxn to wellbutrin  Clear today and has been for a month now  Back to baseline  Doing fine w/o seroquel, con't off of it. con't lexapro and prn alprazolam, ok to inc to bid prn for now. Will monitor     Antipsychotic/Antianxiety/Hypnotic/Psychotropic/Sedation/Antidepressant medications are continued at this time because discontinuation may result in adverse effects or return of concerning behaviors/symptoms.     2. GABBY (acute kidney injury) ([facility]     Resolved  F/u labs 8/13 to monitor     3. Hypoxemia     Resolved  Off O2     4. Physical deconditioning     con't PT/OT  Good progress    5. Diabetes mellitus type 2, diet-controlled ([facility]     At goal  con't Januvia  lyrica for neuropathy as written     6. Diabetic peripheral neuropathy associated with type 2 diabetes mellitus ([facility]     As per # 5     7. Paroxysmal atrial fibrillation (HCC)     con't Xarelto  Monitor for recurrence  On nothing for rate control, but appears in sinus  F/u with cardio     8. Heart failure with preserved ejection fraction (HCC)     Stable  Daily wts   Low salt diet  bumex  Cardio f/u     9. ASCVD (arteriosclerotic cardiovascular disease)     On xarelto, not on asa, recent bleed. Keep cardio f/u  No BB or ACE d/t normal BP's and issues with low previous, per available records     10. History of hypertension     mointor BP's   Resume antihypertensives if needed     11. CKD (chronic kidney disease) stage 3, GFR 30-59 ml/min     Stable  Monitor  Labs ordered     12. Dyslipidemia     con't lipitor      13. Mood disorder ([facility]     As per # 1     14. MIKE (generalized anxiety disorder)     As per # 1     15. Agitation     As per # 1     16. History of pulmonary embolism     con't Xarelto indefinitely      17.  Acquired hypothyroidism     Doing well  con't synthroid     18. Selective immunoglobulin deficiency ([facility]     con't IVIG through Dr. Sebas Barnes     19. History of lower GI bleeding     No recurrence  Monitor  con't iron  F/u labs ordered  CBC imrpoved    20. Gastroesophageal reflux disease, esophagitis presence not specified     con't PPI, doing well     21. BRITTNY on CPAP     con't CPAP use    22. Normocytic anemia    As per # 19    23. Leukopenia, unspecified type    Mild  Comes and goes  Likely d/t # 18  con't IVIG    24.  Thrombocytopenia ([facility]    Very mild  Will monitor for now  If drops more, heme consult      Electronically signed by Sadia Bardales DO on 8/2/2018 at 11:12 AM

## 2018-08-02 ENCOUNTER — CLINICAL DOCUMENTATION (OUTPATIENT)
Dept: FAMILY MEDICINE CLINIC | Age: 73
End: 2018-08-02

## 2018-08-02 VITALS
HEIGHT: 63 IN | WEIGHT: 219.4 LBS | TEMPERATURE: 98.6 F | DIASTOLIC BLOOD PRESSURE: 75 MMHG | RESPIRATION RATE: 18 BRPM | SYSTOLIC BLOOD PRESSURE: 117 MMHG | HEART RATE: 79 BPM | BODY MASS INDEX: 38.88 KG/M2

## 2018-08-02 DIAGNOSIS — D72.819 LEUKOPENIA, UNSPECIFIED TYPE: ICD-10-CM

## 2018-08-02 DIAGNOSIS — R45.1 AGITATION: ICD-10-CM

## 2018-08-02 DIAGNOSIS — N18.30 CKD (CHRONIC KIDNEY DISEASE) STAGE 3, GFR 30-59 ML/MIN (HCC): ICD-10-CM

## 2018-08-02 DIAGNOSIS — E11.69 TYPE 2 DIABETES MELLITUS WITH OTHER SPECIFIED COMPLICATION, WITHOUT LONG-TERM CURRENT USE OF INSULIN (HCC): ICD-10-CM

## 2018-08-02 DIAGNOSIS — R41.82 ALTERED MENTAL STATUS, UNSPECIFIED ALTERED MENTAL STATUS TYPE: Primary | ICD-10-CM

## 2018-08-02 DIAGNOSIS — Z86.711 HISTORY OF PULMONARY EMBOLISM: ICD-10-CM

## 2018-08-02 DIAGNOSIS — F39 MOOD DISORDER (HCC): ICD-10-CM

## 2018-08-02 DIAGNOSIS — R53.81 PHYSICAL DECONDITIONING: ICD-10-CM

## 2018-08-02 DIAGNOSIS — R09.02 HYPOXEMIA: ICD-10-CM

## 2018-08-02 DIAGNOSIS — Z86.79 HISTORY OF HYPERTENSION: ICD-10-CM

## 2018-08-02 DIAGNOSIS — I50.30 HEART FAILURE WITH PRESERVED EJECTION FRACTION (HCC): ICD-10-CM

## 2018-08-02 DIAGNOSIS — G47.33 OSA ON CPAP: ICD-10-CM

## 2018-08-02 DIAGNOSIS — F41.1 GAD (GENERALIZED ANXIETY DISORDER): ICD-10-CM

## 2018-08-02 DIAGNOSIS — I25.10 ASCVD (ARTERIOSCLEROTIC CARDIOVASCULAR DISEASE): ICD-10-CM

## 2018-08-02 DIAGNOSIS — I48.0 PAROXYSMAL ATRIAL FIBRILLATION (HCC): ICD-10-CM

## 2018-08-02 DIAGNOSIS — D80.9 SELECTIVE IMMUNOGLOBULIN DEFICIENCY (HCC): ICD-10-CM

## 2018-08-02 DIAGNOSIS — D69.6 THROMBOCYTOPENIA (HCC): ICD-10-CM

## 2018-08-02 DIAGNOSIS — K21.9 GASTROESOPHAGEAL REFLUX DISEASE, ESOPHAGITIS PRESENCE NOT SPECIFIED: ICD-10-CM

## 2018-08-02 DIAGNOSIS — N17.9 AKI (ACUTE KIDNEY INJURY) (HCC): ICD-10-CM

## 2018-08-02 DIAGNOSIS — Z87.19 HISTORY OF LOWER GI BLEEDING: ICD-10-CM

## 2018-08-02 DIAGNOSIS — D64.9 NORMOCYTIC ANEMIA: ICD-10-CM

## 2018-08-02 DIAGNOSIS — E03.9 ACQUIRED HYPOTHYROIDISM: ICD-10-CM

## 2018-08-02 DIAGNOSIS — E78.5 DYSLIPIDEMIA: ICD-10-CM

## 2018-08-02 DIAGNOSIS — E11.42 DIABETIC PERIPHERAL NEUROPATHY ASSOCIATED WITH TYPE 2 DIABETES MELLITUS (HCC): ICD-10-CM

## 2018-08-02 DIAGNOSIS — Z99.89 OSA ON CPAP: ICD-10-CM

## 2018-08-21 ENCOUNTER — HOSPITAL ENCOUNTER (OUTPATIENT)
Dept: NURSING | Age: 73
Discharge: HOME | End: 2018-08-21
Payer: MEDICARE

## 2018-08-21 RX ORDER — HEPARIN SODIUM (PORCINE) LOCK FLUSH IV SOLN 100 UNIT/ML 100 UNIT/ML
500 SOLUTION INTRAVENOUS ONCE
Status: CANCELLED | OUTPATIENT
Start: 2018-08-21

## 2018-08-27 ENCOUNTER — HOSPITAL ENCOUNTER (OUTPATIENT)
Dept: NURSING | Age: 73
Discharge: HOME | End: 2018-08-27
Payer: MEDICARE

## 2018-08-27 VITALS — WEIGHT: 215 LBS | BODY MASS INDEX: 38.09 KG/M2

## 2018-08-27 DIAGNOSIS — D80.9 SELECTIVE IMMUNOGLOBULIN DEFICIENCY (HCC): ICD-10-CM

## 2018-08-27 RX ORDER — SODIUM CHLORIDE 0.9 % (FLUSH) 0.9 %
10 SYRINGE (ML) INJECTION PRN
Status: DISCONTINUED | OUTPATIENT
Start: 2018-08-27 | End: 2018-08-27

## 2018-08-27 RX ORDER — METHYLPREDNISOLONE SODIUM SUCCINATE 125 MG/2ML
125 INJECTION, POWDER, LYOPHILIZED, FOR SOLUTION INTRAMUSCULAR; INTRAVENOUS ONCE
Status: CANCELLED | OUTPATIENT
Start: 2018-08-27 | End: 2018-08-27

## 2018-08-27 RX ORDER — 0.9 % SODIUM CHLORIDE 0.9 %
10 VIAL (ML) INJECTION ONCE
Status: CANCELLED | OUTPATIENT
Start: 2018-08-27 | End: 2018-08-27

## 2018-08-27 RX ORDER — DIPHENHYDRAMINE HCL 25 MG
25 TABLET ORAL ONCE
Status: CANCELLED | OUTPATIENT
Start: 2018-08-27 | End: 2018-08-27

## 2018-08-27 RX ORDER — HEPARIN SODIUM (PORCINE) LOCK FLUSH IV SOLN 100 UNIT/ML 100 UNIT/ML
500 SOLUTION INTRAVENOUS ONCE
Status: DISCONTINUED | OUTPATIENT
Start: 2018-08-27 | End: 2018-08-27

## 2018-08-27 RX ORDER — ACETAMINOPHEN 325 MG/1
650 TABLET ORAL ONCE
Status: CANCELLED | OUTPATIENT
Start: 2018-08-27 | End: 2018-08-27

## 2018-08-27 RX ORDER — SODIUM CHLORIDE 0.9 % (FLUSH) 0.9 %
10 SYRINGE (ML) INJECTION PRN
Status: CANCELLED | OUTPATIENT
Start: 2018-08-27

## 2018-08-27 RX ORDER — DIPHENHYDRAMINE HYDROCHLORIDE 50 MG/ML
50 INJECTION INTRAMUSCULAR; INTRAVENOUS ONCE
Status: CANCELLED | OUTPATIENT
Start: 2018-08-27 | End: 2018-08-27

## 2018-08-27 RX ORDER — SODIUM CHLORIDE 9 MG/ML
INJECTION, SOLUTION INTRAVENOUS CONTINUOUS
Status: CANCELLED | OUTPATIENT
Start: 2018-08-27

## 2018-08-27 RX ORDER — EPINEPHRINE 1 MG/ML
0.3 INJECTION, SOLUTION, CONCENTRATE INTRAVENOUS PRN
Status: CANCELLED | OUTPATIENT
Start: 2018-08-27

## 2018-08-27 NOTE — PROGRESS NOTES
94[address] [facility] called and stated patient unable to come in today due to ride issues. Patient did call earlier to say she will be here.

## 2018-08-31 ENCOUNTER — HOSPITAL ENCOUNTER (OUTPATIENT)
Dept: NURSING | Age: 73
Discharge: HOME | End: 2018-08-31
Payer: MEDICARE

## 2018-08-31 VITALS
DIASTOLIC BLOOD PRESSURE: 69 MMHG | HEART RATE: 60 BPM | OXYGEN SATURATION: 98 % | BODY MASS INDEX: 38.09 KG/M2 | RESPIRATION RATE: 18 BRPM | WEIGHT: 215 LBS | SYSTOLIC BLOOD PRESSURE: 118 MMHG | TEMPERATURE: 97.7 F

## 2018-08-31 DIAGNOSIS — D80.9 SELECTIVE IMMUNOGLOBULIN DEFICIENCY (HCC): ICD-10-CM

## 2018-08-31 PROCEDURE — 6370000000 HC RX 637 (ALT 250 FOR IP): Performed by: INTERNAL MEDICINE

## 2018-08-31 PROCEDURE — 2709999900 HC NON-CHARGEABLE SUPPLY

## 2018-08-31 PROCEDURE — 6360000002 HC RX W HCPCS: Performed by: INTERNAL MEDICINE

## 2018-08-31 PROCEDURE — 2580000003 HC RX 258: Performed by: INTERNAL MEDICINE

## 2018-08-31 PROCEDURE — 96413 CHEMO IV INFUSION 1 HR: CPT

## 2018-08-31 PROCEDURE — 96415 CHEMO IV INFUSION ADDL HR: CPT

## 2018-08-31 RX ORDER — SODIUM CHLORIDE 0.9 % (FLUSH) 0.9 %
10 SYRINGE (ML) INJECTION PRN
Status: CANCELLED | OUTPATIENT
Start: 2018-08-31

## 2018-08-31 RX ORDER — HEPARIN SODIUM (PORCINE) LOCK FLUSH IV SOLN 100 UNIT/ML 100 UNIT/ML
500 SOLUTION INTRAVENOUS PRN
Status: DISCONTINUED | OUTPATIENT
Start: 2018-08-31 | End: 2018-09-01 | Stop reason: HOSPADM

## 2018-08-31 RX ORDER — ONDANSETRON 4 MG/1
4 TABLET, FILM COATED ORAL EVERY 8 HOURS PRN
COMMUNITY
End: 2019-06-09

## 2018-08-31 RX ORDER — SODIUM CHLORIDE 0.9 % (FLUSH) 0.9 %
10 SYRINGE (ML) INJECTION PRN
Status: DISCONTINUED | OUTPATIENT
Start: 2018-08-31 | End: 2018-09-01 | Stop reason: HOSPADM

## 2018-08-31 RX ORDER — DIPHENHYDRAMINE HYDROCHLORIDE 50 MG/ML
50 INJECTION INTRAMUSCULAR; INTRAVENOUS ONCE
Status: CANCELLED | OUTPATIENT
Start: 2018-08-31 | End: 2018-08-31

## 2018-08-31 RX ORDER — HEPARIN SODIUM (PORCINE) LOCK FLUSH IV SOLN 100 UNIT/ML 100 UNIT/ML
500 SOLUTION INTRAVENOUS PRN
Status: CANCELLED
Start: 2018-08-31

## 2018-08-31 RX ORDER — M-VIT,TX,IRON,MINS/CALC/FOLIC 27MG-0.4MG
1 TABLET ORAL DAILY
COMMUNITY

## 2018-08-31 RX ORDER — SODIUM CHLORIDE 9 MG/ML
INJECTION, SOLUTION INTRAVENOUS CONTINUOUS
Status: CANCELLED | OUTPATIENT
Start: 2018-08-31

## 2018-08-31 RX ORDER — ACETAMINOPHEN 325 MG/1
650 TABLET ORAL ONCE
Status: COMPLETED | OUTPATIENT
Start: 2018-08-31 | End: 2018-08-31

## 2018-08-31 RX ORDER — DIPHENHYDRAMINE HCL 25 MG
25 TABLET ORAL ONCE
Status: COMPLETED | OUTPATIENT
Start: 2018-08-31 | End: 2018-08-31

## 2018-08-31 RX ORDER — CALCIUM CARBONATE 200(500)MG
1 TABLET,CHEWABLE ORAL 2 TIMES DAILY
COMMUNITY
End: 2019-06-09

## 2018-08-31 RX ORDER — DIPHENHYDRAMINE HCL 25 MG
25 TABLET ORAL ONCE
Status: CANCELLED | OUTPATIENT
Start: 2018-08-31 | End: 2018-08-31

## 2018-08-31 RX ORDER — ACETAMINOPHEN 325 MG/1
650 TABLET ORAL ONCE
Status: CANCELLED | OUTPATIENT
Start: 2018-08-31 | End: 2018-08-31

## 2018-08-31 RX ORDER — EPINEPHRINE 1 MG/ML
0.3 INJECTION, SOLUTION, CONCENTRATE INTRAVENOUS PRN
Status: CANCELLED | OUTPATIENT
Start: 2018-08-31

## 2018-08-31 RX ORDER — METHYLPREDNISOLONE SODIUM SUCCINATE 125 MG/2ML
125 INJECTION, POWDER, LYOPHILIZED, FOR SOLUTION INTRAMUSCULAR; INTRAVENOUS ONCE
Status: CANCELLED | OUTPATIENT
Start: 2018-08-31 | End: 2018-08-31

## 2018-08-31 RX ORDER — 0.9 % SODIUM CHLORIDE 0.9 %
10 VIAL (ML) INJECTION ONCE
Status: CANCELLED | OUTPATIENT
Start: 2018-08-31 | End: 2018-08-31

## 2018-08-31 RX ADMIN — Medication 10 ML: at 11:55

## 2018-08-31 RX ADMIN — IMMUNE GLOBULIN (HUMAN) 20 G: 10 INJECTION INTRAVENOUS; SUBCUTANEOUS at 09:59

## 2018-08-31 RX ADMIN — HEPARIN 500 UNITS: 100 SYRINGE at 11:55

## 2018-08-31 RX ADMIN — IMMUNE GLOBULIN (HUMAN) 20 G: 10 INJECTION INTRAVENOUS; SUBCUTANEOUS at 11:12

## 2018-08-31 RX ADMIN — DIPHENHYDRAMINE HCL 25 MG: 25 TABLET ORAL at 09:35

## 2018-08-31 RX ADMIN — ACETAMINOPHEN 650 MG: 325 TABLET ORAL at 09:35

## 2018-08-31 ASSESSMENT — PAIN SCALES - GENERAL: PAINLEVEL_OUTOF10: 0

## 2018-08-31 ASSESSMENT — PAIN - FUNCTIONAL ASSESSMENT: PAIN_FUNCTIONAL_ASSESSMENT: 0-10

## 2018-08-31 NOTE — PROGRESS NOTES
0930 Alert female admitted for IVIG procedure reviewed with pt verbalized understanding. Pt rights and responsibilities offered to pt to read. 1100 No complaints voiced. 1158 Infusion complete tolerated well. Home instructions to pt verbalized understanding. Gait steady. 1205 Discharged ambulatory stable home with family.       __met__ Safety:       (Environmental)   [place] to environment   Ensure ID band is correct and in place/ allergy band as needed   Assess for fall risk   Initiate fall precautions as applicable (fall band, side rails, etc.)   Call light within reach   Bed in low position/ wheels locked    __met__ Pain:        Assess pain level and characteristics   Administer analgesics as ordered   Assess effectiveness of pain management and report to MD as needed    __met__ Knowledge Deficit:   Assess baseline knowledge   Provide teaching at level of understanding   Provide teaching via preferred learning method   Evaluate teaching effectiveness    ___met____ Infection-Risk of Central Venous Catheter:   Monitor for infection signs and symptoms (catheter site redness, temperature elevation, etc)   Assess for infection risks   Educate regarding infection prevention   Manage central venous catheter (flushes/ dressing changes per protocol)

## 2018-09-04 ENCOUNTER — PROCEDURE VISIT (OUTPATIENT)
Dept: CARDIOLOGY CLINIC | Age: 73
End: 2018-09-04
Payer: MEDICARE

## 2018-09-04 DIAGNOSIS — R00.2 PALPITATIONS: ICD-10-CM

## 2018-09-04 DIAGNOSIS — I49.5 TACHYCARDIA-BRADYCARDIA SYNDROME (HCC): Primary | ICD-10-CM

## 2018-09-04 DIAGNOSIS — I49.9 CARDIAC ARRHYTHMIA, UNSPECIFIED CARDIAC ARRHYTHMIA TYPE: ICD-10-CM

## 2018-09-04 PROCEDURE — 93298 REM INTERROG DEV EVAL SCRMS: CPT | Performed by: NUCLEAR MEDICINE

## 2018-09-04 PROCEDURE — 93299 PR REM INTERROG ICPMS/SCRMS <30 D TECH REVIEW: CPT | Performed by: NUCLEAR MEDICINE

## 2018-09-04 NOTE — PROGRESS NOTES
REVEAL BATTERY OK  1 SYMPTOM EPISODE MARKED SHOWING PVC's  KNOWN ATRIAL FIB/XARELTO  LIFETIME AFIB BURDEN 0.1%

## 2018-09-20 ENCOUNTER — APPOINTMENT (OUTPATIENT)
Dept: GENERAL RADIOLOGY | Age: 73
DRG: 813 | End: 2018-09-20
Payer: MEDICARE

## 2018-09-20 ENCOUNTER — HOSPITAL ENCOUNTER (INPATIENT)
Age: 73
LOS: 3 days | Discharge: NURSING, REHAB OR LONG-TERM CARE | DRG: 813 | End: 2018-09-23
Attending: INTERNAL MEDICINE | Admitting: INTERNAL MEDICINE
Payer: MEDICARE

## 2018-09-20 DIAGNOSIS — K92.2 GASTROINTESTINAL HEMORRHAGE, UNSPECIFIED GASTROINTESTINAL HEMORRHAGE TYPE: ICD-10-CM

## 2018-09-20 DIAGNOSIS — R52 PAIN: ICD-10-CM

## 2018-09-20 DIAGNOSIS — N17.9 ACUTE RENAL FAILURE, UNSPECIFIED ACUTE RENAL FAILURE TYPE (HCC): ICD-10-CM

## 2018-09-20 DIAGNOSIS — D50.8 OTHER IRON DEFICIENCY ANEMIA: ICD-10-CM

## 2018-09-20 DIAGNOSIS — F41.9 ANXIETY: ICD-10-CM

## 2018-09-20 DIAGNOSIS — R42 DIZZINESS: Primary | ICD-10-CM

## 2018-09-20 LAB
ALBUMIN SERPL-MCNC: 3.7 G/DL (ref 3.5–5.1)
ALP BLD-CCNC: 86 U/L (ref 38–126)
ALT SERPL-CCNC: 10 U/L (ref 11–66)
ANION GAP SERPL CALCULATED.3IONS-SCNC: 17 MEQ/L (ref 8–16)
AST SERPL-CCNC: 18 U/L (ref 5–40)
BACTERIA: ABNORMAL /HPF
BASE EXCESS (CALCULATED): 4.6 MMOL/L (ref -2.5–2.5)
BASOPHILS # BLD: 0.5 %
BASOPHILS ABSOLUTE: 0 THOU/MM3 (ref 0–0.1)
BILIRUB SERPL-MCNC: 0.2 MG/DL (ref 0.3–1.2)
BILIRUBIN DIRECT: < 0.2 MG/DL (ref 0–0.3)
BILIRUBIN URINE: NEGATIVE
BLOOD, URINE: ABNORMAL
BUN BLDV-MCNC: 25 MG/DL (ref 7–22)
CALCIUM SERPL-MCNC: 9 MG/DL (ref 8.5–10.5)
CASTS 2: ABNORMAL /LPF
CASTS UA: ABNORMAL /LPF
CHARACTER, URINE: CLEAR
CHLORIDE BLD-SCNC: 99 MEQ/L (ref 98–111)
CO2: 26 MEQ/L (ref 23–33)
COLLECTED BY:: ABNORMAL
COLOR: YELLOW
CREAT SERPL-MCNC: 1.5 MG/DL (ref 0.4–1.2)
CRYSTALS, UA: ABNORMAL
D-DIMER QUANTITATIVE: 254 NG/ML FEU (ref 0–500)
DEVICE: ABNORMAL
EKG ATRIAL RATE: 79 BPM
EKG P AXIS: 14 DEGREES
EKG P-R INTERVAL: 146 MS
EKG Q-T INTERVAL: 382 MS
EKG QRS DURATION: 94 MS
EKG QTC CALCULATION (BAZETT): 438 MS
EKG R AXIS: -23 DEGREES
EKG T AXIS: 85 DEGREES
EKG VENTRICULAR RATE: 79 BPM
EOSINOPHIL # BLD: 1 %
EOSINOPHILS ABSOLUTE: 0.1 THOU/MM3 (ref 0–0.4)
EPITHELIAL CELLS, UA: ABNORMAL /HPF
ERYTHROCYTE [DISTWIDTH] IN BLOOD BY AUTOMATED COUNT: 15.4 % (ref 11.5–14.5)
ERYTHROCYTE [DISTWIDTH] IN BLOOD BY AUTOMATED COUNT: 49.8 FL (ref 35–45)
FERRITIN: 84 NG/ML (ref 10–291)
FERRITIN: 85 NG/ML (ref 10–291)
GFR SERPL CREATININE-BSD FRML MDRD: 34 ML/MIN/1.73M2
GLUCOSE BLD-MCNC: 129 MG/DL (ref 70–108)
GLUCOSE BLD-MCNC: 176 MG/DL (ref 70–108)
GLUCOSE URINE: NEGATIVE MG/DL
HCO3: 28 MMOL/L (ref 23–28)
HCT VFR BLD CALC: 24 % (ref 37–47)
HEMOCCULT STL QL: POSITIVE
HEMOGLOBIN: 8 GM/DL (ref 12–16)
IMMATURE GRANS (ABS): 0.03 THOU/MM3 (ref 0–0.07)
IMMATURE GRANULOCYTES: 0.4 %
IRON SATURATION: 18 % (ref 20–50)
IRON: 39 UG/DL (ref 50–170)
IRON: 47 UG/DL (ref 50–170)
KETONES, URINE: NEGATIVE
LEUKOCYTE ESTERASE, URINE: ABNORMAL
LIPASE: 24.9 U/L (ref 5.6–51.3)
LYMPHOCYTES # BLD: 18.5 %
LYMPHOCYTES ABSOLUTE: 1.5 THOU/MM3 (ref 1–4.8)
MCH RBC QN AUTO: 30.5 PG (ref 26–33)
MCHC RBC AUTO-ENTMCNC: 33.3 GM/DL (ref 32.2–35.5)
MCV RBC AUTO: 91.6 FL (ref 81–99)
MISCELLANEOUS 2: ABNORMAL
MONOCYTES # BLD: 6.3 %
MONOCYTES ABSOLUTE: 0.5 THOU/MM3 (ref 0.4–1.3)
NITRITE, URINE: NEGATIVE
NUCLEATED RED BLOOD CELLS: 0 /100 WBC
O2 SATURATION: 97 %
OSMOLALITY CALCULATION: 291.8 MOSMOL/KG (ref 275–300)
PCO2: 35 MMHG (ref 35–45)
PH BLOOD GAS: 7.51 (ref 7.35–7.45)
PH UA: 6.5
PLATELET # BLD: 193 THOU/MM3 (ref 130–400)
PMV BLD AUTO: 10.1 FL (ref 9.4–12.4)
PO2: 82 MMHG (ref 71–104)
POTASSIUM SERPL-SCNC: 4.1 MEQ/L (ref 3.5–5.2)
PRO-BNP: 447.4 PG/ML (ref 0–900)
PROTEIN UA: NEGATIVE
RBC # BLD: 2.62 MILL/MM3 (ref 4.2–5.4)
RBC URINE: ABNORMAL /HPF
RENAL EPITHELIAL, UA: ABNORMAL
SEG NEUTROPHILS: 73.3 %
SEGMENTED NEUTROPHILS ABSOLUTE COUNT: 5.8 THOU/MM3 (ref 1.8–7.7)
SODIUM BLD-SCNC: 142 MEQ/L (ref 135–145)
SOURCE, BLOOD GAS: ABNORMAL
SPECIFIC GRAVITY, URINE: 1.01 (ref 1–1.03)
TOTAL IRON BINDING CAPACITY: 262 UG/DL (ref 171–450)
TOTAL PROTEIN: 6.3 G/DL (ref 6.1–8)
TROPONIN T: < 0.01 NG/ML
UROBILINOGEN, URINE: 0.2 EU/DL
WBC # BLD: 7.9 THOU/MM3 (ref 4.8–10.8)
WBC UA: ABNORMAL /HPF
YEAST: ABNORMAL

## 2018-09-20 PROCEDURE — 2580000003 HC RX 258: Performed by: INTERNAL MEDICINE

## 2018-09-20 PROCEDURE — 84484 ASSAY OF TROPONIN QUANT: CPT

## 2018-09-20 PROCEDURE — 85025 COMPLETE CBC W/AUTO DIFF WBC: CPT

## 2018-09-20 PROCEDURE — 82948 REAGENT STRIP/BLOOD GLUCOSE: CPT

## 2018-09-20 PROCEDURE — 93010 ELECTROCARDIOGRAM REPORT: CPT | Performed by: NUCLEAR MEDICINE

## 2018-09-20 PROCEDURE — C9113 INJ PANTOPRAZOLE SODIUM, VIA: HCPCS | Performed by: INTERNAL MEDICINE

## 2018-09-20 PROCEDURE — 82728 ASSAY OF FERRITIN: CPT

## 2018-09-20 PROCEDURE — 83880 ASSAY OF NATRIURETIC PEPTIDE: CPT

## 2018-09-20 PROCEDURE — 6370000000 HC RX 637 (ALT 250 FOR IP): Performed by: INTERNAL MEDICINE

## 2018-09-20 PROCEDURE — 85014 HEMATOCRIT: CPT

## 2018-09-20 PROCEDURE — 85379 FIBRIN DEGRADATION QUANT: CPT

## 2018-09-20 PROCEDURE — 85018 HEMOGLOBIN: CPT

## 2018-09-20 PROCEDURE — 87086 URINE CULTURE/COLONY COUNT: CPT

## 2018-09-20 PROCEDURE — 82248 BILIRUBIN DIRECT: CPT

## 2018-09-20 PROCEDURE — 6360000002 HC RX W HCPCS: Performed by: INTERNAL MEDICINE

## 2018-09-20 PROCEDURE — 80053 COMPREHEN METABOLIC PANEL: CPT

## 2018-09-20 PROCEDURE — 36415 COLL VENOUS BLD VENIPUNCTURE: CPT

## 2018-09-20 PROCEDURE — 82272 OCCULT BLD FECES 1-3 TESTS: CPT

## 2018-09-20 PROCEDURE — 99285 EMERGENCY DEPT VISIT HI MDM: CPT

## 2018-09-20 PROCEDURE — 83550 IRON BINDING TEST: CPT

## 2018-09-20 PROCEDURE — 83690 ASSAY OF LIPASE: CPT

## 2018-09-20 PROCEDURE — 1200000003 HC TELEMETRY R&B

## 2018-09-20 PROCEDURE — 93005 ELECTROCARDIOGRAM TRACING: CPT | Performed by: INTERNAL MEDICINE

## 2018-09-20 PROCEDURE — 82803 BLOOD GASES ANY COMBINATION: CPT

## 2018-09-20 PROCEDURE — 2709999900 HC NON-CHARGEABLE SUPPLY

## 2018-09-20 PROCEDURE — 81001 URINALYSIS AUTO W/SCOPE: CPT

## 2018-09-20 PROCEDURE — 83540 ASSAY OF IRON: CPT

## 2018-09-20 PROCEDURE — 36600 WITHDRAWAL OF ARTERIAL BLOOD: CPT

## 2018-09-20 PROCEDURE — 71045 X-RAY EXAM CHEST 1 VIEW: CPT

## 2018-09-20 RX ORDER — SODIUM CHLORIDE 450 MG/100ML
INJECTION, SOLUTION INTRAVENOUS CONTINUOUS
Status: DISCONTINUED | OUTPATIENT
Start: 2018-09-20 | End: 2018-09-23 | Stop reason: HOSPADM

## 2018-09-20 RX ORDER — 0.9 % SODIUM CHLORIDE 0.9 %
1000 INTRAVENOUS SOLUTION INTRAVENOUS ONCE
Status: DISCONTINUED | OUTPATIENT
Start: 2018-09-20 | End: 2018-09-20

## 2018-09-20 RX ORDER — PREGABALIN 75 MG/1
150 CAPSULE ORAL 3 TIMES DAILY
Status: DISCONTINUED | OUTPATIENT
Start: 2018-09-20 | End: 2018-09-23 | Stop reason: HOSPADM

## 2018-09-20 RX ORDER — ALPRAZOLAM 0.5 MG/1
0.5 TABLET ORAL NIGHTLY PRN
Status: DISCONTINUED | OUTPATIENT
Start: 2018-09-20 | End: 2018-09-23 | Stop reason: HOSPADM

## 2018-09-20 RX ORDER — ACETAMINOPHEN 325 MG/1
650 TABLET ORAL EVERY 4 HOURS PRN
Status: DISCONTINUED | OUTPATIENT
Start: 2018-09-20 | End: 2018-09-23 | Stop reason: HOSPADM

## 2018-09-20 RX ORDER — TRAZODONE HYDROCHLORIDE 100 MG/1
100 TABLET ORAL NIGHTLY
COMMUNITY

## 2018-09-20 RX ORDER — M-VIT,TX,IRON,MINS/CALC/FOLIC 27MG-0.4MG
1 TABLET ORAL DAILY
Status: DISCONTINUED | OUTPATIENT
Start: 2018-09-20 | End: 2018-09-23 | Stop reason: HOSPADM

## 2018-09-20 RX ORDER — LEVOTHYROXINE SODIUM 0.07 MG/1
75 TABLET ORAL DAILY
Status: DISCONTINUED | OUTPATIENT
Start: 2018-09-21 | End: 2018-09-23 | Stop reason: HOSPADM

## 2018-09-20 RX ORDER — ESCITALOPRAM OXALATE 10 MG/1
10 TABLET ORAL DAILY
Status: DISCONTINUED | OUTPATIENT
Start: 2018-09-20 | End: 2018-09-23 | Stop reason: HOSPADM

## 2018-09-20 RX ORDER — MULTIVIT-MIN/IRON/FOLIC ACID/K 18-600-40
1 CAPSULE ORAL 2 TIMES DAILY
COMMUNITY
End: 2020-12-21

## 2018-09-20 RX ORDER — INSULIN GLARGINE 100 [IU]/ML
16 INJECTION, SOLUTION SUBCUTANEOUS NIGHTLY
Status: ON HOLD | COMMUNITY
End: 2018-09-22 | Stop reason: HOSPADM

## 2018-09-20 RX ORDER — FLUTICASONE PROPIONATE 50 MCG
1 SPRAY, SUSPENSION (ML) NASAL DAILY
COMMUNITY
End: 2020-12-21

## 2018-09-20 RX ORDER — ATORVASTATIN CALCIUM 20 MG/1
20 TABLET, FILM COATED ORAL NIGHTLY
Status: DISCONTINUED | OUTPATIENT
Start: 2018-09-20 | End: 2018-09-23 | Stop reason: HOSPADM

## 2018-09-20 RX ADMIN — SODIUM CHLORIDE 8 MG/HR: 9 INJECTION, SOLUTION INTRAVENOUS at 21:15

## 2018-09-20 RX ADMIN — SODIUM CHLORIDE 1000 ML: 9 INJECTION, SOLUTION INTRAVENOUS at 17:25

## 2018-09-20 RX ADMIN — ESCITALOPRAM OXALATE 10 MG: 10 TABLET, FILM COATED ORAL at 21:15

## 2018-09-20 RX ADMIN — SODIUM CHLORIDE: 4.5 INJECTION, SOLUTION INTRAVENOUS at 21:15

## 2018-09-20 RX ADMIN — LINAGLIPTIN 5 MG: 5 TABLET, FILM COATED ORAL at 21:15

## 2018-09-20 RX ADMIN — ATORVASTATIN CALCIUM 20 MG: 20 TABLET, FILM COATED ORAL at 21:15

## 2018-09-20 RX ADMIN — ALPRAZOLAM 0.5 MG: 0.5 TABLET ORAL at 21:16

## 2018-09-20 RX ADMIN — PREGABALIN 150 MG: 75 CAPSULE ORAL at 21:16

## 2018-09-20 RX ADMIN — MULTIPLE VITAMINS W/ MINERALS TAB 1 TABLET: TAB at 21:15

## 2018-09-20 RX ADMIN — ACETAMINOPHEN 650 MG: 325 TABLET ORAL at 21:17

## 2018-09-20 ASSESSMENT — ENCOUNTER SYMPTOMS
CHEST TIGHTNESS: 0
ABDOMINAL PAIN: 0
WHEEZING: 0
COUGH: 0
DIARRHEA: 0
VOMITING: 0
SHORTNESS OF BREATH: 1
NAUSEA: 0
SORE THROAT: 0
RHINORRHEA: 0
BACK PAIN: 0
CONSTIPATION: 0
BLOOD IN STOOL: 1

## 2018-09-20 ASSESSMENT — PAIN SCALES - GENERAL
PAINLEVEL_OUTOF10: 2
PAINLEVEL_OUTOF10: 0
PAINLEVEL_OUTOF10: 5

## 2018-09-20 NOTE — PROGRESS NOTES
5 - This RN spoke with \"December\" at [facility] regarding patient [facility], stated she would fax it over. 2123 - Patient states she wears home CPAP at night with 3L O2 bled in. States her son in unable to bring machine in until tomorrow, Friday 09/21/2018. Sabine Andrews RN offered to obtain hospital CPAP machine for overnight. Patient states that she prefers a nasal cannula for tonight. Will continue to assess SpO2.

## 2018-09-20 NOTE — ED NOTES
Patient transported to [facility] via cart in stable condition. Patient monitored on cardiac telemetry.      Contact made with floor prior to transport        RAMAN Meng-P  09/20/18 1185

## 2018-09-20 NOTE — ED PROVIDER NOTES
Genitourinary: Negative for difficulty urinating, dysuria and hematuria. Musculoskeletal: Negative for back pain, joint swelling, neck pain and neck stiffness. Skin: Negative for pallor and rash. Neurological: Positive for dizziness. Negative for syncope, weakness, light-headedness, numbness and headaches. Hematological: Negative for adenopathy. Psychiatric/Behavioral: Negative for confusion and suicidal ideas. The patient is not nervous/anxious. PAST MEDICAL HISTORY    has a past medical history of Anemia; Atrial fibrillation ([address] CAD (coronary artery disease); CHF (congestive heart failure) ([facility] [address] Chronic kidney disease; DDD (degenerative disc disease), lumbar; Depression; Frequent PVCs; GERD (gastroesophageal reflux disease); History of blood transfusion; Hx of blood clots; Hyperlipidemia; Hypertension; Hyperthyroidism; Movement disorder; Neuropathy; Obesity; Pneumonia; Sleep apnea; Status post right and left heart catheterization; and Type II or unspecified type diabetes mellitus without mention of complication, not stated as uncontrolled. SURGICAL HISTORY      has a past surgical history that includes Rotator cuff repair; Tubal ligation; Hysterectomy; bladder suspension; tendon release (Left, 08/09/2016); Achilles tendon surgery (Bilateral); Cardiac surgery (2016); Hammer toe surgery (Right); hiatal hernia repair (09/06/2016); hernia repair; Abdomen surgery; Tunneled venous port placement (11/06/2017); Upper gastrointestinal endoscopy (Left, 5/10/2018); Colonoscopy (Left, 5/11/2018); Endoscopy, colon, diagnostic; and Gastric fundoplication.     CURRENT MEDICATIONS       Current Discharge Medication List      CONTINUE these medications which have NOT CHANGED    Details   Multiple Vitamins-Minerals (THERAPEUTIC MULTIVITAMIN-MINERALS) tablet Take 1 tablet by mouth daily      calcium carbonate (TUMS) 500 MG chewable tablet Take 1 tablet by mouth 2 times daily      ondansetron (ZOFRAN) 4 MG tablet Take 4 mg by mouth every 8 hours as needed for Nausea or Vomiting      SITagliptin (JANUVIA) 50 MG tablet Take 50 mg by mouth daily      rivaroxaban (XARELTO) 20 MG TABS tablet Take 20 mg by mouth every 24 hours      escitalopram (LEXAPRO) 10 MG tablet Take 1 tablet by mouth daily  Qty: 30 tablet, Refills: 3      OXYGEN Inhale into the lungs continuous      atorvastatin (LIPITOR) 20 MG tablet Take 1 tablet by mouth nightly  Qty: 30 tablet, Refills: 3      pantoprazole (PROTONIX) 40 MG tablet Take 1 tablet by mouth 2 times daily Take 45 min eating  Qty: 60 tablet, Refills: 0      ALPRAZolam (XANAX) 0.5 MG tablet Take 0.5 mg by mouth nightly as needed. .      benzonatate (TESSALON) 100 MG capsule Take 200 mg by mouth 3 times daily as needed for Cough      magnesium hydroxide (MILK OF MAGNESIA CONCENTRATE) 2400 MG/10ML SUSP Take 30 mLs by mouth once as needed      Immune Globulin (Human) in dextrose solution Infuse 10 g intravenously every 30 days      cetirizine-psuedoephedrine (ZYRTEC-D) 5-120 MG per extended release tablet Take 1 tablet by mouth daily as needed for Allergies      docusate sodium (COLACE) 100 MG capsule Take 100 mg by mouth 2 times daily      acetaminophen (TYLENOL) 325 MG tablet Take 2 tablets by mouth every 4 hours as needed for Pain  Qty: 120 tablet, Refills: 3      bumetanide (BUMEX) 1 MG tablet Take 1 tablet by mouth daily  Qty: 60 tablet, Refills: 0      potassium chloride SA (K-DUR;KLOR-CON M) 20 MEQ tablet Take 0.5 tablets by mouth 2 times daily (with meals)  Qty: 60 tablet, Refills: 0      ferrous sulfate 325 (65 FE) MG tablet Take 325 mg by mouth 3 times daily (with meals)      levothyroxine (SYNTHROID) 75 MCG tablet Take 75 mcg by mouth Daily      pregabalin (LYRICA) 150 MG capsule Take 150 mg by mouth 3 times daily             ALLERGIES     is allergic to bactrim [sulfamethoxazole-trimethoprim]; wellbutrin [bupropion]; and silicone.     FAMILY HISTORY     indicated that her mother is . She indicated that her father is . She indicated that the status of her brother is unknown.    family history includes COPD in her brother and father; Cancer in her mother; Diabetes in her father and mother; Heart Disease in her mother; High Blood Pressure in her mother; Stroke in her mother; Vision Loss in her mother. SOCIAL HISTORY      reports that she quit smoking about 12 years ago. Her smoking use included Cigarettes. She has a 30.00 pack-year smoking history. She has never used smokeless tobacco. She reports that she does not drink alcohol or use drugs. PHYSICAL EXAM     INITIAL VITALS:  height is 5' 6\" (1.676 m) and weight is 260 lb (117.9 kg). Her oral temperature is 97.4 °F (36.3 °C). Her blood pressure is 114/86 and her pulse is 70. Her respiration is 18 and oxygen saturation is 97%. Physical Exam   Constitutional: She is oriented to person, place, and time. She appears well-developed and well-nourished. She is active and cooperative. Non-toxic appearance. HENT:   Head: Normocephalic and atraumatic. Right Ear: Hearing, tympanic membrane, external ear and ear canal normal. Tympanic membrane is not injected and not bulging. No middle ear effusion. Left Ear: Hearing, tympanic membrane, external ear and ear canal normal. Tympanic membrane is not injected and not bulging. No middle ear effusion. Nose: Nose normal.   Mouth/Throat: Mucous membranes are dry. No oropharyngeal exudate, posterior oropharyngeal edema or posterior oropharyngeal erythema. Eyes: Pupils are equal, round, and reactive to light. Conjunctivae, EOM and lids are normal. Right eye exhibits no exudate. Left eye exhibits no exudate. No scleral icterus. Neck: Normal range of motion. Neck supple. No JVD present. Carotid bruit is not present. No tracheal deviation present. Cardiovascular: Normal rate, regular rhythm, S1 normal, S2 normal, normal heart sounds, intact distal pulses and normal pulses. Exam reveals no gallop. No murmur heard. Pulses:       Carotid pulses are 2+ on the right side. Radial pulses are 2+ on the right side, and 2+ on the left side. Pulmonary/Chest: Effort normal and breath sounds normal. No accessory muscle usage or stridor. No respiratory distress. She has no decreased breath sounds. She has no wheezes. She has no rhonchi. She has no rales. She exhibits no tenderness. Abdominal: Soft. Normal appearance and bowel sounds are normal. She exhibits no distension. There is no tenderness. There is no rigidity, no rebound, no guarding and no CVA tenderness. Genitourinary: Rectal exam shows no external hemorrhoid, no internal hemorrhoid and no tenderness. Genitourinary Comments: Apparently melena upon rectal exam.   Musculoskeletal: Normal range of motion. Neurological: She is alert and oriented to person, place, and time. She has normal strength. She displays no atrophy and no tremor. No sensory deficit. GCS eye subscore is 4. GCS verbal subscore is 5. GCS motor subscore is 6. Patient is neurologically intact. Skin: Skin is warm and dry. No rash noted. Psychiatric: She has a normal mood and affect. Her speech is normal and behavior is normal.   Nursing note and vitals reviewed. DIAGNOSTIC RESULTS     EKG: All EKG's are interpreted by the Emergency Department Physician who either signs or Co-signs this chart in the absence of a cardiologist.  EKG interpreted by Julio Cesar Monteiro MD:    Vent. Rate: 79 bpm  SD interval: 146 ms  QRS duration: 94 ms  QTc: 438 ms  P-R-T axes: 14, -23, 85  Sinus rhythm with frequent and consecutive premature ventricular complexes  Low voltage QRS  Cannot rule out Anterior infarct, age undetermined  No STEMI  Compared to old EKG on June-    RADIOLOGY: non-plain film images(s) such as CT, Ultrasound and MRI are read by the radiologist.    XR CHEST PORTABLE   Final Result   No gross infiltrate.             **This report has been created FERRITIN   HEMOGLOBIN AND HEMATOCRIT, BLOOD   HEMOGLOBIN AND HEMATOCRIT, BLOOD   COMPREHENSIVE METABOLIC PANEL   CBC WITH AUTO DIFFERENTIAL       EMERGENCY DEPARTMENT COURSE:   Vitals:    Vitals:    09/20/18 1556 09/20/18 1600 09/20/18 1718 09/20/18 1855   BP:  109/65 126/61 114/86   Pulse: 73 86 89 70   Resp:  14 13 18   Temp:    97.4 °F (36.3 °C)   TempSrc:    Oral   SpO2:  100% 93% 97%   Weight:       Height:           2:39 PM: The patient was seen and evaluated. 5:30 PM: Dr. Abrahan Walker was consulted about the patient's condition. She graciously agreed to admit the patient. MDM:  Patient was found to have GI bleed with anemia as a reason of her symptoms. Patient has history of congestive heart failure all the patient is a deteriorating renal function. I did not order Normal saline and leave it up to the discretion of the admitting physician. Patient's iron studies were also ordered. Patient will be admitted by  for further workup and management. CRITICAL CARE:   None     CONSULTS:  Dr. Jagdish English:  None     FINAL IMPRESSION      1. Dizziness    2. Gastrointestinal hemorrhage, unspecified gastrointestinal hemorrhage type    3.  Acute renal failure, unspecified acute renal failure type ([address]    4. Other iron deficiency anemia          DISPOSITION/PLAN   Admit    PATIENT REFERRED TO:  Michelle Lorenzo 83  [phone]            DISCHARGE MEDICATIONS:  Current Discharge Medication List          (Please note that portions of this note were completed with a voice recognition program.  Efforts were made to edit the dictations but occasionally words are mis-transcribed.)    Scribe:  Balaji Cardenas 9/20/18 2:39 PM Scribing for and in the presence of Cassandra Alcazar MD.    Signed by: Jose Elias Jacobson, 09/20/18 7:29 PM    Provider:  I personally performed the services described in the documentation, reviewed and edited the documentation which was

## 2018-09-20 NOTE — ED NOTES
Bed: 010A  Expected date:   Expected time:   Means of arrival:   Comments:  lGoria Knowles RN  09/20/18 4929

## 2018-09-21 ENCOUNTER — ANESTHESIA (OUTPATIENT)
Dept: ENDOSCOPY | Age: 73
DRG: 813 | End: 2018-09-21
Payer: MEDICARE

## 2018-09-21 ENCOUNTER — ANESTHESIA EVENT (OUTPATIENT)
Dept: ENDOSCOPY | Age: 73
DRG: 813 | End: 2018-09-21
Payer: MEDICARE

## 2018-09-21 VITALS
DIASTOLIC BLOOD PRESSURE: 52 MMHG | SYSTOLIC BLOOD PRESSURE: 97 MMHG | OXYGEN SATURATION: 97 % | RESPIRATION RATE: 10 BRPM

## 2018-09-21 LAB
ABO: NORMAL
ALBUMIN SERPL-MCNC: 3.2 G/DL (ref 3.5–5.1)
ALP BLD-CCNC: 68 U/L (ref 38–126)
ALT SERPL-CCNC: 7 U/L (ref 11–66)
ANION GAP SERPL CALCULATED.3IONS-SCNC: 12 MEQ/L (ref 8–16)
ANTIBODY SCREEN: NORMAL
AST SERPL-CCNC: 15 U/L (ref 5–40)
BASOPHILS # BLD: 0.6 %
BASOPHILS ABSOLUTE: 0 THOU/MM3 (ref 0–0.1)
BILIRUB SERPL-MCNC: 0.7 MG/DL (ref 0.3–1.2)
BUN BLDV-MCNC: 22 MG/DL (ref 7–22)
CALCIUM SERPL-MCNC: 8.2 MG/DL (ref 8.5–10.5)
CHLORIDE BLD-SCNC: 104 MEQ/L (ref 98–111)
CO2: 27 MEQ/L (ref 23–33)
CREAT SERPL-MCNC: 1.2 MG/DL (ref 0.4–1.2)
EOSINOPHIL # BLD: 3 %
EOSINOPHILS ABSOLUTE: 0.1 THOU/MM3 (ref 0–0.4)
ERYTHROCYTE [DISTWIDTH] IN BLOOD BY AUTOMATED COUNT: 15.9 % (ref 11.5–14.5)
ERYTHROCYTE [DISTWIDTH] IN BLOOD BY AUTOMATED COUNT: 51.6 FL (ref 35–45)
GFR SERPL CREATININE-BSD FRML MDRD: 44 ML/MIN/1.73M2
GLUCOSE BLD-MCNC: 100 MG/DL (ref 70–108)
GLUCOSE BLD-MCNC: 105 MG/DL (ref 70–108)
GLUCOSE BLD-MCNC: 118 MG/DL (ref 70–108)
GLUCOSE BLD-MCNC: 126 MG/DL (ref 70–108)
GLUCOSE BLD-MCNC: 181 MG/DL (ref 70–108)
HCT VFR BLD CALC: 21.9 % (ref 37–47)
HCT VFR BLD CALC: 23.9 % (ref 37–47)
HCT VFR BLD CALC: 24.4 % (ref 37–47)
HCT VFR BLD CALC: 25.2 % (ref 37–47)
HCT VFR BLD CALC: 25.3 % (ref 37–47)
HEMOGLOBIN: 7.2 GM/DL (ref 12–16)
HEMOGLOBIN: 7.6 GM/DL (ref 12–16)
HEMOGLOBIN: 7.7 GM/DL (ref 12–16)
HEMOGLOBIN: 8.2 GM/DL (ref 12–16)
HEMOGLOBIN: 8.4 GM/DL (ref 12–16)
IMMATURE GRANS (ABS): 0.01 THOU/MM3 (ref 0–0.07)
IMMATURE GRANULOCYTES: 0.2 %
LYMPHOCYTES # BLD: 34.9 %
LYMPHOCYTES ABSOLUTE: 1.6 THOU/MM3 (ref 1–4.8)
MCH RBC QN AUTO: 29.6 PG (ref 26–33)
MCHC RBC AUTO-ENTMCNC: 31.6 GM/DL (ref 32.2–35.5)
MCV RBC AUTO: 93.8 FL (ref 81–99)
MONOCYTES # BLD: 8.5 %
MONOCYTES ABSOLUTE: 0.4 THOU/MM3 (ref 0.4–1.3)
MRSA SCREEN RT-PCR: NEGATIVE
NUCLEATED RED BLOOD CELLS: 0 /100 WBC
ORGANISM: ABNORMAL
OSMOLALITY CALCULATION: 288.4 MOSMOL/KG (ref 275–300)
PLATELET # BLD: 159 THOU/MM3 (ref 130–400)
PMV BLD AUTO: 10 FL (ref 9.4–12.4)
POTASSIUM SERPL-SCNC: 3.6 MEQ/L (ref 3.5–5.2)
RBC # BLD: 2.6 MILL/MM3 (ref 4.2–5.4)
RH FACTOR: NORMAL
SEG NEUTROPHILS: 52.8 %
SEGMENTED NEUTROPHILS ABSOLUTE COUNT: 2.5 THOU/MM3 (ref 1.8–7.7)
SODIUM BLD-SCNC: 143 MEQ/L (ref 135–145)
TOTAL PROTEIN: 5.4 G/DL (ref 6.1–8)
URINE CULTURE REFLEX: ABNORMAL
WBC # BLD: 4.7 THOU/MM3 (ref 4.8–10.8)

## 2018-09-21 PROCEDURE — 85025 COMPLETE CBC W/AUTO DIFF WBC: CPT

## 2018-09-21 PROCEDURE — 2709999900 HC NON-CHARGEABLE SUPPLY: Performed by: INTERNAL MEDICINE

## 2018-09-21 PROCEDURE — 87081 CULTURE SCREEN ONLY: CPT

## 2018-09-21 PROCEDURE — 80053 COMPREHEN METABOLIC PANEL: CPT

## 2018-09-21 PROCEDURE — 1200000003 HC TELEMETRY R&B

## 2018-09-21 PROCEDURE — 3700000001 HC ADD 15 MINUTES (ANESTHESIA): Performed by: INTERNAL MEDICINE

## 2018-09-21 PROCEDURE — 0DJ08ZZ INSPECTION OF UPPER INTESTINAL TRACT, VIA NATURAL OR ARTIFICIAL OPENING ENDOSCOPIC: ICD-10-PCS | Performed by: INTERNAL MEDICINE

## 2018-09-21 PROCEDURE — 87147 CULTURE TYPE IMMUNOLOGIC: CPT

## 2018-09-21 PROCEDURE — 86900 BLOOD TYPING SEROLOGIC ABO: CPT

## 2018-09-21 PROCEDURE — 2500000003 HC RX 250 WO HCPCS: Performed by: NURSE ANESTHETIST, CERTIFIED REGISTERED

## 2018-09-21 PROCEDURE — 6370000000 HC RX 637 (ALT 250 FOR IP): Performed by: INTERNAL MEDICINE

## 2018-09-21 PROCEDURE — 36591 DRAW BLOOD OFF VENOUS DEVICE: CPT

## 2018-09-21 PROCEDURE — P9016 RBC LEUKOCYTES REDUCED: HCPCS

## 2018-09-21 PROCEDURE — 3700000000 HC ANESTHESIA ATTENDED CARE: Performed by: INTERNAL MEDICINE

## 2018-09-21 PROCEDURE — 3609012800 HC EGD DIAGNOSTIC ONLY: Performed by: INTERNAL MEDICINE

## 2018-09-21 PROCEDURE — 6360000002 HC RX W HCPCS: Performed by: NURSE ANESTHETIST, CERTIFIED REGISTERED

## 2018-09-21 PROCEDURE — 36430 TRANSFUSION BLD/BLD COMPNT: CPT

## 2018-09-21 PROCEDURE — C9113 INJ PANTOPRAZOLE SODIUM, VIA: HCPCS | Performed by: INTERNAL MEDICINE

## 2018-09-21 PROCEDURE — 86850 RBC ANTIBODY SCREEN: CPT

## 2018-09-21 PROCEDURE — 2709999900 HC NON-CHARGEABLE SUPPLY

## 2018-09-21 PROCEDURE — 36415 COLL VENOUS BLD VENIPUNCTURE: CPT

## 2018-09-21 PROCEDURE — 2580000003 HC RX 258: Performed by: NURSE PRACTITIONER

## 2018-09-21 PROCEDURE — 85014 HEMATOCRIT: CPT

## 2018-09-21 PROCEDURE — 86901 BLOOD TYPING SEROLOGIC RH(D): CPT

## 2018-09-21 PROCEDURE — 86923 COMPATIBILITY TEST ELECTRIC: CPT

## 2018-09-21 PROCEDURE — 2580000003 HC RX 258: Performed by: INTERNAL MEDICINE

## 2018-09-21 PROCEDURE — 85018 HEMOGLOBIN: CPT

## 2018-09-21 PROCEDURE — 7100000000 HC PACU RECOVERY - FIRST 15 MIN: Performed by: INTERNAL MEDICINE

## 2018-09-21 PROCEDURE — 6360000002 HC RX W HCPCS: Performed by: INTERNAL MEDICINE

## 2018-09-21 PROCEDURE — 87641 MR-STAPH DNA AMP PROBE: CPT

## 2018-09-21 PROCEDURE — 82948 REAGENT STRIP/BLOOD GLUCOSE: CPT

## 2018-09-21 RX ORDER — PANTOPRAZOLE SODIUM 40 MG/10ML
40 INJECTION, POWDER, LYOPHILIZED, FOR SOLUTION INTRAVENOUS 2 TIMES DAILY
Status: DISCONTINUED | OUTPATIENT
Start: 2018-09-21 | End: 2018-09-23 | Stop reason: HOSPADM

## 2018-09-21 RX ORDER — 0.9 % SODIUM CHLORIDE 0.9 %
250 INTRAVENOUS SOLUTION INTRAVENOUS ONCE
Status: COMPLETED | OUTPATIENT
Start: 2018-09-21 | End: 2018-09-21

## 2018-09-21 RX ORDER — LIDOCAINE HYDROCHLORIDE 20 MG/ML
INJECTION, SOLUTION EPIDURAL; INFILTRATION; INTRACAUDAL; PERINEURAL PRN
Status: DISCONTINUED | OUTPATIENT
Start: 2018-09-21 | End: 2018-09-21 | Stop reason: SDUPTHER

## 2018-09-21 RX ORDER — 0.9 % SODIUM CHLORIDE 0.9 %
10 VIAL (ML) INJECTION EVERY 12 HOURS SCHEDULED
Status: DISCONTINUED | OUTPATIENT
Start: 2018-09-21 | End: 2018-09-23 | Stop reason: HOSPADM

## 2018-09-21 RX ORDER — SODIUM CHLORIDE 450 MG/100ML
INJECTION, SOLUTION INTRAVENOUS CONTINUOUS
Status: DISCONTINUED | OUTPATIENT
Start: 2018-09-21 | End: 2018-09-22

## 2018-09-21 RX ORDER — 0.9 % SODIUM CHLORIDE 0.9 %
10 VIAL (ML) INJECTION PRN
Status: DISCONTINUED | OUTPATIENT
Start: 2018-09-21 | End: 2018-09-23 | Stop reason: HOSPADM

## 2018-09-21 RX ORDER — PROPOFOL 10 MG/ML
INJECTION, EMULSION INTRAVENOUS PRN
Status: DISCONTINUED | OUTPATIENT
Start: 2018-09-21 | End: 2018-09-21 | Stop reason: SDUPTHER

## 2018-09-21 RX ORDER — FUROSEMIDE 10 MG/ML
10 INJECTION INTRAMUSCULAR; INTRAVENOUS ONCE
Status: DISCONTINUED | OUTPATIENT
Start: 2018-09-21 | End: 2018-09-21

## 2018-09-21 RX ADMIN — SODIUM CHLORIDE 250 ML: 9 INJECTION, SOLUTION INTRAVENOUS at 03:36

## 2018-09-21 RX ADMIN — ESCITALOPRAM OXALATE 10 MG: 10 TABLET, FILM COATED ORAL at 09:04

## 2018-09-21 RX ADMIN — PROPOFOL 50 MG: 10 INJECTION, EMULSION INTRAVENOUS at 10:10

## 2018-09-21 RX ADMIN — SODIUM CHLORIDE: 4.5 INJECTION, SOLUTION INTRAVENOUS at 12:05

## 2018-09-21 RX ADMIN — PREGABALIN 150 MG: 75 CAPSULE ORAL at 14:13

## 2018-09-21 RX ADMIN — ATORVASTATIN CALCIUM 20 MG: 20 TABLET, FILM COATED ORAL at 19:54

## 2018-09-21 RX ADMIN — ACETAMINOPHEN 650 MG: 325 TABLET ORAL at 19:56

## 2018-09-21 RX ADMIN — PANTOPRAZOLE SODIUM 40 MG: 40 INJECTION, POWDER, FOR SOLUTION INTRAVENOUS at 14:06

## 2018-09-21 RX ADMIN — PREGABALIN 150 MG: 75 CAPSULE ORAL at 09:04

## 2018-09-21 RX ADMIN — LIDOCAINE HYDROCHLORIDE 100 MG: 20 INJECTION, SOLUTION EPIDURAL; INFILTRATION; INTRACAUDAL; PERINEURAL at 10:10

## 2018-09-21 RX ADMIN — PANTOPRAZOLE SODIUM 40 MG: 40 INJECTION, POWDER, FOR SOLUTION INTRAVENOUS at 19:54

## 2018-09-21 RX ADMIN — PREGABALIN 150 MG: 75 CAPSULE ORAL at 19:56

## 2018-09-21 RX ADMIN — LEVOTHYROXINE SODIUM 75 MCG: 75 TABLET ORAL at 09:04

## 2018-09-21 RX ADMIN — PROPOFOL 50 MG: 10 INJECTION, EMULSION INTRAVENOUS at 10:11

## 2018-09-21 RX ADMIN — ALPRAZOLAM 0.5 MG: 0.5 TABLET ORAL at 21:23

## 2018-09-21 RX ADMIN — MULTIPLE VITAMINS W/ MINERALS TAB 1 TABLET: TAB at 09:04

## 2018-09-21 RX ADMIN — INSULIN LISPRO 1 UNITS: 100 INJECTION, SOLUTION INTRAVENOUS; SUBCUTANEOUS at 20:02

## 2018-09-21 ASSESSMENT — PAIN SCALES - GENERAL
PAINLEVEL_OUTOF10: 0
PAINLEVEL_OUTOF10: 1
PAINLEVEL_OUTOF10: 0

## 2018-09-21 NOTE — H&P
[address] [place] [address]                               HISTORY AND PHYSICAL    PATIENT NAME: Rosie Velez                    :        1945  MED REC NO:   [id number]                           ROOM:       [id number]  ACCOUNT NO:   [de-identified]                           ADMIT DATE: 2018  PROVIDER:     Emily Wheeler M.D. Seen for the hospitalist service. CHIEF COMPLAINT:  Profound weakness. HISTORY OF PRESENT ILLNESS:  This is a 70-year-old woman with known history  of paroxysmal AFib, on chronic anticoagulation with Xarelto, previous  history of GI bleed. The patient was here in May for similar symptoms with  acute GI bleed. Did undergo EGD on 05/10 and had _____ gastric polyps  noted, was continued on PPIs and also had a colonoscopy and small polyps  were removed. She was scheduled to have outpatient small bowel follow  through and is to see Dr. Audra Rivera next week. The patient is back on  Xarelto. She has been noticing her stools to be black in color and  informed the nurses at the nursing home. She also noticed blood when she  was wiping, but that they felt that it was from her iron and hence were  monitoring her. As her weakness was profuse, she was sent to the hospital.  Here in the workup, her hemoglobin was at around 8. She denies any chest  pain, but did agree to being profoundly weak and dizzy, but did not have  any loss of consciousness. She has also complained of being short of  breath. No fever. No chills. No abdominal pain. No nausea or vomiting. PAST MEDICAL HISTORY:  Significant for:  1. Previous history of GI bleed, which she was concerned maybe from her  small bowel. We will schedule for outpatient small bowel follow through. 2.  History of colonic and gastric polyps removed. 3.  History of paroxysmal atrial fibrillation, on Xarelto. 4.  History of cardiomyopathy, EF of 45%.   5.

## 2018-09-21 NOTE — ANESTHESIA PRE PROCEDURE
artery disease involving native coronary artery of native heart without angina pectoris I25.10    RUQ abdominal pain R10.11    Gallbladder sludge K82.8    Bacteremia due to Gram-positive bacteria I15.60    Acute systolic congestive heart failure ([facility]) I50.21    Severe sepsis with septic shock ([facility]) A41.9, R65.21    CKD (chronic kidney disease), stage III N18.3    DM II (diabetes mellitus, type II), controlled ([facility]) E11.9    Cardiomyopathy, dilated ([facility]) I42.0    Coronary artery disease involving native coronary artery of native heart I25.10    Acute pulmonary edema ([facility]) J81.0    Acute kidney injury superimposed on CKD ([facility]) N17.9, N18.9    HFrEF (heart failure with reduced ejection fraction) ([facility]) I50.20    Fever R50.9    Weakness R53.1    Acute on chronic renal insufficiency N28.9, N18.9    Hypotension I95.9    Episode of unresponsiveness R41.89    Cardiomyopathy ([facility]) I42.9    Hyperkalemia E87.5    Hypokalemia E87.6    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    GABBY (acute kidney injury) ([facility] N17.9    Chronic respiratory failure with hypoxia ([facility]) J96.11    Influenza with respiratory manifestation other than pneumonia J11.1    Klebsiella pneumonia ([facility]) J15.0    Chest pain R07.9    Acute on chronic respiratory failure with hypoxia ([facility]) J96.21    Hiatal hernia K44.9    Gastroesophageal reflux disease K21.9    Gastroesophageal reflux disease with esophagitis K21.0    S/P Nissen fundoplication (without gastrostomy tube) procedure Z98.890    Bradycardia R00.1    Symptomatic sinus bradycardia R00.1    Tachycardia-bradycardia syndrome ([facility]) I49.5    Arrhythmia I49.9    Atrial fibrillation ([facility]) I48.91    Encounter for electronic analysis of reveal event recorder Z45.09    Frequent PVCs I49.3    GI bleed K92.2    Com variab immunodef w predom abnlt of B-cell nums & functn ([facility] D83.0    Altered mental status R41.82       Past Medical History:        Diagnosis Date    Anemia     Atrial fibrillation ([facility] [address] 11/9/2017    CAD (coronary artery disease)     CHF (congestive heart failure) (HCC)     Chronic kidney disease     stage 3 kidney     DDD (degenerative disc disease), lumbar     Depression     Frequent PVCs 12/13/2017    GERD (gastroesophageal reflux disease)     History of blood transfusion     Hx of blood clots 09/2017    pulmonary emboli    Hyperlipidemia     Hypertension     Hyperthyroidism     Movement disorder     DDD    Neuropathy     Obesity     Pneumonia 2016    sepsis    Sleep apnea     usually wears cpap at night    Status post right and left heart catheterization     Type II or unspecified type diabetes mellitus without mention of complication, not stated as uncontrolled        Past Surgical History:        Procedure Laterality Date    ABDOMEN SURGERY      ACHILLES TENDON SURGERY Bilateral     BLADDER SUSPENSION      CARDIAC SURGERY  2016    [facility]    COLONOSCOPY Left 5/11/2018    COLONOSCOPY POLYPECTOMY SNARE/COLD BIOPSY performed by Kennedy Wall MD at [facility]    ENDOSCOPY, COLON, DIAGNOSTIC      GASTRIC FUNDOPLICATION      HAMMER TOE SURGERY Right     HERNIA REPAIR      HIATAL HERNIA REPAIR  09/06/2016    Laparoscopic Robotic with Nissen Fundoplication - Dr. Kelsey Prince      right    TENDON RELEASE Left 08/09/2016    left foot    TUBAL LIGATION      TUNNELED VENOUS PORT PLACEMENT  11/06/2017    UPPER GASTROINTESTINAL ENDOSCOPY Left 5/10/2018    EGD BIOPSY performed by Kennedy Wall MD at [facility]       Social History:    Social History   Substance Use Topics    Smoking status: Former Smoker     Packs/day: 1.00     Years: 30.00     Types: Cigarettes     Quit date: 5/10/2006    Smokeless tobacco: Never Used    Alcohol use No                                Counseling given: Not Answered      Vital Signs (Current):   Vitals:    09/21/18 8919 09/21/18 4077 09/21/18 0700 09/21/18 0900   BP: (!) 93/54 (!) 104/54 (!) 105/53 (!) 98/57   Pulse: 61 63  56   Resp: 18   16   Temp: 36.9 °C (98.4 °F)   36.8 °C (98.3 °F)   TempSrc: Oral   Oral   SpO2: 98%   97%   Weight:       Height:                                                  BP Readings from Last 3 Encounters:   09/21/18 (!) 98/57   08/31/18 118/69   08/02/18 117/75       NPO Status:                                                                                 BMI:   Wt Readings from Last 3 Encounters:   09/21/18 221 lb 1.6 oz (100.3 kg)   08/31/18 215 lb (97.5 kg)   08/27/18 215 lb (97.5 kg)     Body mass index is 35.69 kg/m².     CBC:   Lab Results   Component Value Date    WBC 4.7 09/21/2018    RBC 2.60 09/21/2018    HGB 7.7 09/21/2018    HGB 7.6 09/21/2018    HCT 24.4 09/21/2018    HCT 23.9 09/21/2018    MCV 93.8 09/21/2018    RDW 15.2 06/13/2018     09/21/2018       CMP:   Lab Results   Component Value Date     09/21/2018    K 3.6 09/21/2018    K 3.8 06/13/2018     09/21/2018    CO2 27 09/21/2018    BUN 22 09/21/2018    CREATININE 1.2 09/21/2018    LABGLOM 44 09/21/2018    GLUCOSE 100 09/21/2018    PROT 5.4 09/21/2018    CALCIUM 8.2 09/21/2018    BILITOT 0.7 09/21/2018    ALKPHOS 68 09/21/2018    AST 15 09/21/2018    ALT 7 09/21/2018       POC Tests:   Recent Labs      09/21/18   0659   POCGLU  105       Coags:   Lab Results   Component Value Date    INR 1.23 05/10/2018    APTT 70.9 05/09/2018       HCG (If Applicable): No results found for: PREGTESTUR, PREGSERUM, HCG, HCGQUANT     ABGs: No results found for: PHART, PO2ART, FHZ2JOX, APE4AYS, BEART, G1HUZGCM     Type & Screen (If Applicable):  Lab Results   Component Value Date    [facility] POS 09/21/2018       Anesthesia Evaluation  Patient summary reviewed and Nursing notes reviewed no history of anesthetic complications:   Airway: Mallampati: II  TM distance: >3 FB   Neck ROM: full  Mouth opening: > = 3 FB Dental:          Pulmonary:normal exam (+) sleep apnea: on CPAP,                             Cardiovascular:    (+) hypertension:, CAD:, dysrhythmias: atrial fibrillation, CHF: systolic, hyperlipidemia                  Neuro/Psych:   (+) depression/anxiety              ROS comment: Benign essential tremor GI/Hepatic/Renal:   (+) hiatal hernia, GERD:, renal disease: CRI,           Endo/Other:    (+) DiabetesType II DM, using insulin, hyperthyroidism::., .                 Abdominal:   (+) obese,         Vascular:   + DVT, PE. Anesthesia Plan      MAC     ASA 3       Induction: intravenous. Anesthetic plan and risks discussed with patient. Plan discussed with attending.                   Abigail Richards, BRIAN - CRNA   9/21/2018

## 2018-09-21 NOTE — H&P
[facility]   Pre-Operative History and Physical: EGD    Patient: Laura Canales  : 1945     History Obtained From:  patient, electronic medical record    HISTORY OF PRESENT ILLNESS:    The patient is a 75 y.o. female who presents for an EGD for Melena     Past Medical History:        Diagnosis Date    Anemia     Atrial fibrillation ([address] 2017    CAD (coronary artery disease)     CHF (congestive heart failure) (HCC)     Chronic kidney disease     stage 3 kidney     DDD (degenerative disc disease), lumbar     Depression     Frequent PVCs 2017    GERD (gastroesophageal reflux disease)     History of blood transfusion     Hx of blood clots 2017    pulmonary emboli    Hyperlipidemia     Hypertension     Hyperthyroidism     Movement disorder     DDD    Neuropathy     Obesity     Pneumonia 2016    sepsis    Sleep apnea     usually wears cpap at night    Status post right and left heart catheterization     Type II or unspecified type diabetes mellitus without mention of complication, not stated as uncontrolled      Past Surgical History:        Procedure Laterality Date    ABDOMEN SURGERY      ACHILLES TENDON SURGERY Bilateral     BLADDER SUSPENSION      [facility]    COLONOSCOPY Left 2018    COLONOSCOPY POLYPECTOMY SNARE/COLD BIOPSY performed [facility]    ENDOSCOPY, COLON, DIAGNOSTIC      GASTRIC FUNDOPLICATION      HAMMER TOE SURGERY Right     HERNIA REPAIR      HIATAL HERNIA REPAIR  2016    Laparoscopic Robotic with Nissen Fundoplication - Dr. Marvin Sinclair      right    TENDON RELEASE Left 2016    left foot    TUBAL LIGATION      TUNNELED VENOUS PORT PLACEMENT  2017    UPPER GASTROINTESTINAL ENDOSCOPY Left 5/10/2018    EGD BIOPSY performed by Pam Levy MD at [facility]       Medications Prior to Admission: No current facility-administered medications on file prior to encounter. Current Outpatient Prescriptions on File Prior to Encounter   Medication Sig Dispense Refill    Multiple Vitamins-Minerals (THERAPEUTIC MULTIVITAMIN-MINERALS) tablet Take 1 tablet by mouth daily      calcium carbonate (TUMS) 500 MG chewable tablet Take 1 tablet by mouth 2 times daily      ondansetron (ZOFRAN) 4 MG tablet Take 4 mg by mouth every 8 hours as needed for Nausea or Vomiting      SITagliptin (JANUVIA) 50 MG tablet Take 50 mg by mouth daily      escitalopram (LEXAPRO) 10 MG tablet Take 1 tablet by mouth daily 30 tablet 3    OXYGEN Inhale into the lungs continuous      atorvastatin (LIPITOR) 20 MG tablet Take 1 tablet by mouth nightly 30 tablet 3    pantoprazole (PROTONIX) 40 MG tablet Take 1 tablet by mouth 2 times daily Take 45 min eating 60 tablet 0    ALPRAZolam (XANAX) 0.5 MG tablet Take 0.5 mg by mouth nightly as needed.  Mono Jamil benzonatate (TESSALON) 100 MG capsule Take 200 mg by mouth 3 times daily as needed for Cough      magnesium hydroxide (MILK OF MAGNESIA CONCENTRATE) 2400 MG/10ML SUSP Take 30 mLs by mouth once as needed      cetirizine-psuedoephedrine (ZYRTEC-D) 5-120 MG per extended release tablet Take 1 tablet by mouth daily as needed for Allergies      docusate sodium (COLACE) 100 MG capsule Take 100 mg by mouth 2 times daily      acetaminophen (TYLENOL) 325 MG tablet Take 2 tablets by mouth every 4 hours as needed for Pain 120 tablet 3    bumetanide (BUMEX) 1 MG tablet Take 1 tablet by mouth daily 60 tablet 0    potassium chloride SA (K-DUR;KLOR-CON M) 20 MEQ tablet Take 0.5 tablets by mouth 2 times daily (with meals) 60 tablet 0    ferrous sulfate 325 (65 FE) MG tablet Take 325 mg by mouth 3 times daily (with meals)      levothyroxine (SYNTHROID) 75 MCG tablet Take 75 mcg by mouth Daily      pregabalin (LYRICA) 150 MG capsule Take 150 mg by mouth 3 times daily      Immune Globulin (Human) in

## 2018-09-21 NOTE — PLAN OF CARE
Problem: Pain:  Goal: Pain level will decrease  Pain level will decrease   Outcome: Ongoing  Pain Assessment: 0-10  Pain Level: 0   Pain goal:  3  Is pain goal met at this time? Yes     Additional interventions to be implemented: heat, medications Tylenol and position change    Goal: Control of acute pain  Control of acute pain   Outcome: Ongoing  Pain Assessment: 0-10  Pain Level: 0   Pain goal:  3  Is pain goal met at this time? Yes     Additional interventions to be implemented: heat, medications Tylenol and position change        Goal: Control of chronic pain  Control of chronic pain   Outcome: Ongoing  Pain Assessment: 0-10  Pain Level: 0   Pain goal:  3  Is pain goal met at this time? Yes     Additional interventions to be implemented: heat, medications Tylenol and position change          Problem: Falls - Risk of:  Goal: Will remain free from falls  Will remain free from falls   Outcome: Ongoing  Pt remains free of falls this shift, ambulates with standby assist to bedside commode, appropriately uses call light, nonskid socks and bed alarm on at all times. Goal: Absence of physical injury  Absence of physical injury   Outcome: Ongoing  Pt remains free of physical injury, will continue to provide a safe environment. Problem: Discharge Planning:  Goal: Discharged to appropriate level of care  Discharged to appropriate level of care   Outcome: Ongoing  Planning to discharge to an assisted living when medically stable, will continue to work with pt, family, and social work to determine best plan. Problem: Cardiovascular  Goal: No DVT, peripheral vascular complications  Outcome: Ongoing  No signs or symptoms of DVT noted this shift, pt wearing SCDs throughout the shift, will continue to monitor. Goal: Hemodynamic stability  Outcome: Ongoing  Pt's BP running somewhat low this shift, HR remains stable, will continue to monitor.   Goal: Anticoagulate/Hct stable  Outcome: Ongoing  H/H improving this shift,

## 2018-09-21 NOTE — PROGRESS NOTES
IM Progress Note  Dr. Ernestina Calderón  9/21/2018 8:49 AM      Patient name Amanda Prince  [id number]  PCP: Nadia Hull DO  Admit Date: 9/20/2018  Acct No. [de-identified]    Subjective: Interval History:   Pt lying in bed  No BM  Some dizziness  No cp      Diet: DIET CARDIAC;    I/O last 3 completed shifts: In: 1083 [P.O.:250; I.V.:523; Blood:310]  Out: 950 [Urine:950]  No intake/output data recorded. Admission weight: 260 lb (117.9 kg) as of 9/20/2018  2:21 PM  Wt Readings from Last 3 Encounters:   09/21/18 221 lb 1.6 oz (100.3 kg)   08/31/18 215 lb (97.5 kg)   08/27/18 215 lb (97.5 kg)     Body mass index is 35.69 kg/m². ROS   CVS;  no cp or palpitation  Resp: no SOB or cough  Neuro:  No numbness or weakness or dizziness  Abd: no nausea or vomiting or abd pain      Medications:   Scheduled Meds:   sodium chloride  250 mL Intravenous Once    atorvastatin  20 mg Oral Nightly    escitalopram  10 mg Oral Daily    levothyroxine  75 mcg Oral Daily    therapeutic multivitamin-minerals  1 tablet Oral Daily    pregabalin  150 mg Oral TID    linagliptin  5 mg Oral Daily    insulin lispro  0-6 Units Subcutaneous TID     insulin lispro  0-3 Units Subcutaneous Nightly     Continuous Infusions:   pantoprozole (PROTONIX) infusion 8 mg/hr (09/20/18 2115)    sodium chloride 75 mL/hr at 09/20/18 2115       Labs :     CBC:   Recent Labs      09/20/18   1450  09/20/18   2341  09/21/18   0640   WBC  7.9   --   4.7*   HGB  8.0*  7.2*  7.6*  7.7*   PLT  193   --   159     BMP:    Recent Labs      09/20/18   1450  09/21/18   0640   NA  142  143   K  4.1  3.6   CL  99  104   CO2  26  27   BUN  25*  22   CREATININE  1.5*  1.2   GLUCOSE  176*  100     Hepatic:   Recent Labs      09/20/18   1450  09/21/18   0640   AST  18  15   ALT  10*  7*   BILITOT  0.2*  0.7   ALKPHOS  86  68     Troponin: No results for input(s): TROPONINI in the last 72 hours.   BNP: No results for input(s): BNP in the last 72 hours. Lipids: No results for input(s): CHOL, HDL in the last 72 hours. Invalid input(s): LDLCALCU  INR: No results for input(s): INR in the last 72 hours. Radiology    Objective:   Vitals: BP (!) 105/53   Pulse 63   Temp 98.4 °F (36.9 °C) (Oral)   Resp 18 Comment: lungs clear  Ht 5' 6\" (1.676 m)   Wt 221 lb 1.6 oz (100.3 kg)   SpO2 98%   BMI 35.69 kg/m²   HEENT: Head:pupils react  Neck: supple  Lungs: clear to auscultation  Heart: regular rate and rhythm   Abdomen: soft BS heard NGNT  Extremities: warm no edema  Neurologic:  Alert, oriented X3    Impression:   :   1.  Acute blood loss anemia with acute GI bleed with chronic anticoagulation on board s/p 1unit PRBC transfused  2. History of paroxysmal AFib, on chronic anticoagulation. 3.  Acute kidney injury improved with chronic kidney disease stage 3.  4.  History of diabetes. 5.  History of obstructive sleep apnea. 6.  History of hypertension. 7.  History of hyperlipidemia. 8.  History of hypothyroidism. 9.  History of acid reflux. 10.  History of hiatal hernia repair. 11.  History of chronic congestive heart failure secondary to systolic dysfunction, EF was recorded as 45%. 12.  Obesity. 13.  Status post loop recorder. 14.  History of IVIG, immune deficiency.     Plan:     Will cont to monitor Hb and Hct  Transfuse if less than 7.5  Cont to hold xarelto  Pt aware of risk of thromboembolism  Cont to hold trajenta  Monitor ZBP   Cont IVF      Veva Schilder, MD

## 2018-09-21 NOTE — PLAN OF CARE
Problem: DISCHARGE BARRIERS  Goal: Patient's continuum of care needs are met  Outcome: Ongoing  See SW note, from [facility]

## 2018-09-21 NOTE — CONSULTS
Inpatient consult to GI  Consult performed by: Marti Arreaga ordered by: Chloé Abebe Name: Alton Almanzar  MRN: [id number]  [id number]  YOB: 1945  Admit Date: 9/20/2018  2:21 PM  Date of evaluation: 9/21/2018  Primary Care Physician: Marcelo Allan DO   [id number]-26/80-A   Dictating for Dr Joe Crabtree    Requesting Provider:   Jennifer Gunn MD    JONN Consult    Indication:  GI bleed    History:  The patient is a 75 y.o. admitted 9-20-18 for GI bleed, dizziness, ARF, and HONG. Pt states she has intermittent left chest pain, that occurs infrequently and none for a past few days. When occurred other day tums helped. It is sharp, non-radiating. She has associated melena, hematochezia scant amount, lightheadedness, fatigue past week, nausea, early satiety, bloating, eructation, GERD, and constipation. She reports having melena ongoing, not new. She describes it as sticky like. Scant hematochezia reported with wiping after BM. She has been using miralax, prune juice, and colace for her constipation and having a BM daily. She is unsure what makes this worse, but tums did help her previous chest pain. She does take xeralto daily. Hgb 9-5-18 was 13.1. Pt is set up for outpatient SBCE 9-26-18. Last EGD:  5-10-18 anatomy consistent with previous nissen fundoplication. 3.5 erythematous polyp on fold in proximal stomach appeared benign. There were no ulcers no bleeding. Last Colonoscopy:  5-11-18 Colonoscopy to base of cecum, 3 small polyps with largest being 5 mm in ascending colon other 2 to 3 mm, left sided diverticulosis, mild internal hemorrhoids, focal area irregular mucosa right at anorectal junction with biopsy condyloma acumination. No bleeding noted.  2 tubular adenomas and one tubulovillous adenoma    Active Hospital Problems    Diagnosis Date Noted    GI bleed [K92.2] 05/09/2018     Past Medical History:        Diagnosis Date    Anemia     Atrial fibrillation ([facility] [address] 11/9/2017    CAD (coronary artery disease)     CHF (congestive heart failure) (HCC)     Chronic kidney disease     stage 3 kidney     DDD (degenerative disc disease), lumbar     Depression     Frequent PVCs 12/13/2017    GERD (gastroesophageal reflux disease)     History of blood transfusion     Hx of blood clots 09/2017    pulmonary emboli    Hyperlipidemia     Hypertension     Hyperthyroidism     Movement disorder     DDD    Neuropathy     Obesity     Pneumonia 2016    sepsis    Sleep apnea     usually wears cpap at night    Status post right and left heart catheterization     Type II or unspecified type diabetes mellitus without mention of complication, not stated as uncontrolled      Past Surgical History:        Procedure Laterality Date    ABDOMEN SURGERY      ACHILLES TENDON SURGERY Bilateral     BLADDER SUSPENSION      CARDIAC SURGERY  2016    [facility]    COLONOSCOPY Left 5/11/2018    COLONOSCOPY POLYPECTOMY SNARE/COLD BIOPSY performed by Kennedy Wall MD at [facility]    ENDOSCOPY, COLON, DIAGNOSTIC      GASTRIC FUNDOPLICATION      HAMMER TOE SURGERY Right     HERNIA REPAIR      HIATAL HERNIA REPAIR  09/06/2016    Laparoscopic Robotic with Nissen Fundoplication - Dr. Kelsey Prince      right    TENDON RELEASE Left 08/09/2016    left foot    TUBAL LIGATION      TUNNELED VENOUS PORT PLACEMENT  11/06/2017    UPPER GASTROINTESTINAL ENDOSCOPY Left 5/10/2018    EGD BIOPSY performed by Kennedy Wall MD at [facility]     Allergies:  Bactrim [sulfamethoxazole-trimethoprim]; Wellbutrin [bupropion]; and Silicone  Home Meds:  Prior to Admission medications    Medication Sig Start Date End Date Taking?  Authorizing Provider   fluticasone (FLONASE) 50 MCG/ACT nasal spray 1 spray by Each Nare route daily   Yes Historical Provider, MD   insulin glargine (LANTUS) 100 UNIT/ML injection vial Inject 16 Units into every 4 hours as needed for Pain 8/18/16  Yes Alf Wiseman MD   bumetanide (BUMEX) 1 MG tablet Take 1 tablet by mouth daily 5/6/16  Yes Sharyn Reynolds MD   potassium chloride SA (K-DUR;KLOR-CON M) 20 MEQ tablet Take 0.5 tablets by mouth 2 times daily (with meals) 5/6/16  Yes Sharyn Reynolds MD   ferrous sulfate 325 (65 FE) MG tablet Take 325 mg by mouth 3 times daily (with meals)   Yes Historical Provider, MD   levothyroxine (SYNTHROID) 75 MCG tablet Take 75 mcg by mouth Daily   Yes Historical Provider, MD   pregabalin (LYRICA) 150 MG capsule Take 150 mg by mouth 3 times daily   Yes Historical Provider, MD   Immune Globulin (Human) in dextrose solution Infuse 10 g intravenously every 30 days    Historical Provider, MD      Current Meds:       Current Facility-Administered Medications:     0.9 % sodium chloride bolus, 250 mL, Intravenous, Once, Asha Andrade MD, Last Rate: 20 mL/hr at 09/21/18 0336, 250 mL at 09/21/18 0336    acetaminophen (TYLENOL) tablet 650 mg, 650 mg, Oral, Q4H PRN, Asha Andrade MD, 650 mg at 09/20/18 2117    ALPRAZolam (XANAX) tablet 0.5 mg, 0.5 mg, Oral, Nightly PRN, Asha Andrade MD, 0.5 mg at 09/20/18 2116    atorvastatin (LIPITOR) tablet 20 mg, 20 mg, Oral, Nightly, Asha Andrade MD, 20 mg at 09/20/18 2115    escitalopram (LEXAPRO) tablet 10 mg, 10 mg, Oral, Daily, Asha Andrade MD, 10 mg at 09/21/18 0904    levothyroxine (SYNTHROID) tablet 75 mcg, 75 mcg, Oral, Daily, Asha Andrade MD, 75 mcg at 09/21/18 0904    therapeutic multivitamin-minerals 1 tablet, 1 tablet, Oral, Daily, Asha Andrade MD, 1 tablet at 09/21/18 0904    pregabalin (LYRICA) capsule 150 mg, 150 mg, Oral, TID, Asha Andrade MD, 150 mg at 09/21/18 0904    linagliptin (TRADJENTA) tablet 5 mg, 5 mg, Oral, Daily, Asha Andrade MD, 5 mg at 09/20/18 2115    pantoprazole (PROTONIX) 80 mg in sodium chloride 0.9 % 100 mL infusion, 8 mg/hr, Intravenous, Continuous, Bella Che MD, Last Rate: 10 mL/hr at 09/20/18 2115, 8 mg/hr at 09/20/18 2115    0.45 % sodium chloride infusion, , Intravenous, Continuous, Bella Che MD, Last Rate: 75 mL/hr at 09/20/18 2115    insulin lispro (HUMALOG) injection vial 0-6 Units, 0-6 Units, Subcutaneous, TID WC, Bella Che MD    insulin lispro (HUMALOG) injection vial 0-3 Units, 0-3 Units, Subcutaneous, Nightly, Bella Che MD  Social History:   Social History     Social History    Marital status:      Spouse name: N/A    Number of children: 3    Years of education: N/A     Occupational History    Not on file. Social History Main Topics    Smoking status: Former Smoker     Packs/day: 1.00     Years: 30.00     Types: Cigarettes     Quit date: 5/10/2006    Smokeless tobacco: Never Used    Alcohol use No    Drug use: No    Sexual activity: Not on file     Other Topics Concern    Not on file     Social History Narrative    No narrative on file     Family History:   Sister with breast cancer  Brother lung cancer  Family History   Problem Relation Age of Onset    Cancer Mother     Heart Disease Mother     High Blood Pressure Mother     Diabetes Mother     Vision Loss Mother     Stroke Mother     COPD Father     Diabetes Father     COPD Brother        ROS:  General : no fever chills or weight loss   Eyes: No loss of vision  ENT: No  vocal hoarseness   Cardiovascular: + chest pain. Respiratory: No Sob, cough  GI:+ melena, hematochezia, constipation, nausea, early satiety, eructation, bloating,. NO diarrhea. : No dysuria  GYN: No abnormal menstrual bleeding  Musculoskeletal: No new painful or swollen joints.  .   Skin: No rashes, jaundice  Neurologic: no frequent headaches, migraines  Emotional: No anxiety or depression  Endocrine:  No polyuria, polydipsia, polyphagia  Blood: No anemia, blood product or blood product transfusion   Allergic/Immunologic: No lupus or HIV  Cancer: No personal history of cancer     Physical Exam:  BP (!) 98/57   Pulse 56   Temp 98.3 °F (36.8 °C) (Oral)   Resp 16   Ht 5' 6\" (1.676 m)   Wt 221 lb 1.6 oz (100.3 kg)   SpO2 97%   BMI 35.69 kg/m²     The patient is a  75 y.o.  female in no acute distress. HEAD: Normal cephalic/atraumatic. Extra-occular motions intact bilaterally. NECK: No lymphadenopathy or bruits. Trachea is midline. CHEST: Rises equally on inspiration. Clear to auscultation bilaterally. CARDIOVASCULAR: Regular rate and rhythm without murmurs, rubs or gallops. ABDOMEN: Soft and nondistended with normal bowel sounds. No Hepatosplemomegaly or masses noted   Tender:  Non-tender, except mild left lower abd pain with palpation  EXTREMITIES: no cyanosis, clubbing, or edema. DERM: no rash or jaundice. NEURO: alert and oriented times four with appropriate affect    Recent Labs      09/20/18   1450  09/20/18   2341  09/21/18   0640   WBC  7.9   --   4.7*   HGB  8.0*  7.2*  7.6*  7.7*   HCT  24.0*  21.9*  23.9*  24.4*   PLT  193   --   159   AST  18   --   15   ALT  10*   --   7*   BILITOT  0.2*   --   0.7   ALKPHOS  86   --   68       Assessment:  1. GI bleed, likely upper with melena. 2. Anemia, normocytic. Hgb 7.6 today after one unit PRBC. Was 13.1 on 9-5-18. +FOBT  3. Melena, not new but persists per pt  4. Hematochezia, scant amount,   5. Nausea  6. Bloating  7. Early satiety  8. GERD  9. Eructation  10. Chest pain, intermittent, none in past few days  11. Constipation  12. DM 2  13. HTN  14. CAD  13. CKD stage 3  16. A-fib  17. Xeralto use, last dose 9-19-18 per pt  18. Cardiomyopathy  19. Hx PE  20. Hx nissen fundoplication  21. Hx IVIG deficiency    Plan:  1. Protonix gtt  2. NPO  3. EGD with Dr Sierra Arauz today with possible intervention to control bleeding  4. Pt has scheduled SBCE as outpatient 9-26-18 per office/patient  5. Xeralto on hold currently.  Last dose 9-19-18 evening per

## 2018-09-21 NOTE — PROGRESS NOTES
Dr. Soledad Segura gave telephone order to access port, WITHOUT use of Heparin. 5K Zhao WOOD contacted, clarified that Heparin would not be used, stated he would come access [facility].

## 2018-09-21 NOTE — PLAN OF CARE
Problem: Nutrition  Goal: Optimal nutrition therapy  Outcome: Ongoing  Nutrition Problem: Inadequate oral intake  Intervention: Food and/or Nutrient Delivery: Continue NPO, Start ONS  Nutritional Goals: Pt. will receive adequate nutrition (FL diet or greater) in next 1-4 days.

## 2018-09-22 LAB
ANION GAP SERPL CALCULATED.3IONS-SCNC: 9 MEQ/L (ref 8–16)
BASOPHILS # BLD: 0.7 %
BASOPHILS ABSOLUTE: 0 THOU/MM3 (ref 0–0.1)
BUN BLDV-MCNC: 14 MG/DL (ref 7–22)
CALCIUM SERPL-MCNC: 7.8 MG/DL (ref 8.5–10.5)
CHLORIDE BLD-SCNC: 108 MEQ/L (ref 98–111)
CO2: 28 MEQ/L (ref 23–33)
CREAT SERPL-MCNC: 1.1 MG/DL (ref 0.4–1.2)
EOSINOPHIL # BLD: 4.8 %
EOSINOPHILS ABSOLUTE: 0.2 THOU/MM3 (ref 0–0.4)
ERYTHROCYTE [DISTWIDTH] IN BLOOD BY AUTOMATED COUNT: 16.5 % (ref 11.5–14.5)
ERYTHROCYTE [DISTWIDTH] IN BLOOD BY AUTOMATED COUNT: 52.3 FL (ref 35–45)
GFR SERPL CREATININE-BSD FRML MDRD: 49 ML/MIN/1.73M2
GLUCOSE BLD-MCNC: 106 MG/DL (ref 70–108)
GLUCOSE BLD-MCNC: 156 MG/DL (ref 70–108)
GLUCOSE BLD-MCNC: 202 MG/DL (ref 70–108)
GLUCOSE BLD-MCNC: 92 MG/DL (ref 70–108)
GLUCOSE BLD-MCNC: 94 MG/DL (ref 70–108)
HCT VFR BLD CALC: 22.9 % (ref 37–47)
HCT VFR BLD CALC: 23.1 % (ref 37–47)
HCT VFR BLD CALC: 24.3 % (ref 37–47)
HEMOGLOBIN: 7.4 GM/DL (ref 12–16)
HEMOGLOBIN: 7.4 GM/DL (ref 12–16)
HEMOGLOBIN: 7.8 GM/DL (ref 12–16)
IMMATURE GRANS (ABS): 0.01 THOU/MM3 (ref 0–0.07)
IMMATURE GRANULOCYTES: 0.2 %
LYMPHOCYTES # BLD: 38.8 %
LYMPHOCYTES ABSOLUTE: 1.6 THOU/MM3 (ref 1–4.8)
MCH RBC QN AUTO: 29.6 PG (ref 26–33)
MCHC RBC AUTO-ENTMCNC: 32 GM/DL (ref 32.2–35.5)
MCV RBC AUTO: 92.4 FL (ref 81–99)
MONOCYTES # BLD: 8.4 %
MONOCYTES ABSOLUTE: 0.4 THOU/MM3 (ref 0.4–1.3)
NUCLEATED RED BLOOD CELLS: 0 /100 WBC
PLATELET # BLD: 174 THOU/MM3 (ref 130–400)
PMV BLD AUTO: 10.1 FL (ref 9.4–12.4)
POTASSIUM SERPL-SCNC: 3.7 MEQ/L (ref 3.5–5.2)
RBC # BLD: 2.5 MILL/MM3 (ref 4.2–5.4)
SEG NEUTROPHILS: 47.1 %
SEGMENTED NEUTROPHILS ABSOLUTE COUNT: 2 THOU/MM3 (ref 1.8–7.7)
SODIUM BLD-SCNC: 145 MEQ/L (ref 135–145)
WBC # BLD: 4.2 THOU/MM3 (ref 4.8–10.8)

## 2018-09-22 PROCEDURE — 80048 BASIC METABOLIC PNL TOTAL CA: CPT

## 2018-09-22 PROCEDURE — 85018 HEMOGLOBIN: CPT

## 2018-09-22 PROCEDURE — 85014 HEMATOCRIT: CPT

## 2018-09-22 PROCEDURE — 1200000003 HC TELEMETRY R&B

## 2018-09-22 PROCEDURE — 6360000002 HC RX W HCPCS: Performed by: INTERNAL MEDICINE

## 2018-09-22 PROCEDURE — 2580000003 HC RX 258: Performed by: NURSE PRACTITIONER

## 2018-09-22 PROCEDURE — 82948 REAGENT STRIP/BLOOD GLUCOSE: CPT

## 2018-09-22 PROCEDURE — 85025 COMPLETE CBC W/AUTO DIFF WBC: CPT

## 2018-09-22 PROCEDURE — C9113 INJ PANTOPRAZOLE SODIUM, VIA: HCPCS | Performed by: INTERNAL MEDICINE

## 2018-09-22 PROCEDURE — 6370000000 HC RX 637 (ALT 250 FOR IP): Performed by: INTERNAL MEDICINE

## 2018-09-22 PROCEDURE — 36591 DRAW BLOOD OFF VENOUS DEVICE: CPT

## 2018-09-22 RX ORDER — PREGABALIN 150 MG/1
150 CAPSULE ORAL 3 TIMES DAILY
Qty: 9 CAPSULE | Refills: 0 | Status: ON HOLD | OUTPATIENT
Start: 2018-09-22 | End: 2022-04-30 | Stop reason: HOSPADM

## 2018-09-22 RX ORDER — ALPRAZOLAM 0.5 MG/1
0.5 TABLET ORAL NIGHTLY PRN
Qty: 3 TABLET | Refills: 0 | Status: SHIPPED | OUTPATIENT
Start: 2018-09-22 | End: 2018-09-25

## 2018-09-22 RX ORDER — PANTOPRAZOLE SODIUM 40 MG/10ML
40 INJECTION, POWDER, LYOPHILIZED, FOR SOLUTION INTRAVENOUS 2 TIMES DAILY
DISCHARGE
Start: 2018-09-23 | End: 2018-09-23 | Stop reason: HOSPADM

## 2018-09-22 RX ADMIN — Medication 10 ML: at 08:01

## 2018-09-22 RX ADMIN — PREGABALIN 150 MG: 75 CAPSULE ORAL at 19:48

## 2018-09-22 RX ADMIN — ACETAMINOPHEN 650 MG: 325 TABLET ORAL at 19:48

## 2018-09-22 RX ADMIN — ACETAMINOPHEN 650 MG: 325 TABLET ORAL at 13:40

## 2018-09-22 RX ADMIN — MULTIPLE VITAMINS W/ MINERALS TAB 1 TABLET: TAB at 08:01

## 2018-09-22 RX ADMIN — ESCITALOPRAM OXALATE 10 MG: 10 TABLET, FILM COATED ORAL at 08:01

## 2018-09-22 RX ADMIN — PREGABALIN 150 MG: 75 CAPSULE ORAL at 13:41

## 2018-09-22 RX ADMIN — ATORVASTATIN CALCIUM 20 MG: 20 TABLET, FILM COATED ORAL at 19:49

## 2018-09-22 RX ADMIN — LEVOTHYROXINE SODIUM 75 MCG: 75 TABLET ORAL at 05:29

## 2018-09-22 RX ADMIN — INSULIN LISPRO 1 UNITS: 100 INJECTION, SOLUTION INTRAVENOUS; SUBCUTANEOUS at 20:26

## 2018-09-22 RX ADMIN — PANTOPRAZOLE SODIUM 40 MG: 40 INJECTION, POWDER, FOR SOLUTION INTRAVENOUS at 17:27

## 2018-09-22 RX ADMIN — Medication 1 UNITS: at 12:32

## 2018-09-22 RX ADMIN — ALPRAZOLAM 0.5 MG: 0.5 TABLET ORAL at 19:48

## 2018-09-22 RX ADMIN — PREGABALIN 150 MG: 75 CAPSULE ORAL at 08:01

## 2018-09-22 RX ADMIN — PANTOPRAZOLE SODIUM 40 MG: 40 INJECTION, POWDER, FOR SOLUTION INTRAVENOUS at 08:01

## 2018-09-22 RX ADMIN — LINAGLIPTIN 5 MG: 5 TABLET, FILM COATED ORAL at 08:01

## 2018-09-22 ASSESSMENT — PAIN DESCRIPTION - LOCATION
LOCATION: HEAD
LOCATION: ABDOMEN;HEAD

## 2018-09-22 ASSESSMENT — PAIN SCALES - GENERAL
PAINLEVEL_OUTOF10: 3
PAINLEVEL_OUTOF10: 5
PAINLEVEL_OUTOF10: 0
PAINLEVEL_OUTOF10: 0

## 2018-09-22 ASSESSMENT — PAIN DESCRIPTION - DESCRIPTORS
DESCRIPTORS: ACHING
DESCRIPTORS: ACHING

## 2018-09-22 ASSESSMENT — PAIN DESCRIPTION - PAIN TYPE
TYPE: ACUTE PAIN
TYPE: ACUTE PAIN

## 2018-09-22 NOTE — PROGRESS NOTES
results for input(s): TROPONINI in the last 72 hours. BNP: No results for input(s): BNP in the last 72 hours. Lipids: No results for input(s): CHOL, HDL in the last 72 hours. Invalid input(s): LDLCALCU  INR: No results for input(s): INR in the last 72 hours. Radiology    EGD: Findings:  Esophagus: The GE junction was noted to be at 36 cm. The esophagus appeared normal without evidence of Smith's esophagus or reflux esophagitis. Stomach: The stomach appeared normal on forward and retroflexed views  Duodenum: The first and 2nd portions of the duodenum appeared normal with normal villous pattern. Mild erythema however was noted in the duodenal bulb. No ulcers or erosions noted      Objective:   Vitals: BP (!) 111/56   Pulse 88   Temp 98.1 °F (36.7 °C) (Oral)   Resp 16   Ht 5' 6\" (1.676 m)   Wt 219 lb 14.4 oz (99.7 kg)   SpO2 94%   BMI 35.49 kg/m²   HEENT: Not pale anicteric , oropharynx moist and pink  Neck: supple  Lungs: clear to auscultation  Heart: regular rhythm , normal S1/S2 no gallops  Abdomen: Obese , soft, nontender , BS heard  Extremities: warm nocyanosis or edema  Neurologic:  Alert, oriented X3    Impression:   :   1.  Acute blood loss anemia with acute GI bleed  Patient on chronic anticoagulation    2. History of paroxysmal AFib, on chronic anticoagulation. 3.  Acute kidney injury improved with chronic kidney disease stage 3.  4.  History of diabetes. 5.  History of obstructive sleep apnea. 6.  History of hypertension. 7.  History of hyperlipidemia. 8.  History of hypothyroidism. 9.  History of acid reflux. 10.  History of hiatal hernia repair. 11.  History of chronic congestive heart failure secondary to systolic dysfunction, EF was recorded as 45%. 12.  Obesity. 13.  Status post loop recorder.   14.  History of IVIG, immune deficiency.     Plan:    Hemoglobin stable, s/p 1unit PRBC transfused  Continue to hold xarelto, continue Protonix 40 mg Orally bid pt is s/p  EGD

## 2018-09-22 NOTE — PLAN OF CARE
Problem: Pain:  Goal: Pain level will decrease  Pain level will decrease   Outcome: Ongoing  No complaints of pain this shift, will continue to assess. Problem: Falls - Risk of:  Goal: Will remain free from falls  Will remain free from falls   Outcome: Ongoing  No falls this shift. Patient ambulates with standby staff assistance. Bed alarm remains on, call light in reach. Problem: Discharge Planning:  Goal: Discharged to appropriate level of care  Discharged to appropriate level of care   Outcome: Ongoing  Planning to return to [facility] at discharge. Problem: Cardiovascular  Goal: No DVT, peripheral vascular complications  Outcome: Ongoing  No signs of DVT this shift. SCD's remain on. Problem: Skin Integrity/Risk  Goal: No skin breakdown during hospitalization  Outcome: Ongoing  No new skin breakdown this shift. Patient repositioned every two hours. Comments: Care plan reviewed with patient. Patient verbalize understanding of the plan of care and contribute to goal setting.

## 2018-09-22 NOTE — PROGRESS NOTES
Gastrointestinal Progress Note      Patient:  Amanda Prince  Unit/Bed:4K-27/027-A       YOB: 1945    MRN: [id number]                      Acct: [de-identified]     Admit date: 9/20/2018      Subjective:  Patient lying in bed. We reviewed that her hgb is stable and that she has not had any further bleeding. She wants to eat more and try to go back to ECF. Objective:       CBC:   Recent Labs      09/20/18   1450   09/21/18   0640   09/21/18   1800  09/21/18   2340  09/22/18   0535   WBC  7.9   --   4.7*   --    --    --   4.2*   HGB  8.0*   < >  7.6*  7.7*   < >  8.2*  7.4*  7.4*   PLT  193   --   159   --    --    --   174    < > = values in this interval not displayed. BMP:    Recent Labs      09/20/18   1450  09/21/18   0640  09/22/18   0535   NA  142  143  145   K  4.1  3.6  3.7   CL  99  104  108   CO2  26  27  28   BUN  25*  22  14   CREATININE  1.5*  1.2  1.1   GLUCOSE  176*  100  94     Calcium:  Recent Labs      09/22/18   0535   CALCIUM  7.8*     Ionized Calcium:No results for input(s): IONCA in the last 72 hours. Magnesium:No results for input(s): MG in the last 72 hours. Phosphorus:No results for input(s): PHOS in the last 72 hours. INR: No results for input(s): INR in the last 72 hours. Hepatic:   Recent Labs      09/20/18   1450  09/21/18   0640   ALKPHOS  86  68   ALT  10*  7*   AST  18  15   PROT  6.3  5.4*   BILITOT  0.2*  0.7   BILIDIR  <0.2   --    LABALBU  3.7  3.2*     Amylase and Lipase:No results for input(s): LACTA, AMYLASE in the last 72 hours. Lactic Acid: No results for input(s): LACTA in the last 72 hours. Physical Exam:  Vitals:    09/22/18 0355   BP: (!) 99/51   Pulse: 50   Resp: 18   Temp: 97.7 °F (36.5 °C)   SpO2: 99%     GEN: Well nourished, well developed. LUNGS:  Clear to auscultation bilaterally. Chest rises equally on inspiration. CARDIOVASCULAR:  Regular rate and rhythm without murmurs, rubs or gallops.   ABDOMEN:  Soft, larger,

## 2018-09-23 VITALS
OXYGEN SATURATION: 92 % | WEIGHT: 219.7 LBS | BODY MASS INDEX: 35.31 KG/M2 | DIASTOLIC BLOOD PRESSURE: 61 MMHG | TEMPERATURE: 98.6 F | HEART RATE: 50 BPM | HEIGHT: 66 IN | RESPIRATION RATE: 18 BRPM | SYSTOLIC BLOOD PRESSURE: 121 MMHG

## 2018-09-23 LAB
GLUCOSE BLD-MCNC: 96 MG/DL (ref 70–108)
MRSA SCREEN: NORMAL
VRE CULTURE: NORMAL

## 2018-09-23 PROCEDURE — 6370000000 HC RX 637 (ALT 250 FOR IP): Performed by: INTERNAL MEDICINE

## 2018-09-23 PROCEDURE — 82948 REAGENT STRIP/BLOOD GLUCOSE: CPT

## 2018-09-23 PROCEDURE — 6360000002 HC RX W HCPCS: Performed by: INTERNAL MEDICINE

## 2018-09-23 PROCEDURE — C9113 INJ PANTOPRAZOLE SODIUM, VIA: HCPCS | Performed by: INTERNAL MEDICINE

## 2018-09-23 PROCEDURE — 2709999900 HC NON-CHARGEABLE SUPPLY

## 2018-09-23 PROCEDURE — 2580000003 HC RX 258: Performed by: NURSE PRACTITIONER

## 2018-09-23 RX ORDER — HEPARIN SODIUM (PORCINE) LOCK FLUSH IV SOLN 100 UNIT/ML 100 UNIT/ML
500 SOLUTION INTRAVENOUS ONCE
Status: COMPLETED | OUTPATIENT
Start: 2018-09-23 | End: 2018-09-23

## 2018-09-23 RX ORDER — PANTOPRAZOLE SODIUM 40 MG/1
40 TABLET, DELAYED RELEASE ORAL 2 TIMES DAILY
Qty: 30 TABLET | Refills: 3 | Status: ON HOLD | DISCHARGE
Start: 2018-09-23 | End: 2021-07-26 | Stop reason: HOSPADM

## 2018-09-23 RX ADMIN — PANTOPRAZOLE SODIUM 40 MG: 40 INJECTION, POWDER, FOR SOLUTION INTRAVENOUS at 08:23

## 2018-09-23 RX ADMIN — MULTIPLE VITAMINS W/ MINERALS TAB 1 TABLET: TAB at 08:23

## 2018-09-23 RX ADMIN — LEVOTHYROXINE SODIUM 75 MCG: 75 TABLET ORAL at 06:50

## 2018-09-23 RX ADMIN — LINAGLIPTIN 5 MG: 5 TABLET, FILM COATED ORAL at 08:23

## 2018-09-23 RX ADMIN — Medication 10 ML: at 08:23

## 2018-09-23 RX ADMIN — PREGABALIN 150 MG: 75 CAPSULE ORAL at 08:23

## 2018-09-23 RX ADMIN — Medication 10 ML: at 09:35

## 2018-09-23 RX ADMIN — HEPARIN 500 UNITS: 100 SYRINGE at 09:35

## 2018-09-23 RX ADMIN — ESCITALOPRAM OXALATE 10 MG: 10 TABLET, FILM COATED ORAL at 08:23

## 2018-09-23 ASSESSMENT — PAIN SCALES - GENERAL
PAINLEVEL_OUTOF10: 0
PAINLEVEL_OUTOF10: 0

## 2018-09-23 NOTE — PROGRESS NOTES
1028 and 1030 Attempted to call report to nursing home. 1058 Report called to [facility] at [facility], answered all questions. 1130 Discharge teaching and instructions for diagnosis/procedure of GI Bleed completed with patient using teachback method. AVS reviewed. Printed prescriptions given to patient. Patient voiced understanding regarding prescriptions, follow up appointments, and care of self at home.  Discharged in a wheelchair to  skilled nursing per family

## 2018-09-23 NOTE — DISCHARGE SUMMARY
Internal Medicine Specialties     Discharge Summary      Patient Identification:   Jennie Harris   : 1945  MRN: [id number]   Account: [de-identified]      Patient's PCP: Joan Meyers DO    Admit Date: 2018     Discharge Date:   18    Admitting Physician: Aye Rubio MD     Discharge Physician: Gypsy Ku MD     Discharge Diagnoses: Active Problems:    GI bleed  Resolved Problems:    * No resolved hospital problems. *      The patient was seen and examined on day of discharge and this discharge summary is in conjunction with any daily progress note from day of discharge. Hospital Course:   Jennie Harris is a 75 y.o. female admitted to [facility] on 2018 who has a history of paroxysmal AFib, on chronic anticoagulation with Xarelto, previous  history of GI bleed. The patient was here in May for similar symptoms with  acute GI bleed. Did undergo EGD on 05/10   and also had a colonoscopy and small polyps were removed. She was scheduled to have outpatient small bowel followthrough and is to see Dr. Carlos Prater next week. The patient had been back on Xarelto. She has been noticing her stools to be black in color and informed the nurses at the nursing home. She also noticed blood when she was wiping, but that they felt that it was from her iron and hence were monitoring her. As her weakness was profuse, she was sent to the hospital. Here in the workup, her hemoglobin was at around 8. She denies any chest pain, but did agree to being profoundly weak and dizzy. She was  Transfused 1unit PRBC, had EGD, EGD: Findings:  Esophagus: The GE junction was noted to be at 36 cm. The esophagus appeared normal without evidence of Smith's esophagus or reflux esophagitis. Stomach: The stomach appeared normal on forward and retroflexed views  Duodenum: The first and 2nd portions of the duodenum appeared normal with normal villous pattern.  Mild erythema however was noted in the duodenal bulb. No ulcers or erosions noted  We Continued to hold xarelto, transitioned from IV to Protonix 40 mg Orally bid  Pt has scheduled SBCE as outpatient 9-26-18   Xarelto on hold currently. Last dose 9-19-18 evening per pt. Will need to be cleared for resumption if able after capsule endoscopy by GI service. Risk of stroke  / thromboembolism was discussed with the patient    [facility] ( Dr Anika Silvestre ) after SBCE . Hemoglobin has remained stable so she was discharged. Delay occurred as her mediport could not be de-accessed as the staff had left for the day      Labs: For convenience and continuity at follow-up the following most recent labs are provided:      CBC:    Lab Results   Component Value Date    WBC 4.2 09/22/2018    HGB 7.8 09/22/2018    HCT 24.3 09/22/2018     09/22/2018       Renal:  Lab Results   Component Value Date     09/22/2018    K 3.7 09/22/2018    K 3.8 06/13/2018     09/22/2018    CO2 28 09/22/2018    BUN 14 09/22/2018    CREATININE 1.1 09/22/2018    CALCIUM 7.8 09/22/2018    PHOS 4.1 08/12/2016         Significant Diagnostic Studies    Radiology:   Xr Chest Portable    Result Date: 9/20/2018  PROCEDURE: XR CHEST PORTABLE CLINICAL INFORMATION: shortness of breath and dizziness, . COMPARISON: Multiple previous most recent 6/12/2018 TECHNIQUE: AP upright view of the chest. FINDINGS: There is a cardiac event recorder. A left subclavian port is seen. There are multiple monitoring leads. Heart appears mildly enlarged though is accentuated by technique. No gross infiltrate. No gross infiltrate. **This report has been created using voice recognition software. It may contain minor errors which are inherent in voice recognition technology. ** Final report electronically signed by Dr. Shaaron Kayser on 9/20/2018 3:18 PM         Consults:     IP CONSULT TO GI  IP CONSULT TO SOCIAL WORK    Disposition:    [] Home       [] TCU       [] Rehab [] Psych       [x] SNF       [] [place]       [] Other-    Condition at Discharge: Stable    Code Status:  Full Code     Patient Instructions:    Discharge lab work: small bowel capsule endoscopy  Activity: activity as tolerated  Diet: DIET DENTAL SOFT; Cardiac      Follow-up visits:   CM [facility] [address]  [place]  [phone]    formerly [facility] physician to follow    MD Lianna Bates [address]  [place]    Go on 9/26/2018  Small Bowel Capsule Endoscopy as outpatient 9-26-18          Discharge Medications:      Evin Diego   [place] Medication Instructions DDS:[id number]    Printed on:09/23/18 1029   Medication Information                      acetaminophen (TYLENOL) 325 MG tablet  Take 2 tablets by mouth every 4 hours as needed for Pain             ALPRAZolam (XANAX) 0.5 MG tablet  Take 1 tablet by mouth nightly as needed for Anxiety for up to 3 days. Liz Bowman              atorvastatin (LIPITOR) 20 MG tablet  Take 1 tablet by mouth nightly             benzonatate (TESSALON) 100 MG capsule  Take 200 mg by mouth 3 times daily as needed for Cough             bumetanide (BUMEX) 1 MG tablet  Take 1 tablet by mouth daily             Calcium Carb-Cholecalciferol 500-400 MG-UNIT TABS  Take 1 tablet by mouth 2 times daily             calcium carbonate (TUMS) 500 MG chewable tablet  Take 1 tablet by mouth 2 times daily             cetirizine-psuedoephedrine (ZYRTEC-D) 5-120 MG per extended release tablet  Take 1 tablet by mouth daily as needed for Allergies             docusate sodium (COLACE) 100 MG capsule  Take 100 mg by mouth 2 times daily             escitalopram (LEXAPRO) 10 MG tablet  Take 1 tablet by mouth daily             ferrous sulfate 325 (65 FE) MG tablet  Take 325 mg by mouth 3 times daily (with meals)             fluticasone (FLONASE) 50 MCG/ACT nasal spray  1 spray by Each Nare route daily             Immune Globulin (Human) in dextrose

## 2018-09-23 NOTE — PROGRESS NOTES
Gastrointestinal Progress Note      Patient:  Danita Jc  Unit/Bed:4-27/027-A       YOB: 1945    MRN: [id number]                      Acct: [de-identified]     Admit date: 9/20/2018      Subjective:  Patient being discharged. Said that she is feeling good and is going to follow up and have SBCE done as OP. Then will follow with DR Robert Kramer        Objective:       CBC:   Recent Labs      09/20/18   1450   09/21/18   0640   09/21/18   2340  09/22/18   0535  09/22/18   1600   WBC  7.9   --   4.7*   --    --   4.2*   --    HGB  8.0*   < >  7.6*  7.7*   < >  7.4*  7.4*  7.8*   PLT  193   --   159   --    --   174   --     < > = values in this interval not displayed. BMP:    Recent Labs      09/20/18   1450  09/21/18   0640  09/22/18   0535   NA  142  143  145   K  4.1  3.6  3.7   CL  99  104  108   CO2  26  27  28   BUN  25*  22  14   CREATININE  1.5*  1.2  1.1   GLUCOSE  176*  100  94     Calcium:  Recent Labs      09/22/18   0535   CALCIUM  7.8*     Ionized Calcium:No results for input(s): IONCA in the last 72 hours. Magnesium:No results for input(s): MG in the last 72 hours. Phosphorus:No results for input(s): PHOS in the last 72 hours. INR: No results for input(s): INR in the last 72 hours. Hepatic:   Recent Labs      09/20/18   1450  09/21/18   0640   ALKPHOS  86  68   ALT  10*  7*   AST  18  15   PROT  6.3  5.4*   BILITOT  0.2*  0.7   BILIDIR  <0.2   --    LABALBU  3.7  3.2*     Amylase and Lipase:No results for input(s): LACTA, AMYLASE in the last 72 hours. Lactic Acid: No results for input(s): LACTA in the last 72 hours. Physical Exam:  Vitals:    09/23/18 0345   BP: (!) 101/54   Pulse: 68   Resp: 16   Temp: 98.4 °F (36.9 °C)   SpO2: 96%     GEN: Well nourished, well developed. LUNGS:    Chest rises equally on inspiration. HEAD:  Normal cephalic/atraumatic. Extraoccular motions intact bilaterally. NECK:  Neck supple.  Trachea midline      UPPER EXTREMITIES:  No cyanosis, or edema. DERM:  No rash or jaundice. LOWER EXTREMITIES:  No cyanosis, clubbing, or edema. NEURO:  Alert and oriented x4. Patient moves all extremities and has gross sensation in all extremities. Medications:  Scheduled Meds:   heparin flush  500 Units Intercatheter Once    sodium chloride (PF)  10 mL Intravenous 2 times per day    pantoprazole  40 mg Intravenous BID    atorvastatin  20 mg Oral Nightly    escitalopram  10 mg Oral Daily    levothyroxine  75 mcg Oral Daily    therapeutic multivitamin-minerals  1 tablet Oral Daily    pregabalin  150 mg Oral TID    linagliptin  5 mg Oral Daily    insulin lispro  0-6 Units Subcutaneous TID     insulin lispro  0-3 Units Subcutaneous Nightly     Continuous Infusions:   sodium chloride 20 mL/hr at 09/22/18 1145     PRN Meds:sodium chloride (PF), acetaminophen, ALPRAZolam    Assessment:  1. GI bleed, likely upper; melena. Stool now brown  2. Anemia, normocytic.  Hgb 7.8 yesterday at 1600 which was slightly higher than when I saw her yesterday am.  3. Melena, not new  4. Nausea  5. GERD  6. Constipation  7. DM 2  8. HTN  9. CAD  10. CKD stage 3  11. A-fib  12. Xeralto use pta  13. Cardiomyopathy  14. Hx PE  15. Hx nissen fundoplication  16. Hx IVIG deficiency           Plan:  1. Protonix 40 mg IV bid can be changed to po for anticipated discharge. (I tried to change, but screen said that it would interfere with med reconciliation for DC)  2. Advance to full diet and then as tolerated  3. Pt has scheduled SBCE as outpatient 9-26-18 per office/patient  4. Xeralto on hold currently. Last dose 9-19-18 evening per pt.   5. [facility] -sees Dr Thu Lagunas   6. GI signing off.  She is being discharged today           Any concerns, please page DR Yadi Bal since he is on call this weekend       Hospitalist/Attending provider notes, consulting physician notes, laboratory results and procedure notes reviewed prior to seeing the patient.    Electronically signed by Clance Sandhoff, APRN - CNP on 9/23/18 at 7:51 AM

## 2018-09-23 NOTE — PROGRESS NOTES
erythema however was noted in the duodenal bulb. No ulcers or erosions noted  We Continued to hold xarelto, transitioned from IV to Protonix 40 mg Orally bid  Pt has scheduled SBCE as outpatient 9-26-18   Xarelto on hold currently. Last dose 9-19-18 evening per pt. Will need to be cleared for resumption if able after capsule endoscopy by GI service. Risk of stroke  / thromboembolism was discussed with the patient    [facility] ( Dr Stephane Goff ) after SBCE . Hemoglobin has remained stable so she was discharged. Delay occurred as her mediport could not be de-accessed as the staff had left for the day    Since readmission has done well. No melena or hematochezia. I was never notified of previous bleeding. Staff re-educated. No abd pain. Swallowed pill for pill endo yesterday. Labs stable since readmission. No dizziness. No CP. Fatigue better. PRIOR VISIT:   Patient admitted to [facility] from 6/12 to 6/15 for AMS and GABBY. D/c summary:  Hospital Course:   Cheyenne Calhoun a 67 y. o. female admitted to [address] [place] [address] on 6/12/2018 for alteration in mental status after she was found down at home. Corrine Dial was ruled out for CVA.  Per history, patient had recently been prescribed oxygen however oxygen had not been delivered to her home prior to this admission. Flo Puentes does use a CPAP at home.  This was disconnected, patient was hypoxic.  She did have garbled speech on admission.  During the course of her admission, patient was noted to be quite agitated, with intense paranoia.  She was seen by psychiatry who recommended resuming patient's Lexapro, and adding Seroquel.  Patient is being discharged to [facility] for further rehabilitation.     Since admission has done well. Agitation and paranoia have resolved. She is back to baseline mentation. Pt states she'd started on wellbutrin 1 to 2 wks prior to admission and she feels this is what caused her issues. He was on lexapro prior.  She remembers bits and pieces of hospitalization. Today she is feeling great. No paranoia . No hallucinations. Asking if can stop Seroquel, doesn't like how it makes her feels. Less anxiety. Not feeling depressed. Denies SI/HI. Using alprazolam a little. Denies SI/HI. Eating and drinking well. Bowels moving fine.       UPDATE LAST VISIT: Mental status con't to be clear. No agitation. No issues since stopping her seroquel. Labs improved, renal fxn normal. Eating well, drinking well. Good progress with PT. Off O2     UPDATE TODAY: mental status con't to be clear. Labs stable. Eating well, drinking well. Off O2.         DM2 LAST VISIT: Januvia started last visit at pt request was apparently on as outpt. Sugars appropriate. On lyrica for neuropathy related prior poorly controlled DM     UPDATE TODAY: on Januvia. Basaglar added d/t continued poor control since admission. a1c up to 8.8 SEPT 2018. since Basaglar sugars have been improving, still 150-160s, but coming down. No lows. Paroxysmal afib LAST VISIT: currently on nothing for rate control. On xarelto for OAC. Follows with cardio at [facility]. Denies CP, SOB and palpitations. UPDATE TODAY: off [address] as above in d/c summary. Cardio aware. No CP/SOB.         HF with preserved EF/CAD: denies CP, SOB, orthopnea or PND.      Hx of HTN/CKD3: currently on no meds, so far BP <140/90.      HLD: on lipitor, tolerating well     Mood disorder/MIKE/Agitation PRIOR VISIT: started on Seroquel as above in d/c summary. Had lexapro resumed, on prn alprazolam. Since admission has done great as noted above. Feeling back to close to normal.      UPDATE LAST VISIT: doing fine w/o seroquel. lexapro helping with anxiety, but still having some. Asking if can change alprazolam from once daily prn to twice daily prn. Denies SI/HI      UPDATE TODAY: moods stable. lexapro and prn alprazolam working well. Denies SI/HI       Hx of PE: per records, on xarelto life long.  Denies CP/SOB     Hypothyroidism: on synthroid, OFFICE/OUTPT VISIT,PROCEDURE ONLY N/A 9/21/2018    EGD DIAGNOSTIC ONLY performed by Gena Truong MD at [address]      right    TENDON RELEASE Left 08/09/2016    left foot    TUBAL LIGATION      TUNNELED VENOUS PORT PLACEMENT  11/06/2017    UPPER GASTROINTESTINAL ENDOSCOPY Left 5/10/2018    EGD BIOPSY performed by Aniyah Bauer MD at [address] [facility]       Allergies   Allergen Reactions    Bactrim [Sulfamethoxazole-Trimethoprim]      TOLD BY  NEVER TO TAKE AGAIN    Wellbutrin [Bupropion] Other (See Comments)     hallucinations    Silicone Rash       Social History     Social History    Marital status:      Spouse name: N/A    Number of children: 3    Years of education: N/A     Occupational History    Not on file. Social History Main Topics    Smoking status: Former Smoker     Packs/day: 1.00     Years: 30.00     Types: Cigarettes     Quit date: 5/10/2006    Smokeless tobacco: Never Used    Alcohol use No    Drug use: No    Sexual activity: Not on file     Other Topics Concern    Not on file     Social History Narrative    No narrative on file       Family History   Problem Relation Age of Onset    Cancer Mother     Heart Disease Mother     High Blood Pressure Mother     Diabetes Mother     Vision Loss Mother     Stroke Mother     COPD Father     Diabetes Father     COPD Brother          I have reviewed the patient's past medical history, past surgical history, allergies, medications, social and family history and I have made updates where appropriate.       Review of Systems  Positive responses are highlighted in bold    Constitutional:  Fever, Chills, Night Sweats, Fatigue, Unexpected changes in weight  Eyes:  Eye discharge, Eye pain, Eye redness, Visual disturbances   HENT:  Ear pain, Tinnitus, Nosebleeds, Trouble swallowing, Hearing loss, Sore throat  Cardiovascular:  Chest Pain, Palpitations, Orthopnea, Paroxysmal Nocturnal Dyspnea  Respiratory:  Cough, Wheezing, Shortness of breath, Chest tightness, Apnea  Gastrointestinal:  Nausea, Vomiting, Diarrhea, Constipation, Heartburn, Blood in stool  Genitourinary:  Difficulty or painful urination, Flank pain, Change in frequency, Urgency  Skin:  Color change, Rash, Itching, Wound  Psychiatric:  Hallucinations, Anxiety, Depression, Suicidal ideation  Hematological:  Enlarged glands, Easy bleeding, Easily bruising  Musculoskeletal:  Joint pain, Back pain, Gait problems, Joint swelling, Myalgias  Neurological:  Dizziness, Headaches, Presyncope, Numbness, Seizures, Tremors  Allergy:  Environmental allergies, Food allergies  Endocrine:  Heat Intolerance, Cold Intolerance, Polydipsia, Polyphagia, Polyuria      PHYSICAL EXAM:  Vitals:    09/27/18 0500   BP: 118/68   Pulse: 62   Resp: 18   Temp: 98 °F (36.7 °C)   Weight: 217 lb 3.2 oz (98.5 kg)   Height: 5' 3\" (1.6 m)     Body mass index is 38.48 kg/m². Pain: 2 (back)    VS Reviewed  General Appearance: well developed and well- nourished, in no acute distress  Head: normocephalic and atraumatic  Eyes: pupils equal, round, and reactive to light, conjunctivae and eye lids without erythema  ENT: external ear and ear canal normal bilaterally, nose without deformity, nasal mucosa and turbinates normal without polyps, oropharynx normal, dentition is normal for age, no lip or gum lesions noted  Neck: supple and non-tender without mass, no thyromegaly or thyroid nodules, no cervical lymphadenopathy  Pulmonary/Chest: clear to auscultation bilaterally- no wheezes, rales or rhonchi, normal air movement, no respiratory distress or retractions  Cardiovascular: normal rate, regular rhythm, normal S1 and S2, no murmurs, rubs, clicks, or gallops, distal pulses intact  Abdomen: soft, non-tender, non-distended, bowel sounds physiologic,  no rebound or guarding, no masses or hernias noted. Liver and spleen without enlargement.    Extremities: no cyanosis, clubbing or edema of the lower extremities  Musculoskeletal: No joint swelling or gross deformity   Neuro:  Alert, 5/5 strength globally and symmetrically, 2+ patellar reflexes b/l,  normal speech, no focal findings or movement disorder noted  Psych:  Normal affect without evidence of depression or anxiety, insight and judgement are appropriate, memory appears intact  Skin: warm and dry, no rash or erythema  Lymph:  No cervical, auricular or supraclavicular lymph nodes palpated      LABS/IMAGING            Recent Labs      09/22/18   1600  09/22/18   0535  09/21/18   2340   09/21/18   0640   09/20/18   1450   WBC   --   4.2*   --    --   4.7*   --   7.9   HGB  7.8*  7.4*  7.4*   < >  7.6*  7.7*   < >  8.0*   HCT  24.3*  23.1*  22.9*   < >  23.9*  24.4*   < >  24.0*   MCV   --   92.4   --    --   93.8   --   91.6   PLT   --   174   --    --   159   --   193    < > = values in this interval not displayed. Lab Results   Component Value Date     09/22/2018    K 3.7 09/22/2018     09/22/2018    CO2 28 09/22/2018    BUN 14 09/22/2018    CREATININE 1.1 09/22/2018    GLUCOSE 94 09/22/2018    CALCIUM 7.8 (L) 09/22/2018    PROT 5.4 (L) 09/21/2018    LABALBU 3.2 (L) 09/21/2018    BILITOT 0.7 09/21/2018    ALKPHOS 68 09/21/2018    AST 15 09/21/2018    ALT 7 (L) 09/21/2018    LABGLOM 49 (A) 09/22/2018       HbA1c (11 SEPT 2018)  8.8%    Lab Results   Component Value Date    LABA1C 5.4 06/13/2018    LABA1C 6.5 05/01/2018    LABA1C 6.0 07/18/2016         ASSESSMENT & PLAN  1. Gastrointestinal hemorrhage, unspecified gastrointestinal hemorrhage type    Doing well since discharge  Labs improving  con't PPI  W/u on going  Await results of pill endo  con't off [facility] until cleared by GI  Monitor labs  Monitor for s/s bleeding  Staff re-educated on when to notify staff    2. Acute blood loss anemia    As per # 1    3.  Altered mental status, unspecified altered mental status type    Resolved  Unclear etiology  Could have been hypoxia or idiosyncratic rxn to wellbutrin  Clear today and has been for a few months now  Back to baseline  Doing fine w/o seroquel, con't off of it. con't lexapro and prn alprazolam     Antipsychotic/Antianxiety/Hypnotic/Psychotropic/Sedation/Antidepressant medications are continued at this time because discontinuation may result in adverse effects or return of concerning behaviors/symptoms. 4. GABBY (acute kidney injury) ([facility]    Resolved  Monitor labs    5. Hypoxemia    Resolved  Off O2  monitor    6. Physical deconditioning    Done with PT  Doing well  resolved    7. Type 2 diabetes mellitus with diabetic mononeuropathy, with long-term current use of insulin ([facility]    con't basaglar and januvia  Fax sugars next wk, adjust from there  con't lyrica for neuropathy, stable    8. Paroxysmal atrial fibrillation (HCC)    Off xarelto d/t bleeding  Nothing for stroke prevention per GI, pt aware  F/u with cardio  Not on anything for rate control, but doesn't appear to need    9. Heart failure with preserved ejection fraction (HCC)    Stable   Daily wts  Low salt diet  bumex  Cardio f/u    10. ASCVD (arteriosclerotic cardiovascular disease)    Off xarelto for now, not on asa, recent bleed. Keep cardio f/u  No BB or ACE d/t normal BP's and issues with low previous, per available records    11. History of hypertension    mointor BP's   Resume antihypertensives if needed    12. CKD (chronic kidney disease) stage 3, GFR 30-59 ml/min ([facility])    Stable  Monitor  Labs ordered    13. Dyslipidemia    Con't lipitor    14. Mood disorder ([facility]    As per # 1    15. MIKE (generalized anxiety disorder)    As per # 1    16. Agitation    As per # 1    17. History of pulmonary embolism    Off [address] as above for now  Will need to resume at some point, will await GI rec's    18. Acquired hypothyroidism    con't synthroid  Labs UTD    19. Selective immunoglobulin deficiency ([facility]    con't IVIG through Dr. Michael Watts    20.  History of lower GI

## 2018-09-27 ENCOUNTER — CLINICAL DOCUMENTATION (OUTPATIENT)
Dept: FAMILY MEDICINE CLINIC | Age: 73
End: 2018-09-27

## 2018-09-27 VITALS
WEIGHT: 217.2 LBS | BODY MASS INDEX: 38.48 KG/M2 | HEART RATE: 62 BPM | RESPIRATION RATE: 18 BRPM | DIASTOLIC BLOOD PRESSURE: 68 MMHG | HEIGHT: 63 IN | SYSTOLIC BLOOD PRESSURE: 118 MMHG | TEMPERATURE: 98 F

## 2018-09-27 DIAGNOSIS — K21.9 GASTROESOPHAGEAL REFLUX DISEASE, ESOPHAGITIS PRESENCE NOT SPECIFIED: ICD-10-CM

## 2018-09-27 DIAGNOSIS — D72.819 LEUKOPENIA, UNSPECIFIED TYPE: ICD-10-CM

## 2018-09-27 DIAGNOSIS — K92.2 GASTROINTESTINAL HEMORRHAGE, UNSPECIFIED GASTROINTESTINAL HEMORRHAGE TYPE: Primary | ICD-10-CM

## 2018-09-27 DIAGNOSIS — R53.81 PHYSICAL DECONDITIONING: ICD-10-CM

## 2018-09-27 DIAGNOSIS — N17.9 AKI (ACUTE KIDNEY INJURY) (HCC): ICD-10-CM

## 2018-09-27 DIAGNOSIS — I50.30 HEART FAILURE WITH PRESERVED EJECTION FRACTION (HCC): ICD-10-CM

## 2018-09-27 DIAGNOSIS — R45.1 AGITATION: ICD-10-CM

## 2018-09-27 DIAGNOSIS — D69.6 THROMBOCYTOPENIA (HCC): ICD-10-CM

## 2018-09-27 DIAGNOSIS — E03.9 ACQUIRED HYPOTHYROIDISM: ICD-10-CM

## 2018-09-27 DIAGNOSIS — E11.41 TYPE 2 DIABETES MELLITUS WITH DIABETIC MONONEUROPATHY, WITH LONG-TERM CURRENT USE OF INSULIN (HCC): ICD-10-CM

## 2018-09-27 DIAGNOSIS — I25.10 ASCVD (ARTERIOSCLEROTIC CARDIOVASCULAR DISEASE): ICD-10-CM

## 2018-09-27 DIAGNOSIS — Z79.4 TYPE 2 DIABETES MELLITUS WITH DIABETIC MONONEUROPATHY, WITH LONG-TERM CURRENT USE OF INSULIN (HCC): ICD-10-CM

## 2018-09-27 DIAGNOSIS — Z86.79 HISTORY OF HYPERTENSION: ICD-10-CM

## 2018-09-27 DIAGNOSIS — R09.02 HYPOXEMIA: ICD-10-CM

## 2018-09-27 DIAGNOSIS — D62 ACUTE BLOOD LOSS ANEMIA: ICD-10-CM

## 2018-09-27 DIAGNOSIS — E78.5 DYSLIPIDEMIA: ICD-10-CM

## 2018-09-27 DIAGNOSIS — D80.9 SELECTIVE IMMUNOGLOBULIN DEFICIENCY (HCC): ICD-10-CM

## 2018-09-27 DIAGNOSIS — R41.82 ALTERED MENTAL STATUS, UNSPECIFIED ALTERED MENTAL STATUS TYPE: ICD-10-CM

## 2018-09-27 DIAGNOSIS — N18.30 CKD (CHRONIC KIDNEY DISEASE) STAGE 3, GFR 30-59 ML/MIN (HCC): ICD-10-CM

## 2018-09-27 DIAGNOSIS — Z86.711 HISTORY OF PULMONARY EMBOLISM: ICD-10-CM

## 2018-09-27 DIAGNOSIS — G47.33 OSA ON CPAP: ICD-10-CM

## 2018-09-27 DIAGNOSIS — I48.0 PAROXYSMAL ATRIAL FIBRILLATION (HCC): ICD-10-CM

## 2018-09-27 DIAGNOSIS — Z99.89 OSA ON CPAP: ICD-10-CM

## 2018-09-27 DIAGNOSIS — F41.1 GAD (GENERALIZED ANXIETY DISORDER): ICD-10-CM

## 2018-09-27 DIAGNOSIS — Z87.19 HISTORY OF LOWER GI BLEEDING: ICD-10-CM

## 2018-09-27 DIAGNOSIS — F39 MOOD DISORDER (HCC): ICD-10-CM

## 2018-09-28 ENCOUNTER — HOSPITAL ENCOUNTER (OUTPATIENT)
Dept: NURSING | Age: 73
Discharge: HOME | End: 2018-09-28
Payer: COMMERCIAL

## 2018-09-28 VITALS
HEART RATE: 77 BPM | DIASTOLIC BLOOD PRESSURE: 55 MMHG | OXYGEN SATURATION: 95 % | SYSTOLIC BLOOD PRESSURE: 111 MMHG | BODY MASS INDEX: 38.44 KG/M2 | RESPIRATION RATE: 16 BRPM | TEMPERATURE: 97.1 F | WEIGHT: 217 LBS

## 2018-09-28 DIAGNOSIS — D80.9 SELECTIVE IMMUNOGLOBULIN DEFICIENCY (HCC): ICD-10-CM

## 2018-09-28 LAB
IGA: 104 MG/DL (ref 70–400)
IGG: 922 MG/DL (ref 700–1600)
IGM: 40 MG/DL (ref 40–230)

## 2018-09-28 PROCEDURE — 36415 COLL VENOUS BLD VENIPUNCTURE: CPT

## 2018-09-28 PROCEDURE — 6370000000 HC RX 637 (ALT 250 FOR IP): Performed by: INTERNAL MEDICINE

## 2018-09-28 PROCEDURE — 2709999900 HC NON-CHARGEABLE SUPPLY

## 2018-09-28 PROCEDURE — 82784 ASSAY IGA/IGD/IGG/IGM EACH: CPT

## 2018-09-28 PROCEDURE — 6360000002 HC RX W HCPCS: Performed by: INTERNAL MEDICINE

## 2018-09-28 PROCEDURE — 2580000003 HC RX 258: Performed by: INTERNAL MEDICINE

## 2018-09-28 PROCEDURE — 96413 CHEMO IV INFUSION 1 HR: CPT

## 2018-09-28 PROCEDURE — 96415 CHEMO IV INFUSION ADDL HR: CPT

## 2018-09-28 RX ORDER — SODIUM CHLORIDE 0.9 % (FLUSH) 0.9 %
10 SYRINGE (ML) INJECTION PRN
Status: DISCONTINUED | OUTPATIENT
Start: 2018-09-28 | End: 2018-09-29 | Stop reason: HOSPADM

## 2018-09-28 RX ORDER — DIPHENHYDRAMINE HCL 25 MG
25 TABLET ORAL ONCE
Status: COMPLETED | OUTPATIENT
Start: 2018-09-28 | End: 2018-09-28

## 2018-09-28 RX ORDER — 0.9 % SODIUM CHLORIDE 0.9 %
10 VIAL (ML) INJECTION ONCE
Status: DISCONTINUED | OUTPATIENT
Start: 2018-09-28 | End: 2018-09-29 | Stop reason: HOSPADM

## 2018-09-28 RX ORDER — DIPHENHYDRAMINE HYDROCHLORIDE 50 MG/ML
50 INJECTION INTRAMUSCULAR; INTRAVENOUS ONCE
Status: CANCELLED | OUTPATIENT
Start: 2018-09-28 | End: 2018-09-28

## 2018-09-28 RX ORDER — ACETAMINOPHEN 325 MG/1
650 TABLET ORAL ONCE
Status: COMPLETED | OUTPATIENT
Start: 2018-09-28 | End: 2018-09-28

## 2018-09-28 RX ORDER — ACETAMINOPHEN 325 MG/1
650 TABLET ORAL ONCE
Status: CANCELLED | OUTPATIENT
Start: 2018-09-28 | End: 2018-09-28

## 2018-09-28 RX ORDER — SODIUM CHLORIDE 0.9 % (FLUSH) 0.9 %
10 SYRINGE (ML) INJECTION PRN
Status: CANCELLED | OUTPATIENT
Start: 2018-09-28

## 2018-09-28 RX ORDER — HEPARIN SODIUM (PORCINE) LOCK FLUSH IV SOLN 100 UNIT/ML 100 UNIT/ML
500 SOLUTION INTRAVENOUS PRN
Status: DISCONTINUED | OUTPATIENT
Start: 2018-09-28 | End: 2018-09-29 | Stop reason: HOSPADM

## 2018-09-28 RX ORDER — EPINEPHRINE 1 MG/ML
0.3 INJECTION, SOLUTION, CONCENTRATE INTRAVENOUS PRN
Status: CANCELLED | OUTPATIENT
Start: 2018-09-28

## 2018-09-28 RX ORDER — HEPARIN SODIUM (PORCINE) LOCK FLUSH IV SOLN 100 UNIT/ML 100 UNIT/ML
500 SOLUTION INTRAVENOUS PRN
Status: CANCELLED
Start: 2018-09-28

## 2018-09-28 RX ORDER — 0.9 % SODIUM CHLORIDE 0.9 %
10 VIAL (ML) INJECTION ONCE
Status: CANCELLED | OUTPATIENT
Start: 2018-09-28 | End: 2018-09-28

## 2018-09-28 RX ORDER — METHYLPREDNISOLONE SODIUM SUCCINATE 125 MG/2ML
125 INJECTION, POWDER, LYOPHILIZED, FOR SOLUTION INTRAMUSCULAR; INTRAVENOUS ONCE
Status: CANCELLED | OUTPATIENT
Start: 2018-09-28 | End: 2018-09-28

## 2018-09-28 RX ORDER — DIPHENHYDRAMINE HCL 25 MG
25 TABLET ORAL ONCE
Status: CANCELLED | OUTPATIENT
Start: 2018-09-28 | End: 2018-09-28

## 2018-09-28 RX ORDER — SODIUM CHLORIDE 9 MG/ML
INJECTION, SOLUTION INTRAVENOUS CONTINUOUS
Status: CANCELLED | OUTPATIENT
Start: 2018-09-28

## 2018-09-28 RX ADMIN — IMMUNE GLOBULIN (HUMAN) 20 G: 10 INJECTION INTRAVENOUS; SUBCUTANEOUS at 09:45

## 2018-09-28 RX ADMIN — HEPARIN 500 UNITS: 100 SYRINGE at 11:36

## 2018-09-28 RX ADMIN — IMMUNE GLOBULIN (HUMAN) 20 G: 10 INJECTION INTRAVENOUS; SUBCUTANEOUS at 10:57

## 2018-09-28 RX ADMIN — DIPHENHYDRAMINE HCL 25 MG: 25 TABLET ORAL at 09:40

## 2018-09-28 RX ADMIN — ACETAMINOPHEN 650 MG: 325 TABLET ORAL at 09:40

## 2018-09-28 RX ADMIN — Medication 10 ML: at 11:37

## 2018-09-28 ASSESSMENT — PAIN SCALES - GENERAL
PAINLEVEL_OUTOF10: 0
PAINLEVEL_OUTOF10: 0

## 2018-09-28 ASSESSMENT — PAIN - FUNCTIONAL ASSESSMENT: PAIN_FUNCTIONAL_ASSESSMENT: 0-10

## 2018-09-28 NOTE — PROGRESS NOTES
0902 BEDSIDE REPORT RECEIVED FROM AXEL RN   3067 INFUSION INITIATED.  PT DENIES NEEDS  1000 SIDE RAIL UP X1, CALL LIGHT IN REACH  1030 UP TO RESTROOM, GAIT STEADY  1100 TOELRATING LUNCH  1130 __M__ Safety:       (Environmental)   [place] to environment   Ensure ID band is correct and in place/ allergy band as needed   Assess for fall risk   Initiate fall precautions as applicable (fall band, side rails, etc.)   Call light within reach   Bed in low position/ wheels locked    _M___ Pain:        Assess pain level and characteristics   Administer analgesics as ordered   Assess effectiveness of pain management and report to MD as needed    __M__ Knowledge Deficit:   Assess baseline knowledge   Provide teaching at level of understanding   Provide teaching via preferred learning method   Evaluate teaching effectiveness    __M__ Hemodynamic/Respiratory Status:       (Pre and Post Procedure Monitoring)   Assess/Monitor vital signs and LOC   Assess Baseline SpO2 prior to any sedation   Obtain weight/height   Assess vital signs/ LOC until patient meets discharge criteria   Monitor procedure site and notify MD of any issues        1140 [facility] NOTIFIED OF PT PICKUP, WILL COME UP TO FLOOR      1200   PT DISCHARGED PER WHEEL CHAIR IN SATISFACTORY CONDITION

## 2018-09-28 NOTE — PROGRESS NOTES
1282 Alert female admitted for IVIG procedure reviewed with pt verbalized understanding. Pt rights and responsibilities offered to pt to read.      __met__ Safety:       (Environmental)   [place] to environment   Ensure ID band is correct and in place/ allergy band as needed   Assess for fall risk   Initiate fall precautions as applicable (fall band, side rails, etc.)   Call light within reach   Bed in low position/ wheels locked    __met__ Pain:        Assess pain level and characteristics   Administer analgesics as ordered   Assess effectiveness of pain management and report to MD as needed     __metAssess baseline knowledge   Provide teaching at level of understanding   Provide teaching via preferred learning method   Evaluate teaching effectiveness   _metMonitor for infection signs and symptoms (catheter site redness, temperature elevation, etc)   Assess for infection risks   Educate regarding infection prevention   Manage central venous catheter (flushes/ dressing changes per protocol)

## 2018-10-09 ENCOUNTER — TELEPHONE (OUTPATIENT)
Dept: CARDIOLOGY CLINIC | Age: 73
End: 2018-10-09

## 2018-10-09 ENCOUNTER — PROCEDURE VISIT (OUTPATIENT)
Dept: CARDIOLOGY CLINIC | Age: 73
End: 2018-10-09
Payer: MEDICARE

## 2018-10-09 DIAGNOSIS — I49.5 TACHYCARDIA-BRADYCARDIA SYNDROME (HCC): Primary | ICD-10-CM

## 2018-10-09 PROCEDURE — 93299 PR REM INTERROG ICPMS/SCRMS <30 D TECH REVIEW: CPT | Performed by: NUCLEAR MEDICINE

## 2018-10-09 PROCEDURE — 93298 REM INTERROG DEV EVAL SCRMS: CPT | Performed by: NUCLEAR MEDICINE

## 2018-10-23 ENCOUNTER — APPOINTMENT (OUTPATIENT)
Dept: GENERAL RADIOLOGY | Age: 73
End: 2018-10-23
Payer: MEDICARE

## 2018-10-23 ENCOUNTER — HOSPITAL ENCOUNTER (EMERGENCY)
Age: 73
Discharge: HOME | End: 2018-10-23
Payer: MEDICARE

## 2018-10-23 VITALS
OXYGEN SATURATION: 99 % | TEMPERATURE: 98.3 F | SYSTOLIC BLOOD PRESSURE: 113 MMHG | RESPIRATION RATE: 16 BRPM | DIASTOLIC BLOOD PRESSURE: 79 MMHG | HEART RATE: 69 BPM

## 2018-10-23 DIAGNOSIS — I95.1 ORTHOSTATIC HYPOTENSION: ICD-10-CM

## 2018-10-23 DIAGNOSIS — E86.0 DEHYDRATION: Primary | ICD-10-CM

## 2018-10-23 LAB
ALBUMIN SERPL-MCNC: 3.8 G/DL (ref 3.5–5.1)
ALP BLD-CCNC: 75 U/L (ref 38–126)
ALT SERPL-CCNC: 12 U/L (ref 11–66)
ANION GAP SERPL CALCULATED.3IONS-SCNC: 13 MEQ/L (ref 8–16)
AST SERPL-CCNC: 27 U/L (ref 5–40)
BACTERIA: ABNORMAL
BASOPHILS # BLD: 0.9 %
BASOPHILS ABSOLUTE: 0 THOU/MM3 (ref 0–0.1)
BILIRUB SERPL-MCNC: 0.3 MG/DL (ref 0.3–1.2)
BILIRUBIN URINE: NEGATIVE
BLOOD, URINE: NEGATIVE
BUN BLDV-MCNC: 24 MG/DL (ref 7–22)
CALCIUM SERPL-MCNC: 9.1 MG/DL (ref 8.5–10.5)
CASTS: ABNORMAL /LPF
CASTS: ABNORMAL /LPF
CHARACTER, URINE: CLEAR
CHLORIDE BLD-SCNC: 100 MEQ/L (ref 98–111)
CO2: 27 MEQ/L (ref 23–33)
COLOR: YELLOW
CREAT SERPL-MCNC: 1.5 MG/DL (ref 0.4–1.2)
CRYSTALS: ABNORMAL
EKG ATRIAL RATE: 69 BPM
EKG P AXIS: 20 DEGREES
EKG P-R INTERVAL: 156 MS
EKG Q-T INTERVAL: 404 MS
EKG QRS DURATION: 98 MS
EKG QTC CALCULATION (BAZETT): 432 MS
EKG R AXIS: -28 DEGREES
EKG T AXIS: 80 DEGREES
EKG VENTRICULAR RATE: 69 BPM
EOSINOPHIL # BLD: 2.1 %
EOSINOPHILS ABSOLUTE: 0.1 THOU/MM3 (ref 0–0.4)
EPITHELIAL CELLS, UA: ABNORMAL /HPF
ERYTHROCYTE [DISTWIDTH] IN BLOOD BY AUTOMATED COUNT: 14.4 % (ref 11.5–14.5)
ERYTHROCYTE [DISTWIDTH] IN BLOOD BY AUTOMATED COUNT: 49.6 FL (ref 35–45)
GFR SERPL CREATININE-BSD FRML MDRD: 34 ML/MIN/1.73M2
GLUCOSE BLD-MCNC: 118 MG/DL (ref 70–108)
GLUCOSE, URINE: NEGATIVE MG/DL
HCT VFR BLD CALC: 35.1 % (ref 37–47)
HEMOGLOBIN: 11.4 GM/DL (ref 12–16)
IMMATURE GRANS (ABS): 0.01 THOU/MM3 (ref 0–0.07)
IMMATURE GRANULOCYTES: 0.2 %
KETONES, URINE: NEGATIVE
LEUKOCYTE ESTERASE, URINE: ABNORMAL
LYMPHOCYTES # BLD: 30.5 %
LYMPHOCYTES ABSOLUTE: 1.4 THOU/MM3 (ref 1–4.8)
MCH RBC QN AUTO: 30.4 PG (ref 26–33)
MCHC RBC AUTO-ENTMCNC: 32.5 GM/DL (ref 32.2–35.5)
MCV RBC AUTO: 93.6 FL (ref 81–99)
MISCELLANEOUS LAB TEST RESULT: ABNORMAL
MONOCYTES # BLD: 9.9 %
MONOCYTES ABSOLUTE: 0.5 THOU/MM3 (ref 0.4–1.3)
NITRITE, URINE: NEGATIVE
NUCLEATED RED BLOOD CELLS: 0 /100 WBC
OSMOLALITY CALCULATION: 284.5 MOSMOL/KG (ref 275–300)
PH UA: 5.5
PLATELET # BLD: 160 THOU/MM3 (ref 130–400)
PMV BLD AUTO: 10.5 FL (ref 9.4–12.4)
POTASSIUM REFLEX MAGNESIUM: 4.1 MEQ/L (ref 3.5–5.2)
PROTEIN UA: NEGATIVE MG/DL
RBC # BLD: 3.75 MILL/MM3 (ref 4.2–5.4)
RBC URINE: ABNORMAL /HPF
RENAL EPITHELIAL, UA: ABNORMAL
SEG NEUTROPHILS: 56.4 %
SEGMENTED NEUTROPHILS ABSOLUTE COUNT: 2.7 THOU/MM3 (ref 1.8–7.7)
SODIUM BLD-SCNC: 140 MEQ/L (ref 135–145)
SPECIFIC GRAVITY UA: 1.01 (ref 1–1.03)
TOTAL PROTEIN: 6.9 G/DL (ref 6.1–8)
TROPONIN T: < 0.01 NG/ML
TSH SERPL DL<=0.05 MIU/L-ACNC: 1.9 UIU/ML (ref 0.4–4.2)
UROBILINOGEN, URINE: 0.2 EU/DL
WBC # BLD: 4.7 THOU/MM3 (ref 4.8–10.8)
WBC UA: ABNORMAL /HPF
YEAST: ABNORMAL

## 2018-10-23 PROCEDURE — 85025 COMPLETE CBC W/AUTO DIFF WBC: CPT

## 2018-10-23 PROCEDURE — 2580000003 HC RX 258: Performed by: PHYSICIAN ASSISTANT

## 2018-10-23 PROCEDURE — 87086 URINE CULTURE/COLONY COUNT: CPT

## 2018-10-23 PROCEDURE — 99284 EMERGENCY DEPT VISIT MOD MDM: CPT

## 2018-10-23 PROCEDURE — 87077 CULTURE AEROBIC IDENTIFY: CPT

## 2018-10-23 PROCEDURE — 80053 COMPREHEN METABOLIC PANEL: CPT

## 2018-10-23 PROCEDURE — 84484 ASSAY OF TROPONIN QUANT: CPT

## 2018-10-23 PROCEDURE — 93010 ELECTROCARDIOGRAM REPORT: CPT | Performed by: INTERNAL MEDICINE

## 2018-10-23 PROCEDURE — 93005 ELECTROCARDIOGRAM TRACING: CPT | Performed by: PHYSICIAN ASSISTANT

## 2018-10-23 PROCEDURE — 71045 X-RAY EXAM CHEST 1 VIEW: CPT

## 2018-10-23 PROCEDURE — 84443 ASSAY THYROID STIM HORMONE: CPT

## 2018-10-23 PROCEDURE — 36415 COLL VENOUS BLD VENIPUNCTURE: CPT

## 2018-10-23 PROCEDURE — 81001 URINALYSIS AUTO W/SCOPE: CPT

## 2018-10-23 RX ORDER — 0.9 % SODIUM CHLORIDE 0.9 %
1000 INTRAVENOUS SOLUTION INTRAVENOUS ONCE
Status: COMPLETED | OUTPATIENT
Start: 2018-10-23 | End: 2018-10-23

## 2018-10-23 RX ADMIN — SODIUM CHLORIDE 1000 ML: 900 INJECTION, SOLUTION INTRAVENOUS at 18:27

## 2018-10-23 ASSESSMENT — PAIN DESCRIPTION - PAIN TYPE: TYPE: CHRONIC PAIN

## 2018-10-23 ASSESSMENT — ENCOUNTER SYMPTOMS
COUGH: 0
SORE THROAT: 0
WHEEZING: 0
EYE DISCHARGE: 0
DIARRHEA: 0
BACK PAIN: 0
NAUSEA: 1
ABDOMINAL PAIN: 0
EYE PAIN: 0
SHORTNESS OF BREATH: 0
VOMITING: 0
RHINORRHEA: 0

## 2018-10-23 ASSESSMENT — PAIN SCALES - GENERAL: PAINLEVEL_OUTOF10: 2

## 2018-10-23 ASSESSMENT — PAIN DESCRIPTION - LOCATION: LOCATION: CHEST

## 2018-10-23 NOTE — ED NOTES
Pt is in bed laying on right side resting. She denies any pain right now. Fluids infusing per order. She denies needs at this time.       Jorge Lopez, RN  10/23/18 [id number]

## 2018-10-23 NOTE — ED NOTES
Bed: 017A  Expected date: 10/23/18  Expected time:   Means of arrival: [facility]  Comments:      Karis Rosales RN  10/23/18 [id number]

## 2018-10-23 NOTE — ED PROVIDER NOTES
Medication List as of 10/23/2018  6:57 PM          (Please note that portions of this note werecompleted with a voice recognition program.  Efforts were made to edit the dictations but occasionally words are mis-transcribed.)    The patient was given an opportunity to see theEmergency Attending. The patient voiced understanding that I was a Mid-Level Provider and was in agreement with being seen independently by myself. Scribe: Neelam Mccauley 10/23/18 3:51 PM Scribing for and inthe presence of Carmen Zavala PA-C. Signed by: Jose Elias Moss, 11/02/18 10:59 AM    Provider:  I personally performed the servicesdescribed in the documentation, reviewed and edited the documentation which was dictated to the scribe in my presence, and it accurately records my words and actions.     Carmen Zavala PA-C 10/23/18 10:59 AM       Carmen Zavala PA-C  11/02/18 [id number]

## 2018-10-24 LAB
ORGANISM: ABNORMAL
URINE CULTURE, ROUTINE: ABNORMAL

## 2018-10-26 ENCOUNTER — HOSPITAL ENCOUNTER (OUTPATIENT)
Dept: NURSING | Age: 73
Discharge: HOME | End: 2018-10-26
Payer: MEDICARE

## 2018-10-26 VITALS
TEMPERATURE: 98.3 F | HEART RATE: 57 BPM | RESPIRATION RATE: 16 BRPM | DIASTOLIC BLOOD PRESSURE: 79 MMHG | WEIGHT: 219 LBS | BODY MASS INDEX: 38.79 KG/M2 | SYSTOLIC BLOOD PRESSURE: 122 MMHG

## 2018-10-26 DIAGNOSIS — D80.9 SELECTIVE IMMUNOGLOBULIN DEFICIENCY (HCC): ICD-10-CM

## 2018-10-26 PROCEDURE — 6360000002 HC RX W HCPCS: Performed by: INTERNAL MEDICINE

## 2018-10-26 PROCEDURE — 2580000003 HC RX 258: Performed by: INTERNAL MEDICINE

## 2018-10-26 PROCEDURE — 96365 THER/PROPH/DIAG IV INF INIT: CPT

## 2018-10-26 PROCEDURE — 96366 THER/PROPH/DIAG IV INF ADDON: CPT

## 2018-10-26 PROCEDURE — 96415 CHEMO IV INFUSION ADDL HR: CPT

## 2018-10-26 PROCEDURE — 96413 CHEMO IV INFUSION 1 HR: CPT

## 2018-10-26 PROCEDURE — 2709999900 HC NON-CHARGEABLE SUPPLY

## 2018-10-26 RX ORDER — ACETAMINOPHEN 325 MG/1
650 TABLET ORAL ONCE
Status: DISCONTINUED | OUTPATIENT
Start: 2018-10-26 | End: 2018-10-27 | Stop reason: HOSPADM

## 2018-10-26 RX ORDER — SODIUM CHLORIDE 9 MG/ML
INJECTION, SOLUTION INTRAVENOUS CONTINUOUS
Status: CANCELLED | OUTPATIENT
Start: 2018-10-26

## 2018-10-26 RX ORDER — DIPHENHYDRAMINE HCL 25 MG
25 TABLET ORAL ONCE
Status: DISCONTINUED | OUTPATIENT
Start: 2018-10-26 | End: 2018-10-27 | Stop reason: HOSPADM

## 2018-10-26 RX ORDER — ACETAMINOPHEN 325 MG/1
650 TABLET ORAL ONCE
Status: CANCELLED | OUTPATIENT
Start: 2018-10-26 | End: 2018-10-26

## 2018-10-26 RX ORDER — 0.9 % SODIUM CHLORIDE 0.9 %
10 VIAL (ML) INJECTION ONCE
Status: CANCELLED | OUTPATIENT
Start: 2018-10-26 | End: 2018-10-26

## 2018-10-26 RX ORDER — HEPARIN SODIUM (PORCINE) LOCK FLUSH IV SOLN 100 UNIT/ML 100 UNIT/ML
500 SOLUTION INTRAVENOUS PRN
Status: CANCELLED
Start: 2018-10-26

## 2018-10-26 RX ORDER — HEPARIN SODIUM (PORCINE) LOCK FLUSH IV SOLN 100 UNIT/ML 100 UNIT/ML
500 SOLUTION INTRAVENOUS PRN
Status: DISCONTINUED | OUTPATIENT
Start: 2018-10-26 | End: 2018-10-27 | Stop reason: HOSPADM

## 2018-10-26 RX ORDER — EPINEPHRINE 1 MG/ML
0.3 INJECTION, SOLUTION, CONCENTRATE INTRAVENOUS PRN
Status: CANCELLED | OUTPATIENT
Start: 2018-10-26

## 2018-10-26 RX ORDER — SODIUM CHLORIDE 0.9 % (FLUSH) 0.9 %
10 SYRINGE (ML) INJECTION PRN
Status: CANCELLED | OUTPATIENT
Start: 2018-10-26

## 2018-10-26 RX ORDER — DIPHENHYDRAMINE HCL 25 MG
25 TABLET ORAL ONCE
Status: CANCELLED | OUTPATIENT
Start: 2018-10-26 | End: 2018-10-26

## 2018-10-26 RX ORDER — DIPHENHYDRAMINE HYDROCHLORIDE 50 MG/ML
50 INJECTION INTRAMUSCULAR; INTRAVENOUS ONCE
Status: CANCELLED | OUTPATIENT
Start: 2018-10-26 | End: 2018-10-26

## 2018-10-26 RX ORDER — METHYLPREDNISOLONE SODIUM SUCCINATE 125 MG/2ML
125 INJECTION, POWDER, LYOPHILIZED, FOR SOLUTION INTRAMUSCULAR; INTRAVENOUS ONCE
Status: CANCELLED | OUTPATIENT
Start: 2018-10-26 | End: 2018-10-26

## 2018-10-26 RX ORDER — SODIUM CHLORIDE 0.9 % (FLUSH) 0.9 %
10 SYRINGE (ML) INJECTION PRN
Status: DISCONTINUED | OUTPATIENT
Start: 2018-10-26 | End: 2018-10-27 | Stop reason: HOSPADM

## 2018-10-26 RX ADMIN — Medication 10 ML: at 11:53

## 2018-10-26 RX ADMIN — IMMUNE GLOBULIN (HUMAN) 20 G: 10 INJECTION INTRAVENOUS; SUBCUTANEOUS at 09:57

## 2018-10-26 RX ADMIN — HEPARIN 500 UNITS: 100 SYRINGE at 11:53

## 2018-10-26 RX ADMIN — IMMUNE GLOBULIN (HUMAN) 20 G: 10 INJECTION INTRAVENOUS; SUBCUTANEOUS at 11:12

## 2018-10-26 ASSESSMENT — PAIN - FUNCTIONAL ASSESSMENT: PAIN_FUNCTIONAL_ASSESSMENT: 0-10

## 2018-10-26 ASSESSMENT — PAIN DESCRIPTION - DESCRIPTORS: DESCRIPTORS: HEADACHE

## 2018-10-26 NOTE — PROGRESS NOTES
56 Alert female admitted for IVIG infusion procedure reviewed with pt verbalized understanding. Pt rights and responsibilities offered to pt to read  1100 No complaints voiced. 100 [facility] instructions to pt verbalized understanding. 1220 Discharged per wheelchair stable to nursing home per LACP    __met__ Safety:       (Environmental)  Roberta Specter to environment   Ensure ID band is correct and in place/ allergy band as needed   Assess for fall risk   Initiate fall precautions as applicable (fall band, side rails, etc.)   Call light within reach   Bed in low position/ wheels locked    __met__ Pain:        Assess pain level and characteristics   Administer analgesics as ordered   Assess effectiveness of pain management and report to MD as needed    ___met_ Knowledge Deficit:   Assess baseline knowledge   Provide teaching at level of understanding   Provide teaching via preferred learning method   Evaluate teaching effectiveness    _met___ Infection-Risk of Central Venous Catheter:   Monitor for infection signs and symptoms (catheter site redness, temperature elevation, etc)   Assess for infection risks   Educate regarding infection prevention   Manage central venous catheter (flushes/ dressing changes per protocol)  .

## 2018-11-14 ENCOUNTER — PROCEDURE VISIT (OUTPATIENT)
Dept: CARDIOLOGY CLINIC | Age: 73
End: 2018-11-14
Payer: MEDICARE

## 2018-11-14 DIAGNOSIS — I48.91 ATRIAL FIBRILLATION, UNSPECIFIED TYPE (HCC): Primary | ICD-10-CM

## 2018-11-14 PROCEDURE — 93299 PR REM INTERROG ICPMS/SCRMS <30 D TECH REVIEW: CPT | Performed by: NUCLEAR MEDICINE

## 2018-11-14 PROCEDURE — 93298 REM INTERROG DEV EVAL SCRMS: CPT | Performed by: NUCLEAR MEDICINE

## 2018-11-23 ENCOUNTER — HOSPITAL ENCOUNTER (OUTPATIENT)
Dept: NURSING | Age: 73
Discharge: HOME | End: 2018-11-23
Payer: MEDICARE

## 2018-11-23 VITALS
HEART RATE: 80 BPM | DIASTOLIC BLOOD PRESSURE: 63 MMHG | TEMPERATURE: 98.2 F | SYSTOLIC BLOOD PRESSURE: 141 MMHG | BODY MASS INDEX: 38.55 KG/M2 | OXYGEN SATURATION: 97 % | WEIGHT: 217.6 LBS | RESPIRATION RATE: 16 BRPM

## 2018-11-23 DIAGNOSIS — D80.9 SELECTIVE IMMUNOGLOBULIN DEFICIENCY (HCC): ICD-10-CM

## 2018-11-23 PROCEDURE — 6360000002 HC RX W HCPCS: Performed by: INTERNAL MEDICINE

## 2018-11-23 PROCEDURE — 96413 CHEMO IV INFUSION 1 HR: CPT

## 2018-11-23 PROCEDURE — 96415 CHEMO IV INFUSION ADDL HR: CPT

## 2018-11-23 PROCEDURE — 2580000003 HC RX 258: Performed by: INTERNAL MEDICINE

## 2018-11-23 PROCEDURE — 2709999900 HC NON-CHARGEABLE SUPPLY

## 2018-11-23 PROCEDURE — 6370000000 HC RX 637 (ALT 250 FOR IP): Performed by: INTERNAL MEDICINE

## 2018-11-23 RX ORDER — SODIUM CHLORIDE 9 MG/ML
INJECTION, SOLUTION INTRAVENOUS CONTINUOUS
Status: CANCELLED | OUTPATIENT
Start: 2018-11-23

## 2018-11-23 RX ORDER — 0.9 % SODIUM CHLORIDE 0.9 %
10 VIAL (ML) INJECTION ONCE
Status: CANCELLED | OUTPATIENT
Start: 2018-11-23 | End: 2018-11-23

## 2018-11-23 RX ORDER — METHYLPREDNISOLONE SODIUM SUCCINATE 125 MG/2ML
125 INJECTION, POWDER, LYOPHILIZED, FOR SOLUTION INTRAMUSCULAR; INTRAVENOUS ONCE
Status: CANCELLED | OUTPATIENT
Start: 2018-11-23 | End: 2018-11-23

## 2018-11-23 RX ORDER — EPINEPHRINE 1 MG/ML
0.3 INJECTION, SOLUTION, CONCENTRATE INTRAVENOUS PRN
Status: CANCELLED | OUTPATIENT
Start: 2018-11-23

## 2018-11-23 RX ORDER — DIPHENHYDRAMINE HYDROCHLORIDE 50 MG/ML
50 INJECTION INTRAMUSCULAR; INTRAVENOUS ONCE
Status: CANCELLED | OUTPATIENT
Start: 2018-11-23 | End: 2018-11-23

## 2018-11-23 RX ORDER — HEPARIN SODIUM (PORCINE) LOCK FLUSH IV SOLN 100 UNIT/ML 100 UNIT/ML
500 SOLUTION INTRAVENOUS PRN
Status: DISCONTINUED | OUTPATIENT
Start: 2018-11-23 | End: 2018-11-24 | Stop reason: HOSPADM

## 2018-11-23 RX ORDER — ACETAMINOPHEN 325 MG/1
650 TABLET ORAL ONCE
Status: COMPLETED | OUTPATIENT
Start: 2018-11-23 | End: 2018-11-23

## 2018-11-23 RX ORDER — ACETAMINOPHEN 325 MG/1
650 TABLET ORAL ONCE
Status: CANCELLED | OUTPATIENT
Start: 2018-11-23 | End: 2018-11-23

## 2018-11-23 RX ORDER — HEPARIN SODIUM (PORCINE) LOCK FLUSH IV SOLN 100 UNIT/ML 100 UNIT/ML
500 SOLUTION INTRAVENOUS PRN
Status: CANCELLED
Start: 2018-11-23

## 2018-11-23 RX ORDER — DIPHENHYDRAMINE HCL 25 MG
25 TABLET ORAL ONCE
Status: COMPLETED | OUTPATIENT
Start: 2018-11-23 | End: 2018-11-23

## 2018-11-23 RX ORDER — SODIUM CHLORIDE 0.9 % (FLUSH) 0.9 %
10 SYRINGE (ML) INJECTION PRN
Status: DISCONTINUED | OUTPATIENT
Start: 2018-11-23 | End: 2018-11-24 | Stop reason: HOSPADM

## 2018-11-23 RX ORDER — DIPHENHYDRAMINE HCL 25 MG
25 TABLET ORAL ONCE
Status: CANCELLED | OUTPATIENT
Start: 2018-11-23 | End: 2018-11-23

## 2018-11-23 RX ORDER — SODIUM CHLORIDE 0.9 % (FLUSH) 0.9 %
10 SYRINGE (ML) INJECTION PRN
Status: CANCELLED | OUTPATIENT
Start: 2018-11-23

## 2018-11-23 RX ADMIN — ACETAMINOPHEN 650 MG: 325 TABLET ORAL at 10:14

## 2018-11-23 RX ADMIN — DIPHENHYDRAMINE HCL 25 MG: 25 TABLET ORAL at 10:14

## 2018-11-23 RX ADMIN — IMMUNE GLOBULIN (HUMAN) 20 G: 10 INJECTION INTRAVENOUS; SUBCUTANEOUS at 11:13

## 2018-11-23 RX ADMIN — IMMUNE GLOBULIN (HUMAN) 20 G: 10 INJECTION INTRAVENOUS; SUBCUTANEOUS at 12:29

## 2018-11-23 RX ADMIN — HEPARIN 500 UNITS: 100 SYRINGE at 13:09

## 2018-11-23 RX ADMIN — Medication 10 ML: at 13:08

## 2018-11-23 RX ADMIN — Medication 10 ML: at 10:23

## 2018-11-23 ASSESSMENT — PAIN - FUNCTIONAL ASSESSMENT: PAIN_FUNCTIONAL_ASSESSMENT: 0-10

## 2018-11-23 NOTE — PROGRESS NOTES
1005 Patient arrived to [facility] via wheelchair for IVIG ingusion  PT RIGHTS AND RESPONSIBILITIES OFFERED TO PT.  1014 Pre medication given as ordered  1113 IVIG started as ordered patient denies any needs  1215 Patient given lunch and denies any other needs  1253 Discharge instructions given to patient verbalized understanding  1307 IVIG completed patient tolerated well.  Port flushed  1315 Patient up in wheelchair and LACP called  1323 Patient taken to nursing facility via squad    _m___ Safety:       (Environmental)   [place] to environment   Ensure ID band is correct and in place/ allergy band as needed   Assess for fall risk   Initiate fall precautions as applicable (fall band, side rails, etc.)   Call light within reach   Bed in low position/ wheels locked    __m__ Pain:        Assess pain level and characteristics   Administer analgesics as ordered   Assess effectiveness of pain management and report to MD as needed    __m__ Knowledge Deficit:   Assess baseline knowledge   Provide teaching at level of understanding   Provide teaching via preferred learning method   Evaluate teaching effectiveness    __m__ Hemodynamic/Respiratory Status:       (Pre and Post Procedure Monitoring)   Assess/Monitor vital signs and LOC   Assess Baseline SpO2 prior to any sedation   Obtain weight/height   Assess vital signs/ LOC until patient meets discharge criteria   Monitor procedure site and notify MD of any issues    _m___ Infection-Risk of Central Venous Catheter:   Monitor for infection signs and symptoms (catheter site redness, temperature elevation, etc)   Assess for infection risks   Educate regarding infection prevention   Manage central venous catheter (flushes/ dressing changes per protocol)

## 2018-12-20 ENCOUNTER — PROCEDURE VISIT (OUTPATIENT)
Dept: CARDIOLOGY CLINIC | Age: 73
End: 2018-12-20
Payer: MEDICARE

## 2018-12-20 DIAGNOSIS — I48.0 PAROXYSMAL ATRIAL FIBRILLATION (HCC): Primary | ICD-10-CM

## 2018-12-20 PROCEDURE — 93299 PR REM INTERROG ICPMS/SCRMS <30 D TECH REVIEW: CPT | Performed by: NUCLEAR MEDICINE

## 2018-12-20 PROCEDURE — 93298 REM INTERROG DEV EVAL SCRMS: CPT | Performed by: NUCLEAR MEDICINE

## 2018-12-21 ENCOUNTER — HOSPITAL ENCOUNTER (OUTPATIENT)
Dept: NURSING | Age: 73
Discharge: HOME | End: 2018-12-21
Payer: MEDICARE

## 2018-12-21 VITALS
WEIGHT: 218 LBS | BODY MASS INDEX: 36.32 KG/M2 | HEART RATE: 62 BPM | OXYGEN SATURATION: 95 % | DIASTOLIC BLOOD PRESSURE: 63 MMHG | RESPIRATION RATE: 16 BRPM | TEMPERATURE: 32.5 F | HEIGHT: 65 IN | SYSTOLIC BLOOD PRESSURE: 133 MMHG

## 2018-12-21 DIAGNOSIS — D80.9 SELECTIVE IMMUNOGLOBULIN DEFICIENCY (HCC): ICD-10-CM

## 2018-12-21 PROCEDURE — 2709999900 HC NON-CHARGEABLE SUPPLY

## 2018-12-21 PROCEDURE — 6360000002 HC RX W HCPCS: Performed by: INTERNAL MEDICINE

## 2018-12-21 PROCEDURE — 96413 CHEMO IV INFUSION 1 HR: CPT

## 2018-12-21 PROCEDURE — 2580000003 HC RX 258: Performed by: INTERNAL MEDICINE

## 2018-12-21 PROCEDURE — 6370000000 HC RX 637 (ALT 250 FOR IP): Performed by: INTERNAL MEDICINE

## 2018-12-21 PROCEDURE — 96415 CHEMO IV INFUSION ADDL HR: CPT

## 2018-12-21 RX ORDER — DIPHENHYDRAMINE HCL 25 MG
25 TABLET ORAL ONCE
Status: COMPLETED | OUTPATIENT
Start: 2018-12-21 | End: 2018-12-21

## 2018-12-21 RX ORDER — SODIUM CHLORIDE 0.9 % (FLUSH) 0.9 %
10 SYRINGE (ML) INJECTION PRN
Status: DISCONTINUED | OUTPATIENT
Start: 2018-12-21 | End: 2018-12-22 | Stop reason: HOSPADM

## 2018-12-21 RX ORDER — DIPHENHYDRAMINE HCL 25 MG
25 TABLET ORAL ONCE
Status: CANCELLED | OUTPATIENT
Start: 2018-12-21 | End: 2018-12-21

## 2018-12-21 RX ORDER — SODIUM CHLORIDE 0.9 % (FLUSH) 0.9 %
10 SYRINGE (ML) INJECTION PRN
Status: CANCELLED | OUTPATIENT
Start: 2018-12-21

## 2018-12-21 RX ORDER — EPINEPHRINE 1 MG/ML
0.3 INJECTION, SOLUTION, CONCENTRATE INTRAVENOUS PRN
Status: CANCELLED | OUTPATIENT
Start: 2018-12-21

## 2018-12-21 RX ORDER — HEPARIN SODIUM (PORCINE) LOCK FLUSH IV SOLN 100 UNIT/ML 100 UNIT/ML
500 SOLUTION INTRAVENOUS PRN
Status: CANCELLED
Start: 2018-12-21

## 2018-12-21 RX ORDER — HEPARIN SODIUM (PORCINE) LOCK FLUSH IV SOLN 100 UNIT/ML 100 UNIT/ML
500 SOLUTION INTRAVENOUS PRN
Status: DISCONTINUED | OUTPATIENT
Start: 2018-12-21 | End: 2018-12-22 | Stop reason: HOSPADM

## 2018-12-21 RX ORDER — ACETAMINOPHEN 325 MG/1
650 TABLET ORAL ONCE
Status: COMPLETED | OUTPATIENT
Start: 2018-12-21 | End: 2018-12-21

## 2018-12-21 RX ORDER — 0.9 % SODIUM CHLORIDE 0.9 %
10 VIAL (ML) INJECTION ONCE
Status: CANCELLED | OUTPATIENT
Start: 2018-12-21 | End: 2018-12-21

## 2018-12-21 RX ORDER — DIPHENHYDRAMINE HYDROCHLORIDE 50 MG/ML
50 INJECTION INTRAMUSCULAR; INTRAVENOUS ONCE
Status: CANCELLED | OUTPATIENT
Start: 2018-12-21 | End: 2018-12-21

## 2018-12-21 RX ORDER — SODIUM CHLORIDE 9 MG/ML
INJECTION, SOLUTION INTRAVENOUS CONTINUOUS
Status: CANCELLED | OUTPATIENT
Start: 2018-12-21

## 2018-12-21 RX ORDER — ACETAMINOPHEN 325 MG/1
650 TABLET ORAL ONCE
Status: CANCELLED | OUTPATIENT
Start: 2018-12-21 | End: 2018-12-21

## 2018-12-21 RX ORDER — METHYLPREDNISOLONE SODIUM SUCCINATE 125 MG/2ML
125 INJECTION, POWDER, LYOPHILIZED, FOR SOLUTION INTRAMUSCULAR; INTRAVENOUS ONCE
Status: CANCELLED | OUTPATIENT
Start: 2018-12-21 | End: 2018-12-21

## 2018-12-21 RX ADMIN — IMMUNE GLOBULIN (HUMAN) 20 G: 10 INJECTION INTRAVENOUS; SUBCUTANEOUS at 11:46

## 2018-12-21 RX ADMIN — Medication 10 ML: at 09:56

## 2018-12-21 RX ADMIN — IMMUNE GLOBULIN (HUMAN) 20 G: 10 INJECTION INTRAVENOUS; SUBCUTANEOUS at 10:35

## 2018-12-21 RX ADMIN — ACETAMINOPHEN 650 MG: 325 TABLET ORAL at 09:54

## 2018-12-21 RX ADMIN — DIPHENHYDRAMINE HCL 25 MG: 25 TABLET ORAL at 09:54

## 2018-12-21 RX ADMIN — Medication 10 ML: at 12:29

## 2018-12-21 RX ADMIN — HEPARIN 500 UNITS: 100 SYRINGE at 12:29

## 2018-12-21 ASSESSMENT — PAIN SCALES - GENERAL
PAINLEVEL_OUTOF10: 0
PAINLEVEL_OUTOF10: 0

## 2018-12-31 ENCOUNTER — TELEPHONE (OUTPATIENT)
Dept: CARDIOLOGY CLINIC | Age: 73
End: 2018-12-31

## 2018-12-31 NOTE — TELEPHONE ENCOUNTER
JUST AN FYI:      RECEIVED AN ALERT ON THE [employer] WEBSITE TODAY SAYING THAT THIS PT HAD A PAUSE.  I PRINTED OUT THE EPISODES PAGE AND THERE WAS NO PAUSE ON THE REPORT

## 2019-01-18 ENCOUNTER — HOSPITAL ENCOUNTER (OUTPATIENT)
Dept: NURSING | Age: 74
Discharge: HOME | End: 2019-01-18
Payer: MEDICARE

## 2019-01-18 VITALS
WEIGHT: 228.4 LBS | TEMPERATURE: 98.8 F | DIASTOLIC BLOOD PRESSURE: 74 MMHG | BODY MASS INDEX: 38.01 KG/M2 | SYSTOLIC BLOOD PRESSURE: 157 MMHG | HEART RATE: 60 BPM | RESPIRATION RATE: 16 BRPM | OXYGEN SATURATION: 95 %

## 2019-01-18 DIAGNOSIS — D83.9 COMMON VARIABLE IMMUNODEFICIENCY (HCC): ICD-10-CM

## 2019-01-18 DIAGNOSIS — D80.9 SELECTIVE IMMUNOGLOBULIN DEFICIENCY (HCC): ICD-10-CM

## 2019-01-18 LAB
IGA: 110 MG/DL (ref 70–400)
IGG: 1144 MG/DL (ref 700–1600)
IGM: 55 MG/DL (ref 40–230)

## 2019-01-18 PROCEDURE — 6360000002 HC RX W HCPCS: Performed by: INTERNAL MEDICINE

## 2019-01-18 PROCEDURE — 82784 ASSAY IGA/IGD/IGG/IGM EACH: CPT

## 2019-01-18 PROCEDURE — 36591 DRAW BLOOD OFF VENOUS DEVICE: CPT

## 2019-01-18 PROCEDURE — 2709999900 HC NON-CHARGEABLE SUPPLY

## 2019-01-18 PROCEDURE — 2580000003 HC RX 258: Performed by: FAMILY MEDICINE

## 2019-01-18 PROCEDURE — 96413 CHEMO IV INFUSION 1 HR: CPT

## 2019-01-18 PROCEDURE — 6360000002 HC RX W HCPCS: Performed by: FAMILY MEDICINE

## 2019-01-18 PROCEDURE — 96415 CHEMO IV INFUSION ADDL HR: CPT

## 2019-01-18 RX ORDER — 0.9 % SODIUM CHLORIDE 0.9 %
10 VIAL (ML) INJECTION ONCE
Status: CANCELLED | OUTPATIENT
Start: 2019-01-18 | End: 2019-01-18

## 2019-01-18 RX ORDER — SODIUM CHLORIDE 0.9 % (FLUSH) 0.9 %
5 SYRINGE (ML) INJECTION PRN
Status: CANCELLED | OUTPATIENT
Start: 2019-01-18

## 2019-01-18 RX ORDER — METHYLPREDNISOLONE SODIUM SUCCINATE 125 MG/2ML
125 INJECTION, POWDER, LYOPHILIZED, FOR SOLUTION INTRAMUSCULAR; INTRAVENOUS ONCE
Status: CANCELLED | OUTPATIENT
Start: 2019-01-18 | End: 2019-01-18

## 2019-01-18 RX ORDER — 0.9 % SODIUM CHLORIDE 0.9 %
10 VIAL (ML) INJECTION PRN
Status: DISCONTINUED | OUTPATIENT
Start: 2019-01-18 | End: 2019-01-19 | Stop reason: HOSPADM

## 2019-01-18 RX ORDER — HEPARIN SODIUM (PORCINE) LOCK FLUSH IV SOLN 100 UNIT/ML 100 UNIT/ML
500 SOLUTION INTRAVENOUS PRN
Status: CANCELLED
Start: 2019-01-18

## 2019-01-18 RX ORDER — DIPHENHYDRAMINE HCL 25 MG
25 TABLET ORAL ONCE
Status: CANCELLED | OUTPATIENT
Start: 2019-01-18 | End: 2019-01-18

## 2019-01-18 RX ORDER — HEPARIN SODIUM (PORCINE) LOCK FLUSH IV SOLN 100 UNIT/ML 100 UNIT/ML
500 SOLUTION INTRAVENOUS PRN
Status: CANCELLED | OUTPATIENT
Start: 2019-01-18

## 2019-01-18 RX ORDER — DIPHENHYDRAMINE HYDROCHLORIDE 50 MG/ML
50 INJECTION INTRAMUSCULAR; INTRAVENOUS ONCE
Status: CANCELLED | OUTPATIENT
Start: 2019-01-18 | End: 2019-01-18

## 2019-01-18 RX ORDER — SODIUM CHLORIDE 9 MG/ML
INJECTION, SOLUTION INTRAVENOUS ONCE
Status: CANCELLED | OUTPATIENT
Start: 2019-01-18 | End: 2019-01-18

## 2019-01-18 RX ORDER — SODIUM CHLORIDE 0.9 % (FLUSH) 0.9 %
10 SYRINGE (ML) INJECTION PRN
Status: CANCELLED | OUTPATIENT
Start: 2019-01-18

## 2019-01-18 RX ORDER — SODIUM CHLORIDE 9 MG/ML
INJECTION, SOLUTION INTRAVENOUS CONTINUOUS
Status: CANCELLED | OUTPATIENT
Start: 2019-01-18

## 2019-01-18 RX ORDER — HEPARIN SODIUM (PORCINE) LOCK FLUSH IV SOLN 100 UNIT/ML 100 UNIT/ML
500 SOLUTION INTRAVENOUS PRN
Status: DISCONTINUED | OUTPATIENT
Start: 2019-01-18 | End: 2019-01-19 | Stop reason: HOSPADM

## 2019-01-18 RX ORDER — ACETAMINOPHEN 325 MG/1
650 TABLET ORAL ONCE
Status: CANCELLED | OUTPATIENT
Start: 2019-01-18 | End: 2019-01-18

## 2019-01-18 RX ORDER — HEPARIN SODIUM (PORCINE) LOCK FLUSH IV SOLN 100 UNIT/ML 100 UNIT/ML
500 SOLUTION INTRAVENOUS PRN
Status: DISCONTINUED | OUTPATIENT
Start: 2019-01-18 | End: 2019-01-18

## 2019-01-18 RX ORDER — DEXTROSE MONOHYDRATE 50 MG/ML
INJECTION, SOLUTION INTRAVENOUS ONCE
Status: CANCELLED | OUTPATIENT
Start: 2019-01-18 | End: 2019-01-18

## 2019-01-18 RX ADMIN — IMMUNE GLOBULIN (HUMAN) 20 G: 10 INJECTION INTRAVENOUS; SUBCUTANEOUS at 10:28

## 2019-01-18 RX ADMIN — HEPARIN 500 UNITS: 100 SYRINGE at 12:52

## 2019-01-18 RX ADMIN — IMMUNE GLOBULIN (HUMAN) 20 G: 10 INJECTION INTRAVENOUS; SUBCUTANEOUS at 11:48

## 2019-01-18 RX ADMIN — Medication 10 ML: at 12:52

## 2019-01-18 ASSESSMENT — PAIN - FUNCTIONAL ASSESSMENT: PAIN_FUNCTIONAL_ASSESSMENT: 0-10

## 2019-01-18 NOTE — PROGRESS NOTES
8124 Patient arrived to [facility] ambulatory for ivig infusion  PT RIGHTS AND RESPONSIBILITIES OFFERED TO PT.  1028 IVIG started as ordered.  Patient denies any needs at this time  1130 Patient continues to deny any needs watching TV  1206 Discharge instructions given to patient verbalized understanding  1241 IVIG completed patient tolerated well.  1300 Patient discharged to home in stable condition    _m___ Safety:       (Environmental)  Oskar Mass to environment   Ensure ID band is correct and in place/ allergy band as needed   Assess for fall risk   Initiate fall precautions as applicable (fall band, side rails, etc.)   Call light within reach   Bed in low position/ wheels locked    _m___ Pain:        Assess pain level and characteristics   Administer analgesics as ordered   Assess effectiveness of pain management and report to MD as needed    _m___ Knowledge Deficit:   Assess baseline knowledge   Provide teaching at level of understanding   Provide teaching via preferred learning method   Evaluate teaching effectiveness    _m__ Hemodynamic/Respiratory Status:       (Pre and Post Procedure Monitoring)   Assess/Monitor vital signs and LOC   Assess Baseline SpO2 prior to any sedation   Obtain weight/height   Assess vital signs/ LOC until patient meets discharge criteria   Monitor procedure site and notify MD of any issues    __m__ Infection-Risk of Central Venous Catheter:   Monitor for infection signs and symptoms (catheter site redness, temperature elevation, etc)   Assess for infection risks   Educate regarding infection prevention   Manage central venous catheter (flushes/ dressing changes per protocol)

## 2019-01-21 ENCOUNTER — OFFICE VISIT (OUTPATIENT)
Dept: CARDIOLOGY CLINIC | Age: 74
End: 2019-01-21
Payer: MEDICARE

## 2019-01-21 VITALS
BODY MASS INDEX: 36.48 KG/M2 | HEART RATE: 72 BPM | WEIGHT: 227 LBS | HEIGHT: 66 IN | DIASTOLIC BLOOD PRESSURE: 74 MMHG | SYSTOLIC BLOOD PRESSURE: 136 MMHG

## 2019-01-21 DIAGNOSIS — I10 ESSENTIAL HYPERTENSION: ICD-10-CM

## 2019-01-21 DIAGNOSIS — I42.0 DILATED CARDIOMYOPATHY (HCC): Primary | ICD-10-CM

## 2019-01-21 DIAGNOSIS — I48.20 CHRONIC ATRIAL FIBRILLATION (HCC): ICD-10-CM

## 2019-01-21 DIAGNOSIS — R10.9 ABDOMINAL PAIN, UNSPECIFIED ABDOMINAL LOCATION: ICD-10-CM

## 2019-01-21 PROCEDURE — 1123F ACP DISCUSS/DSCN MKR DOCD: CPT | Performed by: NUCLEAR MEDICINE

## 2019-01-21 PROCEDURE — 4040F PNEUMOC VAC/ADMIN/RCVD: CPT | Performed by: NUCLEAR MEDICINE

## 2019-01-21 PROCEDURE — 3017F COLORECTAL CA SCREEN DOC REV: CPT | Performed by: NUCLEAR MEDICINE

## 2019-01-21 PROCEDURE — G8427 DOCREV CUR MEDS BY ELIG CLIN: HCPCS | Performed by: NUCLEAR MEDICINE

## 2019-01-21 PROCEDURE — 1036F TOBACCO NON-USER: CPT | Performed by: NUCLEAR MEDICINE

## 2019-01-21 PROCEDURE — G8484 FLU IMMUNIZE NO ADMIN: HCPCS | Performed by: NUCLEAR MEDICINE

## 2019-01-21 PROCEDURE — G8400 PT W/DXA NO RESULTS DOC: HCPCS | Performed by: NUCLEAR MEDICINE

## 2019-01-21 PROCEDURE — 1090F PRES/ABSN URINE INCON ASSESS: CPT | Performed by: NUCLEAR MEDICINE

## 2019-01-21 PROCEDURE — 1101F PT FALLS ASSESS-DOCD LE1/YR: CPT | Performed by: NUCLEAR MEDICINE

## 2019-01-21 PROCEDURE — 99213 OFFICE O/P EST LOW 20 MIN: CPT | Performed by: NUCLEAR MEDICINE

## 2019-01-21 PROCEDURE — G8599 NO ASA/ANTIPLAT THER USE RNG: HCPCS | Performed by: NUCLEAR MEDICINE

## 2019-01-21 PROCEDURE — G8417 CALC BMI ABV UP PARAM F/U: HCPCS | Performed by: NUCLEAR MEDICINE

## 2019-01-21 RX ORDER — INSULIN GLARGINE 100 [IU]/ML
8 INJECTION, SOLUTION SUBCUTANEOUS NIGHTLY
COMMUNITY
End: 2020-12-21

## 2019-01-21 RX ORDER — DULOXETIN HYDROCHLORIDE 20 MG/1
40 CAPSULE, DELAYED RELEASE ORAL DAILY
COMMUNITY
End: 2020-12-21

## 2019-01-22 ENCOUNTER — TELEPHONE (OUTPATIENT)
Dept: CARDIOLOGY CLINIC | Age: 74
End: 2019-01-22

## 2019-01-28 ENCOUNTER — PROCEDURE VISIT (OUTPATIENT)
Dept: CARDIOLOGY CLINIC | Age: 74
End: 2019-01-28
Payer: MEDICARE

## 2019-01-28 DIAGNOSIS — R41.89 EPISODE OF UNRESPONSIVENESS: Primary | ICD-10-CM

## 2019-01-28 PROCEDURE — 93299 PR REM INTERROG ICPMS/SCRMS <30 D TECH REVIEW: CPT | Performed by: NUCLEAR MEDICINE

## 2019-01-28 PROCEDURE — 93298 REM INTERROG DEV EVAL SCRMS: CPT | Performed by: NUCLEAR MEDICINE

## 2019-02-04 ENCOUNTER — HOSPITAL ENCOUNTER (OUTPATIENT)
Dept: ULTRASOUND IMAGING | Age: 74
Discharge: HOME | End: 2019-02-04
Payer: MEDICARE

## 2019-02-04 DIAGNOSIS — I48.20 CHRONIC ATRIAL FIBRILLATION (HCC): ICD-10-CM

## 2019-02-04 DIAGNOSIS — R10.9 ABDOMINAL PAIN, UNSPECIFIED ABDOMINAL LOCATION: ICD-10-CM

## 2019-02-04 DIAGNOSIS — I42.0 DILATED CARDIOMYOPATHY (HCC): ICD-10-CM

## 2019-02-04 DIAGNOSIS — I10 ESSENTIAL HYPERTENSION: ICD-10-CM

## 2019-02-04 PROCEDURE — 76705 ECHO EXAM OF ABDOMEN: CPT

## 2019-02-15 ENCOUNTER — HOSPITAL ENCOUNTER (OUTPATIENT)
Dept: NURSING | Age: 74
Discharge: HOME | End: 2019-02-15
Payer: MEDICARE

## 2019-02-15 VITALS
HEART RATE: 62 BPM | TEMPERATURE: 97.6 F | BODY MASS INDEX: 36.64 KG/M2 | SYSTOLIC BLOOD PRESSURE: 151 MMHG | WEIGHT: 227 LBS | DIASTOLIC BLOOD PRESSURE: 80 MMHG | RESPIRATION RATE: 16 BRPM | OXYGEN SATURATION: 95 %

## 2019-02-15 DIAGNOSIS — D83.9 COMMON VARIABLE IMMUNODEFICIENCY (HCC): ICD-10-CM

## 2019-02-15 PROCEDURE — 96415 CHEMO IV INFUSION ADDL HR: CPT

## 2019-02-15 PROCEDURE — 96413 CHEMO IV INFUSION 1 HR: CPT

## 2019-02-15 PROCEDURE — 2709999900 HC NON-CHARGEABLE SUPPLY

## 2019-02-15 PROCEDURE — 6370000000 HC RX 637 (ALT 250 FOR IP): Performed by: FAMILY MEDICINE

## 2019-02-15 PROCEDURE — 2580000003 HC RX 258: Performed by: FAMILY MEDICINE

## 2019-02-15 PROCEDURE — 6360000002 HC RX W HCPCS: Performed by: FAMILY MEDICINE

## 2019-02-15 RX ORDER — SODIUM CHLORIDE 0.9 % (FLUSH) 0.9 %
10 SYRINGE (ML) INJECTION PRN
Status: DISCONTINUED | OUTPATIENT
Start: 2019-02-15 | End: 2019-02-16 | Stop reason: HOSPADM

## 2019-02-15 RX ORDER — ACETAMINOPHEN 325 MG/1
650 TABLET ORAL ONCE
Status: CANCELLED | OUTPATIENT
Start: 2019-02-15 | End: 2019-02-15

## 2019-02-15 RX ORDER — DEXTROSE MONOHYDRATE 50 MG/ML
INJECTION, SOLUTION INTRAVENOUS ONCE
Status: CANCELLED | OUTPATIENT
Start: 2019-02-15 | End: 2019-02-15

## 2019-02-15 RX ORDER — SODIUM CHLORIDE 9 MG/ML
INJECTION, SOLUTION INTRAVENOUS CONTINUOUS
Status: CANCELLED | OUTPATIENT
Start: 2019-02-15

## 2019-02-15 RX ORDER — DIPHENHYDRAMINE HYDROCHLORIDE 50 MG/ML
50 INJECTION INTRAMUSCULAR; INTRAVENOUS ONCE
Status: CANCELLED | OUTPATIENT
Start: 2019-02-15 | End: 2019-02-15

## 2019-02-15 RX ORDER — SODIUM CHLORIDE 0.9 % (FLUSH) 0.9 %
10 SYRINGE (ML) INJECTION PRN
Status: CANCELLED | OUTPATIENT
Start: 2019-02-15

## 2019-02-15 RX ORDER — ACETAMINOPHEN 325 MG/1
650 TABLET ORAL ONCE
Status: COMPLETED | OUTPATIENT
Start: 2019-02-15 | End: 2019-02-15

## 2019-02-15 RX ORDER — SODIUM CHLORIDE 0.9 % (FLUSH) 0.9 %
5 SYRINGE (ML) INJECTION PRN
Status: CANCELLED | OUTPATIENT
Start: 2019-02-15

## 2019-02-15 RX ORDER — METHYLPREDNISOLONE SODIUM SUCCINATE 125 MG/2ML
125 INJECTION, POWDER, LYOPHILIZED, FOR SOLUTION INTRAMUSCULAR; INTRAVENOUS ONCE
Status: CANCELLED | OUTPATIENT
Start: 2019-02-15 | End: 2019-02-15

## 2019-02-15 RX ORDER — SODIUM CHLORIDE 9 MG/ML
INJECTION, SOLUTION INTRAVENOUS ONCE
Status: CANCELLED | OUTPATIENT
Start: 2019-02-15 | End: 2019-02-15

## 2019-02-15 RX ORDER — DIPHENHYDRAMINE HCL 25 MG
25 TABLET ORAL ONCE
Status: CANCELLED | OUTPATIENT
Start: 2019-02-15 | End: 2019-02-15

## 2019-02-15 RX ORDER — HEPARIN SODIUM (PORCINE) LOCK FLUSH IV SOLN 100 UNIT/ML 100 UNIT/ML
500 SOLUTION INTRAVENOUS PRN
Status: CANCELLED | OUTPATIENT
Start: 2019-02-15

## 2019-02-15 RX ORDER — DIPHENHYDRAMINE HCL 25 MG
25 TABLET ORAL ONCE
Status: COMPLETED | OUTPATIENT
Start: 2019-02-15 | End: 2019-02-15

## 2019-02-15 RX ORDER — HEPARIN SODIUM (PORCINE) LOCK FLUSH IV SOLN 100 UNIT/ML 100 UNIT/ML
500 SOLUTION INTRAVENOUS PRN
Status: DISCONTINUED | OUTPATIENT
Start: 2019-02-15 | End: 2019-02-16 | Stop reason: HOSPADM

## 2019-02-15 RX ORDER — 0.9 % SODIUM CHLORIDE 0.9 %
10 VIAL (ML) INJECTION ONCE
Status: CANCELLED | OUTPATIENT
Start: 2019-02-15 | End: 2019-02-15

## 2019-02-15 RX ADMIN — HEPARIN 500 UNITS: 100 SYRINGE at 13:08

## 2019-02-15 RX ADMIN — IMMUNE GLOBULIN (HUMAN) 20 G: 10 INJECTION INTRAVENOUS; SUBCUTANEOUS at 12:28

## 2019-02-15 RX ADMIN — ACETAMINOPHEN 650 MG: 325 TABLET ORAL at 10:40

## 2019-02-15 RX ADMIN — DIPHENHYDRAMINE HCL 25 MG: 25 TABLET ORAL at 10:40

## 2019-02-15 RX ADMIN — Medication 10 ML: at 13:08

## 2019-02-15 RX ADMIN — Medication 10 ML: at 10:37

## 2019-02-15 RX ADMIN — IMMUNE GLOBULIN (HUMAN) 20 G: 10 INJECTION INTRAVENOUS; SUBCUTANEOUS at 11:10

## 2019-02-15 ASSESSMENT — PAIN - FUNCTIONAL ASSESSMENT: PAIN_FUNCTIONAL_ASSESSMENT: 0-10

## 2019-02-15 NOTE — PROGRESS NOTES
1022 Patient arrived to [facility] ambulatory for IVIG infusion  PT RIGHTS AND RESPONSIBILITIES OFFERED TO PT.  1110 IVIG stated as ordered  1130 Patient denies any needs at this time  1225 Patient continues to deny any needs . 1312 Discharge instructions given to patient verbalized understanding.  Patient discharged to home in stable condition ambulatory    __m__ Safety:       (Environmental)  Leana Jasmine to environment   Ensure ID band is correct and in place/ allergy band as needed   Assess for fall risk   Initiate fall precautions as applicable (fall band, side rails, etc.)   Call light within reach   Bed in low position/ wheels locked  m  ____ Pain:        Assess pain level and characteristics   Administer analgesics as ordered   Assess effectiveness of pain management and report to MD as needed    _m___ Knowledge Deficit:   Assess baseline knowledge   Provide teaching at level of understanding   Provide teaching via preferred learning method   Evaluate teaching effectiveness    m____ Hemodynamic/Respiratory Status:       (Pre and Post Procedure Monitoring)   Assess/Monitor vital signs and LOC   Assess Baseline SpO2 prior to any sedation   Obtain weight/height   Assess vital signs/ LOC until patient meets discharge criteria   Monitor procedure site and notify MD of any issues    _m___ Infection-Risk of Central Venous Catheter:   Monitor for infection signs and symptoms (catheter site redness, temperature elevation, etc)   Assess for infection risks   Educate regarding infection prevention   Manage central venous catheter (flushes/ dressing changes per protocol)

## 2019-02-21 ENCOUNTER — TELEPHONE (OUTPATIENT)
Dept: CARDIOLOGY CLINIC | Age: 74
End: 2019-02-21

## 2019-03-01 ENCOUNTER — PROCEDURE VISIT (OUTPATIENT)
Dept: CARDIOLOGY CLINIC | Age: 74
End: 2019-03-01
Payer: MEDICARE

## 2019-03-01 DIAGNOSIS — I48.91 ATRIAL FIBRILLATION, UNSPECIFIED TYPE (HCC): Primary | ICD-10-CM

## 2019-03-01 PROCEDURE — 93298 REM INTERROG DEV EVAL SCRMS: CPT | Performed by: NUCLEAR MEDICINE

## 2019-03-01 PROCEDURE — 93299 PR REM INTERROG ICPMS/SCRMS <30 D TECH REVIEW: CPT | Performed by: NUCLEAR MEDICINE

## 2019-03-14 LAB — CREATININE: 44 MG/DL

## 2019-03-15 ENCOUNTER — HOSPITAL ENCOUNTER (OUTPATIENT)
Dept: NURSING | Age: 74
Discharge: HOME | End: 2019-03-15
Payer: MEDICARE

## 2019-03-15 VITALS
SYSTOLIC BLOOD PRESSURE: 130 MMHG | BODY MASS INDEX: 36.86 KG/M2 | RESPIRATION RATE: 16 BRPM | TEMPERATURE: 97.9 F | DIASTOLIC BLOOD PRESSURE: 64 MMHG | OXYGEN SATURATION: 93 % | WEIGHT: 228.4 LBS | HEART RATE: 50 BPM

## 2019-03-15 DIAGNOSIS — D83.9 COMMON VARIABLE IMMUNODEFICIENCY (HCC): Primary | ICD-10-CM

## 2019-03-15 PROCEDURE — 2580000003 HC RX 258: Performed by: FAMILY MEDICINE

## 2019-03-15 PROCEDURE — 96415 CHEMO IV INFUSION ADDL HR: CPT

## 2019-03-15 PROCEDURE — 6360000002 HC RX W HCPCS: Performed by: FAMILY MEDICINE

## 2019-03-15 PROCEDURE — 96413 CHEMO IV INFUSION 1 HR: CPT

## 2019-03-15 PROCEDURE — 6370000000 HC RX 637 (ALT 250 FOR IP): Performed by: FAMILY MEDICINE

## 2019-03-15 PROCEDURE — 2709999900 HC NON-CHARGEABLE SUPPLY

## 2019-03-15 RX ORDER — CYCLOBENZAPRINE HCL 5 MG
5 TABLET ORAL 3 TIMES DAILY PRN
COMMUNITY
End: 2020-12-21

## 2019-03-15 RX ORDER — ONDANSETRON 4 MG/1
4 TABLET, ORALLY DISINTEGRATING ORAL EVERY 8 HOURS PRN
COMMUNITY
End: 2019-05-06

## 2019-03-15 RX ORDER — TRAMADOL HYDROCHLORIDE 50 MG/1
50 TABLET ORAL EVERY 6 HOURS PRN
COMMUNITY
End: 2019-05-06

## 2019-03-15 RX ORDER — SODIUM CHLORIDE 9 MG/ML
INJECTION, SOLUTION INTRAVENOUS CONTINUOUS
Status: CANCELLED | OUTPATIENT
Start: 2019-03-15

## 2019-03-15 RX ORDER — ACETAMINOPHEN 325 MG/1
650 TABLET ORAL ONCE
Status: CANCELLED | OUTPATIENT
Start: 2019-03-15 | End: 2019-03-15

## 2019-03-15 RX ORDER — HEPARIN SODIUM (PORCINE) LOCK FLUSH IV SOLN 100 UNIT/ML 100 UNIT/ML
500 SOLUTION INTRAVENOUS PRN
Status: CANCELLED | OUTPATIENT
Start: 2019-03-15

## 2019-03-15 RX ORDER — HYDROXYZINE HYDROCHLORIDE 25 MG/1
25 TABLET, FILM COATED ORAL 3 TIMES DAILY PRN
Status: ON HOLD | COMMUNITY
End: 2021-07-26 | Stop reason: HOSPADM

## 2019-03-15 RX ORDER — SODIUM CHLORIDE 0.9 % (FLUSH) 0.9 %
5 SYRINGE (ML) INJECTION PRN
Status: CANCELLED | OUTPATIENT
Start: 2019-03-15

## 2019-03-15 RX ORDER — DIPHENHYDRAMINE HCL 25 MG
25 TABLET ORAL ONCE
Status: CANCELLED | OUTPATIENT
Start: 2019-03-15 | End: 2019-03-15

## 2019-03-15 RX ORDER — SODIUM CHLORIDE 9 MG/ML
INJECTION, SOLUTION INTRAVENOUS ONCE
Status: CANCELLED | OUTPATIENT
Start: 2019-03-15 | End: 2019-03-15

## 2019-03-15 RX ORDER — DIPHENHYDRAMINE HYDROCHLORIDE 50 MG/ML
50 INJECTION INTRAMUSCULAR; INTRAVENOUS ONCE
Status: CANCELLED | OUTPATIENT
Start: 2019-03-15 | End: 2019-03-15

## 2019-03-15 RX ORDER — METHYLPREDNISOLONE SODIUM SUCCINATE 125 MG/2ML
125 INJECTION, POWDER, LYOPHILIZED, FOR SOLUTION INTRAMUSCULAR; INTRAVENOUS ONCE
Status: CANCELLED | OUTPATIENT
Start: 2019-03-15 | End: 2019-03-15

## 2019-03-15 RX ORDER — DEXTROSE MONOHYDRATE 50 MG/ML
INJECTION, SOLUTION INTRAVENOUS ONCE
Status: CANCELLED | OUTPATIENT
Start: 2019-03-15 | End: 2019-03-15

## 2019-03-15 RX ORDER — POLYETHYLENE GLYCOL 3350 17 G/17G
17 POWDER, FOR SOLUTION ORAL DAILY PRN
COMMUNITY
End: 2021-08-26

## 2019-03-15 RX ORDER — 0.9 % SODIUM CHLORIDE 0.9 %
10 VIAL (ML) INJECTION ONCE
Status: CANCELLED | OUTPATIENT
Start: 2019-03-15 | End: 2019-03-15

## 2019-03-15 RX ORDER — HEPARIN SODIUM (PORCINE) LOCK FLUSH IV SOLN 100 UNIT/ML 100 UNIT/ML
500 SOLUTION INTRAVENOUS PRN
Status: DISCONTINUED | OUTPATIENT
Start: 2019-03-15 | End: 2019-03-16 | Stop reason: HOSPADM

## 2019-03-15 RX ORDER — ACETAMINOPHEN 325 MG/1
650 TABLET ORAL ONCE
Status: COMPLETED | OUTPATIENT
Start: 2019-03-15 | End: 2019-03-15

## 2019-03-15 RX ORDER — SODIUM CHLORIDE 0.9 % (FLUSH) 0.9 %
10 SYRINGE (ML) INJECTION PRN
Status: DISCONTINUED | OUTPATIENT
Start: 2019-03-15 | End: 2019-03-16 | Stop reason: HOSPADM

## 2019-03-15 RX ORDER — DIPHENHYDRAMINE HCL 25 MG
25 TABLET ORAL ONCE
Status: COMPLETED | OUTPATIENT
Start: 2019-03-15 | End: 2019-03-15

## 2019-03-15 RX ORDER — SODIUM CHLORIDE 0.9 % (FLUSH) 0.9 %
10 SYRINGE (ML) INJECTION PRN
Status: CANCELLED | OUTPATIENT
Start: 2019-03-15

## 2019-03-15 RX ADMIN — Medication 10 ML: at 12:29

## 2019-03-15 RX ADMIN — IMMUNE GLOBULIN (HUMAN) 20 G: 10 INJECTION INTRAVENOUS; SUBCUTANEOUS at 11:50

## 2019-03-15 RX ADMIN — HEPARIN 500 UNITS: 100 SYRINGE at 12:29

## 2019-03-15 RX ADMIN — Medication 10 ML: at 10:19

## 2019-03-15 RX ADMIN — DIPHENHYDRAMINE HCL 25 MG: 25 TABLET ORAL at 09:49

## 2019-03-15 RX ADMIN — ACETAMINOPHEN 650 MG: 325 TABLET ORAL at 09:49

## 2019-03-15 RX ADMIN — IMMUNE GLOBULIN (HUMAN) 20 G: 10 INJECTION INTRAVENOUS; SUBCUTANEOUS at 10:37

## 2019-03-15 ASSESSMENT — PAIN SCALES - GENERAL: PAINLEVEL_OUTOF10: 0

## 2019-03-15 ASSESSMENT — PAIN - FUNCTIONAL ASSESSMENT: PAIN_FUNCTIONAL_ASSESSMENT: 0-10

## 2019-03-15 NOTE — NOTE TO PATIENT VIA PORTAL
0930 Patient arrived to [facility] ambulatory for IVIG infusion with family member at side. PT RIGHTS AND RESPONSIBILITIES OFFERED TO PT.  1045 Patient denies any needs at this time  1140 Patient denies any needs at this time call light within reach  1232 Medication completed patient tolerated well. Discharge instructions given to patient verbalized understanding.  Patient discharged to home in stable condition      _m___ Safety:       (Environmental)  Jermaine Le to environment   Ensure ID band is correct and in place/ allergy band as needed   Assess for fall risk   Initiate fall precautions as applicable (fall band, side rails, etc.)   Call light within reach   Bed in low position/ wheels locked    _m___ Pain:        Assess pain level and characteristics   Administer analgesics as ordered   Assess effectiveness of pain management and report to MD as needed    _m___ Knowledge Deficit:   Assess baseline knowledge   Provide teaching at level of understanding   Provide teaching via preferred learning method   Evaluate teaching effectiveness    _m___ Hemodynamic/Respiratory Status:       (Pre and Post Procedure Monitoring)   Assess/Monitor vital signs and LOC   Assess Baseline SpO2 prior to any sedation   Obtain weight/height   Assess vital signs/ LOC until patient meets discharge criteria   Monitor procedure site and notify MD of any issues    m____ Infection-Risk of Central Venous Catheter:   Monitor for infection signs and symptoms (catheter site redness, temperature elevation, etc)   Assess for infection risks   Educate regarding infection prevention   Manage central venous catheter (flushes/ dressing changes per protocol)

## 2019-03-18 ENCOUNTER — TELEPHONE (OUTPATIENT)
Dept: NEPHROLOGY | Age: 74
End: 2019-03-18

## 2019-04-01 ENCOUNTER — HOSPITAL ENCOUNTER (OUTPATIENT)
Dept: CT IMAGING | Age: 74
Discharge: HOME | End: 2019-04-01
Payer: MEDICARE

## 2019-04-01 DIAGNOSIS — R10.30 LOWER ABDOMINAL PAIN: ICD-10-CM

## 2019-04-01 PROCEDURE — 6360000004 HC RX CONTRAST MEDICATION: Performed by: INTERNAL MEDICINE

## 2019-04-01 PROCEDURE — 74177 CT ABD & PELVIS W/CONTRAST: CPT

## 2019-04-01 PROCEDURE — 6360000002 HC RX W HCPCS

## 2019-04-01 RX ORDER — HEPARIN SODIUM (PORCINE) LOCK FLUSH IV SOLN 100 UNIT/ML 100 UNIT/ML
500 SOLUTION INTRAVENOUS ONCE
Status: COMPLETED | OUTPATIENT
Start: 2019-04-01 | End: 2019-04-01

## 2019-04-01 RX ADMIN — HEPARIN SODIUM (PORCINE) LOCK FLUSH IV SOLN 100 UNIT/ML 500 UNITS: 100 SOLUTION at 14:29

## 2019-04-01 RX ADMIN — IOPAMIDOL 85 ML: 755 INJECTION, SOLUTION INTRAVENOUS at 14:06

## 2019-04-01 RX ADMIN — IOHEXOL 50 ML: 240 INJECTION, SOLUTION INTRATHECAL; INTRAVASCULAR; INTRAVENOUS; ORAL at 14:06

## 2019-04-04 LAB
BUN BLDV-MCNC: 16 MG/DL
CALCIUM SERPL-MCNC: 8.2 MG/DL
CHLORIDE BLD-SCNC: 103 MMOL/L
CO2: 30 MMOL/L
CREAT SERPL-MCNC: 1.4 MG/DL
GFR CALCULATED: 37
GLUCOSE BLD-MCNC: 132 MG/DL
POTASSIUM SERPL-SCNC: 3.7 MMOL/L
SODIUM BLD-SCNC: 140 MMOL/L

## 2019-04-08 ENCOUNTER — PROCEDURE VISIT (OUTPATIENT)
Dept: CARDIOLOGY CLINIC | Age: 74
End: 2019-04-08
Payer: MEDICARE

## 2019-04-08 DIAGNOSIS — Z45.09 ENCOUNTER FOR ELECTRONIC ANALYSIS OF REVEAL EVENT RECORDER: Primary | ICD-10-CM

## 2019-04-08 PROCEDURE — 93299 PR REM INTERROG ICPMS/SCRMS <30 D TECH REVIEW: CPT | Performed by: NUCLEAR MEDICINE

## 2019-04-08 PROCEDURE — 93298 REM INTERROG DEV EVAL SCRMS: CPT | Performed by: NUCLEAR MEDICINE

## 2019-04-08 NOTE — PROGRESS NOTES
[employer] reveal  Dr Daniella Gutiérrez, not on xarelto d/t GI Bleed  Battery ok  Episodes of bradycardia with PVC

## 2019-04-12 ENCOUNTER — HOSPITAL ENCOUNTER (OUTPATIENT)
Dept: NURSING | Age: 74
Discharge: HOME | End: 2019-04-12
Payer: MEDICARE

## 2019-04-12 VITALS
OXYGEN SATURATION: 92 % | TEMPERATURE: 98.2 F | SYSTOLIC BLOOD PRESSURE: 136 MMHG | HEART RATE: 52 BPM | WEIGHT: 227 LBS | DIASTOLIC BLOOD PRESSURE: 65 MMHG | BODY MASS INDEX: 36.64 KG/M2 | RESPIRATION RATE: 16 BRPM

## 2019-04-12 DIAGNOSIS — D83.9 COMMON VARIABLE IMMUNODEFICIENCY (HCC): Primary | ICD-10-CM

## 2019-04-12 PROCEDURE — 96413 CHEMO IV INFUSION 1 HR: CPT

## 2019-04-12 PROCEDURE — 2709999900 HC NON-CHARGEABLE SUPPLY

## 2019-04-12 PROCEDURE — 2580000003 HC RX 258: Performed by: FAMILY MEDICINE

## 2019-04-12 PROCEDURE — 96415 CHEMO IV INFUSION ADDL HR: CPT

## 2019-04-12 PROCEDURE — 6360000002 HC RX W HCPCS: Performed by: FAMILY MEDICINE

## 2019-04-12 RX ORDER — SODIUM CHLORIDE 0.9 % (FLUSH) 0.9 %
5 SYRINGE (ML) INJECTION PRN
Status: CANCELLED | OUTPATIENT
Start: 2019-05-10

## 2019-04-12 RX ORDER — SODIUM CHLORIDE 9 MG/ML
INJECTION, SOLUTION INTRAVENOUS ONCE
Status: CANCELLED | OUTPATIENT
Start: 2019-05-10

## 2019-04-12 RX ORDER — SODIUM CHLORIDE 0.9 % (FLUSH) 0.9 %
5 SYRINGE (ML) INJECTION PRN
Status: DISCONTINUED | OUTPATIENT
Start: 2019-04-12 | End: 2019-04-12

## 2019-04-12 RX ORDER — METHYLPREDNISOLONE SODIUM SUCCINATE 125 MG/2ML
125 INJECTION, POWDER, LYOPHILIZED, FOR SOLUTION INTRAMUSCULAR; INTRAVENOUS ONCE
Status: DISCONTINUED | OUTPATIENT
Start: 2019-04-12 | End: 2019-04-12

## 2019-04-12 RX ORDER — DIPHENHYDRAMINE HCL 25 MG
25 TABLET ORAL ONCE
Status: CANCELLED | OUTPATIENT
Start: 2019-05-10

## 2019-04-12 RX ORDER — DIPHENHYDRAMINE HYDROCHLORIDE 50 MG/ML
50 INJECTION INTRAMUSCULAR; INTRAVENOUS ONCE
Status: CANCELLED | OUTPATIENT
Start: 2019-05-10

## 2019-04-12 RX ORDER — HEPARIN SODIUM (PORCINE) LOCK FLUSH IV SOLN 100 UNIT/ML 100 UNIT/ML
500 SOLUTION INTRAVENOUS PRN
Status: DISCONTINUED | OUTPATIENT
Start: 2019-04-12 | End: 2019-04-13 | Stop reason: HOSPADM

## 2019-04-12 RX ORDER — HEPARIN SODIUM (PORCINE) LOCK FLUSH IV SOLN 100 UNIT/ML 100 UNIT/ML
500 SOLUTION INTRAVENOUS PRN
Status: DISCONTINUED | OUTPATIENT
Start: 2019-04-12 | End: 2019-04-12

## 2019-04-12 RX ORDER — ACETAMINOPHEN 325 MG/1
650 TABLET ORAL ONCE
Status: CANCELLED | OUTPATIENT
Start: 2019-05-10

## 2019-04-12 RX ORDER — SODIUM CHLORIDE 9 MG/ML
INJECTION, SOLUTION INTRAVENOUS CONTINUOUS
Status: DISCONTINUED | OUTPATIENT
Start: 2019-04-12 | End: 2019-04-12

## 2019-04-12 RX ORDER — 0.9 % SODIUM CHLORIDE 0.9 %
10 VIAL (ML) INJECTION ONCE
Status: CANCELLED | OUTPATIENT
Start: 2019-05-10

## 2019-04-12 RX ORDER — 0.9 % SODIUM CHLORIDE 0.9 %
10 VIAL (ML) INJECTION ONCE
Status: DISCONTINUED | OUTPATIENT
Start: 2019-04-12 | End: 2019-04-12

## 2019-04-12 RX ORDER — DEXTROSE MONOHYDRATE 50 MG/ML
INJECTION, SOLUTION INTRAVENOUS ONCE
Status: CANCELLED | OUTPATIENT
Start: 2019-05-10

## 2019-04-12 RX ORDER — SODIUM CHLORIDE 0.9 % (FLUSH) 0.9 %
10 SYRINGE (ML) INJECTION PRN
Status: CANCELLED | OUTPATIENT
Start: 2019-05-10

## 2019-04-12 RX ORDER — SODIUM CHLORIDE 0.9 % (FLUSH) 0.9 %
10 SYRINGE (ML) INJECTION PRN
Status: DISCONTINUED | OUTPATIENT
Start: 2019-04-12 | End: 2019-04-13 | Stop reason: HOSPADM

## 2019-04-12 RX ORDER — HEPARIN SODIUM (PORCINE) LOCK FLUSH IV SOLN 100 UNIT/ML 100 UNIT/ML
500 SOLUTION INTRAVENOUS PRN
Status: CANCELLED | OUTPATIENT
Start: 2019-05-10

## 2019-04-12 RX ORDER — METHYLPREDNISOLONE SODIUM SUCCINATE 125 MG/2ML
125 INJECTION, POWDER, LYOPHILIZED, FOR SOLUTION INTRAMUSCULAR; INTRAVENOUS ONCE
Status: CANCELLED | OUTPATIENT
Start: 2019-05-10

## 2019-04-12 RX ORDER — DIPHENHYDRAMINE HYDROCHLORIDE 50 MG/ML
50 INJECTION INTRAMUSCULAR; INTRAVENOUS ONCE
Status: DISCONTINUED | OUTPATIENT
Start: 2019-04-12 | End: 2019-04-12

## 2019-04-12 RX ORDER — SODIUM CHLORIDE 9 MG/ML
INJECTION, SOLUTION INTRAVENOUS CONTINUOUS
Status: CANCELLED | OUTPATIENT
Start: 2019-05-10

## 2019-04-12 RX ADMIN — Medication 10 ML: at 09:54

## 2019-04-12 RX ADMIN — IMMUNE GLOBULIN (HUMAN) 20 G: 10 INJECTION INTRAVENOUS; SUBCUTANEOUS at 11:08

## 2019-04-12 RX ADMIN — Medication 10 ML: at 11:47

## 2019-04-12 RX ADMIN — IMMUNE GLOBULIN (HUMAN) 20 G: 10 INJECTION INTRAVENOUS; SUBCUTANEOUS at 09:58

## 2019-04-12 RX ADMIN — SODIUM CHLORIDE, PRESERVATIVE FREE 500 UNITS: 5 INJECTION INTRAVENOUS at 11:47

## 2019-04-12 ASSESSMENT — PAIN DESCRIPTION - DESCRIPTORS: DESCRIPTORS: ACHING

## 2019-04-12 ASSESSMENT — PAIN - FUNCTIONAL ASSESSMENT: PAIN_FUNCTIONAL_ASSESSMENT: 0-10

## 2019-04-12 NOTE — PROGRESS NOTES
0935:  ARRIVES AMBULATORY TO [facility] FOR IVIG. PROCESS REVIEWED AND PT RIGHTS AND RESPONSIBILITIES OFFERED TO PT.  PT HAS HER PORT OXYGEN ON. PT STATES SHE TOOK PRE MEDS BEFORE COMING. 1010:  IVIG INFUSING. NO COMPLAINTS.   1100:  NO COMPLAINTS. PLAYING GAMES ON PHONE.  IV CONTINUES  1200:  PT TOLERATED INFUSION WELL AND DISCHARGED AMBULATORY WITH PORTABLE OXYGEN IN CARE OF SON. STABLE.   INSTRUCTIONS GIVEN.    __M__ Safety:       (Environmental)   [place] to environment   Ensure ID band is correct and in place/ allergy band as needed   Assess for fall risk   Initiate fall precautions as applicable (fall band, side rails, etc.)   Call light within reach   Bed in low position/ wheels locked    __M__ Pain:        Assess pain level and characteristics   Administer analgesics as ordered   Assess effectiveness of pain management and report to MD as needed    __M__ Knowledge Deficit:   Assess baseline knowledge   Provide teaching at level of understanding   Provide teaching via preferred learning method   Evaluate teaching effectiveness    ___M_ Hemodynamic/Respiratory Status:       (Pre and Post Procedure Monitoring)   Assess/Monitor vital signs and LOC   Assess Baseline SpO2 prior to any sedation   Obtain weight/height   Assess vital signs/ LOC until patient meets discharge criteria   Monitor procedure site and notify MD of any issues    _M__ Infection-Risk of Central Venous Catheter:   Monitor for infection signs and symptoms (catheter site redness, temperature elevation, etc)   Assess for infection risks   Educate regarding infection prevention   Manage central venous catheter (flushes/ dressing changes per protocol)

## 2019-04-29 DIAGNOSIS — N18.30 CHRONIC KIDNEY DISEASE, STAGE III (MODERATE) (HCC): Primary | ICD-10-CM

## 2019-05-02 LAB
BASOPHILS ABSOLUTE: NORMAL /ΜL
BASOPHILS RELATIVE PERCENT: NORMAL %
BUN BLDV-MCNC: 25 MG/DL
CALCIUM SERPL-MCNC: 9 MG/DL
CHLORIDE BLD-SCNC: 99 MMOL/L
CO2: 32 MMOL/L
CREAT SERPL-MCNC: 1.4 MG/DL
EOSINOPHILS ABSOLUTE: NORMAL /ΜL
EOSINOPHILS RELATIVE PERCENT: NORMAL %
GFR CALCULATED: 37
GLUCOSE BLD-MCNC: 112 MG/DL
HCT VFR BLD CALC: 36.8 % (ref 36–46)
HEMOGLOBIN: 12.2 G/DL (ref 12–16)
LYMPHOCYTES ABSOLUTE: NORMAL /ΜL
LYMPHOCYTES RELATIVE PERCENT: NORMAL %
MCH RBC QN AUTO: NORMAL PG
MCHC RBC AUTO-ENTMCNC: NORMAL G/DL
MCV RBC AUTO: NORMAL FL
MONOCYTES ABSOLUTE: NORMAL /ΜL
MONOCYTES RELATIVE PERCENT: NORMAL %
NEUTROPHILS ABSOLUTE: NORMAL /ΜL
NEUTROPHILS RELATIVE PERCENT: NORMAL %
PLATELET # BLD: 152 K/ΜL
PMV BLD AUTO: NORMAL FL
POTASSIUM SERPL-SCNC: 3.4 MMOL/L
RBC # BLD: 4.17 10^6/ΜL
SODIUM BLD-SCNC: 141 MMOL/L
WBC # BLD: 5 10^3/ML

## 2019-05-03 LAB
CREATININE, URINE: 115
MICROALBUMIN/CREAT 24H UR: 0.5 MG/G{CREAT}
MICROALBUMIN/CREAT UR-RTO: 4.3

## 2019-05-06 ENCOUNTER — OFFICE VISIT (OUTPATIENT)
Dept: NEPHROLOGY | Age: 74
End: 2019-05-06
Payer: MEDICARE

## 2019-05-06 VITALS
WEIGHT: 227.8 LBS | BODY MASS INDEX: 37.95 KG/M2 | DIASTOLIC BLOOD PRESSURE: 60 MMHG | OXYGEN SATURATION: 95 % | HEIGHT: 65 IN | SYSTOLIC BLOOD PRESSURE: 116 MMHG | HEART RATE: 74 BPM

## 2019-05-06 DIAGNOSIS — E87.6 HYPOKALEMIA: ICD-10-CM

## 2019-05-06 DIAGNOSIS — E03.9 HYPOTHYROIDISM (ACQUIRED): ICD-10-CM

## 2019-05-06 DIAGNOSIS — I10 ESSENTIAL HYPERTENSION: ICD-10-CM

## 2019-05-06 DIAGNOSIS — N18.30 CKD (CHRONIC KIDNEY DISEASE), STAGE III (HCC): Primary | ICD-10-CM

## 2019-05-06 PROCEDURE — 3017F COLORECTAL CA SCREEN DOC REV: CPT | Performed by: INTERNAL MEDICINE

## 2019-05-06 PROCEDURE — 99213 OFFICE O/P EST LOW 20 MIN: CPT | Performed by: INTERNAL MEDICINE

## 2019-05-06 PROCEDURE — 1123F ACP DISCUSS/DSCN MKR DOCD: CPT | Performed by: INTERNAL MEDICINE

## 2019-05-06 PROCEDURE — G8599 NO ASA/ANTIPLAT THER USE RNG: HCPCS | Performed by: INTERNAL MEDICINE

## 2019-05-06 PROCEDURE — G8400 PT W/DXA NO RESULTS DOC: HCPCS | Performed by: INTERNAL MEDICINE

## 2019-05-06 PROCEDURE — 1090F PRES/ABSN URINE INCON ASSESS: CPT | Performed by: INTERNAL MEDICINE

## 2019-05-06 PROCEDURE — G8427 DOCREV CUR MEDS BY ELIG CLIN: HCPCS | Performed by: INTERNAL MEDICINE

## 2019-05-06 PROCEDURE — 4040F PNEUMOC VAC/ADMIN/RCVD: CPT | Performed by: INTERNAL MEDICINE

## 2019-05-06 PROCEDURE — 1036F TOBACCO NON-USER: CPT | Performed by: INTERNAL MEDICINE

## 2019-05-06 PROCEDURE — G8417 CALC BMI ABV UP PARAM F/U: HCPCS | Performed by: INTERNAL MEDICINE

## 2019-05-06 RX ORDER — BENZONATATE 100 MG/1
200 CAPSULE ORAL 3 TIMES DAILY PRN
Status: ON HOLD | COMMUNITY
End: 2021-08-04

## 2019-05-06 RX ORDER — POTASSIUM CHLORIDE 20 MEQ/1
20 TABLET, EXTENDED RELEASE ORAL DAILY
COMMUNITY
End: 2020-06-22 | Stop reason: DRUGHIGH

## 2019-05-06 RX ORDER — IPRATROPIUM BROMIDE AND ALBUTEROL SULFATE 2.5; .5 MG/3ML; MG/3ML
1 SOLUTION RESPIRATORY (INHALATION) EVERY 4 HOURS
COMMUNITY

## 2019-05-06 RX ORDER — PREDNISOLONE ACETATE 10 MG/ML
1 SUSPENSION/ DROPS OPHTHALMIC 4 TIMES DAILY
COMMUNITY
End: 2020-06-15 | Stop reason: ALTCHOICE

## 2019-05-06 RX ORDER — GUAIFENESIN 100 MG/5ML
10 SYRUP ORAL 3 TIMES DAILY PRN
Status: ON HOLD | COMMUNITY
End: 2021-08-04

## 2019-05-06 RX ORDER — OFLOXACIN 3 MG/ML
3 SOLUTION/ DROPS OPHTHALMIC 4 TIMES DAILY
COMMUNITY
End: 2019-06-09

## 2019-05-06 RX ORDER — KETOROLAC TROMETHAMINE 5 MG/ML
1 SOLUTION OPHTHALMIC 4 TIMES DAILY
COMMUNITY
End: 2019-06-09

## 2019-05-06 NOTE — PROGRESS NOTES
[facility]    [facility], [address]   [place] [facility]  [phone]  Progress Note  5/6/2019 3:03 PM      Pt Name:    Alexis Dugan  YOB: 1945  Primary Care Physician:  Pricila May MD     Chief Complaint:   Chief Complaint   Patient presents with    Follow-up     CKD III        History of Present Illness: This is a follow-up visit for CKD 3 . The patient was last seen in clinic 1 year ago by Dr. Florencia Costa. Since the last visit the patient was hospitalized with GI Bleed. She is now off Xarelto. Last Hgb up to 12.3. She has occasional blood when wiping but no black stools or melena. She says the cause of bleeding was not determined. Comorbidities include DM for about 40 years, denies retinopathy, +neuropathy,  Afib, previous GI Bleed, hypothyroidism, COPD,  chronic hypoxic respiratory failure on Home O2, history of immune deficiency receiving IVIG, BRITTNY, history of hiatal hernia. C/o brittle nails, hair falling out, weight gain and no energy. Pertinent items are noted in HPI.          Past History:  Past Medical History:   Diagnosis Date    Anemia     Atrial fibrillation ([address] 11/9/2017    CAD (coronary artery disease)     CHF (congestive heart failure) (HCC)     Chronic kidney disease     stage 3 kidney     DDD (degenerative disc disease), lumbar     Depression     Frequent PVCs 12/13/2017    GERD (gastroesophageal reflux disease)     History of blood transfusion     Hx of blood clots 09/2017    pulmonary emboli    Hyperlipidemia     Hypertension     Hyperthyroidism     Movement disorder     DDD    Neuropathy     Obesity     Pneumonia 2016    sepsis    Sleep apnea     usually wears cpap at night    Status post right and left heart catheterization     Type II or unspecified type diabetes mellitus without mention of complication, not stated as uncontrolled      Past Surgical History:   Procedure Laterality Date    ABDOMEN SURGERY      ACHILLES TENDON SURGERY Bilateral     BLADDER SUSPENSION      CARDIAC SURGERY  2016    [facility]    COLONOSCOPY Left 5/11/2018    COLONOSCOPY POLYPECTOMY SNARE/COLD BIOPSY performed by Kaylie Gardner MD at [facility]    ENDOSCOPY, COLON, DIAGNOSTIC      GASTRIC FUNDOPLICATION      HAMMER TOE SURGERY Right     HERNIA REPAIR      HIATAL HERNIA REPAIR  09/06/2016    Laparoscopic Robotic with Nissen Fundoplication - Dr. Alex Pena OFFICE/OUTPT VISIT,PROCEDURE ONLY N/A 9/21/2018    EGD DIAGNOSTIC ONLY performed by Daphne Clemente MD at [facility] [address] [facility]      right    TENDON RELEASE Left 08/09/2016    left foot    TUBAL LIGATION      TUNNELED VENOUS PORT PLACEMENT  11/06/2017    UPPER GASTROINTESTINAL ENDOSCOPY Left 5/10/2018    EGD BIOPSY performed by Kaylie Gardner MD at [facility]        VITALS:  /60 (Site: Right Lower Arm, Position: Sitting, Cuff Size: Large Adult)   Pulse 74   Ht 5' 5\" (1.651 m)   Wt 227 lb 12.8 oz (103.3 kg)   SpO2 95%   BMI 37.91 kg/m²   Wt Readings from Last 3 Encounters:   05/06/19 227 lb 12.8 oz (103.3 kg)   04/12/19 227 lb (103 kg)   03/15/19 228 lb 6.4 oz (103.6 kg)     Body mass index is 37.91 kg/m².      General Appearance: alert and cooperative with exam, appears comfortable, no distress  HEENT: EOMI, moist oral mucus membranes  Neck: No jugular venous distention, no carotid bruit  Lungs: diminished breath sounds at bases  Heart: irregularly irregular  GI: soft, non-tender, no guarding, no flank pain  Extremities: No sig LE edema, no cyanosis  Skin: warm, dry  Neurologic: no tremor, no asterixis, no focal neurologic deficits     Medications:  Current Outpatient Medications   Medication Sig Dispense Refill    ketorolac (ACULAR) 0.5 % ophthalmic solution 1 drop 4 times daily      ofloxacin (OCUFLOX) 0.3 % solution 3 drops 4 times daily      prednisoLONE acetate (PRED FORTE) 1 % ophthalmic suspension 1 drop 4 times daily      benzonatate (TESSALON) 100 MG capsule Take by mouth 3 times daily as needed for Cough      guaiFENesin (ROBITUSSIN) 100 MG/5ML syrup Take 10 mLs by mouth 3 times daily as needed for Cough      ipratropium-albuterol (DUONEB) 0.5-2.5 (3) MG/3ML SOLN nebulizer solution Inhale 1 vial into the lungs every 4 hours      lidocaine viscous (XYLOCAINE) 2 % solution Take 15 mLs by mouth as needed for Irritation      Linaclotide (LINZESS) 72 MCG CAPS Take 72.5 mg by mouth daily      polyethylene glycol (GLYCOLAX) packet Take 17 g by mouth daily as needed for Constipation      cyclobenzaprine (FLEXERIL) 5 MG tablet Take 5 mg by mouth 3 times daily as needed for Muscle spasms      hydrOXYzine (ATARAX) 25 MG tablet Take 25 mg by mouth 3 times daily as needed for Itching      DULoxetine (CYMBALTA) 20 MG extended release capsule Take 20 mg by mouth daily      insulin glargine (LANTUS) 100 UNIT/ML injection vial Inject into the skin nightly      pantoprazole (PROTONIX) 40 MG tablet Take 1 tablet by mouth 2 times daily 30 tablet 3    pregabalin (LYRICA) 150 MG capsule Take 1 capsule by mouth 3 times daily for 3 days. . 9 capsule 0    SITagliptin (JANUVIA) 50 MG tablet Take 0.5 tablets by mouth daily 30 tablet 3    insulin lispro (HUMALOG) 100 UNIT/ML injection vial Inject 0-3 Units into the skin nightly 1 vial 3    fluticasone (FLONASE) 50 MCG/ACT nasal spray 1 spray by Each Nare route daily      traZODone (DESYREL) 50 MG tablet Take 25 mg by mouth nightly      Calcium Carb-Cholecalciferol 500-400 MG-UNIT TABS Take 1 tablet by mouth 2 times daily      Multiple Vitamins-Minerals (THERAPEUTIC MULTIVITAMIN-MINERALS) tablet Take 1 tablet by mouth daily      calcium carbonate (TUMS) 500 MG chewable tablet Take 1 tablet by mouth 2 times daily      ondansetron (ZOFRAN) 4 MG tablet Take 4 mg by mouth every 8 hours as needed for Nausea or Vomiting      escitalopram (LEXAPRO) 10 MG tablet Take 1 tablet by mouth daily 30 tablet 3    OXYGEN Inhale into the lungs continuous      atorvastatin (LIPITOR) 20 MG tablet Take 1 tablet by mouth nightly 30 tablet 3    magnesium hydroxide (MILK OF MAGNESIA CONCENTRATE) 2400 MG/10ML SUSP Take 30 mLs by mouth once as needed      Immune Globulin (Human) in dextrose solution Infuse 10 g intravenously every 30 days      cetirizine-psuedoephedrine (ZYRTEC-D) 5-120 MG per extended release tablet Take 1 tablet by mouth daily as needed for Allergies      docusate sodium (COLACE) 100 MG capsule Take 100 mg by mouth 2 times daily      acetaminophen (TYLENOL) 325 MG tablet Take 2 tablets by mouth every 4 hours as needed for Pain 120 tablet 3    levothyroxine (SYNTHROID) 75 MCG tablet Take 75 mcg by mouth Daily       No current facility-administered medications for this visit.          Laboratory & Diagnostics:  CBC:   Lab Results   Component Value Date    WBC 5.0 05/02/2019    HGB 12.2 05/02/2019    HCT 36.8 05/02/2019    MCV 93.6 10/23/2018     05/02/2019     BMP:    Lab Results   Component Value Date     05/02/2019     04/04/2019     10/23/2018    K 3.4 05/02/2019    K 3.7 04/04/2019    K 4.1 10/23/2018    CL 99 05/02/2019     04/04/2019     10/23/2018    CO2 32 05/02/2019    CO2 30 04/04/2019    CO2 27 10/23/2018    BUN 25 05/02/2019    BUN 16 04/04/2019    BUN 24 (H) 10/23/2018    CREATININE 1.4 05/02/2019    CREATININE 1.4 04/04/2019    CREATININE 44.0 03/14/2019    GLUCOSE 112 05/02/2019    GLUCOSE 132 04/04/2019    GLUCOSE 118 (H) 10/23/2018      Hepatic:   Lab Results   Component Value Date    AST 27 10/23/2018    AST 15 09/21/2018    AST 18 09/20/2018    ALT 12 10/23/2018    ALT 7 (L) 09/21/2018    ALT 10 (L) 09/20/2018    BILITOT 0.3 10/23/2018    BILITOT 0.7 09/21/2018    BILITOT 0.2 (L) 09/20/2018    ALKPHOS 75 10/23/2018    ALKPHOS 68 09/21/2018    ALKPHOS 86 09/20/2018     BNP: No results found for: BNP  Lipids:   Lab Results   Component Value Date    CHOL 127 03/23/2016    HDL 49 03/23/2016     INR:   Lab Results   Component Value Date    INR 1.23 (H) 05/10/2018    INR 1.49 (H) 05/09/2018    INR 1.00 10/30/2017     URINE:   Lab Results   Component Value Date    NAUR 88 03/22/2016     Lab Results   Component Value Date    NITRU NEGATIVE 10/23/2018    COLORU YELLOW 10/23/2018    PHUR 5.5 10/23/2018    LABCAST NONE SEEN 10/23/2018    LABCAST NONE SEEN 10/23/2018    WBCUA 2-4 10/23/2018    RBCUA 0-2 10/23/2018    YEAST NONE SEEN 10/23/2018    BACTERIA NONE 10/23/2018    SPECGRAV 1.009 10/23/2018    LEUKOCYTESUR TRACE 10/23/2018    UROBILINOGEN 0.2 10/23/2018    BILIRUBINUR NEGATIVE 10/23/2018    BLOODU NEGATIVE 10/23/2018    GLUCOSEU NEGATIVE 09/20/2018    KETUA NEGATIVE 10/23/2018    AMORPHOUS DEBRIS 06/12/2018      Microalbumen/Creatinine ratio:  No components found for: RUCREAT        Impression/Plan:   1. CKD 3b - diabetes, hypertension, overalls table . Goals of care include slowing rate of progression by controlling blood pressure, blood glucoses and albuminuria and by avoiding nephrotoxins such as NSAIDs and IV contrast.  2. Hypokalemia - start KCl 20 meq daily, recheck in 2 weeks  3. DM - insulin requiring, no microalbuminuria  4. Hx UTIs  5. Hypothyroidism - will check TSH due to her symptoms  6. HTN - controlled  7. Afib, not on anticoagulation due to GI B        Bloodwork and medications were reviewed and plan of care discussed with the patient. Return to clinic in 1 year  or sooner if the need arises.       Reece Hernandez, DO  [facility]

## 2019-05-10 ENCOUNTER — HOSPITAL ENCOUNTER (OUTPATIENT)
Dept: NURSING | Age: 74
Discharge: HOME | End: 2019-05-10
Payer: MEDICARE

## 2019-05-10 ENCOUNTER — PROCEDURE VISIT (OUTPATIENT)
Dept: CARDIOLOGY CLINIC | Age: 74
End: 2019-05-10
Payer: MEDICARE

## 2019-05-10 VITALS
SYSTOLIC BLOOD PRESSURE: 139 MMHG | OXYGEN SATURATION: 95 % | WEIGHT: 225.2 LBS | HEART RATE: 62 BPM | BODY MASS INDEX: 37.48 KG/M2 | DIASTOLIC BLOOD PRESSURE: 81 MMHG | TEMPERATURE: 96.8 F | RESPIRATION RATE: 16 BRPM

## 2019-05-10 DIAGNOSIS — I48.91 ATRIAL FIBRILLATION, UNSPECIFIED TYPE (HCC): Primary | ICD-10-CM

## 2019-05-10 DIAGNOSIS — D83.9 COMMON VARIABLE IMMUNODEFICIENCY (HCC): Primary | ICD-10-CM

## 2019-05-10 LAB
IGA: 115 MG/DL (ref 70–400)
IGG: 1253 MG/DL (ref 700–1600)

## 2019-05-10 PROCEDURE — 96413 CHEMO IV INFUSION 1 HR: CPT

## 2019-05-10 PROCEDURE — 6360000002 HC RX W HCPCS: Performed by: FAMILY MEDICINE

## 2019-05-10 PROCEDURE — 93298 REM INTERROG DEV EVAL SCRMS: CPT | Performed by: INTERNAL MEDICINE

## 2019-05-10 PROCEDURE — 36591 DRAW BLOOD OFF VENOUS DEVICE: CPT

## 2019-05-10 PROCEDURE — 2580000003 HC RX 258: Performed by: FAMILY MEDICINE

## 2019-05-10 PROCEDURE — 96415 CHEMO IV INFUSION ADDL HR: CPT

## 2019-05-10 PROCEDURE — 2709999900 HC NON-CHARGEABLE SUPPLY

## 2019-05-10 PROCEDURE — 82784 ASSAY IGA/IGD/IGG/IGM EACH: CPT

## 2019-05-10 PROCEDURE — 93299 PR REM INTERROG ICPMS/SCRMS <30 D TECH REVIEW: CPT | Performed by: INTERNAL MEDICINE

## 2019-05-10 RX ORDER — SODIUM CHLORIDE 0.9 % (FLUSH) 0.9 %
5 SYRINGE (ML) INJECTION PRN
Status: CANCELLED | OUTPATIENT
Start: 2019-06-07

## 2019-05-10 RX ORDER — 0.9 % SODIUM CHLORIDE 0.9 %
10 VIAL (ML) INJECTION ONCE
Status: CANCELLED | OUTPATIENT
Start: 2019-06-07

## 2019-05-10 RX ORDER — DIPHENHYDRAMINE HCL 25 MG
25 TABLET ORAL ONCE
Status: DISCONTINUED | OUTPATIENT
Start: 2019-05-10 | End: 2019-05-11 | Stop reason: HOSPADM

## 2019-05-10 RX ORDER — SODIUM CHLORIDE 0.9 % (FLUSH) 0.9 %
10 SYRINGE (ML) INJECTION PRN
Status: CANCELLED | OUTPATIENT
Start: 2019-06-07

## 2019-05-10 RX ORDER — DIPHENHYDRAMINE HCL 25 MG
25 TABLET ORAL ONCE
Status: CANCELLED | OUTPATIENT
Start: 2019-06-07

## 2019-05-10 RX ORDER — SODIUM CHLORIDE 0.9 % (FLUSH) 0.9 %
5 SYRINGE (ML) INJECTION PRN
Status: DISCONTINUED | OUTPATIENT
Start: 2019-05-10 | End: 2019-05-11 | Stop reason: HOSPADM

## 2019-05-10 RX ORDER — METHYLPREDNISOLONE SODIUM SUCCINATE 125 MG/2ML
125 INJECTION, POWDER, LYOPHILIZED, FOR SOLUTION INTRAMUSCULAR; INTRAVENOUS ONCE
Status: CANCELLED | OUTPATIENT
Start: 2019-06-07

## 2019-05-10 RX ORDER — HEPARIN SODIUM (PORCINE) LOCK FLUSH IV SOLN 100 UNIT/ML 100 UNIT/ML
500 SOLUTION INTRAVENOUS PRN
Status: DISCONTINUED | OUTPATIENT
Start: 2019-05-10 | End: 2019-05-11 | Stop reason: HOSPADM

## 2019-05-10 RX ORDER — SODIUM CHLORIDE 9 MG/ML
INJECTION, SOLUTION INTRAVENOUS CONTINUOUS
Status: CANCELLED | OUTPATIENT
Start: 2019-06-07

## 2019-05-10 RX ORDER — ACETAMINOPHEN 325 MG/1
650 TABLET ORAL ONCE
Status: CANCELLED | OUTPATIENT
Start: 2019-06-07

## 2019-05-10 RX ORDER — DIPHENHYDRAMINE HYDROCHLORIDE 50 MG/ML
50 INJECTION INTRAMUSCULAR; INTRAVENOUS ONCE
Status: CANCELLED | OUTPATIENT
Start: 2019-06-07

## 2019-05-10 RX ORDER — HEPARIN SODIUM (PORCINE) LOCK FLUSH IV SOLN 100 UNIT/ML 100 UNIT/ML
500 SOLUTION INTRAVENOUS PRN
Status: CANCELLED | OUTPATIENT
Start: 2019-06-07

## 2019-05-10 RX ORDER — ACETAMINOPHEN 325 MG/1
650 TABLET ORAL ONCE
Status: DISCONTINUED | OUTPATIENT
Start: 2019-05-10 | End: 2019-05-11 | Stop reason: HOSPADM

## 2019-05-10 RX ORDER — SODIUM CHLORIDE 0.9 % (FLUSH) 0.9 %
10 SYRINGE (ML) INJECTION PRN
Status: DISCONTINUED | OUTPATIENT
Start: 2019-05-10 | End: 2019-05-11 | Stop reason: HOSPADM

## 2019-05-10 RX ADMIN — HEPARIN 500 UNITS: 100 SYRINGE at 12:10

## 2019-05-10 RX ADMIN — IMMUNE GLOBULIN (HUMAN) 20 G: 10 INJECTION INTRAVENOUS; SUBCUTANEOUS at 10:19

## 2019-05-10 RX ADMIN — IMMUNE GLOBULIN (HUMAN) 20 G: 10 INJECTION INTRAVENOUS; SUBCUTANEOUS at 11:31

## 2019-05-10 RX ADMIN — Medication 10 ML: at 10:18

## 2019-05-10 RX ADMIN — Medication 10 ML: at 12:08

## 2019-05-10 ASSESSMENT — PAIN - FUNCTIONAL ASSESSMENT: PAIN_FUNCTIONAL_ASSESSMENT: 0-10

## 2019-05-10 NOTE — PROGRESS NOTES
1063 Patient arrived to [facility] ambulatory for IVIG infusion  PT RIGHTS AND RESPONSIBILITIES OFFERED TO PT.  1119 Discharge instructions given to patient verbalized understanding      _m___ Safety:       (Environmental)  Nell Ap to environment   Ensure ID band is correct and in place/ allergy band as needed   Assess for fall risk   Initiate fall precautions as applicable (fall band, side rails, etc.)   Call light within reach   Bed in low position/ wheels locked    m____ Pain:        Assess pain level and characteristics   Administer analgesics as ordered   Assess effectiveness of pain management and report to MD as needed    _m___ Knowledge Deficit:   Assess baseline knowledge   Provide teaching at level of understanding   Provide teaching via preferred learning method   Evaluate teaching effectiveness    _m___ Hemodynamic/Respiratory Status:       (Pre and Post Procedure Monitoring)   Assess/Monitor vital signs and LOC   Assess Baseline SpO2 prior to any sedation   Obtain weight/height   Assess vital signs/ LOC until patient meets discharge criteria   Monitor procedure site and notify MD of any issues    _m___ Infection-Risk of Central Venous Catheter:   Monitor for infection signs and symptoms (catheter site redness, temperature elevation, etc)   Assess for infection risks   Educate regarding infection prevention   Manage central venous catheter (flushes/ dressing changes per protocol)

## 2019-05-23 LAB
BUN BLDV-MCNC: 25 MG/DL
CALCIUM SERPL-MCNC: 9.1 MG/DL
CHLORIDE BLD-SCNC: 104 MMOL/L
CO2: 33 MMOL/L
CREAT SERPL-MCNC: 1.3 MG/DL
GFR CALCULATED: 40
GLUCOSE BLD-MCNC: 107 MG/DL
POTASSIUM SERPL-SCNC: 4.6 MMOL/L
SODIUM BLD-SCNC: 141 MMOL/L

## 2019-06-07 ENCOUNTER — HOSPITAL ENCOUNTER (OUTPATIENT)
Dept: NURSING | Age: 74
Discharge: HOME | End: 2019-06-07
Payer: MEDICARE

## 2019-06-07 VITALS
WEIGHT: 225 LBS | OXYGEN SATURATION: 100 % | SYSTOLIC BLOOD PRESSURE: 128 MMHG | DIASTOLIC BLOOD PRESSURE: 60 MMHG | BODY MASS INDEX: 37.44 KG/M2 | HEART RATE: 69 BPM | RESPIRATION RATE: 16 BRPM | TEMPERATURE: 98 F

## 2019-06-07 DIAGNOSIS — D83.9 COMMON VARIABLE IMMUNODEFICIENCY (HCC): Primary | ICD-10-CM

## 2019-06-07 PROCEDURE — 6360000002 HC RX W HCPCS: Performed by: FAMILY MEDICINE

## 2019-06-07 PROCEDURE — 96415 CHEMO IV INFUSION ADDL HR: CPT

## 2019-06-07 PROCEDURE — 2709999900 HC NON-CHARGEABLE SUPPLY

## 2019-06-07 PROCEDURE — 96413 CHEMO IV INFUSION 1 HR: CPT

## 2019-06-07 PROCEDURE — 6370000000 HC RX 637 (ALT 250 FOR IP): Performed by: FAMILY MEDICINE

## 2019-06-07 PROCEDURE — 2580000003 HC RX 258: Performed by: FAMILY MEDICINE

## 2019-06-07 RX ORDER — HEPARIN SODIUM (PORCINE) LOCK FLUSH IV SOLN 100 UNIT/ML 100 UNIT/ML
500 SOLUTION INTRAVENOUS PRN
Status: CANCELLED | OUTPATIENT
Start: 2019-07-05

## 2019-06-07 RX ORDER — DIPHENHYDRAMINE HCL 25 MG
25 TABLET ORAL ONCE
Status: COMPLETED | OUTPATIENT
Start: 2019-06-07 | End: 2019-06-07

## 2019-06-07 RX ORDER — METHYLPREDNISOLONE SODIUM SUCCINATE 125 MG/2ML
125 INJECTION, POWDER, LYOPHILIZED, FOR SOLUTION INTRAMUSCULAR; INTRAVENOUS ONCE
Status: CANCELLED | OUTPATIENT
Start: 2019-07-05

## 2019-06-07 RX ORDER — 0.9 % SODIUM CHLORIDE 0.9 %
10 VIAL (ML) INJECTION ONCE
Status: CANCELLED | OUTPATIENT
Start: 2019-07-05

## 2019-06-07 RX ORDER — HEPARIN SODIUM (PORCINE) LOCK FLUSH IV SOLN 100 UNIT/ML 100 UNIT/ML
500 SOLUTION INTRAVENOUS PRN
Status: DISCONTINUED | OUTPATIENT
Start: 2019-06-07 | End: 2019-06-08 | Stop reason: HOSPADM

## 2019-06-07 RX ORDER — SODIUM CHLORIDE 0.9 % (FLUSH) 0.9 %
5 SYRINGE (ML) INJECTION PRN
Status: CANCELLED | OUTPATIENT
Start: 2019-07-05

## 2019-06-07 RX ORDER — ACETAMINOPHEN 325 MG/1
650 TABLET ORAL ONCE
Status: COMPLETED | OUTPATIENT
Start: 2019-06-07 | End: 2019-06-07

## 2019-06-07 RX ORDER — DIPHENHYDRAMINE HYDROCHLORIDE 50 MG/ML
50 INJECTION INTRAMUSCULAR; INTRAVENOUS ONCE
Status: CANCELLED | OUTPATIENT
Start: 2019-07-05

## 2019-06-07 RX ORDER — SODIUM CHLORIDE 0.9 % (FLUSH) 0.9 %
10 SYRINGE (ML) INJECTION PRN
Status: DISCONTINUED | OUTPATIENT
Start: 2019-06-07 | End: 2019-06-08 | Stop reason: HOSPADM

## 2019-06-07 RX ORDER — SODIUM CHLORIDE 9 MG/ML
INJECTION, SOLUTION INTRAVENOUS CONTINUOUS
Status: CANCELLED | OUTPATIENT
Start: 2019-07-05

## 2019-06-07 RX ORDER — SODIUM CHLORIDE 0.9 % (FLUSH) 0.9 %
10 SYRINGE (ML) INJECTION PRN
Status: CANCELLED | OUTPATIENT
Start: 2019-07-05

## 2019-06-07 RX ORDER — ACETAMINOPHEN 325 MG/1
650 TABLET ORAL ONCE
Status: CANCELLED | OUTPATIENT
Start: 2019-07-05

## 2019-06-07 RX ORDER — DIPHENHYDRAMINE HCL 25 MG
25 TABLET ORAL ONCE
Status: CANCELLED | OUTPATIENT
Start: 2019-07-05

## 2019-06-07 RX ADMIN — Medication 10 ML: at 11:22

## 2019-06-07 RX ADMIN — ACETAMINOPHEN 650 MG: 325 TABLET ORAL at 09:23

## 2019-06-07 RX ADMIN — IMMUNE GLOBULIN (HUMAN) 20 G: 10 INJECTION INTRAVENOUS; SUBCUTANEOUS at 10:39

## 2019-06-07 RX ADMIN — IMMUNE GLOBULIN (HUMAN) 20 G: 10 INJECTION INTRAVENOUS; SUBCUTANEOUS at 09:33

## 2019-06-07 RX ADMIN — Medication 10 ML: at 09:26

## 2019-06-07 RX ADMIN — DIPHENHYDRAMINE HCL 25 MG: 25 TABLET ORAL at 09:23

## 2019-06-07 RX ADMIN — HEPARIN 500 UNITS: 100 SYRINGE at 11:22

## 2019-06-07 ASSESSMENT — PAIN SCALES - GENERAL
PAINLEVEL_OUTOF10: 0
PAINLEVEL_OUTOF10: 0

## 2019-06-07 NOTE — PROGRESS NOTES
1795 pt arrives to [facility] for IVIG infusion.  All questions and concerns addressed  0911 PATIENT RIGHTS AND RESPONSIBILITIES SHEET OFFERED TO PT TO READ.  0930 refreshments given, side rail up x1, call light in reach  0945 _m___ Safety:       (Environmental)  Mumtaz Abrams to environment   Ensure ID band is correct and in place/ allergy band as needed   Assess for fall risk   Initiate fall precautions as applicable (fall band, side rails, etc.)   Call light within reach   Bed in low position/ wheels locked    __m__ Pain:        Assess pain level and characteristics   Administer analgesics as ordered   Assess effectiveness of pain management and report to MD as needed    _m___ Knowledge Deficit:   Assess baseline knowledge   Provide teaching at level of understanding   Provide teaching via preferred learning method   Evaluate teaching effectiveness    _m_ Hemodynamic/Respiratory Status:       (Pre and Post Procedure Monitoring)   Assess/Monitor vital signs and LOC   Assess Baseline SpO2 prior to any sedation   Obtain weight/height   Assess vital signs/ LOC until patient meets discharge criteria   Monitor procedure site and notify MD of any issues    _m_ Infection-Risk of Central Venous Catheter:   Monitor for infection signs and symptoms (catheter site redness, temperature elevation, etc)   Assess for infection risks   Educate regarding infection prevention   Manage central venous catheter (flushes/ dressing changes per protocol)      1030 denies needs  1125 WRITTEN DISCHARGE INSTRUCTIONS GIVEN TO PT-VERBALIZES UNDERSTANDING  1130 PT DISCHARGED AMBULATORY IN SATISFACTORY CONDITION

## 2019-06-09 ENCOUNTER — APPOINTMENT (OUTPATIENT)
Dept: GENERAL RADIOLOGY | Age: 74
End: 2019-06-09
Payer: MEDICARE

## 2019-06-09 ENCOUNTER — APPOINTMENT (OUTPATIENT)
Dept: CT IMAGING | Age: 74
End: 2019-06-09
Payer: MEDICARE

## 2019-06-09 ENCOUNTER — HOSPITAL ENCOUNTER (EMERGENCY)
Age: 74
Discharge: HOME | End: 2019-06-09
Payer: MEDICARE

## 2019-06-09 VITALS
RESPIRATION RATE: 18 BRPM | BODY MASS INDEX: 36.48 KG/M2 | TEMPERATURE: 98.3 F | SYSTOLIC BLOOD PRESSURE: 126 MMHG | WEIGHT: 227 LBS | OXYGEN SATURATION: 94 % | DIASTOLIC BLOOD PRESSURE: 73 MMHG | HEIGHT: 66 IN | HEART RATE: 75 BPM

## 2019-06-09 DIAGNOSIS — S01.81XA FOREHEAD LACERATION, INITIAL ENCOUNTER: Primary | ICD-10-CM
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
## 2019-06-09 DIAGNOSIS — S09.90XA CLOSED HEAD INJURY, INITIAL ENCOUNTER: ICD-10-CM

## 2019-06-09 PROCEDURE — 72100 X-RAY EXAM L-S SPINE 2/3 VWS: CPT

## 2019-06-09 PROCEDURE — 12013 RPR F/E/E/N/L/M 2.6-5.0 CM: CPT

## 2019-06-09 PROCEDURE — 2500000003 HC RX 250 WO HCPCS: Performed by: PHYSICIAN ASSISTANT

## 2019-06-09 PROCEDURE — 6370000000 HC RX 637 (ALT 250 FOR IP): Performed by: PHYSICIAN ASSISTANT

## 2019-06-09 PROCEDURE — 2709999900 HC NON-CHARGEABLE SUPPLY

## 2019-06-09 PROCEDURE — 72072 X-RAY EXAM THORAC SPINE 3VWS: CPT

## 2019-06-09 PROCEDURE — 99283 EMERGENCY DEPT VISIT LOW MDM: CPT

## 2019-06-09 PROCEDURE — 72125 CT NECK SPINE W/O DYE: CPT

## 2019-06-09 PROCEDURE — 6370000000 HC RX 637 (ALT 250 FOR IP)

## 2019-06-09 PROCEDURE — 70486 CT MAXILLOFACIAL W/O DYE: CPT

## 2019-06-09 PROCEDURE — 70450 CT HEAD/BRAIN W/O DYE: CPT

## 2019-06-09 RX ORDER — CETIRIZINE HYDROCHLORIDE 10 MG/1
10 TABLET ORAL DAILY
COMMUNITY
End: 2020-06-15 | Stop reason: ALTCHOICE

## 2019-06-09 RX ORDER — IBUPROFEN 200 MG
TABLET ORAL ONCE
Status: COMPLETED | OUTPATIENT
Start: 2019-06-09 | End: 2019-06-09

## 2019-06-09 RX ORDER — CEPHALEXIN 500 MG/1
500 CAPSULE ORAL 3 TIMES DAILY
Qty: 21 CAPSULE | Refills: 0 | Status: SHIPPED | OUTPATIENT
Start: 2019-06-09 | End: 2019-06-16

## 2019-06-09 RX ORDER — LIDOCAINE HYDROCHLORIDE 10 MG/ML
20 INJECTION, SOLUTION INFILTRATION; PERINEURAL ONCE
Status: COMPLETED | OUTPATIENT
Start: 2019-06-09 | End: 2019-06-09

## 2019-06-09 RX ORDER — ACETAMINOPHEN 325 MG/1
650 TABLET ORAL ONCE
Status: COMPLETED | OUTPATIENT
Start: 2019-06-09 | End: 2019-06-09

## 2019-06-09 RX ADMIN — Medication 3 ML: at 15:15

## 2019-06-09 RX ADMIN — BACITRACIN ZINC, POLYMYXIN B SULFATE, NEOMYCIN SULFATE: 400; 5000; 3.5 OINTMENT TOPICAL at 17:19

## 2019-06-09 RX ADMIN — ACETAMINOPHEN 650 MG: 325 TABLET ORAL at 15:14

## 2019-06-09 RX ADMIN — LIDOCAINE HYDROCHLORIDE 20 ML: 10 INJECTION, SOLUTION INFILTRATION; PERINEURAL at 16:41

## 2019-06-09 ASSESSMENT — PAIN DESCRIPTION - DESCRIPTORS: DESCRIPTORS: HEADACHE

## 2019-06-09 ASSESSMENT — ENCOUNTER SYMPTOMS
WHEEZING: 0
NAUSEA: 0
EYE REDNESS: 0
EYE DISCHARGE: 0
COLOR CHANGE: 0
SORE THROAT: 0
DIARRHEA: 0
RHINORRHEA: 0
CONSTIPATION: 0
SHORTNESS OF BREATH: 0
VOMITING: 0
COUGH: 0
ABDOMINAL PAIN: 1
BACK PAIN: 1

## 2019-06-09 ASSESSMENT — PAIN SCALES - GENERAL
PAINLEVEL_OUTOF10: 8
PAINLEVEL_OUTOF10: 1

## 2019-06-09 ASSESSMENT — PAIN DESCRIPTION - FREQUENCY: FREQUENCY: CONTINUOUS

## 2019-06-09 ASSESSMENT — PAIN DESCRIPTION - PAIN TYPE: TYPE: ACUTE PAIN

## 2019-06-09 ASSESSMENT — PAIN DESCRIPTION - LOCATION: LOCATION: HEAD

## 2019-06-09 NOTE — ED TRIAGE NOTES
Pt presents to room 23 per EMS from [facility]. Pt tripped and fell over a dog and now has a laceration to her right forehead. Pt denies LOC, denies anticoagulant therapy. Pt c/o head pain at present time. At present time, pt is alert and oriented. Respirations even and unlabored; skin warm & dry. NAD noted.

## 2019-06-09 NOTE — ED NOTES
Pt discharged with instructions, verbalized understanding. Pt and family given opportunity to ask questions and discuss plan of care with treatment team.  Pt and family denies additional needs at present time and are in agreement with current plan of care. Pt refused need for w/c assistance. Ambulated out of ED with steady gait in stable condition.        Yue Damon RN  06/09/19 7163

## 2019-06-09 NOTE — ED PROVIDER NOTES
stiffness. Skin: Positive for wound (laceration above right eyebrow). Negative for color change, pallor and rash. Neurological: Positive for light-headedness and headaches. Negative for dizziness, syncope, weakness and numbness. Hematological: Negative for adenopathy. Psychiatric/Behavioral: Negative for agitation, confusion, dysphoric mood and suicidal ideas. The patient is not nervous/anxious. PAST MEDICAL HISTORY    has a past medical history of Anemia, Atrial fibrillation ([address] CAD (coronary artery disease), CHF (congestive heart failure) ([address] Chronic kidney disease, DDD (degenerative disc disease), lumbar, Depression, Frequent PVCs, GERD (gastroesophageal reflux disease), History of blood transfusion, Hx of blood clots, Hyperlipidemia, Hypertension, Hyperthyroidism, Movement disorder, Neuropathy, Obesity, Pneumonia, Sleep apnea, Status post right and left heart catheterization, and Type II or unspecified type diabetes mellitus without mention of complication, not stated as uncontrolled. SURGICAL HISTORY      has a past surgical history that includes Rotator cuff repair; Tubal ligation; Hysterectomy; bladder suspension; tendon release (Left, 08/09/2016); Achilles tendon surgery (Bilateral); Cardiac surgery (2016); Hammer toe surgery (Right); hiatal hernia repair (09/06/2016); hernia repair; Abdomen surgery; Tunneled venous port placement (11/06/2017); Upper gastrointestinal endoscopy (Left, 5/10/2018); Colonoscopy (Left, 5/11/2018); Endoscopy, colon, diagnostic; Gastric fundoplication; and pr office/outpt visit,procedure only (N/A, 9/21/2018).     CURRENT MEDICATIONS       Discharge Medication List as of 6/9/2019  5:00 PM      CONTINUE these medications which have NOT CHANGED    Details   cetirizine (ZYRTEC) 10 MG tablet Take 10 mg by mouth dailyHistorical Med      prednisoLONE acetate (PRED FORTE) 1 % ophthalmic suspension 1 drop 4 times dailyHistorical Med      benzonatate (TESSALON) 100 sinus tenderness, nasal deformity or septal deviation. No epistaxis. Mouth/Throat: Oropharynx is clear and moist. No oral lesions. No lacerations. There is a 4 cm long laceration above the patient's right eyebrow with mild associated tenderness and gaping wound edges. There is mild edema but no bruising or active bleeding. There was no additional edema, bruising, abrasion, or laceration noted of the face or scalp. There was no additional tenderness or crepitus of the face or scalp. There were no palpable bony or soft tissue abnormalities of the face or scalp. Eyes: Pupils are equal, round, and reactive to light. Conjunctivae and EOM are normal. Right eye exhibits no discharge. Left eye exhibits no discharge. No scleral icterus. Neck: Normal range of motion. Neck supple. Cardiovascular: Normal rate, regular rhythm and normal heart sounds. Exam reveals no gallop and no friction rub. No murmur heard. Pulmonary/Chest: Effort normal and breath sounds normal. No stridor. No respiratory distress. She has no wheezes. She has no rales. She exhibits no tenderness. There is no chest wall tenderness, edema, bruising, or crepitus. Lung sounds are clear and equal bilaterally. Abdominal: Soft. Bowel sounds are normal. She exhibits no distension and no mass. There is no hepatosplenomegaly. There is tenderness (mild) in the epigastric area. There is no rigidity, no rebound, no guarding, no tenderness at McBurney's point and negative Croft's sign. No hernia. There was intermittent and inconsistent epigastric abdominal tenderness that the patient states is chronic and unchanged from baseline. There was no guarding, rigidity, rebound tenderness, bruising, edema, distension, or crepitus. Musculoskeletal: She exhibits tenderness (mild). She exhibits no edema or deformity. Cervical back: Normal. She exhibits normal range of motion, no tenderness, no swelling, no deformity, no pain and no spasm. Thoracic back: She exhibits tenderness. She exhibits normal range of motion, no swelling, no deformity, no pain and no spasm. Lumbar back: Normal. She exhibits normal range of motion, no tenderness, no swelling, no deformity, no pain and no spasm. There is no midline spinal or paraspinal tenderness of the cervical or lumbar spine. There is mild midline thoracic tenderness. There is good spinal ROM. Patient has full ROM and strength of the extremities. There is intact sensation of soft touch in the extremities. The extremities appear to be neurovascularly intact. Lymphadenopathy:     She has no cervical adenopathy. Neurological: She is alert and oriented to person, place, and time. No cranial nerve deficit. She exhibits normal muscle tone. Coordination normal. GCS eye subscore is 4. GCS verbal subscore is 5. GCS motor subscore is 6. There were no noted cranial nerve or focal neurologic deficits. Cranial nerves II through XII are grossly intact. Skin: Skin is warm and dry. No rash noted. She is not diaphoretic. No erythema. No pallor. Psychiatric: She has a normal mood and affect. Her behavior is normal. Thought content normal.   Nursing note and vitals reviewed. DIFFERENTIAL DIAGNOSIS:   Includes but not limited to: mechanical fall, laceration, contusion, hem,atoma, dislocation, fracture, ICH, TBI, concussion, paraspinal strain vs sprain vs paraspinal muscle spasm, herniated disc, sciatica, degenerative disc disease.  Low suspicion for: vertebral subluxation or fracture, cauda equina, discitis, epidural abscess, or central canal compromise based on history and physical exam.    DIAGNOSTIC RESULTS     EKG: All EKG's are interpreted by the Emergency Department Physician who either signs or Co-signsthis chart in the absence of a cardiologist.  None    RADIOLOGY: non-plain film images(s) such as CT, Ultrasound and MRI are read by the radiologist.  Plainradiographic images are visualized and preliminarily interpreted by the emergency physician unless otherwise stated below. XR THORACIC SPINE (3 VIEWS)   Final Result      No fracture or subluxation of the thoracic spine. Final report electronically signed by Dr. Sue Seo on 6/9/2019 2:28 PM      XR LUMBAR SPINE (2-3 VIEWS)   Final Result   1. No acute fracture or spondylolisthesis of the lumbar spine. 2. Multilevel degenerative disc disease and posterior facet arthropathy as detailed above. Final report electronically signed by Dr. Sue Seo on 6/9/2019 2:31 PM      CT Head WO Contrast   Final Result      No mass effect or acute hemorrhage. **This report has been created using voice recognition software. It may contain minor errors which are inherent in voice recognition technology. **      Final report electronically signed by Dr. Sue Seo on 6/9/2019 2:10 PM      CT Cervical Spine WO Contrast   Final Result   1. No acute fracture or spondylolisthesis of the cervical spine. 2. Multilevel degenerative disc disease, neural foraminal narrowing and central canal stenosis as detailed above. Final report electronically signed by Dr. Sue Seo on 6/9/2019 2:12 PM      CT FACIAL BONES WO CONTRAST   Final Result      No facial bone fracture. Final report electronically signed by Dr. Sue Seo on 6/9/2019 2:17 PM          LABS:   Labs Reviewed - No data to display    EMERGENCY DEPARTMENT COURSE:   Vitals:    Vitals:    06/09/19 1320 06/09/19 1445 06/09/19 1515 06/09/19 1720   BP: (!) 155/108 (!) 148/66 139/86 126/73   Pulse: 82 72 59 75   Resp: 20 16 18 18   Temp: 98.3 °F (36.8 °C)      TempSrc: Oral      SpO2: 95% 95% 96% 94%   Weight: 227 lb (103 kg)      Height: 5' 6\" (1.676 m)        The patient was seen and evaluated within the ED today with a headache, right eyebrow laceration, and midback pain following a mechanical fall.   Within the department, I observed the patient's vital signs to be within acceptable range. On exam, I appreciated a 4 cm long laceration above the patient's right eyebrow with mild associated tenderness and gaping wound edges. There is mild edema but no bruising or active bleeding. There was no additional edema, bruising, abrasion, or laceration noted of the face or scalp. There was no additional tenderness or crepitus of the face or scalp. There were no palpable bony or soft tissue abnormalities of the face or scalp. There were no noted cranial nerve or focal neurologic deficits. Cranial nerves II through XII are grossly intact. There is no chest wall tenderness, edema, bruising, or crepitus. Lung sounds are clear and equal bilaterally. There was intermittent and inconsistent epigastric abdominal tenderness that the patient states is chronic and unchanged from baseline. There was no guarding, rigidity, rebound tenderness, bruising, edema, distension, or crepitus. There is no midline spinal or paraspinal tenderness of the cervical or lumbar spine. There is mild midline thoracic tenderness. There is good spinal ROM. Patient has full ROM and strength of the extremities. There is intact sensation of soft touch in the extremities. The extremities appear to be neurovascularly intact. Radiologic studies within the department revealed  no acute hemorrhage, infarct, mass, or other acute cranial or intracranial pathology and no acute fracture, dislocation, or soft tissue abnormality. Within the department, the patient was treated with Tylenol. Her laceration was cleaned, repaired, and dressed as below. The patient was instructed to keep the laceration clean and dry and agreed to keep it covered and dressed when leaving home. The patient was instructed not to soak the laceration until the sutures are removed, although routine washing of the area is allowed.  The patient will return if the sutures break or if there are noted fevers, redness, swelling, purulent drainage from the laceration site, or other signs of infection. The patient will either follow up with her PCP or return to this department to have the sutures removed in 5 days. The patient vocalized understanding of these instructions. I observed the patient's condition to improve during the duration of the stay. I explained my proposed course of treatment to the patient and attending family, who were amenable to my treatment and discharge decisions. She was discharged home in stable condition with prescriptions for Keflex, and the patient will return to the ED if the symptoms become more severe in nature or otherwise change. CRITICAL CARE:   None    CONSULTS:  Discussed the case with my attending physician in the Emergency Department, who agreed with my workup, treatment, and disposition decisions. PROCEDURES:  Lac Repair  Date/Time: 6/10/2019 12:00 AM  Performed by: Lieutenant Justin PA-C  Authorized by: Lieutenant Justin PA-C     Consent:     Consent obtained:  Verbal    Consent given by:  Patient    Risks discussed:  Infection, pain, poor cosmetic result and poor wound healing    Alternatives discussed:  No treatment  Universal protocol:     Procedure explained and questions answered to patient or proxy's satisfaction: yes    Anesthesia (see MAR for exact dosages): Anesthesia method:  Topical application and local infiltration    Topical anesthetic:  LET    Local anesthetic:  Lidocaine 1% w/o epi  Laceration details:     Location:  Face    Face location:  R eyebrow    Length (cm):  4    Depth (mm):  5  Repair type:     Repair type:   Intermediate  Pre-procedure details:     Preparation:  Patient was prepped and draped in usual sterile fashion and imaging obtained to evaluate for foreign bodies  Exploration:     Hemostasis achieved with:  LET and direct pressure    Wound exploration: wound explored through full range of motion and entire depth of wound probed and visualized      Wound extent: no areolar tissue violation noted, no 5:00 PM      START taking these medications    Details   cephALEXin (KEFLEX) 500 MG capsule Take 1 capsule by mouth 3 times daily for 7 days, Disp-21 capsule, R-0Print             (Please note that portions of this note werecompleted with a voice recognition program.  Efforts were made to edit the dictations but occasionally words are mis-transcribed.)    KEREN Jessica PA-C  06/10/19 0001

## 2019-06-17 ENCOUNTER — PROCEDURE VISIT (OUTPATIENT)
Dept: CARDIOLOGY CLINIC | Age: 74
End: 2019-06-17
Payer: MEDICARE

## 2019-06-17 ENCOUNTER — TELEPHONE (OUTPATIENT)
Dept: ENT CLINIC | Age: 74
End: 2019-06-17

## 2019-06-17 DIAGNOSIS — I48.91 ATRIAL FIBRILLATION, UNSPECIFIED TYPE (HCC): Primary | ICD-10-CM

## 2019-06-17 PROCEDURE — 93298 REM INTERROG DEV EVAL SCRMS: CPT | Performed by: INTERNAL MEDICINE

## 2019-06-17 PROCEDURE — 93299 PR REM INTERROG ICPMS/SCRMS <30 D TECH REVIEW: CPT | Performed by: INTERNAL MEDICINE

## 2019-06-17 NOTE — TELEPHONE ENCOUNTER
Patient left message on clinical voicemail stating she would like to have an appointment to have her left ear checked. She stated it drains at night and she is not hearing very well out of the left. Attempted to call patient. Got voicemail, left message to call the office. I do not see any referral in [facility], so she will need to see her PCP for one before an appointment can be made.

## 2019-07-01 ENCOUNTER — TELEPHONE (OUTPATIENT)
Dept: CARDIOLOGY CLINIC | Age: 74
End: 2019-07-01

## 2019-07-01 NOTE — TELEPHONE ENCOUNTER
Ask Dr. Francois Campbell, if he would like me to see the patient to discuss the option of the WATCHMAN as an alternative since she cannot take anticoagulation due to her h/o GIB.         Kaylee Doss RN at 6/17/2019 10:11 AM     Status: Signed      [employer]   Battery ok   History A fib no anticoagulants due to GI bleed  Bradycardia with pvc's  On June 9th patient fell and went to ER

## 2019-07-01 NOTE — PROGRESS NOTES
Ask Dr. Arron Moctezuma, if he would like me to see the patient to discuss the option of the WATCHMAN as an alternative since she cannot take anticoagulation due to her h/o GIB.

## 2019-07-02 ENCOUNTER — OFFICE VISIT (OUTPATIENT)
Dept: INFECTIOUS DISEASES | Age: 74
End: 2019-07-02
Payer: MEDICARE

## 2019-07-02 VITALS
HEIGHT: 66 IN | BODY MASS INDEX: 37.03 KG/M2 | WEIGHT: 230.4 LBS | DIASTOLIC BLOOD PRESSURE: 70 MMHG | TEMPERATURE: 98.9 F | SYSTOLIC BLOOD PRESSURE: 128 MMHG

## 2019-07-02 DIAGNOSIS — D83.9 CVID (COMMON VARIABLE IMMUNODEFICIENCY) (HCC): Primary | ICD-10-CM

## 2019-07-02 PROCEDURE — 99203 OFFICE O/P NEW LOW 30 MIN: CPT | Performed by: INTERNAL MEDICINE

## 2019-07-02 NOTE — PATIENT INSTRUCTIONS
Follow-up at the ID clinic as needed. Cardiology follow-up. Her symptoms may be related to atrial fibrillation with tachybradycardia syndrome  Continue immunoglobulin infusion. Patient is tolerating the infusion very well  Resume her iron 1 tablet every other day  Follow-up will be scheduled at the ID clinic if she has any concerns.

## 2019-07-04 ENCOUNTER — HOSPITAL ENCOUNTER (EMERGENCY)
Age: 74
Discharge: HOME | End: 2019-07-04
Attending: EMERGENCY MEDICINE
Payer: MEDICARE

## 2019-07-04 VITALS
TEMPERATURE: 98.7 F | HEART RATE: 84 BPM | RESPIRATION RATE: 22 BRPM | BODY MASS INDEX: 36.96 KG/M2 | DIASTOLIC BLOOD PRESSURE: 93 MMHG | HEIGHT: 66 IN | WEIGHT: 230 LBS | OXYGEN SATURATION: 95 % | SYSTOLIC BLOOD PRESSURE: 118 MMHG

## 2019-07-04 DIAGNOSIS — R42 DIZZINESS: ICD-10-CM

## 2019-07-04 DIAGNOSIS — G44.319 ACUTE POST-TRAUMATIC HEADACHE, NOT INTRACTABLE: Primary | ICD-10-CM

## 2019-07-04 DIAGNOSIS — G50.0 SUPRAORBITAL NEURALGIA: ICD-10-CM

## 2019-07-04 PROCEDURE — 99214 OFFICE O/P EST MOD 30 MIN: CPT | Performed by: EMERGENCY MEDICINE

## 2019-07-04 PROCEDURE — 99215 OFFICE O/P EST HI 40 MIN: CPT

## 2019-07-04 PROCEDURE — 99283 EMERGENCY DEPT VISIT LOW MDM: CPT

## 2019-07-04 ASSESSMENT — ENCOUNTER SYMPTOMS
BACK PAIN: 0
VOICE CHANGE: 0
TROUBLE SWALLOWING: 0
EYE ITCHING: 0
WHEEZING: 0
COUGH: 0
RHINORRHEA: 0
CONSTIPATION: 0
EYE DISCHARGE: 0
CHOKING: 0
ABDOMINAL PAIN: 0
FACIAL SWELLING: 0
EYE PAIN: 0
NAUSEA: 0
DIARRHEA: 0
NAUSEA: 1
STRIDOR: 0
EYE REDNESS: 0
ABDOMINAL DISTENTION: 0
BLOOD IN STOOL: 0
SHORTNESS OF BREATH: 0
SORE THROAT: 0
VOMITING: 0
SINUS PRESSURE: 0

## 2019-07-04 ASSESSMENT — PAIN DESCRIPTION - DESCRIPTORS: DESCRIPTORS: ACHING

## 2019-07-04 ASSESSMENT — PAIN DESCRIPTION - PAIN TYPE: TYPE: ACUTE PAIN

## 2019-07-04 ASSESSMENT — PAIN SCALES - GENERAL: PAINLEVEL_OUTOF10: 6

## 2019-07-04 ASSESSMENT — PAIN DESCRIPTION - LOCATION: LOCATION: HEAD

## 2019-07-04 NOTE — ED NOTES
Dr Lyudmila Contreras at bedside for evaluation. Reports persistent headache all day today-3/10- and bilateral eyes burning and scratchy. Recent cataract surgery. States she had a sharp pain in her right eye when she stood up earlier and that has her concerned. Pt has blackout sunglasses on at this time. Denied other needs or concerns.      Catrina Song RN  07/04/19 0423

## 2019-07-04 NOTE — ED PROVIDER NOTES
[facility]  eMERGENCY dEPARTMENT eNCOUnter          [address] [facility]       Chief Complaint   Patient presents with    Headache     Pt having some pain on upper right side of head and pain is going into right corner of eye. Started today. Pt is from [facility]. Nurses Notes reviewed and I agreeexcept as noted in the HPI. HISTORY OF PRESENT ILLNESS    Justnie Shepard is a 75 y.o. female who presents to Emergency Department with right side supraorbital headache and pain in last five hours. She was seen at [facility] ED on 6/9/2019 for mechanical fall and right supraorbital lac which was repaired. CT head then was negative for acute findings. She has been having on and off head since then but never that bad until today when she touches right supraorbital laceration side which has severe sharp pain. She has bilateral cataract surgery in 05/2019 she is concerned something wrong with right eye. She has no fever or chill, no N/V. She still has mild photophobia. She has mid dizziness around noon time (when headache seems worse) but now her dizziness and headache are minimal. She was seen at urgent care and was sent to ED per her request.       REVIEW OF SYSTEMS     Review of Systems   Constitutional: Negative for activity change, appetite change, chills, fatigue and fever. HENT: Negative for congestion, ear discharge, hearing loss, nosebleeds and rhinorrhea. Eyes: Negative for discharge and itching. Respiratory: Negative for cough, shortness of breath and wheezing. Cardiovascular: Negative for chest pain. Gastrointestinal: Negative for abdominal distention, abdominal pain, diarrhea, nausea and vomiting. Endocrine: Negative for cold intolerance. Genitourinary: Negative for dysuria and flank pain. Musculoskeletal: Negative for arthralgias, myalgias, neck pain and neck stiffness. Skin: Negative for rash and wound. Neurological: Positive for dizziness and headaches. ophthalmic nerve during laceration or repair on 6/9/2019. Patient is reassured and discharged with PCP follow-up in 1 week. CRITICAL CARE:   None    CONSULTS:  None    PROCEDURES:  None    FINAL IMPRESSION      1. Acute post-traumatic headache, not intractable    2. Dizziness    3.  Supraorbital neuralgia          DISPOSITION/PLAN   Home    PATIENT REFERRED TO:  to [facility] ED      to [facility] ED    Fidel Francis MD  [address] [phone]    In 1 week        DISCHARGE MEDICATIONS:  New Prescriptions    No medications on file       (Please note that portions of this note were completed with a voice recognition program.  Efforts were made to edit the dictations but occasionally words aremis-transcribed.)    MD Sandra Bright MD  07/04/19 1800

## 2019-07-04 NOTE — ED PROVIDER NOTES
[address] [facility] [address] [address] EMERGENCY DEPT  [facility]       Chief Complaint   Patient presents with    Headache     Pt having some pain on upper right side of head and pain is going into right corner of eye. Started today. Pt is from [facility]. Nurses Notes reviewed and I agree except as noted in the HPI. HISTORY OF PRESENT ILLNESS   Merary Newby is a 75 y.o. female who presents with 4-hour history of right-sided headache that she rates at 5 out of 10 severity associated with dizziness and nausea. Patient has been experiencing head pain  since her fall June 6 when she sustained head trauma that required sutures at [facility] ED.  CT scan of head negative. REVIEW OF SYSTEMS     Review of Systems   Constitutional: Positive for appetite change. Negative for chills, fatigue, fever and unexpected weight change. HENT: Negative for congestion, ear discharge, ear pain, facial swelling, hearing loss, nosebleeds, postnasal drip, sinus pressure, sore throat, trouble swallowing and voice change. Eyes: Negative for pain, discharge, redness and visual disturbance. Respiratory: Negative for cough, choking, shortness of breath, wheezing and stridor. Cardiovascular: Negative for chest pain and leg swelling. Gastrointestinal: Positive for nausea. Negative for abdominal pain, blood in stool, constipation, diarrhea and vomiting. Genitourinary: Negative for dysuria, flank pain, frequency, hematuria, urgency, vaginal bleeding and vaginal discharge. Musculoskeletal: Negative for arthralgias, back pain, neck pain and neck stiffness. Skin: Negative for rash. Neurological: Positive for dizziness and headaches. Negative for seizures, syncope, weakness and light-headedness. Hematological: Negative for adenopathy. Does not bruise/bleed easily. Psychiatric/Behavioral: Negative for confusion, sleep disturbance and suicidal ideas. The patient is not nervous/anxious.     All other systems reviewed TO:  to [facility] ED      to [facility] ED    DISCHARGE MEDICATIONS:  New Prescriptions    No medications on file     Current Discharge Medication List          MD Andrea Iqbal MD  07/04/19 [facility] Trever Castro MD  07/04/19 5469

## 2019-07-04 NOTE — ED NOTES
Transfer instructions reviewed with pt and sister, instructed pt to go directly to main er and to not stop on the way there. Pt verbalizes understanding, ambulates to lobby with portable O2 tank in stable condition.       Cris Henriquez RN  07/04/19 5730

## 2019-07-05 ENCOUNTER — HOSPITAL ENCOUNTER (OUTPATIENT)
Dept: NURSING | Age: 74
Discharge: HOME | End: 2019-07-05
Payer: MEDICARE

## 2019-07-05 VITALS
WEIGHT: 225 LBS | SYSTOLIC BLOOD PRESSURE: 122 MMHG | TEMPERATURE: 98.1 F | OXYGEN SATURATION: 95 % | BODY MASS INDEX: 36.32 KG/M2 | HEART RATE: 68 BPM | RESPIRATION RATE: 16 BRPM | DIASTOLIC BLOOD PRESSURE: 64 MMHG

## 2019-07-05 DIAGNOSIS — D83.9 COMMON VARIABLE IMMUNODEFICIENCY (HCC): Primary | ICD-10-CM

## 2019-07-05 PROCEDURE — 2709999900 HC NON-CHARGEABLE SUPPLY

## 2019-07-05 PROCEDURE — 2580000003 HC RX 258: Performed by: FAMILY MEDICINE

## 2019-07-05 PROCEDURE — 6360000002 HC RX W HCPCS: Performed by: FAMILY MEDICINE

## 2019-07-05 PROCEDURE — 96415 CHEMO IV INFUSION ADDL HR: CPT

## 2019-07-05 PROCEDURE — 96413 CHEMO IV INFUSION 1 HR: CPT

## 2019-07-05 RX ORDER — METHYLPREDNISOLONE SODIUM SUCCINATE 125 MG/2ML
125 INJECTION, POWDER, LYOPHILIZED, FOR SOLUTION INTRAMUSCULAR; INTRAVENOUS ONCE
Status: CANCELLED | OUTPATIENT
Start: 2019-08-02

## 2019-07-05 RX ORDER — DIPHENHYDRAMINE HCL 25 MG
25 TABLET ORAL ONCE
Status: CANCELLED | OUTPATIENT
Start: 2019-08-02

## 2019-07-05 RX ORDER — SODIUM CHLORIDE 9 MG/ML
INJECTION, SOLUTION INTRAVENOUS CONTINUOUS
Status: CANCELLED | OUTPATIENT
Start: 2019-08-02

## 2019-07-05 RX ORDER — SODIUM CHLORIDE 0.9 % (FLUSH) 0.9 %
10 SYRINGE (ML) INJECTION PRN
Status: CANCELLED | OUTPATIENT
Start: 2019-08-02

## 2019-07-05 RX ORDER — HEPARIN SODIUM (PORCINE) LOCK FLUSH IV SOLN 100 UNIT/ML 100 UNIT/ML
500 SOLUTION INTRAVENOUS PRN
Status: CANCELLED | OUTPATIENT
Start: 2019-08-02

## 2019-07-05 RX ORDER — 0.9 % SODIUM CHLORIDE 0.9 %
10 VIAL (ML) INJECTION ONCE
Status: CANCELLED | OUTPATIENT
Start: 2019-08-02

## 2019-07-05 RX ORDER — DIPHENHYDRAMINE HYDROCHLORIDE 50 MG/ML
50 INJECTION INTRAMUSCULAR; INTRAVENOUS ONCE
Status: CANCELLED | OUTPATIENT
Start: 2019-08-02

## 2019-07-05 RX ORDER — ACETAMINOPHEN 325 MG/1
650 TABLET ORAL ONCE
Status: CANCELLED | OUTPATIENT
Start: 2019-08-02

## 2019-07-05 RX ORDER — SODIUM CHLORIDE 0.9 % (FLUSH) 0.9 %
5 SYRINGE (ML) INJECTION PRN
Status: CANCELLED | OUTPATIENT
Start: 2019-08-02

## 2019-07-05 RX ORDER — DIPHENHYDRAMINE HCL 25 MG
25 TABLET ORAL ONCE
Status: DISCONTINUED | OUTPATIENT
Start: 2019-07-05 | End: 2019-07-06 | Stop reason: HOSPADM

## 2019-07-05 RX ORDER — HEPARIN SODIUM (PORCINE) LOCK FLUSH IV SOLN 100 UNIT/ML 100 UNIT/ML
500 SOLUTION INTRAVENOUS PRN
Status: DISCONTINUED | OUTPATIENT
Start: 2019-07-05 | End: 2019-07-06 | Stop reason: HOSPADM

## 2019-07-05 RX ORDER — SODIUM CHLORIDE 0.9 % (FLUSH) 0.9 %
10 SYRINGE (ML) INJECTION PRN
Status: DISCONTINUED | OUTPATIENT
Start: 2019-07-05 | End: 2019-07-06 | Stop reason: HOSPADM

## 2019-07-05 RX ADMIN — Medication 10 ML: at 12:45

## 2019-07-05 RX ADMIN — IMMUNE GLOBULIN (HUMAN) 20 G: 10 INJECTION INTRAVENOUS; SUBCUTANEOUS at 10:55

## 2019-07-05 RX ADMIN — HEPARIN 500 UNITS: 100 SYRINGE at 12:46

## 2019-07-05 RX ADMIN — IMMUNE GLOBULIN (HUMAN) 20 G: 10 INJECTION INTRAVENOUS; SUBCUTANEOUS at 12:03

## 2019-07-05 NOTE — PROGRESS NOTES
_m___ Safety:       (Environmental)   [place] to environment   Ensure ID band is correct and in place/ allergy band as needed   Assess for fall risk   Initiate fall precautions as applicable (fall band, side rails, etc.)   Call light within reach   Bed in low position/ wheels locked

## 2019-07-05 NOTE — PROGRESS NOTES
1045 ivig prepared and starting after port accessed. Pt states she took her premeds before coming here  1125 pt resting with no c/o. Tolerating infusion with no problems. 1200 resting quietly in room   1245 IVIG INFUSED. PT YOAN WELL. PT STABLE. PORT DEACCESSED. NO REDNESS. FLUSHES WELL. SMALL BANDAID APPLIED. PT GIVEN DISCHARGE INSTRUCTIONS. 1250 PT DISCHARGED PER AMBULATION WITH OXYGEN AND FAMILY.

## 2019-07-05 NOTE — PROGRESS NOTES
Patient admitted to room 4 for a IV infusion, vitals are stable. Patient offered rights and responsibilities.

## 2019-07-05 NOTE — PROGRESS NOTES
__m__ Safety:       (Environmental)   [place] to environment   Ensure ID band is correct and in place/ allergy band as needed   Assess for fall risk   Initiate fall precautions as applicable (fall band, side rails, etc.)   Call light within reach   Bed in low position/ wheels locked

## 2019-07-12 ENCOUNTER — OFFICE VISIT (OUTPATIENT)
Dept: ENT CLINIC | Age: 74
End: 2019-07-12
Payer: MEDICARE

## 2019-07-12 VITALS
SYSTOLIC BLOOD PRESSURE: 138 MMHG | TEMPERATURE: 98.5 F | RESPIRATION RATE: 14 BRPM | DIASTOLIC BLOOD PRESSURE: 84 MMHG | HEIGHT: 65 IN | WEIGHT: 230 LBS | BODY MASS INDEX: 38.32 KG/M2

## 2019-07-12 DIAGNOSIS — H60.502 ACUTE OTITIS EXTERNA OF LEFT EAR, UNSPECIFIED TYPE: Primary | ICD-10-CM

## 2019-07-12 DIAGNOSIS — H91.92 HEARING LOSS OF LEFT EAR, UNSPECIFIED HEARING LOSS TYPE: ICD-10-CM

## 2019-07-12 PROCEDURE — G8427 DOCREV CUR MEDS BY ELIG CLIN: HCPCS | Performed by: PHYSICIAN ASSISTANT

## 2019-07-12 PROCEDURE — 1123F ACP DISCUSS/DSCN MKR DOCD: CPT | Performed by: PHYSICIAN ASSISTANT

## 2019-07-12 PROCEDURE — 4040F PNEUMOC VAC/ADMIN/RCVD: CPT | Performed by: PHYSICIAN ASSISTANT

## 2019-07-12 PROCEDURE — G8400 PT W/DXA NO RESULTS DOC: HCPCS | Performed by: PHYSICIAN ASSISTANT

## 2019-07-12 PROCEDURE — 99203 OFFICE O/P NEW LOW 30 MIN: CPT | Performed by: PHYSICIAN ASSISTANT

## 2019-07-12 PROCEDURE — 1090F PRES/ABSN URINE INCON ASSESS: CPT | Performed by: PHYSICIAN ASSISTANT

## 2019-07-12 PROCEDURE — G8417 CALC BMI ABV UP PARAM F/U: HCPCS | Performed by: PHYSICIAN ASSISTANT

## 2019-07-12 PROCEDURE — 3017F COLORECTAL CA SCREEN DOC REV: CPT | Performed by: PHYSICIAN ASSISTANT

## 2019-07-12 PROCEDURE — G8599 NO ASA/ANTIPLAT THER USE RNG: HCPCS | Performed by: PHYSICIAN ASSISTANT

## 2019-07-12 PROCEDURE — 1036F TOBACCO NON-USER: CPT | Performed by: PHYSICIAN ASSISTANT

## 2019-07-12 PROCEDURE — 4130F TOPICAL PREP RX AOE: CPT | Performed by: PHYSICIAN ASSISTANT

## 2019-07-12 RX ORDER — LANCETS 28 GAUGE
EACH MISCELLANEOUS
Refills: 1 | COMMUNITY
Start: 2019-07-04

## 2019-07-12 RX ORDER — DEXTROSE 4 G
TABLET,CHEWABLE ORAL
Refills: 1 | COMMUNITY
Start: 2019-07-04

## 2019-07-12 ASSESSMENT — ENCOUNTER SYMPTOMS
EYE PAIN: 0
COUGH: 0
EYE DISCHARGE: 0
FACIAL SWELLING: 0
SINUS PAIN: 0
SHORTNESS OF BREATH: 0
SORE THROAT: 0
SINUS PRESSURE: 0
NAUSEA: 0
TROUBLE SWALLOWING: 0
VOICE CHANGE: 0
RHINORRHEA: 0
EYE ITCHING: 0
VOMITING: 0

## 2019-07-12 NOTE — PROGRESS NOTES
Neurological: Positive for light-headedness. Negative for dizziness. Hematological: Negative for adenopathy. Social History:    TOBACCO:   reports that she quit smoking about 13 years ago. Her smoking use included cigarettes. She has a 30.00 pack-year smoking history. She has never used smokeless tobacco.  ETOH:   reports that she does not drink alcohol. DRUGS:   reports that she does not use drugs. Family History:       Problem Relation Age of Onset    Cancer Mother     Heart Disease Mother     High Blood Pressure Mother     Diabetes Mother     Vision Loss Mother     Stroke Mother     COPD Father     Diabetes Father     COPD Brother        Objective: This is a 75 y.o. female. Patient is alert and oriented to person, place and time. Patient appears well developed, well nourished. Mood is happy. Not obviously hearing impaired. /84 (Site: Left Upper Arm, Position: Sitting)   Temp 98.5 °F (36.9 °C) (Oral)   Resp 14   Ht 5' 4.5\" (1.638 m)   Wt 230 lb (104.3 kg)   BMI 38.87 kg/m²     Head:   Normocephalic, atraumatic. No obvious masses or lesions noted. Ears:  External ears: Normal: no scars, lesions or masses. Mastoid process: No erythema noted. No tenderness to palpation. R External auditory canal: clear and free of any pathology  L External auditory canal: purulent drainage in the canal. Culture taken and remaining debris suctioned. Tympanic membranes:  R intact, translucent                                                  L moderately-severely erythematous   [place]:   [place]:  Right Ear:  512 hz - Result positive (air conduction greater than bone conduction)-patient reports that she could hear the bone conduction testing better in her left while testing on the right               Left Ear:  512 hz - Result negative (bone conduction is greater than air conduction)  Flowers: 512 hz.  Result - lateralizes to the left  Nose:    External nose: Appears midline.  No obvious deformity or masses. Septum:  normal. No septal hematoma. No perforation. Mucosa:  clear  Turbinates: normal and pink            Discharge:  clear    Mouth/Throat:  Lips, tongue and oral cavity: Normal. No masses or lesions noted   Dentition: fair, no malocclusion  Oral mucosa: moist  Tonsils: present, not acutely enlarged and no erythema or exudates  Oropharynx: normal-appearing mucosa  Hard and soft palates: symmetrical and intact. Salivary glands: not enlarged and no tenderness to palpation. Uvula: midline, no obvious lesions    Neck: Trachea midline. Thyroid not enlarged, no palpable masses or tenderness  Lymphatic: No cervical lymphadenopathy noted. Eyes: TONIA, EOM intact. Conjunctiva moist without discharge. Lungs: Normal effort of breathing, not obviously distressed. Assessment/Plan:     Diagnosis Orders   1. Acute otitis externa of left ear, unspecified type  Ear culture   2. Hearing loss of left ear, unspecified hearing loss type         The patient is a 75 y.o. female that presents for evaluation of left hearing loss. The patient appears to have otitis externa, given the drainage in the canal. A culture was taken and remaining drainage suctioned. I recommended Ciloxan drops on the assisted living paperwork. She told me they will order the medication from their pharmacy if I give the recommendation. We discussed keeping water out of her ears to avoid further infection. The patient will use Ciloxan for the next week and return after about 7 days for possible debridement. I will call [facility] if modification to the drops needs to be made pending culture. She will call sooner with increased pain, drainage, or other new/worsening symptoms. Patient has no other questions at this time.        Electronically signed by CAROLINA Villavicencio on 7/12/2019 at 12:56 PM

## 2019-07-17 LAB
AEROBIC CULTURE: ABNORMAL
ANAEROBIC CULTURE: ABNORMAL
GRAM STAIN RESULT: ABNORMAL
ORGANISM: ABNORMAL

## 2019-07-19 ENCOUNTER — PROCEDURE VISIT (OUTPATIENT)
Dept: CARDIOLOGY CLINIC | Age: 74
End: 2019-07-19
Payer: MEDICARE

## 2019-07-19 ENCOUNTER — OFFICE VISIT (OUTPATIENT)
Dept: ENT CLINIC | Age: 74
End: 2019-07-19
Payer: MEDICARE

## 2019-07-19 VITALS
WEIGHT: 231.5 LBS | HEIGHT: 64 IN | RESPIRATION RATE: 14 BRPM | TEMPERATURE: 98.7 F | DIASTOLIC BLOOD PRESSURE: 80 MMHG | BODY MASS INDEX: 39.52 KG/M2 | SYSTOLIC BLOOD PRESSURE: 110 MMHG | HEART RATE: 56 BPM

## 2019-07-19 DIAGNOSIS — H91.92 HEARING LOSS OF LEFT EAR, UNSPECIFIED HEARING LOSS TYPE: ICD-10-CM

## 2019-07-19 DIAGNOSIS — I48.0 PAROXYSMAL ATRIAL FIBRILLATION (HCC): Primary | ICD-10-CM

## 2019-07-19 DIAGNOSIS — H60.502 ACUTE OTITIS EXTERNA OF LEFT EAR, UNSPECIFIED TYPE: Primary | ICD-10-CM

## 2019-07-19 PROCEDURE — 3017F COLORECTAL CA SCREEN DOC REV: CPT | Performed by: PHYSICIAN ASSISTANT

## 2019-07-19 PROCEDURE — G8400 PT W/DXA NO RESULTS DOC: HCPCS | Performed by: PHYSICIAN ASSISTANT

## 2019-07-19 PROCEDURE — 1090F PRES/ABSN URINE INCON ASSESS: CPT | Performed by: PHYSICIAN ASSISTANT

## 2019-07-19 PROCEDURE — 93298 REM INTERROG DEV EVAL SCRMS: CPT | Performed by: INTERNAL MEDICINE

## 2019-07-19 PROCEDURE — 99212 OFFICE O/P EST SF 10 MIN: CPT | Performed by: PHYSICIAN ASSISTANT

## 2019-07-19 PROCEDURE — 1123F ACP DISCUSS/DSCN MKR DOCD: CPT | Performed by: PHYSICIAN ASSISTANT

## 2019-07-19 PROCEDURE — G8599 NO ASA/ANTIPLAT THER USE RNG: HCPCS | Performed by: PHYSICIAN ASSISTANT

## 2019-07-19 PROCEDURE — 93299 PR REM INTERROG ICPMS/SCRMS <30 D TECH REVIEW: CPT | Performed by: INTERNAL MEDICINE

## 2019-07-19 PROCEDURE — 4130F TOPICAL PREP RX AOE: CPT | Performed by: PHYSICIAN ASSISTANT

## 2019-07-19 PROCEDURE — 1036F TOBACCO NON-USER: CPT | Performed by: PHYSICIAN ASSISTANT

## 2019-07-19 PROCEDURE — G8417 CALC BMI ABV UP PARAM F/U: HCPCS | Performed by: PHYSICIAN ASSISTANT

## 2019-07-19 PROCEDURE — 4040F PNEUMOC VAC/ADMIN/RCVD: CPT | Performed by: PHYSICIAN ASSISTANT

## 2019-07-19 PROCEDURE — G8427 DOCREV CUR MEDS BY ELIG CLIN: HCPCS | Performed by: PHYSICIAN ASSISTANT

## 2019-07-19 ASSESSMENT — ENCOUNTER SYMPTOMS
COLOR CHANGE: 0
TROUBLE SWALLOWING: 0
FACIAL SWELLING: 0
WHEEZING: 0
VOMITING: 0
CHOKING: 0
COUGH: 0
SHORTNESS OF BREATH: 0
APNEA: 0
VOICE CHANGE: 0
SINUS PRESSURE: 0
CHEST TIGHTNESS: 0
STRIDOR: 0
DIARRHEA: 0
ABDOMINAL PAIN: 0
NAUSEA: 0
RHINORRHEA: 0
SORE THROAT: 0

## 2019-07-19 NOTE — LETTER
Patient: Rina Brands   :1945    Please continue the Ciloxan drops for 7 more days. About 4 drops in the left ear twice a day.         Thanks,        Clary Mccormick PA-C

## 2019-07-19 NOTE — PROGRESS NOTES
greater than bone conduction)               Left Ear:  512 hz - Result negative (bone conduction is greater than air conduction)  Flowers: 512 hz.  Result - lateralizes to the left    Neck: Trachea midline. Thyroid not enlarged, no palpable masses or tenderness  Lymphatic: No cervical lymphadenopathy noted. Eyes: TONIA, EOM intact. Conjunctiva moist without discharge. Lungs: Normal effort of breathing, not obviously distressed. Assessment/Plan:     Diagnosis Orders   1. Acute otitis externa of left ear, unspecified type     2. Hearing loss of left ear, unspecified hearing loss type         The patient is a 75 y.o. female that presents for follow up on otitis externa of the left ear. Patient reports improvement with the drops, but some symptoms persist.  There is still a mild amount of erythema and drainage in the left canal.  Drainage was suctioned without complication. The patient will continue the Ciloxan for 7 more days then follow up in the office. She will continue to keep her ears dry. If infection is cleared and patient still senses decreased hearing, will likely consider audiogram.  She will call the office sooner with worsening/new symptoms.      Electronically signed by CAROLINA Sheth on 7/24/2019 at 1:01 PM

## 2019-07-22 ENCOUNTER — OFFICE VISIT (OUTPATIENT)
Dept: CARDIOLOGY CLINIC | Age: 74
End: 2019-07-22
Payer: MEDICARE

## 2019-07-22 VITALS
DIASTOLIC BLOOD PRESSURE: 80 MMHG | BODY MASS INDEX: 38.09 KG/M2 | SYSTOLIC BLOOD PRESSURE: 102 MMHG | HEIGHT: 65 IN | WEIGHT: 228.6 LBS | HEART RATE: 80 BPM

## 2019-07-22 DIAGNOSIS — I48.0 PAROXYSMAL ATRIAL FIBRILLATION (HCC): Primary | ICD-10-CM

## 2019-07-22 DIAGNOSIS — I42.0 DILATED CARDIOMYOPATHY (HCC): ICD-10-CM

## 2019-07-22 PROCEDURE — G8417 CALC BMI ABV UP PARAM F/U: HCPCS | Performed by: NUCLEAR MEDICINE

## 2019-07-22 PROCEDURE — 3017F COLORECTAL CA SCREEN DOC REV: CPT | Performed by: NUCLEAR MEDICINE

## 2019-07-22 PROCEDURE — 99213 OFFICE O/P EST LOW 20 MIN: CPT | Performed by: NUCLEAR MEDICINE

## 2019-07-22 PROCEDURE — 1036F TOBACCO NON-USER: CPT | Performed by: NUCLEAR MEDICINE

## 2019-07-22 PROCEDURE — 4040F PNEUMOC VAC/ADMIN/RCVD: CPT | Performed by: NUCLEAR MEDICINE

## 2019-07-22 PROCEDURE — 1090F PRES/ABSN URINE INCON ASSESS: CPT | Performed by: NUCLEAR MEDICINE

## 2019-07-22 PROCEDURE — G8400 PT W/DXA NO RESULTS DOC: HCPCS | Performed by: NUCLEAR MEDICINE

## 2019-07-22 PROCEDURE — G8427 DOCREV CUR MEDS BY ELIG CLIN: HCPCS | Performed by: NUCLEAR MEDICINE

## 2019-07-22 PROCEDURE — G8599 NO ASA/ANTIPLAT THER USE RNG: HCPCS | Performed by: NUCLEAR MEDICINE

## 2019-07-22 PROCEDURE — 1123F ACP DISCUSS/DSCN MKR DOCD: CPT | Performed by: NUCLEAR MEDICINE

## 2019-07-22 NOTE — PROGRESS NOTES
[address] [address]  Dept: [phone]  Dept Fax: [phone]  Loc: [phone]    Visit Date: 7/22/2019    Antonio Faulkner is a 75 y.o. female who presents todayfor:  Chief Complaint   Patient presents with    6 Month Follow-Up     dialated cardiomyopathy    Atrial Fibrillation    Hypertension    Cardiomyopathy     Seeing Raj for possible watchman   Known CMP and A fib   Known severe pulmonary disease on Home O2  Chronic dyspnea  No chest pain   BP us lower today   Better at home  Had GI bleeding '  Off of thinners      HPI:  HPI  Past Medical History:   Diagnosis Date    Anemia     Atrial fibrillation ([address] 11/9/2017    CAD (coronary artery disease)     CHF (congestive heart failure) (HCC)     Chronic kidney disease     stage 3 kidney     DDD (degenerative disc disease), lumbar     Depression     Frequent PVCs 12/13/2017    GERD (gastroesophageal reflux disease)     History of blood transfusion     Hx of blood clots 09/2017    pulmonary emboli    Hyperlipidemia     Hypertension     Hyperthyroidism     Movement disorder     DDD    Neuropathy     Obesity     Pneumonia 2016    sepsis    Sleep apnea     usually wears cpap at night    Status post right and left heart catheterization     Type II or unspecified type diabetes mellitus without mention of complication, not stated as uncontrolled       Past Surgical History:   Procedure Laterality Date    ABDOMEN SURGERY      ACHILLES TENDON SURGERY Bilateral     BLADDER SUSPENSION      [facility]  [facility]    COLONOSCOPY Left 5/11/2018    COLONOSCOPY POLYPECTOMY SNARE/COLD BIOPSY performed by Izabella Justin MD at [facility]    ENDOSCOPY, COLON, DIAGNOSTIC      GASTRIC FUNDOPLICATION      HAMMER TOE SURGERY Right     HERNIA REPAIR      HIATAL HERNIA REPAIR  09/06/2016    Laparoscopic Robotic with Nissen

## 2019-07-26 ENCOUNTER — OFFICE VISIT (OUTPATIENT)
Dept: ENT CLINIC | Age: 74
End: 2019-07-26
Payer: MEDICARE

## 2019-07-26 VITALS
DIASTOLIC BLOOD PRESSURE: 70 MMHG | HEIGHT: 65 IN | HEART RATE: 64 BPM | RESPIRATION RATE: 12 BRPM | TEMPERATURE: 98.5 F | WEIGHT: 229.8 LBS | BODY MASS INDEX: 38.29 KG/M2 | SYSTOLIC BLOOD PRESSURE: 90 MMHG

## 2019-07-26 DIAGNOSIS — H60.502 ACUTE OTITIS EXTERNA OF LEFT EAR, UNSPECIFIED TYPE: Primary | ICD-10-CM

## 2019-07-26 DIAGNOSIS — H91.92 HEARING LOSS OF LEFT EAR, UNSPECIFIED HEARING LOSS TYPE: ICD-10-CM

## 2019-07-26 PROCEDURE — 99212 OFFICE O/P EST SF 10 MIN: CPT | Performed by: PHYSICIAN ASSISTANT

## 2019-07-26 PROCEDURE — 1036F TOBACCO NON-USER: CPT | Performed by: PHYSICIAN ASSISTANT

## 2019-07-26 PROCEDURE — G8400 PT W/DXA NO RESULTS DOC: HCPCS | Performed by: PHYSICIAN ASSISTANT

## 2019-07-26 PROCEDURE — 3017F COLORECTAL CA SCREEN DOC REV: CPT | Performed by: PHYSICIAN ASSISTANT

## 2019-07-26 PROCEDURE — 1123F ACP DISCUSS/DSCN MKR DOCD: CPT | Performed by: PHYSICIAN ASSISTANT

## 2019-07-26 PROCEDURE — 4040F PNEUMOC VAC/ADMIN/RCVD: CPT | Performed by: PHYSICIAN ASSISTANT

## 2019-07-26 PROCEDURE — 1090F PRES/ABSN URINE INCON ASSESS: CPT | Performed by: PHYSICIAN ASSISTANT

## 2019-07-26 PROCEDURE — 4130F TOPICAL PREP RX AOE: CPT | Performed by: PHYSICIAN ASSISTANT

## 2019-07-26 PROCEDURE — G8599 NO ASA/ANTIPLAT THER USE RNG: HCPCS | Performed by: PHYSICIAN ASSISTANT

## 2019-07-26 PROCEDURE — G8417 CALC BMI ABV UP PARAM F/U: HCPCS | Performed by: PHYSICIAN ASSISTANT

## 2019-07-26 PROCEDURE — G8427 DOCREV CUR MEDS BY ELIG CLIN: HCPCS | Performed by: PHYSICIAN ASSISTANT

## 2019-07-26 ASSESSMENT — ENCOUNTER SYMPTOMS
VOICE CHANGE: 0
WHEEZING: 0
NAUSEA: 0
CHOKING: 0
ABDOMINAL PAIN: 0
APNEA: 0
SINUS PRESSURE: 0
COUGH: 0
CHEST TIGHTNESS: 0
FACIAL SWELLING: 0
VOMITING: 0
STRIDOR: 0
DIARRHEA: 0
COLOR CHANGE: 0
SHORTNESS OF BREATH: 0
SORE THROAT: 0
TROUBLE SWALLOWING: 0
RHINORRHEA: 0

## 2019-07-26 NOTE — PROGRESS NOTES
[facility]  [place] [address] [place] [address]  Dept: [phone]  Dept Fax: [phone]  Loc: [phone]    Javy Neri is a 75 y.o. female who was referred by No ref. provider found for:  Chief Complaint   Patient presents with    Other     1 week follow up acute otitis externa   . HPI:     Patient presents today for 1 week follow up on left otitis externa. The patient reports that the she hasn't noticed persistent otorrhea. She reports mild otalgia just inferior to the left ear. She denies fever, chills, headaches at this time. She reports some decreased hearing still. Patient reports that she has continued to keep water out of her ear. She denies any other concerns at this time. Subjective:        Review of Systems   Constitutional: Negative for activity change, appetite change, chills, diaphoresis, fatigue, fever and unexpected weight change. HENT: Positive for ear pain. Negative for congestion, dental problem, ear discharge, facial swelling, hearing loss, mouth sores, nosebleeds, postnasal drip, rhinorrhea, sinus pressure, sneezing, sore throat, tinnitus, trouble swallowing and voice change. Eyes: Negative for visual disturbance. Respiratory: Negative for apnea, cough, choking, chest tightness, shortness of breath, wheezing and stridor. Cardiovascular: Negative for chest pain, palpitations and leg swelling. Gastrointestinal: Negative for abdominal pain, diarrhea, nausea and vomiting. Endocrine: Negative for cold intolerance, heat intolerance, polydipsia and polyuria. Genitourinary: Negative for difficulty urinating, dysuria, enuresis, hematuria and urgency. Musculoskeletal: Negative for arthralgias, gait problem, neck pain and neck stiffness. Skin: Negative for color change and rash. Allergic/Immunologic: Negative for environmental allergies and immunocompromised state.    Neurological:

## 2019-07-31 ENCOUNTER — OFFICE VISIT (OUTPATIENT)
Dept: CARDIOLOGY CLINIC | Age: 74
End: 2019-07-31
Payer: MEDICARE

## 2019-07-31 VITALS
SYSTOLIC BLOOD PRESSURE: 122 MMHG | DIASTOLIC BLOOD PRESSURE: 70 MMHG | WEIGHT: 231 LBS | HEART RATE: 56 BPM | HEIGHT: 64 IN | BODY MASS INDEX: 39.44 KG/M2

## 2019-07-31 DIAGNOSIS — I48.91 ATRIAL FIBRILLATION, UNSPECIFIED TYPE (HCC): Primary | ICD-10-CM

## 2019-07-31 PROCEDURE — 1090F PRES/ABSN URINE INCON ASSESS: CPT | Performed by: INTERNAL MEDICINE

## 2019-07-31 PROCEDURE — 4040F PNEUMOC VAC/ADMIN/RCVD: CPT | Performed by: INTERNAL MEDICINE

## 2019-07-31 PROCEDURE — 1123F ACP DISCUSS/DSCN MKR DOCD: CPT | Performed by: INTERNAL MEDICINE

## 2019-07-31 PROCEDURE — G8428 CUR MEDS NOT DOCUMENT: HCPCS | Performed by: INTERNAL MEDICINE

## 2019-07-31 PROCEDURE — 3017F COLORECTAL CA SCREEN DOC REV: CPT | Performed by: INTERNAL MEDICINE

## 2019-07-31 PROCEDURE — 99204 OFFICE O/P NEW MOD 45 MIN: CPT | Performed by: INTERNAL MEDICINE

## 2019-07-31 PROCEDURE — 93000 ELECTROCARDIOGRAM COMPLETE: CPT | Performed by: INTERNAL MEDICINE

## 2019-07-31 PROCEDURE — G8599 NO ASA/ANTIPLAT THER USE RNG: HCPCS | Performed by: INTERNAL MEDICINE

## 2019-07-31 PROCEDURE — G8400 PT W/DXA NO RESULTS DOC: HCPCS | Performed by: INTERNAL MEDICINE

## 2019-07-31 PROCEDURE — 1036F TOBACCO NON-USER: CPT | Performed by: INTERNAL MEDICINE

## 2019-07-31 PROCEDURE — G8417 CALC BMI ABV UP PARAM F/U: HCPCS | Performed by: INTERNAL MEDICINE

## 2019-07-31 RX ORDER — CIPROFLOXACIN AND DEXAMETHASONE 3; 1 MG/ML; MG/ML
4 SUSPENSION/ DROPS AURICULAR (OTIC) 2 TIMES DAILY
COMMUNITY
End: 2020-06-15 | Stop reason: ALTCHOICE

## 2019-07-31 RX ORDER — POLYETHYLENE GLYCOL 3350 17 G/17G
17 POWDER, FOR SOLUTION ORAL DAILY PRN
COMMUNITY
End: 2020-06-15 | Stop reason: CLARIF

## 2019-07-31 RX ORDER — MULTIVIT-MIN/IRON/FOLIC ACID/K 18-600-40
1 CAPSULE ORAL 2 TIMES DAILY
Status: ON HOLD | COMMUNITY
End: 2022-04-30 | Stop reason: HOSPADM

## 2019-07-31 RX ORDER — DIPHENHYDRAMINE HCL 25 MG
25 CAPSULE ORAL PRN
COMMUNITY

## 2019-07-31 ASSESSMENT — ENCOUNTER SYMPTOMS
VOMITING: 0
ABDOMINAL PAIN: 0
LEFT EYE: 0
DOUBLE VISION: 0
BLURRED VISION: 0
RIGHT EYE: 0
CONSTIPATION: 0
SHORTNESS OF BREATH: 1
SORE THROAT: 0
BACK PAIN: 0
DIARRHEA: 0
NAUSEA: 0
COUGH: 0
WHEEZING: 0

## 2019-07-31 NOTE — PROGRESS NOTES
Cigarettes     Last attempt to quit: 5/10/2006     Years since quittin.2    Smokeless tobacco: Never Used   Substance and Sexual Activity    Alcohol use: No    Drug use: No    Sexual activity: Not Currently   Lifestyle    Physical activity:     Days per week: Not on file     Minutes per session: Not on file    Stress: Not on file   Relationships    Social connections:     Talks on phone: Not on file     Gets together: Not on file     Attends Baptism service: Not on file     Active member of club or organization: Not on file     Attends meetings of clubs or organizations: Not on file     Relationship status: Not on file    Intimate partner violence:     Fear of current or ex partner: Not on file     Emotionally abused: Not on file     Physically abused: Not on file     Forced sexual activity: Not on file   Other Topics Concern    Not on file   Social History Narrative    Not on file       MEDICATIONS:  Current Outpatient Medications   Medication Sig Dispense Refill    diphenhydrAMINE (BENADRYL) 25 MG capsule Take 25 mg by mouth as needed for Itching      polyethylene glycol (GLYCOLAX) packet Take 17 g by mouth daily as needed for Constipation      ciprofloxacin-dexamethasone (CIPRODEX) 0.3-0.1 % otic suspension Place 4 drops into the left ear 2 times daily 4 gtts      Calcium Carb-Cholecalciferol 500-400 MG-UNIT TABS Take 1 tablet by mouth 2 times daily      RA ALCOHOL SWABS 70 % PADS   1    ONE TOUCH ULTRA TEST strip   1    Lancets Misc. (UNISTIK CZT COMFORT) MISC   1    cetirizine (ZYRTEC) 10 MG tablet Take 10 mg by mouth daily      prednisoLONE acetate (PRED FORTE) 1 % ophthalmic suspension 1 drop 4 times daily      benzonatate (TESSALON) 100 MG capsule Take 200 mg by mouth 3 times daily as needed for Cough       guaiFENesin (ROBITUSSIN) 100 MG/5ML syrup Take 10 mLs by mouth 3 times daily as needed for Cough      ipratropium-albuterol (DUONEB) 0.5-2.5 (3) MG/3ML SOLN nebulizer solution Inhale 1 vial into the lungs every 4 hours      lidocaine viscous (XYLOCAINE) 2 % solution Take 15 mLs by mouth as needed for Irritation      potassium chloride (KLOR-CON M) 20 MEQ extended release tablet Take 20 mEq by mouth daily      Linaclotide (LINZESS) 72 MCG CAPS Take 72.5 mg by mouth daily      polyethylene glycol (GLYCOLAX) packet Take 17 g by mouth daily as needed for Constipation      cyclobenzaprine (FLEXERIL) 5 MG tablet Take 5 mg by mouth 3 times daily as needed for Muscle spasms      hydrOXYzine (ATARAX) 25 MG tablet Take 25 mg by mouth 3 times daily as needed for Itching      DULoxetine (CYMBALTA) 20 MG extended release capsule Take 20 mg by mouth daily      insulin glargine (LANTUS) 100 UNIT/ML injection vial Inject into the skin nightly      pantoprazole (PROTONIX) 40 MG tablet Take 1 tablet by mouth 2 times daily 30 tablet 3    pregabalin (LYRICA) 150 MG capsule Take 1 capsule by mouth 3 times daily for 3 days. . 9 capsule 0    SITagliptin (JANUVIA) 50 MG tablet Take 0.5 tablets by mouth daily 30 tablet 3    insulin lispro (HUMALOG) 100 UNIT/ML injection vial Inject 0-3 Units into the skin nightly 1 vial 3    fluticasone (FLONASE) 50 MCG/ACT nasal spray 1 spray by Each Nare route daily      traZODone (DESYREL) 50 MG tablet Take 25 mg by mouth nightly      Calcium Carb-Cholecalciferol 500-400 MG-UNIT TABS Take 1 tablet by mouth 2 times daily      Multiple Vitamins-Minerals (THERAPEUTIC MULTIVITAMIN-MINERALS) tablet Take 1 tablet by mouth daily      escitalopram (LEXAPRO) 10 MG tablet Take 1 tablet by mouth daily 30 tablet 3    OXYGEN Inhale into the lungs continuous      atorvastatin (LIPITOR) 20 MG tablet Take 1 tablet by mouth nightly 30 tablet 3    magnesium hydroxide (MILK OF MAGNESIA CONCENTRATE) 2400 MG/10ML SUSP Take 30 mLs by mouth once as needed      docusate sodium (COLACE) 100 MG capsule Take 100 mg by mouth 2 times daily      acetaminophen (TYLENOL) 325 MG tablet Take 2 tablets by mouth every 4 hours as needed for Pain 120 tablet 3    levothyroxine (SYNTHROID) 75 MCG tablet Take 75 mcg by mouth Daily       No current facility-administered medications for this visit. REVIEW OFSYSTEMS:    Review of Systems   Constitution: Negative for fever, weight gain and weight loss. HENT: Negative for hearing loss and sore throat. Eyes: Negative for blurred vision, double vision, vision loss in left eye and vision loss in right eye. Cardiovascular: Positive for dyspnea on exertion and leg swelling. Negative for chest pain, irregular heartbeat, near-syncope, palpitations and syncope. Respiratory: Positive for shortness of breath. Negative for cough and wheezing. Endocrine: Negative for cold intolerance, heat intolerance and polyuria. Hematologic/Lymphatic: Negative for bleeding problem. Does not bruise/bleed easily. Skin: Negative for dry skin, itching and rash. Musculoskeletal: Negative for arthritis, back pain, joint pain and myalgias. Gastrointestinal: Negative for abdominal pain, constipation, diarrhea, nausea and vomiting. Genitourinary: Negative for dysuria, frequency, hematuria and urgency. Neurological: Negative for dizziness, headaches, light-headedness, numbness, paresthesias, seizures and vertigo. Psychiatric/Behavioral: Negative for depression and memory loss. The patient is not nervous/anxious. PHYSICAL EXAM:  Ht 5' 4\" (1.626 m)   Wt 231 lb (104.8 kg)   BMI 39.65 kg/m²     Physical Exam   Constitutional: She is oriented to person, place, and time. No distress. HENT:   Head: Normocephalic and atraumatic. Head is without contusion. Right Ear: Hearing and external ear normal. No decreased hearing is noted. Left Ear: Hearing and external ear normal. No decreased hearing is noted. Nose: Nose normal. No sinus tenderness. No epistaxis. Mouth/Throat: Oropharynx is clear and moist. Mucous membranes are not dry.  No oropharyngeal Her affect is not angry. Cognition and memory are normal. She does not exhibit a depressed mood. Nursing note and vitals reviewed. DIAGNOSTIC STUDIES & LABORATORY DATA:    I have personally reviewed andinterpreted the results of the following diagnostic testing  CARDIAC HISTORY:    CATH: 2016      ECHO: 2017  Summary   Technically difficult examination.   Ejection fraction is visually estimated at 45%.   There was mild global hypokinesis of the left ventricle.   Moderately dilated left atrium.   Mild mitral regurgitation is present. EC2019 Sinus Bradycardia, HR 55 bpm.    STRESS TEST: 2018      REVEAL INTERROGATION: 2019          Lab Results   Component Value Date    WBC 5.0 2019    HGB 12.2 2019    HCT 36.8 2019     2019    CHOL 127 2016    TRIG 77 2016    HDL 49 2016    ALT 12 10/23/2018    AST 27 10/23/2018     2019    K 4.6 2019     2019    CREATININE 1.3 2019    BUN 25 2019    CO2 33 2019    TSH 1.900 10/23/2018    INR 1.23 (H) 05/10/2018    LABA1C 5.4 2018          IMPRESSION:  Atrial Fibrillation:  The patient has paroxysmal atrial fibrillation. She is not on any anticoagulation secondary to a GIB. The etiology of the bleeding was never discovered. I discussed the option of the WATCHMAN device with the patient as an alternative to chronic anticoagulation. However, I explained to her that this would only reduce her risk of stroke from atrial fibrillation, but it would not reduce her risk of recurrent pulmonary embolism. I did not recommend this treatment option at this time, but I would like to contact her gastroenterologist and see if there is a way that the diagnosis for her GIB can be made possibly by repeating the video endoscopy and this way she can be on oral anticoagulation to treat both risks. PLAN:  1.   I will contact Dr. Estee Nye to discuss the patient's case. 2. F/U will depend upon the plans with her other providers.            Electronically signed by Bree Denny MD on 7/31/2019 at 10:24 AM

## 2019-08-01 ENCOUNTER — OFFICE VISIT (OUTPATIENT)
Dept: ENT CLINIC | Age: 74
End: 2019-08-01
Payer: MEDICARE

## 2019-08-01 VITALS
TEMPERATURE: 98.6 F | HEART RATE: 76 BPM | DIASTOLIC BLOOD PRESSURE: 90 MMHG | WEIGHT: 227.7 LBS | SYSTOLIC BLOOD PRESSURE: 120 MMHG | HEIGHT: 64 IN | RESPIRATION RATE: 20 BRPM | BODY MASS INDEX: 38.87 KG/M2

## 2019-08-01 DIAGNOSIS — H91.92 HEARING LOSS OF LEFT EAR, UNSPECIFIED HEARING LOSS TYPE: Primary | ICD-10-CM

## 2019-08-01 PROCEDURE — G8427 DOCREV CUR MEDS BY ELIG CLIN: HCPCS | Performed by: PHYSICIAN ASSISTANT

## 2019-08-01 PROCEDURE — 4040F PNEUMOC VAC/ADMIN/RCVD: CPT | Performed by: PHYSICIAN ASSISTANT

## 2019-08-01 PROCEDURE — G8417 CALC BMI ABV UP PARAM F/U: HCPCS | Performed by: PHYSICIAN ASSISTANT

## 2019-08-01 PROCEDURE — 1123F ACP DISCUSS/DSCN MKR DOCD: CPT | Performed by: PHYSICIAN ASSISTANT

## 2019-08-01 PROCEDURE — 1036F TOBACCO NON-USER: CPT | Performed by: PHYSICIAN ASSISTANT

## 2019-08-01 PROCEDURE — 3017F COLORECTAL CA SCREEN DOC REV: CPT | Performed by: PHYSICIAN ASSISTANT

## 2019-08-01 PROCEDURE — 99212 OFFICE O/P EST SF 10 MIN: CPT | Performed by: PHYSICIAN ASSISTANT

## 2019-08-01 PROCEDURE — 1090F PRES/ABSN URINE INCON ASSESS: CPT | Performed by: PHYSICIAN ASSISTANT

## 2019-08-01 PROCEDURE — G8598 ASA/ANTIPLAT THER USED: HCPCS | Performed by: PHYSICIAN ASSISTANT

## 2019-08-01 PROCEDURE — G8400 PT W/DXA NO RESULTS DOC: HCPCS | Performed by: PHYSICIAN ASSISTANT

## 2019-08-01 ASSESSMENT — ENCOUNTER SYMPTOMS
CHOKING: 0
FACIAL SWELLING: 0
NAUSEA: 0
STRIDOR: 0
SHORTNESS OF BREATH: 0
RHINORRHEA: 0
VOICE CHANGE: 0
SORE THROAT: 0
CHEST TIGHTNESS: 0
ABDOMINAL PAIN: 0
VOMITING: 0
DIARRHEA: 0
APNEA: 0
COUGH: 0
WHEEZING: 0
COLOR CHANGE: 0
TROUBLE SWALLOWING: 0
SINUS PRESSURE: 0

## 2019-08-01 NOTE — PROGRESS NOTES
state.   Neurological: Negative for dizziness, syncope, facial asymmetry, speech difficulty, light-headedness and headaches. Hematological: Negative for adenopathy. Does not bruise/bleed easily. Psychiatric/Behavioral: Negative for confusion and sleep disturbance. The patient is not nervous/anxious. Social History:    TOBACCO:   reports that she quit smoking about 13 years ago. Her smoking use included cigarettes. She has a 30.00 pack-year smoking history. She has never used smokeless tobacco.  ETOH:   reports that she does not drink alcohol. DRUGS:   reports that she does not use drugs. Family History:       Problem Relation Age of Onset    Cancer Mother     Heart Disease Mother     High Blood Pressure Mother     Diabetes Mother     Vision Loss Mother     Stroke Mother     COPD Father     Diabetes Father     COPD Brother        Objective: This is a 75 y.o. female. Patient is alert and oriented to person, place and time. Patient appears well developed, well nourished. Mood is happy. Not obviously hearing impaired. BP (!) 120/90 (Site: Left Upper Arm)   Pulse 76   Temp 98.6 °F (37 °C) (Oral)   Resp 20   Ht 5' 4\" (1.626 m)   Wt 227 lb 11.2 oz (103.3 kg)   BMI 39.08 kg/m²     Head:   Normocephalic, atraumatic. No obvious masses or lesions noted. Ears:  External ears: Normal: no scars, lesions or masses. Mastoid process: No erythema noted. No tenderness to palpation. R External auditory canal: clear and free of any pathology  L External auditory canal: clear and free of any pathology   Tympanic membranes:  R intact, translucent                                                  L intact, translucent     Neck: Trachea midline. Thyroid not enlarged, no palpable masses or tenderness  Lymphatic: No cervical lymphadenopathy noted. Eyes: TONIA, EOM intact. Conjunctiva moist without discharge. Lungs: Normal effort of breathing, not obviously distressed.     Assessment/Plan:

## 2019-08-02 ENCOUNTER — HOSPITAL ENCOUNTER (OUTPATIENT)
Dept: NURSING | Age: 74
Discharge: HOME | End: 2019-08-02
Payer: MEDICARE

## 2019-08-02 VITALS
BODY MASS INDEX: 38.62 KG/M2 | HEART RATE: 63 BPM | TEMPERATURE: 98.2 F | OXYGEN SATURATION: 94 % | DIASTOLIC BLOOD PRESSURE: 90 MMHG | WEIGHT: 225 LBS | SYSTOLIC BLOOD PRESSURE: 129 MMHG | RESPIRATION RATE: 20 BRPM

## 2019-08-02 DIAGNOSIS — D83.9 COMMON VARIABLE IMMUNODEFICIENCY (HCC): Primary | ICD-10-CM

## 2019-08-02 PROCEDURE — 96413 CHEMO IV INFUSION 1 HR: CPT

## 2019-08-02 PROCEDURE — 96415 CHEMO IV INFUSION ADDL HR: CPT

## 2019-08-02 PROCEDURE — 2709999900 HC NON-CHARGEABLE SUPPLY

## 2019-08-02 PROCEDURE — 6360000002 HC RX W HCPCS: Performed by: FAMILY MEDICINE

## 2019-08-02 PROCEDURE — 6370000000 HC RX 637 (ALT 250 FOR IP): Performed by: FAMILY MEDICINE

## 2019-08-02 RX ORDER — SODIUM CHLORIDE 0.9 % (FLUSH) 0.9 %
5 SYRINGE (ML) INJECTION PRN
Status: CANCELLED | OUTPATIENT
Start: 2019-08-30

## 2019-08-02 RX ORDER — SODIUM CHLORIDE 0.9 % (FLUSH) 0.9 %
10 SYRINGE (ML) INJECTION PRN
Status: CANCELLED | OUTPATIENT
Start: 2019-08-30

## 2019-08-02 RX ORDER — ACETAMINOPHEN 325 MG/1
650 TABLET ORAL ONCE
Status: CANCELLED | OUTPATIENT
Start: 2019-08-30

## 2019-08-02 RX ORDER — HEPARIN SODIUM (PORCINE) LOCK FLUSH IV SOLN 100 UNIT/ML 100 UNIT/ML
500 SOLUTION INTRAVENOUS PRN
Status: CANCELLED | OUTPATIENT
Start: 2019-08-30

## 2019-08-02 RX ORDER — 0.9 % SODIUM CHLORIDE 0.9 %
10 VIAL (ML) INJECTION ONCE
Status: CANCELLED | OUTPATIENT
Start: 2019-08-30

## 2019-08-02 RX ORDER — ACETAMINOPHEN 325 MG/1
650 TABLET ORAL ONCE
Status: COMPLETED | OUTPATIENT
Start: 2019-08-02 | End: 2019-08-02

## 2019-08-02 RX ORDER — HEPARIN SODIUM (PORCINE) LOCK FLUSH IV SOLN 100 UNIT/ML 100 UNIT/ML
500 SOLUTION INTRAVENOUS PRN
Status: DISCONTINUED | OUTPATIENT
Start: 2019-08-02 | End: 2019-08-03 | Stop reason: HOSPADM

## 2019-08-02 RX ORDER — DIPHENHYDRAMINE HCL 25 MG
25 TABLET ORAL ONCE
Status: CANCELLED | OUTPATIENT
Start: 2019-08-30

## 2019-08-02 RX ORDER — DIPHENHYDRAMINE HCL 25 MG
25 TABLET ORAL ONCE
Status: COMPLETED | OUTPATIENT
Start: 2019-08-02 | End: 2019-08-02

## 2019-08-02 RX ORDER — ACETAMINOPHEN 325 MG/1
TABLET ORAL
Status: DISPENSED
Start: 2019-08-02 | End: 2019-08-02

## 2019-08-02 RX ORDER — METHYLPREDNISOLONE SODIUM SUCCINATE 125 MG/2ML
125 INJECTION, POWDER, LYOPHILIZED, FOR SOLUTION INTRAMUSCULAR; INTRAVENOUS ONCE
Status: CANCELLED | OUTPATIENT
Start: 2019-08-30

## 2019-08-02 RX ORDER — DIPHENHYDRAMINE HYDROCHLORIDE 50 MG/ML
50 INJECTION INTRAMUSCULAR; INTRAVENOUS ONCE
Status: CANCELLED | OUTPATIENT
Start: 2019-08-30

## 2019-08-02 RX ORDER — SODIUM CHLORIDE 9 MG/ML
INJECTION, SOLUTION INTRAVENOUS CONTINUOUS
Status: CANCELLED | OUTPATIENT
Start: 2019-08-30

## 2019-08-02 RX ADMIN — IMMUNE GLOBULIN (HUMAN) 20 G: 10 INJECTION INTRAVENOUS; SUBCUTANEOUS at 12:24

## 2019-08-02 RX ADMIN — DIPHENHYDRAMINE HCL 25 MG: 25 TABLET ORAL at 10:34

## 2019-08-02 RX ADMIN — HEPARIN 500 UNITS: 100 SYRINGE at 13:14

## 2019-08-02 RX ADMIN — IMMUNE GLOBULIN (HUMAN) 20 G: 10 INJECTION INTRAVENOUS; SUBCUTANEOUS at 11:07

## 2019-08-02 RX ADMIN — ACETAMINOPHEN 650 MG: 325 TABLET ORAL at 10:36

## 2019-08-02 ASSESSMENT — PAIN SCALES - GENERAL
PAINLEVEL_OUTOF10: 0
PAINLEVEL_OUTOF10: 0

## 2019-08-02 ASSESSMENT — PAIN - FUNCTIONAL ASSESSMENT: PAIN_FUNCTIONAL_ASSESSMENT: 0-10

## 2019-08-02 NOTE — PROGRESS NOTES
[address]. IVIG PROCEDURE REVIEWED WITH PT VERBALIZED UNDERSTANDING. Pt rights and responsibilities offered to pt to read.     __MET__ Safety:       (Environmental)   [place] to environment   Ensure ID band is correct and in place/ allergy band as needed   Assess for fall risk   Initiate fall precautions as applicable (fall band, side rails, etc.)   Call light within reach   Bed in low position/ wheels locked    __MET__ Pain:        Assess pain level and characteristics   Administer analgesics as ordered   Assess effectiveness of pain management and report to MD as needed    __MET__ Knowledge Deficit:   Assess baseline knowledge   Provide teaching at level of understanding   Provide teaching via preferred learning method   Evaluate teaching effectiveness    MET____ Infection-Risk of Central Venous Catheter:   Monitor for infection signs and symptoms (catheter site redness, temperature elevation, etc)   Assess for infection risks   Educate regarding infection prevention   Manage central venous catheter (flushes/ dressing changes per protocol)

## 2019-08-02 NOTE — PROGRESS NOTES
[address] [facility]. HOME INSTRUCTIONS TO PT VERBALIZED UNDERSTANDING. GAIT STEADY.   [facility]

## 2019-08-12 LAB
FUNGUS IDENTIFIED: NORMAL
FUNGUS SMEAR: NORMAL

## 2019-08-19 ENCOUNTER — HOSPITAL ENCOUNTER (OUTPATIENT)
Dept: AUDIOLOGY | Age: 74
Discharge: HOME | End: 2019-08-19
Payer: MEDICARE

## 2019-08-19 PROCEDURE — 92557 COMPREHENSIVE HEARING TEST: CPT | Performed by: AUDIOLOGIST

## 2019-08-19 PROCEDURE — 92567 TYMPANOMETRY: CPT | Performed by: AUDIOLOGIST

## 2019-08-21 ENCOUNTER — TELEPHONE (OUTPATIENT)
Dept: ENT CLINIC | Age: 74
End: 2019-08-21

## 2019-08-21 NOTE — TELEPHONE ENCOUNTER
I called the patient to discuss results of the audiogram.  The call went to voicemail. I told her to call the office to talk about the results. If she calls back and I am unavailable. You may tell her that her hearing test looked pretty good. There is some hearing loss in the high frequencies. If she is bothered enough by the hearing loss I can set up a hearing aid evaluation. Based on the hearing test she is compensating well and still able to hear a majority of what is said. If she is not interested in hearing aids, I would recommend getting a hearing test again in a year. She should call us sooner if she notices worsening of her hearing.

## 2019-08-26 ENCOUNTER — PROCEDURE VISIT (OUTPATIENT)
Dept: CARDIOLOGY CLINIC | Age: 74
End: 2019-08-26
Payer: MEDICARE

## 2019-08-26 DIAGNOSIS — I48.0 PAROXYSMAL ATRIAL FIBRILLATION (HCC): Primary | ICD-10-CM

## 2019-08-26 PROCEDURE — 93299 PR REM INTERROG ICPMS/SCRMS <30 D TECH REVIEW: CPT | Performed by: INTERNAL MEDICINE

## 2019-08-26 PROCEDURE — 93298 REM INTERROG DEV EVAL SCRMS: CPT | Performed by: INTERNAL MEDICINE

## 2019-08-30 ENCOUNTER — HOSPITAL ENCOUNTER (OUTPATIENT)
Dept: NURSING | Age: 74
Discharge: HOME | End: 2019-08-30
Payer: MEDICARE

## 2019-08-30 VITALS
RESPIRATION RATE: 16 BRPM | TEMPERATURE: 97.8 F | SYSTOLIC BLOOD PRESSURE: 156 MMHG | HEART RATE: 56 BPM | OXYGEN SATURATION: 95 % | DIASTOLIC BLOOD PRESSURE: 84 MMHG

## 2019-08-30 DIAGNOSIS — D83.9 COMMON VARIABLE IMMUNODEFICIENCY (HCC): Primary | ICD-10-CM

## 2019-08-30 LAB
IGA: 97 MG/DL (ref 70–400)
IGG: 1210 MG/DL (ref 700–1600)
IGM: 49 MG/DL (ref 40–230)

## 2019-08-30 PROCEDURE — 96413 CHEMO IV INFUSION 1 HR: CPT

## 2019-08-30 PROCEDURE — 2709999900 HC NON-CHARGEABLE SUPPLY

## 2019-08-30 PROCEDURE — 6360000002 HC RX W HCPCS: Performed by: FAMILY MEDICINE

## 2019-08-30 PROCEDURE — 36591 DRAW BLOOD OFF VENOUS DEVICE: CPT

## 2019-08-30 PROCEDURE — 96415 CHEMO IV INFUSION ADDL HR: CPT

## 2019-08-30 PROCEDURE — 82784 ASSAY IGA/IGD/IGG/IGM EACH: CPT

## 2019-08-30 PROCEDURE — 2580000003 HC RX 258: Performed by: FAMILY MEDICINE

## 2019-08-30 RX ORDER — HEPARIN SODIUM (PORCINE) LOCK FLUSH IV SOLN 100 UNIT/ML 100 UNIT/ML
500 SOLUTION INTRAVENOUS PRN
Status: CANCELLED | OUTPATIENT
Start: 2019-09-27

## 2019-08-30 RX ORDER — SODIUM CHLORIDE 0.9 % (FLUSH) 0.9 %
10 SYRINGE (ML) INJECTION PRN
Status: DISCONTINUED | OUTPATIENT
Start: 2019-08-30 | End: 2019-08-31 | Stop reason: HOSPADM

## 2019-08-30 RX ORDER — METHYLPREDNISOLONE SODIUM SUCCINATE 125 MG/2ML
125 INJECTION, POWDER, LYOPHILIZED, FOR SOLUTION INTRAMUSCULAR; INTRAVENOUS ONCE
Status: CANCELLED | OUTPATIENT
Start: 2019-09-27

## 2019-08-30 RX ORDER — 0.9 % SODIUM CHLORIDE 0.9 %
10 VIAL (ML) INJECTION ONCE
Status: DISCONTINUED | OUTPATIENT
Start: 2019-08-30 | End: 2019-08-31 | Stop reason: HOSPADM

## 2019-08-30 RX ORDER — 0.9 % SODIUM CHLORIDE 0.9 %
10 VIAL (ML) INJECTION ONCE
Status: CANCELLED | OUTPATIENT
Start: 2019-09-27

## 2019-08-30 RX ORDER — ACETAMINOPHEN 325 MG/1
650 TABLET ORAL ONCE
Status: CANCELLED | OUTPATIENT
Start: 2019-09-27

## 2019-08-30 RX ORDER — SODIUM CHLORIDE 9 MG/ML
INJECTION, SOLUTION INTRAVENOUS CONTINUOUS
Status: CANCELLED | OUTPATIENT
Start: 2019-09-27

## 2019-08-30 RX ORDER — METHYLPREDNISOLONE SODIUM SUCCINATE 125 MG/2ML
125 INJECTION, POWDER, LYOPHILIZED, FOR SOLUTION INTRAMUSCULAR; INTRAVENOUS ONCE
Status: DISCONTINUED | OUTPATIENT
Start: 2019-08-30 | End: 2019-08-31 | Stop reason: HOSPADM

## 2019-08-30 RX ORDER — DIPHENHYDRAMINE HCL 25 MG
25 TABLET ORAL ONCE
Status: CANCELLED | OUTPATIENT
Start: 2019-09-27

## 2019-08-30 RX ORDER — SODIUM CHLORIDE 0.9 % (FLUSH) 0.9 %
5 SYRINGE (ML) INJECTION PRN
Status: CANCELLED | OUTPATIENT
Start: 2019-09-27

## 2019-08-30 RX ORDER — DIPHENHYDRAMINE HYDROCHLORIDE 50 MG/ML
50 INJECTION INTRAMUSCULAR; INTRAVENOUS ONCE
Status: CANCELLED | OUTPATIENT
Start: 2019-09-27

## 2019-08-30 RX ORDER — SODIUM CHLORIDE 0.9 % (FLUSH) 0.9 %
10 SYRINGE (ML) INJECTION PRN
Status: CANCELLED | OUTPATIENT
Start: 2019-09-27

## 2019-08-30 RX ORDER — HEPARIN SODIUM (PORCINE) LOCK FLUSH IV SOLN 100 UNIT/ML 100 UNIT/ML
500 SOLUTION INTRAVENOUS PRN
Status: DISCONTINUED | OUTPATIENT
Start: 2019-08-30 | End: 2019-08-31 | Stop reason: HOSPADM

## 2019-08-30 RX ADMIN — HEPARIN 500 UNITS: 100 SYRINGE at 12:39

## 2019-08-30 RX ADMIN — IMMUNE GLOBULIN (HUMAN) 20 G: 10 INJECTION INTRAVENOUS; SUBCUTANEOUS at 11:48

## 2019-08-30 RX ADMIN — Medication 10 ML: at 10:18

## 2019-08-30 RX ADMIN — Medication 10 ML: at 12:39

## 2019-08-30 RX ADMIN — IMMUNE GLOBULIN (HUMAN) 20 G: 10 INJECTION INTRAVENOUS; SUBCUTANEOUS at 10:20

## 2019-08-30 ASSESSMENT — PAIN - FUNCTIONAL ASSESSMENT: PAIN_FUNCTIONAL_ASSESSMENT: 0-10

## 2019-08-30 ASSESSMENT — PAIN SCALES - GENERAL: PAINLEVEL_OUTOF10: 0

## 2019-08-30 ASSESSMENT — PAIN DESCRIPTION - DESCRIPTORS: DESCRIPTORS: ACHING

## 2019-08-30 NOTE — PROGRESS NOTES
1007 Patient arrived to [facility] ambulatory for IVIG infusion with oxygen at 1l/nc noted  PT RIGHTS AND RESPONSIBILITIES OFFERED TO PT.  1010 Patient stated she took premedication at home  1020 IVIG infusion started as ordered  1130 Patient denies any needs at this time  1238 IVIG infusion completed patient tolerated well. [id number] Discharge instructions given to patient verbalized understanding.  Patient discharged to home in stable condition    _m___ Safety:       (Environmental)  [place] to environment   Ensure ID band is correct and in place/ allergy band as needed   Assess for fall risk   Initiate fall precautions as applicable (fall band, side rails, etc.)   Call light within reach   Bed in low position/ wheels locked    m____ Pain:        Assess pain level and characteristics   Administer analgesics as ordered   Assess effectiveness of pain management and report to MD as needed    m____ Knowledge Deficit:   Assess baseline knowledge   Provide teaching at level of understanding   Provide teaching via preferred learning method   Evaluate teaching effectiveness    _m___ Hemodynamic/Respiratory Status:       (Pre and Post Procedure Monitoring)   Assess/Monitor vital signs and LOC   Assess Baseline SpO2 prior to any sedation   Obtain weight/height   Assess vital signs/ LOC until patient meets discharge criteria   Monitor procedure site and notify MD of any issues    _m__ Infection-Risk of Central Venous Catheter:   Monitor for infection signs and symptoms (catheter site redness, temperature elevation, etc)   Assess for infection risks   Educate regarding infection prevention   Manage central venous catheter (flushes/ dressing changes per protocol)

## 2019-09-27 ENCOUNTER — HOSPITAL ENCOUNTER (OUTPATIENT)
Dept: NURSING | Age: 74
Discharge: HOME | End: 2019-09-27
Payer: MEDICARE

## 2019-09-27 VITALS
DIASTOLIC BLOOD PRESSURE: 84 MMHG | SYSTOLIC BLOOD PRESSURE: 153 MMHG | BODY MASS INDEX: 39.65 KG/M2 | RESPIRATION RATE: 16 BRPM | HEART RATE: 90 BPM | TEMPERATURE: 98.3 F | OXYGEN SATURATION: 95 % | WEIGHT: 231 LBS

## 2019-09-27 DIAGNOSIS — D83.9 COMMON VARIABLE IMMUNODEFICIENCY (HCC): Primary | ICD-10-CM

## 2019-09-27 PROCEDURE — 2580000003 HC RX 258: Performed by: FAMILY MEDICINE

## 2019-09-27 PROCEDURE — 6360000002 HC RX W HCPCS: Performed by: FAMILY MEDICINE

## 2019-09-27 PROCEDURE — 96413 CHEMO IV INFUSION 1 HR: CPT

## 2019-09-27 PROCEDURE — 96415 CHEMO IV INFUSION ADDL HR: CPT

## 2019-09-27 PROCEDURE — 2709999900 HC NON-CHARGEABLE SUPPLY

## 2019-09-27 RX ORDER — METHYLPREDNISOLONE SODIUM SUCCINATE 125 MG/2ML
125 INJECTION, POWDER, LYOPHILIZED, FOR SOLUTION INTRAMUSCULAR; INTRAVENOUS ONCE
Status: CANCELLED | OUTPATIENT
Start: 2019-10-25

## 2019-09-27 RX ORDER — SODIUM CHLORIDE 0.9 % (FLUSH) 0.9 %
10 SYRINGE (ML) INJECTION PRN
Status: DISCONTINUED | OUTPATIENT
Start: 2019-09-27 | End: 2019-09-28 | Stop reason: HOSPADM

## 2019-09-27 RX ORDER — ACETAMINOPHEN 325 MG/1
650 TABLET ORAL ONCE
Status: DISCONTINUED | OUTPATIENT
Start: 2019-09-27 | End: 2019-09-28 | Stop reason: HOSPADM

## 2019-09-27 RX ORDER — DIPHENHYDRAMINE HCL 25 MG
25 TABLET ORAL ONCE
Status: DISCONTINUED | OUTPATIENT
Start: 2019-09-27 | End: 2019-09-28 | Stop reason: HOSPADM

## 2019-09-27 RX ORDER — DIPHENHYDRAMINE HCL 25 MG
25 TABLET ORAL ONCE
Status: CANCELLED | OUTPATIENT
Start: 2019-10-25

## 2019-09-27 RX ORDER — SODIUM CHLORIDE 0.9 % (FLUSH) 0.9 %
5 SYRINGE (ML) INJECTION PRN
Status: DISCONTINUED | OUTPATIENT
Start: 2019-09-27 | End: 2019-09-28 | Stop reason: HOSPADM

## 2019-09-27 RX ORDER — ACETAMINOPHEN 325 MG/1
650 TABLET ORAL ONCE
Status: CANCELLED | OUTPATIENT
Start: 2019-10-25

## 2019-09-27 RX ORDER — 0.9 % SODIUM CHLORIDE 0.9 %
10 VIAL (ML) INJECTION ONCE
Status: DISCONTINUED | OUTPATIENT
Start: 2019-09-27 | End: 2019-09-28 | Stop reason: HOSPADM

## 2019-09-27 RX ORDER — HEPARIN SODIUM (PORCINE) LOCK FLUSH IV SOLN 100 UNIT/ML 100 UNIT/ML
500 SOLUTION INTRAVENOUS PRN
Status: CANCELLED | OUTPATIENT
Start: 2019-10-25

## 2019-09-27 RX ORDER — SODIUM CHLORIDE 0.9 % (FLUSH) 0.9 %
5 SYRINGE (ML) INJECTION PRN
Status: CANCELLED | OUTPATIENT
Start: 2019-10-25

## 2019-09-27 RX ORDER — SODIUM CHLORIDE 9 MG/ML
INJECTION, SOLUTION INTRAVENOUS CONTINUOUS
Status: CANCELLED | OUTPATIENT
Start: 2019-10-25

## 2019-09-27 RX ORDER — DIPHENHYDRAMINE HYDROCHLORIDE 50 MG/ML
50 INJECTION INTRAMUSCULAR; INTRAVENOUS ONCE
Status: CANCELLED | OUTPATIENT
Start: 2019-10-25

## 2019-09-27 RX ORDER — SODIUM CHLORIDE 0.9 % (FLUSH) 0.9 %
10 SYRINGE (ML) INJECTION PRN
Status: CANCELLED | OUTPATIENT
Start: 2019-10-25

## 2019-09-27 RX ORDER — HEPARIN SODIUM (PORCINE) LOCK FLUSH IV SOLN 100 UNIT/ML 100 UNIT/ML
500 SOLUTION INTRAVENOUS PRN
Status: DISCONTINUED | OUTPATIENT
Start: 2019-09-27 | End: 2019-09-28 | Stop reason: HOSPADM

## 2019-09-27 RX ORDER — 0.9 % SODIUM CHLORIDE 0.9 %
10 VIAL (ML) INJECTION ONCE
Status: CANCELLED | OUTPATIENT
Start: 2019-10-25

## 2019-09-27 RX ADMIN — IMMUNE GLOBULIN (HUMAN) 20 G: 10 INJECTION INTRAVENOUS; SUBCUTANEOUS at 10:15

## 2019-09-27 RX ADMIN — IMMUNE GLOBULIN (HUMAN) 20 G: 10 INJECTION INTRAVENOUS; SUBCUTANEOUS at 11:26

## 2019-09-27 RX ADMIN — Medication 10 ML: at 10:10

## 2019-09-27 RX ADMIN — Medication 10 ML: at 12:08

## 2019-09-27 RX ADMIN — HEPARIN 300 UNITS: 100 SYRINGE at 12:07

## 2019-09-27 ASSESSMENT — PAIN - FUNCTIONAL ASSESSMENT: PAIN_FUNCTIONAL_ASSESSMENT: 0-10

## 2019-09-27 NOTE — PROGRESS NOTES
[address] 1212 INFUSION COMPLETE TOLERATED WELL. HOME INSTRUCTIONS TO PT VERBALIZED UNDERSTANDING.  1220 DISCHARGED AMBULATORY STABLE HOME WITH FAMILY.     _MET     (Environmental)  [place] to environment  [facility] ID band is correct and in place/ allergy band as needed   Assess for fall risk   Initiate fall precautions as applicable (fall band, side rails, etc.)   Call light within reach   Bed in low position/ wheels locked    __MET__ Pain:        Assess pain level and characteristics   Administer analgesics as ordered   Assess effectiveness of pain management and report to MD as needed    __MET__ Knowledge Deficit:   Assess baseline knowledge   Provide teaching at level of understanding   Provide teaching via preferred learning method   Evaluate teaching effectiveness    __MET____ Infection-Risk of Central Venous Catheter:   Monitor for infection signs and symptoms (catheter site redness, temperature elevation, etc)   Assess for infection risks   Educate regarding infection prevention   Manage central venous catheter (flushes/ dressing changes per protocol)

## 2019-10-24 ENCOUNTER — TELEPHONE (OUTPATIENT)
Dept: CARDIOLOGY CLINIC | Age: 74
End: 2019-10-24

## 2019-10-25 ENCOUNTER — HOSPITAL ENCOUNTER (OUTPATIENT)
Dept: NURSING | Age: 74
Discharge: HOME | End: 2019-10-25
Payer: MEDICARE

## 2019-10-25 VITALS
BODY MASS INDEX: 39.48 KG/M2 | RESPIRATION RATE: 16 BRPM | DIASTOLIC BLOOD PRESSURE: 82 MMHG | HEART RATE: 52 BPM | SYSTOLIC BLOOD PRESSURE: 114 MMHG | TEMPERATURE: 97.1 F | WEIGHT: 230 LBS

## 2019-10-25 DIAGNOSIS — D83.9 COMMON VARIABLE IMMUNODEFICIENCY (HCC): Primary | ICD-10-CM

## 2019-10-25 PROCEDURE — 99211 OFF/OP EST MAY X REQ PHY/QHP: CPT

## 2019-10-25 PROCEDURE — 96415 CHEMO IV INFUSION ADDL HR: CPT

## 2019-10-25 PROCEDURE — 96413 CHEMO IV INFUSION 1 HR: CPT

## 2019-10-25 PROCEDURE — 2580000003 HC RX 258: Performed by: FAMILY MEDICINE

## 2019-10-25 PROCEDURE — 2709999900 HC NON-CHARGEABLE SUPPLY

## 2019-10-25 PROCEDURE — 96366 THER/PROPH/DIAG IV INF ADDON: CPT

## 2019-10-25 PROCEDURE — 6360000002 HC RX W HCPCS: Performed by: FAMILY MEDICINE

## 2019-10-25 PROCEDURE — 96417 CHEMO IV INFUS EACH ADDL SEQ: CPT

## 2019-10-25 PROCEDURE — 96365 THER/PROPH/DIAG IV INF INIT: CPT

## 2019-10-25 RX ORDER — SODIUM CHLORIDE 0.9 % (FLUSH) 0.9 %
10 SYRINGE (ML) INJECTION PRN
Status: CANCELLED | OUTPATIENT
Start: 2019-11-22

## 2019-10-25 RX ORDER — ACETAMINOPHEN 325 MG/1
650 TABLET ORAL ONCE
Status: CANCELLED | OUTPATIENT
Start: 2019-11-22

## 2019-10-25 RX ORDER — HEPARIN SODIUM (PORCINE) LOCK FLUSH IV SOLN 100 UNIT/ML 100 UNIT/ML
500 SOLUTION INTRAVENOUS PRN
Status: CANCELLED | OUTPATIENT
Start: 2019-11-22

## 2019-10-25 RX ORDER — DIPHENHYDRAMINE HCL 25 MG
25 TABLET ORAL ONCE
Status: CANCELLED | OUTPATIENT
Start: 2019-11-22

## 2019-10-25 RX ORDER — SODIUM CHLORIDE 0.9 % (FLUSH) 0.9 %
5 SYRINGE (ML) INJECTION PRN
Status: CANCELLED | OUTPATIENT
Start: 2019-11-22

## 2019-10-25 RX ORDER — DIPHENHYDRAMINE HCL 25 MG
25 TABLET ORAL ONCE
Status: DISCONTINUED | OUTPATIENT
Start: 2019-10-25 | End: 2019-10-26 | Stop reason: HOSPADM

## 2019-10-25 RX ORDER — DIPHENHYDRAMINE HYDROCHLORIDE 50 MG/ML
50 INJECTION INTRAMUSCULAR; INTRAVENOUS ONCE
Status: CANCELLED | OUTPATIENT
Start: 2019-11-22

## 2019-10-25 RX ORDER — SODIUM CHLORIDE 9 MG/ML
INJECTION, SOLUTION INTRAVENOUS CONTINUOUS
Status: CANCELLED | OUTPATIENT
Start: 2019-11-22

## 2019-10-25 RX ORDER — METHYLPREDNISOLONE SODIUM SUCCINATE 125 MG/2ML
125 INJECTION, POWDER, LYOPHILIZED, FOR SOLUTION INTRAMUSCULAR; INTRAVENOUS ONCE
Status: CANCELLED | OUTPATIENT
Start: 2019-11-22

## 2019-10-25 RX ORDER — SODIUM CHLORIDE 0.9 % (FLUSH) 0.9 %
5 SYRINGE (ML) INJECTION PRN
Status: DISCONTINUED | OUTPATIENT
Start: 2019-10-25 | End: 2019-10-26 | Stop reason: HOSPADM

## 2019-10-25 RX ORDER — SODIUM CHLORIDE 0.9 % (FLUSH) 0.9 %
10 SYRINGE (ML) INJECTION PRN
Status: DISCONTINUED | OUTPATIENT
Start: 2019-10-25 | End: 2019-10-26 | Stop reason: HOSPADM

## 2019-10-25 RX ORDER — HEPARIN SODIUM (PORCINE) LOCK FLUSH IV SOLN 100 UNIT/ML 100 UNIT/ML
500 SOLUTION INTRAVENOUS PRN
Status: DISCONTINUED | OUTPATIENT
Start: 2019-10-25 | End: 2019-10-26 | Stop reason: HOSPADM

## 2019-10-25 RX ORDER — 0.9 % SODIUM CHLORIDE 0.9 %
10 VIAL (ML) INJECTION ONCE
Status: CANCELLED | OUTPATIENT
Start: 2019-11-22

## 2019-10-25 RX ORDER — ACETAMINOPHEN 325 MG/1
650 TABLET ORAL ONCE
Status: DISCONTINUED | OUTPATIENT
Start: 2019-10-25 | End: 2019-10-26 | Stop reason: HOSPADM

## 2019-10-25 RX ADMIN — IMMUNE GLOBULIN (HUMAN) 20 G: 10 INJECTION INTRAVENOUS; SUBCUTANEOUS at 10:09

## 2019-10-25 RX ADMIN — HEPARIN 500 UNITS: 100 SYRINGE at 11:58

## 2019-10-25 RX ADMIN — IMMUNE GLOBULIN (HUMAN) 20 G: 10 INJECTION INTRAVENOUS; SUBCUTANEOUS at 11:19

## 2019-10-25 RX ADMIN — Medication 10 ML: at 10:06

## 2019-10-25 RX ADMIN — Medication 10 ML: at 11:58

## 2019-10-25 ASSESSMENT — PAIN SCALES - GENERAL: PAINLEVEL_OUTOF10: 0

## 2019-10-25 ASSESSMENT — PAIN - FUNCTIONAL ASSESSMENT: PAIN_FUNCTIONAL_ASSESSMENT: 0-10

## 2019-10-25 NOTE — PROGRESS NOTES
5698 Patient arrived to [facility] ambulatory with oxygen at 3L/nc for IVIG infusion  PT RIGHTS AND RESPONSIBILITIES OFFERED TO PT.  1009 IVIG started as ordered, patient denies any needs at this time  1100 Patient playing on cell phone and denies any needs at this time  1157 IVIG infusion completed and patient tolerated well. Denies any needs or complaints  1205 Discharge instructions given to patient verbalized understanding. Patient discharged to home in stable condition.     m____ Safety:       (Environmental)   [place] to environment   Ensure ID band is correct and in place/ allergy band as needed   Assess for fall risk   Initiate fall precautions as applicable (fall band, side rails, etc.)   Call light within reach   Bed in low position/ wheels locked    _m___ Pain:        Assess pain level and characteristics   Administer analgesics as ordered   Assess effectiveness of pain management and report to MD as needed    _m___ Knowledge Deficit:   Assess baseline knowledge   Provide teaching at level of understanding   Provide teaching via preferred learning method   Evaluate teaching effectiveness    _m___ Hemodynamic/Respiratory Status:       (Pre and Post Procedure Monitoring)   Assess/Monitor vital signs and LOC   Assess Baseline SpO2 prior to any sedation   Obtain weight/height   Assess vital signs/ LOC until patient meets discharge criteria   Monitor procedure site and notify MD of any issues     __m__ Infection-Risk of Central Venous Catheter:   Monitor for infection signs and symptoms (catheter site redness, temperature elevation, etc)   Assess for infection risks   Educate regarding infection prevention   Manage central venous catheter (flushes/ dressing changes per protocol)

## 2019-11-04 ENCOUNTER — PROCEDURE VISIT (OUTPATIENT)
Dept: CARDIOLOGY CLINIC | Age: 74
End: 2019-11-04
Payer: MEDICARE

## 2019-11-04 DIAGNOSIS — I48.91 ATRIAL FIBRILLATION, UNSPECIFIED TYPE (HCC): Primary | ICD-10-CM

## 2019-11-04 PROCEDURE — 93298 REM INTERROG DEV EVAL SCRMS: CPT | Performed by: INTERNAL MEDICINE

## 2019-11-04 PROCEDURE — 93299 PR REM INTERROG ICPMS/SCRMS <30 D TECH REVIEW: CPT | Performed by: INTERNAL MEDICINE

## 2019-11-07 ENCOUNTER — OFFICE VISIT (OUTPATIENT)
Dept: ENT CLINIC | Age: 74
End: 2019-11-07
Payer: MEDICARE

## 2019-11-07 VITALS
RESPIRATION RATE: 16 BRPM | SYSTOLIC BLOOD PRESSURE: 110 MMHG | WEIGHT: 237 LBS | DIASTOLIC BLOOD PRESSURE: 72 MMHG | BODY MASS INDEX: 40.68 KG/M2 | HEART RATE: 72 BPM

## 2019-11-07 DIAGNOSIS — H61.22 EXCESSIVE CERUMEN IN EAR CANAL, LEFT: ICD-10-CM

## 2019-11-07 DIAGNOSIS — H60.392 OTHER INFECTIVE ACUTE OTITIS EXTERNA OF LEFT EAR: Primary | ICD-10-CM

## 2019-11-07 DIAGNOSIS — H90.3 SENSORINEURAL HEARING LOSS (SNHL), BILATERAL: ICD-10-CM

## 2019-11-07 PROCEDURE — G8598 ASA/ANTIPLAT THER USED: HCPCS | Performed by: PHYSICIAN ASSISTANT

## 2019-11-07 PROCEDURE — 99213 OFFICE O/P EST LOW 20 MIN: CPT | Performed by: PHYSICIAN ASSISTANT

## 2019-11-07 PROCEDURE — 4130F TOPICAL PREP RX AOE: CPT | Performed by: PHYSICIAN ASSISTANT

## 2019-11-07 PROCEDURE — G8484 FLU IMMUNIZE NO ADMIN: HCPCS | Performed by: PHYSICIAN ASSISTANT

## 2019-11-07 PROCEDURE — G8400 PT W/DXA NO RESULTS DOC: HCPCS | Performed by: PHYSICIAN ASSISTANT

## 2019-11-07 PROCEDURE — 4040F PNEUMOC VAC/ADMIN/RCVD: CPT | Performed by: PHYSICIAN ASSISTANT

## 2019-11-07 PROCEDURE — 1090F PRES/ABSN URINE INCON ASSESS: CPT | Performed by: PHYSICIAN ASSISTANT

## 2019-11-07 PROCEDURE — 1036F TOBACCO NON-USER: CPT | Performed by: PHYSICIAN ASSISTANT

## 2019-11-07 PROCEDURE — G8417 CALC BMI ABV UP PARAM F/U: HCPCS | Performed by: PHYSICIAN ASSISTANT

## 2019-11-07 PROCEDURE — G8427 DOCREV CUR MEDS BY ELIG CLIN: HCPCS | Performed by: PHYSICIAN ASSISTANT

## 2019-11-07 PROCEDURE — 3017F COLORECTAL CA SCREEN DOC REV: CPT | Performed by: PHYSICIAN ASSISTANT

## 2019-11-07 PROCEDURE — 1123F ACP DISCUSS/DSCN MKR DOCD: CPT | Performed by: PHYSICIAN ASSISTANT

## 2019-11-07 RX ORDER — CIPROFLOXACIN HYDROCHLORIDE 3.5 MG/ML
4 SOLUTION/ DROPS TOPICAL 2 TIMES DAILY
Qty: 5 ML | Refills: 1 | Status: SHIPPED | OUTPATIENT
Start: 2019-11-07 | End: 2019-11-14

## 2019-11-07 ASSESSMENT — ENCOUNTER SYMPTOMS
SINUS PRESSURE: 0
COUGH: 0
NAUSEA: 0
SHORTNESS OF BREATH: 0
CHOKING: 0
FACIAL SWELLING: 0
RHINORRHEA: 0
STRIDOR: 0
CHEST TIGHTNESS: 0
TROUBLE SWALLOWING: 0
COLOR CHANGE: 0
VOMITING: 0
SORE THROAT: 0
DIARRHEA: 0
WHEEZING: 0
VOICE CHANGE: 0
APNEA: 0
ABDOMINAL PAIN: 0

## 2019-11-12 ENCOUNTER — TELEPHONE (OUTPATIENT)
Dept: AUDIOLOGY | Age: 74
End: 2019-11-12

## 2019-11-12 LAB
AEROBIC CULTURE: NORMAL
ANAEROBIC CULTURE: NORMAL
GRAM STAIN RESULT: NORMAL

## 2019-11-14 ENCOUNTER — OFFICE VISIT (OUTPATIENT)
Dept: ENT CLINIC | Age: 74
End: 2019-11-14
Payer: MEDICARE

## 2019-11-14 VITALS
TEMPERATURE: 98.4 F | WEIGHT: 236.6 LBS | BODY MASS INDEX: 39.42 KG/M2 | HEART RATE: 56 BPM | HEIGHT: 65 IN | DIASTOLIC BLOOD PRESSURE: 90 MMHG | SYSTOLIC BLOOD PRESSURE: 130 MMHG | RESPIRATION RATE: 16 BRPM

## 2019-11-14 DIAGNOSIS — H90.3 SENSORINEURAL HEARING LOSS (SNHL), BILATERAL: ICD-10-CM

## 2019-11-14 DIAGNOSIS — H60.392 OTHER INFECTIVE ACUTE OTITIS EXTERNA OF LEFT EAR: Primary | ICD-10-CM

## 2019-11-14 PROCEDURE — 4040F PNEUMOC VAC/ADMIN/RCVD: CPT | Performed by: PHYSICIAN ASSISTANT

## 2019-11-14 PROCEDURE — G8484 FLU IMMUNIZE NO ADMIN: HCPCS | Performed by: PHYSICIAN ASSISTANT

## 2019-11-14 PROCEDURE — 1123F ACP DISCUSS/DSCN MKR DOCD: CPT | Performed by: PHYSICIAN ASSISTANT

## 2019-11-14 PROCEDURE — G8427 DOCREV CUR MEDS BY ELIG CLIN: HCPCS | Performed by: PHYSICIAN ASSISTANT

## 2019-11-14 PROCEDURE — 3017F COLORECTAL CA SCREEN DOC REV: CPT | Performed by: PHYSICIAN ASSISTANT

## 2019-11-14 PROCEDURE — G8400 PT W/DXA NO RESULTS DOC: HCPCS | Performed by: PHYSICIAN ASSISTANT

## 2019-11-14 PROCEDURE — G8598 ASA/ANTIPLAT THER USED: HCPCS | Performed by: PHYSICIAN ASSISTANT

## 2019-11-14 PROCEDURE — 1090F PRES/ABSN URINE INCON ASSESS: CPT | Performed by: PHYSICIAN ASSISTANT

## 2019-11-14 PROCEDURE — G8417 CALC BMI ABV UP PARAM F/U: HCPCS | Performed by: PHYSICIAN ASSISTANT

## 2019-11-14 PROCEDURE — 4130F TOPICAL PREP RX AOE: CPT | Performed by: PHYSICIAN ASSISTANT

## 2019-11-14 PROCEDURE — 99212 OFFICE O/P EST SF 10 MIN: CPT | Performed by: PHYSICIAN ASSISTANT

## 2019-11-14 PROCEDURE — 1036F TOBACCO NON-USER: CPT | Performed by: PHYSICIAN ASSISTANT

## 2019-11-14 ASSESSMENT — ENCOUNTER SYMPTOMS
SINUS PRESSURE: 0
STRIDOR: 0
FACIAL SWELLING: 0
CHOKING: 0
RHINORRHEA: 0
ABDOMINAL PAIN: 0
COUGH: 0
NAUSEA: 0
SORE THROAT: 0
TROUBLE SWALLOWING: 0
SHORTNESS OF BREATH: 0
DIARRHEA: 0
APNEA: 0
VOMITING: 0
WHEEZING: 0
COLOR CHANGE: 0
CHEST TIGHTNESS: 0
VOICE CHANGE: 0

## 2019-11-22 ENCOUNTER — HOSPITAL ENCOUNTER (OUTPATIENT)
Dept: NURSING | Age: 74
Discharge: HOME | End: 2019-11-22
Payer: MEDICARE

## 2019-11-22 VITALS
TEMPERATURE: 97.5 F | HEART RATE: 63 BPM | OXYGEN SATURATION: 94 % | RESPIRATION RATE: 16 BRPM | SYSTOLIC BLOOD PRESSURE: 129 MMHG | WEIGHT: 231.6 LBS | BODY MASS INDEX: 38.54 KG/M2 | DIASTOLIC BLOOD PRESSURE: 75 MMHG

## 2019-11-22 DIAGNOSIS — D83.9 COMMON VARIABLE IMMUNODEFICIENCY (HCC): Primary | ICD-10-CM

## 2019-11-22 PROCEDURE — 96415 CHEMO IV INFUSION ADDL HR: CPT

## 2019-11-22 PROCEDURE — 96413 CHEMO IV INFUSION 1 HR: CPT

## 2019-11-22 PROCEDURE — 2709999900 HC NON-CHARGEABLE SUPPLY

## 2019-11-22 PROCEDURE — 96366 THER/PROPH/DIAG IV INF ADDON: CPT

## 2019-11-22 PROCEDURE — 6360000002 HC RX W HCPCS: Performed by: FAMILY MEDICINE

## 2019-11-22 PROCEDURE — 2580000003 HC RX 258: Performed by: FAMILY MEDICINE

## 2019-11-22 PROCEDURE — 96365 THER/PROPH/DIAG IV INF INIT: CPT

## 2019-11-22 RX ORDER — SODIUM CHLORIDE 0.9 % (FLUSH) 0.9 %
10 SYRINGE (ML) INJECTION PRN
Status: CANCELLED | OUTPATIENT
Start: 2019-12-20

## 2019-11-22 RX ORDER — 0.9 % SODIUM CHLORIDE 0.9 %
10 VIAL (ML) INJECTION ONCE
Status: CANCELLED | OUTPATIENT
Start: 2019-12-20

## 2019-11-22 RX ORDER — METHYLPREDNISOLONE SODIUM SUCCINATE 125 MG/2ML
125 INJECTION, POWDER, LYOPHILIZED, FOR SOLUTION INTRAMUSCULAR; INTRAVENOUS ONCE
Status: CANCELLED | OUTPATIENT
Start: 2019-12-20

## 2019-11-22 RX ORDER — HEPARIN SODIUM (PORCINE) LOCK FLUSH IV SOLN 100 UNIT/ML 100 UNIT/ML
500 SOLUTION INTRAVENOUS PRN
Status: CANCELLED | OUTPATIENT
Start: 2019-12-20

## 2019-11-22 RX ORDER — ACETAMINOPHEN 325 MG/1
650 TABLET ORAL ONCE
Status: CANCELLED | OUTPATIENT
Start: 2019-12-20

## 2019-11-22 RX ORDER — SODIUM CHLORIDE 9 MG/ML
INJECTION, SOLUTION INTRAVENOUS CONTINUOUS
Status: CANCELLED | OUTPATIENT
Start: 2019-12-20

## 2019-11-22 RX ORDER — DIPHENHYDRAMINE HCL 25 MG
25 TABLET ORAL ONCE
Status: CANCELLED | OUTPATIENT
Start: 2019-12-20

## 2019-11-22 RX ORDER — DIPHENHYDRAMINE HYDROCHLORIDE 50 MG/ML
50 INJECTION INTRAMUSCULAR; INTRAVENOUS ONCE
Status: CANCELLED | OUTPATIENT
Start: 2019-12-20

## 2019-11-22 RX ORDER — HEPARIN SODIUM (PORCINE) LOCK FLUSH IV SOLN 100 UNIT/ML 100 UNIT/ML
500 SOLUTION INTRAVENOUS PRN
Status: DISCONTINUED | OUTPATIENT
Start: 2019-11-22 | End: 2019-11-23 | Stop reason: HOSPADM

## 2019-11-22 RX ORDER — SODIUM CHLORIDE 0.9 % (FLUSH) 0.9 %
5 SYRINGE (ML) INJECTION PRN
Status: CANCELLED | OUTPATIENT
Start: 2019-12-20

## 2019-11-22 RX ORDER — SODIUM CHLORIDE 0.9 % (FLUSH) 0.9 %
10 SYRINGE (ML) INJECTION PRN
Status: DISCONTINUED | OUTPATIENT
Start: 2019-11-22 | End: 2019-11-23 | Stop reason: HOSPADM

## 2019-11-22 RX ADMIN — IMMUNE GLOBULIN (HUMAN) 20 G: 10 INJECTION INTRAVENOUS; SUBCUTANEOUS at 12:14

## 2019-11-22 RX ADMIN — HEPARIN 500 UNITS: 100 SYRINGE at 14:00

## 2019-11-22 RX ADMIN — SODIUM CHLORIDE, PRESERVATIVE FREE 10 ML: 5 INJECTION INTRAVENOUS at 12:14

## 2019-11-22 RX ADMIN — IMMUNE GLOBULIN (HUMAN) 20 G: 10 INJECTION INTRAVENOUS; SUBCUTANEOUS at 13:25

## 2019-11-22 RX ADMIN — SODIUM CHLORIDE, PRESERVATIVE FREE 10 ML: 5 INJECTION INTRAVENOUS at 14:00

## 2019-11-22 NOTE — PROGRESS NOTES
1210 pt arrives ambulatory for IVIG infusion. Infusion explained and questions answered. PT RIGHTS AND RESPONSIBILITIES OFFERED TO PT. Pt provided with apolonia bean.   1315 pt tolerating infusion well. Denies needs at this time. 1400 infusion complete. Pt tolerated it well with no complaints. Vitals stable. Pt discharged ambulatory with instructions with no complaints.            _m___ Safety:       (Environmental)   [place] to environment   Ensure ID band is correct and in place/ allergy band as needed   Assess for fall risk   Initiate fall precautions as applicable (fall band, side rails, etc.)   Call light within reach   Bed in low position/ wheels locked    _m___ Pain:        Assess pain level and characteristics   Administer analgesics as ordered   Assess effectiveness of pain management and report to MD as needed    _m___ Knowledge Deficit:   Assess baseline knowledge   Provide teaching at level of understanding   Provide teaching via preferred learning method   Evaluate teaching effectiveness    __m__ Hemodynamic/Respiratory Status:       (Pre and Post Procedure Monitoring)   Assess/Monitor vital signs and LOC   Assess Baseline SpO2 prior to any sedation   Obtain weight/height   Assess vital signs/ LOC until patient meets discharge criteria   Monitor procedure site and notify MD of any issues    __m__ Infection-Risk of Central Venous Catheter:   Monitor for infection signs and symptoms (catheter site redness, temperature elevation, etc)   Assess for infection risks   Educate regarding infection prevention   Manage central venous catheter (flushes/ dressing changes per protocol)

## 2019-12-06 ENCOUNTER — PROCEDURE VISIT (OUTPATIENT)
Dept: CARDIOLOGY CLINIC | Age: 74
End: 2019-12-06
Payer: MEDICARE

## 2019-12-06 DIAGNOSIS — I48.91 ATRIAL FIBRILLATION, UNSPECIFIED TYPE (HCC): Primary | ICD-10-CM

## 2019-12-06 PROCEDURE — 93298 REM INTERROG DEV EVAL SCRMS: CPT | Performed by: INTERNAL MEDICINE

## 2019-12-06 PROCEDURE — 93299 PR REM INTERROG ICPMS/SCRMS <30 D TECH REVIEW: CPT | Performed by: INTERNAL MEDICINE

## 2019-12-06 RX ORDER — METHYLPREDNISOLONE SODIUM SUCCINATE 125 MG/2ML
125 INJECTION, POWDER, LYOPHILIZED, FOR SOLUTION INTRAMUSCULAR; INTRAVENOUS ONCE
Status: CANCELLED | OUTPATIENT
Start: 2019-12-06

## 2019-12-06 RX ORDER — SODIUM CHLORIDE 9 MG/ML
INJECTION, SOLUTION INTRAVENOUS CONTINUOUS
Status: CANCELLED | OUTPATIENT
Start: 2019-12-06

## 2019-12-06 RX ORDER — HEPARIN SODIUM (PORCINE) LOCK FLUSH IV SOLN 100 UNIT/ML 100 UNIT/ML
500 SOLUTION INTRAVENOUS PRN
Status: CANCELLED | OUTPATIENT
Start: 2019-12-06

## 2019-12-06 RX ORDER — DIPHENHYDRAMINE HYDROCHLORIDE 50 MG/ML
50 INJECTION INTRAMUSCULAR; INTRAVENOUS ONCE
Status: CANCELLED | OUTPATIENT
Start: 2019-12-06

## 2019-12-06 RX ORDER — SODIUM CHLORIDE 0.9 % (FLUSH) 0.9 %
5 SYRINGE (ML) INJECTION PRN
Status: CANCELLED | OUTPATIENT
Start: 2019-12-06

## 2019-12-06 RX ORDER — SODIUM CHLORIDE 0.9 % (FLUSH) 0.9 %
10 SYRINGE (ML) INJECTION PRN
Status: CANCELLED | OUTPATIENT
Start: 2019-12-06

## 2019-12-06 RX ORDER — DIPHENHYDRAMINE HCL 25 MG
25 TABLET ORAL ONCE
Status: CANCELLED | OUTPATIENT
Start: 2019-12-06

## 2019-12-06 RX ORDER — ACETAMINOPHEN 325 MG/1
650 TABLET ORAL ONCE
Status: CANCELLED | OUTPATIENT
Start: 2019-12-06

## 2019-12-09 LAB
FUNGUS IDENTIFIED: NORMAL
FUNGUS SMEAR: NORMAL

## 2019-12-19 ENCOUNTER — TELEPHONE (OUTPATIENT)
Dept: ONCOLOGY | Age: 74
End: 2019-12-19

## 2019-12-20 ENCOUNTER — HOSPITAL ENCOUNTER (OUTPATIENT)
Dept: NURSING | Age: 74
Discharge: HOME | End: 2019-12-20
Payer: MEDICARE

## 2019-12-20 VITALS
WEIGHT: 232 LBS | SYSTOLIC BLOOD PRESSURE: 148 MMHG | BODY MASS INDEX: 38.61 KG/M2 | DIASTOLIC BLOOD PRESSURE: 87 MMHG | TEMPERATURE: 97.7 F | OXYGEN SATURATION: 95 % | HEART RATE: 60 BPM | RESPIRATION RATE: 16 BRPM

## 2019-12-20 DIAGNOSIS — D83.9 COMMON VARIABLE IMMUNODEFICIENCY (HCC): Primary | ICD-10-CM

## 2019-12-20 LAB
IGA: 94 MG/DL (ref 70–400)
IGG: 1193 MG/DL (ref 700–1600)
IGM: 48 MG/DL (ref 40–230)

## 2019-12-20 PROCEDURE — 82784 ASSAY IGA/IGD/IGG/IGM EACH: CPT

## 2019-12-20 PROCEDURE — 96413 CHEMO IV INFUSION 1 HR: CPT

## 2019-12-20 PROCEDURE — 36591 DRAW BLOOD OFF VENOUS DEVICE: CPT

## 2019-12-20 PROCEDURE — 96365 THER/PROPH/DIAG IV INF INIT: CPT

## 2019-12-20 PROCEDURE — 2709999900 HC NON-CHARGEABLE SUPPLY

## 2019-12-20 PROCEDURE — 6360000002 HC RX W HCPCS: Performed by: FAMILY MEDICINE

## 2019-12-20 PROCEDURE — 2580000003 HC RX 258: Performed by: FAMILY MEDICINE

## 2019-12-20 RX ORDER — SODIUM CHLORIDE 9 MG/ML
INJECTION, SOLUTION INTRAVENOUS CONTINUOUS
Status: CANCELLED | OUTPATIENT
Start: 2020-01-17

## 2019-12-20 RX ORDER — ACETAMINOPHEN 325 MG/1
650 TABLET ORAL ONCE
Status: CANCELLED | OUTPATIENT
Start: 2020-01-17

## 2019-12-20 RX ORDER — METHYLPREDNISOLONE SODIUM SUCCINATE 125 MG/2ML
125 INJECTION, POWDER, LYOPHILIZED, FOR SOLUTION INTRAMUSCULAR; INTRAVENOUS ONCE
Status: CANCELLED | OUTPATIENT
Start: 2020-01-17

## 2019-12-20 RX ORDER — SODIUM CHLORIDE 0.9 % (FLUSH) 0.9 %
5 SYRINGE (ML) INJECTION PRN
Status: CANCELLED | OUTPATIENT
Start: 2020-01-17

## 2019-12-20 RX ORDER — DIPHENHYDRAMINE HCL 25 MG
25 TABLET ORAL ONCE
Status: DISCONTINUED | OUTPATIENT
Start: 2019-12-20 | End: 2019-12-21 | Stop reason: HOSPADM

## 2019-12-20 RX ORDER — HEPARIN SODIUM (PORCINE) LOCK FLUSH IV SOLN 100 UNIT/ML 100 UNIT/ML
500 SOLUTION INTRAVENOUS PRN
Status: CANCELLED | OUTPATIENT
Start: 2020-01-17

## 2019-12-20 RX ORDER — SODIUM CHLORIDE 0.9 % (FLUSH) 0.9 %
10 SYRINGE (ML) INJECTION PRN
Status: CANCELLED | OUTPATIENT
Start: 2020-01-17

## 2019-12-20 RX ORDER — DIPHENHYDRAMINE HYDROCHLORIDE 50 MG/ML
50 INJECTION INTRAMUSCULAR; INTRAVENOUS ONCE
Status: CANCELLED | OUTPATIENT
Start: 2020-01-17

## 2019-12-20 RX ORDER — DIPHENHYDRAMINE HCL 25 MG
25 TABLET ORAL ONCE
Status: CANCELLED | OUTPATIENT
Start: 2020-01-17

## 2019-12-20 RX ORDER — ACETAMINOPHEN 325 MG/1
650 TABLET ORAL ONCE
Status: DISCONTINUED | OUTPATIENT
Start: 2019-12-20 | End: 2019-12-21 | Stop reason: HOSPADM

## 2019-12-20 RX ORDER — SODIUM CHLORIDE 0.9 % (FLUSH) 0.9 %
10 SYRINGE (ML) INJECTION PRN
Status: DISCONTINUED | OUTPATIENT
Start: 2019-12-20 | End: 2019-12-21 | Stop reason: HOSPADM

## 2019-12-20 RX ORDER — HEPARIN SODIUM (PORCINE) LOCK FLUSH IV SOLN 100 UNIT/ML 100 UNIT/ML
500 SOLUTION INTRAVENOUS PRN
Status: DISCONTINUED | OUTPATIENT
Start: 2019-12-20 | End: 2019-12-21 | Stop reason: HOSPADM

## 2019-12-20 RX ADMIN — Medication 10 ML: at 11:31

## 2019-12-20 RX ADMIN — IMMUNE GLOBULIN (HUMAN) 20 G: 10 INJECTION INTRAVENOUS; SUBCUTANEOUS at 10:40

## 2019-12-20 RX ADMIN — IMMUNE GLOBULIN (HUMAN) 5 G: 10 INJECTION INTRAVENOUS; SUBCUTANEOUS at 10:10

## 2019-12-20 RX ADMIN — HEPARIN 500 UNITS: 100 SYRINGE at 11:31

## 2019-12-20 ASSESSMENT — PAIN - FUNCTIONAL ASSESSMENT: PAIN_FUNCTIONAL_ASSESSMENT: 0-10

## 2019-12-20 ASSESSMENT — PAIN SCALES - GENERAL: PAINLEVEL_OUTOF10: 0

## 2019-12-20 NOTE — PROGRESS NOTES
5014 Patient arrived to [facility] ambulatory for IVIG infusion  PT RIGHTS AND RESPONSIBILITIES OFFERED TO PT.

## 2020-01-10 ENCOUNTER — PROCEDURE VISIT (OUTPATIENT)
Dept: CARDIOLOGY CLINIC | Age: 75
End: 2020-01-10
Payer: MEDICARE

## 2020-01-10 PROBLEM — R00.2 PALPITATIONS: Status: ACTIVE | Noted: 2020-01-10

## 2020-01-10 PROCEDURE — 93298 REM INTERROG DEV EVAL SCRMS: CPT | Performed by: INTERNAL MEDICINE

## 2020-01-17 ENCOUNTER — HOSPITAL ENCOUNTER (OUTPATIENT)
Dept: NURSING | Age: 75
Discharge: HOME | End: 2020-01-17
Payer: MEDICARE

## 2020-01-17 VITALS
DIASTOLIC BLOOD PRESSURE: 65 MMHG | TEMPERATURE: 98 F | RESPIRATION RATE: 16 BRPM | BODY MASS INDEX: 38.61 KG/M2 | SYSTOLIC BLOOD PRESSURE: 129 MMHG | OXYGEN SATURATION: 94 % | HEART RATE: 70 BPM | WEIGHT: 232 LBS

## 2020-01-17 DIAGNOSIS — D83.9 COMMON VARIABLE IMMUNODEFICIENCY (HCC): Primary | ICD-10-CM

## 2020-01-17 PROCEDURE — 2580000003 HC RX 258: Performed by: FAMILY MEDICINE

## 2020-01-17 PROCEDURE — 6360000002 HC RX W HCPCS: Performed by: FAMILY MEDICINE

## 2020-01-17 PROCEDURE — 2709999900 HC NON-CHARGEABLE SUPPLY

## 2020-01-17 PROCEDURE — 99211 OFF/OP EST MAY X REQ PHY/QHP: CPT

## 2020-01-17 PROCEDURE — 96413 CHEMO IV INFUSION 1 HR: CPT

## 2020-01-17 PROCEDURE — 96415 CHEMO IV INFUSION ADDL HR: CPT

## 2020-01-17 RX ORDER — SODIUM CHLORIDE 9 MG/ML
INJECTION, SOLUTION INTRAVENOUS CONTINUOUS
Status: CANCELLED | OUTPATIENT
Start: 2020-02-14

## 2020-01-17 RX ORDER — DIPHENHYDRAMINE HCL 25 MG
25 TABLET ORAL ONCE
Status: DISCONTINUED | OUTPATIENT
Start: 2020-01-17 | End: 2020-01-18 | Stop reason: HOSPADM

## 2020-01-17 RX ORDER — HEPARIN SODIUM (PORCINE) LOCK FLUSH IV SOLN 100 UNIT/ML 100 UNIT/ML
500 SOLUTION INTRAVENOUS PRN
Status: CANCELLED | OUTPATIENT
Start: 2020-02-14

## 2020-01-17 RX ORDER — ACETAMINOPHEN 325 MG/1
650 TABLET ORAL ONCE
Status: CANCELLED | OUTPATIENT
Start: 2020-02-14

## 2020-01-17 RX ORDER — ACETAMINOPHEN 325 MG/1
650 TABLET ORAL ONCE
Status: DISCONTINUED | OUTPATIENT
Start: 2020-01-17 | End: 2020-01-18 | Stop reason: HOSPADM

## 2020-01-17 RX ORDER — SODIUM CHLORIDE 0.9 % (FLUSH) 0.9 %
10 SYRINGE (ML) INJECTION PRN
Status: DISCONTINUED | OUTPATIENT
Start: 2020-01-17 | End: 2020-01-18 | Stop reason: HOSPADM

## 2020-01-17 RX ORDER — DIPHENHYDRAMINE HCL 25 MG
25 TABLET ORAL ONCE
Status: CANCELLED | OUTPATIENT
Start: 2020-02-14

## 2020-01-17 RX ORDER — SODIUM CHLORIDE 0.9 % (FLUSH) 0.9 %
10 SYRINGE (ML) INJECTION PRN
Status: CANCELLED | OUTPATIENT
Start: 2020-02-14

## 2020-01-17 RX ORDER — DIPHENHYDRAMINE HYDROCHLORIDE 50 MG/ML
50 INJECTION INTRAMUSCULAR; INTRAVENOUS ONCE
Status: CANCELLED | OUTPATIENT
Start: 2020-02-14

## 2020-01-17 RX ORDER — HEPARIN SODIUM (PORCINE) LOCK FLUSH IV SOLN 100 UNIT/ML 100 UNIT/ML
500 SOLUTION INTRAVENOUS PRN
Status: DISCONTINUED | OUTPATIENT
Start: 2020-01-17 | End: 2020-01-18 | Stop reason: HOSPADM

## 2020-01-17 RX ORDER — METHYLPREDNISOLONE SODIUM SUCCINATE 125 MG/2ML
125 INJECTION, POWDER, LYOPHILIZED, FOR SOLUTION INTRAMUSCULAR; INTRAVENOUS ONCE
Status: CANCELLED | OUTPATIENT
Start: 2020-02-14

## 2020-01-17 RX ORDER — SODIUM CHLORIDE 0.9 % (FLUSH) 0.9 %
5 SYRINGE (ML) INJECTION PRN
Status: CANCELLED | OUTPATIENT
Start: 2020-02-14

## 2020-01-17 RX ADMIN — IMMUNE GLOBULIN (HUMAN) 20 G: 10 INJECTION INTRAVENOUS; SUBCUTANEOUS at 10:58

## 2020-01-17 RX ADMIN — Medication 10 ML: at 10:09

## 2020-01-17 RX ADMIN — Medication 10 ML: at 11:43

## 2020-01-17 RX ADMIN — HEPARIN 500 UNITS: 100 SYRINGE at 11:43

## 2020-01-17 RX ADMIN — IMMUNE GLOBULIN (HUMAN) 10 G: 10 INJECTION INTRAVENOUS; SUBCUTANEOUS at 10:10

## 2020-01-17 ASSESSMENT — PAIN - FUNCTIONAL ASSESSMENT: PAIN_FUNCTIONAL_ASSESSMENT: 0-10

## 2020-01-17 NOTE — PROGRESS NOTES
1000 pt arrives to [facility] for IVIG infusion. All questions and concerns addressed  1002 PATIENT RIGHTS AND RESPONSIBILITIES SHEET OFFERED TO PT TO READ. 1010 call light in reach, side rail up x1.  Refreshments given, denies needs  1030 resting, call light in reach  1033 _m___ Safety:       (Environmental)  Carlen Meckel to environment   Ensure ID band is correct and in place/ allergy band as needed   Assess for fall risk   Initiate fall precautions as applicable (fall band, side rails, etc.)   Call light within reach   Bed in low position/ wheels locked    __m__ Pain:        Assess pain level and characteristics   Administer analgesics as ordered   Assess effectiveness of pain management and report to MD as needed    _m___ Knowledge Deficit:   Assess baseline knowledge   Provide teaching at level of understanding   Provide teaching via preferred learning method   Evaluate teaching effectiveness    __m__ Hemodynamic/Respiratory Status:       (Pre and Post Procedure Monitoring)   Assess/Monitor vital signs and LOC   Assess Baseline SpO2 prior to any sedation   Obtain weight/height   Assess vital signs/ LOC until patient meets discharge criteria   Monitor procedure site and notify MD of any issues    ___m_ Infection-Risk of Central Venous Catheter:   Monitor for infection signs and symptoms (catheter site redness, temperature elevation, etc)   Assess for infection risks   Educate regarding infection prevention   Manage central venous catheter (flushes/ dressing changes per protocol)    1130 denies needs  1145 WRITTEN DISCHARGE INSTRUCTIONS GIVEN TO PT-VERBALIZES UNDERSTANDING  1150 PT DISCHARGED AMBULATORY IN SATISFACTORY CONDITION

## 2020-02-14 ENCOUNTER — HOSPITAL ENCOUNTER (OUTPATIENT)
Dept: NURSING | Age: 75
Discharge: HOME | End: 2020-02-14
Payer: MEDICARE

## 2020-02-14 ENCOUNTER — PROCEDURE VISIT (OUTPATIENT)
Dept: CARDIOLOGY CLINIC | Age: 75
End: 2020-02-14
Payer: MEDICARE

## 2020-02-14 VITALS
WEIGHT: 237 LBS | RESPIRATION RATE: 18 BRPM | TEMPERATURE: 97.7 F | SYSTOLIC BLOOD PRESSURE: 143 MMHG | OXYGEN SATURATION: 96 % | DIASTOLIC BLOOD PRESSURE: 80 MMHG | HEART RATE: 60 BPM | BODY MASS INDEX: 39.44 KG/M2

## 2020-02-14 DIAGNOSIS — D83.9 COMMON VARIABLE IMMUNODEFICIENCY (HCC): Primary | ICD-10-CM

## 2020-02-14 PROCEDURE — 6360000002 HC RX W HCPCS: Performed by: FAMILY MEDICINE

## 2020-02-14 PROCEDURE — 93298 REM INTERROG DEV EVAL SCRMS: CPT | Performed by: INTERNAL MEDICINE

## 2020-02-14 PROCEDURE — 2580000003 HC RX 258: Performed by: FAMILY MEDICINE

## 2020-02-14 PROCEDURE — 96413 CHEMO IV INFUSION 1 HR: CPT

## 2020-02-14 RX ORDER — DIPHENHYDRAMINE HCL 25 MG
25 TABLET ORAL ONCE
Status: CANCELLED | OUTPATIENT
Start: 2020-03-13

## 2020-02-14 RX ORDER — ACETAMINOPHEN 325 MG/1
650 TABLET ORAL ONCE
Status: CANCELLED | OUTPATIENT
Start: 2020-03-13

## 2020-02-14 RX ORDER — SODIUM CHLORIDE 9 MG/ML
INJECTION, SOLUTION INTRAVENOUS CONTINUOUS
Status: CANCELLED | OUTPATIENT
Start: 2020-03-13

## 2020-02-14 RX ORDER — DIPHENHYDRAMINE HYDROCHLORIDE 50 MG/ML
50 INJECTION INTRAMUSCULAR; INTRAVENOUS ONCE
Status: CANCELLED | OUTPATIENT
Start: 2020-03-13

## 2020-02-14 RX ORDER — HEPARIN SODIUM (PORCINE) LOCK FLUSH IV SOLN 100 UNIT/ML 100 UNIT/ML
500 SOLUTION INTRAVENOUS PRN
Status: DISCONTINUED | OUTPATIENT
Start: 2020-02-14 | End: 2020-02-15 | Stop reason: HOSPADM

## 2020-02-14 RX ORDER — SODIUM CHLORIDE 9 MG/ML
INJECTION, SOLUTION INTRAVENOUS CONTINUOUS
Status: ACTIVE | OUTPATIENT
Start: 2020-02-14 | End: 2020-02-14

## 2020-02-14 RX ORDER — METHYLPREDNISOLONE SODIUM SUCCINATE 125 MG/2ML
125 INJECTION, POWDER, LYOPHILIZED, FOR SOLUTION INTRAMUSCULAR; INTRAVENOUS ONCE
Status: CANCELLED | OUTPATIENT
Start: 2020-03-13

## 2020-02-14 RX ORDER — SODIUM CHLORIDE 0.9 % (FLUSH) 0.9 %
10 SYRINGE (ML) INJECTION PRN
Status: DISCONTINUED | OUTPATIENT
Start: 2020-02-14 | End: 2020-02-15 | Stop reason: HOSPADM

## 2020-02-14 RX ORDER — SODIUM CHLORIDE 0.9 % (FLUSH) 0.9 %
5 SYRINGE (ML) INJECTION PRN
Status: CANCELLED | OUTPATIENT
Start: 2020-03-13

## 2020-02-14 RX ORDER — ACETAMINOPHEN 325 MG/1
650 TABLET ORAL ONCE
Status: DISCONTINUED | OUTPATIENT
Start: 2020-02-14 | End: 2020-02-15 | Stop reason: HOSPADM

## 2020-02-14 RX ORDER — DIPHENHYDRAMINE HCL 25 MG
25 TABLET ORAL ONCE
Status: DISCONTINUED | OUTPATIENT
Start: 2020-02-14 | End: 2020-02-15 | Stop reason: HOSPADM

## 2020-02-14 RX ORDER — HEPARIN SODIUM (PORCINE) LOCK FLUSH IV SOLN 100 UNIT/ML 100 UNIT/ML
500 SOLUTION INTRAVENOUS PRN
Status: CANCELLED | OUTPATIENT
Start: 2020-03-13

## 2020-02-14 RX ORDER — SODIUM CHLORIDE 0.9 % (FLUSH) 0.9 %
10 SYRINGE (ML) INJECTION PRN
Status: CANCELLED | OUTPATIENT
Start: 2020-03-13

## 2020-02-14 RX ADMIN — IMMUNE GLOBULIN (HUMAN) 5 G: 10 INJECTION INTRAVENOUS; SUBCUTANEOUS at 10:05

## 2020-02-14 RX ADMIN — HEPARIN 500 UNITS: 100 SYRINGE at 11:26

## 2020-02-14 RX ADMIN — IMMUNE GLOBULIN (HUMAN) 20 G: 10 INJECTION INTRAVENOUS; SUBCUTANEOUS at 10:37

## 2020-02-14 RX ADMIN — Medication 10 ML: at 10:11

## 2020-02-14 RX ADMIN — Medication 10 ML: at 11:26

## 2020-02-14 ASSESSMENT — PAIN SCALES - GENERAL: PAINLEVEL_OUTOF10: 0

## 2020-02-14 NOTE — PROGRESS NOTES
DR Angel Andrews PT  REVEAL BATTERY OK  DEVICE IS CALLING THIS AFIB BUT IT LOOKS LIKE NSR WITH PVC'S/PACS

## 2020-02-14 NOTE — PROGRESS NOTES
__m__ Safety:       (Environmental)   [place] to environment   Ensure ID band is correct and in place/ allergy band as needed   Assess for fall risk   Initiate fall precautions as applicable (fall band, side rails, etc.)   Call light within reach   Bed in low position/ wheels locked    __m__ Pain:        Assess pain level and characteristics   Administer analgesics as ordered   Assess effectiveness of pain management and report to MD as needed    ___m_ Knowledge Deficit:   Assess baseline knowledge   Provide teaching at level of understanding   Provide teaching via preferred learning method   Evaluate teaching effectiveness    ___m_ Hemodynamic/Respiratory Status:       (Pre and Post Procedure Monitoring)   Assess/Monitor vital signs and LOC   Assess Baseline SpO2 prior to any sedation   Obtain weight/height   Assess vital signs/ LOC until patient meets discharge criteria   Monitor procedure site and notify MD of any issues    ___m_ Infection-Risk of Central Venous Catheter:   Monitor for infection signs and symptoms (catheter site redness, temperature elevation, etc)   Assess for infection risks   Educate regarding infection prevention   Manage central venous catheter (flushes/ dressing changes per protocol)

## 2020-03-13 ENCOUNTER — HOSPITAL ENCOUNTER (OUTPATIENT)
Dept: NURSING | Age: 75
Discharge: HOME | End: 2020-03-13
Payer: MEDICARE

## 2020-03-13 VITALS
OXYGEN SATURATION: 96 % | TEMPERATURE: 98 F | WEIGHT: 241.1 LBS | HEART RATE: 64 BPM | DIASTOLIC BLOOD PRESSURE: 69 MMHG | SYSTOLIC BLOOD PRESSURE: 139 MMHG | BODY MASS INDEX: 40.12 KG/M2 | RESPIRATION RATE: 16 BRPM

## 2020-03-13 DIAGNOSIS — D83.9 COMMON VARIABLE IMMUNODEFICIENCY (HCC): Primary | ICD-10-CM

## 2020-03-13 PROCEDURE — 2580000003 HC RX 258: Performed by: FAMILY MEDICINE

## 2020-03-13 PROCEDURE — 96413 CHEMO IV INFUSION 1 HR: CPT

## 2020-03-13 PROCEDURE — 96415 CHEMO IV INFUSION ADDL HR: CPT

## 2020-03-13 PROCEDURE — 6360000002 HC RX W HCPCS: Performed by: FAMILY MEDICINE

## 2020-03-13 RX ORDER — METHYLPREDNISOLONE SODIUM SUCCINATE 125 MG/2ML
125 INJECTION, POWDER, LYOPHILIZED, FOR SOLUTION INTRAMUSCULAR; INTRAVENOUS ONCE
Status: CANCELLED | OUTPATIENT
Start: 2020-04-10

## 2020-03-13 RX ORDER — DIPHENHYDRAMINE HYDROCHLORIDE 50 MG/ML
50 INJECTION INTRAMUSCULAR; INTRAVENOUS ONCE
Status: CANCELLED | OUTPATIENT
Start: 2020-04-10

## 2020-03-13 RX ORDER — HEPARIN SODIUM (PORCINE) LOCK FLUSH IV SOLN 100 UNIT/ML 100 UNIT/ML
500 SOLUTION INTRAVENOUS PRN
Status: CANCELLED | OUTPATIENT
Start: 2020-04-10

## 2020-03-13 RX ORDER — DIPHENHYDRAMINE HCL 25 MG
25 TABLET ORAL ONCE
Status: DISCONTINUED | OUTPATIENT
Start: 2020-03-13 | End: 2020-03-14 | Stop reason: HOSPADM

## 2020-03-13 RX ORDER — SODIUM CHLORIDE 0.9 % (FLUSH) 0.9 %
10 SYRINGE (ML) INJECTION PRN
Status: CANCELLED | OUTPATIENT
Start: 2020-04-10

## 2020-03-13 RX ORDER — SODIUM CHLORIDE 0.9 % (FLUSH) 0.9 %
5 SYRINGE (ML) INJECTION PRN
Status: CANCELLED | OUTPATIENT
Start: 2020-04-10

## 2020-03-13 RX ORDER — ACETAMINOPHEN 325 MG/1
650 TABLET ORAL ONCE
Status: DISCONTINUED | OUTPATIENT
Start: 2020-03-13 | End: 2020-03-14 | Stop reason: HOSPADM

## 2020-03-13 RX ORDER — SODIUM CHLORIDE 9 MG/ML
INJECTION, SOLUTION INTRAVENOUS CONTINUOUS
Status: CANCELLED | OUTPATIENT
Start: 2020-04-10

## 2020-03-13 RX ORDER — HEPARIN SODIUM (PORCINE) LOCK FLUSH IV SOLN 100 UNIT/ML 100 UNIT/ML
500 SOLUTION INTRAVENOUS PRN
Status: DISCONTINUED | OUTPATIENT
Start: 2020-03-13 | End: 2020-03-14 | Stop reason: HOSPADM

## 2020-03-13 RX ORDER — DIPHENHYDRAMINE HCL 25 MG
25 TABLET ORAL ONCE
Status: CANCELLED | OUTPATIENT
Start: 2020-04-10

## 2020-03-13 RX ORDER — ACETAMINOPHEN 325 MG/1
650 TABLET ORAL ONCE
Status: CANCELLED | OUTPATIENT
Start: 2020-04-10

## 2020-03-13 RX ORDER — SODIUM CHLORIDE 0.9 % (FLUSH) 0.9 %
10 SYRINGE (ML) INJECTION PRN
Status: DISCONTINUED | OUTPATIENT
Start: 2020-03-13 | End: 2020-03-14 | Stop reason: HOSPADM

## 2020-03-13 RX ORDER — SODIUM CHLORIDE 9 MG/ML
INJECTION, SOLUTION INTRAVENOUS CONTINUOUS
Status: ACTIVE | OUTPATIENT
Start: 2020-03-13 | End: 2020-03-13

## 2020-03-13 RX ADMIN — IMMUNE GLOBULIN (HUMAN) 5 G: 10 INJECTION INTRAVENOUS; SUBCUTANEOUS at 10:28

## 2020-03-13 RX ADMIN — Medication 10 ML: at 11:50

## 2020-03-13 RX ADMIN — HEPARIN 500 UNITS: 100 SYRINGE at 11:53

## 2020-03-13 RX ADMIN — IMMUNE GLOBULIN (HUMAN) 20 G: 10 INJECTION INTRAVENOUS; SUBCUTANEOUS at 10:59

## 2020-03-13 ASSESSMENT — PAIN SCALES - GENERAL: PAINLEVEL_OUTOF10: 0

## 2020-03-13 ASSESSMENT — PAIN - FUNCTIONAL ASSESSMENT: PAIN_FUNCTIONAL_ASSESSMENT: 0-10

## 2020-03-13 NOTE — PROGRESS NOTES
[address] [place] IVIG PROCEDURE REVIEWED WITH PT VERBALIZED UNDERSTANDING. -Pt rights and responsibilities offered to pt to read. _MET___ Safety:       (Environmental)   [place] to environment   Ensure ID band is correct and in place/ allergy band as needed   Assess for fall risk   Initiate fall precautions as applicable (fall band, side rails, etc.)   Call light within reach   Bed in low position/ wheels locked    _MET___ Pain:        Assess pain level and characteristics   Administer analgesics as ordered   Assess effectiveness of pain management and report to MD as needed    __MET__ Knowledge Deficit:   Assess baseline knowledge   Provide teaching at level of understanding   Provide teaching via preferred learning method   Evaluate teaching effectiveness    __MET__ Hemodynamic/Respiratory Status:           Assess vital signs/ LOC until patient meets discharge criteria   Monitor procedure site and notify MD of any issues    __METCatheter:   Monitor for infection signs and symptoms (catheter site redness, temperature elevation, etc)   Assess for infection risks   Educate regarding infection prevention   Manage central venous catheter (flushes/ dressing changes per protocol)      1149 INFUSION COMPLETE TOLERATED WELL. HOME INSTRUCTIONS TO PT VERBALIZED UNDERSTANDING. GAIT STEADY  [address] [place].

## 2020-03-17 ENCOUNTER — PROCEDURE VISIT (OUTPATIENT)
Dept: CARDIOLOGY CLINIC | Age: 75
End: 2020-03-17
Payer: MEDICARE

## 2020-03-17 PROCEDURE — G2066 INTER DEVC REMOTE 30D: HCPCS | Performed by: INTERNAL MEDICINE

## 2020-03-17 PROCEDURE — 93298 REM INTERROG DEV EVAL SCRMS: CPT | Performed by: INTERNAL MEDICINE

## 2020-03-25 PROBLEM — N17.9 AKI (ACUTE KIDNEY INJURY) (HCC): Status: RESOLVED | Noted: 2020-03-25 | Resolved: 2020-03-24

## 2020-03-25 PROBLEM — I42.0 CARDIOMYOPATHY, DILATED (HCC): Status: RESOLVED | Noted: 2020-03-25 | Resolved: 2020-03-24

## 2020-04-10 ENCOUNTER — HOSPITAL ENCOUNTER (OUTPATIENT)
Dept: NURSING | Age: 75
Discharge: HOME | End: 2020-04-10
Payer: MEDICARE

## 2020-04-10 VITALS
TEMPERATURE: 98.4 F | RESPIRATION RATE: 16 BRPM | WEIGHT: 240 LBS | OXYGEN SATURATION: 98 % | HEART RATE: 77 BPM | SYSTOLIC BLOOD PRESSURE: 123 MMHG | BODY MASS INDEX: 39.94 KG/M2 | DIASTOLIC BLOOD PRESSURE: 66 MMHG

## 2020-04-10 DIAGNOSIS — D83.9 COMMON VARIABLE IMMUNODEFICIENCY (HCC): Primary | ICD-10-CM

## 2020-04-10 LAB
IGA: 100 MG/DL (ref 70–400)
IGG: 1027 MG/DL (ref 700–1600)
IGM: 50 MG/DL (ref 40–230)

## 2020-04-10 PROCEDURE — 96413 CHEMO IV INFUSION 1 HR: CPT

## 2020-04-10 PROCEDURE — 2580000003 HC RX 258: Performed by: FAMILY MEDICINE

## 2020-04-10 PROCEDURE — 99211 OFF/OP EST MAY X REQ PHY/QHP: CPT

## 2020-04-10 PROCEDURE — 6360000002 HC RX W HCPCS: Performed by: FAMILY MEDICINE

## 2020-04-10 PROCEDURE — 82784 ASSAY IGA/IGD/IGG/IGM EACH: CPT

## 2020-04-10 PROCEDURE — 36415 COLL VENOUS BLD VENIPUNCTURE: CPT

## 2020-04-10 PROCEDURE — 96415 CHEMO IV INFUSION ADDL HR: CPT

## 2020-04-10 RX ORDER — ACETAMINOPHEN 325 MG/1
650 TABLET ORAL ONCE
Status: DISCONTINUED | OUTPATIENT
Start: 2020-04-10 | End: 2020-04-11 | Stop reason: HOSPADM

## 2020-04-10 RX ORDER — METHYLPREDNISOLONE SODIUM SUCCINATE 125 MG/2ML
125 INJECTION, POWDER, LYOPHILIZED, FOR SOLUTION INTRAMUSCULAR; INTRAVENOUS ONCE
Status: CANCELLED | OUTPATIENT
Start: 2020-05-08

## 2020-04-10 RX ORDER — SODIUM CHLORIDE 9 MG/ML
INJECTION, SOLUTION INTRAVENOUS CONTINUOUS
Status: CANCELLED | OUTPATIENT
Start: 2020-05-08

## 2020-04-10 RX ORDER — SODIUM CHLORIDE 0.9 % (FLUSH) 0.9 %
10 SYRINGE (ML) INJECTION PRN
Status: CANCELLED | OUTPATIENT
Start: 2020-05-08

## 2020-04-10 RX ORDER — DIPHENHYDRAMINE HCL 25 MG
25 TABLET ORAL ONCE
Status: DISCONTINUED | OUTPATIENT
Start: 2020-04-10 | End: 2020-04-11 | Stop reason: HOSPADM

## 2020-04-10 RX ORDER — DIPHENHYDRAMINE HYDROCHLORIDE 50 MG/ML
50 INJECTION INTRAMUSCULAR; INTRAVENOUS ONCE
Status: CANCELLED | OUTPATIENT
Start: 2020-05-08

## 2020-04-10 RX ORDER — SODIUM CHLORIDE 0.9 % (FLUSH) 0.9 %
5 SYRINGE (ML) INJECTION PRN
Status: CANCELLED | OUTPATIENT
Start: 2020-05-08

## 2020-04-10 RX ORDER — SODIUM CHLORIDE 0.9 % (FLUSH) 0.9 %
10 SYRINGE (ML) INJECTION PRN
Status: DISCONTINUED | OUTPATIENT
Start: 2020-04-10 | End: 2020-04-11 | Stop reason: HOSPADM

## 2020-04-10 RX ORDER — ACETAMINOPHEN 325 MG/1
650 TABLET ORAL ONCE
Status: CANCELLED | OUTPATIENT
Start: 2020-05-08

## 2020-04-10 RX ORDER — HEPARIN SODIUM (PORCINE) LOCK FLUSH IV SOLN 100 UNIT/ML 100 UNIT/ML
500 SOLUTION INTRAVENOUS PRN
Status: CANCELLED | OUTPATIENT
Start: 2020-05-08

## 2020-04-10 RX ORDER — DIPHENHYDRAMINE HCL 25 MG
25 TABLET ORAL ONCE
Status: CANCELLED | OUTPATIENT
Start: 2020-05-08

## 2020-04-10 RX ORDER — HEPARIN SODIUM (PORCINE) LOCK FLUSH IV SOLN 100 UNIT/ML 100 UNIT/ML
500 SOLUTION INTRAVENOUS PRN
Status: DISCONTINUED | OUTPATIENT
Start: 2020-04-10 | End: 2020-04-11 | Stop reason: HOSPADM

## 2020-04-10 RX ADMIN — IMMUNE GLOBULIN (HUMAN) 5 G: 10 INJECTION INTRAVENOUS; SUBCUTANEOUS at 10:09

## 2020-04-10 RX ADMIN — Medication 10 ML: at 11:28

## 2020-04-10 RX ADMIN — Medication 10 ML: at 10:13

## 2020-04-10 RX ADMIN — IMMUNE GLOBULIN (HUMAN) 20 G: 10 INJECTION INTRAVENOUS; SUBCUTANEOUS at 10:42

## 2020-04-10 RX ADMIN — HEPARIN 500 UNITS: 100 SYRINGE at 11:28

## 2020-04-10 NOTE — PROGRESS NOTES
1121 INFUSION COMPLETE TOLERATED WELL. HOME INSTRUCTIONS TO PT VERBALIZED UNDERSTANDING.   [address] [facility]

## 2020-04-10 NOTE — PROGRESS NOTES
__M__ Safety:       (Environmental)   [place] to environment   Ensure ID band is correct and in place/ allergy band as needed   Assess for fall risk   Initiate fall precautions as applicable (fall band, side rails, etc.)   Call light within reach   Bed in low position/ wheels locked    __M__ Pain:        Assess pain level and characteristics   Administer analgesics as ordered   Assess effectiveness of pain management and report to MD as needed    _M___ Knowledge Deficit:   Assess baseline knowledge   Provide teaching at level of understanding   Provide teaching via preferred learning method   Evaluate teaching effectiveness    M____ Hemodynamic/Respiratory Status:       (Pre and Post Procedure Monitoring)   Assess/Monitor vital signs and LOC   Assess Baseline SpO2 prior to any sedation   Obtain weight/height   Assess vital signs/ LOC until patient meets discharge criteria   Monitor procedure site and notify MD of any issues

## 2020-04-29 ENCOUNTER — PROCEDURE VISIT (OUTPATIENT)
Dept: CARDIOLOGY CLINIC | Age: 75
End: 2020-04-29
Payer: MEDICARE

## 2020-04-29 PROCEDURE — 93298 REM INTERROG DEV EVAL SCRMS: CPT | Performed by: INTERNAL MEDICINE

## 2020-04-29 PROCEDURE — G2066 INTER DEVC REMOTE 30D: HCPCS | Performed by: INTERNAL MEDICINE

## 2020-05-07 LAB
BASOPHILS ABSOLUTE: ABNORMAL
BASOPHILS RELATIVE PERCENT: ABNORMAL
BUN BLDV-MCNC: 29 MG/DL
CALCIUM SERPL-MCNC: 9.5 MG/DL
CHLORIDE BLD-SCNC: 105 MMOL/L
CO2: 30 MMOL/L
CREAT SERPL-MCNC: 1.2 MG/DL
EOSINOPHILS ABSOLUTE: ABNORMAL
EOSINOPHILS RELATIVE PERCENT: ABNORMAL
GFR CALCULATED: 44
GLUCOSE BLD-MCNC: 112 MG/DL
HCT VFR BLD CALC: 35.9 % (ref 36–46)
HEMOGLOBIN: 11.8 G/DL (ref 12–16)
LYMPHOCYTES ABSOLUTE: ABNORMAL
LYMPHOCYTES RELATIVE PERCENT: ABNORMAL
MCH RBC QN AUTO: ABNORMAL PG
MCHC RBC AUTO-ENTMCNC: ABNORMAL G/DL
MCV RBC AUTO: ABNORMAL FL
MONOCYTES ABSOLUTE: ABNORMAL
MONOCYTES RELATIVE PERCENT: ABNORMAL
NEUTROPHILS ABSOLUTE: ABNORMAL
NEUTROPHILS RELATIVE PERCENT: ABNORMAL
PLATELET # BLD: 151 K/ΜL
PMV BLD AUTO: ABNORMAL FL
POTASSIUM SERPL-SCNC: 4.5 MMOL/L
PTH INTACT: 27
RBC # BLD: 3.87 10^6/ΜL
SODIUM BLD-SCNC: 142 MMOL/L
VITAMIN D 25-HYDROXY: 28
VITAMIN D2, 25 HYDROXY: NORMAL
VITAMIN D3,25 HYDROXY: NORMAL
WBC # BLD: 4.4 10^3/ML

## 2020-05-08 ENCOUNTER — HOSPITAL ENCOUNTER (OUTPATIENT)
Dept: NURSING | Age: 75
Discharge: HOME | End: 2020-05-08
Payer: MEDICARE

## 2020-05-08 VITALS
OXYGEN SATURATION: 97 % | HEART RATE: 62 BPM | RESPIRATION RATE: 16 BRPM | TEMPERATURE: 98.6 F | DIASTOLIC BLOOD PRESSURE: 81 MMHG | SYSTOLIC BLOOD PRESSURE: 121 MMHG | WEIGHT: 234 LBS | BODY MASS INDEX: 38.94 KG/M2

## 2020-05-08 DIAGNOSIS — D83.9 COMMON VARIABLE IMMUNODEFICIENCY (HCC): Primary | ICD-10-CM

## 2020-05-08 PROCEDURE — 96413 CHEMO IV INFUSION 1 HR: CPT

## 2020-05-08 PROCEDURE — 2580000003 HC RX 258: Performed by: FAMILY MEDICINE

## 2020-05-08 PROCEDURE — 96365 THER/PROPH/DIAG IV INF INIT: CPT

## 2020-05-08 PROCEDURE — 6370000000 HC RX 637 (ALT 250 FOR IP): Performed by: FAMILY MEDICINE

## 2020-05-08 PROCEDURE — 6360000002 HC RX W HCPCS: Performed by: FAMILY MEDICINE

## 2020-05-08 PROCEDURE — 96366 THER/PROPH/DIAG IV INF ADDON: CPT

## 2020-05-08 PROCEDURE — 96415 CHEMO IV INFUSION ADDL HR: CPT

## 2020-05-08 RX ORDER — METHYLPREDNISOLONE SODIUM SUCCINATE 125 MG/2ML
125 INJECTION, POWDER, LYOPHILIZED, FOR SOLUTION INTRAMUSCULAR; INTRAVENOUS ONCE
Status: CANCELLED | OUTPATIENT
Start: 2020-06-05

## 2020-05-08 RX ORDER — SODIUM CHLORIDE 9 MG/ML
INJECTION, SOLUTION INTRAVENOUS CONTINUOUS
Status: CANCELLED | OUTPATIENT
Start: 2020-06-05

## 2020-05-08 RX ORDER — SODIUM CHLORIDE 0.9 % (FLUSH) 0.9 %
10 SYRINGE (ML) INJECTION PRN
Status: CANCELLED | OUTPATIENT
Start: 2020-06-05

## 2020-05-08 RX ORDER — HEPARIN SODIUM (PORCINE) LOCK FLUSH IV SOLN 100 UNIT/ML 100 UNIT/ML
500 SOLUTION INTRAVENOUS PRN
Status: DISCONTINUED | OUTPATIENT
Start: 2020-05-08 | End: 2020-05-09 | Stop reason: HOSPADM

## 2020-05-08 RX ORDER — ACETAMINOPHEN 325 MG/1
650 TABLET ORAL ONCE
Status: COMPLETED | OUTPATIENT
Start: 2020-05-08 | End: 2020-05-08

## 2020-05-08 RX ORDER — DIPHENHYDRAMINE HYDROCHLORIDE 50 MG/ML
50 INJECTION INTRAMUSCULAR; INTRAVENOUS ONCE
Status: CANCELLED | OUTPATIENT
Start: 2020-06-05

## 2020-05-08 RX ORDER — SODIUM CHLORIDE 0.9 % (FLUSH) 0.9 %
10 SYRINGE (ML) INJECTION PRN
Status: DISCONTINUED | OUTPATIENT
Start: 2020-05-08 | End: 2020-05-09 | Stop reason: HOSPADM

## 2020-05-08 RX ORDER — SODIUM CHLORIDE 0.9 % (FLUSH) 0.9 %
5 SYRINGE (ML) INJECTION PRN
Status: CANCELLED | OUTPATIENT
Start: 2020-06-05

## 2020-05-08 RX ORDER — HEPARIN SODIUM (PORCINE) LOCK FLUSH IV SOLN 100 UNIT/ML 100 UNIT/ML
500 SOLUTION INTRAVENOUS PRN
Status: CANCELLED | OUTPATIENT
Start: 2020-06-05

## 2020-05-08 RX ORDER — DIPHENHYDRAMINE HCL 25 MG
25 TABLET ORAL ONCE
Status: COMPLETED | OUTPATIENT
Start: 2020-05-08 | End: 2020-05-08

## 2020-05-08 RX ORDER — DIPHENHYDRAMINE HCL 25 MG
25 TABLET ORAL ONCE
Status: CANCELLED | OUTPATIENT
Start: 2020-06-05

## 2020-05-08 RX ORDER — ACETAMINOPHEN 325 MG/1
650 TABLET ORAL ONCE
Status: CANCELLED | OUTPATIENT
Start: 2020-06-05

## 2020-05-08 RX ADMIN — HEPARIN 500 UNITS: 100 SYRINGE at 11:42

## 2020-05-08 RX ADMIN — IMMUNE GLOBULIN (HUMAN) 20 G: 10 INJECTION INTRAVENOUS; SUBCUTANEOUS at 10:55

## 2020-05-08 RX ADMIN — DIPHENHYDRAMINE HCL 25 MG: 25 TABLET ORAL at 10:05

## 2020-05-08 RX ADMIN — IMMUNE GLOBULIN (HUMAN) 5 G: 10 INJECTION INTRAVENOUS; SUBCUTANEOUS at 10:17

## 2020-05-08 RX ADMIN — Medication 10 ML: at 10:19

## 2020-05-08 RX ADMIN — ACETAMINOPHEN 650 MG: 325 TABLET ORAL at 10:05

## 2020-05-08 RX ADMIN — Medication 10 ML: at 11:42

## 2020-05-08 ASSESSMENT — PAIN SCALES - GENERAL: PAINLEVEL_OUTOF10: 0

## 2020-05-08 ASSESSMENT — PAIN - FUNCTIONAL ASSESSMENT: PAIN_FUNCTIONAL_ASSESSMENT: 0-10

## 2020-05-08 NOTE — PROGRESS NOTES
1065 [facility] IVIG PROCEDURE REVIEWED WITH PT VERBALIZED UNDERSTANDING. Pt rights and responsibilities offered to pt to read. __MET__ Safety:       (Environmental)   [place] to environment   Ensure ID band is correct and in place/ allergy band as needed   Assess for fall risk   Initiate fall precautions as applicable (fall band, side rails, etc.)   Call light within reach   Bed in low position/ wheels locked    __MET__ Pain:        Assess pain level and characteristics   Administer analgesics as ordered   Assess effectiveness of pain management and report to MD as needed    _MET___ Knowledge Deficit:   Assess baseline knowledge   Provide teaching at level of understanding   Provide teaching via preferred learning method   Evaluate teaching effectiveness     __MET__ Hemodynamic/Respiratory Status:Obtain weight/height   Assess vital signs/ LOC until patient meets discharge criteria   Monitor procedure site and notify MD of any issues    _MET___ Infection-Risk of Central Venous Catheter:   Monitor for infection signs and symptoms (catheter site redness, temperature elevation, etc)   Assess for infection risks   Educate regarding infection prevention   Manage central venous catheter (flushes/ dressing changes per protocol)  1145 INFUSION COMPLETE TOLERATED WELL. HOME INSTRUCTIONS TO PT VERBALIZED UNDERSTANDING.   [address]

## 2020-06-01 ENCOUNTER — PROCEDURE VISIT (OUTPATIENT)
Dept: CARDIOLOGY CLINIC | Age: 75
End: 2020-06-01
Payer: MEDICARE

## 2020-06-01 PROCEDURE — G2066 INTER DEVC REMOTE 30D: HCPCS | Performed by: INTERNAL MEDICINE

## 2020-06-01 PROCEDURE — 93298 REM INTERROG DEV EVAL SCRMS: CPT | Performed by: INTERNAL MEDICINE

## 2020-06-05 ENCOUNTER — HOSPITAL ENCOUNTER (OUTPATIENT)
Dept: NURSING | Age: 75
Discharge: HOME | End: 2020-06-05
Payer: MEDICARE

## 2020-06-05 VITALS
DIASTOLIC BLOOD PRESSURE: 68 MMHG | SYSTOLIC BLOOD PRESSURE: 161 MMHG | TEMPERATURE: 97.5 F | HEART RATE: 72 BPM | RESPIRATION RATE: 18 BRPM | BODY MASS INDEX: 39.07 KG/M2 | WEIGHT: 234.8 LBS

## 2020-06-05 DIAGNOSIS — D83.9 COMMON VARIABLE IMMUNODEFICIENCY (HCC): Primary | ICD-10-CM

## 2020-06-05 PROCEDURE — 6370000000 HC RX 637 (ALT 250 FOR IP): Performed by: FAMILY MEDICINE

## 2020-06-05 PROCEDURE — 96365 THER/PROPH/DIAG IV INF INIT: CPT

## 2020-06-05 PROCEDURE — 2580000003 HC RX 258: Performed by: FAMILY MEDICINE

## 2020-06-05 PROCEDURE — 96413 CHEMO IV INFUSION 1 HR: CPT

## 2020-06-05 PROCEDURE — 96366 THER/PROPH/DIAG IV INF ADDON: CPT

## 2020-06-05 PROCEDURE — 96415 CHEMO IV INFUSION ADDL HR: CPT

## 2020-06-05 PROCEDURE — 6360000002 HC RX W HCPCS: Performed by: FAMILY MEDICINE

## 2020-06-05 PROCEDURE — 99211 OFF/OP EST MAY X REQ PHY/QHP: CPT

## 2020-06-05 RX ORDER — ACETAMINOPHEN 325 MG/1
650 TABLET ORAL ONCE
Status: CANCELLED | OUTPATIENT
Start: 2020-07-03

## 2020-06-05 RX ORDER — SODIUM CHLORIDE 0.9 % (FLUSH) 0.9 %
10 SYRINGE (ML) INJECTION PRN
Status: DISCONTINUED | OUTPATIENT
Start: 2020-06-05 | End: 2020-06-06 | Stop reason: HOSPADM

## 2020-06-05 RX ORDER — DIPHENHYDRAMINE HCL 25 MG
25 TABLET ORAL ONCE
Status: COMPLETED | OUTPATIENT
Start: 2020-06-05 | End: 2020-06-05

## 2020-06-05 RX ORDER — DIPHENHYDRAMINE HYDROCHLORIDE 50 MG/ML
50 INJECTION INTRAMUSCULAR; INTRAVENOUS ONCE
Status: CANCELLED | OUTPATIENT
Start: 2020-07-03

## 2020-06-05 RX ORDER — DIPHENHYDRAMINE HCL 25 MG
25 TABLET ORAL ONCE
Status: CANCELLED | OUTPATIENT
Start: 2020-07-03

## 2020-06-05 RX ORDER — SODIUM CHLORIDE 0.9 % (FLUSH) 0.9 %
5 SYRINGE (ML) INJECTION PRN
Status: CANCELLED | OUTPATIENT
Start: 2020-07-03

## 2020-06-05 RX ORDER — METHYLPREDNISOLONE SODIUM SUCCINATE 125 MG/2ML
125 INJECTION, POWDER, LYOPHILIZED, FOR SOLUTION INTRAMUSCULAR; INTRAVENOUS ONCE
Status: CANCELLED | OUTPATIENT
Start: 2020-07-03

## 2020-06-05 RX ORDER — HEPARIN SODIUM (PORCINE) LOCK FLUSH IV SOLN 100 UNIT/ML 100 UNIT/ML
500 SOLUTION INTRAVENOUS PRN
Status: DISCONTINUED | OUTPATIENT
Start: 2020-06-05 | End: 2020-06-06 | Stop reason: HOSPADM

## 2020-06-05 RX ORDER — SODIUM CHLORIDE 9 MG/ML
INJECTION, SOLUTION INTRAVENOUS CONTINUOUS
Status: CANCELLED | OUTPATIENT
Start: 2020-07-03

## 2020-06-05 RX ORDER — SODIUM CHLORIDE 0.9 % (FLUSH) 0.9 %
10 SYRINGE (ML) INJECTION PRN
Status: CANCELLED | OUTPATIENT
Start: 2020-07-03

## 2020-06-05 RX ORDER — ACETAMINOPHEN 325 MG/1
650 TABLET ORAL ONCE
Status: COMPLETED | OUTPATIENT
Start: 2020-06-05 | End: 2020-06-05

## 2020-06-05 RX ORDER — HEPARIN SODIUM (PORCINE) LOCK FLUSH IV SOLN 100 UNIT/ML 100 UNIT/ML
500 SOLUTION INTRAVENOUS PRN
Status: CANCELLED | OUTPATIENT
Start: 2020-07-03

## 2020-06-05 RX ADMIN — IMMUNE GLOBULIN (HUMAN) 5 G: 10 INJECTION INTRAVENOUS; SUBCUTANEOUS at 10:09

## 2020-06-05 RX ADMIN — HEPARIN 500 UNITS: 100 SYRINGE at 11:36

## 2020-06-05 RX ADMIN — Medication 10 ML: at 10:04

## 2020-06-05 RX ADMIN — ACETAMINOPHEN 650 MG: 325 TABLET ORAL at 10:01

## 2020-06-05 RX ADMIN — Medication 10 ML: at 11:36

## 2020-06-05 RX ADMIN — IMMUNE GLOBULIN (HUMAN) 20 G: 10 INJECTION INTRAVENOUS; SUBCUTANEOUS at 10:40

## 2020-06-05 RX ADMIN — DIPHENHYDRAMINE HCL 25 MG: 25 TABLET ORAL at 10:01

## 2020-06-05 ASSESSMENT — PAIN SCALES - GENERAL: PAINLEVEL_OUTOF10: 0

## 2020-06-05 NOTE — PROGRESS NOTES
179 [facility] FOR IVIG PROCEDURE REVIEWED WITH PT VERBALIZED UNDERSTANDING. Pt rights and responsibilities offered to pt to read. ___MET_ Safety:       (Environmental)   [place] to environment   Ensure ID band is correct and in place/ allergy band as needed   Assess for fall risk   Initiate fall precautions as applicable (fall band, side rails, etc.)   Call light within reach   Bed in low position/ wheels locked    __MET__ Pain:        Assess pain level and characteristics   Administer analgesics as ordered   Assess effectiveness of pain management and report to MD as needed    __MET__ Knowledge Deficit:   Assess baseline knowledge   Provide teaching at level of understanding   Provide teaching via preferred learning method   Evaluate teaching effectiveness    __MET__ Hemodynamic/Respiratory Status:     Obtain weight/height   Assess vital signs/ LOC until patient meets discharge criteria   Monitor procedure site and notify MD of any issues    _MET___ Infection-Risk of Central Venous Catheter:   Monitor for infection signs and symptoms (catheter site redness, temperature elevation, etc)   Assess for infection risks   Educate regarding infection prevention   Manage central venous catheter (flushes/ dressing changes per protocol)      1115 HOME INSTRUCTIONS TO PT VERBALIZED UNDERSTANDING. 1139 infusion complete tolerate well.   1140 discharge ambulatory stable home

## 2020-06-15 ENCOUNTER — OFFICE VISIT (OUTPATIENT)
Dept: ONCOLOGY | Age: 75
End: 2020-06-15
Payer: MEDICARE

## 2020-06-15 ENCOUNTER — OFFICE VISIT (OUTPATIENT)
Dept: CARDIOLOGY CLINIC | Age: 75
End: 2020-06-15
Payer: MEDICARE

## 2020-06-15 ENCOUNTER — HOSPITAL ENCOUNTER (OUTPATIENT)
Dept: INFUSION THERAPY | Age: 75
Discharge: HOME | End: 2020-06-15
Payer: MEDICARE

## 2020-06-15 VITALS
SYSTOLIC BLOOD PRESSURE: 132 MMHG | BODY MASS INDEX: 37.12 KG/M2 | WEIGHT: 231 LBS | HEART RATE: 68 BPM | HEIGHT: 66 IN | DIASTOLIC BLOOD PRESSURE: 82 MMHG

## 2020-06-15 VITALS — HEIGHT: 66 IN | WEIGHT: 231.6 LBS | BODY MASS INDEX: 37.22 KG/M2

## 2020-06-15 DIAGNOSIS — D69.6 THROMBOCYTOPENIA (HCC): ICD-10-CM

## 2020-06-15 LAB
ALBUMIN SERPL-MCNC: 4.2 G/DL (ref 3.5–5.1)
ALP BLD-CCNC: 85 U/L (ref 38–126)
ALT SERPL-CCNC: 9 U/L (ref 11–66)
AST SERPL-CCNC: 20 U/L (ref 5–40)
BASOPHILS # BLD: 1.6 %
BASOPHILS ABSOLUTE: 0.1 THOU/MM3 (ref 0–0.1)
BILIRUB SERPL-MCNC: 0.5 MG/DL (ref 0.3–1.2)
BILIRUBIN DIRECT: < 0.2 MG/DL (ref 0–0.3)
EOSINOPHIL # BLD: 1.6 %
EOSINOPHILS ABSOLUTE: 0.1 THOU/MM3 (ref 0–0.4)
ERYTHROCYTE [DISTWIDTH] IN BLOOD BY AUTOMATED COUNT: 13.2 % (ref 11.5–14.5)
ERYTHROCYTE [DISTWIDTH] IN BLOOD BY AUTOMATED COUNT: 46.6 FL (ref 35–45)
HCT VFR BLD CALC: 42.2 % (ref 37–47)
HEMOGLOBIN: 13.4 GM/DL (ref 12–16)
IMMATURE GRANS (ABS): 0.01 THOU/MM3 (ref 0–0.07)
IMMATURE GRANULOCYTES: 0.2 %
LYMPHOCYTES # BLD: 28.4 %
LYMPHOCYTES ABSOLUTE: 1.3 THOU/MM3 (ref 1–4.8)
MCH RBC QN AUTO: 30.5 PG (ref 26–33)
MCHC RBC AUTO-ENTMCNC: 31.8 GM/DL (ref 32.2–35.5)
MCV RBC AUTO: 96.1 FL (ref 81–99)
MONOCYTES # BLD: 10.1 %
MONOCYTES ABSOLUTE: 0.5 THOU/MM3 (ref 0.4–1.3)
NUCLEATED RED BLOOD CELLS: 0 /100 WBC
PLATELET # BLD: 169 THOU/MM3 (ref 130–400)
PMV BLD AUTO: 11.1 FL (ref 9.4–12.4)
RBC # BLD: 4.39 MILL/MM3 (ref 4.2–5.4)
SEG NEUTROPHILS: 58.1 %
SEGMENTED NEUTROPHILS ABSOLUTE COUNT: 2.6 THOU/MM3 (ref 1.8–7.7)
TOTAL PROTEIN: 7.2 G/DL (ref 6.1–8)
WBC # BLD: 4.5 THOU/MM3 (ref 4.8–10.8)

## 2020-06-15 PROCEDURE — 99214 OFFICE O/P EST MOD 30 MIN: CPT | Performed by: NUCLEAR MEDICINE

## 2020-06-15 PROCEDURE — 4040F PNEUMOC VAC/ADMIN/RCVD: CPT | Performed by: PHYSICIAN ASSISTANT

## 2020-06-15 PROCEDURE — 1090F PRES/ABSN URINE INCON ASSESS: CPT | Performed by: NUCLEAR MEDICINE

## 2020-06-15 PROCEDURE — 1090F PRES/ABSN URINE INCON ASSESS: CPT | Performed by: PHYSICIAN ASSISTANT

## 2020-06-15 PROCEDURE — 85025 COMPLETE CBC W/AUTO DIFF WBC: CPT

## 2020-06-15 PROCEDURE — 3017F COLORECTAL CA SCREEN DOC REV: CPT | Performed by: PHYSICIAN ASSISTANT

## 2020-06-15 PROCEDURE — G8417 CALC BMI ABV UP PARAM F/U: HCPCS | Performed by: PHYSICIAN ASSISTANT

## 2020-06-15 PROCEDURE — 99211 OFF/OP EST MAY X REQ PHY/QHP: CPT

## 2020-06-15 PROCEDURE — 3017F COLORECTAL CA SCREEN DOC REV: CPT | Performed by: NUCLEAR MEDICINE

## 2020-06-15 PROCEDURE — 99214 OFFICE O/P EST MOD 30 MIN: CPT | Performed by: PHYSICIAN ASSISTANT

## 2020-06-15 PROCEDURE — 36415 COLL VENOUS BLD VENIPUNCTURE: CPT

## 2020-06-15 PROCEDURE — 1123F ACP DISCUSS/DSCN MKR DOCD: CPT | Performed by: NUCLEAR MEDICINE

## 2020-06-15 PROCEDURE — 1036F TOBACCO NON-USER: CPT | Performed by: NUCLEAR MEDICINE

## 2020-06-15 PROCEDURE — 1036F TOBACCO NON-USER: CPT | Performed by: PHYSICIAN ASSISTANT

## 2020-06-15 PROCEDURE — 4040F PNEUMOC VAC/ADMIN/RCVD: CPT | Performed by: NUCLEAR MEDICINE

## 2020-06-15 PROCEDURE — G8417 CALC BMI ABV UP PARAM F/U: HCPCS | Performed by: NUCLEAR MEDICINE

## 2020-06-15 PROCEDURE — 80076 HEPATIC FUNCTION PANEL: CPT

## 2020-06-15 PROCEDURE — G8427 DOCREV CUR MEDS BY ELIG CLIN: HCPCS | Performed by: NUCLEAR MEDICINE

## 2020-06-15 PROCEDURE — G8400 PT W/DXA NO RESULTS DOC: HCPCS | Performed by: NUCLEAR MEDICINE

## 2020-06-15 PROCEDURE — G8400 PT W/DXA NO RESULTS DOC: HCPCS | Performed by: PHYSICIAN ASSISTANT

## 2020-06-15 PROCEDURE — 1123F ACP DISCUSS/DSCN MKR DOCD: CPT | Performed by: PHYSICIAN ASSISTANT

## 2020-06-15 PROCEDURE — G8427 DOCREV CUR MEDS BY ELIG CLIN: HCPCS | Performed by: PHYSICIAN ASSISTANT

## 2020-06-15 RX ORDER — LORATADINE 10 MG/1
10 TABLET ORAL DAILY
Status: ON HOLD | COMMUNITY
End: 2021-07-26 | Stop reason: HOSPADM

## 2020-06-15 RX ORDER — CHOLECALCIFEROL (VITAMIN D3) 1250 MCG
CAPSULE ORAL DAILY
Status: ON HOLD | COMMUNITY
End: 2021-07-23

## 2020-06-15 RX ORDER — DULAGLUTIDE 0.75 MG/.5ML
0.75 INJECTION, SOLUTION SUBCUTANEOUS WEEKLY
COMMUNITY

## 2020-06-15 RX ORDER — FUROSEMIDE 20 MG/1
20 TABLET ORAL DAILY
COMMUNITY
End: 2021-08-26

## 2020-06-15 NOTE — PATIENT INSTRUCTIONS
1. Will obtain CBC, LFT today  2. Will call if follow up visit needed. **  Called the patient. No further follow up needed. Recommended her to have CBC completed per PCP or Neurology every 6 months.

## 2020-06-15 NOTE — PROGRESS NOTES
[facility] [address] [address], [address] [facility] [address]  [address] [address]  Dept: [phone]  Dept Fax: [phone]: [phone]      Visit Date:6/15/2020     Eleanor Suárez is a 75 y.o. female who presents today for:   Chief Complaint   Patient presents with    New Patient     Thrombocytopenia         HPI:   Eleanor Suárez is a 75 y.o. female referred to Hematology/Oncology office for evaluation of thrombocytopenia by Neurology, LILLY Russ. The patient had CBC completed on November 28, 2019 which showed a platelet count of 578,909. She was referred for further evaluation. Her white blood cell count was 3.1, hemoglobin 11.4, hematocrit 34.5. Per chart review, the patient had platelet check on 8/5/0159 with platelet count 941,935, on 5/2/2019 platelet count 702,009. In November 0772 she had folic acid which was normal, B12 and TSH were within normal limits. The patient follows with Dr. Ivana Fuenets for CKD and kidney function has been stable ranging 1.2 to 1.4. Upon review of prior CBCs the patient has had multiple since 2016. In June 2017 her platelet count was 69,622 noted during hospitalization for weakness, bradycardia. In June 2018 her platelet count was 00,438 during hospitalization for AMS and found to be hypoxic. At time of lab work in November 2019 she was being treated for acute left otitis externa. The patient denies prior work-up of low platelet count. She affirms fatigue. Denies any B type: No unintentional weight loss, night sweats, recurrent infection, abdominal bloating, early satiety or lymphadenopathy. Her past medical history includes CKD, COPD with chronic respiratory failure, Atrial fibrillation chronically on Eliquis, HTN, and hiatal hernia. She is a nonsmoker and does not drink alcohol. Denies history of liver disorder or autoimmune disease.          Past Medical History:   Diagnosis Date    Anemia     Atrial fibrillation ([address] 11/9/2017    CAD (coronary artery disease)     CHF (congestive heart failure) (HCC)     Chronic kidney disease     stage 3 kidney     DDD (degenerative disc disease), lumbar     Depression     Frequent PVCs 12/13/2017    GERD (gastroesophageal reflux disease)     History of blood transfusion     Hx of blood clots 09/2017    pulmonary emboli    Hyperlipidemia     Hypertension     Hyperthyroidism     Movement disorder     DDD    Neuropathy     Obesity     Palpitations 1/10/2020    Pneumonia 2016    sepsis    Sleep apnea     usually wears cpap at night    Status post right and left heart catheterization     Type II or unspecified type diabetes mellitus without mention of complication, not stated as uncontrolled       Past Surgical History:   Procedure Laterality Date    ABDOMEN SURGERY      ACHILLES TENDON SURGERY Bilateral     BLADDER SUSPENSION      CARDIAC SURGERY  2016    heart cath--[facility]    COLONOSCOPY Left 5/11/2018    COLONOSCOPY POLYPECTOMY SNARE/COLD BIOPSY performed by Keysha Galdamez MD at [facility]    ENDOSCOPY, COLON, DIAGNOSTIC      GASTRIC FUNDOPLICATION      HAMMER TOE SURGERY Right     HERNIA REPAIR      HIATAL HERNIA REPAIR  09/06/2016    Laparoscopic Robotic with Nissen Fundoplication - Dr. Chelsea Saldivar OFFICE/OUTPT VISIT,PROCEDURE ONLY N/A 9/21/2018    EGD DIAGNOSTIC ONLY performed by Conrad Sheikh MD at [address]      right    TENDON RELEASE Left 08/09/2016    left foot    TUBAL LIGATION      TUNNELED VENOUS PORT PLACEMENT  11/06/2017    UPPER GASTROINTESTINAL ENDOSCOPY Left 5/10/2018    EGD BIOPSY performed by Keysha Galdamez MD at [facility]      Family History   Problem Relation Age of Onset    Cancer Mother     Heart Disease Mother     High Blood Pressure Mother     Diabetes Mother     Vision Loss Mother     Stroke Mother     COPD Father     Diabetes Father     COPD Brother       Social History     Tobacco Use    Smoking status: Former Smoker     Packs/day: 1.00     Years: 30.00     Pack years: 30.00     Types: Cigarettes     Last attempt to quit: 5/10/2006     Years since quittin.1    Smokeless tobacco: Never Used   Substance Use Topics    Alcohol use: No      Current Outpatient Medications   Medication Sig Dispense Refill    apixaban (ELIQUIS) 5 MG TABS tablet Take 1 tablet by mouth 2 times daily 60 tablet 5    diphenhydrAMINE (BENADRYL) 25 MG capsule Take 25 mg by mouth as needed for Itching      polyethylene glycol (GLYCOLAX) packet Take 17 g by mouth daily as needed for Constipation      ciprofloxacin-dexamethasone (CIPRODEX) 0.3-0.1 % otic suspension Place 4 drops into the left ear 2 times daily 4 gtts      Calcium Carb-Cholecalciferol 500-400 MG-UNIT TABS Take 1 tablet by mouth 2 times daily      RA ALCOHOL SWABS 70 % PADS   1    ONE TOUCH ULTRA TEST strip   1    Lancets Misc. (UNISTIK CZT COMFORT) MISC   1    cetirizine (ZYRTEC) 10 MG tablet Take 10 mg by mouth daily      prednisoLONE acetate (PRED FORTE) 1 % ophthalmic suspension 1 drop 4 times daily      benzonatate (TESSALON) 100 MG capsule Take 200 mg by mouth 3 times daily as needed for Cough       guaiFENesin (ROBITUSSIN) 100 MG/5ML syrup Take 10 mLs by mouth 3 times daily as needed for Cough      ipratropium-albuterol (DUONEB) 0.5-2.5 (3) MG/3ML SOLN nebulizer solution Inhale 1 vial into the lungs every 4 hours      lidocaine viscous (XYLOCAINE) 2 % solution Take 15 mLs by mouth as needed for Irritation      potassium chloride (KLOR-CON M) 20 MEQ extended release tablet Take 20 mEq by mouth daily      Linaclotide (LINZESS) 72 MCG CAPS Take 72.5 mg by mouth daily      polyethylene glycol (GLYCOLAX) packet Take 17 g by mouth daily as needed for Constipation      cyclobenzaprine (FLEXERIL) 5 MG tablet Take 5 mg by mouth 3 times daily as needed for Muscle spasms      hydrOXYzine (ATARAX) 25 MG tablet Take 25 mg by mouth 3 times daily as needed for Itching      DULoxetine (CYMBALTA) 20 MG extended release capsule Take 20 mg by mouth daily      insulin glargine (LANTUS) 100 UNIT/ML injection vial Inject into the skin nightly      pantoprazole (PROTONIX) 40 MG tablet Take 1 tablet by mouth 2 times daily 30 tablet 3    pregabalin (LYRICA) 150 MG capsule Take 1 capsule by mouth 3 times daily for 3 days. . 9 capsule 0    insulin lispro (HUMALOG) 100 UNIT/ML injection vial Inject 0-3 Units into the skin nightly 1 vial 3    fluticasone (FLONASE) 50 MCG/ACT nasal spray 1 spray by Each Nare route daily      traZODone (DESYREL) 50 MG tablet Take 25 mg by mouth nightly      Calcium Carb-Cholecalciferol 500-400 MG-UNIT TABS Take 1 tablet by mouth 2 times daily      Multiple Vitamins-Minerals (THERAPEUTIC MULTIVITAMIN-MINERALS) tablet Take 1 tablet by mouth daily      escitalopram (LEXAPRO) 10 MG tablet Take 1 tablet by mouth daily 30 tablet 3    OXYGEN Inhale into the lungs continuous      atorvastatin (LIPITOR) 20 MG tablet Take 1 tablet by mouth nightly 30 tablet 3    magnesium hydroxide (MILK OF MAGNESIA CONCENTRATE) 2400 MG/10ML SUSP Take 30 mLs by mouth once as needed      docusate sodium (COLACE) 100 MG capsule Take 100 mg by mouth 2 times daily      acetaminophen (TYLENOL) 325 MG tablet Take 2 tablets by mouth every 4 hours as needed for Pain 120 tablet 3    levothyroxine (SYNTHROID) 75 MCG tablet Take 75 mcg by mouth Daily       No current facility-administered medications for this visit. Allergies   Allergen Reactions    Bactrim [Sulfamethoxazole-Trimethoprim]      TOLD BY DR NEVER TO TAKE AGAIN    Wellbutrin [Bupropion] Other (See Comments)     hallucinations    Silicone Rash          Review of Systems:   Review of Systems   Constitutional: Positive for fatigue. Negative for appetite change, chills, diaphoresis, fever and unexpected weight change. HENT: Positive for postnasal drip, rhinorrhea and sore throat. Component Value Date    ALT 12 10/23/2018    AST 27 10/23/2018    ALKPHOS 75 10/23/2018    BILITOT 0.3 10/23/2018         Assessment and Plan:   1. History of Thrombocytopenia  The patient has had intermittent thrombocytopenia during episodes of acute illness/inflammation. First noted in June 2017 during weakness, bradycardia, then in June 2018 during episode of hypoxia, AMS. In November 2019 platelet count 105,592 while being treated for left otitis externa. CBCs since November 2019 show resolution of thrombocytopenia with platelet count 751,015 and 151,000. Thrombocytopenia is likely due to acute phase reactant during inflammation. Folic acid, vitamin Q68 and TSH within normal limits in November 2019. Denies history of liver disorder, alcohol use. There is possible medication induced component as patient is on Eliquis (<1% per UpToDate), lexapro (<1% per UpToDate) and pantoprazole (<2% per UpToDate). -Will obtain CBC, LFT today and determine if further follow up/work up indicated. Lab Results   Component Value Date    WBC 4.5 (L) 06/15/2020    HGB 13.4 06/15/2020    HCT 42.2 06/15/2020    MCV 96.1 06/15/2020     06/15/2020     Component Value Ref Range & Units Status Collected Lab   Alb 4.2  3.5 - 5.1 g/dL Final 06/15/2020 11:04 AM [facility]   Total Bilirubin 0.5  0.3 - 1.2 mg/dL Final 06/15/2020 11:04 AM [facility]   Bilirubin, Direct <0.2  0.0 - 0.3 mg/dL Final 06/15/2020 11:04 AM [facility]   Alkaline Phosphatase 85  38 - 126 U/L Final 06/15/2020 11:04 AM [facility]   AST 20  5 - 40 U/L Final 06/15/2020 11:04 AM [facility]   ALT 9Low   11 - 66 U/L Final 06/15/2020 11:04 AM [facility]   Total Protein 7.2  6.1 - 8.0 g/dL Final 06/15/2020 11:04 AM [facility]   Performed at [address] [place] [address], [address]       LFT within normal limits. CBC showed platelet count 625,539. Update:  Called the patient with lab results. No further follow up needed. Reviewed plan with Dr. Benny Montero. Recommended the patient to have CBC completed every 6 months per her Primary Care Provider or Neurology. All patient questions answered. Pt voiced understanding. Reviewed assessment and plan with Dr. Benny Montero. Patient agreed with treatment plan. Follow up as directed. Patient instructed to call for questions or concerns.       Electronically signed by   Karoline Rogel PA-C

## 2020-06-15 NOTE — PROGRESS NOTES
times daily as needed for Cough       guaiFENesin (ROBITUSSIN) 100 MG/5ML syrup Take 10 mLs by mouth 3 times daily as needed for Cough      ipratropium-albuterol (DUONEB) 0.5-2.5 (3) MG/3ML SOLN nebulizer solution Inhale 1 vial into the lungs every 4 hours      lidocaine viscous (XYLOCAINE) 2 % solution Take 15 mLs by mouth as needed for Irritation      potassium chloride (KLOR-CON M) 20 MEQ extended release tablet Take 20 mEq by mouth daily      Linaclotide (LINZESS) 72 MCG CAPS Take 72.5 mg by mouth daily      polyethylene glycol (GLYCOLAX) packet Take 17 g by mouth daily as needed for Constipation      cyclobenzaprine (FLEXERIL) 5 MG tablet Take 5 mg by mouth 3 times daily as needed for Muscle spasms      hydrOXYzine (ATARAX) 25 MG tablet Take 25 mg by mouth 3 times daily as needed for Itching      DULoxetine (CYMBALTA) 20 MG extended release capsule Take 40 mg by mouth daily       insulin glargine (LANTUS) 100 UNIT/ML injection vial Inject 8 Units into the skin nightly       pantoprazole (PROTONIX) 40 MG tablet Take 1 tablet by mouth 2 times daily 30 tablet 3    pregabalin (LYRICA) 150 MG capsule Take 1 capsule by mouth 3 times daily for 3 days. . 9 capsule 0    insulin lispro (HUMALOG) 100 UNIT/ML injection vial Inject 0-3 Units into the skin nightly 1 vial 3    fluticasone (FLONASE) 50 MCG/ACT nasal spray 1 spray by Each Nare route daily      traZODone (DESYREL) 50 MG tablet Take 100 mg by mouth nightly       Calcium Carb-Cholecalciferol 500-400 MG-UNIT TABS Take 1 tablet by mouth 2 times daily      Multiple Vitamins-Minerals (THERAPEUTIC MULTIVITAMIN-MINERALS) tablet Take 1 tablet by mouth daily      escitalopram (LEXAPRO) 10 MG tablet Take 1 tablet by mouth daily 30 tablet 3    OXYGEN Inhale into the lungs continuous      atorvastatin (LIPITOR) 20 MG tablet Take 1 tablet by mouth nightly 30 tablet 3    magnesium hydroxide (MILK OF MAGNESIA CONCENTRATE) 2400 MG/10ML SUSP Take 30 mLs by sounds  Extremities:   No edema, no cyanosis, good peripheral pulses  Neurological:   Awake, alert, oriented. No obvious focal deficits  Musculoskelatal:  No obvious deformities    Assessment:      Diagnosis Orders   1. Paroxysmal atrial fibrillation (HCC)     2. Essential hypertension     3. Dilated cardiomyopathy ([address]     cardiac as above  Higher risk patient   No more GI bleeding   No watchman   Plan:  No follow-ups on file. Discussed at length   Monitor for GI bleeding   Consider a follow up echo Continue risk factor modification and medical management  Thank you for allowing me to participate in the care of your patient. Please don't hesitate to contact me regarding any further issues related to the patient care    Orders Placed:  No orders of the defined types were placed in this encounter. Medications Prescribed:  No orders of the defined types were placed in this encounter. Discussed use, benefit, and side effects of prescribed medications. All patient questions answered. Pt voicedunderstanding. Instructed to continue current medications, diet and exercise. Continue risk factor modification and medical management. Patient agreed with treatment plan. Follow up as directed.     Electronically signedby Zak Knight MD on 6/15/2020 at 2:05 PM

## 2020-06-22 ENCOUNTER — OFFICE VISIT (OUTPATIENT)
Dept: NEPHROLOGY | Age: 75
End: 2020-06-22
Payer: MEDICARE

## 2020-06-22 ENCOUNTER — HOSPITAL ENCOUNTER (OUTPATIENT)
Dept: NON INVASIVE DIAGNOSTICS | Age: 75
Discharge: HOME | End: 2020-06-22
Payer: MEDICARE

## 2020-06-22 VITALS
HEART RATE: 95 BPM | SYSTOLIC BLOOD PRESSURE: 113 MMHG | OXYGEN SATURATION: 97 % | BODY MASS INDEX: 37.45 KG/M2 | TEMPERATURE: 97.4 F | DIASTOLIC BLOOD PRESSURE: 74 MMHG | WEIGHT: 232 LBS

## 2020-06-22 LAB
LV EF: 40 %
LVEF MODALITY: NORMAL

## 2020-06-22 PROCEDURE — 4040F PNEUMOC VAC/ADMIN/RCVD: CPT | Performed by: INTERNAL MEDICINE

## 2020-06-22 PROCEDURE — 1090F PRES/ABSN URINE INCON ASSESS: CPT | Performed by: INTERNAL MEDICINE

## 2020-06-22 PROCEDURE — 1036F TOBACCO NON-USER: CPT | Performed by: INTERNAL MEDICINE

## 2020-06-22 PROCEDURE — 1123F ACP DISCUSS/DSCN MKR DOCD: CPT | Performed by: INTERNAL MEDICINE

## 2020-06-22 PROCEDURE — G8417 CALC BMI ABV UP PARAM F/U: HCPCS | Performed by: INTERNAL MEDICINE

## 2020-06-22 PROCEDURE — 93306 TTE W/DOPPLER COMPLETE: CPT

## 2020-06-22 PROCEDURE — 3017F COLORECTAL CA SCREEN DOC REV: CPT | Performed by: INTERNAL MEDICINE

## 2020-06-22 PROCEDURE — G8400 PT W/DXA NO RESULTS DOC: HCPCS | Performed by: INTERNAL MEDICINE

## 2020-06-22 PROCEDURE — G8427 DOCREV CUR MEDS BY ELIG CLIN: HCPCS | Performed by: INTERNAL MEDICINE

## 2020-06-22 PROCEDURE — 99213 OFFICE O/P EST LOW 20 MIN: CPT | Performed by: INTERNAL MEDICINE

## 2020-06-22 RX ORDER — POTASSIUM CHLORIDE 750 MG/1
10 TABLET, EXTENDED RELEASE ORAL DAILY
Qty: 90 TABLET | Refills: 2 | Status: SHIPPED
Start: 2020-06-22

## 2020-06-22 NOTE — PROGRESS NOTES
[facility]    [facility], [address]   [place], [address]  [phone]  Progress Note  6/22/2020 3:03 PM      Pt Name:    Rica Cadena  YOB: 1945  Primary Care Physician:  Lesvia Florez MD     Chief Complaint:   Chief Complaint   Patient presents with    Follow-up     CCKD III        History of Present Illness: This is a follow-up visit for CKD 3 . Patient doing ok. Blood pressure controlled. Taking lasix 20 mg PRN, states she takes it a few times a month. On KCl 20 meq daily. Chronic SOB, unchanged. Comorbidities include DM for about 40 years, denies retinopathy, +neuropathy,  Afib, previous GI Bleed, hypothyroidism, COPD,  chronic hypoxic respiratory failure on Home O2, history of immune deficiency receiving IVIG, BRITTNY, history of hiatal hernia. Pertinent items are noted in HPI.          Past History:  Past Medical History:   Diagnosis Date    Anemia     Atrial fibrillation ([address] 11/9/2017    CAD (coronary artery disease)     CHF (congestive heart failure) (HCC)     Chronic kidney disease     stage 3 kidney     DDD (degenerative disc disease), lumbar     Depression     Frequent PVCs 12/13/2017    GERD (gastroesophageal reflux disease)     History of blood transfusion     Hx of blood clots 09/2017    pulmonary emboli    Hyperlipidemia     Hypertension     Hyperthyroidism     Movement disorder     DDD    Neuropathy     Obesity     Palpitations 1/10/2020    Pneumonia 2016    sepsis    Sleep apnea     usually wears cpap at night    Status post right and left heart catheterization     Type II or unspecified type diabetes mellitus without mention of complication, not stated as uncontrolled      Past Surgical History:   Procedure Laterality Date    ABDOMEN SURGERY      ACHILLES TENDON SURGERY Bilateral     BLADDER SUSPENSION      CARDIAC SURGERY  2016    heart cath--[facility]    COLONOSCOPY Left 5/11/2018

## 2020-06-23 ENCOUNTER — TELEPHONE (OUTPATIENT)
Dept: CARDIOLOGY CLINIC | Age: 75
End: 2020-06-23

## 2020-06-23 NOTE — TELEPHONE ENCOUNTER
Please see echo         Summary   Ejection fraction is visually estimated at 40%. There was mild global hypokinesis of the left ventricle.

## 2020-07-01 ASSESSMENT — ENCOUNTER SYMPTOMS
SHORTNESS OF BREATH: 1
SORE THROAT: 1
CHOKING: 0
DIARRHEA: 0
NAUSEA: 0
BLOOD IN STOOL: 0
VOMITING: 0
CHEST TIGHTNESS: 0
CONSTIPATION: 0
BACK PAIN: 1
RHINORRHEA: 1
ANAL BLEEDING: 0
COUGH: 0
ABDOMINAL PAIN: 1

## 2020-07-06 ENCOUNTER — HOSPITAL ENCOUNTER (OUTPATIENT)
Dept: NURSING | Age: 75
Discharge: HOME | End: 2020-07-06
Payer: MEDICARE

## 2020-07-06 ENCOUNTER — PROCEDURE VISIT (OUTPATIENT)
Dept: CARDIOLOGY CLINIC | Age: 75
End: 2020-07-06
Payer: MEDICARE

## 2020-07-06 VITALS
HEART RATE: 66 BPM | WEIGHT: 231 LBS | TEMPERATURE: 98.2 F | OXYGEN SATURATION: 96 % | RESPIRATION RATE: 18 BRPM | SYSTOLIC BLOOD PRESSURE: 121 MMHG | BODY MASS INDEX: 37.28 KG/M2 | DIASTOLIC BLOOD PRESSURE: 63 MMHG

## 2020-07-06 DIAGNOSIS — D83.9 COMMON VARIABLE IMMUNODEFICIENCY (HCC): Primary | ICD-10-CM

## 2020-07-06 PROCEDURE — G2066 INTER DEVC REMOTE 30D: HCPCS | Performed by: NUCLEAR MEDICINE

## 2020-07-06 PROCEDURE — 6360000002 HC RX W HCPCS: Performed by: FAMILY MEDICINE

## 2020-07-06 PROCEDURE — 2580000003 HC RX 258: Performed by: FAMILY MEDICINE

## 2020-07-06 PROCEDURE — 99211 OFF/OP EST MAY X REQ PHY/QHP: CPT

## 2020-07-06 PROCEDURE — 96413 CHEMO IV INFUSION 1 HR: CPT

## 2020-07-06 PROCEDURE — 96415 CHEMO IV INFUSION ADDL HR: CPT

## 2020-07-06 PROCEDURE — 93298 REM INTERROG DEV EVAL SCRMS: CPT | Performed by: NUCLEAR MEDICINE

## 2020-07-06 PROCEDURE — 6370000000 HC RX 637 (ALT 250 FOR IP): Performed by: FAMILY MEDICINE

## 2020-07-06 RX ORDER — SODIUM CHLORIDE 9 MG/ML
INJECTION, SOLUTION INTRAVENOUS CONTINUOUS
Status: ACTIVE | OUTPATIENT
Start: 2020-07-06 | End: 2020-07-06

## 2020-07-06 RX ORDER — METHYLPREDNISOLONE SODIUM SUCCINATE 125 MG/2ML
125 INJECTION, POWDER, LYOPHILIZED, FOR SOLUTION INTRAMUSCULAR; INTRAVENOUS ONCE
Status: CANCELLED | OUTPATIENT
Start: 2020-07-27

## 2020-07-06 RX ORDER — DIPHENHYDRAMINE HCL 25 MG
25 TABLET ORAL ONCE
Status: CANCELLED | OUTPATIENT
Start: 2020-07-27

## 2020-07-06 RX ORDER — ACETAMINOPHEN 325 MG/1
650 TABLET ORAL ONCE
Status: CANCELLED | OUTPATIENT
Start: 2020-07-27

## 2020-07-06 RX ORDER — DIPHENHYDRAMINE HCL 25 MG
25 TABLET ORAL ONCE
Status: COMPLETED | OUTPATIENT
Start: 2020-07-06 | End: 2020-07-06

## 2020-07-06 RX ORDER — SODIUM CHLORIDE 9 MG/ML
INJECTION, SOLUTION INTRAVENOUS CONTINUOUS
Status: CANCELLED | OUTPATIENT
Start: 2020-07-27

## 2020-07-06 RX ORDER — ACETAMINOPHEN 325 MG/1
650 TABLET ORAL ONCE
Status: COMPLETED | OUTPATIENT
Start: 2020-07-06 | End: 2020-07-06

## 2020-07-06 RX ORDER — DIPHENHYDRAMINE HYDROCHLORIDE 50 MG/ML
50 INJECTION INTRAMUSCULAR; INTRAVENOUS ONCE
Status: CANCELLED | OUTPATIENT
Start: 2020-07-27

## 2020-07-06 RX ORDER — HEPARIN SODIUM (PORCINE) LOCK FLUSH IV SOLN 100 UNIT/ML 100 UNIT/ML
500 SOLUTION INTRAVENOUS PRN
Status: DISCONTINUED | OUTPATIENT
Start: 2020-07-06 | End: 2020-07-07 | Stop reason: HOSPADM

## 2020-07-06 RX ORDER — SODIUM CHLORIDE 0.9 % (FLUSH) 0.9 %
5 SYRINGE (ML) INJECTION PRN
Status: CANCELLED | OUTPATIENT
Start: 2020-07-27

## 2020-07-06 RX ORDER — SODIUM CHLORIDE 0.9 % (FLUSH) 0.9 %
10 SYRINGE (ML) INJECTION PRN
Status: DISCONTINUED | OUTPATIENT
Start: 2020-07-06 | End: 2020-07-07 | Stop reason: HOSPADM

## 2020-07-06 RX ORDER — SODIUM CHLORIDE 0.9 % (FLUSH) 0.9 %
10 SYRINGE (ML) INJECTION PRN
Status: CANCELLED | OUTPATIENT
Start: 2020-07-27

## 2020-07-06 RX ORDER — HEPARIN SODIUM (PORCINE) LOCK FLUSH IV SOLN 100 UNIT/ML 100 UNIT/ML
500 SOLUTION INTRAVENOUS PRN
Status: CANCELLED | OUTPATIENT
Start: 2020-07-27

## 2020-07-06 RX ADMIN — IMMUNE GLOBULIN (HUMAN) 20 G: 10 INJECTION INTRAVENOUS; SUBCUTANEOUS at 11:18

## 2020-07-06 RX ADMIN — IMMUNE GLOBULIN (HUMAN) 5 G: 10 INJECTION INTRAVENOUS; SUBCUTANEOUS at 10:44

## 2020-07-06 RX ADMIN — Medication 10 ML: at 10:23

## 2020-07-06 RX ADMIN — Medication 10 ML: at 12:11

## 2020-07-06 RX ADMIN — DIPHENHYDRAMINE HCL 25 MG: 25 TABLET ORAL at 10:18

## 2020-07-06 RX ADMIN — ACETAMINOPHEN 650 MG: 325 TABLET ORAL at 10:18

## 2020-07-06 RX ADMIN — HEPARIN 500 UNITS: 100 SYRINGE at 12:11

## 2020-07-06 ASSESSMENT — PAIN SCALES - GENERAL: PAINLEVEL_OUTOF10: 0

## 2020-07-06 NOTE — PROGRESS NOTES
1006 pt arrives to [facility] for IVIG infusion. All quesitons and concerns addressed  1008 PATIENT RIGHTS AND RESPONSIBILITIES SHEET OFFERED TO PT TO READ.   1020 call light in reach, side rail up x1 refreshments given  1100 resting, denies needs  1115 __m__ Safety:       (Environmental)   [place] to environment   Ensure ID band is correct and in place/ allergy band as needed   Assess for fall risk   Initiate fall precautions as applicable (fall band, side rails, etc.)   Call light within reach   Bed in low position/ wheels locked    __m__ Pain:        Assess pain level and characteristics   Administer analgesics as ordered   Assess effectiveness of pain management and report to MD as needed    __m__ Knowledge Deficit:   Assess baseline knowledge   Provide teaching at level of understanding   Provide teaching via preferred learning method   Evaluate teaching effectiveness    _m___ Hemodynamic/Respiratory Status:       (Pre and Post Procedure Monitoring)   Assess/Monitor vital signs and LOC   Assess Baseline SpO2 prior to any sedation   Obtain weight/height   Assess vital signs/ LOC until patient meets discharge criteria   Monitor procedure site and notify MD of any issues    _m___ Infection-Risk of Central Venous Catheter:   Monitor for infection signs and symptoms (catheter site redness, temperature elevation, etc)   Assess for infection risks   Educate regarding infection prevention   Manage central venous catheter (flushes/ dressing changes per protocol)

## 2020-08-07 ENCOUNTER — HOSPITAL ENCOUNTER (OUTPATIENT)
Dept: NURSING | Age: 75
Discharge: HOME | End: 2020-08-07
Payer: MEDICARE

## 2020-08-07 VITALS
BODY MASS INDEX: 37.28 KG/M2 | WEIGHT: 231 LBS | TEMPERATURE: 97.8 F | RESPIRATION RATE: 16 BRPM | SYSTOLIC BLOOD PRESSURE: 121 MMHG | HEART RATE: 77 BPM | DIASTOLIC BLOOD PRESSURE: 66 MMHG

## 2020-08-07 DIAGNOSIS — D83.9 COMMON VARIABLE IMMUNODEFICIENCY (HCC): Primary | ICD-10-CM

## 2020-08-07 LAB
IGA: 103 MG/DL (ref 70–400)
IGG: 1013 MG/DL (ref 700–1600)
IGM: 48 MG/DL (ref 40–230)

## 2020-08-07 PROCEDURE — 96413 CHEMO IV INFUSION 1 HR: CPT

## 2020-08-07 PROCEDURE — 2580000003 HC RX 258: Performed by: FAMILY MEDICINE

## 2020-08-07 PROCEDURE — 99211 OFF/OP EST MAY X REQ PHY/QHP: CPT

## 2020-08-07 PROCEDURE — 36591 DRAW BLOOD OFF VENOUS DEVICE: CPT

## 2020-08-07 PROCEDURE — 96365 THER/PROPH/DIAG IV INF INIT: CPT

## 2020-08-07 PROCEDURE — 82784 ASSAY IGA/IGD/IGG/IGM EACH: CPT

## 2020-08-07 PROCEDURE — 6360000002 HC RX W HCPCS: Performed by: FAMILY MEDICINE

## 2020-08-07 RX ORDER — HEPARIN SODIUM (PORCINE) LOCK FLUSH IV SOLN 100 UNIT/ML 100 UNIT/ML
500 SOLUTION INTRAVENOUS PRN
Status: CANCELLED | OUTPATIENT
Start: 2020-09-04

## 2020-08-07 RX ORDER — METHYLPREDNISOLONE SODIUM SUCCINATE 125 MG/2ML
125 INJECTION, POWDER, LYOPHILIZED, FOR SOLUTION INTRAMUSCULAR; INTRAVENOUS ONCE
Status: CANCELLED | OUTPATIENT
Start: 2020-09-04

## 2020-08-07 RX ORDER — DIPHENHYDRAMINE HYDROCHLORIDE 50 MG/ML
50 INJECTION INTRAMUSCULAR; INTRAVENOUS ONCE
Status: CANCELLED | OUTPATIENT
Start: 2020-09-04

## 2020-08-07 RX ORDER — HEPARIN SODIUM (PORCINE) LOCK FLUSH IV SOLN 100 UNIT/ML 100 UNIT/ML
500 SOLUTION INTRAVENOUS PRN
Status: DISCONTINUED | OUTPATIENT
Start: 2020-08-07 | End: 2020-08-08 | Stop reason: HOSPADM

## 2020-08-07 RX ORDER — SODIUM CHLORIDE 9 MG/ML
INJECTION, SOLUTION INTRAVENOUS CONTINUOUS
Status: CANCELLED | OUTPATIENT
Start: 2020-09-04

## 2020-08-07 RX ORDER — SODIUM CHLORIDE 0.9 % (FLUSH) 0.9 %
5 SYRINGE (ML) INJECTION PRN
Status: CANCELLED | OUTPATIENT
Start: 2020-09-04

## 2020-08-07 RX ORDER — DIPHENHYDRAMINE HCL 25 MG
25 TABLET ORAL ONCE
Status: CANCELLED | OUTPATIENT
Start: 2020-09-04

## 2020-08-07 RX ORDER — SODIUM CHLORIDE 0.9 % (FLUSH) 0.9 %
10 SYRINGE (ML) INJECTION PRN
Status: DISCONTINUED | OUTPATIENT
Start: 2020-08-07 | End: 2020-08-08 | Stop reason: HOSPADM

## 2020-08-07 RX ORDER — SODIUM CHLORIDE 0.9 % (FLUSH) 0.9 %
10 SYRINGE (ML) INJECTION PRN
Status: CANCELLED | OUTPATIENT
Start: 2020-09-04

## 2020-08-07 RX ORDER — ACETAMINOPHEN 325 MG/1
650 TABLET ORAL ONCE
Status: CANCELLED | OUTPATIENT
Start: 2020-09-04

## 2020-08-07 RX ADMIN — Medication 10 ML: at 11:22

## 2020-08-07 RX ADMIN — IMMUNE GLOBULIN (HUMAN) 20 G: 10 INJECTION INTRAVENOUS; SUBCUTANEOUS at 10:27

## 2020-08-07 RX ADMIN — IMMUNE GLOBULIN (HUMAN) 5 G: 10 INJECTION INTRAVENOUS; SUBCUTANEOUS at 09:59

## 2020-08-07 RX ADMIN — HEPARIN 500 UNITS: 100 SYRINGE at 11:22

## 2020-08-07 NOTE — PROGRESS NOTES
0945 pt arrives to [facility] for IVIG infusion. All questions and concerns addressed  0946 PATIENT RIGHTS AND RESPONSIBILITIES SHEET OFFERED TO PT TO READ.  4063 call light in reach, side rail up x1.  Lunch given  1030 denies needs, call light in reach  1115 _m___ Safety:       (Environmental)  [place] to environment   Ensure ID band is correct and in place/ allergy band as needed   Assess for fall risk   Initiate fall precautions as applicable (fall band, side rails, etc.)   Call light within reach   Bed in low position/ wheels locked    __m__ Pain:        Assess pain level and characteristics   Administer analgesics as ordered   Assess effectiveness of pain management and report to MD as needed    _m___ Knowledge Deficit:   Assess baseline knowledge   Provide teaching at level of understanding   Provide teaching via preferred learning method   Evaluate teaching effectiveness    __m__ Hemodynamic/Respiratory Status:       (Pre and Post Procedure Monitoring)   Assess/Monitor vital signs and LOC   Assess Baseline SpO2 prior to any sedation   Obtain weight/height   Assess vital signs/ LOC until patient meets discharge criteria   Monitor procedure site and notify MD of any issues    _m___ Infection-Risk of Central Venous Catheter:   Monitor for infection signs and symptoms (catheter site redness, temperature elevation, etc)   Assess for infection risks   Educate regarding infection prevention   Manage central venous catheter (flushes/ dressing changes per protocol)    1125 WRITTEN DISCHARGE INSTRUCTIONS GIVEN TO PT-VERBALIZES UNDERSTANDING  1130 PT DISCHARGED AMBULATORY IN SATISFACTORY CONDITION

## 2020-08-10 ENCOUNTER — PROCEDURE VISIT (OUTPATIENT)
Dept: CARDIOLOGY CLINIC | Age: 75
End: 2020-08-10
Payer: MEDICARE

## 2020-08-10 PROCEDURE — 93298 REM INTERROG DEV EVAL SCRMS: CPT | Performed by: NUCLEAR MEDICINE

## 2020-08-10 PROCEDURE — G2066 INTER DEVC REMOTE 30D: HCPCS | Performed by: NUCLEAR MEDICINE

## 2020-08-25 LAB — SARS-COV-2: NORMAL

## 2020-09-04 ENCOUNTER — HOSPITAL ENCOUNTER (OUTPATIENT)
Dept: NURSING | Age: 75
Discharge: HOME | End: 2020-09-04
Payer: MEDICARE

## 2020-09-04 VITALS
HEART RATE: 72 BPM | OXYGEN SATURATION: 86 % | WEIGHT: 231 LBS | TEMPERATURE: 98.7 F | DIASTOLIC BLOOD PRESSURE: 70 MMHG | RESPIRATION RATE: 16 BRPM | BODY MASS INDEX: 37.28 KG/M2 | SYSTOLIC BLOOD PRESSURE: 138 MMHG

## 2020-09-04 DIAGNOSIS — D83.9 COMMON VARIABLE IMMUNODEFICIENCY (HCC): Primary | ICD-10-CM

## 2020-09-04 PROCEDURE — 96413 CHEMO IV INFUSION 1 HR: CPT

## 2020-09-04 PROCEDURE — 6360000002 HC RX W HCPCS: Performed by: FAMILY MEDICINE

## 2020-09-04 PROCEDURE — 2580000003 HC RX 258: Performed by: FAMILY MEDICINE

## 2020-09-04 PROCEDURE — 99211 OFF/OP EST MAY X REQ PHY/QHP: CPT

## 2020-09-04 RX ORDER — ACETAMINOPHEN 325 MG/1
650 TABLET ORAL ONCE
Status: CANCELLED | OUTPATIENT
Start: 2020-10-02

## 2020-09-04 RX ORDER — METHYLPREDNISOLONE SODIUM SUCCINATE 125 MG/2ML
125 INJECTION, POWDER, LYOPHILIZED, FOR SOLUTION INTRAMUSCULAR; INTRAVENOUS ONCE
Status: CANCELLED | OUTPATIENT
Start: 2020-10-02

## 2020-09-04 RX ORDER — HEPARIN SODIUM (PORCINE) LOCK FLUSH IV SOLN 100 UNIT/ML 100 UNIT/ML
500 SOLUTION INTRAVENOUS PRN
Status: CANCELLED | OUTPATIENT
Start: 2020-10-02

## 2020-09-04 RX ORDER — SODIUM CHLORIDE 0.9 % (FLUSH) 0.9 %
5 SYRINGE (ML) INJECTION PRN
Status: CANCELLED | OUTPATIENT
Start: 2020-10-02

## 2020-09-04 RX ORDER — SODIUM CHLORIDE 9 MG/ML
INJECTION, SOLUTION INTRAVENOUS CONTINUOUS
Status: CANCELLED | OUTPATIENT
Start: 2020-10-02

## 2020-09-04 RX ORDER — DIPHENHYDRAMINE HYDROCHLORIDE 50 MG/ML
50 INJECTION INTRAMUSCULAR; INTRAVENOUS ONCE
Status: CANCELLED | OUTPATIENT
Start: 2020-10-02

## 2020-09-04 RX ORDER — DIPHENHYDRAMINE HCL 25 MG
25 TABLET ORAL ONCE
Status: DISCONTINUED | OUTPATIENT
Start: 2020-09-04 | End: 2020-09-05 | Stop reason: HOSPADM

## 2020-09-04 RX ORDER — DIPHENHYDRAMINE HCL 25 MG
25 TABLET ORAL ONCE
Status: CANCELLED | OUTPATIENT
Start: 2020-10-02

## 2020-09-04 RX ORDER — SODIUM CHLORIDE 0.9 % (FLUSH) 0.9 %
10 SYRINGE (ML) INJECTION PRN
Status: CANCELLED | OUTPATIENT
Start: 2020-10-02

## 2020-09-04 RX ORDER — ACETAMINOPHEN 325 MG/1
650 TABLET ORAL ONCE
Status: DISCONTINUED | OUTPATIENT
Start: 2020-09-04 | End: 2020-09-05 | Stop reason: HOSPADM

## 2020-09-04 RX ORDER — HEPARIN SODIUM (PORCINE) LOCK FLUSH IV SOLN 100 UNIT/ML 100 UNIT/ML
500 SOLUTION INTRAVENOUS PRN
Status: DISCONTINUED | OUTPATIENT
Start: 2020-09-04 | End: 2020-09-05 | Stop reason: HOSPADM

## 2020-09-04 RX ORDER — SODIUM CHLORIDE 0.9 % (FLUSH) 0.9 %
10 SYRINGE (ML) INJECTION PRN
Status: DISCONTINUED | OUTPATIENT
Start: 2020-09-04 | End: 2020-09-05 | Stop reason: HOSPADM

## 2020-09-04 RX ADMIN — HEPARIN 500 UNITS: 100 SYRINGE at 11:28

## 2020-09-04 RX ADMIN — Medication 10 ML: at 10:01

## 2020-09-04 RX ADMIN — IMMUNE GLOBULIN (HUMAN) 20 G: 10 INJECTION INTRAVENOUS; SUBCUTANEOUS at 10:33

## 2020-09-04 RX ADMIN — Medication 10 ML: at 11:28

## 2020-09-04 RX ADMIN — IMMUNE GLOBULIN (HUMAN) 5 G: 10 INJECTION INTRAVENOUS; SUBCUTANEOUS at 10:02

## 2020-09-04 ASSESSMENT — PAIN SCALES - GENERAL: PAINLEVEL_OUTOF10: 0

## 2020-09-04 ASSESSMENT — PAIN - FUNCTIONAL ASSESSMENT: PAIN_FUNCTIONAL_ASSESSMENT: 0-10

## 2020-09-04 NOTE — PROGRESS NOTES
1000 Patient stated took tylenol and benadryl at home  1002 IVIG started as ordered  1140 Discharge instructions given to patient discharged to home in stable condition    _m___ Safety:       (Environmental)  [place] to environment   Ensure ID band is correct and in place/ allergy band as needed   Assess for fall risk   Initiate fall precautions as applicable (fall band, side rails, etc.)   Call light within reach   Bed in low position/ wheels locked    _m___ Pain:        Assess pain level and characteristics   Administer analgesics as ordered   Assess effectiveness of pain management and report to MD as needed    _m___ Knowledge Deficit:   Assess baseline knowledge   Provide teaching at level of understanding   Provide teaching via preferred learning method   Evaluate teaching effectiveness    m____ Hemodynamic/Respiratory Status:       (Pre and Post Procedure Monitoring)   Assess/Monitor vital signs and LOC   Assess Baseline SpO2 prior to any sedation   Obtain weight/height   Assess vital signs/ LOC until patient meets discharge criteria   Monitor procedure site and notify MD of any issues    m____ Infection-Risk of Central Venous Catheter:   Monitor for infection signs and symptoms (catheter site redness, temperature elevation, etc)   Assess for infection risks   Educate regarding infection prevention   Manage central venous catheter (flushes/ dressing changes per protocol)

## 2020-09-04 NOTE — PROGRESS NOTES
1241  Patient ambulated into room for IVIG infusion. Patient rights and responsibilities offered to patient. Daughter at bedside.

## 2020-09-14 ENCOUNTER — PROCEDURE VISIT (OUTPATIENT)
Dept: CARDIOLOGY CLINIC | Age: 75
End: 2020-09-14
Payer: MEDICARE

## 2020-09-14 PROCEDURE — 93298 REM INTERROG DEV EVAL SCRMS: CPT | Performed by: NUCLEAR MEDICINE

## 2020-09-14 PROCEDURE — G2066 INTER DEVC REMOTE 30D: HCPCS | Performed by: NUCLEAR MEDICINE

## 2020-10-02 ENCOUNTER — HOSPITAL ENCOUNTER (OUTPATIENT)
Dept: NURSING | Age: 75
Discharge: HOME | End: 2020-10-02
Payer: MEDICARE

## 2020-10-02 VITALS
RESPIRATION RATE: 16 BRPM | DIASTOLIC BLOOD PRESSURE: 74 MMHG | OXYGEN SATURATION: 92 % | TEMPERATURE: 98.4 F | SYSTOLIC BLOOD PRESSURE: 151 MMHG | HEART RATE: 74 BPM | BODY MASS INDEX: 37.28 KG/M2 | WEIGHT: 231 LBS

## 2020-10-02 DIAGNOSIS — D83.9 COMMON VARIABLE IMMUNODEFICIENCY (HCC): Primary | ICD-10-CM

## 2020-10-02 PROCEDURE — 96365 THER/PROPH/DIAG IV INF INIT: CPT

## 2020-10-02 PROCEDURE — 96413 CHEMO IV INFUSION 1 HR: CPT

## 2020-10-02 PROCEDURE — 6360000002 HC RX W HCPCS: Performed by: FAMILY MEDICINE

## 2020-10-02 PROCEDURE — 2580000003 HC RX 258: Performed by: FAMILY MEDICINE

## 2020-10-02 RX ORDER — SODIUM CHLORIDE 9 MG/ML
INJECTION, SOLUTION INTRAVENOUS CONTINUOUS
Status: CANCELLED | OUTPATIENT
Start: 2020-10-30

## 2020-10-02 RX ORDER — DIPHENHYDRAMINE HYDROCHLORIDE 50 MG/ML
50 INJECTION INTRAMUSCULAR; INTRAVENOUS ONCE
Status: CANCELLED | OUTPATIENT
Start: 2020-10-30

## 2020-10-02 RX ORDER — SODIUM CHLORIDE 0.9 % (FLUSH) 0.9 %
5 SYRINGE (ML) INJECTION PRN
Status: CANCELLED | OUTPATIENT
Start: 2020-10-30

## 2020-10-02 RX ORDER — DIPHENHYDRAMINE HCL 25 MG
25 TABLET ORAL ONCE
Status: CANCELLED | OUTPATIENT
Start: 2020-10-30

## 2020-10-02 RX ORDER — ACETAMINOPHEN 325 MG/1
650 TABLET ORAL ONCE
Status: CANCELLED | OUTPATIENT
Start: 2020-10-30

## 2020-10-02 RX ORDER — METHYLPREDNISOLONE SODIUM SUCCINATE 125 MG/2ML
125 INJECTION, POWDER, LYOPHILIZED, FOR SOLUTION INTRAMUSCULAR; INTRAVENOUS ONCE
Status: CANCELLED | OUTPATIENT
Start: 2020-10-30

## 2020-10-02 RX ORDER — ACETAMINOPHEN 325 MG/1
650 TABLET ORAL ONCE
Status: DISCONTINUED | OUTPATIENT
Start: 2020-10-02 | End: 2020-10-03 | Stop reason: HOSPADM

## 2020-10-02 RX ORDER — HEPARIN SODIUM (PORCINE) LOCK FLUSH IV SOLN 100 UNIT/ML 100 UNIT/ML
500 SOLUTION INTRAVENOUS PRN
Status: DISCONTINUED | OUTPATIENT
Start: 2020-10-02 | End: 2020-10-03 | Stop reason: HOSPADM

## 2020-10-02 RX ORDER — SODIUM CHLORIDE 0.9 % (FLUSH) 0.9 %
10 SYRINGE (ML) INJECTION PRN
Status: DISCONTINUED | OUTPATIENT
Start: 2020-10-02 | End: 2020-10-03 | Stop reason: HOSPADM

## 2020-10-02 RX ORDER — SODIUM CHLORIDE 0.9 % (FLUSH) 0.9 %
10 SYRINGE (ML) INJECTION PRN
Status: CANCELLED | OUTPATIENT
Start: 2020-10-30

## 2020-10-02 RX ORDER — HEPARIN SODIUM (PORCINE) LOCK FLUSH IV SOLN 100 UNIT/ML 100 UNIT/ML
500 SOLUTION INTRAVENOUS PRN
Status: CANCELLED | OUTPATIENT
Start: 2020-10-30

## 2020-10-02 RX ORDER — DIPHENHYDRAMINE HCL 25 MG
25 TABLET ORAL ONCE
Status: DISCONTINUED | OUTPATIENT
Start: 2020-10-02 | End: 2020-10-03 | Stop reason: HOSPADM

## 2020-10-02 RX ADMIN — Medication 10 ML: at 11:34

## 2020-10-02 RX ADMIN — IMMUNE GLOBULIN (HUMAN) 20 G: 10 INJECTION INTRAVENOUS; SUBCUTANEOUS at 10:44

## 2020-10-02 RX ADMIN — HEPARIN 500 UNITS: 100 SYRINGE at 11:34

## 2020-10-02 RX ADMIN — IMMUNE GLOBULIN (HUMAN) 5 G: 10 INJECTION INTRAVENOUS; SUBCUTANEOUS at 10:15

## 2020-10-02 ASSESSMENT — PAIN - FUNCTIONAL ASSESSMENT: PAIN_FUNCTIONAL_ASSESSMENT: 0-10

## 2020-10-02 NOTE — PROGRESS NOTES
__m__ Safety:       (Environmental)   [place] to environment   Ensure ID band is correct and in place/ allergy band as needed   Assess for fall risk   Initiate fall precautions as applicable (fall band, side rails, etc.)   Call light within reach   Bed in low position/ wheels locked    _m___ Pain:        Assess pain level and characteristics   Administer analgesics as ordered   Assess effectiveness of pain management and report to MD as needed    ___m_ Knowledge Deficit:   Assess baseline knowledge   Provide teaching at level of understanding   Provide teaching via preferred learning method   Evaluate teaching effectiveness    _m___ Hemodynamic/Respiratory Status:       (Pre and Post Procedure Monitoring)   Assess/Monitor vital signs and LOC   Assess Baseline SpO2 prior to any sedation   Obtain weight/height   Assess vital signs/ LOC until patient meets discharge criteria   Monitor procedure site and notify MD of any issues    __m__ Infection-Risk of Central Venous Catheter:   Monitor for infection signs and symptoms (catheter site redness, temperature elevation, etc)   Assess for infection risks   Educate regarding infection prevention   Manage central venous catheter (flushes/ dressing changes per protocol)

## 2020-10-19 ENCOUNTER — PROCEDURE VISIT (OUTPATIENT)
Dept: CARDIOLOGY CLINIC | Age: 75
End: 2020-10-19
Payer: MEDICARE

## 2020-10-19 PROCEDURE — 93298 REM INTERROG DEV EVAL SCRMS: CPT | Performed by: NUCLEAR MEDICINE

## 2020-10-19 PROCEDURE — G2066 INTER DEVC REMOTE 30D: HCPCS | Performed by: NUCLEAR MEDICINE

## 2020-10-30 ENCOUNTER — HOSPITAL ENCOUNTER (OUTPATIENT)
Dept: NURSING | Age: 75
Discharge: HOME | End: 2020-10-30
Payer: MEDICARE

## 2020-10-30 VITALS
RESPIRATION RATE: 18 BRPM | WEIGHT: 231 LBS | DIASTOLIC BLOOD PRESSURE: 68 MMHG | BODY MASS INDEX: 37.28 KG/M2 | HEART RATE: 82 BPM | OXYGEN SATURATION: 94 % | SYSTOLIC BLOOD PRESSURE: 159 MMHG | TEMPERATURE: 97.5 F

## 2020-10-30 DIAGNOSIS — D83.9 COMMON VARIABLE IMMUNODEFICIENCY (HCC): Primary | ICD-10-CM

## 2020-10-30 PROCEDURE — 96413 CHEMO IV INFUSION 1 HR: CPT

## 2020-10-30 PROCEDURE — 6360000002 HC RX W HCPCS: Performed by: FAMILY MEDICINE

## 2020-10-30 PROCEDURE — 2580000003 HC RX 258: Performed by: FAMILY MEDICINE

## 2020-10-30 PROCEDURE — 6370000000 HC RX 637 (ALT 250 FOR IP): Performed by: FAMILY MEDICINE

## 2020-10-30 RX ORDER — ACETAMINOPHEN 325 MG/1
650 TABLET ORAL ONCE
Status: CANCELLED | OUTPATIENT
Start: 2020-11-27

## 2020-10-30 RX ORDER — SODIUM CHLORIDE 0.9 % (FLUSH) 0.9 %
10 SYRINGE (ML) INJECTION PRN
Status: DISCONTINUED | OUTPATIENT
Start: 2020-10-30 | End: 2020-10-31 | Stop reason: HOSPADM

## 2020-10-30 RX ORDER — SODIUM CHLORIDE 0.9 % (FLUSH) 0.9 %
10 SYRINGE (ML) INJECTION PRN
Status: CANCELLED | OUTPATIENT
Start: 2020-11-27

## 2020-10-30 RX ORDER — SODIUM CHLORIDE 9 MG/ML
INJECTION, SOLUTION INTRAVENOUS CONTINUOUS
Status: CANCELLED | OUTPATIENT
Start: 2020-11-27

## 2020-10-30 RX ORDER — METHYLPREDNISOLONE SODIUM SUCCINATE 125 MG/2ML
125 INJECTION, POWDER, LYOPHILIZED, FOR SOLUTION INTRAMUSCULAR; INTRAVENOUS ONCE
Status: CANCELLED | OUTPATIENT
Start: 2020-11-27

## 2020-10-30 RX ORDER — SODIUM CHLORIDE 0.9 % (FLUSH) 0.9 %
5 SYRINGE (ML) INJECTION PRN
Status: CANCELLED | OUTPATIENT
Start: 2020-11-27

## 2020-10-30 RX ORDER — ACETAMINOPHEN 325 MG/1
650 TABLET ORAL ONCE
Status: COMPLETED | OUTPATIENT
Start: 2020-10-30 | End: 2020-10-30

## 2020-10-30 RX ORDER — DIPHENHYDRAMINE HYDROCHLORIDE 50 MG/ML
50 INJECTION INTRAMUSCULAR; INTRAVENOUS ONCE
Status: CANCELLED | OUTPATIENT
Start: 2020-11-27

## 2020-10-30 RX ORDER — HEPARIN SODIUM (PORCINE) LOCK FLUSH IV SOLN 100 UNIT/ML 100 UNIT/ML
500 SOLUTION INTRAVENOUS PRN
Status: CANCELLED | OUTPATIENT
Start: 2020-11-27

## 2020-10-30 RX ORDER — DIPHENHYDRAMINE HCL 25 MG
25 TABLET ORAL ONCE
Status: COMPLETED | OUTPATIENT
Start: 2020-10-30 | End: 2020-10-30

## 2020-10-30 RX ORDER — HEPARIN SODIUM (PORCINE) LOCK FLUSH IV SOLN 100 UNIT/ML 100 UNIT/ML
500 SOLUTION INTRAVENOUS PRN
Status: DISCONTINUED | OUTPATIENT
Start: 2020-10-30 | End: 2020-10-31 | Stop reason: HOSPADM

## 2020-10-30 RX ORDER — DIPHENHYDRAMINE HCL 25 MG
25 TABLET ORAL ONCE
Status: CANCELLED | OUTPATIENT
Start: 2020-11-27

## 2020-10-30 RX ADMIN — Medication 10 ML: at 11:30

## 2020-10-30 RX ADMIN — IMMUNE GLOBULIN (HUMAN) 20 G: 10 INJECTION INTRAVENOUS; SUBCUTANEOUS at 10:28

## 2020-10-30 RX ADMIN — ACETAMINOPHEN 650 MG: 325 TABLET ORAL at 09:36

## 2020-10-30 RX ADMIN — HEPARIN 500 UNITS: 100 SYRINGE at 11:30

## 2020-10-30 RX ADMIN — DIPHENHYDRAMINE HCL 25 MG: 25 TABLET ORAL at 09:36

## 2020-10-30 RX ADMIN — IMMUNE GLOBULIN (HUMAN) 5 G: 10 INJECTION INTRAVENOUS; SUBCUTANEOUS at 10:00

## 2020-10-30 ASSESSMENT — PAIN SCALES - GENERAL: PAINLEVEL_OUTOF10: 0

## 2020-10-30 ASSESSMENT — PAIN - FUNCTIONAL ASSESSMENT: PAIN_FUNCTIONAL_ASSESSMENT: 0-10

## 2020-10-30 NOTE — PROGRESS NOTES
__m__ Safety:       (Environmental)   [place] to environment   Ensure ID band is correct and in place/ allergy band as needed   Assess for fall risk   Initiate fall precautions as applicable (fall band, side rails, etc.)   Call light within reach   Bed in low position/ wheels locked    _m___ Pain:        Assess pain level and characteristics   Administer analgesics as ordered   Assess effectiveness of pain management and report to MD as needed    __m__ Knowledge Deficit:   Assess baseline knowledge   Provide teaching at level of understanding   Provide teaching via preferred learning method   Evaluate teaching effectiveness    __m__ Hemodynamic/Respiratory Status:       (Pre and Post Procedure Monitoring)   Assess/Monitor vital signs and LOC   Assess Baseline SpO2 prior to any sedation   Obtain weight/height   Assess vital signs/ LOC until patient meets discharge criteria   Monitor procedure site and notify MD of any issues    __m__ Infection-Risk of Central Venous Catheter:   Monitor for infection signs and symptoms (catheter site redness, temperature elevation, etc)   Assess for infection risks   Educate regarding infection prevention   Manage central venous catheter (flushes/ dressing changes per protocol)

## 2020-11-10 ENCOUNTER — OFFICE VISIT (OUTPATIENT)
Dept: ENT CLINIC | Age: 75
End: 2020-11-10
Payer: MEDICARE

## 2020-11-10 ENCOUNTER — HOSPITAL ENCOUNTER (OUTPATIENT)
Dept: AUDIOLOGY | Age: 75
Discharge: HOME | End: 2020-11-10
Payer: MEDICARE

## 2020-11-10 VITALS
HEART RATE: 72 BPM | WEIGHT: 227 LBS | TEMPERATURE: 97.3 F | RESPIRATION RATE: 14 BRPM | BODY MASS INDEX: 36.64 KG/M2 | SYSTOLIC BLOOD PRESSURE: 124 MMHG | DIASTOLIC BLOOD PRESSURE: 80 MMHG

## 2020-11-10 PROCEDURE — G8417 CALC BMI ABV UP PARAM F/U: HCPCS | Performed by: PHYSICIAN ASSISTANT

## 2020-11-10 PROCEDURE — 92557 COMPREHENSIVE HEARING TEST: CPT | Performed by: AUDIOLOGIST

## 2020-11-10 PROCEDURE — G8400 PT W/DXA NO RESULTS DOC: HCPCS | Performed by: PHYSICIAN ASSISTANT

## 2020-11-10 PROCEDURE — 99212 OFFICE O/P EST SF 10 MIN: CPT | Performed by: PHYSICIAN ASSISTANT

## 2020-11-10 PROCEDURE — 1123F ACP DISCUSS/DSCN MKR DOCD: CPT | Performed by: PHYSICIAN ASSISTANT

## 2020-11-10 PROCEDURE — G8484 FLU IMMUNIZE NO ADMIN: HCPCS | Performed by: PHYSICIAN ASSISTANT

## 2020-11-10 PROCEDURE — 1090F PRES/ABSN URINE INCON ASSESS: CPT | Performed by: PHYSICIAN ASSISTANT

## 2020-11-10 PROCEDURE — 1036F TOBACCO NON-USER: CPT | Performed by: PHYSICIAN ASSISTANT

## 2020-11-10 PROCEDURE — 4040F PNEUMOC VAC/ADMIN/RCVD: CPT | Performed by: PHYSICIAN ASSISTANT

## 2020-11-10 PROCEDURE — 92567 TYMPANOMETRY: CPT | Performed by: AUDIOLOGIST

## 2020-11-10 PROCEDURE — G8427 DOCREV CUR MEDS BY ELIG CLIN: HCPCS | Performed by: PHYSICIAN ASSISTANT

## 2020-11-10 NOTE — PROGRESS NOTES
AUDIOLOGICAL EVALUATION      REASON FOR TESTING:  Monitor sensorineural hearing loss. 08/19/19 audiogram indicated normal hearing through 1000 Hz sloping to a mild to severe sensorineural hearing loss bilaterally. The patient has not noticed any change in hearing. Continues to have some pain in the left ear. OTOSCOPY: Clear canal, bilaterally. AUDIOGRAM        Reliability: Good  Audiometer Used:  [id number]        COMMENTS: Audiometry revealed a slight to mild sensorineural hearing loss at 250-4000 Hz sloping to a moderate sensorineural hearing loss at 1248-4870 Hz with good word recognition in each ear. The patient's hearing has decreased slightly (5 dB at most thresholds) in both ears. Word understanding ability has decreased in both ears. Tympanometry revealed normal middle ear function in both ears. RECOMMENDATION(S): 1- Continue care with CAROLINA Merritt today, as scheduled. 2- Repeat audiogram and tympanogram annual or sooner if any changes are noted in hearing ability. 3- Hearing aids could be pursued if desired.

## 2020-11-10 NOTE — PROGRESS NOTES
[address]  [place] [address] [facility] [address]  Dept: [phone]  Dept Fax: [phone]  Loc: [phone]    Sandi Palma is a 67 y.o. female who was referred by No ref. provider found for:  Chief Complaint   Patient presents with    Hearing Aid Check     Patient is here for audiogram results   . HPI:     The patient presents for audiogram results. The patient reports that she has been doing relatively well since she was last seen. She denies any changes in her hearing. She reports chronic tinnitus which is unchanged. The patient reports that about once a month her left ear will feel sore for 1 to 2 hours, sometimes shorter. She states that the pain is self-limiting. She denies any changes in her tinnitus and hearing during the pain. No reported fevers, chills, vertigo. She denies any other concerns at this time. Subjective:        Review of Systems   All other systems reviewed and are negative. Past Medical History:  Past Medical History:   Diagnosis Date    Anemia     Atrial fibrillation ([address] 11/9/2017    CAD (coronary artery disease)     CHF (congestive heart failure) (HCC)     Chronic kidney disease     stage 3 kidney     DDD (degenerative disc disease), lumbar     Depression     Frequent PVCs 12/13/2017    GERD (gastroesophageal reflux disease)     History of blood transfusion     Hx of blood clots 09/2017    pulmonary emboli    Hyperlipidemia     Hypertension     Hyperthyroidism     Movement disorder     DDD    Neuropathy     Obesity     Palpitations 1/10/2020    Pneumonia 2016    sepsis    Sleep apnea     usually wears cpap at night    Status post right and left heart catheterization     Type II or unspecified type diabetes mellitus without mention of complication, not stated as uncontrolled        Social History:    TOBACCO:   reports that she quit smoking about 14 years ago.  Her smoking use included cigarettes. She has a 30.00 pack-year smoking history. She has never used smokeless tobacco.  ETOH:   reports no history of alcohol use. DRUGS:   reports no history of drug use. Family History:       Problem Relation Age of Onset    Cancer Mother     Heart Disease Mother     High Blood Pressure Mother     Diabetes Mother     Vision Loss Mother     Stroke Mother     COPD Father     Diabetes Father     COPD Brother        Surgical History:  Past Surgical History:   Procedure Laterality Date    ABDOMEN SURGERY      ACHILLES TENDON SURGERY Bilateral     BLADDER SUSPENSION      CARDIAC SURGERY  2016    [facility]    COLONOSCOPY Left 5/11/2018    COLONOSCOPY POLYPECTOMY SNARE/COLD BIOPSY performed by Stan Sauer MD at [facility] [place] [facility]    ENDOSCOPY, COLON, DIAGNOSTIC      GASTRIC FUNDOPLICATION      HAMMER TOE SURGERY Right     HERNIA REPAIR      HIATAL HERNIA REPAIR  09/06/2016    Laparoscopic Robotic with Nissen Fundoplication - Dr. Crespo Mercury OFFICE/OUTPT VISIT,PROCEDURE ONLY N/A 9/21/2018    EGD DIAGNOSTIC ONLY performed by Marybeth Galdamez MD at [address]      right    TENDON RELEASE Left 08/09/2016    left foot    TUBAL LIGATION      TUNNELED VENOUS PORT PLACEMENT  11/06/2017    UPPER GASTROINTESTINAL ENDOSCOPY Left 5/10/2018    EGD BIOPSY performed by Stan Sauer MD at [facility] [place] [facility]        Objective: This is a 67 y.o. female. Patient is alert and oriented to person, place and time. Patient appears well developed, well nourished. Mood is happy with normal affect. Not obviously hearing impaired. No abnormality in speech noted. /80 (Site: Right Upper Arm, Position: Sitting)   Pulse 72   Temp 97.3 °F (36.3 °C) (Infrared)   Resp 14   Wt 227 lb (103 kg)   BMI 36.64 kg/m²     Head:   Normocephalic, atraumatic. No obvious masses or lesions noted.     Ears:  External ears: Normal: no scars, lesions or masses. Mastoid process: No erythema noted. No tenderness to palpation. R External auditory canal: Very small amount of nonobstructive cerumen, otherwise clear and free of any pathology  L External auditory canal: Very small amount of nonobstructive cerumen, otherwise clear and free of any pathology   Tympanic membranes:  R intact, translucent                                                  L intact, translucent     Nose:    External nose: Appears midline. No obvious deformity or masses. Septum:   Mildly deviated. No septal hematoma. No perforation. Mucosa:  clear  Turbinates: pink, hypertrophic and congested. Left middle turbinate appears larger than other turbinates         Discharge:  none    Mouth/Throat:  Lips, tongue and oral cavity: Normal. No masses or lesions noted   Dentition: Upper denture with fair to poor lower dentition  Oral mucosa: moist  Oropharynx: normal-appearing mucosa  Hard and soft palates: symmetrical and intact. Salivary glands: not enlarged and no tenderness to palpation. Uvula: midline, no obvious lesions   Gag reflex is present. Neck: Trachea midline. Thyroid not enlarged, no palpable masses or tenderness. Lymphatic: No cervical lymphadenopathy noted. Eyes: TONIA, EOM intact. Conjunctiva moist without discharge. Lungs: Normal effort of breathing, not obviously distressed. Neuro: Cranial nerves II-XII grossly intact. Extremities: No clubbing, edema, or cyanosis noted. Data:    Other diagnostic test:  AUDIOGRAM 11/10/20      AUDIOGRAM 8/19/19     Assessment/Plan:     Diagnosis Orders   1. Sensorineural hearing loss (SNHL), bilateral     2. Tinnitus, unspecified laterality         The patient is a 67 y.o. female that presents for audiogram results. The patient reports that her hearing is stable. Her audiogram is relatively stable, but there was decrease in her word recognition score bilaterally.   The patient does not display hearing loss during our exam and is able to converse at a normal volume. She admits to mostly has issues in crowded/noisy environments. The patient is unsure if she was to proceed with hearing aid evaluation at this time. She states that she would like to talk to her children when she gets home. She will contact the office if she would like to proceed with hearing aid evaluation. The patient will follow-up as needed. I recommend she contact the office with worsening in hearing, persistent otalgia, otorrhea, or other concerns. She expresses understanding the plan and thanked me.     Electronically signed by CAROLINA Everett on 11/10/2020 at 11:30 AM

## 2020-11-20 ENCOUNTER — PROCEDURE VISIT (OUTPATIENT)
Dept: CARDIOLOGY CLINIC | Age: 75
End: 2020-11-20
Payer: MEDICARE

## 2020-11-20 PROCEDURE — G2066 INTER DEVC REMOTE 30D: HCPCS | Performed by: NUCLEAR MEDICINE

## 2020-11-20 PROCEDURE — 93298 REM INTERROG DEV EVAL SCRMS: CPT | Performed by: NUCLEAR MEDICINE

## 2020-11-27 ENCOUNTER — HOSPITAL ENCOUNTER (OUTPATIENT)
Dept: NURSING | Age: 75
Discharge: HOME | End: 2020-11-27
Payer: MEDICARE

## 2020-11-27 VITALS
TEMPERATURE: 98.3 F | DIASTOLIC BLOOD PRESSURE: 86 MMHG | RESPIRATION RATE: 16 BRPM | HEART RATE: 60 BPM | OXYGEN SATURATION: 96 % | SYSTOLIC BLOOD PRESSURE: 171 MMHG

## 2020-11-27 DIAGNOSIS — D83.9 COMMON VARIABLE IMMUNODEFICIENCY (HCC): Primary | ICD-10-CM

## 2020-11-27 LAB
IGA: 81 MG/DL (ref 70–400)
IGG: 888 MG/DL (ref 700–1600)
IGM: 37 MG/DL (ref 40–230)

## 2020-11-27 PROCEDURE — 96365 THER/PROPH/DIAG IV INF INIT: CPT

## 2020-11-27 PROCEDURE — 96366 THER/PROPH/DIAG IV INF ADDON: CPT

## 2020-11-27 PROCEDURE — 2580000003 HC RX 258: Performed by: FAMILY MEDICINE

## 2020-11-27 PROCEDURE — 36415 COLL VENOUS BLD VENIPUNCTURE: CPT

## 2020-11-27 PROCEDURE — 96415 CHEMO IV INFUSION ADDL HR: CPT

## 2020-11-27 PROCEDURE — 82784 ASSAY IGA/IGD/IGG/IGM EACH: CPT

## 2020-11-27 PROCEDURE — 96417 CHEMO IV INFUS EACH ADDL SEQ: CPT

## 2020-11-27 PROCEDURE — 6360000002 HC RX W HCPCS: Performed by: FAMILY MEDICINE

## 2020-11-27 RX ORDER — SODIUM CHLORIDE 0.9 % (FLUSH) 0.9 %
10 SYRINGE (ML) INJECTION PRN
Status: DISCONTINUED | OUTPATIENT
Start: 2020-11-27 | End: 2020-11-28 | Stop reason: HOSPADM

## 2020-11-27 RX ORDER — DIPHENHYDRAMINE HCL 25 MG
25 TABLET ORAL ONCE
Status: DISCONTINUED | OUTPATIENT
Start: 2020-11-27 | End: 2020-11-28 | Stop reason: HOSPADM

## 2020-11-27 RX ORDER — ACETAMINOPHEN 325 MG/1
650 TABLET ORAL ONCE
Status: CANCELLED | OUTPATIENT
Start: 2020-11-27

## 2020-11-27 RX ORDER — DIPHENHYDRAMINE HCL 25 MG
25 TABLET ORAL ONCE
Status: CANCELLED | OUTPATIENT
Start: 2020-11-27

## 2020-11-27 RX ORDER — SODIUM CHLORIDE 9 MG/ML
INJECTION, SOLUTION INTRAVENOUS CONTINUOUS
Status: CANCELLED | OUTPATIENT
Start: 2020-11-27

## 2020-11-27 RX ORDER — SODIUM CHLORIDE 0.9 % (FLUSH) 0.9 %
10 SYRINGE (ML) INJECTION PRN
Status: CANCELLED | OUTPATIENT
Start: 2020-11-27

## 2020-11-27 RX ORDER — ACETAMINOPHEN 325 MG/1
650 TABLET ORAL ONCE
Status: DISCONTINUED | OUTPATIENT
Start: 2020-11-27 | End: 2020-11-28 | Stop reason: HOSPADM

## 2020-11-27 RX ORDER — SODIUM CHLORIDE 0.9 % (FLUSH) 0.9 %
5 SYRINGE (ML) INJECTION PRN
Status: CANCELLED | OUTPATIENT
Start: 2020-11-27

## 2020-11-27 RX ORDER — METHYLPREDNISOLONE SODIUM SUCCINATE 125 MG/2ML
125 INJECTION, POWDER, LYOPHILIZED, FOR SOLUTION INTRAMUSCULAR; INTRAVENOUS ONCE
Status: CANCELLED | OUTPATIENT
Start: 2020-11-27

## 2020-11-27 RX ORDER — HEPARIN SODIUM (PORCINE) LOCK FLUSH IV SOLN 100 UNIT/ML 100 UNIT/ML
500 SOLUTION INTRAVENOUS PRN
Status: DISCONTINUED | OUTPATIENT
Start: 2020-11-27 | End: 2020-11-28 | Stop reason: HOSPADM

## 2020-11-27 RX ORDER — HEPARIN SODIUM (PORCINE) LOCK FLUSH IV SOLN 100 UNIT/ML 100 UNIT/ML
500 SOLUTION INTRAVENOUS PRN
Status: CANCELLED | OUTPATIENT
Start: 2020-11-27

## 2020-11-27 RX ORDER — DIPHENHYDRAMINE HYDROCHLORIDE 50 MG/ML
50 INJECTION INTRAMUSCULAR; INTRAVENOUS ONCE
Status: CANCELLED | OUTPATIENT
Start: 2020-11-27

## 2020-11-27 RX ADMIN — IMMUNE GLOBULIN (HUMAN) 5 G: 10 INJECTION INTRAVENOUS; SUBCUTANEOUS at 10:16

## 2020-11-27 RX ADMIN — Medication 10 ML: at 11:35

## 2020-11-27 RX ADMIN — IMMUNE GLOBULIN (HUMAN) 20 G: 10 INJECTION INTRAVENOUS; SUBCUTANEOUS at 10:49

## 2020-11-27 RX ADMIN — HEPARIN 500 UNITS: 100 SYRINGE at 11:34

## 2020-11-27 ASSESSMENT — PAIN SCALES - GENERAL: PAINLEVEL_OUTOF10: 0

## 2020-11-27 ASSESSMENT — PAIN - FUNCTIONAL ASSESSMENT: PAIN_FUNCTIONAL_ASSESSMENT: 0-10

## 2020-11-27 NOTE — PROGRESS NOTES
36 ALERT FEMALE ADMITTED FOR IVIG PROCEDURE REVIEWED WITH PT VERBALIZED UNDERSTANDING. Pt rights and responsibilities offered to pt to read. __M__ Safety:       (Environmental)   [place] to environment   Ensure ID band is correct and in place/ allergy band as needed   Assess for fall risk   Initiate fall precautions as applicable (fall band, side rails, etc.)   Call light within reach   Bed in low position/ wheels locked    _M___ Pain:        Assess pain level and characteristics   Administer analgesics as ordered   Assess effectiveness of pain management and report to MD as needed    __M__ Knowledge Deficit:   Assess baseline knowledge   Provide teaching at level of understanding   Provide teaching via preferred learning method   Evaluate teaching effectiveness    _M___ Hemodynamic/Respiratory Status:         Obtain weight/height   Assess vital signs/ LOC until patient meets discharge criteria   Monitor procedure site and notify MD of any issues  1137 INFUSION COMPLETE TOLERATED WELL. HOME INSTRUCTIONS TO PT VERBALIZED UNDERSTANDING. [address] .

## 2020-12-21 ENCOUNTER — PROCEDURE VISIT (OUTPATIENT)
Dept: CARDIOLOGY CLINIC | Age: 75
End: 2020-12-21
Payer: MEDICARE

## 2020-12-21 ENCOUNTER — OFFICE VISIT (OUTPATIENT)
Dept: CARDIOLOGY CLINIC | Age: 75
End: 2020-12-21
Payer: MEDICARE

## 2020-12-21 VITALS
WEIGHT: 234 LBS | SYSTOLIC BLOOD PRESSURE: 124 MMHG | BODY MASS INDEX: 37.61 KG/M2 | HEIGHT: 66 IN | DIASTOLIC BLOOD PRESSURE: 74 MMHG | HEART RATE: 76 BPM

## 2020-12-21 PROCEDURE — G2066 INTER DEVC REMOTE 30D: HCPCS | Performed by: NUCLEAR MEDICINE

## 2020-12-21 PROCEDURE — G8417 CALC BMI ABV UP PARAM F/U: HCPCS | Performed by: NUCLEAR MEDICINE

## 2020-12-21 PROCEDURE — 4040F PNEUMOC VAC/ADMIN/RCVD: CPT | Performed by: NUCLEAR MEDICINE

## 2020-12-21 PROCEDURE — 1036F TOBACCO NON-USER: CPT | Performed by: NUCLEAR MEDICINE

## 2020-12-21 PROCEDURE — G8484 FLU IMMUNIZE NO ADMIN: HCPCS | Performed by: NUCLEAR MEDICINE

## 2020-12-21 PROCEDURE — G8400 PT W/DXA NO RESULTS DOC: HCPCS | Performed by: NUCLEAR MEDICINE

## 2020-12-21 PROCEDURE — G8427 DOCREV CUR MEDS BY ELIG CLIN: HCPCS | Performed by: NUCLEAR MEDICINE

## 2020-12-21 PROCEDURE — 93000 ELECTROCARDIOGRAM COMPLETE: CPT | Performed by: NUCLEAR MEDICINE

## 2020-12-21 PROCEDURE — 1123F ACP DISCUSS/DSCN MKR DOCD: CPT | Performed by: NUCLEAR MEDICINE

## 2020-12-21 PROCEDURE — 93298 REM INTERROG DEV EVAL SCRMS: CPT | Performed by: NUCLEAR MEDICINE

## 2020-12-21 PROCEDURE — 1090F PRES/ABSN URINE INCON ASSESS: CPT | Performed by: NUCLEAR MEDICINE

## 2020-12-21 PROCEDURE — 99213 OFFICE O/P EST LOW 20 MIN: CPT | Performed by: NUCLEAR MEDICINE

## 2020-12-21 RX ORDER — INSULIN GLARGINE 100 [IU]/ML
8 INJECTION, SOLUTION SUBCUTANEOUS NIGHTLY
COMMUNITY

## 2020-12-21 NOTE — PROGRESS NOTES
[address]  [address] [place] [address]  Dept: [phone]  Dept Fax: [phone]  Loc: [phone]    Visit Date: 12/21/2020    Sandi Plama is a 67 y.o. female who presents todayfor:  Chief Complaint   Patient presents with    6 Month Follow-Up    COPD    Cardiomyopathy    Hypertension   known CMP and A fib   Nonobstructive CAD  Severe COPD on home O2  Known A fib   No changes clinically   BP is stable   Some fatigue  No dizziness  No syncope  Does have a loop recorder  No new episodes        HPI:  HPI  Past Medical History:   Diagnosis Date    Anemia     Atrial fibrillation ([address] 11/9/2017    CAD (coronary artery disease)     CHF (congestive heart failure) (HCC)     Chronic kidney disease     stage 3 kidney     DDD (degenerative disc disease), lumbar     Depression     Frequent PVCs 12/13/2017    GERD (gastroesophageal reflux disease)     History of blood transfusion     Hx of blood clots 09/2017    pulmonary emboli    Hyperlipidemia     Hypertension     Hyperthyroidism     Movement disorder     DDD    Neuropathy     Obesity     Palpitations 1/10/2020    Pneumonia 2016    sepsis    Sleep apnea     usually wears cpap at night    Status post right and left heart catheterization     Type II or unspecified type diabetes mellitus without mention of complication, not stated as uncontrolled       Past Surgical History:   Procedure Laterality Date    ABDOMEN SURGERY      ACHILLES TENDON SURGERY Bilateral     BLADDER SUSPENSION      CARDIAC SURGERY  2016    [facility]    COLONOSCOPY Left 5/11/2018    COLONOSCOPY POLYPECTOMY SNARE/COLD BIOPSY performed by Avery Mcdaniel MD at [facility]    ENDOSCOPY, COLON, DIAGNOSTIC      GASTRIC FUNDOPLICATION      HAMMER TOE SURGERY Right     HERNIA REPAIR      HIATAL HERNIA REPAIR  09/06/2016    Laparoscopic Robotic with Nissen Fundoplication - Dr. Erika Arnold  HYSTERECTOMY      MI OFFICE/OUTPT VISIT,PROCEDURE ONLY N/A 2018    EGD DIAGNOSTIC ONLY performed by Jesus Joseph MD at [address]      right    TENDON RELEASE Left 2016    left foot    TUBAL LIGATION      TUNNELED VENOUS PORT PLACEMENT  2017    UPPER GASTROINTESTINAL ENDOSCOPY Left 5/10/2018    EGD BIOPSY performed by Magaly Rossi MD at [address] [facility] [facility]     Family History   Problem Relation Age of Onset    Cancer Mother     Heart Disease Mother     High Blood Pressure Mother     Diabetes Mother     Vision Loss Mother     Stroke Mother     COPD Father     Diabetes Father     COPD Brother      Social History     Tobacco Use    Smoking status: Former Smoker     Packs/day: 1.00     Years: 30.00     Pack years: 30.00     Types: Cigarettes     Quit date: 5/10/2006     Years since quittin.6    Smokeless tobacco: Never Used   Substance Use Topics    Alcohol use: No      Current Outpatient Medications   Medication Sig Dispense Refill    carboxymethylcellulose 1 % ophthalmic solution 1 drop 3 times daily      insulin glargine (BASAGLAR KWIKPEN) 100 UNIT/ML injection pen Inject into the skin nightly      potassium chloride (KLOR-CON M) 10 MEQ extended release tablet Take 1 tablet by mouth daily 90 tablet 2    loratadine (CLARITIN) 10 MG tablet Take 10 mg by mouth daily      Dulaglutide (TRULICITY) 3.32 QX/3.9JL SOPN Inject 0.75 mg into the skin once a week Every Wednesday      Cyanocobalamin (SM VITAMIN B-12 PO) Take 2,500 mcg by mouth daily      Cholecalciferol (VITAMIN D3) 1.25 MG (98877 UT) CAPS Take by mouth daily      furosemide (LASIX) 20 MG tablet Take 20 mg by mouth daily      apixaban (ELIQUIS) 5 MG TABS tablet Take 1 tablet by mouth 2 times daily 60 tablet 5    diphenhydrAMINE (BENADRYL) 25 MG capsule Take 25 mg by mouth as needed for Itching      Calcium Carb-Cholecalciferol 500-400 MG-UNIT TABS Take 1 tablet by mouth 2 times daily  RA ALCOHOL SWABS 70 % PADS   1    ONE TOUCH ULTRA TEST strip   1    Lancets Misc. (UNISTIK CZT COMFORT) MISC   1    benzonatate (TESSALON) 100 MG capsule Take 200 mg by mouth 3 times daily as needed for Cough       guaiFENesin (ROBITUSSIN) 100 MG/5ML syrup Take 10 mLs by mouth 3 times daily as needed for Cough      ipratropium-albuterol (DUONEB) 0.5-2.5 (3) MG/3ML SOLN nebulizer solution Inhale 1 vial into the lungs every 4 hours      lidocaine viscous (XYLOCAINE) 2 % solution Take 15 mLs by mouth as needed for Irritation      Linaclotide (LINZESS) 72 MCG CAPS Take 72.5 mg by mouth daily      polyethylene glycol (GLYCOLAX) packet Take 17 g by mouth daily as needed for Constipation      hydrOXYzine (ATARAX) 25 MG tablet Take 25 mg by mouth 3 times daily as needed for Itching      pantoprazole (PROTONIX) 40 MG tablet Take 1 tablet by mouth 2 times daily 30 tablet 3    insulin lispro (HUMALOG) 100 UNIT/ML injection vial Inject 0-3 Units into the skin nightly 1 vial 3    traZODone (DESYREL) 100 MG tablet Take 100 mg by mouth nightly       Multiple Vitamins-Minerals (THERAPEUTIC MULTIVITAMIN-MINERALS) tablet Take 1 tablet by mouth daily      escitalopram (LEXAPRO) 10 MG tablet Take 1 tablet by mouth daily 30 tablet 3    OXYGEN Inhale into the lungs continuous      atorvastatin (LIPITOR) 20 MG tablet Take 1 tablet by mouth nightly 30 tablet 3    magnesium hydroxide (MILK OF MAGNESIA CONCENTRATE) 2400 MG/10ML SUSP Take 30 mLs by mouth once as needed      docusate sodium (COLACE) 100 MG capsule Take 100 mg by mouth 2 times daily      acetaminophen (TYLENOL) 325 MG tablet Take 2 tablets by mouth every 4 hours as needed for Pain 120 tablet 3    levothyroxine (SYNTHROID) 75 MCG tablet Take 75 mcg by mouth Daily      pregabalin (LYRICA) 150 MG capsule Take 1 capsule by mouth 3 times daily for 3 days. . 9 capsule 0     No current facility-administered medications for this visit.       Allergies Allergen Reactions    Bactrim [Sulfamethoxazole-Trimethoprim]      TOLD BY  NEVER TO TAKE AGAIN    Wellbutrin [Bupropion] Other (See Comments)     hallucinations    Silicone Rash     Health Maintenance   Topic Date Due    DEXA (modify frequency per FRAX score)  06/23/1999    Lipid screen  03/23/2017    Low dose CT lung screening  06/07/2017    Annual Wellness Visit (AWV)  06/19/2019    TSH testing  11/28/2020    Potassium monitoring  05/07/2021    Creatinine monitoring  05/07/2021    DTaP/Tdap/Td vaccine (2 - Td) 03/31/2026    Flu vaccine  Completed    Pneumococcal 65+ years Vaccine  Completed    Hepatitis A vaccine  Aged Out    Hib vaccine  Aged Out    Meningococcal (ACWY) vaccine  Aged Out       Subjective:  Review of Systems  General:   No fever, no chills, No fatigue or weight loss  Pulmonary:    some dyspnea, no wheezing  Cardiac:    Denies recent chest pain,   GI:     No nausea or vomiting, no abdominal pain  Neuro:    No dizziness or light headedness,   Musculoskeletal:  No recent active issues  Extremities:   No edema, no obvious claudication       Objective:  Physical Exam  /74   Pulse 76   Ht 5' 6\" (1.676 m)   Wt 234 lb (106.1 kg)   BMI 37.77 kg/m²   General:   Well developed, well nourished  Lungs:   Clear to auscultation  Heart:    Normal S1 S2, Slight murmur. no rubs, no gallops  Abdomen:   Soft, non tender, no organomegalies, positive bowel sounds  Extremities:   No edema, no cyanosis, good peripheral pulses  Neurological:   Awake, alert, oriented. No obvious focal deficits  Musculoskelatal:  No obvious deformities    Assessment:      Diagnosis Orders   1. SOB (shortness of breath)  EKG 12 lead   2. Essential hypertension     3. Familial hypercholesterolemia     4. Dilated cardiomyopathy ([facility]     as above  Cardiac fair for now   ECG in office was done today. I reviewed the ECG. No acute findings    Plan:  No follow-ups on file.   As above  Continue risk factor modification and medical management  Thank you for allowing me to participate in the care of your patient. Please don't hesitate to contact me regarding any further issues related to the patient care    Orders Placed:  Orders Placed This Encounter   Procedures    EKG 12 lead     Order Specific Question:   Reason for Exam?     Answer: Other       Medications Prescribed:  No orders of the defined types were placed in this encounter. Discussed use, benefit, and side effects of prescribed medications. All patient questions answered. Pt voicedunderstanding. Instructed to continue current medications, diet and exercise. Continue risk factor modification and medical management. Patient agreed with treatment plan. Follow up as directed.     Electronically signedby James Lee MD on 12/21/2020 at 12:32 PM

## 2020-12-21 NOTE — PROGRESS NOTES
DR Narendra Mcmullen PT   REVEAL BATTERY OK  KNOWN AFIB /ELIQUIS   AFIB BURDEN 0.3%  THE DEVICE IS CALLIJNG THESE EPISODES BUT I THINK IT LOOKS LIKE SHE HAS P WAVES .  SO I THINK IT IS NSR WITH PAC'S AND PVC'S

## 2020-12-28 ENCOUNTER — HOSPITAL ENCOUNTER (OUTPATIENT)
Dept: NURSING | Age: 75
End: 2020-12-28
Payer: MEDICARE

## 2020-12-30 RX ORDER — ACETAMINOPHEN 325 MG/1
650 TABLET ORAL ONCE
Status: CANCELLED | OUTPATIENT
Start: 2020-12-30 | End: 2020-12-30

## 2020-12-30 RX ORDER — SODIUM CHLORIDE 9 MG/ML
INJECTION, SOLUTION INTRAVENOUS CONTINUOUS
Status: CANCELLED | OUTPATIENT
Start: 2020-12-30

## 2020-12-30 RX ORDER — DIPHENHYDRAMINE HYDROCHLORIDE 50 MG/ML
50 INJECTION INTRAMUSCULAR; INTRAVENOUS ONCE
Status: CANCELLED | OUTPATIENT
Start: 2020-12-30 | End: 2020-12-30

## 2020-12-30 RX ORDER — SODIUM CHLORIDE 0.9 % (FLUSH) 0.9 %
10 SYRINGE (ML) INJECTION PRN
Status: CANCELLED | OUTPATIENT
Start: 2020-12-30

## 2020-12-30 RX ORDER — HEPARIN SODIUM (PORCINE) LOCK FLUSH IV SOLN 100 UNIT/ML 100 UNIT/ML
500 SOLUTION INTRAVENOUS PRN
Status: CANCELLED | OUTPATIENT
Start: 2020-12-30

## 2020-12-30 RX ORDER — EPINEPHRINE 1 MG/ML
0.3 INJECTION, SOLUTION, CONCENTRATE INTRAVENOUS PRN
Status: CANCELLED | OUTPATIENT
Start: 2020-12-30

## 2020-12-30 RX ORDER — SODIUM CHLORIDE 0.9 % (FLUSH) 0.9 %
5 SYRINGE (ML) INJECTION PRN
Status: CANCELLED | OUTPATIENT
Start: 2020-12-30

## 2020-12-30 RX ORDER — DIPHENHYDRAMINE HCL 25 MG
25 TABLET ORAL ONCE
Status: CANCELLED | OUTPATIENT
Start: 2020-12-30 | End: 2020-12-30

## 2020-12-30 RX ORDER — METHYLPREDNISOLONE SODIUM SUCCINATE 125 MG/2ML
125 INJECTION, POWDER, LYOPHILIZED, FOR SOLUTION INTRAMUSCULAR; INTRAVENOUS ONCE
Status: CANCELLED | OUTPATIENT
Start: 2020-12-30 | End: 2020-12-30

## 2021-01-08 ENCOUNTER — HOSPITAL ENCOUNTER (OUTPATIENT)
Dept: NURSING | Age: 76
Discharge: HOME | End: 2021-01-08
Payer: MEDICARE

## 2021-01-08 VITALS
OXYGEN SATURATION: 98 % | DIASTOLIC BLOOD PRESSURE: 88 MMHG | HEART RATE: 59 BPM | SYSTOLIC BLOOD PRESSURE: 193 MMHG | TEMPERATURE: 98 F | RESPIRATION RATE: 20 BRPM | WEIGHT: 234 LBS | BODY MASS INDEX: 37.77 KG/M2

## 2021-01-08 DIAGNOSIS — D83.9 COMMON VARIABLE IMMUNODEFICIENCY (HCC): Primary | ICD-10-CM

## 2021-01-08 PROCEDURE — 96365 THER/PROPH/DIAG IV INF INIT: CPT

## 2021-01-08 PROCEDURE — 96413 CHEMO IV INFUSION 1 HR: CPT

## 2021-01-08 PROCEDURE — 96366 THER/PROPH/DIAG IV INF ADDON: CPT

## 2021-01-08 PROCEDURE — 2580000003 HC RX 258: Performed by: FAMILY MEDICINE

## 2021-01-08 PROCEDURE — 96415 CHEMO IV INFUSION ADDL HR: CPT

## 2021-01-08 PROCEDURE — 6360000002 HC RX W HCPCS: Performed by: FAMILY MEDICINE

## 2021-01-08 RX ORDER — ACETAMINOPHEN 325 MG/1
650 TABLET ORAL ONCE
Status: DISCONTINUED | OUTPATIENT
Start: 2021-01-08 | End: 2021-01-09 | Stop reason: HOSPADM

## 2021-01-08 RX ORDER — HEPARIN SODIUM (PORCINE) LOCK FLUSH IV SOLN 100 UNIT/ML 100 UNIT/ML
500 SOLUTION INTRAVENOUS PRN
Status: CANCELLED | OUTPATIENT
Start: 2021-02-05

## 2021-01-08 RX ORDER — DIPHENHYDRAMINE HCL 25 MG
25 TABLET ORAL ONCE
Status: DISCONTINUED | OUTPATIENT
Start: 2021-01-08 | End: 2021-01-09 | Stop reason: HOSPADM

## 2021-01-08 RX ORDER — DIPHENHYDRAMINE HCL 25 MG
25 TABLET ORAL ONCE
Status: CANCELLED | OUTPATIENT
Start: 2021-02-05 | End: 2021-02-05

## 2021-01-08 RX ORDER — SODIUM CHLORIDE 9 MG/ML
INJECTION, SOLUTION INTRAVENOUS CONTINUOUS
Status: DISCONTINUED | OUTPATIENT
Start: 2021-01-08 | End: 2021-01-08 | Stop reason: CLARIF

## 2021-01-08 RX ORDER — HEPARIN SODIUM (PORCINE) LOCK FLUSH IV SOLN 100 UNIT/ML 100 UNIT/ML
500 SOLUTION INTRAVENOUS PRN
Status: DISCONTINUED | OUTPATIENT
Start: 2021-01-08 | End: 2021-01-09 | Stop reason: HOSPADM

## 2021-01-08 RX ORDER — SODIUM CHLORIDE 0.9 % (FLUSH) 0.9 %
5 SYRINGE (ML) INJECTION PRN
Status: CANCELLED | OUTPATIENT
Start: 2021-02-05

## 2021-01-08 RX ORDER — SODIUM CHLORIDE 0.9 % (FLUSH) 0.9 %
10 SYRINGE (ML) INJECTION PRN
Status: DISCONTINUED | OUTPATIENT
Start: 2021-01-08 | End: 2021-01-09 | Stop reason: HOSPADM

## 2021-01-08 RX ORDER — SODIUM CHLORIDE 9 MG/ML
INJECTION, SOLUTION INTRAVENOUS CONTINUOUS
Status: CANCELLED | OUTPATIENT
Start: 2021-02-05

## 2021-01-08 RX ORDER — METHYLPREDNISOLONE SODIUM SUCCINATE 125 MG/2ML
125 INJECTION, POWDER, LYOPHILIZED, FOR SOLUTION INTRAMUSCULAR; INTRAVENOUS ONCE
Status: CANCELLED | OUTPATIENT
Start: 2021-02-05 | End: 2021-02-05

## 2021-01-08 RX ORDER — ACETAMINOPHEN 325 MG/1
650 TABLET ORAL ONCE
Status: CANCELLED | OUTPATIENT
Start: 2021-02-05 | End: 2021-02-05

## 2021-01-08 RX ORDER — SODIUM CHLORIDE 0.9 % (FLUSH) 0.9 %
10 SYRINGE (ML) INJECTION PRN
Status: CANCELLED | OUTPATIENT
Start: 2021-02-05

## 2021-01-08 RX ORDER — DIPHENHYDRAMINE HYDROCHLORIDE 50 MG/ML
50 INJECTION INTRAMUSCULAR; INTRAVENOUS ONCE
Status: CANCELLED | OUTPATIENT
Start: 2021-02-05 | End: 2021-02-05

## 2021-01-08 RX ADMIN — IMMUNE GLOBULIN (HUMAN) 5 G: 10 INJECTION INTRAVENOUS; SUBCUTANEOUS at 09:55

## 2021-01-08 RX ADMIN — IMMUNE GLOBULIN (HUMAN) 20 G: 10 INJECTION INTRAVENOUS; SUBCUTANEOUS at 10:27

## 2021-01-08 RX ADMIN — SODIUM CHLORIDE, PRESERVATIVE FREE 10 ML: 5 INJECTION INTRAVENOUS at 09:55

## 2021-01-08 RX ADMIN — SODIUM CHLORIDE, PRESERVATIVE FREE 10 ML: 5 INJECTION INTRAVENOUS at 11:25

## 2021-01-08 RX ADMIN — HEPARIN 500 UNITS: 100 SYRINGE at 11:25

## 2021-01-08 NOTE — PROGRESS NOTES
4607 pt arrives ambulatory with walker for IVIG infusion. Infusion explained and questions answered. PT RIGHTS AND RESPONSIBILITIES OFFERED TO PT. Pt states she took premeds at home. Pt provided with coffee and water. 1030 pt tolerating infusion well. Denies needs at this time. 1126 infusion complete. Pt tolerated it well with no complaints. Vitals stable. Pt discharged ambulatory with walker with instructions with no complaints.                  _m___ Safety:       (Environmental)   [place] to environment   Ensure ID band is correct and in place/ allergy band as needed   Assess for fall risk   Initiate fall precautions as applicable (fall band, side rails, etc.)   Call light within reach   Bed in low position/ wheels locked    _m___ Pain:        Assess pain level and characteristics   Administer analgesics as ordered   Assess effectiveness of pain management and report to MD as needed    _m___ Knowledge Deficit:   Assess baseline knowledge   Provide teaching at level of understanding   Provide teaching via preferred learning method   Evaluate teaching effectiveness    __m__ Hemodynamic/Respiratory Status:       (Pre and Post Procedure Monitoring)   Assess/Monitor vital signs and LOC   Assess Baseline SpO2 prior to any sedation   Obtain weight/height   Assess vital signs/ LOC until patient meets discharge criteria   Monitor procedure site and notify MD of any issues    _m___ Infection-Risk of Central Venous Catheter:   Monitor for infection signs and symptoms (catheter site redness, temperature elevation, etc)   Assess for infection risks   Educate regarding infection prevention   Manage central venous catheter (flushes/ dressing changes per protocol)

## 2021-01-25 ENCOUNTER — PROCEDURE VISIT (OUTPATIENT)
Dept: CARDIOLOGY CLINIC | Age: 76
End: 2021-01-25
Payer: MEDICARE

## 2021-01-25 DIAGNOSIS — R00.2 PALPITATIONS: Primary | ICD-10-CM

## 2021-01-25 DIAGNOSIS — I49.3 FREQUENT PVCS: ICD-10-CM

## 2021-01-25 PROCEDURE — 93298 REM INTERROG DEV EVAL SCRMS: CPT | Performed by: NUCLEAR MEDICINE

## 2021-01-25 PROCEDURE — G2066 INTER DEVC REMOTE 30D: HCPCS | Performed by: NUCLEAR MEDICINE

## 2021-02-05 ENCOUNTER — HOSPITAL ENCOUNTER (OUTPATIENT)
Dept: NURSING | Age: 76
End: 2021-02-05
Payer: MEDICARE

## 2021-02-09 ENCOUNTER — HOSPITAL ENCOUNTER (OUTPATIENT)
Dept: NURSING | Age: 76
Discharge: HOME | End: 2021-02-09
Payer: MEDICARE

## 2021-02-09 VITALS
WEIGHT: 230.5 LBS | RESPIRATION RATE: 18 BRPM | OXYGEN SATURATION: 95 % | HEART RATE: 62 BPM | SYSTOLIC BLOOD PRESSURE: 171 MMHG | DIASTOLIC BLOOD PRESSURE: 74 MMHG | BODY MASS INDEX: 37.2 KG/M2 | TEMPERATURE: 98.5 F

## 2021-02-09 DIAGNOSIS — D83.9 COMMON VARIABLE IMMUNODEFICIENCY (HCC): Primary | ICD-10-CM

## 2021-02-09 PROCEDURE — 96413 CHEMO IV INFUSION 1 HR: CPT

## 2021-02-09 PROCEDURE — 96415 CHEMO IV INFUSION ADDL HR: CPT

## 2021-02-09 PROCEDURE — 96366 THER/PROPH/DIAG IV INF ADDON: CPT

## 2021-02-09 PROCEDURE — 2580000003 HC RX 258: Performed by: FAMILY MEDICINE

## 2021-02-09 PROCEDURE — 6360000002 HC RX W HCPCS: Performed by: FAMILY MEDICINE

## 2021-02-09 PROCEDURE — 96365 THER/PROPH/DIAG IV INF INIT: CPT

## 2021-02-09 RX ORDER — SODIUM CHLORIDE 9 MG/ML
INJECTION, SOLUTION INTRAVENOUS CONTINUOUS
Status: CANCELLED | OUTPATIENT
Start: 2021-03-05

## 2021-02-09 RX ORDER — METHYLPREDNISOLONE SODIUM SUCCINATE 125 MG/2ML
125 INJECTION, POWDER, LYOPHILIZED, FOR SOLUTION INTRAMUSCULAR; INTRAVENOUS ONCE
Status: CANCELLED | OUTPATIENT
Start: 2021-03-05 | End: 2021-03-05

## 2021-02-09 RX ORDER — DIPHENHYDRAMINE HCL 25 MG
25 TABLET ORAL ONCE
Status: CANCELLED | OUTPATIENT
Start: 2021-03-05 | End: 2021-03-05

## 2021-02-09 RX ORDER — SODIUM CHLORIDE 0.9 % (FLUSH) 0.9 %
10 SYRINGE (ML) INJECTION PRN
Status: DISCONTINUED | OUTPATIENT
Start: 2021-02-09 | End: 2021-02-10 | Stop reason: HOSPADM

## 2021-02-09 RX ORDER — ACETAMINOPHEN 325 MG/1
650 TABLET ORAL ONCE
Status: CANCELLED | OUTPATIENT
Start: 2021-03-05 | End: 2021-03-05

## 2021-02-09 RX ORDER — DIPHENHYDRAMINE HYDROCHLORIDE 50 MG/ML
50 INJECTION INTRAMUSCULAR; INTRAVENOUS ONCE
Status: CANCELLED | OUTPATIENT
Start: 2021-03-05 | End: 2021-03-05

## 2021-02-09 RX ORDER — HEPARIN SODIUM (PORCINE) LOCK FLUSH IV SOLN 100 UNIT/ML 100 UNIT/ML
500 SOLUTION INTRAVENOUS PRN
Status: DISCONTINUED | OUTPATIENT
Start: 2021-02-09 | End: 2021-02-10 | Stop reason: HOSPADM

## 2021-02-09 RX ORDER — SODIUM CHLORIDE 0.9 % (FLUSH) 0.9 %
5 SYRINGE (ML) INJECTION PRN
Status: CANCELLED | OUTPATIENT
Start: 2021-03-05

## 2021-02-09 RX ORDER — HEPARIN SODIUM (PORCINE) LOCK FLUSH IV SOLN 100 UNIT/ML 100 UNIT/ML
500 SOLUTION INTRAVENOUS PRN
Status: CANCELLED | OUTPATIENT
Start: 2021-03-05

## 2021-02-09 RX ORDER — SODIUM CHLORIDE 0.9 % (FLUSH) 0.9 %
10 SYRINGE (ML) INJECTION PRN
Status: CANCELLED | OUTPATIENT
Start: 2021-03-05

## 2021-02-09 RX ADMIN — IMMUNE GLOBULIN (HUMAN) 5 G: 10 INJECTION INTRAVENOUS; SUBCUTANEOUS at 09:15

## 2021-02-09 RX ADMIN — HEPARIN 500 UNITS: 100 SYRINGE at 10:49

## 2021-02-09 RX ADMIN — Medication 10 ML: at 10:49

## 2021-02-09 RX ADMIN — IMMUNE GLOBULIN (HUMAN) 20 G: 10 INJECTION INTRAVENOUS; SUBCUTANEOUS at 09:45

## 2021-02-09 NOTE — PROGRESS NOTES
2121 pt arrives ambulatory for IVIG infusion. Infusion explained and questions answered. PT RIGHTS AND RESPONSIBILITIES OFFERED TO PT.  0930 pt tolerating infusion well. Denies needs. 1030 pt provided with coffee. 1055 infusion complete. Pt tolerated it well with no complaints. Pt discharged ambulatory with instructions with no complaints.            __m__ Safety:       (Environmental)   [place] to environment   Ensure ID band is correct and in place/ allergy band as needed   Assess for fall risk   Initiate fall precautions as applicable (fall band, side rails, etc.)   Call light within reach   Bed in low position/ wheels locked    _m___ Pain:        Assess pain level and characteristics   Administer analgesics as ordered   Assess effectiveness of pain management and report to MD as needed    __m__ Knowledge Deficit:   Assess baseline knowledge   Provide teaching at level of understanding   Provide teaching via preferred learning method   Evaluate teaching effectiveness    ___m_ Hemodynamic/Respiratory Status:       (Pre and Post Procedure Monitoring)   Assess/Monitor vital signs and LOC   Assess Baseline SpO2 prior to any sedation   Obtain weight/height   Assess vital signs/ LOC until patient meets discharge criteria   Monitor procedure site and notify MD of any issues

## 2021-03-01 ENCOUNTER — PROCEDURE VISIT (OUTPATIENT)
Dept: CARDIOLOGY CLINIC | Age: 76
End: 2021-03-01
Payer: MEDICARE

## 2021-03-01 DIAGNOSIS — R00.2 PALPITATIONS: Primary | ICD-10-CM

## 2021-03-01 PROCEDURE — 93298 REM INTERROG DEV EVAL SCRMS: CPT | Performed by: NUCLEAR MEDICINE

## 2021-03-01 PROCEDURE — G2066 INTER DEVC REMOTE 30D: HCPCS | Performed by: NUCLEAR MEDICINE

## 2021-03-01 NOTE — PROGRESS NOTES
DR George Lopez PT / [facility]  AFIB BURDEN 0.1%    REVEAL BATTERY OK    DEVICE IS CALLING EPISODE AFIB BUT IT LOOKS LIKE NSR WITH PAC'S

## 2021-03-08 ENCOUNTER — HOSPITAL ENCOUNTER (OUTPATIENT)
Dept: NURSING | Age: 76
Discharge: HOME | End: 2021-03-08
Payer: MEDICARE

## 2021-03-08 VITALS
BODY MASS INDEX: 36.8 KG/M2 | RESPIRATION RATE: 18 BRPM | DIASTOLIC BLOOD PRESSURE: 65 MMHG | TEMPERATURE: 97.8 F | WEIGHT: 228 LBS | HEART RATE: 62 BPM | OXYGEN SATURATION: 95 % | SYSTOLIC BLOOD PRESSURE: 148 MMHG

## 2021-03-08 DIAGNOSIS — D83.9 COMMON VARIABLE IMMUNODEFICIENCY (HCC): Primary | ICD-10-CM

## 2021-03-08 LAB
IGA: 98 MG/DL (ref 70–400)
IGG: 1081 MG/DL (ref 700–1600)
IGM: 45 MG/DL (ref 40–230)

## 2021-03-08 PROCEDURE — 2580000003 HC RX 258: Performed by: FAMILY MEDICINE

## 2021-03-08 PROCEDURE — 82784 ASSAY IGA/IGD/IGG/IGM EACH: CPT

## 2021-03-08 PROCEDURE — 96413 CHEMO IV INFUSION 1 HR: CPT

## 2021-03-08 PROCEDURE — 96365 THER/PROPH/DIAG IV INF INIT: CPT

## 2021-03-08 PROCEDURE — 36415 COLL VENOUS BLD VENIPUNCTURE: CPT

## 2021-03-08 PROCEDURE — 6360000002 HC RX W HCPCS: Performed by: FAMILY MEDICINE

## 2021-03-08 RX ORDER — DULOXETIN HYDROCHLORIDE 20 MG/1
120 CAPSULE, DELAYED RELEASE ORAL DAILY
COMMUNITY

## 2021-03-08 RX ORDER — HEPARIN SODIUM (PORCINE) LOCK FLUSH IV SOLN 100 UNIT/ML 100 UNIT/ML
500 SOLUTION INTRAVENOUS PRN
Status: CANCELLED | OUTPATIENT
Start: 2021-03-09

## 2021-03-08 RX ORDER — SODIUM CHLORIDE 9 MG/ML
INJECTION, SOLUTION INTRAVENOUS CONTINUOUS
Status: ACTIVE | OUTPATIENT
Start: 2021-03-08 | End: 2021-03-08

## 2021-03-08 RX ORDER — SODIUM CHLORIDE 0.9 % (FLUSH) 0.9 %
10 SYRINGE (ML) INJECTION PRN
Status: DISCONTINUED | OUTPATIENT
Start: 2021-03-08 | End: 2021-03-09 | Stop reason: HOSPADM

## 2021-03-08 RX ORDER — METHYLPREDNISOLONE SODIUM SUCCINATE 125 MG/2ML
125 INJECTION, POWDER, LYOPHILIZED, FOR SOLUTION INTRAMUSCULAR; INTRAVENOUS ONCE
Status: CANCELLED | OUTPATIENT
Start: 2021-03-09 | End: 2021-03-09

## 2021-03-08 RX ORDER — DIPHENHYDRAMINE HCL 25 MG
25 TABLET ORAL ONCE
Status: CANCELLED | OUTPATIENT
Start: 2021-03-09 | End: 2021-03-09

## 2021-03-08 RX ORDER — SODIUM CHLORIDE 0.9 % (FLUSH) 0.9 %
5 SYRINGE (ML) INJECTION PRN
Status: CANCELLED | OUTPATIENT
Start: 2021-03-09

## 2021-03-08 RX ORDER — SODIUM CHLORIDE 9 MG/ML
INJECTION, SOLUTION INTRAVENOUS CONTINUOUS
Status: CANCELLED | OUTPATIENT
Start: 2021-03-09

## 2021-03-08 RX ORDER — ACETAMINOPHEN 325 MG/1
650 TABLET ORAL ONCE
Status: CANCELLED | OUTPATIENT
Start: 2021-03-09 | End: 2021-03-09

## 2021-03-08 RX ORDER — DIPHENHYDRAMINE HYDROCHLORIDE 50 MG/ML
50 INJECTION INTRAMUSCULAR; INTRAVENOUS ONCE
Status: CANCELLED | OUTPATIENT
Start: 2021-03-09 | End: 2021-03-09

## 2021-03-08 RX ORDER — SODIUM CHLORIDE 0.9 % (FLUSH) 0.9 %
10 SYRINGE (ML) INJECTION PRN
Status: CANCELLED | OUTPATIENT
Start: 2021-03-09

## 2021-03-08 RX ORDER — HEPARIN SODIUM (PORCINE) LOCK FLUSH IV SOLN 100 UNIT/ML 100 UNIT/ML
500 SOLUTION INTRAVENOUS PRN
Status: DISCONTINUED | OUTPATIENT
Start: 2021-03-08 | End: 2021-03-09 | Stop reason: HOSPADM

## 2021-03-08 RX ADMIN — Medication 10 ML: at 11:39

## 2021-03-08 RX ADMIN — IMMUNE GLOBULIN (HUMAN) 5 G: 10 INJECTION INTRAVENOUS; SUBCUTANEOUS at 10:15

## 2021-03-08 RX ADMIN — IMMUNE GLOBULIN (HUMAN) 20 G: 10 INJECTION INTRAVENOUS; SUBCUTANEOUS at 10:45

## 2021-03-08 RX ADMIN — SODIUM CHLORIDE: 9 INJECTION, SOLUTION INTRAVENOUS at 10:15

## 2021-03-08 RX ADMIN — HEPARIN 500 UNITS: 100 SYRINGE at 11:40

## 2021-03-08 ASSESSMENT — PAIN - FUNCTIONAL ASSESSMENT: PAIN_FUNCTIONAL_ASSESSMENT: 0-10

## 2021-03-08 NOTE — PROGRESS NOTES
__m__ Safety:       (Environmental)   [place] to environment   Ensure ID band is correct and in place/ allergy band as needed   Assess for fall risk   Initiate fall precautions as applicable (fall band, side rails, etc.)   Call light within reach   Bed in low position/ wheels locked    __m__ Pain:        Assess pain level and characteristics   Administer analgesics as ordered   Assess effectiveness of pain management and report to MD as needed    __m__ Knowledge Deficit:   Assess baseline knowledge   Provide teaching at level of understanding   Provide teaching via preferred learning method   Evaluate teaching effectiveness    __m__ Hemodynamic/Respiratory Status:       (Pre and Post Procedure Monitoring)   Assess/Monitor vital signs and LOC   Assess Baseline SpO2 prior to any sedation   Obtain weight/height   Assess vital signs/ LOC until patient meets discharge criteria   Monitor procedure site and notify MD of any issues  m__ Infection-Risk of Central Venous Catheter:   Monitor for infection signs and symptoms (catheter site redness, temperature elevation, etc)   Assess for infection risks   Educate regarding infection prevention   Manage central venous catheter (flushes/ dressing changes per protocol)

## 2021-04-05 ENCOUNTER — PROCEDURE VISIT (OUTPATIENT)
Dept: CARDIOLOGY CLINIC | Age: 76
End: 2021-04-05
Payer: MEDICARE

## 2021-04-05 ENCOUNTER — HOSPITAL ENCOUNTER (OUTPATIENT)
Dept: NURSING | Age: 76
Discharge: HOME | End: 2021-04-05
Payer: MEDICARE

## 2021-04-05 VITALS
BODY MASS INDEX: 36.96 KG/M2 | HEART RATE: 70 BPM | TEMPERATURE: 96.5 F | SYSTOLIC BLOOD PRESSURE: 181 MMHG | OXYGEN SATURATION: 93 % | DIASTOLIC BLOOD PRESSURE: 86 MMHG | RESPIRATION RATE: 16 BRPM | WEIGHT: 229 LBS

## 2021-04-05 DIAGNOSIS — D83.9 COMMON VARIABLE IMMUNODEFICIENCY (HCC): Primary | ICD-10-CM

## 2021-04-05 DIAGNOSIS — R00.2 PALPITATIONS: Primary | ICD-10-CM

## 2021-04-05 PROCEDURE — 96415 CHEMO IV INFUSION ADDL HR: CPT

## 2021-04-05 PROCEDURE — 6360000002 HC RX W HCPCS: Performed by: FAMILY MEDICINE

## 2021-04-05 PROCEDURE — 96366 THER/PROPH/DIAG IV INF ADDON: CPT

## 2021-04-05 PROCEDURE — 96365 THER/PROPH/DIAG IV INF INIT: CPT

## 2021-04-05 PROCEDURE — 93298 REM INTERROG DEV EVAL SCRMS: CPT | Performed by: NUCLEAR MEDICINE

## 2021-04-05 PROCEDURE — G2066 INTER DEVC REMOTE 30D: HCPCS | Performed by: NUCLEAR MEDICINE

## 2021-04-05 PROCEDURE — 2580000003 HC RX 258: Performed by: FAMILY MEDICINE

## 2021-04-05 PROCEDURE — 96413 CHEMO IV INFUSION 1 HR: CPT

## 2021-04-05 RX ORDER — ACETAMINOPHEN 325 MG/1
650 TABLET ORAL ONCE
Status: DISCONTINUED | OUTPATIENT
Start: 2021-04-05 | End: 2021-04-06 | Stop reason: HOSPADM

## 2021-04-05 RX ORDER — DIPHENHYDRAMINE HCL 25 MG
25 TABLET ORAL ONCE
Status: CANCELLED | OUTPATIENT
Start: 2021-05-03 | End: 2021-05-03

## 2021-04-05 RX ORDER — SODIUM CHLORIDE 0.9 % (FLUSH) 0.9 %
10 SYRINGE (ML) INJECTION PRN
Status: DISCONTINUED | OUTPATIENT
Start: 2021-04-05 | End: 2021-04-06 | Stop reason: HOSPADM

## 2021-04-05 RX ORDER — SODIUM CHLORIDE 0.9 % (FLUSH) 0.9 %
10 SYRINGE (ML) INJECTION PRN
Status: CANCELLED | OUTPATIENT
Start: 2021-05-03

## 2021-04-05 RX ORDER — METHYLPREDNISOLONE SODIUM SUCCINATE 125 MG/2ML
125 INJECTION, POWDER, LYOPHILIZED, FOR SOLUTION INTRAMUSCULAR; INTRAVENOUS ONCE
Status: CANCELLED | OUTPATIENT
Start: 2021-05-03 | End: 2021-05-03

## 2021-04-05 RX ORDER — SODIUM CHLORIDE 9 MG/ML
INJECTION, SOLUTION INTRAVENOUS CONTINUOUS
Status: ACTIVE | OUTPATIENT
Start: 2021-04-05 | End: 2021-04-05

## 2021-04-05 RX ORDER — SODIUM CHLORIDE 0.9 % (FLUSH) 0.9 %
5 SYRINGE (ML) INJECTION PRN
Status: CANCELLED | OUTPATIENT
Start: 2021-05-03

## 2021-04-05 RX ORDER — HEPARIN SODIUM (PORCINE) LOCK FLUSH IV SOLN 100 UNIT/ML 100 UNIT/ML
500 SOLUTION INTRAVENOUS PRN
Status: DISCONTINUED | OUTPATIENT
Start: 2021-04-05 | End: 2021-04-06 | Stop reason: HOSPADM

## 2021-04-05 RX ORDER — HEPARIN SODIUM (PORCINE) LOCK FLUSH IV SOLN 100 UNIT/ML 100 UNIT/ML
500 SOLUTION INTRAVENOUS PRN
Status: CANCELLED | OUTPATIENT
Start: 2021-05-03

## 2021-04-05 RX ORDER — ACETAMINOPHEN 325 MG/1
650 TABLET ORAL ONCE
Status: CANCELLED | OUTPATIENT
Start: 2021-05-03 | End: 2021-05-03

## 2021-04-05 RX ORDER — SODIUM CHLORIDE 9 MG/ML
INJECTION, SOLUTION INTRAVENOUS CONTINUOUS
Status: CANCELLED | OUTPATIENT
Start: 2021-05-03

## 2021-04-05 RX ORDER — DIPHENHYDRAMINE HCL 25 MG
25 TABLET ORAL ONCE
Status: DISCONTINUED | OUTPATIENT
Start: 2021-04-05 | End: 2021-04-06 | Stop reason: HOSPADM

## 2021-04-05 RX ORDER — DIPHENHYDRAMINE HYDROCHLORIDE 50 MG/ML
50 INJECTION INTRAMUSCULAR; INTRAVENOUS ONCE
Status: CANCELLED | OUTPATIENT
Start: 2021-05-03 | End: 2021-05-03

## 2021-04-05 RX ADMIN — IMMUNE GLOBULIN (HUMAN) 5 G: 10 INJECTION INTRAVENOUS; SUBCUTANEOUS at 12:08

## 2021-04-05 RX ADMIN — SODIUM CHLORIDE: 9 INJECTION, SOLUTION INTRAVENOUS at 10:53

## 2021-04-05 RX ADMIN — IMMUNE GLOBULIN (HUMAN) 20 G: 10 INJECTION INTRAVENOUS; SUBCUTANEOUS at 10:51

## 2021-04-05 RX ADMIN — HEPARIN 500 UNITS: 100 SYRINGE at 12:21

## 2021-05-03 ENCOUNTER — HOSPITAL ENCOUNTER (OUTPATIENT)
Dept: NURSING | Age: 76
Discharge: HOME | End: 2021-05-03
Payer: MEDICARE

## 2021-05-03 VITALS
WEIGHT: 223 LBS | OXYGEN SATURATION: 95 % | DIASTOLIC BLOOD PRESSURE: 62 MMHG | HEART RATE: 83 BPM | BODY MASS INDEX: 35.99 KG/M2 | RESPIRATION RATE: 16 BRPM | TEMPERATURE: 96.2 F | SYSTOLIC BLOOD PRESSURE: 117 MMHG

## 2021-05-03 DIAGNOSIS — D83.9 COMMON VARIABLE IMMUNODEFICIENCY (HCC): Primary | ICD-10-CM

## 2021-05-03 PROCEDURE — 96413 CHEMO IV INFUSION 1 HR: CPT

## 2021-05-03 PROCEDURE — 6370000000 HC RX 637 (ALT 250 FOR IP): Performed by: FAMILY MEDICINE

## 2021-05-03 PROCEDURE — 6360000002 HC RX W HCPCS: Performed by: FAMILY MEDICINE

## 2021-05-03 PROCEDURE — 2580000003 HC RX 258: Performed by: FAMILY MEDICINE

## 2021-05-03 RX ORDER — SODIUM CHLORIDE 9 MG/ML
INJECTION, SOLUTION INTRAVENOUS CONTINUOUS
Status: ACTIVE | OUTPATIENT
Start: 2021-05-03 | End: 2021-05-03

## 2021-05-03 RX ORDER — SODIUM CHLORIDE 0.9 % (FLUSH) 0.9 %
5 SYRINGE (ML) INJECTION PRN
Status: CANCELLED | OUTPATIENT
Start: 2021-05-31

## 2021-05-03 RX ORDER — HEPARIN SODIUM (PORCINE) LOCK FLUSH IV SOLN 100 UNIT/ML 100 UNIT/ML
500 SOLUTION INTRAVENOUS PRN
Status: CANCELLED | OUTPATIENT
Start: 2021-05-31

## 2021-05-03 RX ORDER — DIPHENHYDRAMINE HCL 25 MG
25 TABLET ORAL ONCE
Status: CANCELLED | OUTPATIENT
Start: 2021-05-31 | End: 2021-05-31

## 2021-05-03 RX ORDER — SODIUM CHLORIDE 9 MG/ML
INJECTION, SOLUTION INTRAVENOUS CONTINUOUS
Status: CANCELLED | OUTPATIENT
Start: 2021-05-31

## 2021-05-03 RX ORDER — SODIUM CHLORIDE 0.9 % (FLUSH) 0.9 %
10 SYRINGE (ML) INJECTION PRN
Status: CANCELLED | OUTPATIENT
Start: 2021-05-31

## 2021-05-03 RX ORDER — SODIUM CHLORIDE 0.9 % (FLUSH) 0.9 %
10 SYRINGE (ML) INJECTION PRN
Status: DISCONTINUED | OUTPATIENT
Start: 2021-05-03 | End: 2021-05-04 | Stop reason: HOSPADM

## 2021-05-03 RX ORDER — ACETAMINOPHEN 325 MG/1
650 TABLET ORAL ONCE
Status: COMPLETED | OUTPATIENT
Start: 2021-05-03 | End: 2021-05-03

## 2021-05-03 RX ORDER — DIPHENHYDRAMINE HYDROCHLORIDE 50 MG/ML
50 INJECTION INTRAMUSCULAR; INTRAVENOUS ONCE
Status: CANCELLED | OUTPATIENT
Start: 2021-05-31 | End: 2021-05-31

## 2021-05-03 RX ORDER — METHYLPREDNISOLONE SODIUM SUCCINATE 125 MG/2ML
125 INJECTION, POWDER, LYOPHILIZED, FOR SOLUTION INTRAMUSCULAR; INTRAVENOUS ONCE
Status: CANCELLED | OUTPATIENT
Start: 2021-05-31 | End: 2021-05-31

## 2021-05-03 RX ORDER — HEPARIN SODIUM (PORCINE) LOCK FLUSH IV SOLN 100 UNIT/ML 100 UNIT/ML
500 SOLUTION INTRAVENOUS PRN
Status: DISCONTINUED | OUTPATIENT
Start: 2021-05-03 | End: 2021-05-04 | Stop reason: HOSPADM

## 2021-05-03 RX ORDER — ACETAMINOPHEN 325 MG/1
650 TABLET ORAL ONCE
Status: CANCELLED | OUTPATIENT
Start: 2021-05-31 | End: 2021-05-31

## 2021-05-03 RX ORDER — DIPHENHYDRAMINE HCL 25 MG
25 TABLET ORAL ONCE
Status: COMPLETED | OUTPATIENT
Start: 2021-05-03 | End: 2021-05-03

## 2021-05-03 RX ADMIN — IMMUNE GLOBULIN (HUMAN) 5 G: 10 INJECTION INTRAVENOUS; SUBCUTANEOUS at 12:22

## 2021-05-03 RX ADMIN — ACETAMINOPHEN 650 MG: 325 TABLET ORAL at 10:30

## 2021-05-03 RX ADMIN — IMMUNE GLOBULIN (HUMAN) 20 G: 10 INJECTION INTRAVENOUS; SUBCUTANEOUS at 10:59

## 2021-05-03 RX ADMIN — HEPARIN 500 UNITS: 100 SYRINGE at 12:39

## 2021-05-03 RX ADMIN — SODIUM CHLORIDE: 9 INJECTION, SOLUTION INTRAVENOUS at 10:28

## 2021-05-03 RX ADMIN — DIPHENHYDRAMINE HCL 25 MG: 25 TABLET ORAL at 10:30

## 2021-05-03 RX ADMIN — Medication 10 ML: at 12:39

## 2021-05-03 ASSESSMENT — PAIN SCALES - GENERAL: PAINLEVEL_OUTOF10: 0

## 2021-05-03 NOTE — PROGRESS NOTES
1015: Patient arrived ambulatory for IVIG. PT RIGHTS AND RESPONSIBILITIES OFFERED TO PT. Patient provided with snack and beverage. 1030: Pre-medications administered. 1059: IVIG started, patient tolerating well. 1222: IVIG completed, patient tolerated well. No concerns voiced. AVS reviewed with patient, voiced understanding. Patient discharged ambulatory.                _m___ Safety:       (Environmental)   [place] to environment   Ensure ID band is correct and in place/ allergy band as needed   Assess for fall risk   Initiate fall precautions as applicable (fall band, side rails, etc.)   Call light within reach   Bed in low position/ wheels locked    _m___ Pain:        Assess pain level and characteristics   Administer analgesics as ordered   Assess effectiveness of pain management and report to MD as needed    _m___ Knowledge Deficit:   Assess baseline knowledge   Provide teaching at level of understanding   Provide teaching via preferred learning method   Evaluate teaching effectiveness    _m___ Hemodynamic/Respiratory Status:       (Pre and Post Procedure Monitoring)   Assess/Monitor vital signs and LOC   Assess Baseline SpO2 prior to any sedation   Obtain weight/height   Assess vital signs/ LOC until patient meets discharge criteria   Monitor procedure site and notify MD of any issues

## 2021-05-10 NOTE — PROGRESS NOTES
Patients infusion complete. She tolerated well. [Alert] : alert [Well Nourished] : well nourished [No Acute Distress] : no acute distress [Well Developed] : well developed [Normal Sclera/Conjunctiva] : normal sclera/conjunctiva [EOMI] : extra ocular movement intact [No Proptosis] : no proptosis [Normal Oropharynx] : the oropharynx was normal [Thyroid Not Enlarged] : the thyroid was not enlarged [No Thyroid Nodules] : no palpable thyroid nodules [Clear to Auscultation] : lungs were clear to auscultation bilaterally [Normal S1, S2] : normal S1 and S2 [Normal Rate] : heart rate was normal [Regular Rhythm] : with a regular rhythm [No Edema] : no peripheral edema [Not Tender] : non-tender [Not Distended] : not distended [Soft] : abdomen soft [Normal Anterior Cervical Nodes] : no anterior cervical lymphadenopathy [No Spinal Tenderness] : no spinal tenderness [Spine Straight] : spine straight [No Stigmata of Cushings Syndrome] : no stigmata of Cushings Syndrome [Normal Gait] : normal gait [No Rash] : no rash [Normal Reflexes] : deep tendon reflexes were 2+ and symmetric [No Tremors] : no tremors [Oriented x3] : oriented to person, place, and time [Normal Hearing] : hearing was normal [No Respiratory Distress] : no respiratory distress [Normal Affect] : the affect was normal [Normal Mood] : the mood was normal [Acanthosis Nigricans] : no acanthosis nigricans

## 2021-05-11 ENCOUNTER — PROCEDURE VISIT (OUTPATIENT)
Dept: CARDIOLOGY CLINIC | Age: 76
End: 2021-05-11
Payer: MEDICARE

## 2021-05-11 DIAGNOSIS — R00.2 PALPITATIONS: Primary | ICD-10-CM

## 2021-05-11 PROCEDURE — G2066 INTER DEVC REMOTE 30D: HCPCS | Performed by: NUCLEAR MEDICINE

## 2021-05-11 PROCEDURE — 93298 REM INTERROG DEV EVAL SCRMS: CPT | Performed by: NUCLEAR MEDICINE

## 2021-05-11 NOTE — PROGRESS NOTES
[facility] [employer]   Patient of Gayathri    History of palpitations    Battery okay    Episodes: false [place]anna-- NSR with PVCs

## 2021-06-01 ENCOUNTER — HOSPITAL ENCOUNTER (OUTPATIENT)
Dept: NURSING | Age: 76
Discharge: HOME | End: 2021-06-01
Payer: MEDICARE

## 2021-06-01 ENCOUNTER — TELEPHONE (OUTPATIENT)
Dept: CARDIOLOGY CLINIC | Age: 76
End: 2021-06-01

## 2021-06-01 VITALS
OXYGEN SATURATION: 92 % | SYSTOLIC BLOOD PRESSURE: 181 MMHG | BODY MASS INDEX: 36.48 KG/M2 | WEIGHT: 226 LBS | RESPIRATION RATE: 18 BRPM | DIASTOLIC BLOOD PRESSURE: 89 MMHG | TEMPERATURE: 98.4 F | HEART RATE: 60 BPM

## 2021-06-01 DIAGNOSIS — D83.9 COMMON VARIABLE IMMUNODEFICIENCY (HCC): Primary | ICD-10-CM

## 2021-06-01 PROCEDURE — 96365 THER/PROPH/DIAG IV INF INIT: CPT

## 2021-06-01 PROCEDURE — 2580000003 HC RX 258: Performed by: FAMILY MEDICINE

## 2021-06-01 PROCEDURE — 96413 CHEMO IV INFUSION 1 HR: CPT

## 2021-06-01 PROCEDURE — 6360000002 HC RX W HCPCS: Performed by: FAMILY MEDICINE

## 2021-06-01 RX ORDER — HEPARIN SODIUM (PORCINE) LOCK FLUSH IV SOLN 100 UNIT/ML 100 UNIT/ML
500 SOLUTION INTRAVENOUS PRN
Status: DISCONTINUED | OUTPATIENT
Start: 2021-06-01 | End: 2021-06-02 | Stop reason: HOSPADM

## 2021-06-01 RX ORDER — SODIUM CHLORIDE 0.9 % (FLUSH) 0.9 %
10 SYRINGE (ML) INJECTION PRN
Status: DISCONTINUED | OUTPATIENT
Start: 2021-06-01 | End: 2021-06-02 | Stop reason: HOSPADM

## 2021-06-01 RX ORDER — SODIUM CHLORIDE 9 MG/ML
INJECTION, SOLUTION INTRAVENOUS CONTINUOUS
Status: CANCELLED | OUTPATIENT
Start: 2021-06-29

## 2021-06-01 RX ORDER — SODIUM CHLORIDE 0.9 % (FLUSH) 0.9 %
10 SYRINGE (ML) INJECTION PRN
Status: CANCELLED | OUTPATIENT
Start: 2021-06-29

## 2021-06-01 RX ORDER — ACETAMINOPHEN 325 MG/1
650 TABLET ORAL ONCE
Status: CANCELLED | OUTPATIENT
Start: 2021-06-29 | End: 2021-06-29

## 2021-06-01 RX ORDER — DIPHENHYDRAMINE HCL 25 MG
25 TABLET ORAL ONCE
Status: DISCONTINUED | OUTPATIENT
Start: 2021-06-01 | End: 2021-06-02 | Stop reason: HOSPADM

## 2021-06-01 RX ORDER — SODIUM CHLORIDE 9 MG/ML
INJECTION, SOLUTION INTRAVENOUS CONTINUOUS
Status: ACTIVE | OUTPATIENT
Start: 2021-06-01 | End: 2021-06-01

## 2021-06-01 RX ORDER — SODIUM CHLORIDE 0.9 % (FLUSH) 0.9 %
5 SYRINGE (ML) INJECTION PRN
Status: CANCELLED | OUTPATIENT
Start: 2021-06-29

## 2021-06-01 RX ORDER — DIPHENHYDRAMINE HCL 25 MG
25 TABLET ORAL ONCE
Status: CANCELLED | OUTPATIENT
Start: 2021-06-29 | End: 2021-06-29

## 2021-06-01 RX ORDER — METHYLPREDNISOLONE SODIUM SUCCINATE 125 MG/2ML
125 INJECTION, POWDER, LYOPHILIZED, FOR SOLUTION INTRAMUSCULAR; INTRAVENOUS ONCE
Status: CANCELLED | OUTPATIENT
Start: 2021-06-29 | End: 2021-06-29

## 2021-06-01 RX ORDER — DIPHENHYDRAMINE HYDROCHLORIDE 50 MG/ML
50 INJECTION INTRAMUSCULAR; INTRAVENOUS ONCE
Status: CANCELLED | OUTPATIENT
Start: 2021-06-29 | End: 2021-06-29

## 2021-06-01 RX ORDER — HEPARIN SODIUM (PORCINE) LOCK FLUSH IV SOLN 100 UNIT/ML 100 UNIT/ML
500 SOLUTION INTRAVENOUS PRN
Status: CANCELLED | OUTPATIENT
Start: 2021-06-29

## 2021-06-01 RX ORDER — ACETAMINOPHEN 325 MG/1
650 TABLET ORAL ONCE
Status: DISCONTINUED | OUTPATIENT
Start: 2021-06-01 | End: 2021-06-02 | Stop reason: HOSPADM

## 2021-06-01 RX ADMIN — Medication 10 ML: at 12:33

## 2021-06-01 RX ADMIN — IMMUNE GLOBULIN (HUMAN) 5 G: 10 INJECTION INTRAVENOUS; SUBCUTANEOUS at 11:04

## 2021-06-01 RX ADMIN — IMMUNE GLOBULIN (HUMAN) 20 G: 10 INJECTION INTRAVENOUS; SUBCUTANEOUS at 11:38

## 2021-06-01 RX ADMIN — HEPARIN 500 UNITS: 100 SYRINGE at 12:33

## 2021-06-01 ASSESSMENT — PAIN - FUNCTIONAL ASSESSMENT: PAIN_FUNCTIONAL_ASSESSMENT: 0-10

## 2021-06-23 ENCOUNTER — OFFICE VISIT (OUTPATIENT)
Dept: NEPHROLOGY | Age: 76
End: 2021-06-23
Payer: MEDICARE

## 2021-06-23 VITALS
DIASTOLIC BLOOD PRESSURE: 74 MMHG | TEMPERATURE: 98 F | WEIGHT: 228 LBS | HEART RATE: 65 BPM | SYSTOLIC BLOOD PRESSURE: 120 MMHG | OXYGEN SATURATION: 98 % | BODY MASS INDEX: 36.8 KG/M2

## 2021-06-23 DIAGNOSIS — N18.31 STAGE 3A CHRONIC KIDNEY DISEASE (HCC): ICD-10-CM

## 2021-06-23 DIAGNOSIS — R05.9 COUGH: Primary | ICD-10-CM

## 2021-06-23 PROCEDURE — G8400 PT W/DXA NO RESULTS DOC: HCPCS | Performed by: INTERNAL MEDICINE

## 2021-06-23 PROCEDURE — G8417 CALC BMI ABV UP PARAM F/U: HCPCS | Performed by: INTERNAL MEDICINE

## 2021-06-23 PROCEDURE — 4040F PNEUMOC VAC/ADMIN/RCVD: CPT | Performed by: INTERNAL MEDICINE

## 2021-06-23 PROCEDURE — 99213 OFFICE O/P EST LOW 20 MIN: CPT | Performed by: INTERNAL MEDICINE

## 2021-06-23 PROCEDURE — G8427 DOCREV CUR MEDS BY ELIG CLIN: HCPCS | Performed by: INTERNAL MEDICINE

## 2021-06-23 PROCEDURE — 1090F PRES/ABSN URINE INCON ASSESS: CPT | Performed by: INTERNAL MEDICINE

## 2021-06-23 PROCEDURE — 1123F ACP DISCUSS/DSCN MKR DOCD: CPT | Performed by: INTERNAL MEDICINE

## 2021-06-23 PROCEDURE — 1036F TOBACCO NON-USER: CPT | Performed by: INTERNAL MEDICINE

## 2021-06-23 RX ORDER — CITALOPRAM 40 MG/1
40 TABLET ORAL DAILY
COMMUNITY
Start: 2021-06-04

## 2021-06-23 RX ORDER — LOPERAMIDE HYDROCHLORIDE 2 MG/1
TABLET ORAL
Status: ON HOLD | COMMUNITY
End: 2022-04-30 | Stop reason: HOSPADM

## 2021-06-23 RX ORDER — DEXTRAN 70/HYPROMELLOSE
DROPS OPHTHALMIC (EYE)
COMMUNITY
End: 2021-09-02

## 2021-06-23 RX ORDER — IPRATROPIUM BROMIDE 42 UG/1
SPRAY, METERED NASAL
Status: ON HOLD | COMMUNITY
Start: 2021-05-28 | End: 2021-07-26 | Stop reason: HOSPADM

## 2021-06-23 RX ORDER — TIZANIDINE 2 MG/1
2 TABLET ORAL 2 TIMES DAILY
COMMUNITY

## 2021-06-23 NOTE — PROGRESS NOTES
Bilateral     BLADDER SUSPENSION      CARDIAC SURGERY  2016    heart cath--[facility]    COLONOSCOPY Left 5/11/2018    COLONOSCOPY POLYPECTOMY SNARE/COLD BIOPSY performed by Magda Serra MD at [facility]    ENDOSCOPY, COLON, DIAGNOSTIC      GASTRIC FUNDOPLICATION      HAMMER TOE SURGERY Right     HERNIA REPAIR      HIATAL HERNIA REPAIR  09/06/2016    Laparoscopic Robotic with Nissen Fundoplication - Dr. Se Garcia OFFICE/OUTPT VISIT,PROCEDURE ONLY N/A 9/21/2018    EGD DIAGNOSTIC ONLY performed by Shun Caballero MD at [address]      right    TENDON RELEASE Left 08/09/2016    left foot    TUBAL LIGATION      TUNNELED VENOUS PORT PLACEMENT  11/06/2017    UPPER GASTROINTESTINAL ENDOSCOPY Left 5/10/2018    EGD BIOPSY performed by Magda Serra MD at [facility]        VITALS:  /74 (Site: Right Upper Arm, Position: Sitting, Cuff Size: Large Adult)   Pulse 65   Temp 98 °F (36.7 °C) (Temporal)   Wt 228 lb (103.4 kg)   SpO2 98%   BMI 36.80 kg/m²   Wt Readings from Last 3 Encounters:   06/23/21 228 lb (103.4 kg)   06/01/21 226 lb (102.5 kg)   05/03/21 223 lb (101.2 kg)     Body mass index is 36.8 kg/m².      General Appearance: alert and cooperative with exam, appears comfortable, no distress  HEENT: EOMI, moist oral mucus membranes  Neck: No jugular venous distention,  Lungs: rales noted left lung base  Heart: irregularly irregular  GI: soft, non-tender, no guarding, no flank pain  Extremities: nonpitting edema noted  Skin: warm, dry  Neurologic: no tremor, no asterixis, no focal neurologic deficits     Medications:  Current Outpatient Medications   Medication Sig Dispense Refill    Artificial Tear Solution (JUST TEARS EYE DROPS) SOLN Apply to eye      citalopram (CELEXA) 40 MG tablet Take 40 mg by mouth daily      ipratropium (ATROVENT) 0.06 % nasal spray       loperamide (IMODIUM A-D) 2 MG tablet Take by mouth      tiZANidine tablet by mouth 2 times daily 30 tablet 3    pregabalin (LYRICA) 150 MG capsule Take 1 capsule by mouth 3 times daily for 3 days. . 9 capsule 0    insulin lispro (HUMALOG) 100 UNIT/ML injection vial Inject 0-3 Units into the skin nightly 1 vial 3    traZODone (DESYREL) 100 MG tablet Take 100 mg by mouth nightly       Multiple Vitamins-Minerals (THERAPEUTIC MULTIVITAMIN-MINERALS) tablet Take 1 tablet by mouth daily      escitalopram (LEXAPRO) 10 MG tablet Take 1 tablet by mouth daily 30 tablet 3    OXYGEN Inhale into the lungs continuous      atorvastatin (LIPITOR) 20 MG tablet Take 1 tablet by mouth nightly 30 tablet 3    magnesium hydroxide (MILK OF MAGNESIA CONCENTRATE) 2400 MG/10ML SUSP Take 30 mLs by mouth once as needed      docusate sodium (COLACE) 100 MG capsule Take 100 mg by mouth 2 times daily      acetaminophen (TYLENOL) 325 MG tablet Take 2 tablets by mouth every 4 hours as needed for Pain 120 tablet 3    levothyroxine (SYNTHROID) 75 MCG tablet Take 75 mcg by mouth Daily       No current facility-administered medications for this visit.         Laboratory & Diagnostics:  CBC:   Lab Results   Component Value Date    WBC 4.5 (L) 06/15/2020    HGB 13.4 06/15/2020    HCT 42.2 06/15/2020    MCV 96.1 06/15/2020     06/15/2020     BMP:    Lab Results   Component Value Date     05/07/2020     05/23/2019     05/02/2019    K 4.5 05/07/2020    K 4.6 05/23/2019    K 3.4 05/02/2019     05/07/2020     05/23/2019    CL 99 05/02/2019    CO2 30 05/07/2020    CO2 33 05/23/2019    CO2 32 05/02/2019    BUN 29 05/07/2020    BUN 25 05/23/2019    BUN 25 05/02/2019    CREATININE 1.2 05/07/2020    CREATININE 1.3 05/23/2019    CREATININE 1.4 05/02/2019    GLUCOSE 112 05/07/2020    GLUCOSE 107 05/23/2019    GLUCOSE 112 05/02/2019      Hepatic:   Lab Results   Component Value Date    AST 20 06/15/2020    AST 27 10/23/2018    AST 15 09/21/2018    ALT 9 (L) 06/15/2020    ALT 12 10/23/2018

## 2021-06-24 LAB
BASOPHILS ABSOLUTE: ABNORMAL
BASOPHILS RELATIVE PERCENT: ABNORMAL
BUN BLDV-MCNC: 22 MG/DL
CALCIUM SERPL-MCNC: 9 MG/DL
CHLORIDE BLD-SCNC: 105 MMOL/L
CO2: 30 MMOL/L
CREAT SERPL-MCNC: 1.1 MG/DL
EOSINOPHILS ABSOLUTE: ABNORMAL
EOSINOPHILS RELATIVE PERCENT: ABNORMAL
GFR CALCULATED: 48
GLUCOSE BLD-MCNC: 104 MG/DL
HCT VFR BLD CALC: 34.2 % (ref 36–46)
HEMOGLOBIN: 11.5 G/DL (ref 12–16)
LYMPHOCYTES ABSOLUTE: ABNORMAL
LYMPHOCYTES RELATIVE PERCENT: ABNORMAL
MCH RBC QN AUTO: ABNORMAL PG
MCHC RBC AUTO-ENTMCNC: ABNORMAL G/DL
MCV RBC AUTO: ABNORMAL FL
MONOCYTES ABSOLUTE: ABNORMAL
MONOCYTES RELATIVE PERCENT: ABNORMAL
NEUTROPHILS ABSOLUTE: ABNORMAL
NEUTROPHILS RELATIVE PERCENT: ABNORMAL
PLATELET # BLD: 161 K/ΜL
PMV BLD AUTO: ABNORMAL FL
POTASSIUM SERPL-SCNC: 4.4 MMOL/L
RBC # BLD: 3.78 10^6/ΜL
SODIUM BLD-SCNC: 143 MMOL/L
VITAMIN D 25-HYDROXY: 25
VITAMIN D2, 25 HYDROXY: NORMAL
VITAMIN D3,25 HYDROXY: NORMAL
WBC # BLD: 3.9 10^3/ML

## 2021-06-28 ENCOUNTER — HOSPITAL ENCOUNTER (OUTPATIENT)
Dept: NURSING | Age: 76
Discharge: HOME | End: 2021-06-28
Payer: MEDICARE

## 2021-06-28 VITALS
DIASTOLIC BLOOD PRESSURE: 73 MMHG | TEMPERATURE: 97.8 F | HEART RATE: 55 BPM | RESPIRATION RATE: 18 BRPM | OXYGEN SATURATION: 96 % | SYSTOLIC BLOOD PRESSURE: 175 MMHG

## 2021-06-28 DIAGNOSIS — D83.9 COMMON VARIABLE IMMUNODEFICIENCY (HCC): Primary | ICD-10-CM

## 2021-06-28 PROCEDURE — 82784 ASSAY IGA/IGD/IGG/IGM EACH: CPT

## 2021-06-28 PROCEDURE — 96415 CHEMO IV INFUSION ADDL HR: CPT

## 2021-06-28 PROCEDURE — 36415 COLL VENOUS BLD VENIPUNCTURE: CPT

## 2021-06-28 PROCEDURE — 96365 THER/PROPH/DIAG IV INF INIT: CPT

## 2021-06-28 PROCEDURE — 96413 CHEMO IV INFUSION 1 HR: CPT

## 2021-06-28 PROCEDURE — 2580000003 HC RX 258: Performed by: FAMILY MEDICINE

## 2021-06-28 PROCEDURE — 6360000002 HC RX W HCPCS: Performed by: FAMILY MEDICINE

## 2021-06-28 PROCEDURE — 96366 THER/PROPH/DIAG IV INF ADDON: CPT

## 2021-06-28 RX ORDER — SODIUM CHLORIDE 9 MG/ML
INJECTION, SOLUTION INTRAVENOUS CONTINUOUS
Status: CANCELLED | OUTPATIENT
Start: 2021-06-29

## 2021-06-28 RX ORDER — ACETAMINOPHEN 325 MG/1
650 TABLET ORAL ONCE
Status: CANCELLED | OUTPATIENT
Start: 2021-06-29 | End: 2021-06-29

## 2021-06-28 RX ORDER — SODIUM CHLORIDE 0.9 % (FLUSH) 0.9 %
10 SYRINGE (ML) INJECTION PRN
Status: CANCELLED | OUTPATIENT
Start: 2021-06-29

## 2021-06-28 RX ORDER — HEPARIN SODIUM (PORCINE) LOCK FLUSH IV SOLN 100 UNIT/ML 100 UNIT/ML
500 SOLUTION INTRAVENOUS PRN
Status: DISCONTINUED | OUTPATIENT
Start: 2021-06-28 | End: 2021-06-29 | Stop reason: HOSPADM

## 2021-06-28 RX ORDER — ACETAMINOPHEN 325 MG/1
650 TABLET ORAL ONCE
Status: DISCONTINUED | OUTPATIENT
Start: 2021-06-28 | End: 2021-06-29 | Stop reason: HOSPADM

## 2021-06-28 RX ORDER — HEPARIN SODIUM (PORCINE) LOCK FLUSH IV SOLN 100 UNIT/ML 100 UNIT/ML
500 SOLUTION INTRAVENOUS PRN
Status: CANCELLED | OUTPATIENT
Start: 2021-06-29

## 2021-06-28 RX ORDER — DIPHENHYDRAMINE HYDROCHLORIDE 50 MG/ML
50 INJECTION INTRAMUSCULAR; INTRAVENOUS ONCE
Status: CANCELLED | OUTPATIENT
Start: 2021-06-29 | End: 2021-06-29

## 2021-06-28 RX ORDER — SODIUM CHLORIDE 0.9 % (FLUSH) 0.9 %
5 SYRINGE (ML) INJECTION PRN
Status: CANCELLED | OUTPATIENT
Start: 2021-06-29

## 2021-06-28 RX ORDER — SODIUM CHLORIDE 0.9 % (FLUSH) 0.9 %
10 SYRINGE (ML) INJECTION PRN
Status: DISCONTINUED | OUTPATIENT
Start: 2021-06-28 | End: 2021-06-29 | Stop reason: HOSPADM

## 2021-06-28 RX ORDER — DIPHENHYDRAMINE HCL 25 MG
25 TABLET ORAL ONCE
Status: CANCELLED | OUTPATIENT
Start: 2021-06-29 | End: 2021-06-29

## 2021-06-28 RX ORDER — DIPHENHYDRAMINE HCL 25 MG
25 TABLET ORAL ONCE
Status: DISCONTINUED | OUTPATIENT
Start: 2021-06-28 | End: 2021-06-29 | Stop reason: HOSPADM

## 2021-06-28 RX ORDER — METHYLPREDNISOLONE SODIUM SUCCINATE 125 MG/2ML
125 INJECTION, POWDER, LYOPHILIZED, FOR SOLUTION INTRAMUSCULAR; INTRAVENOUS ONCE
Status: CANCELLED | OUTPATIENT
Start: 2021-06-29 | End: 2021-06-29

## 2021-06-28 RX ADMIN — Medication 10 ML: at 12:47

## 2021-06-28 RX ADMIN — IMMUNE GLOBULIN (HUMAN) 5 G: 10 INJECTION INTRAVENOUS; SUBCUTANEOUS at 11:17

## 2021-06-28 RX ADMIN — HEPARIN 500 UNITS: 100 SYRINGE at 12:47

## 2021-06-28 RX ADMIN — IMMUNE GLOBULIN (HUMAN) 20 G: 10 INJECTION INTRAVENOUS; SUBCUTANEOUS at 11:48

## 2021-06-28 NOTE — PROGRESS NOTES
1050 pt arrives ambulatory for IVIG infusion. Infusion explained and questions answered. PT RIGHTS AND RESPONSIBILITIES OFFERED TO PT. Pt provided with water and coffee. Pt took tylenol and benadryl premeds at home around 10:30AM.   1122 labs drawn and sent down as ordered. 1215 pt tolerating infusion well. Denies needs at this time. 1250 infusion complete. Pt tolerated it well with no complaints. Vitals stable. Pt discharged ambulatory with instructions with no complaints.              __m__ Safety:       (Environmental)   [place] to environment   Ensure ID band is correct and in place/ allergy band as needed   Assess for fall risk   Initiate fall precautions as applicable (fall band, side rails, etc.)   Call light within reach   Bed in low position/ wheels locked    __m__ Pain:        Assess pain level and characteristics   Administer analgesics as ordered   Assess effectiveness of pain management and report to MD as needed    __m__ Knowledge Deficit:   Assess baseline knowledge   Provide teaching at level of understanding   Provide teaching via preferred learning method   Evaluate teaching effectiveness    _m___ Hemodynamic/Respiratory Status:       (Pre and Post Procedure Monitoring)   Assess/Monitor vital signs and LOC   Assess Baseline SpO2 prior to any sedation   Obtain weight/height   Assess vital signs/ LOC until patient meets discharge criteria   Monitor procedure site and notify MD of any issues    _m___ Infection-Risk of Central Venous Catheter:   Monitor for infection signs and symptoms (catheter site redness, temperature elevation, etc)   Assess for infection risks   Educate regarding infection prevention   Manage central venous catheter (flushes/ dressing changes per protocol)

## 2021-06-29 LAB
IGA: 85 MG/DL (ref 70–400)
IGG: 1007 MG/DL (ref 700–1600)
IGM: 43 MG/DL (ref 40–230)

## 2021-07-02 DIAGNOSIS — R05.9 COUGH: ICD-10-CM

## 2021-07-02 DIAGNOSIS — N18.31 STAGE 3A CHRONIC KIDNEY DISEASE (HCC): ICD-10-CM

## 2021-07-06 ENCOUNTER — TELEPHONE (OUTPATIENT)
Dept: NEPHROLOGY | Age: 76
End: 2021-07-06

## 2021-07-06 NOTE — TELEPHONE ENCOUNTER
----- Message from Con Solis DO sent at 7/6/2021 12:52 PM EDT -----  Chest xray looks ok  ----- Message -----  From: Binu Eddy MA  Sent: 7/2/2021   8:58 AM EDT  To: Con Solis DO

## 2021-07-23 ENCOUNTER — HOSPITAL ENCOUNTER (INPATIENT)
Age: 76
LOS: 3 days | Discharge: NURSING, REHAB OR LONG-TERM CARE | DRG: 813 | End: 2021-07-26
Attending: FAMILY MEDICINE | Admitting: FAMILY MEDICINE
Payer: MEDICARE

## 2021-07-23 ENCOUNTER — APPOINTMENT (OUTPATIENT)
Dept: CT IMAGING | Age: 76
DRG: 813 | End: 2021-07-23
Payer: MEDICARE

## 2021-07-23 DIAGNOSIS — R53.1 GENERAL WEAKNESS: ICD-10-CM

## 2021-07-23 DIAGNOSIS — D64.9 ANEMIA, UNSPECIFIED TYPE: Primary | ICD-10-CM

## 2021-07-23 LAB
ALBUMIN SERPL-MCNC: 4 G/DL (ref 3.5–5.1)
ALP BLD-CCNC: 62 U/L (ref 38–126)
ALT SERPL-CCNC: 10 U/L (ref 11–66)
ANION GAP SERPL CALCULATED.3IONS-SCNC: 8 MEQ/L (ref 8–16)
AST SERPL-CCNC: 18 U/L (ref 5–40)
BASOPHILS # BLD: 0.6 %
BASOPHILS ABSOLUTE: 0 THOU/MM3 (ref 0–0.1)
BILIRUB SERPL-MCNC: 0.3 MG/DL (ref 0.3–1.2)
BILIRUBIN DIRECT: < 0.2 MG/DL (ref 0–0.3)
BILIRUBIN URINE: NEGATIVE
BLOOD, URINE: NEGATIVE
BUN BLDV-MCNC: 16 MG/DL (ref 7–22)
CALCIUM SERPL-MCNC: 8.7 MG/DL (ref 8.5–10.5)
CHARACTER, URINE: CLEAR
CHLORIDE BLD-SCNC: 107 MEQ/L (ref 98–111)
CO2: 27 MEQ/L (ref 23–33)
COLOR: YELLOW
CREAT SERPL-MCNC: 1.1 MG/DL (ref 0.4–1.2)
EOSINOPHIL # BLD: 1.7 %
EOSINOPHILS ABSOLUTE: 0.1 THOU/MM3 (ref 0–0.4)
ERYTHROCYTE [DISTWIDTH] IN BLOOD BY AUTOMATED COUNT: 13.8 % (ref 11.5–14.5)
ERYTHROCYTE [DISTWIDTH] IN BLOOD BY AUTOMATED COUNT: 47.8 FL (ref 35–45)
GFR SERPL CREATININE-BSD FRML MDRD: 48 ML/MIN/1.73M2
GLUCOSE BLD-MCNC: 85 MG/DL (ref 70–108)
GLUCOSE BLD-MCNC: 90 MG/DL (ref 70–108)
GLUCOSE URINE: NEGATIVE MG/DL
HCT VFR BLD CALC: 24.1 % (ref 37–47)
HEMOCCULT STL QL: NEGATIVE
HEMOGLOBIN: 7.4 GM/DL (ref 12–16)
IMMATURE GRANS (ABS): 0.01 THOU/MM3 (ref 0–0.07)
IMMATURE GRANULOCYTES: 0.2 %
INR BLD: 1.38 (ref 0.85–1.13)
KETONES, URINE: NEGATIVE
LEUKOCYTE ESTERASE, URINE: NEGATIVE
LYMPHOCYTES # BLD: 21.3 %
LYMPHOCYTES ABSOLUTE: 1 THOU/MM3 (ref 1–4.8)
MCH RBC QN AUTO: 29.2 PG (ref 26–33)
MCHC RBC AUTO-ENTMCNC: 30.7 GM/DL (ref 32.2–35.5)
MCV RBC AUTO: 95.3 FL (ref 81–99)
MONOCYTES # BLD: 9 %
MONOCYTES ABSOLUTE: 0.4 THOU/MM3 (ref 0.4–1.3)
NITRITE, URINE: NEGATIVE
NUCLEATED RED BLOOD CELLS: 0 /100 WBC
OSMOLALITY CALCULATION: 283.6 MOSMOL/KG (ref 275–300)
PH UA: 6.5 (ref 5–9)
PLATELET # BLD: 219 THOU/MM3 (ref 130–400)
PMV BLD AUTO: 10.1 FL (ref 9.4–12.4)
POTASSIUM SERPL-SCNC: 4.3 MEQ/L (ref 3.5–5.2)
PROTEIN UA: NEGATIVE
RBC # BLD: 2.53 MILL/MM3 (ref 4.2–5.4)
SEG NEUTROPHILS: 67.2 %
SEGMENTED NEUTROPHILS ABSOLUTE COUNT: 3.2 THOU/MM3 (ref 1.8–7.7)
SODIUM BLD-SCNC: 142 MEQ/L (ref 135–145)
SPECIFIC GRAVITY, URINE: 1.02 (ref 1–1.03)
TOTAL PROTEIN: 6 G/DL (ref 6.1–8)
UROBILINOGEN, URINE: 0.2 EU/DL (ref 0–1)
WBC # BLD: 4.8 THOU/MM3 (ref 4.8–10.8)

## 2021-07-23 PROCEDURE — 85025 COMPLETE CBC W/AUTO DIFF WBC: CPT

## 2021-07-23 PROCEDURE — 1200000003 HC TELEMETRY R&B

## 2021-07-23 PROCEDURE — 74177 CT ABD & PELVIS W/CONTRAST: CPT

## 2021-07-23 PROCEDURE — 82248 BILIRUBIN DIRECT: CPT

## 2021-07-23 PROCEDURE — 81003 URINALYSIS AUTO W/O SCOPE: CPT

## 2021-07-23 PROCEDURE — 99285 EMERGENCY DEPT VISIT HI MDM: CPT

## 2021-07-23 PROCEDURE — 85610 PROTHROMBIN TIME: CPT

## 2021-07-23 PROCEDURE — 6360000004 HC RX CONTRAST MEDICATION: Performed by: PHYSICIAN ASSISTANT

## 2021-07-23 PROCEDURE — 96366 THER/PROPH/DIAG IV INF ADDON: CPT

## 2021-07-23 PROCEDURE — 99223 1ST HOSP IP/OBS HIGH 75: CPT | Performed by: FAMILY MEDICINE

## 2021-07-23 PROCEDURE — 82272 OCCULT BLD FECES 1-3 TESTS: CPT

## 2021-07-23 PROCEDURE — 83540 ASSAY OF IRON: CPT

## 2021-07-23 PROCEDURE — C9113 INJ PANTOPRAZOLE SODIUM, VIA: HCPCS | Performed by: PHYSICIAN ASSISTANT

## 2021-07-23 PROCEDURE — 96365 THER/PROPH/DIAG IV INF INIT: CPT

## 2021-07-23 PROCEDURE — 2580000003 HC RX 258: Performed by: STUDENT IN AN ORGANIZED HEALTH CARE EDUCATION/TRAINING PROGRAM

## 2021-07-23 PROCEDURE — 2580000003 HC RX 258: Performed by: PHYSICIAN ASSISTANT

## 2021-07-23 PROCEDURE — 82948 REAGENT STRIP/BLOOD GLUCOSE: CPT

## 2021-07-23 PROCEDURE — 83550 IRON BINDING TEST: CPT

## 2021-07-23 PROCEDURE — 36415 COLL VENOUS BLD VENIPUNCTURE: CPT

## 2021-07-23 PROCEDURE — 80053 COMPREHEN METABOLIC PANEL: CPT

## 2021-07-23 PROCEDURE — 6370000000 HC RX 637 (ALT 250 FOR IP): Performed by: STUDENT IN AN ORGANIZED HEALTH CARE EDUCATION/TRAINING PROGRAM

## 2021-07-23 PROCEDURE — 6360000002 HC RX W HCPCS: Performed by: PHYSICIAN ASSISTANT

## 2021-07-23 PROCEDURE — 6370000000 HC RX 637 (ALT 250 FOR IP): Performed by: PHYSICIAN ASSISTANT

## 2021-07-23 RX ORDER — ONDANSETRON 4 MG/1
4 TABLET, ORALLY DISINTEGRATING ORAL EVERY 8 HOURS PRN
Status: DISCONTINUED | OUTPATIENT
Start: 2021-07-23 | End: 2021-07-26 | Stop reason: HOSPADM

## 2021-07-23 RX ORDER — M-VIT,TX,IRON,MINS/CALC/FOLIC 27MG-0.4MG
1 TABLET ORAL DAILY
Status: DISCONTINUED | OUTPATIENT
Start: 2021-07-24 | End: 2021-07-26 | Stop reason: HOSPADM

## 2021-07-23 RX ORDER — ACETAMINOPHEN 325 MG/1
650 TABLET ORAL EVERY 6 HOURS PRN
Status: DISCONTINUED | OUTPATIENT
Start: 2021-07-23 | End: 2021-07-26 | Stop reason: HOSPADM

## 2021-07-23 RX ORDER — CITALOPRAM 40 MG/1
40 TABLET ORAL DAILY
Status: DISCONTINUED | OUTPATIENT
Start: 2021-07-24 | End: 2021-07-26 | Stop reason: HOSPADM

## 2021-07-23 RX ORDER — DIPHENHYDRAMINE HCL 25 MG
25 TABLET ORAL DAILY PRN
Status: DISCONTINUED | OUTPATIENT
Start: 2021-07-23 | End: 2021-07-25

## 2021-07-23 RX ORDER — FUROSEMIDE 20 MG/1
20 TABLET ORAL DAILY
Status: DISCONTINUED | OUTPATIENT
Start: 2021-07-24 | End: 2021-07-26 | Stop reason: HOSPADM

## 2021-07-23 RX ORDER — LANOLIN ALCOHOL/MO/W.PET/CERES
2500 CREAM (GRAM) TOPICAL DAILY
Status: DISCONTINUED | OUTPATIENT
Start: 2021-07-24 | End: 2021-07-26 | Stop reason: HOSPADM

## 2021-07-23 RX ORDER — POTASSIUM CHLORIDE 20 MEQ/1
40 TABLET, EXTENDED RELEASE ORAL PRN
Status: DISCONTINUED | OUTPATIENT
Start: 2021-07-23 | End: 2021-07-26 | Stop reason: HOSPADM

## 2021-07-23 RX ORDER — SODIUM CHLORIDE 0.9 % (FLUSH) 0.9 %
5-40 SYRINGE (ML) INJECTION EVERY 12 HOURS SCHEDULED
Status: DISCONTINUED | OUTPATIENT
Start: 2021-07-23 | End: 2021-07-26 | Stop reason: HOSPADM

## 2021-07-23 RX ORDER — DEXTROSE MONOHYDRATE 25 G/50ML
12.5 INJECTION, SOLUTION INTRAVENOUS PRN
Status: DISCONTINUED | OUTPATIENT
Start: 2021-07-23 | End: 2021-07-26 | Stop reason: HOSPADM

## 2021-07-23 RX ORDER — DULOXETIN HYDROCHLORIDE 60 MG/1
60 CAPSULE, DELAYED RELEASE ORAL DAILY
Status: DISCONTINUED | OUTPATIENT
Start: 2021-07-24 | End: 2021-07-26 | Stop reason: HOSPADM

## 2021-07-23 RX ORDER — PANTOPRAZOLE SODIUM 40 MG/10ML
40 INJECTION, POWDER, LYOPHILIZED, FOR SOLUTION INTRAVENOUS 2 TIMES DAILY
Status: DISCONTINUED | OUTPATIENT
Start: 2021-07-23 | End: 2021-07-24 | Stop reason: ALTCHOICE

## 2021-07-23 RX ORDER — POTASSIUM CHLORIDE 750 MG/1
10 TABLET, FILM COATED, EXTENDED RELEASE ORAL DAILY
Status: DISCONTINUED | OUTPATIENT
Start: 2021-07-24 | End: 2021-07-26 | Stop reason: HOSPADM

## 2021-07-23 RX ORDER — SODIUM CHLORIDE 9 MG/ML
25 INJECTION, SOLUTION INTRAVENOUS PRN
Status: DISCONTINUED | OUTPATIENT
Start: 2021-07-23 | End: 2021-07-26 | Stop reason: HOSPADM

## 2021-07-23 RX ORDER — ESCITALOPRAM OXALATE 10 MG/1
10 TABLET ORAL DAILY
Status: DISCONTINUED | OUTPATIENT
Start: 2021-07-24 | End: 2021-07-23

## 2021-07-23 RX ORDER — ACETAMINOPHEN 325 MG/1
650 TABLET ORAL ONCE
Status: COMPLETED | OUTPATIENT
Start: 2021-07-23 | End: 2021-07-23

## 2021-07-23 RX ORDER — POTASSIUM CHLORIDE 7.45 MG/ML
10 INJECTION INTRAVENOUS PRN
Status: DISCONTINUED | OUTPATIENT
Start: 2021-07-23 | End: 2021-07-26 | Stop reason: HOSPADM

## 2021-07-23 RX ORDER — TRAZODONE HYDROCHLORIDE 100 MG/1
100 TABLET ORAL NIGHTLY
Status: DISCONTINUED | OUTPATIENT
Start: 2021-07-23 | End: 2021-07-26 | Stop reason: HOSPADM

## 2021-07-23 RX ORDER — IPRATROPIUM BROMIDE 42 UG/1
2 SPRAY, METERED NASAL DAILY
Status: DISCONTINUED | OUTPATIENT
Start: 2021-07-24 | End: 2021-07-26 | Stop reason: HOSPADM

## 2021-07-23 RX ORDER — DEXTROSE MONOHYDRATE 50 MG/ML
100 INJECTION, SOLUTION INTRAVENOUS PRN
Status: DISCONTINUED | OUTPATIENT
Start: 2021-07-23 | End: 2021-07-26 | Stop reason: HOSPADM

## 2021-07-23 RX ORDER — CHOLECALCIFEROL (VITAMIN D3) 1250 MCG
1 CAPSULE ORAL DAILY
Status: DISCONTINUED | OUTPATIENT
Start: 2021-07-24 | End: 2021-07-23 | Stop reason: DRUGHIGH

## 2021-07-23 RX ORDER — POLYETHYLENE GLYCOL 3350 17 G/17G
17 POWDER, FOR SOLUTION ORAL DAILY PRN
Status: DISCONTINUED | OUTPATIENT
Start: 2021-07-23 | End: 2021-07-26 | Stop reason: HOSPADM

## 2021-07-23 RX ORDER — OYSTER SHELL CALCIUM WITH VITAMIN D 500; 200 MG/1; [IU]/1
1 TABLET, FILM COATED ORAL 2 TIMES DAILY
Status: DISCONTINUED | OUTPATIENT
Start: 2021-07-23 | End: 2021-07-26 | Stop reason: HOSPADM

## 2021-07-23 RX ORDER — VITAMIN B COMPLEX
3000 TABLET ORAL DAILY
Status: DISCONTINUED | OUTPATIENT
Start: 2021-07-24 | End: 2021-07-26 | Stop reason: HOSPADM

## 2021-07-23 RX ORDER — ATORVASTATIN CALCIUM 20 MG/1
20 TABLET, FILM COATED ORAL NIGHTLY
Status: DISCONTINUED | OUTPATIENT
Start: 2021-07-23 | End: 2021-07-26 | Stop reason: HOSPADM

## 2021-07-23 RX ORDER — SODIUM CHLORIDE 9 MG/ML
INJECTION, SOLUTION INTRAVENOUS CONTINUOUS
Status: DISCONTINUED | OUTPATIENT
Start: 2021-07-23 | End: 2021-07-26 | Stop reason: HOSPADM

## 2021-07-23 RX ORDER — SODIUM CHLORIDE 0.9 % (FLUSH) 0.9 %
5-40 SYRINGE (ML) INJECTION PRN
Status: DISCONTINUED | OUTPATIENT
Start: 2021-07-23 | End: 2021-07-26 | Stop reason: HOSPADM

## 2021-07-23 RX ORDER — ACETAMINOPHEN 650 MG/1
650 SUPPOSITORY RECTAL EVERY 6 HOURS PRN
Status: DISCONTINUED | OUTPATIENT
Start: 2021-07-23 | End: 2021-07-26 | Stop reason: HOSPADM

## 2021-07-23 RX ORDER — TIZANIDINE 4 MG/1
2 TABLET ORAL EVERY 6 HOURS PRN
Status: DISCONTINUED | OUTPATIENT
Start: 2021-07-23 | End: 2021-07-26 | Stop reason: HOSPADM

## 2021-07-23 RX ORDER — NICOTINE POLACRILEX 4 MG
15 LOZENGE BUCCAL PRN
Status: DISCONTINUED | OUTPATIENT
Start: 2021-07-23 | End: 2021-07-26 | Stop reason: HOSPADM

## 2021-07-23 RX ORDER — LEVOTHYROXINE SODIUM 0.07 MG/1
75 TABLET ORAL DAILY
Status: DISCONTINUED | OUTPATIENT
Start: 2021-07-24 | End: 2021-07-26 | Stop reason: HOSPADM

## 2021-07-23 RX ORDER — INSULIN GLARGINE 100 [IU]/ML
8 INJECTION, SOLUTION SUBCUTANEOUS NIGHTLY
Status: DISCONTINUED | OUTPATIENT
Start: 2021-07-23 | End: 2021-07-26 | Stop reason: HOSPADM

## 2021-07-23 RX ORDER — ONDANSETRON 2 MG/ML
4 INJECTION INTRAMUSCULAR; INTRAVENOUS EVERY 6 HOURS PRN
Status: DISCONTINUED | OUTPATIENT
Start: 2021-07-23 | End: 2021-07-26 | Stop reason: HOSPADM

## 2021-07-23 RX ORDER — BENZONATATE 100 MG/1
200 CAPSULE ORAL 3 TIMES DAILY PRN
Status: DISCONTINUED | OUTPATIENT
Start: 2021-07-23 | End: 2021-07-26 | Stop reason: HOSPADM

## 2021-07-23 RX ORDER — CETIRIZINE HYDROCHLORIDE 10 MG/1
10 TABLET ORAL DAILY
Status: DISCONTINUED | OUTPATIENT
Start: 2021-07-24 | End: 2021-07-26 | Stop reason: HOSPADM

## 2021-07-23 RX ADMIN — PANTOPRAZOLE SODIUM 80 MG: 40 INJECTION, POWDER, FOR SOLUTION INTRAVENOUS at 17:34

## 2021-07-23 RX ADMIN — ACETAMINOPHEN 650 MG: 325 TABLET ORAL at 21:48

## 2021-07-23 RX ADMIN — SODIUM CHLORIDE: 9 INJECTION, SOLUTION INTRAVENOUS at 22:51

## 2021-07-23 RX ADMIN — ACETAMINOPHEN 650 MG: 325 TABLET ORAL at 17:31

## 2021-07-23 RX ADMIN — CALCIUM CARBONATE-VITAMIN D TAB 500 MG-200 UNIT 1 TABLET: 500-200 TAB at 22:52

## 2021-07-23 RX ADMIN — TRAZODONE HYDROCHLORIDE 100 MG: 100 TABLET ORAL at 22:51

## 2021-07-23 RX ADMIN — PANTOPRAZOLE SODIUM 8 MG/HR: 40 INJECTION, POWDER, FOR SOLUTION INTRAVENOUS at 18:13

## 2021-07-23 RX ADMIN — IOPAMIDOL 80 ML: 755 INJECTION, SOLUTION INTRAVENOUS at 18:25

## 2021-07-23 RX ADMIN — ATORVASTATIN CALCIUM 20 MG: 20 TABLET, FILM COATED ORAL at 22:51

## 2021-07-23 ASSESSMENT — PAIN DESCRIPTION - LOCATION
LOCATION: HEAD
LOCATION: HEAD

## 2021-07-23 ASSESSMENT — PAIN SCALES - GENERAL
PAINLEVEL_OUTOF10: 6
PAINLEVEL_OUTOF10: 5
PAINLEVEL_OUTOF10: 5

## 2021-07-23 ASSESSMENT — PAIN DESCRIPTION - PAIN TYPE
TYPE: ACUTE PAIN
TYPE: ACUTE PAIN

## 2021-07-23 NOTE — ED NOTES
Pt assisted to bathroom at this time. Urine sample obtained. Respirations unlabored.       Portillo STAUFFER RN  07/23/21 2882

## 2021-07-23 NOTE — ED PROVIDER NOTES
Reason for Visit: Abnormal Lab (low hgb)      HISTORY OF PRESENT ILLNESS       HPI: This 67 y.o. femalepresents to the emergency department for CBC that was taken earlier today noted that a hemoglobin was down to 7. Patient states she has been having black tarry stools over the last 2 weeks and has been intermittent. Patient also with lower abdominal pain ongoing for 2 or 3 days. Aching in nature constant worse with touch movement not makes better mild severity. Patient with a history of blood transfusions. Patient has been seen by GI however is not found any definitive cause. No burning urgency frequency urgency. No headache neck pain. No other complaints.     Past Medical History:   Diagnosis Date    Anemia     Atrial fibrillation ([address] 11/9/2017    CAD (coronary artery disease)     CHF (congestive heart failure) (HCC)     Chronic kidney disease     stage 3 kidney     DDD (degenerative disc disease), lumbar     Depression     Frequent PVCs 12/13/2017    GERD (gastroesophageal reflux disease)     History of blood transfusion     Hx of blood clots 09/2017    pulmonary emboli    Hyperlipidemia     Hypertension     Hyperthyroidism     Movement disorder     DDD    Neuropathy     Obesity     Palpitations 1/10/2020    Pneumonia 2016    sepsis    Sleep apnea     usually wears cpap at night    Status post right and left heart catheterization     Type II or unspecified type diabetes mellitus without mention of complication, not stated as uncontrolled        Past Surgical History:   Procedure Laterality Date    ABDOMEN SURGERY      ACHILLES TENDON SURGERY Bilateral     BLADDER SUSPENSION      CARDIAC SURGERY  2016    heart cath--[facility]    COLONOSCOPY Left 5/11/2018    COLONOSCOPY POLYPECTOMY SNARE/COLD BIOPSY performed by Jimena Whitten MD at [facility]    ENDOSCOPY, COLON, DIAGNOSTIC      GASTRIC FUNDOPLICATION      HAMMER TOE SURGERY Right     HERNIA REPAIR      HIATAL HERNIA REPAIR  09/06/2016    Laparoscopic Robotic with Nissen Fundoplication - Dr. Reddy Frankel OFFICE/OUTPT VISIT,PROCEDURE ONLY N/A 9/21/2018    EGD DIAGNOSTIC ONLY performed by Sherlynn Bloch, MD at [address]      right    TENDON RELEASE Left 08/09/2016    left foot    TUBAL LIGATION      TUNNELED VENOUS PORT PLACEMENT  11/06/2017    UPPER GASTROINTESTINAL ENDOSCOPY Left 5/10/2018    EGD BIOPSY performed by Aki Fischer MD at [address]     Socioeconomic History    Marital status:      Spouse name: Not on file    Number of children: 3    Years of education: Not on file    Highest education level: Not on file   Occupational History    Not on file   Tobacco Use    Smoking status: Former Smoker     Packs/day: 1.00     Years: 30.00     Pack years: 30.00     Types: Cigarettes     Quit date: 5/10/2006     Years since quitting: 15.2    Smokeless tobacco: Never Used   Vaping Use    Vaping Use: Never used   Substance and Sexual Activity    Alcohol use: No    Drug use: No    Sexual activity: Not Currently   Other Topics Concern    Not on file   Social History Narrative    Not on file     Social Determinants of Health     Financial Resource Strain:     Difficulty of Paying Living Expenses:    Food Insecurity:     Worried About Running Out of Food in the Last Year:     Ran Out of Food in the Last Year:    Transportation Needs:     Lack of Transportation (Medical):      Lack of Transportation (Non-Medical):    Physical Activity:     Days of Exercise per Week:     Minutes of Exercise per Session:    Stress:     Feeling of Stress :    Social Connections:     Frequency of Communication with Friends and Family:     Frequency of Social Gatherings with Friends and Family:     Attends Alevism Services:     Active Member of Clubs or Organizations:     Attends Club or Organization Meetings:     Marital Status:    Intimate Partner Violence:     Fear of Current or Ex-Partner:     Emotionally Abused:     Physically Abused:     Sexually Abused:        Previous Medications    ACETAMINOPHEN (TYLENOL) 325 MG TABLET    Take 2 tablets by mouth every 4 hours as needed for Pain    APIXABAN (ELIQUIS) 5 MG TABS TABLET    Take 1 tablet by mouth 2 times daily    ARTIFICIAL TEAR SOLUTION (JUST TEARS EYE DROPS) SOLN    Apply to eye    ATORVASTATIN (LIPITOR) 20 MG TABLET    Take 1 tablet by mouth nightly    BENZONATATE (TESSALON) 100 MG CAPSULE    Take 200 mg by mouth 3 times daily as needed for Cough     CALCIUM CARB-CHOLECALCIFEROL 500-400 MG-UNIT TABS    Take 1 tablet by mouth 2 times daily    CARBOXYMETHYLCELLULOSE 1 % OPHTHALMIC SOLUTION    1 drop 3 times daily    CHOLECALCIFEROL (VITAMIN D3) 1.25 MG (89759 UT) CAPS    Take by mouth daily    CITALOPRAM (CELEXA) 40 MG TABLET    Take 40 mg by mouth daily    CYANOCOBALAMIN (SM VITAMIN B-12 PO)    Take 2,500 mcg by mouth daily    DIPHENHYDRAMINE (BENADRYL) 25 MG CAPSULE    Take 25 mg by mouth as needed for Itching    DOCUSATE SODIUM (COLACE) 100 MG CAPSULE    Take 100 mg by mouth 2 times daily    DULAGLUTIDE (TRULICITY) 2.78 HP/3.8AN SOPN    Inject 0.75 mg into the skin once a week Every Wednesday    DULOXETINE (CYMBALTA) 20 MG EXTENDED RELEASE CAPSULE    Take 60 mg by mouth daily     ESCITALOPRAM (LEXAPRO) 10 MG TABLET    Take 1 tablet by mouth daily    FUROSEMIDE (LASIX) 20 MG TABLET    Take 20 mg by mouth daily    GUAIFENESIN (ROBITUSSIN) 100 MG/5ML SYRUP    Take 10 mLs by mouth 3 times daily as needed for Cough    HYDROXYZINE (ATARAX) 25 MG TABLET    Take 25 mg by mouth 3 times daily as needed for Itching    INSULIN GLARGINE (BASAGLAR KWIKPEN) 100 UNIT/ML INJECTION PEN    Inject 8 Units into the skin nightly     INSULIN LISPRO (HUMALOG) 100 UNIT/ML INJECTION VIAL    Inject 0-3 Units into the skin nightly    IPRATROPIUM (ATROVENT) 0.06 % NASAL SPRAY IPRATROPIUM-ALBUTEROL (DUONEB) 0.5-2.5 (3) MG/3ML SOLN NEBULIZER SOLUTION    Inhale 1 vial into the lungs every 4 hours    LANCETS MISC. (UNISTIK CZT COMFORT) MISC        LEVOTHYROXINE (SYNTHROID) 75 MCG TABLET    Take 75 mcg by mouth Daily    LIDOCAINE VISCOUS (XYLOCAINE) 2 % SOLUTION    Take 15 mLs by mouth as needed for Irritation    LINACLOTIDE (LINZESS) 72 MCG CAPS    Take 72.5 mg by mouth daily    LOPERAMIDE (IMODIUM A-D) 2 MG TABLET    Take by mouth    LORATADINE (CLARITIN) 10 MG TABLET    Take 10 mg by mouth daily    MAGNESIUM HYDROXIDE (MILK OF MAGNESIA CONCENTRATE) 2400 MG/10ML SUSP    Take 30 mLs by mouth once as needed    MULTIPLE VITAMINS-MINERALS (THERAPEUTIC MULTIVITAMIN-MINERALS) TABLET    Take 1 tablet by mouth daily    ONE TOUCH ULTRA TEST STRIP        OXYGEN    Inhale into the lungs continuous    PANTOPRAZOLE (PROTONIX) 40 MG TABLET    Take 1 tablet by mouth 2 times daily    POLYETHYLENE GLYCOL (GLYCOLAX) PACKET    Take 17 g by mouth daily as needed for Constipation    POTASSIUM CHLORIDE (KLOR-CON M) 10 MEQ EXTENDED RELEASE TABLET    Take 1 tablet by mouth daily    PREGABALIN (LYRICA) 150 MG CAPSULE    Take 1 capsule by mouth 3 times daily for 3 days. Benedetta Ou RA ALCOHOL SWABS 70 % PADS        TIZANIDINE (ZANAFLEX) 2 MG TABLET    Take 2 mg by mouth every 6 hours as needed    TRAZODONE (DESYREL) 100 MG TABLET    Take 100 mg by mouth nightly        Allergies: Allergies as of 07/23/2021 - Fully Reviewed 07/23/2021   Allergen Reaction Noted    Bactrim [sulfamethoxazole-trimethoprim]  01/02/2018    Wellbutrin [bupropion] Other (See Comments) 80/16/2195    Silicone Rash 15/09/7164       Review of Systems       See HPI for further details. At least 10 systems reviewed and are otherwise negative unless noted in the history of present illness.      Constitutional: Denies fever or chills   Eyes: Denies change in visual acuity   Respiratory: Denies cough or shortness of breath   Cardiovascular: Denies chest pain or edema   GI: Denies nausea, vomiting, bloody stools or diarrhea admits abdominal pain  : Denies dysuria   Musculoskeletal: Denies back pain or joint pain   Integument: Denies rash   Neurologic: Denies headache, focal weakness or sensory changes   Endocrine: Denies polyuria or polydipsia   Lymphatic: Denies swollen glands   Psychiatric: Denies depression or anxiety       Physical Exam       Vitals:    07/23/21 1722 07/23/21 1807 07/23/21 1837 07/23/21 1922   BP: (!) 146/75 134/72 (!) 150/69 (!) 147/90   Pulse: 69 78 68 69   Resp: 12 18 12 12   Temp:       TempSrc:       SpO2: 92% 93% 96% 96%       Constitutional: No acute distress, Non-toxic appearance; well nourished    HENT: Normocephalic, Atraumatic, Bilateral external ears normal, Oropharynx moist, No oral exudates, Nose normal.    Eyes: PERRLA, EOMI, Conjunctiva normal, No discharge. Neck: Normal range of motion, No tenderness, Supple, No lymphadenopathy, No stridor. Cardiovascular: Normal heart rate, Normal rhythm, No murmurs, No rubs, No gallops. Pulmonary/Chest: Normal breath sounds, No respiratory distress, No wheezing    Abdomen: Bowel sounds normal, Soft, with lower abdominal tenderness, No masses, No pulsatile masses    Back: No tenderness, No CVA tenderness    Extremities: Normal range of motion, Intact distal pulses, No edema, No tenderness    Neurologic: Alert & oriented x 3; Normal motor function, Normal sensory function, No focal defecits    Skin: Warm, Dry, No erythema, No rash    Psychiatric: Alert and oriented to person, place, and time. Patient maintains good eye contact. Mood and affect were normal. Concentration appeared normal      Diagnostic Studies       Please see electronic medical record for any tests performed in the ED.      Labs:    Labs Reviewed   CBC WITH AUTO DIFFERENTIAL - Abnormal; Notable for the following components:       Result Value    RBC 2.53 (*)     Hemoglobin 7.4 (*)     Hematocrit 24.1 (*)     MCHC 30.7 (*)     RDW-SD 47.8 (*)     All other components within normal limits   HEPATIC FUNCTION PANEL - Abnormal; Notable for the following components:    ALT 10 (*)     Total Protein 6.0 (*)     All other components within normal limits   PROTIME-INR - Abnormal; Notable for the following components:    INR 1.38 (*)     All other components within normal limits   GLOMERULAR FILTRATION RATE, ESTIMATED - Abnormal; Notable for the following components:    Est, Glom Filt Rate 48 (*)     All other components within normal limits   BASIC METABOLIC PANEL   BLOOD OCCULT STOOL SCREEN #1   ANION GAP   OSMOLALITY   URINE RT REFLEX TO CULTURE       Radiology:    CT ABDOMEN PELVIS W IV CONTRAST Additional Contrast? None   Final Result   1. Small hiatus hernia. 2. Findings of constipation and mild ileus. **This report has been created using voice recognition software. It may contain minor errors which are inherent in voice recognition technology. **      Final report electronically signed by Dr. Nikki Murdock on 7/23/2021 6:49 PM        Emergency Department Procedures         ED Course and [facility]       Risa Huff is a 67 y.o. female who presented to the emergency department with a chief complaint of lower abdominal pain; black stools     Patient was seen, history and physical exam was performed. Patient remains stable here in the emergency department. Patient's laboratory values, imaging studies and physical examination findings were reviewed and were concerning for anemia. Pt with bright red blood to fingertip (with external hemorrhoids)- but negative occult blood. Pt on Eliquis.     Labs Reviewed   CBC WITH AUTO DIFFERENTIAL - Abnormal; Notable for the following components:       Result Value    RBC 2.53 (*)     Hemoglobin 7.4 (*)     Hematocrit 24.1 (*)     MCHC 30.7 (*)     RDW-SD 47.8 (*)     All other components within normal limits   HEPATIC FUNCTION PANEL - Abnormal; Notable for the following components:    ALT 10 (*)     Total Protein 6.0 (*)     All other components within normal limits   PROTIME-INR - Abnormal; Notable for the following components:    INR 1.38 (*)     All other components within normal limits   GLOMERULAR FILTRATION RATE, ESTIMATED - Abnormal; Notable for the following components:    Est, Glom Filt Rate 48 (*)     All other components within normal limits   BASIC METABOLIC PANEL   BLOOD OCCULT STOOL SCREEN #1   ANION GAP   OSMOLALITY   URINE RT REFLEX TO CULTURE     Medications   pantoprazole (PROTONIX) 80 mg in sodium chloride 0.9 % 100 mL infusion (8 mg/hr Intravenous New Bag 7/23/21 1813)   pantoprazole (PROTONIX) 80 mg in sodium chloride 0.9 % 50 mL bolus (0 mg Intravenous Stopped 7/23/21 1813)   acetaminophen (TYLENOL) tablet 650 mg (650 mg Oral Given 7/23/21 1731)   iopamidol (ISOVUE-370) 76 % injection 80 mL (80 mLs Intravenous Given 7/23/21 1825)     New Prescriptions    No medications on file         Counseling     The emergency provider has spoken with the patient and discussed today's findings, in addition to providing specific details for the plan of care. Questions are answered and there is agreement with the plan. This patient was seen under the direct supervision of the attending physician who was available for consultation. Differential Diagnosis   GI bleed versus anemia versus metabolic abnormality    Consults: Sheyla with hospitalist agrees to evaluate for admission. DECISION to ADMIT / DISCHARGE:     7:54 PM    Clinical Impression       1.   1. Anemia, unspecified type    2. General weakness        Disposition       All results were shared with the patient, medical decision making was discussed and all questions were answered, and she agreed to assessment and plan. Patient was ADMITTED to the hospital based on concerning ED workup. At this point in time, I believe the patient has the mental capacity to make medical decisions.         Ramos Sampson  07/23/21 1954

## 2021-07-23 NOTE — ED NOTES
Bed: 016A  Expected date:   Expected time:   Means of arrival: [facility]  Comments:     Joseline Medellin RN  07/23/21 0432

## 2021-07-23 NOTE — H&P
History & Physical        Patient:  Oswald Erwin  YOB: 1945    MRN: [id number]     Acct: [de-identified]    PCP: Isamar Awan MD    Date of Admission: 7/23/2021    Date of Service: Pt seen/examined on 07/23/21  and Admitted to Inpatient with expected LOS greater than two midnights due to medical therapy. Chief Complaint:  Dark Stools, Fatigue    Assessment and Plan:    1.)  Acute blood loss anemia 2/2 UGIB: Hgb admission 7.4, patient stating black tarry stools over the weekend with subsequent clearance. At the time she did have a positive fecal occult blood test however it is negative on today's exam.  Patient does state symptoms of appetite loss and lower abdominal pain. Does have a past history of peptic ulcer disease.   -Consult GI for probable scope   -Patient kept n.p.o. after midnight   -Recheck CBC daily   -Run iron studies   -Report any black or tarry bowel movements   -Patient given an 80 mg bolus of Protonix in ED, switching to 40 mg BID   -Holding patient's Eliquis, SCDs applied   -Light IVF hydration   -Transfuse if Hgb <7.0   -PT/OT consulted to help with weakness   -Continue patient's home medication of cyanocobalamin    2.)  History Paroxsymal Afib, on Eliquis: Patient currently in rhythm on admission as seen by P waves on telemetry. Continue telemetry inpatient. Holding Eliquis until Hgb is stable and procedure is over. 3.)  Hypothyroidism: Continue patient's levothyroxine 75 mcg daily. 4.)  Primary HTN: Patient currently only takes Lasix outpatient. Was moderately hypertensive on admission. Continuing Lasix. If blood pressure still remains high, will begin metoprolol due to history of CHF (systolic). If blood pressure still remains high, add ACE inhibitor for protection in CKD stage III as well as systolic heart failure. 5.)  DDD: Patient has a significant history of pain related to degenerative disc disease.   Continue Celexa, tizanidine, and Cymbalta inpatient. 6.)  T2DM, insulin dependent: Patient takes Humalog sliding scale, Glargine 8 units nightly, and dulaglutide daily. Holding dulaglutide inpatient, continuing with Lantus 8 units nightly and the low-dose sliding scale insulin. Hypoglycemia protocols in place, point-of-care glucose testing ACHS. 7.)  Improved systolic heart failure (EF 40% 6/2020), CAD: Patient takes Lasix outpatient. No listed home medications indicative of systolic heart failure. Patient should be on a beta-blocker and an ACE inhibitor for proper medication. Monitor patient's blood pressure, and if needed add any beta-blocker first.  Patient should follow up with cardiology for medication adjustment. 8.)  Chronic constipation: Patient's medication history lists several medications for inducing bowel movements including Linzess, GlycoLax, milk of magnesia, and Colace. Holding these medications inpatient and only giving as needed medications. Patient has symptoms of ileus in CT scan, her gut may be unable to respond to normal stimuli if she is using 4 medications to have bowel movements outpatient. 9.)  CKD stage III: Creatinine on admission 1.1, baseline around 1.1. Patient was mildly hypertensive on arrival, this combined with her history of systolic heart failure says that she would benefit from an ACE inhibitor outpatient. 10.)  Chronic insomnia: Patient takes trazodone outpatient, continue inpatient. 11.)  History of prior PE: Patient takes Eliquis outpatient for A. fib as well as PE. Currently holding Eliquis and applying SCDs due to acute anemia. 12.)  History of BRITTNY: Listed in chart that patient takes CPAP at night, however pulmonology notes do not confirm this. She may have had CPAP at night for a brief hospital stay but none currently. DVT prophylaxis: Due to acute blood loss anemia, using SCDs and holding her Eliquis.   Will restart Eliquis after any procedures are performed and when hemoglobin is stable. History Of Present Illness:       Ms. Chevy Narayan is a 25-year-old female with a past medical history of A. fib, CHF, CAD, anemia, prior PE, HTN, HLD, CKD stage III, hypothyroidism, sleep apnea with CPAP, and T2DM. She presented to the ER due to an abnormal CBC that was checked earlier today the notes that hemoglobin was down to 7.4. On top of this the patient states she has been having black tarry stools over the past week that have been intermittent and has had ongoing abdominal pain for the past 2 to 3 days. Patient restates that most of her black and tarry stools occurred over the weekend on Saturday and Sunday. Her PCP checked a stool sample which was positive for fecal occult blood at the current point in time. Her PCP was also getting in contact with GI to see if they could do an outpatient scope and also checked her CBC which came back low. Her PCP contacted Dr. Fabiano Peres who works with GI and they agree that the patient should be admitted for further evaluation. Patient does state that years ago she had peptic ulcer disease in which she had to be scoped and they did find an active bleeding ulcer. She states she had symptoms of abdominal pain in the epigastric area at that time. Currently her abdominal pain is located in the lower abdomen and she states poor appetite for the past week. She denies any fever, chills, chest pain, shortness of breath, nausea, vomiting, numbness or tingling her hands and feet, or active diarrhea. Her main issue is constant and progressive fatigue. In the ED, /77, HR 87, RR 17, SPO2 95%, temperature 98.9 °F.  Sodium 142, potassium 4.3, creatinine 1.1, albumin 4.0, WBC 4.8, Hgb 7.4, MCV 94.3, , INR 1.38, fecal occult blood test negative, CT scan of the abdomen shows a small hiatal hernia and findings consistent with constipation mild ileus.     Past Medical History:          Diagnosis Date    Anemia     Atrial fibrillation ([address] 11/9/2017    CAD (coronary artery disease)     CHF (congestive heart failure) (HCC)     Chronic kidney disease     stage 3 kidney     DDD (degenerative disc disease), lumbar     Depression     Frequent PVCs 12/13/2017    GERD (gastroesophageal reflux disease)     History of blood transfusion     Hx of blood clots 09/2017    pulmonary emboli    Hyperlipidemia     Hypertension     Hyperthyroidism     Movement disorder     DDD    Neuropathy     Obesity     Palpitations 1/10/2020    Pneumonia 2016    sepsis    Sleep apnea     usually wears cpap at night    Status post right and left heart catheterization     Type II or unspecified type diabetes mellitus without mention of complication, not stated as uncontrolled        Past Surgical History:          Procedure Laterality Date    ABDOMEN SURGERY      ACHILLES TENDON SURGERY Bilateral     BLADDER SUSPENSION      CARDIAC SURGERY  2016    heart cath--[facility]    COLONOSCOPY Left 5/11/2018    COLONOSCOPY POLYPECTOMY SNARE/COLD BIOPSY performed by Ara Serra MD at [facility]    ENDOSCOPY, COLON, DIAGNOSTIC      GASTRIC FUNDOPLICATION      HAMMER TOE SURGERY Right     HERNIA REPAIR      HIATAL HERNIA REPAIR  09/06/2016    Laparoscopic Robotic with Nissen Fundoplication - Dr. Galindo Montaño OFFICE/OUTPT VISIT,PROCEDURE ONLY N/A 9/21/2018    EGD DIAGNOSTIC ONLY performed by Sarkis De Oliveira MD at [address]      right    TENDON RELEASE Left 08/09/2016    left foot    TUBAL LIGATION      TUNNELED VENOUS PORT PLACEMENT  11/06/2017    UPPER GASTROINTESTINAL ENDOSCOPY Left 5/10/2018    EGD BIOPSY performed by Ara Serra MD at [facility]       Medications Prior to Admission:      Prior to Admission medications    Medication Sig Start Date End Date Taking?  Authorizing Provider   Artificial Tear Solution (JUST TEARS EYE DROPS) SOLN Apply to eye    Historical Provider, MD   citalopram (CELEXA) 40 MG tablet Take 40 mg by mouth daily 6/4/21   Historical Provider, MD   ipratropium (ATROVENT) 0.06 % nasal spray  5/28/21   Historical Provider, MD   loperamide (IMODIUM A-D) 2 MG tablet Take by mouth    Historical Provider, MD   tiZANidine (ZANAFLEX) 2 MG tablet Take 2 mg by mouth every 6 hours as needed    Historical Provider, MD   DULoxetine (CYMBALTA) 20 MG extended release capsule Take 60 mg by mouth daily     Historical Provider, MD   carboxymethylcellulose 1 % ophthalmic solution 1 drop 3 times daily    Historical Provider, MD   insulin glargine (BASAGLAR KWIKPEN) 100 UNIT/ML injection pen Inject 8 Units into the skin nightly     Historical Provider, MD   potassium chloride (KLOR-CON M) 10 MEQ extended release tablet Take 1 tablet by mouth daily 6/22/20   Bertin Clink E Hemmelgarn, DO   loratadine (CLARITIN) 10 MG tablet Take 10 mg by mouth daily    Historical Provider, MD   Dulaglutide (TRULICITY) 2.49 WT/8.1TI SOPN Inject 0.75 mg into the skin once a week Every Wednesday    Historical Provider, MD   Cyanocobalamin (SM VITAMIN B-12 PO) Take 2,500 mcg by mouth daily    Historical Provider, MD   Cholecalciferol (VITAMIN D3) 1.25 MG (98896 UT) CAPS Take by mouth daily    Historical Provider, MD   furosemide (LASIX) 20 MG tablet Take 20 mg by mouth daily    Historical Provider, MD   apixaban (ELIQUIS) 5 MG TABS tablet Take 1 tablet by mouth 2 times daily 8/1/19   Prince Suhail MD   diphenhydrAMINE (BENADRYL) 25 MG capsule Take 25 mg by mouth as needed for Itching    Historical Provider, MD   Calcium Carb-Cholecalciferol 500-400 MG-UNIT TABS Take 1 tablet by mouth 2 times daily    Historical Provider, MD EVANS ALCOHOL SWABS 70 % PADS  7/4/19   Historical Provider, MD   ONE TOUCH ULTRA TEST strip  7/4/19   Historical Provider, MD   Lancets Misc. (UNISTIK CZT COMFORT) [facility]  7/4/19   Historical Provider, MD   benzonatate (TESSALON) 100 MG capsule Take 200 mg by mouth 3 times daily as needed for Cough Historical Provider, MD   guaiFENesin (ROBITUSSIN) 100 MG/5ML syrup Take 10 mLs by mouth 3 times daily as needed for Cough    Historical Provider, MD   ipratropium-albuterol (DUONEB) 0.5-2.5 (3) MG/3ML SOLN nebulizer solution Inhale 1 vial into the lungs every 4 hours    Historical Provider, MD   lidocaine viscous (XYLOCAINE) 2 % solution Take 15 mLs by mouth as needed for Irritation    Historical Provider, MD   Linaclotide (LINZESS) 72 MCG CAPS Take 72.5 mg by mouth daily    Historical Provider, MD   polyethylene glycol (GLYCOLAX) packet Take 17 g by mouth daily as needed for Constipation    Historical Provider, MD   hydrOXYzine (ATARAX) 25 MG tablet Take 25 mg by mouth 3 times daily as needed for Itching    Historical Provider, MD   pantoprazole (PROTONIX) 40 MG tablet Take 1 tablet by mouth 2 times daily 9/23/18   Argelia Salcido MD   pregabalin (LYRICA) 150 MG capsule Take 1 capsule by mouth 3 times daily for 3 days. . 9/22/18 6/23/21  Argelia Salcido MD   insulin lispro (HUMALOG) 100 UNIT/ML injection vial Inject 0-3 Units into the skin nightly 9/22/18   Argelia Salcido MD   traZODone (DESYREL) 100 MG tablet Take 100 mg by mouth nightly     Historical Provider, MD   Multiple Vitamins-Minerals (THERAPEUTIC MULTIVITAMIN-MINERALS) tablet Take 1 tablet by mouth daily    Historical Provider, MD   escitalopram (LEXAPRO) 10 MG tablet Take 1 tablet by mouth daily 6/15/18   Deshawn Menchaca MD   OXYGEN Inhale into the lungs continuous    Historical Provider, MD   atorvastatin (LIPITOR) 20 MG tablet Take 1 tablet by mouth nightly 5/13/18   Donell Bain MD   magnesium hydroxide (MILK OF MAGNESIA CONCENTRATE) 2400 MG/10ML SUSP Take 30 mLs by mouth once as needed    Historical Provider, MD   docusate sodium (COLACE) 100 MG capsule Take 100 mg by mouth 2 times daily    Historical Provider, MD   acetaminophen (TYLENOL) 325 MG tablet Take 2 tablets by mouth every 4 hours as needed for Pain 8/18/16 Alcon Montaño MD   levothyroxine (SYNTHROID) 75 MCG tablet Take 75 mcg by mouth Daily    Historical Provider, MD       Allergies:  Bactrim [sulfamethoxazole-trimethoprim], Wellbutrin [bupropion], and Silicone    Social History:      The patient currently lives at home. TOBACCO:   reports that she quit smoking about 15 years ago. Her smoking use included cigarettes. She has a 30.00 pack-year smoking history. She has never used smokeless tobacco.  ETOH:   reports no history of alcohol use. Family History:      Reviewed in detail and negative for DM, CAD, Cancer, CVA. Positive as follows:        Problem Relation Age of Onset    Cancer Mother     Heart Disease Mother     High Blood Pressure Mother     Diabetes Mother     Vision Loss Mother     Stroke Mother     COPD Father     Diabetes Father     COPD Brother        Diet:  No diet orders on file    REVIEW OF SYSTEMS:   Pertinent positives as noted in the HPI. All other systems reviewed and negative. PHYSICAL EXAM:    BP (!) 147/90   Pulse 69   Temp 98.9 °F (37.2 °C) (Oral)   Resp 12   SpO2 96%     General appearance:  No apparent distress, appears stated age and cooperative. HEENT:  Normal cephalic, atraumatic without obvious deformity. Pupils equal, round, and reactive to light. Extra ocular muscles intact. Conjunctivae/corneas clear. Neck: Supple, with full range of motion. No jugular venous distention. Trachea midline. Respiratory:  Normal respiratory effort. Clear to auscultation, bilaterally without Rales/Wheezes/Rhonchi. Cardiovascular:  Regular rate and rhythm with normal X3/R2 grade 3/6 systolic murmur, rubs or gallops. Abdomen: Soft, tenderness to palpation of the lower quadrants, non-distended with normal bowel sounds. Musculoskeletal:  No clubbing, cyanosis or edema bilaterally (compression stockings in place). Full range of motion without deformity.   Skin: Skin color, texture, turgor normal.  No rashes or lesions. Neurologic:  Neurovascularly intact without any focal sensory/motor deficits. Cranial nerves: II-XII intact, grossly non-focal.  Psychiatric:  Alert and oriented, thought content appropriate, normal insight  Capillary Refill: Brisk,< 3 seconds   Peripheral Pulses: +2 palpable, equal bilaterally     Labs:     Recent Labs     07/23/21  1700   WBC 4.8   HGB 7.4*   HCT 24.1*        Recent Labs     07/23/21  1700      K 4.3      CO2 27   BUN 16   CREATININE 1.1   CALCIUM 8.7     Recent Labs     07/23/21  1700   AST 18   ALT 10*   BILIDIR <0.2   BILITOT 0.3   ALKPHOS 62     Recent Labs     07/23/21  1700   INR 1.38*     No results for input(s): CKTOTAL, TROPONINI in the last 72 hours. Urinalysis:      Lab Results   Component Value Date    NITRU NEGATIVE 10/23/2018    WBCUA 2-4 10/23/2018    BACTERIA NONE 10/23/2018    RBCUA 0-2 10/23/2018    BLOODU NEGATIVE 10/23/2018    SPECGRAV 1.009 10/23/2018    GLUCOSEU NEGATIVE 09/20/2018       Intake & Output:  No intake/output data recorded. No intake/output data recorded. Radiology:      CT abdomen pelvis: I reviewed the CT with the following interpretation: Small hiatal hernia, findings consistent with constipation potential mild ileus    CT ABDOMEN PELVIS W IV CONTRAST Additional Contrast? None   Final Result   1. Small hiatus hernia. 2. Findings of constipation and mild ileus. **This report has been created using voice recognition software. It may contain minor errors which are inherent in voice recognition technology. **      Final report electronically signed by Dr. Deedee Mcwilliams on 7/23/2021 6:49 PM           DVT prophylaxis: SCD's    Code Status: Prior      PT/OT Eval Status: Consulted    Disposition:Home    There are no active hospital problems to display for this patient. Thank you Ree Campbell MD for the opportunity to be involved in this patient's care.     Electronically signed by Ivette Scruggs DO on 7/23/2021 at 7:56 PM

## 2021-07-23 NOTE — ED NOTES
Pt medicated per MAR. Pt tolerated well. Pt resting on cot with unlabored respirations.       Linda LAU RN  07/23/21 [id number]

## 2021-07-23 NOTE — ACP (ADVANCE CARE PLANNING)
Advance Care Planning     Advance Care Planning Activator (Inpatient)  Conversation Note      Date of ACP Conversation: 7/23/2021     Conversation Conducted with: Patient with Decision Making Capacity    ACP Activator: [address] Decision Maker:     Current Designated Health Care Decision Maker:     Primary Decision Maker: Judi Workman - Child - [phone]  Click here to complete Healthcare Decision Makers including section of the Healthcare Decision Maker Relationship (ie \"Primary\")  Today we documented Decision Maker(s) consistent with ACP documents on file. Care Preferences    Ventilation: \"If you were in your present state of health and suddenly became very ill and were unable to breathe on your own, what would your preference be about the use of a ventilator (breathing machine) if it were available to you? \"      Would the patient desire the use of ventilator (breathing machine)?: yes, short term only    \"If your health worsens and it becomes clear that your chance of recovery is unlikely, what would your preference be about the use of a ventilator (breathing machine) if it were available to you? \"     Would the patient desire the use of ventilator (breathing machine)?: No      Resuscitation  \"CPR works best to restart the heart when there is a sudden event, like a heart attack, in someone who is otherwise healthy. Unfortunately, CPR does not typically restart the heart for people who have serious health conditions or who are very sick. \"    \"In the event your heart stopped as a result of an underlying serious health condition, would you want attempts to be made to restart your heart (answer \"yes\" for attempt to resuscitate) or would you prefer a natural death (answer \"no\" for do not attempt to resuscitate)? \" no       [x] Yes   [] No   Educated Patient / Saida Butts regarding differences between Advance Directives and portable DNR orders.     Length of ACP Conversation in minutes: Conversation Outcomes:  [x] ACP discussion completed  [x] Existing advance directive reviewed with patient; no changes to patient's previously recorded wishes  [] New Advance Directive completed  [x] Portable Do Not Rescitate prepared for Provider review and signature  [] POLST/POST/MOLST/MOST prepared for Provider review and signature      Follow-up plan:    [] Schedule follow-up conversation to continue planning  [] Referred individual to Provider for additional questions/concerns   [x] Advised patient/agent/surrogate to review completed ACP document and update if needed with changes in condition, patient preferences or care setting    [x] This note routed to one or more involved healthcare providers     Jewel Allan came to ED due to low hemoglobin. She lives at [facility] and has three adult children. Yesterday, she \"went down\" (controlled fall) walking back to [place] from [employer]. This scared her and she sees it as a possible new limitation in her health. Her POA/LW on file are current. OK with short term intubation; does not want CPR. New DNR-CCA completed; signed by Dr. Fior Gudino. 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 She shared some stories about her family.

## 2021-07-24 LAB
ABO: NORMAL
ANION GAP SERPL CALCULATED.3IONS-SCNC: 6 MEQ/L (ref 8–16)
ANTIBODY SCREEN: NORMAL
BUN BLDV-MCNC: 14 MG/DL (ref 7–22)
CALCIUM SERPL-MCNC: 8.5 MG/DL (ref 8.5–10.5)
CHLORIDE BLD-SCNC: 108 MEQ/L (ref 98–111)
CO2: 28 MEQ/L (ref 23–33)
CREAT SERPL-MCNC: 1.1 MG/DL (ref 0.4–1.2)
EKG ATRIAL RATE: 72 BPM
EKG P AXIS: 11 DEGREES
EKG P-R INTERVAL: 160 MS
EKG Q-T INTERVAL: 408 MS
EKG QRS DURATION: 102 MS
EKG QTC CALCULATION (BAZETT): 476 MS
EKG R AXIS: -28 DEGREES
EKG T AXIS: 107 DEGREES
EKG VENTRICULAR RATE: 82 BPM
ERYTHROCYTE [DISTWIDTH] IN BLOOD BY AUTOMATED COUNT: 13.9 % (ref 11.5–14.5)
ERYTHROCYTE [DISTWIDTH] IN BLOOD BY AUTOMATED COUNT: 48.9 FL (ref 35–45)
GFR SERPL CREATININE-BSD FRML MDRD: 48 ML/MIN/1.73M2
GLUCOSE BLD-MCNC: 75 MG/DL (ref 70–108)
GLUCOSE BLD-MCNC: 88 MG/DL (ref 70–108)
GLUCOSE BLD-MCNC: 88 MG/DL (ref 70–108)
GLUCOSE BLD-MCNC: 90 MG/DL (ref 70–108)
GLUCOSE BLD-MCNC: 90 MG/DL (ref 70–108)
HCT VFR BLD CALC: 21.9 % (ref 37–47)
HCT VFR BLD CALC: 26.1 % (ref 37–47)
HCT VFR BLD CALC: 29 % (ref 37–47)
HCT VFR BLD CALC: 30.2 % (ref 37–47)
HEMOGLOBIN: 6.7 GM/DL (ref 12–16)
HEMOGLOBIN: 8 GM/DL (ref 12–16)
HEMOGLOBIN: 9.1 GM/DL (ref 12–16)
HEMOGLOBIN: 9.4 GM/DL (ref 12–16)
IRON SATURATION: 7 % (ref 20–50)
IRON: 19 UG/DL (ref 50–170)
MAGNESIUM: 1.7 MG/DL (ref 1.6–2.4)
MCH RBC QN AUTO: 29.5 PG (ref 26–33)
MCHC RBC AUTO-ENTMCNC: 30.6 GM/DL (ref 32.2–35.5)
MCV RBC AUTO: 96.5 FL (ref 81–99)
PATHOLOGIST REVIEW: ABNORMAL
PLATELET # BLD: 187 THOU/MM3 (ref 130–400)
PMV BLD AUTO: 10 FL (ref 9.4–12.4)
POTASSIUM REFLEX MAGNESIUM: 4.1 MEQ/L (ref 3.5–5.2)
RBC # BLD: 2.27 MILL/MM3 (ref 4.2–5.4)
RH FACTOR: NORMAL
SCAN OF BLOOD SMEAR: NORMAL
SODIUM BLD-SCNC: 142 MEQ/L (ref 135–145)
TOTAL IRON BINDING CAPACITY: 291 UG/DL (ref 171–450)
WBC # BLD: 3.9 THOU/MM3 (ref 4.8–10.8)

## 2021-07-24 PROCEDURE — 86900 BLOOD TYPING SEROLOGIC ABO: CPT

## 2021-07-24 PROCEDURE — 2580000003 HC RX 258: Performed by: INTERNAL MEDICINE

## 2021-07-24 PROCEDURE — 85018 HEMOGLOBIN: CPT

## 2021-07-24 PROCEDURE — 36415 COLL VENOUS BLD VENIPUNCTURE: CPT

## 2021-07-24 PROCEDURE — C9113 INJ PANTOPRAZOLE SODIUM, VIA: HCPCS | Performed by: STUDENT IN AN ORGANIZED HEALTH CARE EDUCATION/TRAINING PROGRAM

## 2021-07-24 PROCEDURE — 85014 HEMATOCRIT: CPT

## 2021-07-24 PROCEDURE — C9113 INJ PANTOPRAZOLE SODIUM, VIA: HCPCS | Performed by: INTERNAL MEDICINE

## 2021-07-24 PROCEDURE — P9016 RBC LEUKOCYTES REDUCED: HCPCS

## 2021-07-24 PROCEDURE — 85027 COMPLETE CBC AUTOMATED: CPT

## 2021-07-24 PROCEDURE — 6370000000 HC RX 637 (ALT 250 FOR IP): Performed by: FAMILY MEDICINE

## 2021-07-24 PROCEDURE — 86850 RBC ANTIBODY SCREEN: CPT

## 2021-07-24 PROCEDURE — 6370000000 HC RX 637 (ALT 250 FOR IP): Performed by: STUDENT IN AN ORGANIZED HEALTH CARE EDUCATION/TRAINING PROGRAM

## 2021-07-24 PROCEDURE — 1200000003 HC TELEMETRY R&B

## 2021-07-24 PROCEDURE — 99233 SBSQ HOSP IP/OBS HIGH 50: CPT | Performed by: FAMILY MEDICINE

## 2021-07-24 PROCEDURE — 36430 TRANSFUSION BLD/BLD COMPNT: CPT

## 2021-07-24 PROCEDURE — 86923 COMPATIBILITY TEST ELECTRIC: CPT

## 2021-07-24 PROCEDURE — 93005 ELECTROCARDIOGRAM TRACING: CPT | Performed by: FAMILY MEDICINE

## 2021-07-24 PROCEDURE — 82948 REAGENT STRIP/BLOOD GLUCOSE: CPT

## 2021-07-24 PROCEDURE — 83735 ASSAY OF MAGNESIUM: CPT

## 2021-07-24 PROCEDURE — 2580000003 HC RX 258: Performed by: STUDENT IN AN ORGANIZED HEALTH CARE EDUCATION/TRAINING PROGRAM

## 2021-07-24 PROCEDURE — 6360000002 HC RX W HCPCS: Performed by: INTERNAL MEDICINE

## 2021-07-24 PROCEDURE — 86901 BLOOD TYPING SEROLOGIC RH(D): CPT

## 2021-07-24 PROCEDURE — 6360000002 HC RX W HCPCS: Performed by: STUDENT IN AN ORGANIZED HEALTH CARE EDUCATION/TRAINING PROGRAM

## 2021-07-24 PROCEDURE — 80048 BASIC METABOLIC PNL TOTAL CA: CPT

## 2021-07-24 RX ORDER — PANTOPRAZOLE SODIUM 40 MG/10ML
40 INJECTION, POWDER, LYOPHILIZED, FOR SOLUTION INTRAVENOUS ONCE
Status: COMPLETED | OUTPATIENT
Start: 2021-07-24 | End: 2021-07-24

## 2021-07-24 RX ORDER — BUTALBITAL, ACETAMINOPHEN AND CAFFEINE 50; 325; 40 MG/1; MG/1; MG/1
1 TABLET ORAL EVERY 4 HOURS PRN
Status: DISCONTINUED | OUTPATIENT
Start: 2021-07-24 | End: 2021-07-26 | Stop reason: HOSPADM

## 2021-07-24 RX ORDER — SODIUM CHLORIDE 9 MG/ML
INJECTION, SOLUTION INTRAVENOUS PRN
Status: DISCONTINUED | OUTPATIENT
Start: 2021-07-24 | End: 2021-07-26 | Stop reason: HOSPADM

## 2021-07-24 RX ADMIN — Medication 1 TABLET: at 08:49

## 2021-07-24 RX ADMIN — Medication 3000 UNITS: at 08:48

## 2021-07-24 RX ADMIN — SODIUM CHLORIDE: 9 INJECTION, SOLUTION INTRAVENOUS at 11:57

## 2021-07-24 RX ADMIN — ACETAMINOPHEN 650 MG: 325 TABLET ORAL at 20:04

## 2021-07-24 RX ADMIN — DULOXETINE HYDROCHLORIDE 60 MG: 60 CAPSULE, DELAYED RELEASE ORAL at 08:48

## 2021-07-24 RX ADMIN — FUROSEMIDE 20 MG: 20 TABLET ORAL at 08:49

## 2021-07-24 RX ADMIN — LEVOTHYROXINE SODIUM 75 MCG: 0.07 TABLET ORAL at 08:48

## 2021-07-24 RX ADMIN — CALCIUM CARBONATE-VITAMIN D TAB 500 MG-200 UNIT 1 TABLET: 500-200 TAB at 08:49

## 2021-07-24 RX ADMIN — Medication 2500 MCG: at 08:48

## 2021-07-24 RX ADMIN — CALCIUM CARBONATE-VITAMIN D TAB 500 MG-200 UNIT 1 TABLET: 500-200 TAB at 20:04

## 2021-07-24 RX ADMIN — POTASSIUM CHLORIDE 10 MEQ: 750 TABLET, EXTENDED RELEASE ORAL at 08:50

## 2021-07-24 RX ADMIN — CITALOPRAM 40 MG: 40 TABLET ORAL at 08:48

## 2021-07-24 RX ADMIN — BUTALBITAL, ACETAMINOPHEN, AND CAFFEINE 1 TABLET: 50; 325; 40 TABLET ORAL at 09:57

## 2021-07-24 RX ADMIN — ONDANSETRON 4 MG: 2 INJECTION INTRAMUSCULAR; INTRAVENOUS at 14:44

## 2021-07-24 RX ADMIN — ATORVASTATIN CALCIUM 20 MG: 20 TABLET, FILM COATED ORAL at 20:04

## 2021-07-24 RX ADMIN — BUTALBITAL, ACETAMINOPHEN, AND CAFFEINE 1 TABLET: 50; 325; 40 TABLET ORAL at 14:43

## 2021-07-24 RX ADMIN — PANTOPRAZOLE SODIUM 8 MG/HR: 40 INJECTION, POWDER, FOR SOLUTION INTRAVENOUS at 16:49

## 2021-07-24 RX ADMIN — PANTOPRAZOLE SODIUM 40 MG: 40 INJECTION, POWDER, FOR SOLUTION INTRAVENOUS at 08:48

## 2021-07-24 RX ADMIN — ONDANSETRON 4 MG: 2 INJECTION INTRAMUSCULAR; INTRAVENOUS at 21:56

## 2021-07-24 RX ADMIN — PANTOPRAZOLE SODIUM 40 MG: 40 INJECTION, POWDER, FOR SOLUTION INTRAVENOUS at 16:50

## 2021-07-24 RX ADMIN — TRAZODONE HYDROCHLORIDE 100 MG: 100 TABLET ORAL at 21:56

## 2021-07-24 RX ADMIN — DIPHENHYDRAMINE HCL 25 MG: 25 TABLET ORAL at 21:56

## 2021-07-24 ASSESSMENT — PAIN DESCRIPTION - LOCATION
LOCATION: HEAD
LOCATION: HEAD

## 2021-07-24 ASSESSMENT — PAIN DESCRIPTION - DESCRIPTORS
DESCRIPTORS: HEADACHE
DESCRIPTORS: HEADACHE

## 2021-07-24 ASSESSMENT — PAIN SCALES - GENERAL
PAINLEVEL_OUTOF10: 5
PAINLEVEL_OUTOF10: 8
PAINLEVEL_OUTOF10: 0
PAINLEVEL_OUTOF10: 10
PAINLEVEL_OUTOF10: 0
PAINLEVEL_OUTOF10: 0

## 2021-07-24 ASSESSMENT — PAIN DESCRIPTION - ORIENTATION
ORIENTATION: MID
ORIENTATION: MID

## 2021-07-24 ASSESSMENT — PAIN - FUNCTIONAL ASSESSMENT: PAIN_FUNCTIONAL_ASSESSMENT: ACTIVITIES ARE NOT PREVENTED

## 2021-07-24 ASSESSMENT — PAIN DESCRIPTION - FREQUENCY
FREQUENCY: CONTINUOUS
FREQUENCY: CONTINUOUS

## 2021-07-24 ASSESSMENT — ENCOUNTER SYMPTOMS
SHORTNESS OF BREATH: 0
BLOOD IN STOOL: 0
ABDOMINAL PAIN: 1
ANAL BLEEDING: 0
DIARRHEA: 0
CONSTIPATION: 0
ABDOMINAL DISTENTION: 0
PHOTOPHOBIA: 1
VOMITING: 0
COUGH: 0
NAUSEA: 1

## 2021-07-24 ASSESSMENT — PAIN DESCRIPTION - PAIN TYPE
TYPE: ACUTE PAIN

## 2021-07-24 ASSESSMENT — PAIN DESCRIPTION - ONSET
ONSET: ON-GOING
ONSET: ON-GOING

## 2021-07-24 ASSESSMENT — PAIN DESCRIPTION - PROGRESSION: CLINICAL_PROGRESSION: NOT CHANGED

## 2021-07-24 NOTE — PROGRESS NOTES
PROGRESS NOTE      Patient:  Gwen Sahu      Unit/Bed:6K-23/023-A    YOB: 1945    MRN: [id number]       Acct: [de-identified]     PCP: Yen Pruitt MD    Date of Admission: 7/23/2021      Assessment/Plan:    Acute blood loss anemia 2/2 UGIB: Hgb admission 7.4, patient stating black tarry stools over the weekend with subsequent clearance. At the time she did have a positive fecal occult blood test however currently negative. Protonix Load in ED. Noted Hx of PUD. GI Consulted, NPO for possible EGD. Holding Eliquis, on SCDS. On NS @ 75ml/hr  S/P 1 PRBC transfusion, recheck hgb 8.0 s/p 1 PRBC. Keep hgb > 8.0  H/H Q4H  Continue home Cyanoblaninin.     History Paroxsymal Afib, on Eliquis: Rhythm controled. On Telemetry. Hold Eliquis is setting of GI Bleed      Hypothyroidism: Continue levothyroxine 75 mcg QD. Essential HTN: Continuing Lasix. See CHF below.     DDD: Continue Celexa, tizanidine, and Cymbalta inpatient.     T2DM, insulin dependent: On 8 + SSI insulin. Hypoglycemia protocols in place, point-of-care glucose testing ACHS.    Improved systolic heart failure (EF 40% 6/2020), CAD: On Lasix only. F/U [facility] cardiology for med optimization? Monitor BP.     Chronic constipation: Noted ileus in CT scan. On 4 meds at home, holding home meds. PRN agents ordered. Watch for melena.     CKD stage III: Cr stable on admit. Monitor as needed.     Chronic insomnia: Continue Home Trazadone.     History of prior PE:  Currently holding Eliquis and applying SCDs in setting of GIB w/ anemia. RBBB:  Jul/24/2021, new RBBB present on telemetry, intermittent PVC's. Asymptomatic and stable. Bmp and Mg WNL. Monitor at this time.      Chief Complaint: Dark Stools, Fatigue. Hospital Course:    Per H&P:    \" Ms. Gigi Hart is a 50-year-old female with a past medical history of A. fib, CHF, CAD, anemia, prior PE, HTN, HLD, CKD stage III, hypothyroidism, sleep apnea with CPAP, and T2DM.   She presented to the ER due to an abnormal CBC that was checked earlier today the notes that hemoglobin was down to 7.4. On top of this the patient states she has been having black tarry stools over the past week that have been intermittent and has had ongoing abdominal pain for the past 2 to 3 days. Patient restates that most of her black and tarry stools occurred over the weekend on Saturday and Sunday. Her PCP checked a stool sample which was positive for fecal occult blood at the current point in time. Her PCP was also getting in contact with GI to see if they could do an outpatient scope and also checked her CBC which came back low. Her PCP contacted Dr. Alida Clifford who works with GI and they agree that the patient should be admitted for further evaluation. Patient does state that years ago she had peptic ulcer disease in which she had to be scoped and they did find an active bleeding ulcer. She states she had symptoms of abdominal pain in the epigastric area at that time. Currently her abdominal pain is located in the lower abdomen and she states poor appetite for the past week. She denies any fever, chills, chest pain, shortness of breath, nausea, vomiting, numbness or tingling her hands and feet, or active diarrhea. Her main issue is constant and progressive fatigue. In the ED, /77, HR 87, RR 17, SPO2 95%, temperature 98.9 °F.  Sodium 142, potassium 4.3, creatinine 1.1, albumin 4.0, WBC 4.8, Hgb 7.4, MCV 94.3, , INR 1.38, fecal occult blood test negative, CT scan of the abdomen shows a small hiatal hernia and findings consistent with constipation mild ileus. \"    Floor Course:    Pt admitted. Started on Protonix 40 IV BID s/p ED Protonix load. Pending GI consult for evaluation. NPO for possible scope. found have Hgb 6.7 on recheck, transfused 1 x PRBC. Recheck H/H 8.0. Subjective:  Pt seen and examined S/P 1 unit PRBC.  Complains of headache, 10/10 pain however non debilitating, no focal neuro deficits elicited. Some noted nausea, however denies any BM since last night, unknown if stool still melanotic. Pending GI consult. Review of Systems   Constitutional: Positive for fatigue. Negative for chills and fever. Eyes: Positive for photophobia. Negative for visual disturbance. Respiratory: Negative for cough and shortness of breath. Cardiovascular: Negative for chest pain, palpitations and leg swelling. Gastrointestinal: Positive for abdominal pain and nausea. Negative for abdominal distention, anal bleeding, blood in stool, constipation, diarrhea and vomiting. Genitourinary: Negative for difficulty urinating and dysuria. Neurological: Positive for headaches. Negative for weakness.        Medications:  Reviewed    Infusion Medications    sodium chloride      dextrose      sodium chloride      sodium chloride 75 mL/hr at 07/24/21 1157     Scheduled Medications    pantoprazole  40 mg Intravenous BID    [Held by provider] apixaban  5 mg Oral BID    atorvastatin  20 mg Oral Nightly    calcium-vitamin D  1 tablet Oral BID    citalopram  40 mg Oral Daily    cyanocobalamin  2,500 mcg Oral Daily    DULoxetine  60 mg Oral Daily    furosemide  20 mg Oral Daily    insulin glargine  8 Units Subcutaneous Nightly    insulin lispro  0-6 Units Subcutaneous TID WC    insulin lispro  0-3 Units Subcutaneous Nightly    ipratropium  2 spray Each Nostril Daily    levothyroxine  75 mcg Oral Daily    [Held by provider] cetirizine  10 mg Oral Daily    therapeutic multivitamin-minerals  1 tablet Oral Daily    potassium chloride  10 mEq Oral Daily    traZODone  100 mg Oral Nightly    sodium chloride flush  5-40 mL Intravenous 2 times per day    Vitamin D  3,000 Units Oral Daily     PRN Meds: sodium chloride, butalbital-acetaminophen-caffeine, benzonatate, diphenhydrAMINE, glucose, dextrose, glucagon (rDNA), dextrose, tiZANidine, sodium chloride flush, sodium chloride, ondansetron **OR** ondansetron, polyethylene glycol, acetaminophen **OR** acetaminophen, potassium chloride **OR** potassium alternative oral replacement **OR** potassium chloride      Intake/Output Summary (Last 24 hours) at 7/24/2021 1253  Last data filed at 7/24/2021 0721  Gross per 24 hour   Intake 779.9 ml   Output 0 ml   Net 779.9 ml       Diet:  Diet NPO    Exam:  BP (!) 163/95   Pulse 63   Temp 99.1 °F (37.3 °C) (Oral)   Resp 18   SpO2 92%     Physical Exam  Constitutional:       General: She is not in acute distress. Appearance: She is obese. Cardiovascular:      Rate and Rhythm: Normal rate and regular rhythm. Heart sounds: Normal heart sounds. No murmur heard. No friction rub. No gallop. Pulmonary:      Effort: Pulmonary effort is normal. No respiratory distress. Breath sounds: Normal breath sounds. No wheezing, rhonchi or rales. Abdominal:      General: Abdomen is flat and protuberant. Bowel sounds are decreased. Palpations: Abdomen is soft. There is no fluid wave. Tenderness: There is abdominal tenderness in the epigastric area. There is no guarding. Skin:     General: Skin is warm and dry. Coloration: Skin is not jaundiced. Neurological:      Mental Status: She is alert. Labs:   Recent Labs     07/23/21  1700 07/24/21  0210 07/24/21  0845   WBC 4.8 3.9*  --    HGB 7.4* 6.7* 8.0*   HCT 24.1* 21.9* 26.1*    187  --      Recent Labs     07/23/21  1700 07/24/21  0210    142   K 4.3 4.1    108   CO2 27 28   BUN 16 14   CREATININE 1.1 1.1   CALCIUM 8.7 8.5     Recent Labs     07/23/21  1700   AST 18   ALT 10*   BILIDIR <0.2   BILITOT 0.3   ALKPHOS 62     Recent Labs     07/23/21  1700   INR 1.38*     No results for input(s): Stanley Seed in the last 72 hours.     Urinalysis:      Lab Results   Component Value Date    NITRU NEGATIVE 07/23/2021    WBCUA 2-4 10/23/2018    BACTERIA NONE 10/23/2018    RBCUA 0-2 10/23/2018    BLOODU NEGATIVE 07/23/2021    SPECGRAV 1.009 10/23/2018    GLUCOSEU NEGATIVE 07/23/2021       Radiology:  CT ABDOMEN PELVIS W IV CONTRAST Additional Contrast? None   Final Result   1. Small hiatus hernia. 2. Findings of constipation and mild ileus. **This report has been created using voice recognition software. It may contain minor errors which are inherent in voice recognition technology. **      Final report electronically signed by Dr. Fiorella Denny on 7/23/2021 6:49 PM          Diet: Diet NPO    DVT prophylaxis: [] Lovenox                                 [x] SCDs                                 [] SQ Heparin                                 [] Encourage ambulation           [] Already on Anticoagulation     Disposition:    [x] Home       [] TCU       [] Rehab       [] Psych       [] SNF       [] [place]       [] Other-    Code Status: Full Code    PT/OT Eval Status: N/A      Electronically signed by Sumanth Armstrong MD on 7/24/2021 at 12:53 PM

## 2021-07-24 NOTE — ED NOTES
Pt resting on cot with admitting provider at bedside. Respirations unlabored.       Linda De La Garza VTVFVD, RN  07/23/21 2024

## 2021-07-24 NOTE — ED NOTES
ED to inpatient nurses report    Chief Complaint   Patient presents with    Abnormal Lab     low hgb      Present to ED from assisted living  LOC: alert and orientated to name, place, date  Vital signs   Vitals:    07/23/21 1807 07/23/21 1837 07/23/21 1922 07/23/21 2013   BP: 134/72 (!) 150/69 (!) 147/90 (!) 169/84   Pulse: 78 68 69 67   Resp: 18 12 12 15   Temp:       TempSrc:       SpO2: 93% 96% 96% 93%      Oxygen Baseline room air    Current needs required room air Bipap/Cpap No  LDAs:    Mobility: Independent  Pending ED orders: none  Present condition: pt resting on cot with unlabored respirations.        Electronically signed by Debbi Flaherty RN on 7/23/2021 at 8:39 PM       723 Mykel Alfaro RN  07/23/21 2039

## 2021-07-24 NOTE — PROGRESS NOTES
Pt admitted to  6K23 from ED. Complaints: dark tarry stool with low hemoglobin. IV site free of s/s of infection or infiltration. Vital signs obtained. Assessment and data collection initiated. Two nurse skin assessment performed by Divya Guillen and Fallon Neri RN. Oriented to room. Policies and procedures for  explained. This RN discussed hourly rounding with patient addressing 5 P's. Fall prevention and safety brochure discussed with patient. Bed alarm on. Call light in reach. All questions answered with no further questions at this time.

## 2021-07-25 ENCOUNTER — ANESTHESIA EVENT (OUTPATIENT)
Dept: ENDOSCOPY | Age: 76
DRG: 813 | End: 2021-07-25
Payer: MEDICARE

## 2021-07-25 ENCOUNTER — ANESTHESIA (OUTPATIENT)
Dept: ENDOSCOPY | Age: 76
DRG: 813 | End: 2021-07-25
Payer: MEDICARE

## 2021-07-25 VITALS
DIASTOLIC BLOOD PRESSURE: 113 MMHG | RESPIRATION RATE: 21 BRPM | OXYGEN SATURATION: 96 % | SYSTOLIC BLOOD PRESSURE: 191 MMHG

## 2021-07-25 LAB
AMYLASE: 44 U/L (ref 20–104)
GLUCOSE BLD-MCNC: 160 MG/DL (ref 70–108)
GLUCOSE BLD-MCNC: 75 MG/DL (ref 70–108)
GLUCOSE BLD-MCNC: 75 MG/DL (ref 70–108)
GLUCOSE BLD-MCNC: 82 MG/DL (ref 70–108)
HCT VFR BLD CALC: 25.8 % (ref 37–47)
HCT VFR BLD CALC: 26.1 % (ref 37–47)
HCT VFR BLD CALC: 28.6 % (ref 37–47)
HCT VFR BLD CALC: 31 % (ref 37–47)
HEMOGLOBIN: 8 GM/DL (ref 12–16)
HEMOGLOBIN: 8.2 GM/DL (ref 12–16)
HEMOGLOBIN: 8.9 GM/DL (ref 12–16)
HEMOGLOBIN: 9.2 GM/DL (ref 12–16)
LIPASE: 24.6 U/L (ref 5.6–51.3)

## 2021-07-25 PROCEDURE — 36415 COLL VENOUS BLD VENIPUNCTURE: CPT

## 2021-07-25 PROCEDURE — 2580000003 HC RX 258: Performed by: STUDENT IN AN ORGANIZED HEALTH CARE EDUCATION/TRAINING PROGRAM

## 2021-07-25 PROCEDURE — 2500000003 HC RX 250 WO HCPCS: Performed by: REGISTERED NURSE

## 2021-07-25 PROCEDURE — 6360000002 HC RX W HCPCS: Performed by: REGISTERED NURSE

## 2021-07-25 PROCEDURE — C9113 INJ PANTOPRAZOLE SODIUM, VIA: HCPCS | Performed by: INTERNAL MEDICINE

## 2021-07-25 PROCEDURE — 83690 ASSAY OF LIPASE: CPT

## 2021-07-25 PROCEDURE — 6360000002 HC RX W HCPCS: Performed by: FAMILY MEDICINE

## 2021-07-25 PROCEDURE — 2720000010 HC SURG SUPPLY STERILE: Performed by: INTERNAL MEDICINE

## 2021-07-25 PROCEDURE — 6360000002 HC RX W HCPCS: Performed by: INTERNAL MEDICINE

## 2021-07-25 PROCEDURE — 6370000000 HC RX 637 (ALT 250 FOR IP): Performed by: FAMILY MEDICINE

## 2021-07-25 PROCEDURE — 85018 HEMOGLOBIN: CPT

## 2021-07-25 PROCEDURE — 88305 TISSUE EXAM BY PATHOLOGIST: CPT

## 2021-07-25 PROCEDURE — 6370000000 HC RX 637 (ALT 250 FOR IP): Performed by: INTERNAL MEDICINE

## 2021-07-25 PROCEDURE — 82150 ASSAY OF AMYLASE: CPT

## 2021-07-25 PROCEDURE — 82948 REAGENT STRIP/BLOOD GLUCOSE: CPT

## 2021-07-25 PROCEDURE — 85014 HEMATOCRIT: CPT

## 2021-07-25 PROCEDURE — 3609012400 HC EGD TRANSORAL BIOPSY SINGLE/MULTIPLE: Performed by: INTERNAL MEDICINE

## 2021-07-25 PROCEDURE — 7100000010 HC PHASE II RECOVERY - FIRST 15 MIN: Performed by: INTERNAL MEDICINE

## 2021-07-25 PROCEDURE — 6360000002 HC RX W HCPCS: Performed by: STUDENT IN AN ORGANIZED HEALTH CARE EDUCATION/TRAINING PROGRAM

## 2021-07-25 PROCEDURE — 3700000001 HC ADD 15 MINUTES (ANESTHESIA): Performed by: INTERNAL MEDICINE

## 2021-07-25 PROCEDURE — 3700000000 HC ANESTHESIA ATTENDED CARE: Performed by: INTERNAL MEDICINE

## 2021-07-25 PROCEDURE — 6370000000 HC RX 637 (ALT 250 FOR IP): Performed by: STUDENT IN AN ORGANIZED HEALTH CARE EDUCATION/TRAINING PROGRAM

## 2021-07-25 PROCEDURE — 99232 SBSQ HOSP IP/OBS MODERATE 35: CPT | Performed by: FAMILY MEDICINE

## 2021-07-25 PROCEDURE — 2580000003 HC RX 258: Performed by: INTERNAL MEDICINE

## 2021-07-25 PROCEDURE — 1200000003 HC TELEMETRY R&B

## 2021-07-25 RX ORDER — PANTOPRAZOLE SODIUM 40 MG/1
40 TABLET, DELAYED RELEASE ORAL
Status: DISCONTINUED | OUTPATIENT
Start: 2021-07-25 | End: 2021-07-26 | Stop reason: HOSPADM

## 2021-07-25 RX ORDER — PREGABALIN 75 MG/1
150 CAPSULE ORAL 3 TIMES DAILY
Status: DISCONTINUED | OUTPATIENT
Start: 2021-07-25 | End: 2021-07-26 | Stop reason: HOSPADM

## 2021-07-25 RX ORDER — LANOLIN ALCOHOL/MO/W.PET/CERES
4.5 CREAM (GRAM) TOPICAL NIGHTLY PRN
Status: DISCONTINUED | OUTPATIENT
Start: 2021-07-25 | End: 2021-07-26 | Stop reason: HOSPADM

## 2021-07-25 RX ORDER — MORPHINE SULFATE 2 MG/ML
2 INJECTION, SOLUTION INTRAMUSCULAR; INTRAVENOUS EVERY 4 HOURS PRN
Status: DISCONTINUED | OUTPATIENT
Start: 2021-07-25 | End: 2021-07-26 | Stop reason: HOSPADM

## 2021-07-25 RX ORDER — SUCRALFATE 1 G/1
1 TABLET ORAL
Status: DISCONTINUED | OUTPATIENT
Start: 2021-07-25 | End: 2021-07-26 | Stop reason: HOSPADM

## 2021-07-25 RX ORDER — PROPOFOL 10 MG/ML
INJECTION, EMULSION INTRAVENOUS PRN
Status: DISCONTINUED | OUTPATIENT
Start: 2021-07-25 | End: 2021-07-25 | Stop reason: SDUPTHER

## 2021-07-25 RX ORDER — LIDOCAINE HYDROCHLORIDE 20 MG/ML
INJECTION, SOLUTION EPIDURAL; INFILTRATION; INTRACAUDAL; PERINEURAL PRN
Status: DISCONTINUED | OUTPATIENT
Start: 2021-07-25 | End: 2021-07-25 | Stop reason: SDUPTHER

## 2021-07-25 RX ORDER — SODIUM CHLORIDE 9 MG/ML
25 INJECTION, SOLUTION INTRAVENOUS PRN
Status: DISCONTINUED | OUTPATIENT
Start: 2021-07-25 | End: 2021-07-26 | Stop reason: HOSPADM

## 2021-07-25 RX ORDER — HYDROXYZINE HYDROCHLORIDE 10 MG/1
10 TABLET, FILM COATED ORAL 4 TIMES DAILY PRN
Status: DISCONTINUED | OUTPATIENT
Start: 2021-07-25 | End: 2021-07-26 | Stop reason: HOSPADM

## 2021-07-25 RX ORDER — SODIUM CHLORIDE 0.9 % (FLUSH) 0.9 %
5-40 SYRINGE (ML) INJECTION EVERY 12 HOURS SCHEDULED
Status: DISCONTINUED | OUTPATIENT
Start: 2021-07-25 | End: 2021-07-26 | Stop reason: HOSPADM

## 2021-07-25 RX ORDER — SODIUM CHLORIDE 0.9 % (FLUSH) 0.9 %
5-40 SYRINGE (ML) INJECTION PRN
Status: DISCONTINUED | OUTPATIENT
Start: 2021-07-25 | End: 2021-07-26 | Stop reason: HOSPADM

## 2021-07-25 RX ADMIN — IPRATROPIUM BROMIDE 2 SPRAY: 42 SPRAY NASAL at 08:29

## 2021-07-25 RX ADMIN — Medication 1 TABLET: at 08:28

## 2021-07-25 RX ADMIN — LIDOCAINE HYDROCHLORIDE 100 MG: 20 INJECTION, SOLUTION EPIDURAL; INFILTRATION; INTRACAUDAL; PERINEURAL at 15:40

## 2021-07-25 RX ADMIN — HYDROXYZINE HYDROCHLORIDE 10 MG: 10 TABLET ORAL at 11:20

## 2021-07-25 RX ADMIN — INSULIN GLARGINE 8 UNITS: 100 INJECTION, SOLUTION SUBCUTANEOUS at 20:50

## 2021-07-25 RX ADMIN — Medication 3000 UNITS: at 08:28

## 2021-07-25 RX ADMIN — CALCIUM CARBONATE-VITAMIN D TAB 500 MG-200 UNIT 1 TABLET: 500-200 TAB at 20:47

## 2021-07-25 RX ADMIN — ATORVASTATIN CALCIUM 20 MG: 20 TABLET, FILM COATED ORAL at 20:47

## 2021-07-25 RX ADMIN — PANTOPRAZOLE SODIUM 40 MG: 40 TABLET, DELAYED RELEASE ORAL at 16:58

## 2021-07-25 RX ADMIN — BUTALBITAL, ACETAMINOPHEN, AND CAFFEINE 1 TABLET: 50; 325; 40 TABLET ORAL at 21:00

## 2021-07-25 RX ADMIN — PREGABALIN 150 MG: 75 CAPSULE ORAL at 20:47

## 2021-07-25 RX ADMIN — SODIUM CHLORIDE: 9 INJECTION, SOLUTION INTRAVENOUS at 14:05

## 2021-07-25 RX ADMIN — INSULIN LISPRO 1 UNITS: 100 INJECTION, SOLUTION INTRAVENOUS; SUBCUTANEOUS at 20:48

## 2021-07-25 RX ADMIN — PROPOFOL 100 MG: 10 INJECTION, EMULSION INTRAVENOUS at 15:40

## 2021-07-25 RX ADMIN — PREGABALIN 150 MG: 75 CAPSULE ORAL at 14:06

## 2021-07-25 RX ADMIN — CITALOPRAM 40 MG: 40 TABLET ORAL at 08:28

## 2021-07-25 RX ADMIN — LEVOTHYROXINE SODIUM 75 MCG: 0.07 TABLET ORAL at 05:52

## 2021-07-25 RX ADMIN — BUTALBITAL, ACETAMINOPHEN, AND CAFFEINE 1 TABLET: 50; 325; 40 TABLET ORAL at 14:05

## 2021-07-25 RX ADMIN — CALCIUM CARBONATE-VITAMIN D TAB 500 MG-200 UNIT 1 TABLET: 500-200 TAB at 08:28

## 2021-07-25 RX ADMIN — PANTOPRAZOLE SODIUM 8 MG/HR: 40 INJECTION, POWDER, FOR SOLUTION INTRAVENOUS at 11:23

## 2021-07-25 RX ADMIN — DULOXETINE HYDROCHLORIDE 60 MG: 60 CAPSULE, DELAYED RELEASE ORAL at 08:28

## 2021-07-25 RX ADMIN — POTASSIUM CHLORIDE 10 MEQ: 750 TABLET, EXTENDED RELEASE ORAL at 08:33

## 2021-07-25 RX ADMIN — TRAZODONE HYDROCHLORIDE 100 MG: 100 TABLET ORAL at 20:47

## 2021-07-25 RX ADMIN — Medication 2500 MCG: at 08:28

## 2021-07-25 RX ADMIN — SODIUM CHLORIDE: 9 INJECTION, SOLUTION INTRAVENOUS at 01:32

## 2021-07-25 RX ADMIN — MORPHINE SULFATE 2 MG: 2 INJECTION, SOLUTION INTRAMUSCULAR; INTRAVENOUS at 11:03

## 2021-07-25 RX ADMIN — HYDROXYZINE HYDROCHLORIDE 10 MG: 10 TABLET ORAL at 20:47

## 2021-07-25 RX ADMIN — MORPHINE SULFATE 2 MG: 2 INJECTION, SOLUTION INTRAMUSCULAR; INTRAVENOUS at 20:48

## 2021-07-25 RX ADMIN — ONDANSETRON 4 MG: 2 INJECTION INTRAMUSCULAR; INTRAVENOUS at 08:29

## 2021-07-25 RX ADMIN — TIZANIDINE 2 MG: 4 TABLET ORAL at 00:58

## 2021-07-25 RX ADMIN — BUTALBITAL, ACETAMINOPHEN, AND CAFFEINE 1 TABLET: 50; 325; 40 TABLET ORAL at 00:58

## 2021-07-25 RX ADMIN — FUROSEMIDE 20 MG: 20 TABLET ORAL at 08:28

## 2021-07-25 ASSESSMENT — PAIN DESCRIPTION - PROGRESSION
CLINICAL_PROGRESSION: GRADUALLY WORSENING
CLINICAL_PROGRESSION: GRADUALLY WORSENING

## 2021-07-25 ASSESSMENT — PAIN DESCRIPTION - PAIN TYPE
TYPE: ACUTE PAIN

## 2021-07-25 ASSESSMENT — PAIN SCALES - GENERAL
PAINLEVEL_OUTOF10: 7
PAINLEVEL_OUTOF10: 10
PAINLEVEL_OUTOF10: 6
PAINLEVEL_OUTOF10: 0
PAINLEVEL_OUTOF10: 0
PAINLEVEL_OUTOF10: 6
PAINLEVEL_OUTOF10: 10
PAINLEVEL_OUTOF10: 0

## 2021-07-25 ASSESSMENT — PAIN - FUNCTIONAL ASSESSMENT
PAIN_FUNCTIONAL_ASSESSMENT: 0-10
PAIN_FUNCTIONAL_ASSESSMENT: PREVENTS OR INTERFERES SOME ACTIVE ACTIVITIES AND ADLS
PAIN_FUNCTIONAL_ASSESSMENT: ACTIVITIES ARE NOT PREVENTED

## 2021-07-25 ASSESSMENT — PAIN DESCRIPTION - DESCRIPTORS
DESCRIPTORS: HEADACHE
DESCRIPTORS: DISCOMFORT

## 2021-07-25 ASSESSMENT — PAIN DESCRIPTION - FREQUENCY
FREQUENCY: CONTINUOUS
FREQUENCY: CONTINUOUS

## 2021-07-25 ASSESSMENT — PAIN DESCRIPTION - LOCATION
LOCATION: ABDOMEN
LOCATION: HEAD
LOCATION: HEAD

## 2021-07-25 ASSESSMENT — PAIN DESCRIPTION - ORIENTATION
ORIENTATION: MID
ORIENTATION: MID

## 2021-07-25 ASSESSMENT — PAIN DESCRIPTION - ONSET
ONSET: ON-GOING
ONSET: ON-GOING

## 2021-07-25 NOTE — ANESTHESIA POSTPROCEDURE EVALUATION
Department of Anesthesiology  Postprocedure Note    Patient: Joseph Smith  MRN: [id number]  YOB: 1945  Date of evaluation: 7/25/2021  Time:  3:49 PM     Procedure Summary     Date: 07/25/21 Room / Location: [address] [place] [address] 09 / [facility]    Anesthesia Start: 7750 Anesthesia Stop: 1308    Procedure: EGD BIOPSY (Left Esophagus) Diagnosis: (GI BLEED)    Surgeons: Kiki Jamison MD Responsible Provider: Alicia Cancino MD    Anesthesia Type: MAC ASA Status: 4          Anesthesia Type: MAC    Navdeep Phase I:      Navdeep Phase II:      Last vitals: Reviewed and per EMR flowsheets.        Anesthesia Post Evaluation    Patient location during evaluation: bedside  Patient participation: complete - patient participated  Level of consciousness: awake and alert  Airway patency: patent  Nausea & Vomiting: no nausea and no vomiting  Complications: no  Cardiovascular status: hemodynamically stable  Respiratory status: acceptable, spontaneous ventilation and nasal cannula  Hydration status: stable

## 2021-07-25 NOTE — PROGRESS NOTES
Patient is in phase 2 passing gas, taking fluids.  discussed findings, plan of care, discharge instructions with pt report called to floor.

## 2021-07-25 NOTE — ANESTHESIA PRE PROCEDURE
Department of Anesthesiology  Preprocedure Note       Name:  Tom Muniz   Age:  67 y.o.  :  1945                                          MRN:  [id number]         Date:  2021      Surgeon: Jet Henriquez): Erik Salguero MD    Procedure: Procedure(s):  EGD DIAGNOSTIC ONLY    Medications prior to admission:   Prior to Admission medications    Medication Sig Start Date End Date Taking?  Authorizing Provider   vitamin D 25 MCG (1000 UT) CAPS Take 3,000 Units by mouth daily    Historical Provider, MD   Artificial Tear Solution (JUST TEARS EYE DROPS) SOLN Apply to eye    Historical Provider, MD   citalopram (CELEXA) 40 MG tablet Take 40 mg by mouth daily 21   Historical Provider, MD   ipratropium (ATROVENT) 0.06 % nasal spray  21   Historical Provider, MD   loperamide (IMODIUM A-D) 2 MG tablet Take by mouth    Historical Provider, MD   tiZANidine (ZANAFLEX) 2 MG tablet Take 2 mg by mouth every 6 hours as needed    Historical Provider, MD   DULoxetine (CYMBALTA) 20 MG extended release capsule Take 60 mg by mouth daily     Historical Provider, MD   carboxymethylcellulose 1 % ophthalmic solution 1 drop 3 times daily    Historical Provider, MD   insulin glargine (BASAGLAR KWIKPEN) 100 UNIT/ML injection pen Inject 8 Units into the skin nightly     Historical Provider, MD   potassium chloride (KLOR-CON M) 10 MEQ extended release tablet Take 1 tablet by mouth daily 20   [employer] RICHIE Su DO   loratadine (CLARITIN) 10 MG tablet Take 10 mg by mouth daily    Historical Provider, MD   Dulaglutide (TRULICITY) 0.29 KT/1.9QS SOPN Inject 0.75 mg into the skin once a week Every Wednesday    Historical Provider, MD   Cyanocobalamin (SM VITAMIN B-12 PO) Take 2,500 mcg by mouth daily    Historical Provider, MD   furosemide (LASIX) 20 MG tablet Take 20 mg by mouth daily    Historical Provider, MD   apixaban (ELIQUIS) 5 MG TABS tablet Take 1 tablet by mouth 2 times daily 19   Linda Dhillon MD diphenhydrAMINE (BENADRYL) 25 MG capsule Take 25 mg by mouth as needed for Itching    Historical Provider, MD   Calcium Carb-Cholecalciferol 500-400 MG-UNIT TABS Take 1 tablet by mouth 2 times daily    Historical Provider, MD EVANS ALCOHOL SWABS 70 % PADS  7/4/19   Historical Provider, MD   ONE TOUCH ULTRA TEST strip  7/4/19   Historical Provider, MD   Lancets [facility] (UNISTIK CZT COMFORT) [facility]  7/4/19   Historical Provider, MD   benzonatate (TESSALON) 100 MG capsule Take 200 mg by mouth 3 times daily as needed for Cough     Historical Provider, MD   guaiFENesin (ROBITUSSIN) 100 MG/5ML syrup Take 10 mLs by mouth 3 times daily as needed for Cough    Historical Provider, MD   ipratropium-albuterol (DUONEB) 0.5-2.5 (3) MG/3ML SOLN nebulizer solution Inhale 1 vial into the lungs every 4 hours    Historical Provider, MD   lidocaine viscous (XYLOCAINE) 2 % solution Take 15 mLs by mouth as needed for Irritation    Historical Provider, MD   Linaclotide (LINZESS) 72 MCG CAPS Take 72.5 mg by mouth daily    Historical Provider, MD   polyethylene glycol (GLYCOLAX) packet Take 17 g by mouth daily as needed for Constipation    Historical Provider, MD   hydrOXYzine (ATARAX) 25 MG tablet Take 25 mg by mouth 3 times daily as needed for Itching    Historical Provider, MD   pantoprazole (PROTONIX) 40 MG tablet Take 1 tablet by mouth 2 times daily 9/23/18   Amy Krishanmurthy MD   pregabalin (LYRICA) 150 MG capsule Take 1 capsule by mouth 3 times daily for 3 days. . 9/22/18 6/23/21  Amy Krishnamurthy MD   insulin lispro (HUMALOG) 100 UNIT/ML injection vial Inject 0-3 Units into the skin nightly 9/22/18   Amy Krishnamurthy MD   traZODone (DESYREL) 100 MG tablet Take 100 mg by mouth nightly     Historical Provider, MD   Multiple Vitamins-Minerals (THERAPEUTIC MULTIVITAMIN-MINERALS) tablet Take 1 tablet by mouth daily    Historical Provider, MD   escitalopram (LEXAPRO) 10 MG tablet Take 1 tablet by mouth daily 6/15/18   Rodney BURRIS MD Bianca   OXYGEN Inhale into the lungs continuous    Historical Provider, MD   atorvastatin (LIPITOR) 20 MG tablet Take 1 tablet by mouth nightly 5/13/18   Barrett Dumont MD   magnesium hydroxide (MILK OF MAGNESIA CONCENTRATE) 2400 MG/10ML SUSP Take 30 mLs by mouth once as needed    Historical Provider, MD   docusate sodium (COLACE) 100 MG capsule Take 100 mg by mouth 2 times daily    Historical Provider, MD   acetaminophen (TYLENOL) 325 MG tablet Take 2 tablets by mouth every 4 hours as needed for Pain 8/18/16   Marilu Pedraza MD   levothyroxine (SYNTHROID) 75 MCG tablet Take 75 mcg by mouth Daily    Historical Provider, MD       Current medications:    No current facility-administered medications for this visit. No current outpatient medications on file.      Facility-Administered Medications Ordered in Other Visits   Medication Dose Route Frequency Provider Last Rate Last Admin    sodium chloride flush 0.9 % injection 5-40 mL  5-40 mL Intravenous 2 times per day Jenn Eisenberg MD        sodium chloride flush 0.9 % injection 5-40 mL  5-40 mL Intravenous PRN Jenn Eisenberg MD        0.9 % sodium chloride infusion  25 mL Intravenous PRN Jenn Eisenberg MD        pregabalin (LYRICA) capsule 150 mg  150 mg Oral TID Aj Contreras MD   150 mg at 07/25/21 1406    morphine (PF) injection 2 mg  2 mg Intravenous Q4H PRN Aj Contreras MD   2 mg at 07/25/21 1103    melatonin tablet 4.5 mg  4.5 mg Oral Nightly PRN Aj Contreras MD        hydrOXYzine (ATARAX) tablet 10 mg  10 mg Oral 4x Daily PRN Aj Contreras MD   10 mg at 07/25/21 1120    0.9 % sodium chloride infusion   Intravenous PRN Moisés Lui DO        butalbital-acetaminophen-caffeine (FIORICET, ESGIC) per tablet 1 tablet  1 tablet Oral Q4H PRN Aj Contreras MD   1 tablet at 07/25/21 1405    pantoprazole (PROTONIX) 80 mg in sodium chloride 0.9 % 100 mL infusion  8 mg/hr Intravenous Continuous Jenn Eisenberg MD 10 mL/hr at 07/25/21 1123 8 mg/hr at 07/25/21 1123    [Held by provider] apixaban (ELIQUIS) tablet 5 mg  5 mg Oral BID Katy Quezada, DO        atorvastatin (LIPITOR) tablet 20 mg  20 mg Oral Nightly Katy Quezada, DO   20 mg at 07/24/21 2004    benzonatate (TESSALON) capsule 200 mg  200 mg Oral TID PRN Katy Quezada DO        calcium-vitamin D (OSCAL-500) 500-200 MG-UNIT per tablet 1 tablet  1 tablet Oral BID Katy Quezada DO   1 tablet at 07/25/21 0828    citalopram (CELEXA) tablet 40 mg  40 mg Oral Daily Katy Quezada, DO   40 mg at 07/25/21 2131    vitamin B-12 (CYANOCOBALAMIN) tablet 2,500 mcg  2,500 mcg Oral Daily Katy Quezada DO   2,500 mcg at 07/25/21 4142    DULoxetine (CYMBALTA) extended release capsule 60 mg  60 mg Oral Daily Katy Quezada, DO   60 mg at 07/25/21 6857    furosemide (LASIX) tablet 20 mg  20 mg Oral Daily Katy Quezada, DO   20 mg at 07/25/21 6006    insulin glargine (LANTUS) injection vial 8 Units  8 Units Subcutaneous Nightly Katy Quezada DO        glucose (GLUTOSE) 40 % oral gel 15 g  15 g Oral PRN Katy Quezada, DO        dextrose 50 % IV solution  12.5 g Intravenous PRN Katy Quezada DO        glucagon (rDNA) injection 1 mg  1 mg Intramuscular PRN Katy Quezada DO        dextrose 5 % solution  100 mL/hr Intravenous PRN Katy Quezada, DO        insulin lispro (HUMALOG) injection vial 0-6 Units  0-6 Units Subcutaneous TID WC Katy Quezada DO        insulin lispro (HUMALOG) injection vial 0-3 Units  0-3 Units Subcutaneous Nightly Katy Quezada DO        ipratropium (ATROVENT) 0.06 % nasal spray 2 spray  2 spray Each Nostril Daily Katy Quezada DO   2 spray at 07/25/21 0829    levothyroxine (SYNTHROID) tablet 75 mcg  75 mcg Oral Daily Katy Quezada DO   75 mcg at 07/25/21 9578    [Held by provider] cetirizine (ZYRTEC) tablet 10 mg  10 mg Oral Daily Katy Quezada DO        therapeutic multivitamin-minerals 1 tablet  1 tablet Oral Daily Katy Quezada DO   1 tablet at 07/25/21 0828    potassium chloride (KLOR-CON) extended release tablet 10 mEq  10 mEq Oral Daily Katy JARON Hugheshas, DO   10 mEq at 07/25/21 9770    tiZANidine (ZANAFLEX) tablet 2 mg  2 mg Oral Q6H PRN Ktay SALMERON Yuhas, DO   2 mg at 07/25/21 0058    traZODone (DESYREL) tablet 100 mg  100 mg Oral Nightly Katy JARON Yuhas, DO   100 mg at 07/24/21 2156    sodium chloride flush 0.9 % injection 5-40 mL  5-40 mL Intravenous 2 times per day Katy JARON Yuhas, DO        sodium chloride flush 0.9 % injection 5-40 mL  5-40 mL Intravenous PRN aKty SALMERON Yuhas, DO        0.9 % sodium chloride infusion  25 mL Intravenous PRN Katy Hollingsworths, DO        ondansetron (ZOFRAN-ODT) disintegrating tablet 4 mg  4 mg Oral Q8H PRN Katy SALMERON Yuhas, DO        Or    ondansetron (ZOFRAN) injection 4 mg  4 mg Intravenous Q6H PRN Katy Hugheshas, DO   4 mg at 07/25/21 0829    polyethylene glycol (GLYCOLAX) packet 17 g  17 g Oral Daily PRN Katy SALMERON Yuhas, DO        acetaminophen (TYLENOL) tablet 650 mg  650 mg Oral Q6H PRN Katy Hugheshas, DO   650 mg at 07/24/21 2004    Or    acetaminophen (TYLENOL) suppository 650 mg  650 mg Rectal Q6H PRN Katy Hugheshas, DO        potassium chloride (KLOR-CON M) extended release tablet 40 mEq  40 mEq Oral PRN Katy Hugheshas, DO        Or    potassium bicarb-citric acid (EFFER-K) effervescent tablet 40 mEq  40 mEq Oral PRN Katy SALMERON Yuhas, DO        Or    potassium chloride 10 mEq/100 mL IVPB (Peripheral Line)  10 mEq Intravenous PRN Katy SALMERON Yuhas, DO        Vitamin D (CHOLECALCIFEROL) tablet 3,000 Units  3,000 Units Oral Daily Katy JARON Yuhas, DO   3,000 Units at 07/25/21 0828    0.9 % sodium chloride infusion   Intravenous Continuous Katy Kathy Saver, DO 75 mL/hr at 07/25/21 1405 New Bag at 07/25/21 1405       Allergies:     Allergies   Allergen Reactions    Bactrim [Sulfamethoxazole-Trimethoprim]      TOLD BY DR NEVER TO TAKE AGAIN    Wellbutrin [Bupropion] Other (See Comments)     hallucinations    Silicone Rash       Problem List:    Patient Active Problem List   Diagnosis Code    Benign essential tremor G25.0    CKD (chronic kidney disease) stage 3, GFR 30-59 ml/min ([facility]) N18.30    Essential hypertension I10    Sepsis with hypotension ([facility]) A41.9, I95.9    Septic shock ([facility]) A41.9, R65.21    Pneumonia of both lower lobes due to infectious organism J18.9    Pneumonia of left lung due to infectious organism J18.9    Type 2 diabetes mellitus without complication ([facility]) N36.7    Left ventricular systolic dysfunction A95.8    Coronary artery disease involving native coronary artery of native heart without angina pectoris I25.10    RUQ abdominal pain R10.11    Gallbladder sludge K82.8    Bacteremia due to Gram-positive bacteria Q55.40    Acute systolic congestive heart failure ([facility]) I50.21    Severe sepsis with septic shock ([facility]) A41.9, R65.21    CKD (chronic kidney disease), stage III ([facility]) N18.30    DM II (diabetes mellitus, type II), controlled ([facility]) E11.9    Cardiomyopathy, dilated ([facility]) I42.0    Acute pulmonary edema ([facility]) J81.0    Acute kidney injury superimposed on CKD ([facility]) N17.9, N18.9    HFrEF (heart failure with reduced ejection fraction) ([facility]) I50.20    Fever R50.9    General weakness R53.1    Acute on chronic renal insufficiency N28.9, N18.9    Hypotension I95.9    Episode of unresponsiveness R41.89    Cardiomyopathy ([facility]) I42.9    Hyperkalemia E87.5    Hypokalemia E87.6    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    GABBY (acute kidney injury) ([facility] N17.9    Chronic respiratory failure with hypoxia ([facility]) J96.11    Influenza with respiratory manifestation other than pneumonia J11.1    Klebsiella pneumonia ([facility]) J15.0    Chest pain R07.9    Acute on chronic respiratory failure with hypoxia ([facility]) J96.21    Hiatal hernia K44.9    Gastroesophageal reflux disease K21.9    Gastroesophageal reflux disease with esophagitis K21.00    S/P Nissen fundoplication (without gastrostomy tube) procedure Z98.890    Bradycardia R00.1    Symptomatic sinus bradycardia R00.1    Tachycardia-bradycardia syndrome (HCC) I49.5    Arrhythmia I49.9    Atrial fibrillation ([facility]) I48.91    Encounter for electronic analysis of reveal event recorder Z45.09    Frequent PVCs I49.3    Acute upper GI hemorrhage K92.2    Com variab immunodef w predom abnlt of B-cell nums & functn ([facility]) D83.0    Altered mental status R41.82    Common variable immunodeficiency (HCC) D83.9    Hypothyroidism (acquired) E03.9    Palpitations R00.2    Anemia D64.9    Acute blood loss anemia D62    Hemorrhage secondary to anti-coagulation ([facility] [address] T45.7X1A, D68.9    Physical deconditioning R53.81       Past Medical History:        Diagnosis Date    Anemia     Atrial fibrillation ([facility] [address] 11/9/2017    CAD (coronary artery disease)     CHF (congestive heart failure) ([facility])     Chronic kidney disease     stage 3 kidney     DDD (degenerative disc disease), lumbar     Depression     Frequent PVCs 12/13/2017    GERD (gastroesophageal reflux disease)     History of blood transfusion     Hx of blood clots 09/2017    pulmonary emboli    Hyperlipidemia     Hypertension     Hyperthyroidism     Movement disorder     DDD    Neuropathy     Obesity     Palpitations 1/10/2020    Pneumonia 2016    sepsis    Sleep apnea     usually wears cpap at night    Status post right and left heart catheterization     Type II or unspecified type diabetes mellitus without mention of complication, not stated as uncontrolled        Past Surgical History:        Procedure Laterality Date    ABDOMEN SURGERY      ACHILLES TENDON SURGERY Bilateral     BLADDER SUSPENSION      CARDIAC SURGERY  2016    heart cath--[facility]    COLONOSCOPY Left 5/11/2018    COLONOSCOPY POLYPECTOMY SNARE/COLD BIOPSY performed by Avery Mcdaniel MD at [facility]    ENDOSCOPY, COLON, DIAGNOSTIC      GASTRIC FUNDOPLICATION      HAMMER TOE SURGERY Right     HERNIA REPAIR      HIATAL HERNIA REPAIR  09/06/2016    Laparoscopic Robotic with Nissen Fundoplication - Dr. Yesenia Blanc OFFICE/OUTPT VISIT,PROCEDURE ONLY N/A 9/21/2018    EGD DIAGNOSTIC ONLY performed by Radha Cronin MD at [address]      right    TENDON RELEASE Left 08/09/2016    left foot    TUBAL LIGATION      TUNNELED VENOUS PORT PLACEMENT  11/06/2017    UPPER GASTROINTESTINAL ENDOSCOPY Left 5/10/2018    EGD BIOPSY performed by Rachel Rivera MD at [facility]       Social History:    Social History     Tobacco Use    Smoking status: Former Smoker     Packs/day: 1.00     Years: 30.00     Pack years: 30.00     Types: Cigarettes     Quit date: 5/10/2006     Years since quitting: 15.2    Smokeless tobacco: Never Used   Substance Use Topics    Alcohol use: No                                Counseling given: Not Answered      Vital Signs (Current): There were no vitals filed for this visit.                                            BP Readings from Last 3 Encounters:   07/25/21 (!) 173/75   06/28/21 (!) 175/73   06/23/21 120/74       NPO Status:                                                                                 BMI:   Wt Readings from Last 3 Encounters:   07/25/21 218 lb 6.4 oz (99.1 kg)   06/23/21 228 lb (103.4 kg)   06/01/21 226 lb (102.5 kg)     There is no height or weight on file to calculate BMI.    CBC:   Lab Results   Component Value Date    WBC 3.9 07/24/2021    RBC 2.27 07/24/2021    HGB 8.9 07/25/2021    HCT 28.6 07/25/2021    MCV 96.5 07/24/2021    RDW 15.2 06/13/2018     07/24/2021       CMP:   Lab Results   Component Value Date     07/24/2021    K 4.1 07/24/2021     07/24/2021    CO2 28 07/24/2021    BUN 14 07/24/2021    CREATININE 1.1 07/24/2021    CREATININE 44.0 03/14/2019    LABGLOM 48 07/24/2021    GLUCOSE 75 07/24/2021    PROT 6.0 07/23/2021    CALCIUM 8.5 07/24/2021    BILITOT 0.3 07/23/2021    ALKPHOS 62 07/23/2021    AST 18 07/23/2021    ALT 10 07/23/2021 POC Tests:   Recent Labs     07/25/21  1032   POCGLU 82       Coags:   Lab Results   Component Value Date    INR 1.38 07/23/2021    APTT 70.9 05/09/2018       HCG (If Applicable): No results found for: PREGTESTUR, PREGSERUM, HCG, HCGQUANT     ABGs: No results found for: PHART, PO2ART, SFX5ZKB, RJW4GNP, BEART, [id number]     Type & Screen (If Applicable):  Lab Results   Component Value Date    [facility] 07/24/2021       Anesthesia Evaluation  Patient summary reviewed and Nursing notes reviewed no history of anesthetic complications:   Airway: Mallampati: II  TM distance: >3 FB   Neck ROM: full  Mouth opening: > = 3 FB Dental:    (+) edentulous, upper dentures and lower dentures      Pulmonary:normal exam    (+) COPD:  sleep apnea: on CPAP,                            ROS comment: 1L O2 at home. 3L at night with CPAP   Cardiovascular:  Exercise tolerance: poor (<4 METS),   (+) hypertension:, CAD:, dysrhythmias: atrial fibrillation, CHF: systolic, hyperlipidemia      ECG reviewed      Echocardiogram reviewed                  Neuro/Psych:   (+) depression/anxiety              ROS comment: Benign essential tremor GI/Hepatic/Renal:   (+) hiatal hernia, GERD:, renal disease: CRI,           Endo/Other:    (+) DiabetesType II DM, using insulin, hypothyroidism::., .                 Abdominal:   (+) obese,     Abdomen: soft. Vascular:   + DVT, PE. Other Findings:               Anesthesia Plan      MAC     ASA 4       Induction: intravenous. Anesthetic plan and risks discussed with patient. Plan discussed with CRNA.     Attending anesthesiologist reviewed and agrees with BRIAN Orozco - CRNA   7/25/2021

## 2021-07-25 NOTE — BRIEF OP NOTE
Brief Postoperative Note      Patient: Gwen Sahu  YOB: 1945  MRN: [id number]    Date of Procedure: 7/25/2021    Pre-Op Diagnosis: GI BLEED    Post-Op Diagnosis: Same       Procedure(s):  EGD BIOPSY    Surgeon(s):   Alida Paris MD    Assistant:  * No surgical staff found *    Anesthesia: Monitor Anesthesia Care    Estimated Blood Loss (mL): Minimal    Complications: None    Specimens:   ID Type Source Tests Collected by Time Destination   A : bx antrum, gastritis Tissue Esophagus SURGICAL PATHOLOGY Aliad Paris MD 7/25/2021 1545    B : bx fundus polyp Tissue Stomach SURGICAL PATHOLOGY Alida Paris MD 7/25/2021 1548        Implants:  * No implants in log *      Drains: * No LDAs found *    Findings: above    Electronically signed by Alida Paris MD on 7/25/2021 at 3:53 PM

## 2021-07-25 NOTE — CONSULTS
Patient seen and examined; note dictated. A/P  1. Patient with int melanotic stools; Admitted with anemia. Will change to Protonix drip. Eliquis on hold. Plan EGD 7/25/21. Tx!
Amylase and lipase  since she has some epigastric tenderness. She may only need ultrasound  of the liver and gallbladder as an outpatient. 9.  She did have colonoscopy two years ago with minimal findings. SKIP Cruz M.D.    D: 07/24/2021 15:52:22       T: 07/24/2021 20:04:18     [email or web address]  Job#: [id number]     Doc#: [id number]    CC:  Megan Melendez.  Nicky Duval M.D.

## 2021-07-25 NOTE — PROGRESS NOTES
Hospitalist Progress Note    Patient:  Kindra Friend      Unit/Bed:6K-23/023-A    YOB: 1945    MRN: [id number]       Acct: [de-identified]     PCP: Heaven Barksdale MD    Date of Admission: 7/23/2021    Assessment/Plan:    Anticipated Discharge in :     C/Dk [address] Problems    Diagnosis Date Noted    Acute blood loss anemia [D62]     Hemorrhage secondary to anti-coagulation ([address] [T45.7X1A, D68.9]     Physical deconditioning [R53.81]     Anemia [D64.9] 07/23/2021    Acute upper GI hemorrhage [K92.2] 05/09/2018    General weakness [R53.1]      Upper GI hemorrhage related to anticoagulation  Acute blood loss anemia  Hgb 7.4 on admission, now at 9.2  S/P 1 PRBC transfusion  Patient started on Protonix drip, switched to IV BID but switched back to drip by GI  GI Consulted, plan for EGD today  Hold Eliquis, restart once cleared by GI  Continue to monitor hgb, keep >8 mg/dl     Paroxsymal Afib, on Eliquis  Hold Eliquis for now     Hypothyroidism  Continue levothyroxine 75 mcg QD     Essential HTN, CAD  Continue home medications    Chronic systolic heart failure (EF 40% 6/2020)  Continue Lasix   F/U cardiology as outpatient     DDD  Continue Celexa, tizanidine, and Cymbalta inpatient.     Diabetes mellitus type II, insulin dependent  On Lantus 8U + SSI insulin.    Hypoglycemia protocols in place  Serial accucheck     Chronic constipation  Noted ileus in CT scan. Bowel regimen per GI     CKD stage III  Cr stable   Avoid nephrotoxic agents  Monitor as needed.     Chronic insomnia  Continue Home Trazadone  May give melatonin at night     History of prior PE  Currently holding Eliquis and applying SCDs in setting of GIB w/ anemia     Chief Complaint:   Dark stools, fatigue    Hospital Course:   Per H&P:     \" Ms. Anand Teague a 48-year-old female with a past medical history of A. fib, CHF, CAD, anemia, prior PE, HTN, HLD, CKD stage III, hypothyroidism, sleep apnea with CPAP, and T2DM. Meds    Exam:  /65   Pulse 61   Temp 98.8 °F (37.1 °C) (Oral)   Resp 18   Wt 218 lb 6.4 oz (99.1 kg)   SpO2 98%   BMI 35.25 kg/m²     General appearance: No apparent distress, appears stated age and cooperative. HEENT: Pupils equal, round, and reactive to light. Conjunctivae/corneas clear. Neck: Supple, with full range of motion. No jugular venous distention. Trachea midline. Respiratory:  Normal respiratory effort. Clear to auscultation, bilaterally without Rales/Wheezes/Rhonchi. Cardiovascular: Regular rate and rhythm with normal S1/S2 without murmurs, rubs or gallops. Abdomen: Soft, epigastric tenderness on palpation, non-distended with normal bowel sounds. Musculoskeletal: passive and active ROM x 4 extremities. Skin: Skin color, texture, turgor normal.  No rashes or lesions. Neurologic:  Neurovascularly intact without any focal sensory/motor deficits. Cranial nerves: II-XII intact, grossly non-focal.  Psychiatric: Alert and oriented, thought content appropriate, normal insight  Capillary Refill: Brisk,< 3 seconds   Peripheral Pulses: +2 palpable, equal bilaterally       Labs:   Recent Labs     07/23/21  1700 07/23/21  1700 07/24/21  0210 07/24/21  0845 07/24/21  1515 07/24/21  2107 07/25/21  0200   WBC 4.8  --  3.9*  --   --   --   --    HGB 7.4*   < > 6.7*   < > 9.1* 9.4* 8.2*   HCT 24.1*   < > 21.9*   < > 29.0* 30.2* 26.1*     --  187  --   --   --   --     < > = values in this interval not displayed. Recent Labs     07/23/21  1700 07/24/21  0210    142   K 4.3 4.1    108   CO2 27 28   BUN 16 14   CREATININE 1.1 1.1   CALCIUM 8.7 8.5     Recent Labs     07/23/21 1700   AST 18   ALT 10*   BILIDIR <0.2   BILITOT 0.3   ALKPHOS 62     Recent Labs     07/23/21 1700   INR 1.38*     No results for input(s): Corrie Comment in the last 72 hours.     Urinalysis:      Lab Results   Component Value Date    NITRU NEGATIVE 07/23/2021    WBCUA 2-4 10/23/2018    BACTERIA NONE 10/23/2018    RBCUA 0-2 10/23/2018    BLOODU NEGATIVE 07/23/2021    SPECGRAV 1.009 10/23/2018    GLUCOSEU NEGATIVE 07/23/2021       Radiology:  CT ABDOMEN PELVIS W IV CONTRAST Additional Contrast? None   Final Result   1. Small hiatus hernia. 2. Findings of constipation and mild ileus. **This report has been created using voice recognition software. It may contain minor errors which are inherent in voice recognition technology. **      Final report electronically signed by Dr. Oni Gamboa on 7/23/2021 6:49 PM          Diet: Diet NPO Exceptions are: Sips of Water with Meds    DVT prophylaxis: [] Lovenox                                 [] SCDs                                 [] SQ Heparin                                 [] Encourage ambulation           [] Already on Anticoagulation     Disposition:    [] Home       [] TCU       [] Rehab       [] Psych       [] SNF       [] [facility]       [] Other-    Code Status: Full Code    PT/OT Eval Status:       Electronically signed by Alfonzo Dietrich MD on 7/25/2021 at 7:23 AM

## 2021-07-25 NOTE — PLAN OF CARE
Care plan reviewed with patient and family. Patient and family verbalize understanding of the plan of care and contribute to goal setting. Problem: Falls - Risk of:  Goal: Will remain free from falls  Description: Will remain free from falls  Outcome: Ongoing  Note: No falls noted this shift. Continue falling star program. Bed alarm on, bed in low position. Call light and personal belongings in reach. Patient uses call light appropriately. Goal: Absence of physical injury  Description: Absence of physical injury  Outcome: Ongoing  Note: No falls noted this shift. Continue falling star program. Bed alarm on, bed in low position. Call light and personal belongings in reach. Patient uses call light appropriately. Problem: Pain:  Description: Pain management should include both nonpharmacologic and pharmacologic interventions. Goal: Pain level will decrease  Description: Pain level will decrease  Outcome: Ongoing  Note: Patient rates pain 10/10 in head and abdomen today. Morphine q4 PRN see MAR, Fioricet, Zofran, Lyrica, Atarax, see MAR. Goal: Control of acute pain  Description: Control of acute pain  Outcome: Ongoing  Goal: Control of chronic pain  Description: Control of chronic pain  Outcome: Ongoing     Problem: Skin Integrity:  Goal: Absence of new skin breakdown  Description: Absence of new skin breakdown  Outcome: Ongoing  Note: No new signs or symptoms of skin breakdown noted this shift, encouraging patient to turn and reposition self in bed q2h       Problem: Discharge Planning:  Goal: Discharged to appropriate level of care  Description: Discharged to appropriate level of care  Outcome: Ongoing  Note: Plan to discharge back to [facility]     Problem:  Bowel Function - Altered:  Goal: Bowel elimination is within specified parameters  Description: Bowel elimination is within specified parameters  Outcome: Ongoing  Note: Patient has had no BM so far today, abdominal pain, protonix drip, EGD this afternoon      Problem: Fluid Volume - Imbalance:  Goal: Will show no signs and symptoms of excessive bleeding  Description: Will show no signs and symptoms of excessive bleeding  Outcome: Ongoing  Note: Hgb stable today, monitoring      Problem: Nausea/Vomiting:  Goal: Absence of nausea/vomiting  Description: Absence of nausea/vomiting  Outcome: Ongoing  Note: Nausea, Zofran PRN, on Protonix drip, see MAR.

## 2021-07-26 ENCOUNTER — HOSPITAL ENCOUNTER (OUTPATIENT)
Dept: NURSING | Age: 76
End: 2021-07-26
Payer: MEDICARE

## 2021-07-26 VITALS
HEART RATE: 63 BPM | WEIGHT: 218.4 LBS | RESPIRATION RATE: 18 BRPM | DIASTOLIC BLOOD PRESSURE: 56 MMHG | SYSTOLIC BLOOD PRESSURE: 111 MMHG | OXYGEN SATURATION: 90 % | BODY MASS INDEX: 35.25 KG/M2 | TEMPERATURE: 98.6 F

## 2021-07-26 LAB
ANION GAP SERPL CALCULATED.3IONS-SCNC: 11 MEQ/L (ref 8–16)
ANION GAP SERPL CALCULATED.3IONS-SCNC: 8 MEQ/L (ref 8–16)
BUN BLDV-MCNC: 12 MG/DL (ref 7–22)
BUN BLDV-MCNC: 13 MG/DL (ref 7–22)
CALCIUM SERPL-MCNC: 7.8 MG/DL (ref 8.5–10.5)
CALCIUM SERPL-MCNC: 8.2 MG/DL (ref 8.5–10.5)
CHLORIDE BLD-SCNC: 103 MEQ/L (ref 98–111)
CHLORIDE BLD-SCNC: 104 MEQ/L (ref 98–111)
CO2: 28 MEQ/L (ref 23–33)
CO2: 31 MEQ/L (ref 23–33)
CREAT SERPL-MCNC: 1.3 MG/DL (ref 0.4–1.2)
CREAT SERPL-MCNC: 1.3 MG/DL (ref 0.4–1.2)
ERYTHROCYTE [DISTWIDTH] IN BLOOD BY AUTOMATED COUNT: 13.5 % (ref 11.5–14.5)
ERYTHROCYTE [DISTWIDTH] IN BLOOD BY AUTOMATED COUNT: 46.5 FL (ref 35–45)
GFR SERPL CREATININE-BSD FRML MDRD: 40 ML/MIN/1.73M2
GFR SERPL CREATININE-BSD FRML MDRD: 40 ML/MIN/1.73M2
GLUCOSE BLD-MCNC: 105 MG/DL (ref 70–108)
GLUCOSE BLD-MCNC: 118 MG/DL (ref 70–108)
GLUCOSE BLD-MCNC: 177 MG/DL (ref 70–108)
GLUCOSE BLD-MCNC: 189 MG/DL (ref 70–108)
GLUCOSE BLD-MCNC: 88 MG/DL (ref 70–108)
HCT VFR BLD CALC: 26.3 % (ref 37–47)
HCT VFR BLD CALC: 29.8 % (ref 37–47)
HEMOGLOBIN: 8.1 GM/DL (ref 12–16)
HEMOGLOBIN: 9.2 GM/DL (ref 12–16)
MAGNESIUM: 1.6 MG/DL (ref 1.6–2.4)
MAGNESIUM: 1.6 MG/DL (ref 1.6–2.4)
MCH RBC QN AUTO: 29.1 PG (ref 26–33)
MCHC RBC AUTO-ENTMCNC: 30.8 GM/DL (ref 32.2–35.5)
MCV RBC AUTO: 94.6 FL (ref 81–99)
PLATELET # BLD: 184 THOU/MM3 (ref 130–400)
PMV BLD AUTO: 9.9 FL (ref 9.4–12.4)
POTASSIUM REFLEX MAGNESIUM: 3.5 MEQ/L (ref 3.5–5.2)
POTASSIUM SERPL-SCNC: 3.3 MEQ/L (ref 3.5–5.2)
RBC # BLD: 2.78 MILL/MM3 (ref 4.2–5.4)
SODIUM BLD-SCNC: 142 MEQ/L (ref 135–145)
SODIUM BLD-SCNC: 143 MEQ/L (ref 135–145)
WBC # BLD: 3.7 THOU/MM3 (ref 4.8–10.8)

## 2021-07-26 PROCEDURE — 6360000002 HC RX W HCPCS: Performed by: NURSE PRACTITIONER

## 2021-07-26 PROCEDURE — 2580000003 HC RX 258: Performed by: STUDENT IN AN ORGANIZED HEALTH CARE EDUCATION/TRAINING PROGRAM

## 2021-07-26 PROCEDURE — 6360000002 HC RX W HCPCS: Performed by: STUDENT IN AN ORGANIZED HEALTH CARE EDUCATION/TRAINING PROGRAM

## 2021-07-26 PROCEDURE — 97162 PT EVAL MOD COMPLEX 30 MIN: CPT

## 2021-07-26 PROCEDURE — 6370000000 HC RX 637 (ALT 250 FOR IP): Performed by: INTERNAL MEDICINE

## 2021-07-26 PROCEDURE — 97116 GAIT TRAINING THERAPY: CPT

## 2021-07-26 PROCEDURE — 99239 HOSP IP/OBS DSCHRG MGMT >30: CPT | Performed by: FAMILY MEDICINE

## 2021-07-26 PROCEDURE — 2580000003 HC RX 258: Performed by: INTERNAL MEDICINE

## 2021-07-26 PROCEDURE — 85018 HEMOGLOBIN: CPT

## 2021-07-26 PROCEDURE — 6370000000 HC RX 637 (ALT 250 FOR IP): Performed by: FAMILY MEDICINE

## 2021-07-26 PROCEDURE — 36415 COLL VENOUS BLD VENIPUNCTURE: CPT

## 2021-07-26 PROCEDURE — 2580000003 HC RX 258: Performed by: NURSE PRACTITIONER

## 2021-07-26 PROCEDURE — 80048 BASIC METABOLIC PNL TOTAL CA: CPT

## 2021-07-26 PROCEDURE — 83735 ASSAY OF MAGNESIUM: CPT

## 2021-07-26 PROCEDURE — 6370000000 HC RX 637 (ALT 250 FOR IP): Performed by: STUDENT IN AN ORGANIZED HEALTH CARE EDUCATION/TRAINING PROGRAM

## 2021-07-26 PROCEDURE — 85014 HEMATOCRIT: CPT

## 2021-07-26 PROCEDURE — 82948 REAGENT STRIP/BLOOD GLUCOSE: CPT

## 2021-07-26 PROCEDURE — 85027 COMPLETE CBC AUTOMATED: CPT

## 2021-07-26 RX ORDER — HEPARIN SODIUM (PORCINE) LOCK FLUSH IV SOLN 100 UNIT/ML 100 UNIT/ML
500 SOLUTION INTRAVENOUS PRN
Status: DISCONTINUED | OUTPATIENT
Start: 2021-07-26 | End: 2021-07-26 | Stop reason: HOSPADM

## 2021-07-26 RX ORDER — PANTOPRAZOLE SODIUM 40 MG/1
40 TABLET, DELAYED RELEASE ORAL
Qty: 60 TABLET | Refills: 0 | Status: ON HOLD | DISCHARGE
Start: 2021-07-26 | End: 2022-04-30

## 2021-07-26 RX ORDER — IPRATROPIUM BROMIDE 42 UG/1
2 SPRAY, METERED NASAL DAILY
Qty: 6 EACH | Refills: 0 | Status: SHIPPED
Start: 2021-07-27 | End: 2021-08-26

## 2021-07-26 RX ORDER — SUCRALFATE 1 G/1
1 TABLET ORAL
Qty: 120 TABLET | Refills: 0 | DISCHARGE
Start: 2021-07-26

## 2021-07-26 RX ORDER — HYDROXYZINE HYDROCHLORIDE 10 MG/1
10 TABLET, FILM COATED ORAL 4 TIMES DAILY PRN
DISCHARGE
Start: 2021-07-26 | End: 2021-08-25

## 2021-07-26 RX ADMIN — PANTOPRAZOLE SODIUM 40 MG: 40 TABLET, DELAYED RELEASE ORAL at 05:27

## 2021-07-26 RX ADMIN — Medication 2500 MCG: at 09:10

## 2021-07-26 RX ADMIN — POTASSIUM CHLORIDE 40 MEQ: 1500 TABLET, EXTENDED RELEASE ORAL at 09:11

## 2021-07-26 RX ADMIN — IRON SUCROSE 300 MG: 20 INJECTION, SOLUTION INTRAVENOUS at 15:04

## 2021-07-26 RX ADMIN — PANTOPRAZOLE SODIUM 40 MG: 40 TABLET, DELAYED RELEASE ORAL at 17:26

## 2021-07-26 RX ADMIN — Medication 1 TABLET: at 09:11

## 2021-07-26 RX ADMIN — FUROSEMIDE 20 MG: 20 TABLET ORAL at 09:11

## 2021-07-26 RX ADMIN — PREGABALIN 150 MG: 75 CAPSULE ORAL at 15:04

## 2021-07-26 RX ADMIN — SODIUM CHLORIDE: 9 INJECTION, SOLUTION INTRAVENOUS at 02:24

## 2021-07-26 RX ADMIN — SUCRALFATE 1 G: 1 TABLET ORAL at 17:26

## 2021-07-26 RX ADMIN — PREGABALIN 150 MG: 75 CAPSULE ORAL at 09:11

## 2021-07-26 RX ADMIN — SUCRALFATE 1 G: 1 TABLET ORAL at 05:24

## 2021-07-26 RX ADMIN — POTASSIUM CHLORIDE 10 MEQ: 750 TABLET, EXTENDED RELEASE ORAL at 09:12

## 2021-07-26 RX ADMIN — LEVOTHYROXINE SODIUM 75 MCG: 0.07 TABLET ORAL at 05:24

## 2021-07-26 RX ADMIN — HEPARIN 500 UNITS: 100 SYRINGE at 16:59

## 2021-07-26 RX ADMIN — SODIUM CHLORIDE, PRESERVATIVE FREE 10 ML: 5 INJECTION INTRAVENOUS at 09:12

## 2021-07-26 RX ADMIN — CALCIUM CARBONATE-VITAMIN D TAB 500 MG-200 UNIT 1 TABLET: 500-200 TAB at 09:11

## 2021-07-26 RX ADMIN — CITALOPRAM 40 MG: 40 TABLET ORAL at 09:11

## 2021-07-26 RX ADMIN — Medication 3000 UNITS: at 09:10

## 2021-07-26 RX ADMIN — DULOXETINE HYDROCHLORIDE 60 MG: 60 CAPSULE, DELAYED RELEASE ORAL at 09:11

## 2021-07-26 ASSESSMENT — PAIN DESCRIPTION - DESCRIPTORS
DESCRIPTORS: HEADACHE
DESCRIPTORS: SORE;ACHING

## 2021-07-26 ASSESSMENT — PAIN SCALES - GENERAL
PAINLEVEL_OUTOF10: 6
PAINLEVEL_OUTOF10: 4

## 2021-07-26 ASSESSMENT — PAIN DESCRIPTION - LOCATION
LOCATION: BACK
LOCATION: HEAD

## 2021-07-26 NOTE — DISCHARGE SUMMARY
DISCHARGE SUMMARY      Patient Identification:   Jessie Ramos   : 1945  MRN: [id number]   Account: [de-identified]      Patient's PCP: Paolo Stephens MD    Admit Date: 2021     Discharge Date:       Admitting Physician: Diane Moise MD     Discharge Physician: Red Nathan MD     Discharge Diagnoses:    Upper GI hemorrhage related to anticoagulation  Acute blood loss anemia  Hgb 7.4 on admission, currently stable 8-9, S/P 1 PRBC transfusion  S/P EGD, Pending Colonoscopy OPT with GI follow up. Hold Eliquis, restart after 48 hrs per GI       Paroxsymal Afib, on Eliquis  Hold Eliquis for now, restart after 48 hrs pe GI. Follow up with Cardiology OPT     Hypothyroidism  Continue levothyroxine 75 mcg QD     Essential HTN, CAD  Continue home medications     Chronic systolic heart failure (EF 40% 2020)  Continue Lasix   Follow up with cardiology as outpatient     DDD  Continue Celexa, tizanidine, and Cymbalta inpatient.     Diabetes mellitus type II, insulin dependent  Continue home insulin.     Chronic constipation  Noted ileus in CT scan, continue home bowel regiment per GI.     CKD stage III  Cr stable,  baseline.       Chronic insomnia  Continue Home Trazadone     History of prior PE  Hold Eliquis for now, restart after 48 hrs pe GI. The patient was seen and examined on day of discharge and this discharge summary is in conjunction with any daily progress note from day of discharge. Hospital Course:   Jessie Ramos is a 67 y.o. female admitted to [address] [place] [address] on 2021 for dark stools and fatigue,    Per H&P:     \" Ms. Shagufta Reaves a 23-year-old female with a past medical history of A. fib, CHF, CAD, anemia, prior PE, HTN, HLD, CKD stage III, hypothyroidism, sleep apnea with CPAP, and T2DM.  She presented to the ER due to an abnormal CBC that was checked earlier today the notes that hemoglobin was down to 7.4.  On top of this the patient states she has been having black tarry stools over the past week that have been intermittent and has had ongoing abdominal pain for the past 2 to 3 days.  Patient restates that most of her black and tarry stools occurred over the weekend on Saturday and Sunday. [facility] PCP checked a stool sample which was positive for fecal occult blood at the current point in time. [facility] PCP was also getting in contact with GI to see if they could do an outpatient scope and also checked her CBC which came back low.  Her PCP contacted Dr. Genie Plaza who works with GI and they agree that the patient should be admitted for further evaluation. Christi Goltz does state that years ago she had peptic ulcer disease in which she had to be scoped and they did find an active bleeding ulcer.  She states she had symptoms of abdominal pain in the epigastric area at that time.  Currently her abdominal pain is located in the lower abdomen and she states poor appetite for the past week.  She denies any fever, chills, chest pain, shortness of breath, nausea, vomiting, numbness or tingling her hands and feet, or active diarrhea.  Her main issue is constant and progressive fatigue.              In the ED, /77, HR 87, RR 17, SPO2 95%, temperature 98.9 °F.  Sodium 142, potassium 4.3, creatinine 1.1, albumin 4.0, WBC 4.8, Hgb 7.4, MCV 94.3, , INR 1.38, fecal occult blood test negative, CT scan of the abdomen shows a small hiatal hernia and findings consistent with constipation mild ileus. \"     Floor Course:     Pt admitted. Started on Protonix 40 IV BID s/p ED Protonix load, then later placed on protonix drip. Pt was found have Hgb 6.7 on recheck, transfused 1 x PRBC. Pt Hgb stablized at 8-9. GI consulted for evaluation, and performed EGD on Jul/25. Pt tolerated procedure well, and EGD revealed Abilio lesions near small hiatal hernia with noted esophagitis/gastritis.  Pt still noted to be bleeding on EGD while on Protonix drip, started on karafate, and iron infusion prior to discharge. GI recommends OPT colonoscopy and cardiology consult for watchman procedure in setting of GI bleed on Afib on Anticoag. Additionally while on telemetry Pt had noted episodes of RBBB, which may warrant Cardiology consult as well. Exam:     Vitals:  Vitals:    07/25/21 2349 07/26/21 0428 07/26/21 0811 07/26/21 1245   BP: (!) 112/54 (!) 103/54 (!) 95/58 (!) 111/56   Pulse: 68 (!) 49 78 63   Resp: 16 18 16 18   Temp: 98.3 °F (36.8 °C) 98.6 °F (37 °C) 98.4 °F (36.9 °C) 98.6 °F (37 °C)   TempSrc: Oral Oral Oral Oral   SpO2: 98% 100% 90% 90%   Weight:         Weight: Weight: 218 lb 6.4 oz (99.1 kg)     24 hour intake/output:    Intake/Output Summary (Last 24 hours) at 7/26/2021 1519  Last data filed at 7/26/2021 1336  Gross per 24 hour   Intake 1107.17 ml   Output 0 ml   Net 1107.17 ml       Physical Exam  Constitutional:       General: She is not in acute distress. Appearance: She is obese. Cardiovascular:      Rate and Rhythm: Normal rate and regular rhythm. Heart sounds: Normal heart sounds. No murmur heard. No friction rub. No gallop. Pulmonary:      Effort: Pulmonary effort is normal.      Breath sounds: Normal breath sounds. No wheezing, rhonchi or rales. Abdominal:      General: Abdomen is flat. Bowel sounds are normal. There is no distension. Palpations: Abdomen is soft. There is no fluid wave, hepatomegaly or splenomegaly. Tenderness: There is abdominal tenderness in the epigastric area. There is no guarding. Musculoskeletal:      Right lower leg: No edema. Left lower leg: No edema. Skin:     General: Skin is warm and dry. Capillary Refill: Capillary refill takes less than 2 seconds. Coloration: Skin is not jaundiced. Neurological:      Mental Status: She is alert. Labs:  For convenience and continuity at follow-up the following most recent labs are provided:      CBC:    Lab Results   Component Value Date    WBC 3.7 07/26/2021    HGB 9.2 07/26/2021    HCT 29.8 07/26/2021     07/26/2021       Renal:    Lab Results   Component Value Date     07/26/2021    K 3.5 07/26/2021     07/26/2021    CO2 28 07/26/2021    BUN 12 07/26/2021    CREATININE 1.3 07/26/2021    CREATININE 44.0 03/14/2019    CALCIUM 8.2 07/26/2021    PHOS 4.1 08/12/2016         Significant Diagnostic Studies    Radiology:   CT ABDOMEN PELVIS W IV CONTRAST Additional Contrast? None   Final Result   1. Small hiatus hernia. 2. Findings of constipation and mild ileus. **This report has been created using voice recognition software. It may contain minor errors which are inherent in voice recognition technology. **      Final report electronically signed by Dr. Solo Benoit on 7/23/2021 6:49 PM             Consults:     IP CONSULT TO GI  IP CONSULT TO SOCIAL WORK    Disposition:    [] Home       [] TCU       [] Rehab       [] Psych       [] SNF        [] [facility]       [x] Other- Assisted Living    Condition at Discharge: Stable    Code Status:  Full Code     Patient Instructions:    Discharge lab work: None  Activity: As Tolerated. Diet: ADULT DIET; Regular; Low Fat/Low Chol/High Fiber/ZAHRA; GI [place] (GERD/Peptic Ulcer)      Follow-up visits:   Gisell Jeronimo MD  [address] [place] [address]    In 1 week      Sis Blair [facility] [address] [facility]    In 3 days  Colonoscopy    Emily Oneil MD  [address]  [phone]    In 3 days  DONELL Darnell.          Discharge Medications:      Lauro Denny   Home Medication Instructions AIL:[id number]    Printed on:07/26/21 3270   Medication Information                      acetaminophen (TYLENOL) 325 MG tablet  Take 2 tablets by mouth every 4 hours as needed for Pain             apixaban (ELIQUIS) 5 MG TABS tablet  Take 1 tablet by mouth 2 times daily Start Jul/29/2021 Artificial Tear Solution (JUST TEARS EYE DROPS) SOLN  Apply to eye             atorvastatin (LIPITOR) 20 MG tablet  Take 1 tablet by mouth nightly             benzonatate (TESSALON) 100 MG capsule  Take 200 mg by mouth 3 times daily as needed for Cough              Calcium Carb-Cholecalciferol 500-400 MG-UNIT TABS  Take 1 tablet by mouth 2 times daily             carboxymethylcellulose 1 % ophthalmic solution  1 drop 3 times daily             citalopram (CELEXA) 40 MG tablet  Take 40 mg by mouth daily             Cyanocobalamin (SM VITAMIN B-12 PO)  Take 2,500 mcg by mouth daily             diphenhydrAMINE (BENADRYL) 25 MG capsule  Take 25 mg by mouth as needed for Itching             docusate sodium (COLACE) 100 MG capsule  Take 100 mg by mouth 2 times daily             Dulaglutide (TRULICITY) 7.66 GK/2.3GQ SOPN  Inject 0.75 mg into the skin once a week Every Wednesday             DULoxetine (CYMBALTA) 20 MG extended release capsule  Take 60 mg by mouth daily              escitalopram (LEXAPRO) 10 MG tablet  Take 1 tablet by mouth daily             furosemide (LASIX) 20 MG tablet  Take 20 mg by mouth daily             guaiFENesin (ROBITUSSIN) 100 MG/5ML syrup  Take 10 mLs by mouth 3 times daily as needed for Cough             hydrOXYzine (ATARAX) 10 MG tablet  Take 1 tablet by mouth 4 times daily as needed for Itching             insulin glargine (BASAGLAR KWIKPEN) 100 UNIT/ML injection pen  Inject 8 Units into the skin nightly              insulin lispro (HUMALOG) 100 UNIT/ML injection vial  Inject 0-3 Units into the skin nightly             ipratropium (ATROVENT) 0.06 % nasal spray  2 sprays by Each Nostril route daily             ipratropium-albuterol (DUONEB) 0.5-2.5 (3) MG/3ML SOLN nebulizer solution  Inhale 1 vial into the lungs every 4 hours             Lancets Misc. (UNISTIK CZT COMFORT) MISC               levothyroxine (SYNTHROID) 75 MCG tablet  Take 75 mcg by mouth Daily             lidocaine viscous (XYLOCAINE) 2 % solution  Take 15 mLs by mouth as needed for Irritation             Linaclotide (LINZESS) 72 MCG CAPS  Take 72.5 mg by mouth daily             loperamide (IMODIUM A-D) 2 MG tablet  Take by mouth             magnesium hydroxide (MILK OF MAGNESIA CONCENTRATE) 2400 MG/10ML SUSP  Take 30 mLs by mouth once as needed             Multiple Vitamins-Minerals (THERAPEUTIC MULTIVITAMIN-MINERALS) tablet  Take 1 tablet by mouth daily             ONE TOUCH ULTRA TEST strip               OXYGEN  Inhale into the lungs continuous             pantoprazole (PROTONIX) 40 MG tablet  Take 1 tablet by mouth 2 times daily (before meals)             polyethylene glycol (GLYCOLAX) packet  Take 17 g by mouth daily as needed for Constipation             potassium chloride (KLOR-CON M) 10 MEQ extended release tablet  Take 1 tablet by mouth daily             pregabalin (LYRICA) 150 MG capsule  Take 1 capsule by mouth 3 times daily for 3 days. Ruiz Adams RA ALCOHOL SWABS 70 % PADS               sucralfate (CARAFATE) 1 GM tablet  Take 1 tablet by mouth 4 times daily (before meals and nightly)             tiZANidine (ZANAFLEX) 2 MG tablet  Take 2 mg by mouth every 6 hours as needed             traZODone (DESYREL) 100 MG tablet  Take 100 mg by mouth nightly              vitamin D 25 MCG (1000 UT) CAPS  Take 3,000 Units by mouth daily                 Time Spent on discharge is more than 30 minutes in the examination, evaluation, counseling and review of medications and discharge plan. Signed: Thank you Kalani Thacker MD for the opportunity to be involved in this patient's care.     Electronically signed by Tawana Mahmood MD on 7/26/2021 at 3:19 PM

## 2021-07-26 NOTE — CARE COORDINATION
7/26/21, 3:03 PM EDT    Patient goals/plan/ treatment preferences discussed by  and . Patient goals/plan/ treatment preferences reviewed with patient/ family. Patient/ family verbalize understanding of discharge plan and are in agreement with goal/plan/treatment preferences. Understanding was demonstrated using the teach back method. AVS provided by RN at time of discharge, which includes all necessary medical information pertaining to the patients current course of illness, treatment, post-discharge goals of care, and treatment preferences. IMM Letter  IMM Letter given to Patient/Family/Significant other/Guardian/POA/by[de-identified] Ambreen Yuan CM  IMM Letter date given[de-identified] 07/26/21  IMM Letter time given[de-identified] 46     Pt is being discharged home today back to [address] [facility] notified Pat with the AL. Pt indicated that her daughter would transport her.   RN is aware

## 2021-07-26 NOTE — PROGRESS NOTES
Susannah Kim 59  OCCUPATIONAL THERAPY MISSED TREATMENT NOTE  [facility] TELEMET[facility] 6K  [id number]      Date: 2021  Patient Name: Marty Osorio        CSN: [id number]   : 1945  (67 y.o.)  Gender: female                REASON FOR MISSED TREATMENT: OT attempted at this time, although pt declined due to fatigue stating \"I haven't slept well in 2 weeks. \" Will check back as able

## 2021-07-26 NOTE — CARE COORDINATION
7/26/21, 8:21 AM EDT  DISCHARGE PLANNING EVALUATION:    [place]       Admitted: 7/23/2021/ [facility] day: 3   Location: [facility]23/023-A Reason for admit: Acute anemia [D64.9]   PMH:  has a past medical history of Anemia, Atrial fibrillation ([facility] [address] CAD (coronary artery disease), CHF (congestive heart failure) ([facility] [address] [facility] Chronic kidney disease, DDD (degenerative disc disease), lumbar, Depression, Frequent PVCs, GERD (gastroesophageal reflux disease), History of blood transfusion, Hx of blood clots, Hyperlipidemia, Hypertension, Hyperthyroidism, Movement disorder, Neuropathy, Obesity, Palpitations, Pneumonia, Sleep apnea, Status post right and left heart catheterization, and Type II or unspecified type diabetes mellitus without mention of complication, not stated as uncontrolled. Procedure: 07/25 EGD   Barriers to Discharge:  GI consulted, on Protonix, added Carafate, Hgb 8.1, strict stool counts/description documented, follow iron studies, PT/OT,   PCP: Lalita Kilpatrick MD  Readmission Risk Score: 33%    Patient Goals/Plan/Treatment Preferences: SW saw patient; Liz Payne resides at [address] [facility] [address]. Pt is independent with personal cares and plans to return there at discharge. Transportation/Food Security/Housekeeping Addressed:  No issues identified.

## 2021-07-26 NOTE — PROGRESS NOTES
[address] [address]  INPATIENT PHYSICAL THERAPY  EVALUATION  [facility]Z RENAL TELEMETRY 6K - 6K-23/023-A    Time In: 1837  Time Out: 1205  Timed Code Treatment Minutes: 12 Minutes  Minutes: 22   +5min added for PT returning to pt's room to discuss walker options upon d/c          Date: 2021  Patient Name: Risa Huff,  Gender:  female        MRN: [id number]  : 1945  (67 y.o.)      Referring Practitioner: Vicenta Serra DO  Diagnosis: Acute anemia  Additional Pertinent Hx: Pt presents with acute anemia associated with recent GI bleed resulting in fatigue. Pt has significant PMHx of A. fib, CHF, CAD, anemia, prior PE, HTN, HLD, CKD stage III, hypothyroidism, sleep apnea with CPAP, and T2DM     Restrictions/Precautions:  Restrictions/Precautions: General Precautions, Fall Risk    Subjective:  Chart Reviewed: Yes  Patient assessed for rehabilitation services?: Yes  Family / Caregiver Present: No  Subjective: RN approved session. Pt initially sleeping in bed when therapy arrived, she was startled when we woke her up but once oriented was agreeable to get up with PT and get seated in her recliner for lunch. Pt was cooperative and pleasant throughout the evaluation. At the end of the session PT discussed options for assistive devices and benefits of using a 4WW when ambulating in the community.     General:  Follows Commands: Within Functional Limits    Vision: Impaired  Vision Exceptions: Wears glasses for reading    Hearing: Within functional limits         Pain: Pt did not complain of pain during the session    Vitals: Vitals not assessed per clinical judgement, see nursing flowsheet    Social/Functional History:    Lives With: Alone  Type of Home: Assisted living  Home Layout: One level  Home Access: Level entry  Home Equipment:  (Pt does not use DME PTA, does use a \"walking stick\" when going on long walks)                   Ambulation Assistance: Independent  Transfer Assistance: Independent    Active : No  Occupation: Retired       OBJECTIVE:  Range of Motion:  Bilateral Lower Extremity: WFL    Strength:  Bilateral Lower Extremity: WFL    Balance:  Static Sitting Balance:  Modified Independent  Dynamic Sitting Balance: Modified Independent  Static Standing Balance: Modified Independent  Dynamic Standing Balance: Modified Independent    Bed Mobility:  Supine to Sit: Modified Independent, with head of bed flat, with rail, with increased time for completion    Transfers:  Sit to Stand: Modified Independent   *Pt completed 2 STS transfers    Ambulation:  Contact Guard Assistance  Distance: 50'  Surface: Level Tile  Device:Rolling Walker  Gait Deviations:  Slow Dee Dee  *Pt would be safe to be mod independent within the room, just struggles more with longer ambulation distances    Functional Outcome Measures: Completed  -PAC Inpatient Mobility Raw Score : 23  AM-PAC Inpatient T-Scale Score : 56.93    ASSESSMENT:  Activity Tolerance:  Patient tolerance of  treatment: good. Pt tolerated treatment well and was cooperative through the eval. Pt was pleasant throughout and in good spirits      Treatment Initiated: Treatment and education initiated within context of evaluation. Evaluation time included review of current medical information, gathering information related to past medical, social and functional history, completion of standardized testing, formal and informal observation of tasks, assessment of data and development of plan of care and goals. Treatment time included skilled education and facilitation of tasks to increase safety and independence with functional mobility for improved independence and quality of life.     Assessment:  Assessment: Pt does not require additional skilled therapy in the acute setting  Prognosis: Good    REQUIRES PT FOLLOW UP: No    Discharge Recommendations:  Discharge Recommendations: Continue to assess pending progress, 24 hour supervision or assist, Home with Home health PT *Discussed home health at discharge and patient was receptive to this   *Pt is safe to return back to assisted living at this time and does not need additional therapy in the acute setting, would be beneficial to follow up with [facility] PT to return to OF    Patient Education:  PT Education: Goals, General Safety, Gait Training    Equipment Recommendations:  Equipment Needed: No    Plan:  Times per week: N/A    Goals:  Patient goals : Return home  Short term goals  Time Frame for Short term goals: N/A due to pt being not suitable for acute therapy  Short term goal 1: .  Short term goal 2: . Short term goal 3: .  Short term goal 4: . Long term goals  Time Frame for Long term goals : N/A due to short ELOS    Following session, patient left in safe position with all fall risk precautions in place.

## 2021-07-26 NOTE — PROGRESS NOTES
Gastroenterology Progress Note:     Patient Name:  Susana Larios   MRN: [id number]  [id number]  YOB: 1945  Admit Date: 7/23/2021  4:21 PM  Primary Care Physician: Richie Medina MD   [id number]     Patient seen and examined. 24 hours events and chart reviewed. Subjective: Doing well other than some fatigue. Asking to go home. Hgb up to 9.2 from 8.1. No BM overnight. Tolerating reg diet. EGD yesterday 7/25/21 with findings of Jeraline Echevaria lesions thought to be causing the HONG, melena, intermittent bleeding. Results discussed with patient in detail. Objective:  BP (!) 111/56   Pulse 63   Temp 98.6 °F (37 °C) (Oral)   Resp 18   Wt 218 lb 6.4 oz (99.1 kg)   SpO2 90%   BMI 35.25 kg/m²     Physical Exam:    General:  Nourished in no distress  HEENT: Atraumatic, normocephalic. Moist oral mucous membranes. Neck: Supple without adenopathy, JVD, thyromegaly or masses. Trachea midline. CV: Heart RRR, no murmurs, rubs, gallops. Resp: Even, easy without cough or accessory use. Lungs clear to ascultation bilaterally. Abd: Round, soft, nontender. No hepatosplenomegaly or mass present. Active bowel sounds heard. No distention noted. Ext:  Without cyanosis, clubbing, edema. Skin: Pink, warm, dry  Neuro:  Alert, oriented x 3 with no obvious deficits.        Rectal: deferred    Labs:   CBC:   Lab Results   Component Value Date    WBC 3.7 07/26/2021    HGB 9.2 07/26/2021    HCT 29.8 07/26/2021    MCV 94.6 07/26/2021     07/26/2021     BMP:   Lab Results   Component Value Date     07/26/2021    K 3.5 07/26/2021     07/26/2021    CO2 28 07/26/2021    PHOS 4.1 08/12/2016    BUN 12 07/26/2021    CREATININE 1.3 07/26/2021    CREATININE 44.0 03/14/2019    CALCIUM 8.2 07/26/2021     PT/INR:   Lab Results   Component Value Date    INR 1.38 07/23/2021     LFTs:   Lab Results   Component Value Date    ALKPHOS 62 07/23/2021    ALT 10 07/23/2021    AST 18 07/23/2021    BILITOT 0.3 07/23/2021    BILIDIR <0.2 07/23/2021    LABALBU 4.0 07/23/2021    AMYLASE 44 07/25/2021    LIPASE 24.6 07/25/2021     Significant Diagnostic Studies:   EGD 7/25/21- Distal esophagitis, small hiatal hernia with linear erosions consistent with Gerianne Daft lesions, mild-to-moderate gastritis, polyp that was biopsied. Current Meds:  Scheduled Meds:   sodium chloride flush  5-40 mL Intravenous 2 times per day    pregabalin  150 mg Oral TID    pantoprazole  40 mg Oral BID AC    sucralfate  1 g Oral 4x Daily AC & HS    [Held by provider] apixaban  5 mg Oral BID    atorvastatin  20 mg Oral Nightly    calcium-vitamin D  1 tablet Oral BID    citalopram  40 mg Oral Daily    cyanocobalamin  2,500 mcg Oral Daily    DULoxetine  60 mg Oral Daily    furosemide  20 mg Oral Daily    insulin glargine  8 Units Subcutaneous Nightly    insulin lispro  0-6 Units Subcutaneous TID WC    insulin lispro  0-3 Units Subcutaneous Nightly    ipratropium  2 spray Each Nostril Daily    levothyroxine  75 mcg Oral Daily    [Held by provider] cetirizine  10 mg Oral Daily    therapeutic multivitamin-minerals  1 tablet Oral Daily    potassium chloride  10 mEq Oral Daily    traZODone  100 mg Oral Nightly    sodium chloride flush  5-40 mL Intravenous 2 times per day    Vitamin D  3,000 Units Oral Daily     Continuous Infusions:   sodium chloride      sodium chloride      dextrose      sodium chloride      sodium chloride 75 mL/hr at 07/26/21 0224       Assessment:     66 yo F we are following for melena, anemia. PMHx Diabetes, A-fib on long term anticoagulation, Hypothyroidism, CKD. Colonoscopy 2018 minimal findings. EGD 9/2018 minimal findings. On PPI BID at home. Hx of Nissen with Dr. Yael Fuentes. Reported melanotic stool intermittently x1 wk PTA also occasional abdominal pain, nausea, dry heaving. Significant anemia upon admission.  EGD 7/25/21 Distal esophagitis, small hiatal hernia with linear erosions consistent with Gerianne Daft lesions, mild-to-moderate gastritis, polyp that was biopsied. 1. GI Bleed  2. Anemia  3. Joseph Lesions  4. Hiatal Hernia  5. Iron Deficiency    Plan:     Venofer 300 mg today, may benefit from additional doses on an outpatient basis   PPI PO BID, home dose, continue at discharge. Iron saturation was 7% on admission.  Carafate liquid QID, continue at discharge. If unable to obtain liquid OK to substitute tablets, dissolve in 1 oz of fluid and drink as a slurry.  Low acid, bland diet   Hold Eliquis for 48 additional hours.  May benefit from cardiology evaluation for consideration of Watchmen device in the future due to GIB while on anticoagulation, hiatal hernia, joseph lesions   Proceed with Colonoscopy as planned 8/4/21 with Dr. Latonia Rivera.  Follow up in the office to be determined after colonoscopy.       JONN signing off    Case reviewed and impression/plan reviewed in collaboration with Dr. Macey Corona  Electronically signed by BRIAN Jerez CNP on 7/26/2021 at 12:59 PM    [facility]

## 2021-07-26 NOTE — CARE COORDINATION
DISCHARGE/PLANNING EVALUATION  7/26/21, 9:36 AM EDT    Reason for Referral: pt is from [address]  Mental Status: pt is alert and oriented   Decision Making: pt is capable of making own decisions   Family/Social/Home Environment: pt resides at [address] Pt is independent with personal care, RN takes care of pts medications, and pt does down for meals  Current Services including food security, transportation and housekeeping: see above  Current Equipment:C-pap  Payment Source:Medicare/[employer]   Concerns or Barriers to Discharge: none noted  Post acute provider list with quality measures, geographic area and applicable managed care information provided. Questions regarding selection process answered:NA    Teach Back Method used with pt regarding care plan   Patient verbalize understanding of the plan of care and contribute to goal setting. Patient goals, treatment preferences and discharge plan: SW met with pt, discussed discharge POC. Pt plans on [facility], [facility]. SW updated Pat with [employer].      Electronically signed by JESSIE Locke on 7/26/2021 at 9:36 AM

## 2021-07-26 NOTE — OP NOTE
Operative Note      Patient: Lauri Cruz  YOB: 1945  MRN: [id number]    Date of Procedure: 7/25/2021    Pre-Op Diagnosis: GI BLEED    Post-Op Diagnosis: Same:  Distal esophagitis;  Small hiatal hernia with linear erosions (joseph lesions);  Mild to moderate gastritis. Small polyp in proximal stomach. These lesions may cause intermittent bleeding and chronic anemia. Is on Protonix BID; Will add Carafate QID. Advance diet, change to PO Protonix. Check hgb level in am.  May need to hold Eliquis several more days. Procedure(s):  EGD BIOPSY    Surgeon(s):   Ted Farnsworth MD    Assistant:   * No surgical staff found *    Anesthesia: Monitor Anesthesia Care    Estimated Blood Loss (mL): Minimal    Complications: None    Specimens:   ID Type Source Tests Collected by Time Destination   A : bx antrum, gastritis Tissue Esophagus SURGICAL PATHOLOGY Ted Farnsworth MD 7/25/2021 1545    B : bx fundus polyp Tissue Stomach SURGICAL PATHOLOGY Ted Farnsworth MD 7/25/2021 1548        Implants:  * No implants in log *      Drains: * No LDAs found *    Findings: above    Detailed Description of Procedure:   dictated    Electronically signed by eTd Farnsworth MD on 7/25/2021 at 3:52 PM
straightened. Biopsy was done in the antrum to check for pathology and Helicobacter. Endoscope was retraced back in the proximal stomach. Several biopsies  were done of the stomach polyp. Stomach was deflated. Endoscope was  retraced back to the esophagus and the procedure terminated. The  patient tolerated the procedure well, taken to GI lab recovery area in  stable condition. IMPRESSION:  1. Distal esophagitis. 2.  Small hiatal hernia with linear erosions which are the Buel Bouche  lesions. 3.  Mild-to-moderate gastritis. 4.  Polyp in the proximal stomach, biopsied. Note that the Buel Bouche  lesions may cause intermittent bleeding and chronic anemia. PLAN:  1. She is on Protonix b.i.d., but we can still see bleeding from this. I am adding Carafate liquid. May need to hold the Eliquis for several  more days. We will advance her diet. I changed to p.o. Protonix. 2.  We will check CBC in the morning and may be able to discharge  tomorrow. 3.  Estimated blood loss was less than 10 mL. SKIP Black M.D.    D: 07/25/2021 16:08:21       T: 07/25/2021 23:00:18     [id number]  Job#: [id number]     Doc#: [id number]    CC:

## 2021-07-26 NOTE — DISCHARGE INSTR - COC
Continuity of Care Form    Patient Name: Sharda Almazan   :  1945  MRN:  [id number]    Admit date:  2021  Discharge date:  ***    Code Status Order: Full Code   Advance Directives:      Admitting Physician:  Aruna Black MD  PCP: Luis Benavides MD    Discharging Nurse: [facility] Unit/Room#: 9K-21/46-A  Discharging Unit Phone Number: ***    Emergency Contact:   Extended Emergency Contact Information  Primary Emergency Contact: Obdulia Ricketts of [address] [place] [address] Phone: [phone]  Relation: Child  Secondary Emergency Contact: Gunnar Blackwell  Address: [address] [place] [address] Phone: [phone]  Relation: Child    Past Surgical History:  Past Surgical History:   Procedure Laterality Date    ABDOMEN SURGERY      ACHILLES TENDON SURGERY Bilateral     BLADDER SUSPENSION      CARDIAC SURGERY  2016    heart cath--[facility]    COLONOSCOPY Left 2018    COLONOSCOPY POLYPECTOMY SNARE/COLD BIOPSY performed by Catherine Zamora MD at [facility]    ENDOSCOPY, COLON, DIAGNOSTIC      GASTRIC FUNDOPLICATION      HAMMER TOE SURGERY Right     HERNIA REPAIR      HIATAL HERNIA REPAIR  2016    Laparoscopic Robotic with Nissen Fundoplication - Dr. Clary Bear OFFICE/OUTPT VISIT,PROCEDURE ONLY N/A 2018    EGD DIAGNOSTIC ONLY performed by Florecita Meza MD at [address] [place] [address]      right    TENDON RELEASE Left 2016    left foot    TUBAL LIGATION      TUNNELED VENOUS PORT PLACEMENT  2017    UPPER GASTROINTESTINAL ENDOSCOPY Left 5/10/2018    EGD BIOPSY performed by Catherine Zamora MD at [facility]       Immunization History:   Immunization History   Administered Date(s) Administered    Influenza, Quadv, IM, PF (6 mo and older Fluzone, Flulaval, Fluarix, and 3 yrs and older Afluria) 10/31/2017    Pneumococcal Conjugate 13-valent disease with esophagitis K21.00    S/P Nissen fundoplication (without gastrostomy tube) procedure Z98.890    Bradycardia R00.1    Symptomatic sinus bradycardia R00.1    Tachycardia-bradycardia syndrome (HCC) I49.5    Arrhythmia I49.9    Atrial fibrillation (HCC) I48.91    Encounter for electronic analysis of reveal event recorder Z45.09    Frequent PVCs I49.3    Acute upper GI hemorrhage K92.2    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) D83.0    Altered mental status R41.82    Common variable immunodeficiency (HCC) D83.9    Hypothyroidism (acquired) E03.9    Palpitations R00.2    Anemia D64.9    Acute blood loss anemia D62    Hemorrhage secondary to anti-coagulation (HCC) T45.7X1A, D68.9    Physical deconditioning R53.81       Isolation/Infection:   Isolation            No Isolation          Patient Infection Status       None to display            Nurse Assessment:  Last Vital Signs: BP (!) 111/56   Pulse 63   Temp 98.6 °F (37 °C) (Oral)   Resp 18   Wt 218 lb 6.4 oz (99.1 kg)   SpO2 90%   BMI 35.25 kg/m²     Last documented pain score (0-10 scale): Pain Level: 4  Last Weight:   Wt Readings from Last 1 Encounters:   07/25/21 218 lb 6.4 oz (99.1 kg)     Mental Status:  {IP PT MENTAL STATUS:73762:::0}    IV Access:  { JACKSON IV ACCESS:117940238:::0}    Nursing Mobility/ADLs:  Walking   {CHP DME ADLs:907252990:::0}  Transfer  {CHP DME ADLs:667214512:::0}  Bathing  {CHP DME ADLs:070678988:::0}  Dressing  {CHP DME ADLs:859605530:::0}  Toileting  {CHP DME ADLs:257119231:::0}  Feeding  {CHP DME ADLs:286147316:::0}  Med Admin  {CHP DME ADLs:846302087:::0}  Med Delivery   { JACKSON MED Delivery:280233567:::0}    Wound Care Documentation and Therapy:        Elimination:  Continence:    Bowel: {YES / RD:57769}  Bladder: {YES / NV:88397}  Urinary Catheter: {Urinary Catheter:168526263:::0}   Colostomy/Ileostomy/Ileal Conduit: {YES / TQ:41638}       Date of Last BM: ***    Intake/Output Summary (Last 24 hours) at 2021 1515  Last data filed at 2021 1336  Gross per 24 hour   Intake 1107.17 ml   Output 0 ml   Net 1107.17 ml     I/O last 3 completed shifts: In: 1107.2 [P.O.:170; I.V.:937.2]  Out: 0     Safety Concerns:     [address] JACKSON Safety Concerns:205128612:::0}    Impairments/Disabilities:      [address] JACKSON Impairments/Disabilities:932276334:::0}    Nutrition Therapy:  Current Nutrition Therapy:   [address] [facility] Diet List:738357117:::0}    Routes of Feeding: {[facility] DME Other Feedings:403729077:::0}  Liquids: {Slp liquid thickness:14234}  Daily Fluid Restriction: {CHP DME Yes amt example:449616080:::0}  Last Modified Barium Swallow with Video (Video Swallowing Test): {Done Not Done CM:416835655:::3}    Treatments at the Time of Hospital Discharge:   Respiratory Treatments: ***  Oxygen Therapy:  {Therapy; copd oxygen:09147:::0}  Ventilator:    { CC Vent List:873281765:::0}    Rehab Therapies: {THERAPEUTIC INTERVENTION:6524936918}  Weight Bearing Status/Restrictions: [address]  Weight Bearin:::0}  Other Medical Equipment (for information only, NOT a DME order):  {EQUIPMENT:599868662}  Other Treatments: ***    Patient's personal belongings (please select all that are sent with patient):  {CHP DME Belongings:166044543:::0}    RN SIGNATURE:  {Esignature:483348187:::0}    CASE MANAGEMENT/SOCIAL WORK SECTION    Inpatient Status Date: ***    Readmission Risk Assessment Score:  Readmission Risk              Risk of Unplanned Readmission:  33           Discharging to Facility/ Agency   Name:   Address:  Phone:  Fax:    Dialysis Facility (if applicable)   Name:  Address:  Dialysis Schedule:  Phone:  Fax:    / signature: {Esignature:414671316:::0}    PHYSICIAN SECTION    Prognosis: Good    Condition at Discharge: Stable    Rehab Potential (if transferring to Rehab): Good    Recommended Labs or Other Treatments After Discharge:  Follow up Colonoscopy w/ Irven Leader, possible Watchman procedure w/ Cardio    Physician Certification: I certify the above information and transfer of Risa Huff  is necessary for the continuing treatment of the diagnosis listed and that she requires Assisted Living for a duration greater than 30 days.      Update Admission H&P: No change in H&P    PHYSICIAN SIGNATURE:  Electronically signed by Agustin Chaney MD on 7/26/21 at 3:17 PM EDT

## 2021-07-26 NOTE — PLAN OF CARE
Problem: Falls - Risk of:  Goal: Will remain free from falls  Description: Will remain free from falls  7/25/2021 2155 by Yunier Girard RN  Outcome: Ongoing     Problem: Falls - Risk of:  Goal: Absence of physical injury  Description: Absence of physical injury  7/25/2021 2155 by Yunier Girard RN  Outcome: Ongoing     Problem: Pain:  Goal: Pain level will decrease  Description: Pain level will decrease  7/25/2021 2155 by Yunier Girard RN  Outcome: Ongoing     Problem: Pain:  Goal: Control of acute pain  Description: Control of acute pain  7/25/2021 2155 by Yunier Girard RN  Outcome: Ongoing     Problem: Pain:  Goal: Control of chronic pain  Description: Control of chronic pain  7/25/2021 2155 by Yunier Girard RN  Outcome: Ongoing     Problem: Skin Integrity:  Goal: Absence of new skin breakdown  Description: Absence of new skin breakdown  7/25/2021 2155 by Yunier Girard RN  Outcome: Ongoing  Note: Patient free from skin breakdown. Patient turns self and makes frequent positional changes. Will continue to monitor. Problem: Discharge Planning:  Goal: Discharged to appropriate level of care  Description: Discharged to appropriate level of care  7/25/2021 2155 by Yunier Girard RN  Outcome: Ongoing  Note: Pt to be discharged back to AL. Problem: Bowel Function - Altered:  Goal: Bowel elimination is within specified parameters  Description: Bowel elimination is within specified parameters  7/25/2021 2155 by Yunier Girard RN  Outcome: Ongoing     Problem: Fluid Volume - Imbalance:  Goal: Will show no signs and symptoms of excessive bleeding  Description: Will show no signs and symptoms of excessive bleeding  7/25/2021 2155 by Yunier Girard RN  Outcome: Ongoing     Problem: Nausea/Vomiting:  Goal: Absence of nausea/vomiting  Description: Absence of nausea/vomiting  7/25/2021 2155 by Yunier Girard RN  Outcome: Ongoing     Care plan reviewed with patient.   Patient verbalize understanding of the plan of care and contribute to goal setting.

## 2021-07-27 ENCOUNTER — CARE COORDINATION (OUTPATIENT)
Dept: CASE MANAGEMENT | Age: 76
End: 2021-07-27

## 2021-07-27 NOTE — CARE COORDINATION
Care Transitions Outreach Attempt    Call within 2 business days of discharge: Yes   Attempted to reach patient for transitions of care follow up. Unable to reach patient. Patient: Susana Larios Patient : 1945 MRN: [id number]    Last Discharge [address] [address]       Complaint Diagnosis Description Type Department Provider    21 Abnormal Lab Anemia, unspecified type . .. ED to Hosp-Admission (Discharged) (ADMITTED) SAMEER ConradK Taylor Cade MD; Sugar Grullon. .. Was this an external facility discharge?  No Discharge Facility:  [address] [place] [address]     Noted following upcoming appointments from discharge chart review:   [facility] follow up appointment(s):   Future Appointments   Date Time Provider Mynor Starkey   2021  9:30 AM STR EXAM ROOM 2 [facility]   2021  1:00 PM [address], MD [facility]   2022 10:40 AM [facility] [address]     Non-[facility] follow up appointment(s):

## 2021-07-28 ENCOUNTER — CARE COORDINATION (OUTPATIENT)
Dept: CARE COORDINATION | Age: 76
End: 2021-07-28

## 2021-07-28 NOTE — CARE COORDINATION
Care Transitions Outreach Attempt    Call within 2 business days of discharge: Yes   Attempted to reach patient for transitions of care follow up. Unable to reach patient. Voicemail box is full, cannot leave a message. Attempted to contact [address] [phone]. Left a VM for return call. Patient: Sharda Almazan Patient : 1945 MRN: <[id number]>    Last Discharge [facility]       Complaint Diagnosis Description Type Department Provider    21 Abnormal Lab Anemia, unspecified type . .. ED to Hosp-Admission (Discharged) (ADMITTED) [facility] 6K Aruna Black MD; Joie Cobb. .. Was this an external facility discharge?  No Discharge Facility: [facility]    Noted following upcoming appointments from discharge chart review:   [facility] follow up appointment(s):   Future Appointments   Date Time Provider Mynor Starkey   2021  9:30 AM STR EXAM ROOM 2 [address]   2021  1:30 PM [address], MD [facility]   2021  1:00 PM [address], MD N MARTÍNEZ [facility] [address] [facility]   2022 10:40 AM DO TOMY Dick [facility] [address] [facility]     Non-[facility] follow up appointment(s):

## 2021-08-02 ENCOUNTER — HOSPITAL ENCOUNTER (OUTPATIENT)
Dept: NURSING | Age: 76
Discharge: HOME | End: 2021-08-02
Payer: MEDICARE

## 2021-08-02 ENCOUNTER — OFFICE VISIT (OUTPATIENT)
Dept: CARDIOLOGY CLINIC | Age: 76
End: 2021-08-02
Payer: MEDICARE

## 2021-08-02 VITALS
BODY MASS INDEX: 36.32 KG/M2 | TEMPERATURE: 96.6 F | SYSTOLIC BLOOD PRESSURE: 149 MMHG | RESPIRATION RATE: 16 BRPM | OXYGEN SATURATION: 95 % | WEIGHT: 225 LBS | DIASTOLIC BLOOD PRESSURE: 70 MMHG | HEART RATE: 73 BPM

## 2021-08-02 VITALS
SYSTOLIC BLOOD PRESSURE: 152 MMHG | HEIGHT: 66 IN | WEIGHT: 226 LBS | DIASTOLIC BLOOD PRESSURE: 80 MMHG | HEART RATE: 68 BPM | BODY MASS INDEX: 36.32 KG/M2

## 2021-08-02 DIAGNOSIS — D83.9 COMMON VARIABLE IMMUNODEFICIENCY (HCC): Primary | ICD-10-CM

## 2021-08-02 DIAGNOSIS — I48.0 PAROXYSMAL ATRIAL FIBRILLATION (HCC): Primary | ICD-10-CM

## 2021-08-02 DIAGNOSIS — I42.0 DILATED CARDIOMYOPATHY (HCC): ICD-10-CM

## 2021-08-02 PROCEDURE — 96413 CHEMO IV INFUSION 1 HR: CPT

## 2021-08-02 PROCEDURE — G8400 PT W/DXA NO RESULTS DOC: HCPCS | Performed by: NUCLEAR MEDICINE

## 2021-08-02 PROCEDURE — 96365 THER/PROPH/DIAG IV INF INIT: CPT

## 2021-08-02 PROCEDURE — G8417 CALC BMI ABV UP PARAM F/U: HCPCS | Performed by: NUCLEAR MEDICINE

## 2021-08-02 PROCEDURE — 1090F PRES/ABSN URINE INCON ASSESS: CPT | Performed by: NUCLEAR MEDICINE

## 2021-08-02 PROCEDURE — G8428 CUR MEDS NOT DOCUMENT: HCPCS | Performed by: NUCLEAR MEDICINE

## 2021-08-02 PROCEDURE — 4040F PNEUMOC VAC/ADMIN/RCVD: CPT | Performed by: NUCLEAR MEDICINE

## 2021-08-02 PROCEDURE — 6370000000 HC RX 637 (ALT 250 FOR IP): Performed by: FAMILY MEDICINE

## 2021-08-02 PROCEDURE — 1036F TOBACCO NON-USER: CPT | Performed by: NUCLEAR MEDICINE

## 2021-08-02 PROCEDURE — 99213 OFFICE O/P EST LOW 20 MIN: CPT | Performed by: NUCLEAR MEDICINE

## 2021-08-02 PROCEDURE — 1123F ACP DISCUSS/DSCN MKR DOCD: CPT | Performed by: NUCLEAR MEDICINE

## 2021-08-02 PROCEDURE — 6360000002 HC RX W HCPCS

## 2021-08-02 PROCEDURE — 6360000002 HC RX W HCPCS: Performed by: FAMILY MEDICINE

## 2021-08-02 PROCEDURE — 1111F DSCHRG MED/CURRENT MED MERGE: CPT | Performed by: NUCLEAR MEDICINE

## 2021-08-02 RX ORDER — SODIUM CHLORIDE 9 MG/ML
INJECTION, SOLUTION INTRAVENOUS CONTINUOUS
Status: CANCELLED | OUTPATIENT
Start: 2021-08-23

## 2021-08-02 RX ORDER — SODIUM CHLORIDE 0.9 % (FLUSH) 0.9 %
5 SYRINGE (ML) INJECTION PRN
Status: CANCELLED | OUTPATIENT
Start: 2021-08-23

## 2021-08-02 RX ORDER — DIPHENHYDRAMINE HCL 25 MG
25 TABLET ORAL ONCE
Status: COMPLETED | OUTPATIENT
Start: 2021-08-02 | End: 2021-08-02

## 2021-08-02 RX ORDER — SODIUM CHLORIDE 0.9 % (FLUSH) 0.9 %
10 SYRINGE (ML) INJECTION PRN
Status: CANCELLED | OUTPATIENT
Start: 2021-08-23

## 2021-08-02 RX ORDER — HEPARIN SODIUM (PORCINE) LOCK FLUSH IV SOLN 100 UNIT/ML 100 UNIT/ML
500 SOLUTION INTRAVENOUS PRN
Status: DISCONTINUED | OUTPATIENT
Start: 2021-08-02 | End: 2021-08-03 | Stop reason: HOSPADM

## 2021-08-02 RX ORDER — METHYLPREDNISOLONE SODIUM SUCCINATE 125 MG/2ML
125 INJECTION, POWDER, LYOPHILIZED, FOR SOLUTION INTRAMUSCULAR; INTRAVENOUS ONCE
Status: CANCELLED | OUTPATIENT
Start: 2021-08-23 | End: 2021-08-23

## 2021-08-02 RX ORDER — DIPHENHYDRAMINE HYDROCHLORIDE 50 MG/ML
50 INJECTION INTRAMUSCULAR; INTRAVENOUS ONCE
Status: CANCELLED | OUTPATIENT
Start: 2021-08-23 | End: 2021-08-23

## 2021-08-02 RX ORDER — ACETAMINOPHEN 325 MG/1
650 TABLET ORAL ONCE
Status: CANCELLED | OUTPATIENT
Start: 2021-08-23 | End: 2021-08-23

## 2021-08-02 RX ORDER — HEPARIN SODIUM (PORCINE) LOCK FLUSH IV SOLN 100 UNIT/ML 100 UNIT/ML
500 SOLUTION INTRAVENOUS PRN
Status: CANCELLED | OUTPATIENT
Start: 2021-08-23

## 2021-08-02 RX ORDER — DIPHENHYDRAMINE HCL 25 MG
25 TABLET ORAL ONCE
Status: CANCELLED | OUTPATIENT
Start: 2021-08-23 | End: 2021-08-23

## 2021-08-02 RX ORDER — ACETAMINOPHEN 325 MG/1
650 TABLET ORAL ONCE
Status: COMPLETED | OUTPATIENT
Start: 2021-08-02 | End: 2021-08-02

## 2021-08-02 RX ORDER — HEPARIN SODIUM (PORCINE) LOCK FLUSH IV SOLN 100 UNIT/ML 100 UNIT/ML
SOLUTION INTRAVENOUS
Status: COMPLETED
Start: 2021-08-02 | End: 2021-08-02

## 2021-08-02 RX ADMIN — DIPHENHYDRAMINE HCL 25 MG: 25 TABLET ORAL at 09:44

## 2021-08-02 RX ADMIN — IMMUNE GLOBULIN (HUMAN) 5 G: 10 INJECTION INTRAVENOUS; SUBCUTANEOUS at 10:18

## 2021-08-02 RX ADMIN — IMMUNE GLOBULIN (HUMAN) 20 G: 10 INJECTION INTRAVENOUS; SUBCUTANEOUS at 10:48

## 2021-08-02 RX ADMIN — HEPARIN SODIUM (PORCINE) LOCK FLUSH IV SOLN 100 UNIT/ML 500 UNITS: 100 SOLUTION at 11:40

## 2021-08-02 RX ADMIN — ACETAMINOPHEN 650 MG: 325 TABLET ORAL at 09:44

## 2021-08-02 RX ADMIN — HEPARIN 500 UNITS: 100 SYRINGE at 11:40

## 2021-08-02 ASSESSMENT — PAIN SCALES - GENERAL: PAINLEVEL_OUTOF10: 0

## 2021-08-02 NOTE — PROGRESS NOTES
0930: Patient arrived ambulatory for IVIG infusion. Patient denies any antibiotic usage or infection at this time. PT RIGHTS AND RESPONSIBILITIES OFFERED TO PT.      9281: Pre-medications administered. 1018: IVIG started, patient tolerating well. 1140: IVIG complete, patient tolerated well. No concerns voiced. AVS reviewed with patient, voiced understanding. Patient discharged ambulatory.                          _m___ Safety:       (Environmental)   [place] to environment   Ensure ID band is correct and in place/ allergy band as needed   Assess for fall risk   Initiate fall precautions as applicable (fall band, side rails, etc.)   Call light within reach   Bed in low position/ wheels locked    _m___ Pain:        Assess pain level and characteristics   Administer analgesics as ordered   Assess effectiveness of pain management and report to MD as needed    _m___ Knowledge Deficit:   Assess baseline knowledge   Provide teaching at level of understanding   Provide teaching via preferred learning method   Evaluate teaching effectiveness    _m___ Hemodynamic/Respiratory Status:       (Pre and Post Procedure Monitoring)   Assess/Monitor vital signs and LOC   Assess Baseline SpO2 prior to any sedation   Obtain weight/height   Assess vital signs/ LOC until patient meets discharge criteria   Monitor procedure site and notify MD of any issues    _m___ Infection-Risk of Central Venous Catheter:   Monitor for infection signs and symptoms (catheter site redness, temperature elevation, etc)   Assess for infection risks   Educate regarding infection prevention   Manage central venous catheter (flushes/ dressing changes per protocol)

## 2021-08-02 NOTE — PROGRESS NOTES
Laparoscopic Robotic with Nissen Fundoplication - Dr. Kamryn Fenton OFFICE/OUTPT VISIT,PROCEDURE ONLY N/A 9/21/2018    EGD DIAGNOSTIC ONLY performed by Joann Gandhi MD at [address]      right    TENDON RELEASE Left 08/09/2016    left foot    TUBAL LIGATION      TUNNELED VENOUS PORT PLACEMENT  11/06/2017    UPPER GASTROINTESTINAL ENDOSCOPY Left 5/10/2018    EGD BIOPSY performed by Neelam Mercedes MD at [facility] Left 7/25/2021    EGD BIOPSY performed by Jitendra Munguia MD at [facility]     Family History   Problem Relation Age of Onset    Cancer Mother     Heart Disease Mother     High Blood Pressure Mother     Diabetes Mother     Vision Loss Mother     Stroke Mother     COPD Father     Diabetes Father     COPD Brother      Social History     Tobacco Use    Smoking status: Former Smoker     Packs/day: 1.00     Years: 30.00     Pack years: 30.00     Types: Cigarettes     Quit date: 5/10/2006     Years since quitting: 15.2    Smokeless tobacco: Never Used   Substance Use Topics    Alcohol use: No      Current Outpatient Medications   Medication Sig Dispense Refill    hydrOXYzine (ATARAX) 10 MG tablet Take 1 tablet by mouth 4 times daily as needed for Itching      apixaban (ELIQUIS) 5 MG TABS tablet Take 1 tablet by mouth 2 times daily Start Jul/29/2021 60 tablet 0    ipratropium (ATROVENT) 0.06 % nasal spray 2 sprays by Each Nostril route daily 6 each 0    sucralfate (CARAFATE) 1 GM tablet Take 1 tablet by mouth 4 times daily (before meals and nightly) 120 tablet 0    pantoprazole (PROTONIX) 40 MG tablet Take 1 tablet by mouth 2 times daily (before meals) 60 tablet 0    vitamin D 25 MCG (1000 UT) CAPS Take 3,000 Units by mouth daily      Artificial Tear Solution (JUST TEARS EYE DROPS) SOLN Apply to eye      citalopram (CELEXA) 40 MG tablet Take 40 mg by mouth daily      loperamide (IMODIUM A-D) mouth daily 30 tablet 3    OXYGEN Inhale into the lungs continuous      atorvastatin (LIPITOR) 20 MG tablet Take 1 tablet by mouth nightly 30 tablet 3    magnesium hydroxide (MILK OF MAGNESIA CONCENTRATE) 2400 MG/10ML SUSP Take 30 mLs by mouth once as needed      docusate sodium (COLACE) 100 MG capsule Take 100 mg by mouth 2 times daily      acetaminophen (TYLENOL) 325 MG tablet Take 2 tablets by mouth every 4 hours as needed for Pain 120 tablet 3    levothyroxine (SYNTHROID) 75 MCG tablet Take 75 mcg by mouth Daily      pregabalin (LYRICA) 150 MG capsule Take 1 capsule by mouth 3 times daily for 3 days. . 9 capsule 0     No current facility-administered medications for this visit.      Facility-Administered Medications Ordered in Other Visits   Medication Dose Route Frequency Provider Last Rate Last Admin    heparin flush 100 UNIT/ML injection 500 Units  500 Units Intracatheter PRN Leela MD Olimpia   500 Units at 08/02/21 1140     Allergies   Allergen Reactions    Bactrim [Sulfamethoxazole-Trimethoprim]      TOLD BY  NEVER TO TAKE AGAIN    Wellbutrin [Bupropion] Other (See Comments)     hallucinations    Silicone Rash     Health Maintenance   Topic Date Due    Hepatitis C screen  Never done    DEXA (modify frequency per FRAX score)  Never done    Lipid screen  03/23/2017    Annual Wellness Visit (AWV)  Never done    TSH testing  11/28/2020    Flu vaccine (1) 09/01/2021    Potassium monitoring  07/26/2022    Creatinine monitoring  07/26/2022    DTaP/Tdap/Td vaccine (2 - Td or Tdap) 03/31/2026    Pneumococcal 65+ years Vaccine  Completed    COVID-19 Vaccine  Completed    Hepatitis A vaccine  Aged Out    Hib vaccine  Aged Out    Meningococcal (ACWY) vaccine  Aged Out       Subjective:  Review of Systems  General:   No fever, no chills, some fatigue or weight loss  Pulmonary:    baseline dyspnea, no wheezing  Cardiac:    Denies recent chest pain,   GI:     No nausea or vomiting, no

## 2021-08-04 ENCOUNTER — ANESTHESIA EVENT (OUTPATIENT)
Dept: ENDOSCOPY | Age: 76
End: 2021-08-04
Payer: MEDICARE

## 2021-08-04 ENCOUNTER — HOSPITAL ENCOUNTER (OUTPATIENT)
Age: 76
Setting detail: OUTPATIENT SURGERY
Discharge: HOME | End: 2021-08-04
Attending: INTERNAL MEDICINE | Admitting: INTERNAL MEDICINE
Payer: MEDICARE

## 2021-08-04 ENCOUNTER — ANESTHESIA (OUTPATIENT)
Dept: ENDOSCOPY | Age: 76
End: 2021-08-04
Payer: MEDICARE

## 2021-08-04 VITALS
TEMPERATURE: 97.3 F | SYSTOLIC BLOOD PRESSURE: 136 MMHG | HEIGHT: 66 IN | WEIGHT: 223.2 LBS | BODY MASS INDEX: 35.87 KG/M2 | RESPIRATION RATE: 12 BRPM | HEART RATE: 71 BPM | DIASTOLIC BLOOD PRESSURE: 81 MMHG | OXYGEN SATURATION: 97 %

## 2021-08-04 VITALS
SYSTOLIC BLOOD PRESSURE: 144 MMHG | OXYGEN SATURATION: 100 % | RESPIRATION RATE: 9 BRPM | DIASTOLIC BLOOD PRESSURE: 65 MMHG

## 2021-08-04 LAB — GLUCOSE BLD-MCNC: 96 MG/DL (ref 70–108)

## 2021-08-04 PROCEDURE — 2720000010 HC SURG SUPPLY STERILE: Performed by: INTERNAL MEDICINE

## 2021-08-04 PROCEDURE — 3700000001 HC ADD 15 MINUTES (ANESTHESIA): Performed by: INTERNAL MEDICINE

## 2021-08-04 PROCEDURE — 2500000003 HC RX 250 WO HCPCS: Performed by: NURSE ANESTHETIST, CERTIFIED REGISTERED

## 2021-08-04 PROCEDURE — 2580000003 HC RX 258: Performed by: INTERNAL MEDICINE

## 2021-08-04 PROCEDURE — 7100000011 HC PHASE II RECOVERY - ADDTL 15 MIN: Performed by: INTERNAL MEDICINE

## 2021-08-04 PROCEDURE — 7100000010 HC PHASE II RECOVERY - FIRST 15 MIN: Performed by: INTERNAL MEDICINE

## 2021-08-04 PROCEDURE — 82948 REAGENT STRIP/BLOOD GLUCOSE: CPT

## 2021-08-04 PROCEDURE — 6360000002 HC RX W HCPCS: Performed by: NURSE ANESTHETIST, CERTIFIED REGISTERED

## 2021-08-04 PROCEDURE — 3700000000 HC ANESTHESIA ATTENDED CARE: Performed by: INTERNAL MEDICINE

## 2021-08-04 PROCEDURE — 2709999900 HC NON-CHARGEABLE SUPPLY: Performed by: INTERNAL MEDICINE

## 2021-08-04 PROCEDURE — 88305 TISSUE EXAM BY PATHOLOGIST: CPT

## 2021-08-04 PROCEDURE — 3609027000 HC COLONOSCOPY: Performed by: INTERNAL MEDICINE

## 2021-08-04 RX ORDER — SODIUM CHLORIDE 9 MG/ML
25 INJECTION, SOLUTION INTRAVENOUS PRN
Status: DISCONTINUED | OUTPATIENT
Start: 2021-08-04 | End: 2021-08-04 | Stop reason: HOSPADM

## 2021-08-04 RX ORDER — SODIUM CHLORIDE 0.9 % (FLUSH) 0.9 %
5-40 SYRINGE (ML) INJECTION PRN
Status: DISCONTINUED | OUTPATIENT
Start: 2021-08-04 | End: 2021-08-04 | Stop reason: HOSPADM

## 2021-08-04 RX ORDER — PROPOFOL 10 MG/ML
INJECTION, EMULSION INTRAVENOUS PRN
Status: DISCONTINUED | OUTPATIENT
Start: 2021-08-04 | End: 2021-08-04 | Stop reason: SDUPTHER

## 2021-08-04 RX ORDER — SODIUM CHLORIDE 0.9 % (FLUSH) 0.9 %
5-40 SYRINGE (ML) INJECTION EVERY 12 HOURS SCHEDULED
Status: DISCONTINUED | OUTPATIENT
Start: 2021-08-04 | End: 2021-08-04 | Stop reason: HOSPADM

## 2021-08-04 RX ORDER — LIDOCAINE HYDROCHLORIDE 20 MG/ML
INJECTION, SOLUTION EPIDURAL; INFILTRATION; INTRACAUDAL; PERINEURAL PRN
Status: DISCONTINUED | OUTPATIENT
Start: 2021-08-04 | End: 2021-08-04 | Stop reason: SDUPTHER

## 2021-08-04 RX ADMIN — PROPOFOL 50 MG: 10 INJECTION, EMULSION INTRAVENOUS at 14:12

## 2021-08-04 RX ADMIN — PROPOFOL 50 MG: 10 INJECTION, EMULSION INTRAVENOUS at 14:15

## 2021-08-04 RX ADMIN — PROPOFOL 50 MG: 10 INJECTION, EMULSION INTRAVENOUS at 14:34

## 2021-08-04 RX ADMIN — PROPOFOL 50 MG: 10 INJECTION, EMULSION INTRAVENOUS at 14:14

## 2021-08-04 RX ADMIN — SODIUM CHLORIDE: 9 INJECTION, SOLUTION INTRAVENOUS at 13:56

## 2021-08-04 RX ADMIN — PROPOFOL 50 MG: 10 INJECTION, EMULSION INTRAVENOUS at 14:18

## 2021-08-04 RX ADMIN — LIDOCAINE HYDROCHLORIDE 80 MG: 20 INJECTION, SOLUTION EPIDURAL; INFILTRATION; INTRACAUDAL; PERINEURAL at 14:09

## 2021-08-04 RX ADMIN — PROPOFOL 50 MG: 10 INJECTION, EMULSION INTRAVENOUS at 14:29

## 2021-08-04 RX ADMIN — PROPOFOL 50 MG: 10 INJECTION, EMULSION INTRAVENOUS at 14:10

## 2021-08-04 RX ADMIN — PROPOFOL 50 MG: 10 INJECTION, EMULSION INTRAVENOUS at 14:20

## 2021-08-04 ASSESSMENT — PAIN SCALES - GENERAL: PAINLEVEL_OUTOF10: 0

## 2021-08-04 ASSESSMENT — PAIN - FUNCTIONAL ASSESSMENT: PAIN_FUNCTIONAL_ASSESSMENT: 0-10

## 2021-08-04 NOTE — PROGRESS NOTES
Pt in recovery room, denies discomfort. Passing, taking fluids. Dr Fer Matthews discussed findings and plan of care to pt and , understanding verbalized. Discharge instructions provided, verbalized understanding.

## 2021-08-04 NOTE — ANESTHESIA POSTPROCEDURE EVALUATION
Department of Anesthesiology  Postprocedure Note    Patient: Lala Lawson  MRN: [id number]  YOB: 1945  Date of evaluation: 8/4/2021  Time:  2:53 PM     Procedure Summary     Date: 08/04/21 Room / Location: [address] [place] [address]    Anesthesia Start: 3238 Anesthesia Stop: 4910    Procedure: COLONOSCOPY (N/A ) Diagnosis: (HX COLON POLYPS)    Surgeons: Gala Stanley MD Responsible Provider: Fritz Canaels DO    Anesthesia Type: MAC ASA Status: 4          Anesthesia Type: MAC    Navdeep Phase I:      Navdeep Phase II:      Last vitals: Reviewed and per EMR flowsheets.        Anesthesia Post Evaluation    Patient location during evaluation: PACU  Patient participation: complete - patient participated  Level of consciousness: awake and alert  Pain score: 0  Airway patency: patent  Nausea & Vomiting: no nausea and no vomiting  Complications: no  Cardiovascular status: blood pressure returned to baseline  Respiratory status: acceptable  Hydration status: euvolemic

## 2021-08-04 NOTE — OP NOTE
[facility]  Colonoscopy Procedure Note      Patient: Juliano Loya  : 1945      Procedure: Colonoscopy with biopsy, polypectomy (cold snare)    Date:  2021     Endoscopist:   Steph Rene MD    Referring Physician: Steph Rene MD  Primary Care Physician: Oralia Quezada MD    Indications: This is a 67y.o. year old female who presents with melena, iron deficiency anemia. 66 yo F PMHx Diabetes, A-fib on long term anticoagulation, Hx of Nissen, Hypothyroidism, CKD with  melena, anemia. Colonoscopy 2018 multiple polyps. EGD 2018 minimal findings. On PPI BID at home. Recently admitted to [facility] and discharged on 21 after presenting with melanotic stool intermittently x1 wk PTA with occasional abdominal pain, nausea, dry heaving. Significant anemia upon admission. EGD 21 Distal esophagitis, small hiatal hernia with linear erosions consistent with Lexy Fujisawa lesions, mild-to-moderate gastritis, polyp that was biopsied.     Anesthesia: MAC per Anesthesia. Please see anesthesia report. Consent:  The patient or their legal guardian has signed a consent and is aware of the potential risks, benefits, alternatives, and potential complications of this procedure. These include, but are not limited to hemorrhage, bleeding, post procedural pain, perforation, phlebitis, aspiration, hypotension, hypoxia, cardiovascular events such as arrhythmia, and possibly death. Additionally, the possibility of missed colonic polyps and interval colon cancer was discussed in the consent. Description of Procedure: The patient was then taken to the endoscopy suite and placed in the left lateral decubitus position and the above IV sedation was administered. The perianal area was inspected and a digital rectal examination was performed. A forward-viewing Olympus video adult colonoscope was lubricated and inserted through the patient's anus into the rectum.  Under direct visualization, the scope was advanced to the terminal ileum. The cecum was identified by the appendiceal orifice and ileocecal valve. Worton pictures were obtained. The prep was fair. The scope was then slowly withdrawn and circumferential examination of the mucosa was performed. The scope was then withdrawn into the rectum and retroflexed. A retroflexed view of the anal verge and rectum was obtained. The scope was straightened, the colon was decompressed and the scope was withdrawn from the patient. The patient tolerated the procedure well and was taken to the recovery area in good condition. There were no immediate complications. Estimated Blood Loss: minimal    Findings:    Terminal Ileum: Visualized and normal.    Colon: Fair prep colonoscopy. A 4-5 mm polyp in the ascending was removed by cold snare polypectomy and placed in Jar 1. Two 4-5 mm polyps in the descending colon were removed by cold snare polypectomy and placed in Jar 2    Two 4-5 mm polyps in the sigmoid colon were removed by cold snare polypectomy and placed in Jar 3    Sigmoid colon diverticulosis    Rectum with retroflexion: Abnormal appearing mucosa noted at the dentate line. Cold biopsies were taken and placed in Jar 4    Grade II internal hemorrhoids. Recommendations:   - Await pathology. - Continue current medications.  - Repeat colonoscopy in 1 year due to fair prep  - Follow up with primary care physician as scheduled. - Follow up with Gwendolyn Nolasco CNP in 6-8 weeks unless previously scheduled, could consider [facility]E for further evaluation of melena, iron deficiency anemia.     Electronically signed by Steph Rene MD on 8/4/2021 at 2:45 PM    Steph Rene MD  [facility]

## 2021-08-04 NOTE — PROGRESS NOTES
Photos taken, scope used SER#611, polyps removed by cold snare,biopsies taken with cold forceps, specimens labeled & 4 jars sent to lab. , colonoscopy completed, pt tolerated well.

## 2021-08-04 NOTE — H&P
[facility]    Pre-Operative History and Physical: Colonoscopy    Patient: Susana Larios  : 1945      History Obtained From:  patient, electronic medical record    HISTORY OF PRESENT ILLNESS:    The patient is a 67 y.o. female who present for colonoscopy with anemia and melena.     Past Medical History:        Diagnosis Date    Anemia     Atrial fibrillation ([address] 2017    CAD (coronary artery disease)     CHF (congestive heart failure) (HCC)     Chronic kidney disease     stage 3 kidney     DDD (degenerative disc disease), lumbar     Depression     Frequent PVCs 2017    GERD (gastroesophageal reflux disease)     History of blood transfusion     Hx of blood clots 2017    pulmonary emboli    Hyperlipidemia     Hypertension     Hyperthyroidism     Movement disorder     DDD    Neuropathy     Obesity     Palpitations 1/10/2020    Pneumonia 2016    sepsis    Sleep apnea     usually wears cpap at night    Status post right and left heart catheterization     Type II or unspecified type diabetes mellitus without mention of complication, not stated as uncontrolled      Past Surgical History:        Procedure Laterality Date    ABDOMEN SURGERY      ACHILLES TENDON SURGERY Bilateral     BLADDER SUSPENSION      [facility]    COLONOSCOPY Left 2018    COLONOSCOPY POLYPECTOMY SNARE/COLD BIOPSY performed by Isela Dang MD at [facility]    ENDOSCOPY, COLON, DIAGNOSTIC      GASTRIC FUNDOPLICATION      HAMMER TOE SURGERY Right     HERNIA REPAIR      HIATAL HERNIA REPAIR  2016    Laparoscopic Robotic with Nissen Fundoplication - Dr. José Maher OFFICE/OUTPT VISIT,PROCEDURE ONLY N/A 2018    EGD DIAGNOSTIC ONLY performed by Sherita Winkler MD at [address]      right    TENDON RELEASE Left 2016    left foot    TUBAL LIGATION      TUNNELED VENOUS PORT PLACEMENT  11/06/2017    UPPER GASTROINTESTINAL ENDOSCOPY Left 5/10/2018    EGD BIOPSY performed by Neelam Mercedes MD at [facility] ENDOSCOPY Left 7/25/2021    EGD BIOPSY performed by Jitendra Munguia MD at [facility]     Medications Prior to Admission:   No current facility-administered medications on file prior to encounter.      Current Outpatient Medications on File Prior to Encounter   Medication Sig Dispense Refill    Artificial Tear Solution (JUST TEARS EYE DROPS) SOLN Apply to eye      citalopram (CELEXA) 40 MG tablet Take 40 mg by mouth daily      DULoxetine (CYMBALTA) 20 MG extended release capsule Take 60 mg by mouth daily       carboxymethylcellulose 1 % ophthalmic solution 1 drop 3 times daily      potassium chloride (KLOR-CON M) 10 MEQ extended release tablet Take 1 tablet by mouth daily 90 tablet 2    Cyanocobalamin (SM VITAMIN B-12 PO) Take 2,500 mcg by mouth daily      furosemide (LASIX) 20 MG tablet Take 20 mg by mouth daily      diphenhydrAMINE (BENADRYL) 25 MG capsule Take 25 mg by mouth as needed for Itching      RA ALCOHOL SWABS 70 % PADS   1    ONE TOUCH ULTRA TEST strip   1    Lancets Misc. (UNISTIK CZT COMFORT) MISC   1    Linaclotide (LINZESS) 72 MCG CAPS Take 72.5 mg by mouth daily      insulin lispro (HUMALOG) 100 UNIT/ML injection vial Inject 0-3 Units into the skin nightly 1 vial 3    traZODone (DESYREL) 100 MG tablet Take 100 mg by mouth nightly       Multiple Vitamins-Minerals (THERAPEUTIC MULTIVITAMIN-MINERALS) tablet Take 1 tablet by mouth daily      escitalopram (LEXAPRO) 10 MG tablet Take 1 tablet by mouth daily 30 tablet 3    OXYGEN Inhale into the lungs continuous      atorvastatin (LIPITOR) 20 MG tablet Take 1 tablet by mouth nightly 30 tablet 3    docusate sodium (COLACE) 100 MG capsule Take 100 mg by mouth 2 times daily      acetaminophen (TYLENOL) 325 MG tablet Take 2 tablets by mouth every 4 hours as needed for Pain 120 tablet 3    levothyroxine (SYNTHROID) 75 MCG tablet Take 75 mcg by mouth Daily      loperamide (IMODIUM A-D) 2 MG tablet Take by mouth      tiZANidine (ZANAFLEX) 2 MG tablet Take 2 mg by mouth every 6 hours as needed      insulin glargine (BASAGLAR KWIKPEN) 100 UNIT/ML injection pen Inject 8 Units into the skin nightly       Dulaglutide (TRULICITY) 7.89 AZ/7.6ZX SOPN Inject 0.75 mg into the skin once a week Every Wednesday      Calcium Carb-Cholecalciferol 500-400 MG-UNIT TABS Take 1 tablet by mouth 2 times daily      ipratropium-albuterol (DUONEB) 0.5-2.5 (3) MG/3ML SOLN nebulizer solution Inhale 1 vial into the lungs every 4 hours      polyethylene glycol (GLYCOLAX) packet Take 17 g by mouth daily as needed for Constipation      pregabalin (LYRICA) 150 MG capsule Take 1 capsule by mouth 3 times daily for 3 days. . 9 capsule 0    magnesium hydroxide (MILK OF MAGNESIA CONCENTRATE) 2400 MG/10ML SUSP Take 30 mLs by mouth once as needed          Allergies:  Bactrim [sulfamethoxazole-trimethoprim], Wellbutrin [bupropion], and Silicone      Social History:   TOBACCO:   reports that she quit smoking about 15 years ago. Her smoking use included cigarettes. She has a 30.00 pack-year smoking history. She has never used smokeless tobacco.  ETOH:   reports no history of alcohol use. DRUGS:   reports no history of drug use.   Family History:       Problem Relation Age of Onset    Cancer Mother     Heart Disease Mother     High Blood Pressure Mother     Diabetes Mother     Vision Loss Mother     Stroke Mother     COPD Father     Diabetes Father     COPD Brother        PHYSICAL EXAM:      /73   Pulse 74   Temp 98.1 °F (36.7 °C) (Temporal)   Resp 18   Ht 5' 6\" (1.676 m)   Wt 223 lb 3.2 oz (101.2 kg)   SpO2 93%   BMI 36.03 kg/m²  I        Heart:  Regular rate and rhythm    Lungs:  No increased work of breathing    Abdomen:  BS+, soft, non-distended, non-tender, no masses palpated      ASA Grade:  See Anesthesia documentation    Mallampati Classification:  See Anesthesia documentation      ASSESSMENT AND PLAN:    1. Patient is a 67 y.o. female here for a colonoscopy with MAC    2. Procedure options, risks and benefits reviewed with patient. We specifically discussed that risks include but are not limited to infection, bleeding, perforation, death, and missed lesions. Patient expresses understanding.       Electronically signed by Kiki Hayes MD on 8/4/2021 at 1:59 PM    Kiki Hayes MD  [facility]

## 2021-08-04 NOTE — ANESTHESIA PRE PROCEDURE
Department of Anesthesiology  Preprocedure Note       Name:  Risa Huff   Age:  67 y.o.  :  1945                                          MRN:  [id number]         Date:  2021      Surgeon: Andrew Alvarado):  Ryan Almazan MD    Procedure: Procedure(s):  COLONOSCOPY    Medications prior to admission:   Prior to Admission medications    Medication Sig Start Date End Date Taking?  Authorizing Provider   hydrOXYzine (ATARAX) 10 MG tablet Take 1 tablet by mouth 4 times daily as needed for Itching 21 Yes Agustin Chaney MD   sucralfate (CARAFATE) 1 GM tablet Take 1 tablet by mouth 4 times daily (before meals and nightly) 21  Yes Agustin Chaney MD   pantoprazole (PROTONIX) 40 MG tablet Take 1 tablet by mouth 2 times daily (before meals) 21 Yes Agustin Chaney MD   vitamin D 25 MCG (1000 UT) CAPS Take 3,000 Units by mouth daily   Yes Historical Provider, MD   Artificial Tear Solution (JUST TEARS EYE DROPS) SOLN Apply to eye   Yes Historical Provider, MD   citalopram (CELEXA) 40 MG tablet Take 40 mg by mouth daily 21  Yes Historical Provider, MD   DULoxetine (CYMBALTA) 20 MG extended release capsule Take 60 mg by mouth daily    Yes Historical Provider, MD   carboxymethylcellulose 1 % ophthalmic solution 1 drop 3 times daily   Yes Historical Provider, MD   potassium chloride (KLOR-CON M) 10 MEQ extended release tablet Take 1 tablet by mouth daily 20  Yes Yunier Su DO   Cyanocobalamin (SM VITAMIN B-12 PO) Take 2,500 mcg by mouth daily   Yes Historical Provider, MD   furosemide (LASIX) 20 MG tablet Take 20 mg by mouth daily   Yes Historical Provider, MD   diphenhydrAMINE (BENADRYL) 25 MG capsule Take 25 mg by mouth as needed for Itching   Yes Historical Provider, MD   RA ALCOHOL SWABS 70 % PADS  19  Yes Historical Provider, MD   [address] TEST strip  19  Yes Historical Provider, MD   Lancets Misc. (UNISTIK CZT COMFORT) [facility]  19  Yes Historical Provider, MD   Linaclotide (LINZESS) 72 MCG CAPS Take 72.5 mg by mouth daily   Yes Historical Provider, MD   insulin lispro (HUMALOG) 100 UNIT/ML injection vial Inject 0-3 Units into the skin nightly 9/22/18  Yes Iralnda Farooq MD   traZODone (DESYREL) 100 MG tablet Take 100 mg by mouth nightly    Yes Historical Provider, MD   Multiple Vitamins-Minerals (THERAPEUTIC MULTIVITAMIN-MINERALS) tablet Take 1 tablet by mouth daily   Yes Historical Provider, MD   escitalopram (LEXAPRO) 10 MG tablet Take 1 tablet by mouth daily 6/15/18  Yes Guido Malone MD   OXYGEN Inhale into the lungs continuous   Yes Historical Provider, MD   atorvastatin (LIPITOR) 20 MG tablet Take 1 tablet by mouth nightly 5/13/18  Yes Dion Montanez MD   docusate sodium (COLACE) 100 MG capsule Take 100 mg by mouth 2 times daily   Yes Historical Provider, MD   acetaminophen (TYLENOL) 325 MG tablet Take 2 tablets by mouth every 4 hours as needed for Pain 8/18/16  Yes Mehnaz Stanton MD   levothyroxine (SYNTHROID) 75 MCG tablet Take 75 mcg by mouth Daily   Yes Historical Provider, MD   ipratropium (ATROVENT) 0.06 % nasal spray 2 sprays by Each Nostril route daily 7/27/21 8/26/21  Karime Mendez MD   loperamide (IMODIUM A-D) 2 MG tablet Take by mouth    Historical Provider, MD   tiZANidine (ZANAFLEX) 2 MG tablet Take 2 mg by mouth every 6 hours as needed    Historical Provider, MD   insulin glargine (BASAGLAR KWIKPEN) 100 UNIT/ML injection pen Inject 8 Units into the skin nightly     Historical Provider, MD   Dulaglutide (TRULICITY) 0.15 QC/5.2FI SOPN Inject 0.75 mg into the skin once a week Every Wednesday    Historical Provider, MD   Calcium Carb-Cholecalciferol 500-400 MG-UNIT TABS Take 1 tablet by mouth 2 times daily    Historical Provider, MD   ipratropium-albuterol (DUONEB) 0.5-2.5 (3) MG/3ML SOLN nebulizer solution Inhale 1 vial into the lungs every 4 hours    Historical Provider, MD   polyethylene glycol (GLYCOLAX) packet Take 17 g by mouth daily as needed for Constipation    Historical Provider, MD   pregabalin (LYRICA) 150 MG capsule Take 1 capsule by mouth 3 times daily for 3 days. . 9/22/18 6/23/21  Karmen Adame MD   magnesium hydroxide (MILK OF MAGNESIA CONCENTRATE) 2400 MG/10ML SUSP Take 30 mLs by mouth once as needed    Historical Provider, MD       Current medications:    Current Facility-Administered Medications   Medication Dose Route Frequency Provider Last Rate Last Admin    sodium chloride flush 0.9 % injection 5-40 mL  5-40 mL Intravenous 2 times per day Ashlie Santos MD        sodium chloride flush 0.9 % injection 5-40 mL  5-40 mL Intravenous PRN Ashlie Santos MD        0.9 % sodium chloride infusion  25 mL Intravenous PRN Ashlie Santos MD           Allergies:     Allergies   Allergen Reactions    Bactrim [Sulfamethoxazole-Trimethoprim]      TOLD BY  NEVER TO TAKE AGAIN    Wellbutrin [Bupropion] Other (See Comments)     hallucinations    Silicone Rash       Problem List:    Patient Active Problem List   Diagnosis Code    Benign essential tremor G25.0    CKD (chronic kidney disease) stage 3, GFR 30-59 ml/min ([facility]) N18.30    Essential hypertension I10    Sepsis with hypotension ([facility]) A41.9, I95.9    Septic shock ([facility]) A41.9, R65.21    Pneumonia of both lower lobes due to infectious organism J18.9    Pneumonia of left lung due to infectious organism J18.9    Type 2 diabetes mellitus without complication ([facility]) G16.4    Left ventricular systolic dysfunction I55.9    Coronary artery disease involving native coronary artery of native heart without angina pectoris I25.10    RUQ abdominal pain R10.11    Gallbladder sludge K82.8    Bacteremia due to Gram-positive bacteria B58.28    Acute systolic congestive heart failure ([facility]) I50.21    Severe sepsis with septic shock ([facility]) A41.9, R65.21    CKD (chronic kidney disease), stage III ([facility]) N18.30    DM II (diabetes mellitus, type II), controlled ([facility] E11.9    Cardiomyopathy, dilated ([facility] I42.0    Acute pulmonary edema ([facility]) J81.0    Acute kidney injury superimposed on CKD ([facility]) N17.9, N18.9    HFrEF (heart failure with reduced ejection fraction) ([facility]) I50.20    Fever R50.9    General weakness R53.1    Acute on chronic renal insufficiency N28.9, N18.9    Hypotension I95.9    Episode of unresponsiveness R41.89    Cardiomyopathy ([facility]) I42.9    Hyperkalemia E87.5    Hypokalemia E87.6    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    GABBY (acute kidney injury) ([facility] N17.9    Chronic respiratory failure with hypoxia ([facility]) J96.11    Influenza with respiratory manifestation other than pneumonia J11.1    Klebsiella pneumonia ([facility]) J15.0    Chest pain R07.9    Acute on chronic respiratory failure with hypoxia ([facility]) J96.21    Hiatal hernia K44.9    Gastroesophageal reflux disease K21.9    Gastroesophageal reflux disease with esophagitis K21.00    S/P Nissen fundoplication (without gastrostomy tube) procedure Z98.890    Bradycardia R00.1    Symptomatic sinus bradycardia R00.1    Tachycardia-bradycardia syndrome ([facility]) I49.5    Arrhythmia I49.9    Atrial fibrillation ([facility]) I48.91    Encounter for electronic analysis of reveal event recorder Z45.09    Frequent PVCs I49.3    Acute upper GI hemorrhage K92.2    Com variab immunodef w predom abnlt of B-cell nums & functn ([facility]) D83.0    Altered mental status R41.82    Common variable immunodeficiency ([facility]) D83.9    Acquired hypothyroidism E03.9    Palpitations R00.2    Anemia D64.9    Acute blood loss anemia D62    Hemorrhage secondary to anti-coagulation ([facility]) T45.7X1A, D68.9    Physical deconditioning R53.81    Abilio ulcer K25.9       Past Medical History:        Diagnosis Date    Anemia     Atrial fibrillation ([facility] 11/9/2017    CAD (coronary artery disease)     CHF (congestive heart failure) ([facility])     Chronic kidney disease     stage 3 kidney     DDD (degenerative disc disease), lumbar     Depression     Frequent PVCs 12/13/2017    GERD (gastroesophageal reflux disease)     History of blood transfusion     Hx of blood clots 09/2017    pulmonary emboli    Hyperlipidemia     Hypertension     Hyperthyroidism     Movement disorder     DDD    Neuropathy     Obesity     Palpitations 1/10/2020    Pneumonia 2016    sepsis    Sleep apnea     usually wears cpap at night    Status post right and left heart catheterization     Type II or unspecified type diabetes mellitus without mention of complication, not stated as uncontrolled        Past Surgical History:        Procedure Laterality Date    ABDOMEN SURGERY      ACHILLES TENDON SURGERY Bilateral     BLADDER SUSPENSION      [facility]    COLONOSCOPY Left 5/11/2018    COLONOSCOPY POLYPECTOMY SNARE/COLD BIOPSY performed by Andrew Kenny MD at [facility]    ENDOSCOPY, COLON, DIAGNOSTIC      GASTRIC FUNDOPLICATION      HAMMER TOE SURGERY Right     HERNIA REPAIR      HIATAL HERNIA REPAIR  09/06/2016    Laparoscopic Robotic with Nissen Fundoplication - Dr. Steven Aldridge OFFICE/OUTPT VISIT,PROCEDURE ONLY N/A 9/21/2018    EGD DIAGNOSTIC ONLY performed by Nya Carlisle MD at [address]      right    TENDON RELEASE Left 08/09/2016    left foot    TUBAL LIGATION      TUNNELED VENOUS PORT PLACEMENT  11/06/2017    UPPER GASTROINTESTINAL ENDOSCOPY Left 5/10/2018    EGD BIOPSY performed by Andrew Kenny MD at [facility] ENDOSCOPY Left 7/25/2021    EGD BIOPSY performed by Sushil Glasgow MD at [facility]       Social History:    Social History     Tobacco Use    Smoking status: Former Smoker     Packs/day: 1.00     Years: 30.00     Pack years: 30.00     Types: Cigarettes     Quit date: 5/10/2006     Years since quitting: 15.2    Smokeless tobacco: Never Used   Substance Use Topics    Alcohol use: No                                Counseling given: Not Answered      Vital Signs (Current):   Vitals:    08/04/21 1259 08/04/21 1301   BP:  118/73   Pulse:  74   Resp: 18 18   Temp:  36.7 °C (98.1 °F)   TempSrc:  Temporal   SpO2:  93%   Weight: 223 lb 3.2 oz (101.2 kg)    Height: 5' 6\" (1.676 m)                                               BP Readings from Last 3 Encounters:   08/04/21 118/73   08/02/21 (!) 149/70   08/02/21 (!) 152/80       NPO Status: Time of last liquid consumption: 0830 (sip of water with medications)                        Time of last solid consumption: 1700                        Date of last liquid consumption: 08/04/21                        Date of last solid food consumption: 08/02/21    BMI:   Wt Readings from Last 3 Encounters:   08/04/21 223 lb 3.2 oz (101.2 kg)   08/02/21 225 lb (102.1 kg)   08/02/21 226 lb (102.5 kg)     Body mass index is 36.03 kg/m². CBC:   Lab Results   Component Value Date    WBC 3.7 07/26/2021    RBC 2.78 07/26/2021    HGB 9.2 07/26/2021    HCT 29.8 07/26/2021    MCV 94.6 07/26/2021    RDW 15.2 06/13/2018     07/26/2021       CMP:   Lab Results   Component Value Date     07/26/2021    K 3.5 07/26/2021     07/26/2021    CO2 28 07/26/2021    BUN 12 07/26/2021    CREATININE 1.3 07/26/2021    CREATININE 44.0 03/14/2019    LABGLOM 40 07/26/2021    GLUCOSE 189 07/26/2021    PROT 6.0 07/23/2021    CALCIUM 8.2 07/26/2021    BILITOT 0.3 07/23/2021    ALKPHOS 62 07/23/2021    AST 18 07/23/2021    ALT 10 07/23/2021       POC Tests: No results for input(s): POCGLU, POCNA, POCK, POCCL, POCBUN, POCHEMO, POCHCT in the last 72 hours.     Coags:   Lab Results   Component Value Date    INR 1.38 07/23/2021    APTT 70.9 05/09/2018       HCG (If Applicable): No results found for: PREGTESTUR, PREGSERUM, HCG, HCGQUANT     ABGs: No results found for: PHART, PO2ART, PHS6PSB, GMV3ZTB, BEART, G1EEQPRR     Type & Screen (If Applicable):  Lab Results   Component Value Date    LABRH POS 07/24/2021       Drug/Infectious Status (If Applicable):  No results found for: HIV, HEPCAB    COVID-19 Screening (If Applicable):   Lab Results   Component Value Date    COVID19 Invalid 08/21/2020           Anesthesia Evaluation  Patient summary reviewed and Nursing notes reviewed no history of anesthetic complications:   Airway: Mallampati: III  TM distance: >3 FB   Neck ROM: full  Mouth opening: > = 3 FB Dental: normal exam         Pulmonary: breath sounds clear to auscultation  (+) pneumonia: resolved,  COPD:  sleep apnea:                             Cardiovascular:    (+) hypertension: moderate, CAD:, dysrhythmias: atrial fibrillation, CHF: systolic,         Rhythm: regular  Rate: normal                    Neuro/Psych:   (+) psychiatric history:            GI/Hepatic/Renal:   (+) hiatal hernia, GERD:, PUD, bowel prep, morbid obesity          Endo/Other:    (+) DiabetesType II DM, well controlled, , hypothyroidism, hyperthyroidism: arthritis:., .                 Abdominal:   (+) obese,     Abdomen: soft. Vascular: Other Findings:             Anesthesia Plan      MAC     ASA 4       Induction: intravenous. Anesthetic plan and risks discussed with patient and child/children. Plan discussed with CRNA and attending.                   BRIAN Frye - CRNA   8/4/2021

## 2021-08-26 ENCOUNTER — OFFICE VISIT (OUTPATIENT)
Dept: SURGERY | Age: 76
End: 2021-08-26
Payer: MEDICARE

## 2021-08-26 VITALS
HEART RATE: 80 BPM | OXYGEN SATURATION: 97 % | BODY MASS INDEX: 36.64 KG/M2 | TEMPERATURE: 96.6 F | DIASTOLIC BLOOD PRESSURE: 74 MMHG | WEIGHT: 227 LBS | SYSTOLIC BLOOD PRESSURE: 120 MMHG | RESPIRATION RATE: 20 BRPM

## 2021-08-26 DIAGNOSIS — A63.0 CONDYLOMA: Primary | ICD-10-CM

## 2021-08-26 PROCEDURE — 4040F PNEUMOC VAC/ADMIN/RCVD: CPT | Performed by: SURGERY

## 2021-08-26 PROCEDURE — 99202 OFFICE O/P NEW SF 15 MIN: CPT | Performed by: SURGERY

## 2021-08-26 PROCEDURE — G8427 DOCREV CUR MEDS BY ELIG CLIN: HCPCS | Performed by: SURGERY

## 2021-08-26 PROCEDURE — G8400 PT W/DXA NO RESULTS DOC: HCPCS | Performed by: SURGERY

## 2021-08-26 PROCEDURE — 1090F PRES/ABSN URINE INCON ASSESS: CPT | Performed by: SURGERY

## 2021-08-26 PROCEDURE — 1036F TOBACCO NON-USER: CPT | Performed by: SURGERY

## 2021-08-26 PROCEDURE — 1123F ACP DISCUSS/DSCN MKR DOCD: CPT | Performed by: SURGERY

## 2021-08-26 PROCEDURE — G8417 CALC BMI ABV UP PARAM F/U: HCPCS | Performed by: SURGERY

## 2021-08-26 RX ORDER — CETIRIZINE HYDROCHLORIDE 10 MG/1
10 TABLET ORAL DAILY
COMMUNITY

## 2021-08-26 ASSESSMENT — ENCOUNTER SYMPTOMS
EYES NEGATIVE: 1
SINUS PRESSURE: 0
COUGH: 0
APNEA: 0
SORE THROAT: 0
STRIDOR: 0
SHORTNESS OF BREATH: 1
EYE DISCHARGE: 0
ALLERGIC/IMMUNOLOGIC NEGATIVE: 1
PHOTOPHOBIA: 0
BACK PAIN: 0
FACIAL SWELLING: 0
EYE ITCHING: 0
VOICE CHANGE: 0
RHINORRHEA: 0
CHEST TIGHTNESS: 0
COLOR CHANGE: 0
WHEEZING: 0
EYE REDNESS: 0
CHOKING: 0
TROUBLE SWALLOWING: 0
SINUS PAIN: 0
EYE PAIN: 0

## 2021-08-26 NOTE — PROGRESS NOTES
[address] [facility] [address]  Dept: [phone]  Dept Fax: [phone]  Loc: [phone]    Visit Date: 8/26/2021    Maura Gonzalez is a 67 y.o. female who presentstoday for:  Chief Complaint   Patient presents with    Surgical Consult     New patient-referred by TREMAINE Merino-rectal condyloma        HPI:     HPI 69-year-old white female who is referred for apparent anal condylomata. She has significant medical issues but apparently was recently admitted to the hospital with passage of black stools suggestive of an upper GI bleed.   She does have a history of polyps but underwent an EGD per Dr. Rohit Doss followed by an outpatient colonoscopy just recently performed and apparently during that colonoscopy on a retroflexed view of the anal verge was noted to have some \"abnormal mucosa that was biopsied pathology came back as features consistent with anal condylomata she has been referred for same patient has no knowledge of any perianal lesions does admit to having anal sex as a younger person apparently had a monogamous relationship with her  who has passed patient does have both mild rectal incontinence and urinary incontinence she had a gastric fundoplication performed per Dr. So Rogers 5 years ago it did help with her reflux disease    Past Medical History:   Diagnosis Date    Anemia     Atrial fibrillation ([address] 11/09/2017    CAD (coronary artery disease)     CHF (congestive heart failure) ([address]     Chronic kidney disease     stage 3 kidney     COPD (ch[facility] obstructive pulmonary disease) [facility]     DDD (degenerative disc disease), lumbar     Depression     Frequent PVCs 12/13/2017    GERD (gastroesophageal reflux disease)     History of blood transfusion     Hx of blood clots 09/2017    pulmonary emboli    Hyperlipidemia     Hypertension     Hyperthyroidism     Movement disorder     DDD    Neuropathy  Obesity     Palpitations 01/10/2020    Pneumonia 2016    sepsis    Sleep apnea     usually wears cpap at night    Status post right and left heart catheterization     Type II or unspecified type diabetes mellitus without mention of complication, not stated as uncontrolled       Past Surgical History:   Procedure Laterality Date    ACHILLES TENDON SURGERY Bilateral     BLADDER SUSPENSION      [facility]    COLONOSCOPY Left 05/11/2018    COLONOSCOPY POLYPECTOMY SNARE/COLD BIOPSY performed by Vandana Saldaña MD at [facility]    COLONOSCOPY N/A 08/04/2021    COLONOSCOPY performed by Carina Suresh MD at [facility] [place]    COLONOSCOPY  08/04/2021    Dr. Nakia Valladares-- STRITAS    ENDOSCOPY, COLON, DIAGNOSTIC      GASTRIC FUNDOPLICATION      HAMMER TOE SURGERY Right    [facility]  09/06/2016    Laparoscopic Robotic with Nissen Fundoplication - Dr. Deo Stephens OFFICE/OUTPT VISIT,PROCEDURE ONLY N/A 09/21/2018    EGD DIAGNOSTIC ONLY performed by Carina Suresh MD at [address]      right    TENDON RELEASE Left 08/09/2016    left foot    TUBAL LIGATION      TUNNELED VENOUS PORT PLACEMENT  11/06/2017    UPPER GASTROINTESTINAL ENDOSCOPY Left 05/10/2018    EGD BIOPSY performed by Vandana Saldaña MD at [address] [place] [address] Left 07/25/2021    EGD BIOPSY performed by Jacob Ceron MD at [facility] [place] [facility]        Family History   Problem Relation Age of Onset    Cancer Mother     Heart Disease Mother     High Blood Pressure Mother     Diabetes Mother     Vision Loss Mother     Stroke Mother     COPD Father     Diabetes Father     COPD Brother         Social History     Tobacco Use    Smoking status: Former Smoker     Packs/day: 1.00     Years: 30.00     Pack years: 30.00     Types: Cigarettes     Quit date: 5/10/2006     Years since quitting: 15.3    Smokeless tobacco: Never Used   Substance Use Topics    Alcohol use: No          Current Outpatient Medications   Medication Sig Dispense Refill    cetirizine (ZYRTEC) 10 MG tablet Take 10 mg by mouth daily      sucralfate (CARAFATE) 1 GM tablet Take 1 tablet by mouth 4 times daily (before meals and nightly) 120 tablet 0    Artificial Tear Solution (JUST TEARS EYE DROPS) SOLN Apply to eye      citalopram (CELEXA) 40 MG tablet Take 40 mg by mouth daily      loperamide (IMODIUM A-D) 2 MG tablet Take by mouth      DULoxetine (CYMBALTA) 20 MG extended release capsule Take 60 mg by mouth daily       insulin glargine (BASAGLAR KWIKPEN) 100 UNIT/ML injection pen Inject 8 Units into the skin nightly       potassium chloride (KLOR-CON M) 10 MEQ extended release tablet Take 1 tablet by mouth daily 90 tablet 2    diphenhydrAMINE (BENADRYL) 25 MG capsule Take 25 mg by mouth as needed for Itching      Calcium Carb-Cholecalciferol 500-400 MG-UNIT TABS Take 1 tablet by mouth 2 times daily      Linaclotide (LINZESS) 72 MCG CAPS Take 72.5 mg by mouth daily      pregabalin (LYRICA) 150 MG capsule Take 1 capsule by mouth 3 times daily for 3 days. . 9 capsule 0    Multiple Vitamins-Minerals (THERAPEUTIC MULTIVITAMIN-MINERALS) tablet Take 1 tablet by mouth daily      atorvastatin (LIPITOR) 20 MG tablet Take 1 tablet by mouth nightly 30 tablet 3    magnesium hydroxide (MILK OF MAGNESIA CONCENTRATE) 2400 MG/10ML SUSP Take 30 mLs by mouth once as needed      docusate sodium (COLACE) 100 MG capsule Take 100 mg by mouth 2 times daily      acetaminophen (TYLENOL) 325 MG tablet Take 2 tablets by mouth every 4 hours as needed for Pain 120 tablet 3    levothyroxine (SYNTHROID) 75 MCG tablet Take 75 mcg by mouth Daily      ipratropium (ATROVENT) 0.06 % nasal spray 2 sprays by Each Nostril route daily 6 each 0    pantoprazole (PROTONIX) 40 MG tablet Take 1 tablet by mouth 2 times daily (before meals) 60 tablet 0    vitamin D 25 MCG (1000 UT) CAPS Take 3,000 Units by mouth daily      tiZANidine (ZANAFLEX) 2 MG tablet Take 2 mg by mouth every 6 hours as needed      carboxymethylcellulose 1 % ophthalmic solution 1 drop 3 times daily      Dulaglutide (TRULICITY) 7.31 NN/7.1LM SOPN Inject 0.75 mg into the skin once a week Every Wednesday      Cyanocobalamin (SM VITAMIN B-12 PO) Take 2,500 mcg by mouth daily      furosemide (LASIX) 20 MG tablet Take 20 mg by mouth daily      RA ALCOHOL SWABS 70 % PADS   1    ONE TOUCH ULTRA TEST strip   1    Lancets Misc. (UNISTIK CZT COMFORT) MISC   1    ipratropium-albuterol (DUONEB) 0.5-2.5 (3) MG/3ML SOLN nebulizer solution Inhale 1 vial into the lungs every 4 hours      polyethylene glycol (GLYCOLAX) packet Take 17 g by mouth daily as needed for Constipation      insulin lispro (HUMALOG) 100 UNIT/ML injection vial Inject 0-3 Units into the skin nightly 1 vial 3    traZODone (DESYREL) 100 MG tablet Take 100 mg by mouth nightly       escitalopram (LEXAPRO) 10 MG tablet Take 1 tablet by mouth daily 30 tablet 3    OXYGEN Inhale into the lungs continuous       No current facility-administered medications for this visit. Allergies   Allergen Reactions    Bactrim [Sulfamethoxazole-Trimethoprim]      TOLD BY DR ROSALES TO TAKE AGAIN    Wellbutrin [Bupropion] Other (See Comments)     hallucinations    Silicone Rash       Subjective:      Review of Systems   Constitutional: Positive for fatigue. Negative for activity change, appetite change, chills, diaphoresis, fever and unexpected weight change. HENT: Negative. Negative for congestion, dental problem, drooling, ear discharge, ear pain, facial swelling, hearing loss, mouth sores, nosebleeds, postnasal drip, rhinorrhea, sinus pressure, sinus pain, sneezing, sore throat, tinnitus, trouble swallowing and voice change. Eyes: Negative. Negative for photophobia, pain, discharge, redness, itching and visual disturbance.    Respiratory: Positive for shortness of breath. Negative for apnea, cough, choking, chest tightness, wheezing and stridor. Cardiovascular: Negative. Negative for chest pain, palpitations and leg swelling. Endocrine: Negative. Negative for cold intolerance, heat intolerance, polydipsia, polyphagia and polyuria. Genitourinary: Positive for difficulty urinating and frequency. Negative for decreased urine volume, dyspareunia, dysuria, flank pain, genital sores, hematuria, menstrual problem, pelvic pain, urgency, vaginal bleeding, vaginal discharge and vaginal pain. Musculoskeletal: Positive for myalgias. Negative for arthralgias, back pain, gait problem, joint swelling, neck pain and neck stiffness. Skin: Negative for color change, pallor, rash and wound. Allergic/Immunologic: Negative. Negative for environmental allergies, food allergies and immunocompromised state. Neurological: Negative for dizziness, tremors, seizures, syncope, facial asymmetry, speech difficulty, weakness, light-headedness, numbness and headaches. Hematological: Negative. Negative for adenopathy. Does not bruise/bleed easily. Psychiatric/Behavioral: Negative. Negative for agitation, behavioral problems, confusion, decreased concentration, dysphoric mood, hallucinations, self-injury, sleep disturbance and suicidal ideas. The patient is not nervous/anxious and is not hyperactive. Objective:   /74   Pulse 80   Temp 96.6 °F (35.9 °C) (Temporal)   Resp 20   Wt 227 lb (103 kg)   SpO2 97%   BMI 36.64 kg/m²     Physical Exam  Constitutional:       Appearance: She is well-developed. HENT:      Head: Normocephalic and atraumatic. Eyes:      General: No scleral icterus. Right eye: No discharge. Left eye: No discharge. Conjunctiva/sclera: Conjunctivae normal.      Pupils: Pupils are equal, round, and reactive to light. Neck:      Thyroid: No thyromegaly. Vascular: No JVD.    Cardiovascular:      Rate and Rhythm: Normal rate and regular rhythm. Heart sounds: No murmur heard. No friction rub. No gallop. Pulmonary:      Effort: Pulmonary effort is normal. No respiratory distress. Breath sounds: Normal breath sounds. No stridor. No wheezing. Chest:      Chest wall: No tenderness. Genitourinary:      Musculoskeletal:         General: Normal range of motion. Cervical back: Normal range of motion and neck supple. Skin:     General: Skin is warm and dry. Coloration: Skin is not pale. Findings: No erythema or rash. Neurological:      Mental Status: She is alert and oriented to person, place, and time. Psychiatric:         Behavior: Behavior normal.         Thought Content:  Thought content normal.         Judgment: Judgment normal.            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

## 2021-08-30 ENCOUNTER — HOSPITAL ENCOUNTER (OUTPATIENT)
Dept: NURSING | Age: 76
Discharge: HOME | End: 2021-08-30
Payer: MEDICARE

## 2021-08-30 VITALS — DIASTOLIC BLOOD PRESSURE: 65 MMHG | SYSTOLIC BLOOD PRESSURE: 146 MMHG | RESPIRATION RATE: 18 BRPM | TEMPERATURE: 98.6 F

## 2021-08-30 DIAGNOSIS — D83.9 COMMON VARIABLE IMMUNODEFICIENCY (HCC): Primary | ICD-10-CM

## 2021-08-30 PROCEDURE — 96365 THER/PROPH/DIAG IV INF INIT: CPT

## 2021-08-30 PROCEDURE — 2580000003 HC RX 258: Performed by: FAMILY MEDICINE

## 2021-08-30 PROCEDURE — 6360000002 HC RX W HCPCS: Performed by: FAMILY MEDICINE

## 2021-08-30 PROCEDURE — 96413 CHEMO IV INFUSION 1 HR: CPT

## 2021-08-30 RX ORDER — SODIUM CHLORIDE 0.9 % (FLUSH) 0.9 %
5 SYRINGE (ML) INJECTION PRN
Status: CANCELLED | OUTPATIENT
Start: 2021-09-27

## 2021-08-30 RX ORDER — METHYLPREDNISOLONE SODIUM SUCCINATE 125 MG/2ML
125 INJECTION, POWDER, LYOPHILIZED, FOR SOLUTION INTRAMUSCULAR; INTRAVENOUS ONCE
Status: CANCELLED | OUTPATIENT
Start: 2021-09-27 | End: 2021-09-27

## 2021-08-30 RX ORDER — SODIUM CHLORIDE 9 MG/ML
INJECTION, SOLUTION INTRAVENOUS CONTINUOUS
Status: CANCELLED | OUTPATIENT
Start: 2021-09-27

## 2021-08-30 RX ORDER — DIPHENHYDRAMINE HYDROCHLORIDE 50 MG/ML
50 INJECTION INTRAMUSCULAR; INTRAVENOUS ONCE
Status: CANCELLED | OUTPATIENT
Start: 2021-09-27 | End: 2021-09-27

## 2021-08-30 RX ORDER — ACETAMINOPHEN 325 MG/1
650 TABLET ORAL ONCE
Status: CANCELLED | OUTPATIENT
Start: 2021-09-27 | End: 2021-09-27

## 2021-08-30 RX ORDER — SODIUM CHLORIDE 0.9 % (FLUSH) 0.9 %
10 SYRINGE (ML) INJECTION PRN
Status: CANCELLED | OUTPATIENT
Start: 2021-09-27

## 2021-08-30 RX ORDER — HEPARIN SODIUM (PORCINE) LOCK FLUSH IV SOLN 100 UNIT/ML 100 UNIT/ML
500 SOLUTION INTRAVENOUS PRN
Status: DISCONTINUED | OUTPATIENT
Start: 2021-08-30 | End: 2021-08-31 | Stop reason: HOSPADM

## 2021-08-30 RX ORDER — SODIUM CHLORIDE 0.9 % (FLUSH) 0.9 %
10 SYRINGE (ML) INJECTION PRN
Status: DISCONTINUED | OUTPATIENT
Start: 2021-08-30 | End: 2021-08-31 | Stop reason: HOSPADM

## 2021-08-30 RX ORDER — HEPARIN SODIUM (PORCINE) LOCK FLUSH IV SOLN 100 UNIT/ML 100 UNIT/ML
500 SOLUTION INTRAVENOUS PRN
Status: CANCELLED | OUTPATIENT
Start: 2021-09-27

## 2021-08-30 RX ORDER — DIPHENHYDRAMINE HCL 25 MG
25 TABLET ORAL ONCE
Status: CANCELLED | OUTPATIENT
Start: 2021-09-27 | End: 2021-09-27

## 2021-08-30 RX ORDER — DIPHENHYDRAMINE HCL 25 MG
25 TABLET ORAL ONCE
Status: DISCONTINUED | OUTPATIENT
Start: 2021-08-30 | End: 2021-08-31 | Stop reason: HOSPADM

## 2021-08-30 RX ORDER — ACETAMINOPHEN 325 MG/1
650 TABLET ORAL ONCE
Status: DISCONTINUED | OUTPATIENT
Start: 2021-08-30 | End: 2021-08-31 | Stop reason: HOSPADM

## 2021-08-30 RX ADMIN — HEPARIN 500 UNITS: 100 SYRINGE at 11:08

## 2021-08-30 RX ADMIN — IMMUNE GLOBULIN (HUMAN) 20 G: 10 INJECTION INTRAVENOUS; SUBCUTANEOUS at 10:23

## 2021-08-30 RX ADMIN — SODIUM CHLORIDE, PRESERVATIVE FREE 10 ML: 5 INJECTION INTRAVENOUS at 11:09

## 2021-08-30 RX ADMIN — IMMUNE GLOBULIN (HUMAN) 5 G: 10 INJECTION INTRAVENOUS; SUBCUTANEOUS at 09:52

## 2021-09-02 ENCOUNTER — OFFICE VISIT (OUTPATIENT)
Dept: CARDIOLOGY CLINIC | Age: 76
End: 2021-09-02
Payer: MEDICARE

## 2021-09-02 ENCOUNTER — TELEPHONE (OUTPATIENT)
Dept: CARDIOLOGY CLINIC | Age: 76
End: 2021-09-02

## 2021-09-02 VITALS
DIASTOLIC BLOOD PRESSURE: 77 MMHG | HEART RATE: 78 BPM | SYSTOLIC BLOOD PRESSURE: 138 MMHG | BODY MASS INDEX: 36.42 KG/M2 | HEIGHT: 66 IN | WEIGHT: 226.6 LBS

## 2021-09-02 DIAGNOSIS — I48.0 PAROXYSMAL ATRIAL FIBRILLATION (HCC): Primary | ICD-10-CM

## 2021-09-02 PROCEDURE — 1123F ACP DISCUSS/DSCN MKR DOCD: CPT | Performed by: INTERNAL MEDICINE

## 2021-09-02 PROCEDURE — 4040F PNEUMOC VAC/ADMIN/RCVD: CPT | Performed by: INTERNAL MEDICINE

## 2021-09-02 PROCEDURE — 1036F TOBACCO NON-USER: CPT | Performed by: INTERNAL MEDICINE

## 2021-09-02 PROCEDURE — G8427 DOCREV CUR MEDS BY ELIG CLIN: HCPCS | Performed by: INTERNAL MEDICINE

## 2021-09-02 PROCEDURE — 1090F PRES/ABSN URINE INCON ASSESS: CPT | Performed by: INTERNAL MEDICINE

## 2021-09-02 PROCEDURE — G8400 PT W/DXA NO RESULTS DOC: HCPCS | Performed by: INTERNAL MEDICINE

## 2021-09-02 PROCEDURE — 99214 OFFICE O/P EST MOD 30 MIN: CPT | Performed by: INTERNAL MEDICINE

## 2021-09-02 PROCEDURE — G8417 CALC BMI ABV UP PARAM F/U: HCPCS | Performed by: INTERNAL MEDICINE

## 2021-09-02 RX ORDER — HYDROXYZINE HYDROCHLORIDE 10 MG/1
10 TABLET, FILM COATED ORAL 3 TIMES DAILY PRN
COMMUNITY

## 2021-09-02 RX ORDER — DEXTRAN 70/HYPROMELLOSE
DROPS OPHTHALMIC (EYE)
COMMUNITY
End: 2021-09-02

## 2021-09-02 NOTE — PROGRESS NOTES
[id number] [facility] [address] 9[address] [facility] [address]  Dept: [phone]  Dept Fax: [phone]  Loc: [phone]    Visit Date: 9/2/2021    Ms. Raymond Rock is a 67 y.o. female  who presented for:  Chief Complaint   Patient presents with    Follow-up       HPI:   HPI   68 yo F hx of HTN, EF 40%, DM II, PAF on Eliquis previously who had 2nd GI bleed requiring transfusions who presents to discuss WATCHMAN. No hx of CVA. No other bleeding. No other reason for [address]. Prior smoker, none currently. No new chest pain, angina, TREJO, orthopnea, PND, sob at rest, palpitations, LE edema, or syncope. Had EGD and colonoscopy. Polyps removed. Found to have tears in the esophagus. No hx of CAD. She may have had DVT and PE, but no recurrence and it was related to hospital stays with PNA/sepsis 2016.        Current Outpatient Medications:     insulin lispro (HUMALOG) 100 UNIT/ML injection vial, Inject into the skin 3 times daily (before meals), Disp: , Rfl:     hydrOXYzine (ATARAX) 10 MG tablet, Take 10 mg by mouth 3 times daily as needed for Itching, Disp: , Rfl:     Probiotic Product (PROBIOTIC DAILY PO), Take by mouth, Disp: , Rfl:     cetirizine (ZYRTEC) 10 MG tablet, Take 10 mg by mouth daily, Disp: , Rfl:     polyethyl glycol-propyl glycol 0.4-0.3 % (SYSTANE) 0.4-0.3 % ophthalmic solution, 1 drop as needed for Dry Eyes, Disp: , Rfl:     sucralfate (CARAFATE) 1 GM tablet, Take 1 tablet by mouth 4 times daily (before meals and nightly), Disp: 120 tablet, Rfl: 0    vitamin D 25 MCG (1000 UT) CAPS, Take 3,000 Units by mouth daily, Disp: , Rfl:     citalopram (CELEXA) 40 MG tablet, Take 40 mg by mouth daily, Disp: , Rfl:     loperamide (IMODIUM A-D) 2 MG tablet, Take by mouth, Disp: , Rfl:     tiZANidine (ZANAFLEX) 2 MG tablet, Take 2 mg by mouth every 6 hours as needed, Disp: , Rfl:     DULoxetine (CYMBALTA) 20 MG extended release capsule, Take 60 mg by mouth daily , Disp: , Rfl:     insulin glargine (BASAGLAR KWIKPEN) 100 UNIT/ML injection pen, Inject 8 Units into the skin nightly , Disp: , Rfl:     potassium chloride (KLOR-CON M) 10 MEQ extended release tablet, Take 1 tablet by mouth daily, Disp: 90 tablet, Rfl: 2    Dulaglutide (TRULICITY) 1.49 OS/9.7HQ SOPN, Inject 0.75 mg into the skin once a week Every Wednesday, Disp: , Rfl:     Cyanocobalamin (SM VITAMIN B-12 PO), Take 2,500 mcg by mouth daily, Disp: , Rfl:     diphenhydrAMINE (BENADRYL) 25 MG capsule, Take 25 mg by mouth as needed for Itching, Disp: , Rfl:     Calcium Carb-Cholecalciferol 500-400 MG-UNIT TABS, Take 1 tablet by mouth 2 times daily, Disp: , Rfl:     RA ALCOHOL SWABS 70 % PADS, , Disp: , Rfl: 1    ONE TOUCH ULTRA TEST strip, , Disp: , Rfl: 1    Lancets Misc. ([facility]ORT) MISC, , Disp: , Rfl: 1    ipratropium-albuterol (DUONEB) 0.5-2.5 (3) MG/3ML SOLN nebulizer solution, Inhale 1 vial into the lungs every 4 hours , Disp: , Rfl:     Linaclotide (LINZESS) 72 MCG CAPS, Take 72.5 mg by mouth daily, Disp: , Rfl:     pregabalin (LYRICA) 150 MG capsule, Take 1 capsule by mouth 3 times daily for 3 days. ., Disp: 9 capsule, Rfl: 0    traZODone (DESYREL) 100 MG tablet, Take 100 mg by mouth nightly , Disp: , Rfl:     Multiple Vitamins-Minerals (THERAPEUTIC MULTIVITAMIN-MINERALS) tablet, Take 1 tablet by mouth daily, Disp: , Rfl:     OXYGEN, Inhale into the lungs continuous, Disp: , Rfl:     atorvastatin (LIPITOR) 20 MG tablet, Take 1 tablet by mouth nightly, Disp: 30 tablet, Rfl: 3    magnesium hydroxide (MILK OF MAGNESIA CONCENTRATE) 2400 MG/10ML SUSP, Take 30 mLs by mouth once as needed, Disp: , Rfl:     docusate sodium (COLACE) 100 MG capsule, Take 100 mg by mouth 2 times daily, Disp: , Rfl:     acetaminophen (TYLENOL) 325 MG tablet, Take 2 tablets by mouth every 4 hours as needed for Pain, Disp: 120 tablet, Rfl: 3    levothyroxine (SYNTHROID) 75 MCG tablet, Take 75 mcg by mouth Daily, Disp: , Rfl:     pantoprazole (PROTONIX) 40 MG tablet, Take 1 tablet by mouth 2 times daily (before meals), Disp: 60 tablet, Rfl: 0    Past Medical History  Katina Shcmidt  has a past medical history of Anemia, Atrial fibrillation ([facility] CAD (coronary artery disease), CHF (congestive heart failure) ([facility] Chronic kidney disease, COPD (chronic obstructive pulmonary disease) ([facility] DDD (degenerative disc disease), lumbar, Depression, Frequent PVCs, GERD (gastroesophageal reflux disease), History of blood transfusion, Hx of blood clots, Hyperlipidemia, Hypertension, Hyperthyroidism, Movement disorder, Neuropathy, Obesity, Palpitations, Pneumonia, Sleep apnea, Status post right and left heart catheterization, and Type II or unspecified type diabetes mellitus without mention of complication, not stated as uncontrolled. Social History  Katina Schmidt  reports that she quit smoking about 15 years ago. Her smoking use included cigarettes. She has a 30.00 pack-year smoking history. She has never used smokeless tobacco. She reports that she does not drink alcohol and does not use drugs. Family History  Katina Schmidt family history includes COPD in her brother and father; Cancer in her mother; Diabetes in her father and mother; Heart Disease in her mother; High Blood Pressure in her mother; Stroke in her mother; Vision Loss in her mother. There is no family history of bicuspid aortic valve, aneurysms, heart transplant, pacemakers, defibrillators, or sudden cardiac death.       Past Surgical History   Past Surgical History:   Procedure Laterality Date    ACHILLES TENDON SURGERY Bilateral     BLADDER SUSPENSION      CARDIAC SURGERY  2016    [facility]--[facility]    COLONOSCOPY Left 05/11/2018    COLONOSCOPY POLYPECTOMY SNARE/COLD BIOPSY performed by Mary Jane Casillas MD at [facility]    COLONOSCOPY N/A 08/04/2021    COLONOSCOPY performed by Jun Ring MD at [facility]    COLONOSCOPY  08/04/2021    Dr. Devyn Mcwilliams-- [facility]    ENDOSCOPY, COLON, DIAGNOSTIC      GASTRIC FUNDOPLICATION      HAMMER TOE SURGERY Right     HERNIA REPAIR      HIATAL HERNIA REPAIR  09/06/2016    Laparoscopic Robotic with Nissen Fundoplication - Dr. Ave Watson OFFICE/OUTPT VISIT,PROCEDURE ONLY N/A 09/21/2018    EGD DIAGNOSTIC ONLY performed by Minnie Esposito MD at [address]      right    TENDON RELEASE Left 08/09/2016    left foot    TUBAL LIGATION      TUNNELED VENOUS PORT PLACEMENT  11/06/2017    UPPER GASTROINTESTINAL ENDOSCOPY Left 05/10/2018    EGD BIOPSY performed by Smooth Iglesias MD at [facility] ENDOSCOPY Left 07/25/2021    EGD BIOPSY performed by Radha Hamilton MD at [facility]       Review of Systems   Constitutional: Negative for chills and fever  HENT: Negative for congestion, sinus pressure, sneezing and sore throat. Eyes: Negative for pain, discharge, redness and itching. Respiratory: Negative for apnea, cough  Gastrointestinal: Negative for blood in stool, constipation, diarrhea   Endocrine: Negative for cold intolerance, heat intolerance, polydipsia. Genitourinary: Negative for dysuria, enuresis, flank pain and hematuria. Musculoskeletal: Negative for arthralgias, joint swelling and neck pain. Neurological: Negative for numbness and headaches. Psychiatric/Behavioral: Negative for agitation, confusion, decreased concentration and dysphoric mood. Objective:     /77   Pulse 78   Ht 5' 6\" (1.676 m)   Wt 226 lb 9.6 oz (102.8 kg)   BMI 36.57 kg/m²     Wt Readings from Last 3 Encounters:   09/02/21 226 lb 9.6 oz (102.8 kg)   08/26/21 227 lb (103 kg)   08/04/21 223 lb 3.2 oz (101.2 kg)     BP Readings from Last 3 Encounters:   09/02/21 138/77   08/30/21 (!) 146/65   08/26/21 120/74       Nursing note and vitals reviewed. Physical Exam   Constitutional: Oriented to person, place, and time.  Appears well-developed and well-nourished. HENT:   Head: Normocephalic and atraumatic. Eyes: EOM are normal. Pupils are equal, round, and reactive to light. Neck: Normal range of motion. Neck supple. No JVD present. Cardiovascular: Normal rate, regular rhythm, normal heart sounds and intact distal pulses. No murmur heard. Pulmonary/Chest: Effort normal and breath sounds normal. No respiratory distress. No wheezes. No rales. Abdominal: Soft. Bowel sounds are normal. No distension. There is no tenderness. Musculoskeletal: Normal range of motion. No edema. Neurological: Alert and oriented to person, place, and time. No cranial nerve deficit. Coordination normal.   Skin: Skin is warm and dry. Psychiatric: Normal mood and affect.        Lab Results   Component Value Date    CKTOTAL 54 06/12/2018    CKTOTAL 58 10/29/2017    CKTOTAL 1,071 03/23/2016       Lab Results   Component Value Date    WBC 3.7 07/26/2021    RBC 2.78 07/26/2021    HGB 9.2 07/26/2021    HCT 29.8 07/26/2021    MCV 94.6 07/26/2021    MCH 29.1 07/26/2021    MCHC 30.8 07/26/2021    RDW 15.2 06/13/2018     07/26/2021    MPV 9.9 07/26/2021       Lab Results   Component Value Date     07/26/2021    K 3.5 07/26/2021     07/26/2021    CO2 28 07/26/2021    BUN 12 07/26/2021    LABALBU 4.0 07/23/2021    CREATININE 1.3 07/26/2021    CREATININE 44.0 03/14/2019    CALCIUM 8.2 07/26/2021    LABGLOM 40 07/26/2021    GLUCOSE 189 07/26/2021       Lab Results   Component Value Date    ALKPHOS 62 07/23/2021    ALT 10 07/23/2021    AST 18 07/23/2021    PROT 6.0 07/23/2021    BILITOT 0.3 07/23/2021    BILIDIR <0.2 07/23/2021    LABALBU 4.0 07/23/2021       Lab Results   Component Value Date    MG 1.6 07/26/2021       Lab Results   Component Value Date    INR 1.38 (H) 07/23/2021    INR 1.23 (H) 05/10/2018    INR 1.49 (H) 05/09/2018         Lab Results   Component Value Date    LABA1C 5.4 06/13/2018       Lab Results   Component Value Date    TRIG 77 03/23/2016 HDL 49 03/23/2016    LDLCALC 63 03/23/2016       Lab Results   Component Value Date    TSH 1.900 10/23/2018         Testing Reviewed:      I have individually reviewed the cardiac test below:    ECHO: Results for orders placed during the hospital encounter of 06/22/20    Echo 2D w doppler w color complete    Narrative  Transthoracic Echocardiography Report (TTE)    Demographics    Patient Name    Kwabena Quezada Gender                Female    MR #            [id number]       Race                      Ethnicity    Account #       [de-identified]       Room Number    Accession       [id number]       Date of Study         06/22/2020  Number    Date of Birth   1945      Referring Physician   Benedetta Severs MD Serge Overman MD    Age             75 year(s)      Ericka Peck [facility]    Interpreting          Benedetta Severs MD  Physician    Procedure    Type of Study    TTE procedure:ECHOCARDIOGRAM COMPLETE 2D W DOPPLER W COLOR. Procedure Date  Date: 06/22/2020 Start: 01:10 PM    Study Location: Echo Lab  Technical Quality: Limited visualization due to poor acoustical window. Indications:Paroxysmal atrial fibrillation and Dilated  cardiomyopathy/congestive. Additional Medical History:Coronary artery disease, Diabetes, Hypertension,  Hyperlipidemia, PVCs, CKD, CHF, Sleep apnea, Pneumonia, Palpitations, Blood  clots, GERD, Tobacco use, Family history of heart disease    Patient Status: Routine    Height: 65.75 inches Weight: 231.01 pounds BSA: 2.12 m^2 BMI: 37.57 kg/m^2    BP: 132/82 mmHg    Allergies  - Natural rubber and latex. Conclusions    Summary  Ejection fraction is visually estimated at 40%. There was mild global hypokinesis of the left ventricle.     Signature    ----------------------------------------------------------------  Electronically signed by Benedetta Severs MD (Interpreting  physician) on 06/22/2020 at 02:16 PM  ----------------------------------------------------------------    Findings    Mitral Valve  The mitral valve structure was normal with normal leaflet separation. DOPPLER: The transmitral velocity was within the normal range with no  evidence for mitral stenosis. There was no evidence of mitral  regurgitation. Aortic Valve  The aortic valve was trileaflet with normal thickness and cuspal  separation. DOPPLER: Transaortic velocity was within the normal range with  no evidence of aortic stenosis. There was no evidence of aortic  regurgitation. Tricuspid Valve  Tricuspid valve was not well visualized. Mild tricuspid regurgitation. Pulmonic Valve  The pulmonic valve was not well visualized . Trivial pulmonic regurgitation visualized. Left Atrium  Mildly dilated left atrium. Left Ventricle  Ejection fraction is visually estimated at 40%. There was mild global hypokinesis of the left ventricle. Right Atrium  Right atrial size was normal.    Right Ventricle  The right ventricular size was normal with normal systolic function and  wall thickness. Pericardial Effusion  The pericardium was normal in appearance with no evidence of a pericardial  effusion. Pleural Effusion  No evidence of pleural effusion. Aorta / Great Vessels  -Aortic root dimension within normal limits.  -The Pulmonary artery is within normal limits. -IVC size is within normal limits with normal respiratory phasic changes.     M-Mode/2D Measurements & Calculations    LV Diastolic   LV Systolic Dimension:    AV Cusp Separation: 2.2 cmLA  Dimension: 5.8 4.5 cm                    Dimension: 4.5 cmAO Root  cm             LV Volume Diastolic: 184  Dimension: 3.5 cmLA Area: 24.5  LV FS:22.4 %   ml                        cm^2  LV PW          LV Volume Systolic: 50.1  Diastolic: 1.1 ml  cm             LV EDV/LV EDV Index: 167  Septum         ml/79 m^2LV ESV/LV ESV    RV Diastolic Dimension: 2.7 cm  Diastolic: 1.1 Index: 48.6 ml/44 m^2  cm             EF Calculated: 44.7 %     LA/Aorta: 1.29    LV Length: 7.6 cm         LA volume/Index: 79.9 ml /38m^2    LV Area  Diastolic:  82.4 cm^2  LV Area  Systolic: 07.7  cm^2    Doppler Measurements & Calculations    MV Peak E-Wave: 53.1 cm/s  AV Peak Velocity: 171  LVOT Peak Velocity: 88.3  MV Peak A-Wave: 111 cm/s   cm/s                   cm/s  MV E/A Ratio: 0.48         AV Peak Gradient: 11.7 LVOT Peak Gradient: 3  MV Peak Gradient: 1.13     mmHg                   mmHg  mmHg  TV Peak E-Wave: 60 cm/s  MV Deceleration Time: 317                         TV Peak A-Wave: 93 cm/s  msec  MV P1/2t: 93 msec          IVRT: 85 msec          TV Peak Gradient: 1.44  MVA by PHT:2.37 cm^2                              mmHg    MV E' Septal Velocity: 5.4 AV DVI (Vmax):0.52     PV Peak Velocity: 74.6  cm/s                                              cm/s  MV A' Septal Velocity:                            PV Peak Gradient: 2.23  10.6 cm/s                                         mmHg  MV E' Lateral Velocity:  7.8 cm/s  MV A' Lateral Velocity:  12.2 cm/s  E/E' septal: 9.83  E/E' lateral: 6.81  MR Velocity: 301 cm/s    [email or web address] [id number]       Assessment/Plan   Paroxysmal Atrial Fibrillation, [id number] = 6    We had a long discussion with the patient and myself regarding the risks/benefits of stroke prophylaxis and anticoagulation using ACC guidelines and risk calculators. Shared decision making has been completed with her primary physician (see EMR). We discussed the options including no prophylaxis, aspirin only, DOACs, Warfarin, and mechanical occlusion of ARLET (Watchman). The patient was able to comprehend their overall risk of stroke versus bleeding. The patient agreed that the patient was not a candidate for long-term anticoagulation due to the above issues.   The patient is clinically a candidate for left atrial appendage occlusion using the Watchman device. Patient and family were informed of the risk/benefits of the procedure, the need for further imaging pre-procedure (JAYLEN/CTA) and post-procedure (JAYLEN) at specified intervals. Further, the patient understands that they will require anticoagulation before the procedure and after the procedure in the short-term. The patient is clinically a candidate for short-term anticoagulation. Further, discussion regarding the possibility of CVA related to other etiologies other than afib were also discussed including intracranial disease, carotid disease, cerebral vascular malformation, aortic atheroma, non-ARLET cardioembolic source, etc.  Patient understood that the WATCHMAN device is designed to reduce the risk of CVA in the setting of afib only and does not reduce the risk of CVA related to any other cause of stroke. All parties also understand that if post-procedure the patient develops another reason for anti-coagulation that this is a separate issue from the stroke prophylaxis for atrial fibrillation and that the device does not negate need for anticoagulation for other reasons. At this time, the only reason for anticoagulation is atrial fibrillation. All of the patient's/family's questions were answered to their satisfaction and they have decided to move forward with further work-up for the Watchman device. We will follow-up with the patient and primary cardiologist/caregivers throughout the process.       Disposition:  WATCHMAN     Electronically signed by Maria Isabel Johnson MD   9/2/2021 at 1:06 PM EDT

## 2021-09-21 ENCOUNTER — TELEPHONE (OUTPATIENT)
Dept: CARDIOLOGY CLINIC | Age: 76
End: 2021-09-21

## 2021-09-21 NOTE — TELEPHONE ENCOUNTER
The patient had seen GI yesterday and a capsule and iron studies were ordered. It was discussed that once the GI has cleared her for the [facility] we will proceed with a date. Will keep in contact with [address] [place] [address].

## 2021-09-27 ENCOUNTER — HOSPITAL ENCOUNTER (OUTPATIENT)
Dept: NURSING | Age: 76
Discharge: HOME | End: 2021-09-27
Payer: MEDICARE

## 2021-09-27 VITALS
DIASTOLIC BLOOD PRESSURE: 90 MMHG | WEIGHT: 224 LBS | TEMPERATURE: 98.1 F | SYSTOLIC BLOOD PRESSURE: 162 MMHG | HEART RATE: 76 BPM | RESPIRATION RATE: 16 BRPM | BODY MASS INDEX: 36.15 KG/M2 | OXYGEN SATURATION: 93 %

## 2021-09-27 DIAGNOSIS — D83.9 COMMON VARIABLE IMMUNODEFICIENCY (HCC): Primary | ICD-10-CM

## 2021-09-27 PROCEDURE — 96415 CHEMO IV INFUSION ADDL HR: CPT

## 2021-09-27 PROCEDURE — 96365 THER/PROPH/DIAG IV INF INIT: CPT

## 2021-09-27 PROCEDURE — 96366 THER/PROPH/DIAG IV INF ADDON: CPT

## 2021-09-27 PROCEDURE — 6360000002 HC RX W HCPCS: Performed by: FAMILY MEDICINE

## 2021-09-27 PROCEDURE — 96413 CHEMO IV INFUSION 1 HR: CPT

## 2021-09-27 RX ORDER — SODIUM CHLORIDE 0.9 % (FLUSH) 0.9 %
10 SYRINGE (ML) INJECTION PRN
Status: DISCONTINUED | OUTPATIENT
Start: 2021-09-27 | End: 2021-09-28 | Stop reason: HOSPADM

## 2021-09-27 RX ORDER — HEPARIN SODIUM (PORCINE) LOCK FLUSH IV SOLN 100 UNIT/ML 100 UNIT/ML
500 SOLUTION INTRAVENOUS PRN
Status: CANCELLED | OUTPATIENT
Start: 2021-10-25

## 2021-09-27 RX ORDER — DIPHENHYDRAMINE HCL 25 MG
25 TABLET ORAL ONCE
Status: CANCELLED | OUTPATIENT
Start: 2021-10-25 | End: 2021-10-25

## 2021-09-27 RX ORDER — METHYLPREDNISOLONE SODIUM SUCCINATE 125 MG/2ML
125 INJECTION, POWDER, LYOPHILIZED, FOR SOLUTION INTRAMUSCULAR; INTRAVENOUS ONCE
Status: CANCELLED | OUTPATIENT
Start: 2021-10-25 | End: 2021-10-25

## 2021-09-27 RX ORDER — SODIUM CHLORIDE 9 MG/ML
INJECTION, SOLUTION INTRAVENOUS CONTINUOUS
Status: CANCELLED | OUTPATIENT
Start: 2021-10-25

## 2021-09-27 RX ORDER — DIPHENHYDRAMINE HYDROCHLORIDE 50 MG/ML
50 INJECTION INTRAMUSCULAR; INTRAVENOUS ONCE
Status: CANCELLED | OUTPATIENT
Start: 2021-10-25 | End: 2021-10-25

## 2021-09-27 RX ORDER — HEPARIN SODIUM (PORCINE) LOCK FLUSH IV SOLN 100 UNIT/ML 100 UNIT/ML
500 SOLUTION INTRAVENOUS PRN
Status: DISCONTINUED | OUTPATIENT
Start: 2021-09-27 | End: 2021-09-28 | Stop reason: HOSPADM

## 2021-09-27 RX ORDER — SODIUM CHLORIDE 0.9 % (FLUSH) 0.9 %
5 SYRINGE (ML) INJECTION PRN
Status: CANCELLED | OUTPATIENT
Start: 2021-10-25

## 2021-09-27 RX ORDER — ACETAMINOPHEN 325 MG/1
650 TABLET ORAL ONCE
Status: CANCELLED | OUTPATIENT
Start: 2021-10-25 | End: 2021-10-25

## 2021-09-27 RX ORDER — SODIUM CHLORIDE 0.9 % (FLUSH) 0.9 %
10 SYRINGE (ML) INJECTION PRN
Status: CANCELLED | OUTPATIENT
Start: 2021-10-25

## 2021-09-27 RX ADMIN — HEPARIN 500 UNITS: 100 SYRINGE at 11:38

## 2021-09-27 RX ADMIN — IMMUNE GLOBULIN (HUMAN) 5 G: 10 INJECTION INTRAVENOUS; SUBCUTANEOUS at 10:05

## 2021-09-27 RX ADMIN — IMMUNE GLOBULIN (HUMAN) 20 G: 10 INJECTION INTRAVENOUS; SUBCUTANEOUS at 10:48

## 2021-09-27 ASSESSMENT — PAIN SCALES - GENERAL
PAINLEVEL_OUTOF10: 0
PAINLEVEL_OUTOF10: 0

## 2021-09-27 NOTE — PROGRESS NOTES
1000  pt admitted to [facility] per ambulation for an ivig infusion. PT RIGHTS AND RESPONSIBILITIES OFFERED TO PT. Port accessed easily and flushes easily. pt states she took bendaryl and tylenol at home. 1012 infusion in progress. 1015 resting quietly at present. 1040 dose changed. mikki infusion well. 1100 resting quietly at present. l   1140 infusion completed pt mikki well. Stable. Discharge instructions given . Verbalize understanding. 1145 discharge per ambulation to home. mikki infusion well.                  .___m_ Safety:       (Environmental)   [place] to environment   Ensure ID band is correct and in place/ allergy band as needed   Assess for fall risk   Initiate fall precautions as applicable (fall band, side rails, etc.)   Call light within reach   Bed in low position/ wheels locked    _m___ Pain:        Assess pain level and characteristics   Administer analgesics as ordered   Assess effectiveness of pain management and report to MD as needed    __m__ Knowledge Deficit:   Assess baseline knowledge   Provide teaching at level of understanding   Provide teaching via preferred learning method   Evaluate teaching effectiveness    ___m_ Hemodynamic/Respiratory Status:       (Pre and Post Procedure Monitoring)   Assess/Monitor vital signs and LOC   Assess Baseline SpO2 prior to any sedation   Obtain weight/height   Assess vital signs/ LOC until patient meets discharge criteria   Monitor procedure site and notify MD of any issues    __m__ Infection-Risk of Central Venous Catheter:   Monitor for infection signs and symptoms (catheter site redness, temperature elevation, etc)   Assess for infection risks   Educate regarding infection prevention   Manage central venous catheter (flushes/ dressing changes per protocol)

## 2021-09-28 ENCOUNTER — TELEPHONE (OUTPATIENT)
Dept: CARDIOLOGY CLINIC | Age: 76
End: 2021-09-28

## 2021-09-28 NOTE — TELEPHONE ENCOUNTER
Eugenio Quick,    Can you please call Kimberley Whitt at [facility]. [phone]-  She would like to discuss the Watchman procedure, [facility].    thanks

## 2021-10-07 NOTE — TELEPHONE ENCOUNTER
Capsule study is to be completed on 10/13/2021. I will follow with GI and once cleared her WATCHMAN will be scheduled.

## 2021-10-25 ENCOUNTER — HOSPITAL ENCOUNTER (OUTPATIENT)
Dept: NURSING | Age: 76
Discharge: HOME | End: 2021-10-25
Payer: MEDICARE

## 2021-10-25 VITALS
RESPIRATION RATE: 18 BRPM | SYSTOLIC BLOOD PRESSURE: 149 MMHG | BODY MASS INDEX: 36.15 KG/M2 | HEART RATE: 55 BPM | TEMPERATURE: 97.7 F | DIASTOLIC BLOOD PRESSURE: 70 MMHG | OXYGEN SATURATION: 95 % | WEIGHT: 224 LBS

## 2021-10-25 DIAGNOSIS — D83.9 COMMON VARIABLE IMMUNODEFICIENCY (HCC): Primary | ICD-10-CM

## 2021-10-25 LAB
IGA: 96 MG/DL (ref 70–400)
IGG: 1042 MG/DL (ref 700–1600)
IGM: 47 MG/DL (ref 40–230)

## 2021-10-25 PROCEDURE — 96413 CHEMO IV INFUSION 1 HR: CPT

## 2021-10-25 PROCEDURE — 82784 ASSAY IGA/IGD/IGG/IGM EACH: CPT

## 2021-10-25 PROCEDURE — 96365 THER/PROPH/DIAG IV INF INIT: CPT

## 2021-10-25 PROCEDURE — 2580000003 HC RX 258: Performed by: FAMILY MEDICINE

## 2021-10-25 PROCEDURE — 6360000002 HC RX W HCPCS: Performed by: FAMILY MEDICINE

## 2021-10-25 PROCEDURE — 36415 COLL VENOUS BLD VENIPUNCTURE: CPT

## 2021-10-25 RX ORDER — SODIUM CHLORIDE 0.9 % (FLUSH) 0.9 %
10 SYRINGE (ML) INJECTION PRN
Status: DISCONTINUED | OUTPATIENT
Start: 2021-10-25 | End: 2021-10-26 | Stop reason: HOSPADM

## 2021-10-25 RX ORDER — SODIUM CHLORIDE 9 MG/ML
INJECTION, SOLUTION INTRAVENOUS CONTINUOUS
Status: ACTIVE | OUTPATIENT
Start: 2021-10-25 | End: 2021-10-25

## 2021-10-25 RX ORDER — SODIUM CHLORIDE 0.9 % (FLUSH) 0.9 %
10 SYRINGE (ML) INJECTION PRN
Status: CANCELLED | OUTPATIENT
Start: 2021-11-22

## 2021-10-25 RX ORDER — METHYLPREDNISOLONE SODIUM SUCCINATE 125 MG/2ML
125 INJECTION, POWDER, LYOPHILIZED, FOR SOLUTION INTRAMUSCULAR; INTRAVENOUS ONCE
Status: CANCELLED | OUTPATIENT
Start: 2021-11-22 | End: 2021-11-22

## 2021-10-25 RX ORDER — SODIUM CHLORIDE 0.9 % (FLUSH) 0.9 %
5 SYRINGE (ML) INJECTION PRN
Status: CANCELLED | OUTPATIENT
Start: 2021-11-22

## 2021-10-25 RX ORDER — SODIUM CHLORIDE 9 MG/ML
INJECTION, SOLUTION INTRAVENOUS CONTINUOUS
Status: CANCELLED | OUTPATIENT
Start: 2021-11-22

## 2021-10-25 RX ORDER — DIPHENHYDRAMINE HYDROCHLORIDE 50 MG/ML
50 INJECTION INTRAMUSCULAR; INTRAVENOUS ONCE
Status: CANCELLED | OUTPATIENT
Start: 2021-11-22 | End: 2021-11-22

## 2021-10-25 RX ORDER — HEPARIN SODIUM (PORCINE) LOCK FLUSH IV SOLN 100 UNIT/ML 100 UNIT/ML
500 SOLUTION INTRAVENOUS PRN
Status: CANCELLED | OUTPATIENT
Start: 2021-11-22

## 2021-10-25 RX ORDER — DIPHENHYDRAMINE HCL 25 MG
25 TABLET ORAL ONCE
Status: CANCELLED | OUTPATIENT
Start: 2021-11-22 | End: 2021-11-22

## 2021-10-25 RX ORDER — HEPARIN SODIUM (PORCINE) LOCK FLUSH IV SOLN 100 UNIT/ML 100 UNIT/ML
500 SOLUTION INTRAVENOUS PRN
Status: DISCONTINUED | OUTPATIENT
Start: 2021-10-25 | End: 2021-10-26 | Stop reason: HOSPADM

## 2021-10-25 RX ORDER — ACETAMINOPHEN 325 MG/1
650 TABLET ORAL ONCE
Status: CANCELLED | OUTPATIENT
Start: 2021-11-22 | End: 2021-11-22

## 2021-10-25 RX ADMIN — IMMUNE GLOBULIN (HUMAN) 20 G: 10 INJECTION INTRAVENOUS; SUBCUTANEOUS at 10:00

## 2021-10-25 RX ADMIN — IMMUNE GLOBULIN (HUMAN) 5 G: 10 INJECTION INTRAVENOUS; SUBCUTANEOUS at 09:30

## 2021-10-25 RX ADMIN — SODIUM CHLORIDE: 9 INJECTION, SOLUTION INTRAVENOUS at 09:30

## 2021-10-25 RX ADMIN — HEPARIN 500 UNITS: 100 SYRINGE at 10:50

## 2021-10-25 RX ADMIN — SODIUM CHLORIDE, PRESERVATIVE FREE 10 ML: 5 INJECTION INTRAVENOUS at 10:50

## 2021-10-25 ASSESSMENT — PAIN - FUNCTIONAL ASSESSMENT: PAIN_FUNCTIONAL_ASSESSMENT: 0-10

## 2021-10-25 NOTE — PROGRESS NOTES
___m_ Safety:       (Environmental)   [place] to environment   Ensure ID band is correct and in place/ allergy band as needed   Assess for fall risk   Initiate fall precautions as applicable (fall band, side rails, etc.)   Call light within reach   Bed in low position/ wheels locked    __m__ Pain:        Assess pain level and characteristics   Administer analgesics as ordered   Assess effectiveness of pain management and report to MD as needed    __m__ Knowledge Deficit:   Assess baseline knowledge   Provide teaching at level of understanding   Provide teaching via preferred learning method   Evaluate teaching effectiveness    __m__ Hemodynamic/Respiratory Status:       (Pre and Post Procedure Monitoring)   Assess/Monitor vital signs and LOC   Assess Baseline SpO2 prior to any sedation   Obtain weight/height   Assess vital signs/ LOC until patient meets discharge criteria   Monitor procedure site and notify MD of any issues    _m___ Infection-Risk of Central Venous Catheter:   Monitor for infection signs and symptoms (catheter site redness, temperature elevation, etc)   Assess for infection risks   Educate regarding infection prevention   Manage central venous catheter (flushes/ dressing changes per protocol)

## 2021-11-15 NOTE — TELEPHONE ENCOUNTER
Spoke to GI this morning and they have stated to clear Ivett Ramirez they will need her to repeat capsule study due to failure to fully visualize the small bowel. Spoke to [place] at the nursing [place] and she states that Ivett James is refusing to move forward with the capsule study until the [facility] is complete. Explained the reasoning behind this and she states that she will have Ivett Ramirez call us. Ivett James returned call and states that she did not understand this process. She will call us back once the capsule study is set up.

## 2021-11-22 ENCOUNTER — HOSPITAL ENCOUNTER (OUTPATIENT)
Dept: NURSING | Age: 76
Discharge: HOME | End: 2021-11-22
Payer: MEDICARE

## 2021-11-22 VITALS
WEIGHT: 226 LBS | SYSTOLIC BLOOD PRESSURE: 161 MMHG | RESPIRATION RATE: 20 BRPM | HEART RATE: 53 BPM | TEMPERATURE: 96 F | DIASTOLIC BLOOD PRESSURE: 74 MMHG | BODY MASS INDEX: 36.48 KG/M2 | OXYGEN SATURATION: 95 %

## 2021-11-22 DIAGNOSIS — D83.9 COMMON VARIABLE IMMUNODEFICIENCY (HCC): Primary | ICD-10-CM

## 2021-11-22 PROCEDURE — 6360000002 HC RX W HCPCS: Performed by: FAMILY MEDICINE

## 2021-11-22 PROCEDURE — 96413 CHEMO IV INFUSION 1 HR: CPT

## 2021-11-22 PROCEDURE — 96415 CHEMO IV INFUSION ADDL HR: CPT

## 2021-11-22 RX ORDER — DIPHENHYDRAMINE HCL 25 MG
25 TABLET ORAL ONCE
Status: DISCONTINUED | OUTPATIENT
Start: 2021-11-22 | End: 2021-11-23 | Stop reason: HOSPADM

## 2021-11-22 RX ORDER — SODIUM CHLORIDE 0.9 % (FLUSH) 0.9 %
5 SYRINGE (ML) INJECTION PRN
Status: CANCELLED | OUTPATIENT
Start: 2021-12-20

## 2021-11-22 RX ORDER — METHYLPREDNISOLONE SODIUM SUCCINATE 125 MG/2ML
125 INJECTION, POWDER, LYOPHILIZED, FOR SOLUTION INTRAMUSCULAR; INTRAVENOUS ONCE
Status: CANCELLED | OUTPATIENT
Start: 2021-12-20 | End: 2021-12-20

## 2021-11-22 RX ORDER — SODIUM CHLORIDE 9 MG/ML
INJECTION, SOLUTION INTRAVENOUS CONTINUOUS
Status: CANCELLED | OUTPATIENT
Start: 2021-12-20

## 2021-11-22 RX ORDER — DIPHENHYDRAMINE HCL 25 MG
25 TABLET ORAL ONCE
Status: CANCELLED | OUTPATIENT
Start: 2021-12-20 | End: 2021-12-20

## 2021-11-22 RX ORDER — HEPARIN SODIUM (PORCINE) LOCK FLUSH IV SOLN 100 UNIT/ML 100 UNIT/ML
500 SOLUTION INTRAVENOUS PRN
Status: CANCELLED | OUTPATIENT
Start: 2021-12-20

## 2021-11-22 RX ORDER — HEPARIN SODIUM (PORCINE) LOCK FLUSH IV SOLN 100 UNIT/ML 100 UNIT/ML
500 SOLUTION INTRAVENOUS PRN
Status: DISCONTINUED | OUTPATIENT
Start: 2021-11-22 | End: 2021-11-23 | Stop reason: HOSPADM

## 2021-11-22 RX ORDER — ACETAMINOPHEN 325 MG/1
650 TABLET ORAL ONCE
Status: DISCONTINUED | OUTPATIENT
Start: 2021-11-22 | End: 2021-11-23 | Stop reason: HOSPADM

## 2021-11-22 RX ORDER — ACETAMINOPHEN 325 MG/1
650 TABLET ORAL ONCE
Status: CANCELLED | OUTPATIENT
Start: 2021-12-20 | End: 2021-12-20

## 2021-11-22 RX ORDER — DIPHENHYDRAMINE HYDROCHLORIDE 50 MG/ML
50 INJECTION INTRAMUSCULAR; INTRAVENOUS ONCE
Status: CANCELLED | OUTPATIENT
Start: 2021-12-20 | End: 2021-12-20

## 2021-11-22 RX ORDER — SODIUM CHLORIDE 0.9 % (FLUSH) 0.9 %
10 SYRINGE (ML) INJECTION PRN
Status: CANCELLED | OUTPATIENT
Start: 2021-12-20

## 2021-11-22 RX ADMIN — HEPARIN 500 UNITS: 100 SYRINGE at 11:43

## 2021-11-22 RX ADMIN — IMMUNE GLOBULIN (HUMAN) 5 G: 10 INJECTION INTRAVENOUS; SUBCUTANEOUS at 09:58

## 2021-11-22 RX ADMIN — IMMUNE GLOBULIN (HUMAN) 20 G: 10 INJECTION INTRAVENOUS; SUBCUTANEOUS at 10:28

## 2021-11-22 ASSESSMENT — PAIN SCALES - GENERAL: PAINLEVEL_OUTOF10: 0

## 2021-12-20 ENCOUNTER — HOSPITAL ENCOUNTER (OUTPATIENT)
Dept: NURSING | Age: 76
Discharge: HOME | End: 2021-12-20
Payer: MEDICARE

## 2021-12-20 VITALS
BODY MASS INDEX: 36.86 KG/M2 | HEART RATE: 84 BPM | TEMPERATURE: 97 F | OXYGEN SATURATION: 93 % | SYSTOLIC BLOOD PRESSURE: 166 MMHG | DIASTOLIC BLOOD PRESSURE: 86 MMHG | RESPIRATION RATE: 18 BRPM | WEIGHT: 228.4 LBS

## 2021-12-20 DIAGNOSIS — D83.9 COMMON VARIABLE IMMUNODEFICIENCY (HCC): Primary | ICD-10-CM

## 2021-12-20 PROCEDURE — 6360000002 HC RX W HCPCS: Performed by: FAMILY MEDICINE

## 2021-12-20 PROCEDURE — 96413 CHEMO IV INFUSION 1 HR: CPT

## 2021-12-20 PROCEDURE — 96365 THER/PROPH/DIAG IV INF INIT: CPT

## 2021-12-20 PROCEDURE — 2580000003 HC RX 258: Performed by: FAMILY MEDICINE

## 2021-12-20 RX ORDER — SODIUM CHLORIDE 0.9 % (FLUSH) 0.9 %
10 SYRINGE (ML) INJECTION PRN
Status: CANCELLED | OUTPATIENT
Start: 2022-01-17

## 2021-12-20 RX ORDER — SODIUM CHLORIDE 0.9 % (FLUSH) 0.9 %
5 SYRINGE (ML) INJECTION PRN
Status: CANCELLED | OUTPATIENT
Start: 2022-01-17

## 2021-12-20 RX ORDER — SODIUM CHLORIDE 0.9 % (FLUSH) 0.9 %
10 SYRINGE (ML) INJECTION PRN
Status: DISCONTINUED | OUTPATIENT
Start: 2021-12-20 | End: 2021-12-21 | Stop reason: HOSPADM

## 2021-12-20 RX ORDER — SODIUM CHLORIDE 9 MG/ML
INJECTION, SOLUTION INTRAVENOUS CONTINUOUS
Status: CANCELLED | OUTPATIENT
Start: 2022-01-17

## 2021-12-20 RX ORDER — ACETAMINOPHEN 325 MG/1
650 TABLET ORAL ONCE
Status: CANCELLED | OUTPATIENT
Start: 2022-01-17 | End: 2022-01-17

## 2021-12-20 RX ORDER — METHYLPREDNISOLONE SODIUM SUCCINATE 125 MG/2ML
125 INJECTION, POWDER, LYOPHILIZED, FOR SOLUTION INTRAMUSCULAR; INTRAVENOUS ONCE
Status: CANCELLED | OUTPATIENT
Start: 2022-01-17 | End: 2022-01-17

## 2021-12-20 RX ORDER — DIPHENHYDRAMINE HCL 25 MG
25 TABLET ORAL ONCE
Status: CANCELLED | OUTPATIENT
Start: 2022-01-17 | End: 2022-01-17

## 2021-12-20 RX ORDER — DIPHENHYDRAMINE HYDROCHLORIDE 50 MG/ML
50 INJECTION INTRAMUSCULAR; INTRAVENOUS ONCE
Status: CANCELLED | OUTPATIENT
Start: 2022-01-17 | End: 2022-01-17

## 2021-12-20 RX ORDER — ACETAMINOPHEN 325 MG/1
650 TABLET ORAL ONCE
Status: DISCONTINUED | OUTPATIENT
Start: 2021-12-20 | End: 2021-12-21 | Stop reason: HOSPADM

## 2021-12-20 RX ORDER — HEPARIN SODIUM (PORCINE) LOCK FLUSH IV SOLN 100 UNIT/ML 100 UNIT/ML
500 SOLUTION INTRAVENOUS PRN
Status: DISCONTINUED | OUTPATIENT
Start: 2021-12-20 | End: 2021-12-21 | Stop reason: HOSPADM

## 2021-12-20 RX ORDER — DIPHENHYDRAMINE HCL 25 MG
25 TABLET ORAL ONCE
Status: DISCONTINUED | OUTPATIENT
Start: 2021-12-20 | End: 2021-12-21 | Stop reason: HOSPADM

## 2021-12-20 RX ORDER — HEPARIN SODIUM (PORCINE) LOCK FLUSH IV SOLN 100 UNIT/ML 100 UNIT/ML
500 SOLUTION INTRAVENOUS PRN
Status: CANCELLED | OUTPATIENT
Start: 2022-01-17

## 2021-12-20 RX ADMIN — IMMUNE GLOBULIN (HUMAN) 20 G: 10 INJECTION INTRAVENOUS; SUBCUTANEOUS at 10:18

## 2021-12-20 RX ADMIN — IMMUNE GLOBULIN (HUMAN) 5 G: 10 INJECTION INTRAVENOUS; SUBCUTANEOUS at 09:47

## 2021-12-20 RX ADMIN — SODIUM CHLORIDE, PRESERVATIVE FREE 10 ML: 5 INJECTION INTRAVENOUS at 11:07

## 2021-12-20 RX ADMIN — HEPARIN 500 UNITS: 100 SYRINGE at 11:07

## 2021-12-20 ASSESSMENT — PAIN - FUNCTIONAL ASSESSMENT: PAIN_FUNCTIONAL_ASSESSMENT: 0-10

## 2021-12-20 NOTE — PROGRESS NOTES
__M__ Safety:       (Environmental)   [place] to environment   Ensure ID band is correct and in place/ allergy band as needed   Assess for fall risk   Initiate fall precautions as applicable (fall band, side rails, etc.)   Call light within reach   Bed in low position/ wheels locked    _M___ Pain:        Assess pain level and characteristics   Administer analgesics as ordered   Assess effectiveness of pain management and report to MD as needed    __M__ Knowledge Deficit:   Assess baseline knowledge   Provide teaching at level of understanding   Provide teaching via preferred learning method   Evaluate teaching effectiveness    __M__ Hemodynamic/Respiratory Status:       (Pre and Post Procedure Monitoring)   Assess/Monitor vital signs and LOC   Assess Baseline SpO2 prior to any sedation   Obtain weight/height   Assess vital signs/ LOC until patient meets discharge criteria   Monitor procedure site and notify MD of any issues    __

## 2022-01-03 ENCOUNTER — TELEPHONE (OUTPATIENT)
Dept: CARDIOLOGY CLINIC | Age: 77
End: 2022-01-03

## 2022-01-06 NOTE — TELEPHONE ENCOUNTER
I have spoke to Dr. Buffy Fernando and the patient has been cleared for her WATCHMAN procedure. Their office will faxed the Shared Decision form over.

## 2022-01-07 ENCOUNTER — TELEPHONE (OUTPATIENT)
Dept: CARDIOLOGY CLINIC | Age: 77
End: 2022-01-07

## 2022-01-07 DIAGNOSIS — D64.9 ANEMIA, UNSPECIFIED TYPE: ICD-10-CM

## 2022-01-07 DIAGNOSIS — Z95.818 PRESENCE OF WATCHMAN LEFT ATRIAL APPENDAGE CLOSURE DEVICE: Primary | ICD-10-CM

## 2022-01-07 DIAGNOSIS — I48.0 PAROXYSMAL ATRIAL FIBRILLATION (HCC): ICD-10-CM

## 2022-01-07 NOTE — TELEPHONE ENCOUNTER
WATCHMAN shared decision form obtained from Dr. Nicolette Orozco. Orders received from Dr. Marck Austin to schedule the patient for a WATCHMAN procedure, and hold the following medications the morning of the procedure: Insulin. WATCHMAN: 1/12/2022 at 0700 have the patient arrive at 0500  Discharge caregiver: Pavithra Andres procedural orders: Structural heart follow up phone call on POD 1, have the patient restrart Eliquis 5 mg on the evening of POD 1 post implant, obtain a CBC, BMP and COVID prior to the 45 day JAYLEN (2/26/2022).      CBC/BMP/JAYLEN:   Follow up date with primary cardiologist: Dr. Precious Terry, can you please schedule this patients JAYLEN?

## 2022-01-07 NOTE — TELEPHONE ENCOUNTER
Spoke to Dr. Jami William and updated her that the patient has not been on Eliquis since 8/4/2021. Questioned if this changes her permission on the WATCHMAN procedure since inflammation was found on the capsule study. She has order a stat Iron, Ferriton and CBC. [facility] and the patient to update them.

## 2022-01-09 LAB
BASOPHILS ABSOLUTE: 0.1 /ΜL
BASOPHILS RELATIVE PERCENT: 1.1 %
EOSINOPHILS ABSOLUTE: 0.2 /ΜL
EOSINOPHILS RELATIVE PERCENT: 3.6 %
HCT VFR BLD CALC: 38.8 % (ref 36–46)
HEMOGLOBIN: 12.2 G/DL (ref 12–16)
LYMPHOCYTES ABSOLUTE: 1.9 /ΜL
LYMPHOCYTES RELATIVE PERCENT: 34.7 %
MCH RBC QN AUTO: 31.1 PG
MCHC RBC AUTO-ENTMCNC: 31.4 G/DL
MCV RBC AUTO: 99 FL
MONOCYTES ABSOLUTE: 0.6 /ΜL
MONOCYTES RELATIVE PERCENT: 11.5 %
NEUTROPHILS ABSOLUTE: 2.7 /ΜL
NEUTROPHILS RELATIVE PERCENT: 48.9 %
PLATELET # BLD: 179 K/ΜL
PMV BLD AUTO: 10.2 FL
RBC # BLD: 3.92 10^6/ΜL
WBC # BLD: 5.5 10^3/ML

## 2022-01-13 LAB
FERRITIN: 52 NG/ML (ref 9–150)
IRON: 108

## 2022-01-13 NOTE — TELEPHONE ENCOUNTER
Spoke with Mar Gomez- [phone] Iron and ferritin was drawn today-  Results should be back this evening.      Orders were faxed to [phone]

## 2022-01-14 RX ORDER — ONDANSETRON 2 MG/ML
8 INJECTION INTRAMUSCULAR; INTRAVENOUS
Status: CANCELLED | OUTPATIENT
Start: 2022-01-14

## 2022-01-14 RX ORDER — SODIUM CHLORIDE 9 MG/ML
INJECTION, SOLUTION INTRAVENOUS CONTINUOUS
Status: CANCELLED | OUTPATIENT
Start: 2022-01-14

## 2022-01-14 RX ORDER — ALBUTEROL SULFATE 90 UG/1
4 AEROSOL, METERED RESPIRATORY (INHALATION) PRN
Status: CANCELLED | OUTPATIENT
Start: 2022-01-14

## 2022-01-14 RX ORDER — ACETAMINOPHEN 325 MG/1
650 TABLET ORAL
Status: CANCELLED | OUTPATIENT
Start: 2022-01-14

## 2022-01-14 RX ORDER — DIPHENHYDRAMINE HYDROCHLORIDE 50 MG/ML
50 INJECTION INTRAMUSCULAR; INTRAVENOUS
Status: CANCELLED | OUTPATIENT
Start: 2022-01-14

## 2022-01-14 RX ORDER — MEPERIDINE HYDROCHLORIDE 25 MG/ML
12.5 INJECTION INTRAMUSCULAR; INTRAVENOUS; SUBCUTANEOUS PRN
Status: CANCELLED | OUTPATIENT
Start: 2022-01-14

## 2022-01-14 RX ORDER — SODIUM CHLORIDE 9 MG/ML
25 INJECTION, SOLUTION INTRAVENOUS PRN
Status: CANCELLED | OUTPATIENT
Start: 2022-01-14

## 2022-01-17 ENCOUNTER — HOSPITAL ENCOUNTER (OUTPATIENT)
Dept: NURSING | Age: 77
Discharge: HOME | End: 2022-01-17
Payer: MEDICARE

## 2022-01-17 VITALS
RESPIRATION RATE: 18 BRPM | TEMPERATURE: 97.6 F | OXYGEN SATURATION: 93 % | BODY MASS INDEX: 37.12 KG/M2 | HEART RATE: 62 BPM | DIASTOLIC BLOOD PRESSURE: 72 MMHG | SYSTOLIC BLOOD PRESSURE: 150 MMHG | WEIGHT: 230 LBS

## 2022-01-17 DIAGNOSIS — D83.9 COMMON VARIABLE IMMUNODEFICIENCY (HCC): Primary | ICD-10-CM

## 2022-01-17 PROCEDURE — 96413 CHEMO IV INFUSION 1 HR: CPT

## 2022-01-17 PROCEDURE — 6360000002 HC RX W HCPCS: Performed by: FAMILY MEDICINE

## 2022-01-17 PROCEDURE — 96365 THER/PROPH/DIAG IV INF INIT: CPT

## 2022-01-17 PROCEDURE — 96415 CHEMO IV INFUSION ADDL HR: CPT

## 2022-01-17 PROCEDURE — 2580000003 HC RX 258: Performed by: FAMILY MEDICINE

## 2022-01-17 RX ORDER — HEPARIN SODIUM (PORCINE) LOCK FLUSH IV SOLN 100 UNIT/ML 100 UNIT/ML
500 SOLUTION INTRAVENOUS PRN
Status: CANCELLED | OUTPATIENT
Start: 2022-02-14

## 2022-01-17 RX ORDER — MEPERIDINE HYDROCHLORIDE 25 MG/ML
12.5 INJECTION INTRAMUSCULAR; INTRAVENOUS; SUBCUTANEOUS PRN
Status: CANCELLED | OUTPATIENT
Start: 2022-02-14

## 2022-01-17 RX ORDER — HEPARIN SODIUM (PORCINE) LOCK FLUSH IV SOLN 100 UNIT/ML 100 UNIT/ML
500 SOLUTION INTRAVENOUS PRN
Status: DISCONTINUED | OUTPATIENT
Start: 2022-01-17 | End: 2022-01-18 | Stop reason: HOSPADM

## 2022-01-17 RX ORDER — ONDANSETRON 2 MG/ML
8 INJECTION INTRAMUSCULAR; INTRAVENOUS
Status: CANCELLED | OUTPATIENT
Start: 2022-02-14

## 2022-01-17 RX ORDER — ALBUTEROL SULFATE 90 UG/1
4 AEROSOL, METERED RESPIRATORY (INHALATION) PRN
Status: CANCELLED | OUTPATIENT
Start: 2022-02-14

## 2022-01-17 RX ORDER — DIPHENHYDRAMINE HCL 25 MG
25 TABLET ORAL ONCE
Status: DISCONTINUED | OUTPATIENT
Start: 2022-01-17 | End: 2022-01-18 | Stop reason: HOSPADM

## 2022-01-17 RX ORDER — SODIUM CHLORIDE 9 MG/ML
25 INJECTION, SOLUTION INTRAVENOUS PRN
Status: CANCELLED | OUTPATIENT
Start: 2022-02-14

## 2022-01-17 RX ORDER — ACETAMINOPHEN 325 MG/1
650 TABLET ORAL ONCE
Status: CANCELLED | OUTPATIENT
Start: 2022-02-14 | End: 2022-02-14

## 2022-01-17 RX ORDER — SODIUM CHLORIDE 0.9 % (FLUSH) 0.9 %
5-40 SYRINGE (ML) INJECTION PRN
Status: DISCONTINUED | OUTPATIENT
Start: 2022-01-17 | End: 2022-01-18 | Stop reason: HOSPADM

## 2022-01-17 RX ORDER — DIPHENHYDRAMINE HYDROCHLORIDE 50 MG/ML
50 INJECTION INTRAMUSCULAR; INTRAVENOUS
Status: CANCELLED | OUTPATIENT
Start: 2022-02-14

## 2022-01-17 RX ORDER — SODIUM CHLORIDE 9 MG/ML
INJECTION, SOLUTION INTRAVENOUS CONTINUOUS
Status: CANCELLED | OUTPATIENT
Start: 2022-02-14

## 2022-01-17 RX ORDER — DIPHENHYDRAMINE HCL 25 MG
25 TABLET ORAL ONCE
Status: CANCELLED | OUTPATIENT
Start: 2022-02-14 | End: 2022-02-14

## 2022-01-17 RX ORDER — ACETAMINOPHEN 325 MG/1
650 TABLET ORAL
Status: CANCELLED | OUTPATIENT
Start: 2022-02-14

## 2022-01-17 RX ORDER — SODIUM CHLORIDE 0.9 % (FLUSH) 0.9 %
5-40 SYRINGE (ML) INJECTION PRN
Status: CANCELLED | OUTPATIENT
Start: 2022-02-14

## 2022-01-17 RX ORDER — ACETAMINOPHEN 325 MG/1
650 TABLET ORAL ONCE
Status: DISCONTINUED | OUTPATIENT
Start: 2022-01-17 | End: 2022-01-18 | Stop reason: HOSPADM

## 2022-01-17 RX ADMIN — SODIUM CHLORIDE, PRESERVATIVE FREE 10 ML: 5 INJECTION INTRAVENOUS at 11:24

## 2022-01-17 RX ADMIN — HEPARIN 500 UNITS: 100 SYRINGE at 11:24

## 2022-01-17 RX ADMIN — IMMUNE GLOBULIN (HUMAN) 5 G: 10 INJECTION INTRAVENOUS; SUBCUTANEOUS at 09:55

## 2022-01-17 RX ADMIN — IMMUNE GLOBULIN (HUMAN) 20 G: 10 INJECTION INTRAVENOUS; SUBCUTANEOUS at 10:30

## 2022-01-17 ASSESSMENT — PAIN SCALES - GENERAL
PAINLEVEL_OUTOF10: 0

## 2022-01-17 ASSESSMENT — PAIN - FUNCTIONAL ASSESSMENT: PAIN_FUNCTIONAL_ASSESSMENT: 0-10

## 2022-01-17 NOTE — PROGRESS NOTES
5493 Patient arrived to hospitals ambulatory for IVIG infusion. Oriented to room and call light  PT RIGHTS AND RESPONSIBILITIES OFFERED TO PT.    1657 port accessed using infusion started and she denies complaints    1036 medication infusing and she denies complaints    1051 Medication infusing and she denies complaints    1106 Medication infusing and she denies complaints    1124 Medication completed and she denies complaints. Port de-accessed. Discharge instructions given and explained and she denies questions.   Discharged ambulatory    _M___ Safety:       (Environmental)  Jermaine Le to environment   Ensure ID band is correct and in place/ allergy band as needed   Assess for fall risk   Initiate fall precautions as applicable (fall band, side rails, etc.)   Call light within reach   Bed in low position/ wheels locked    _M___ Pain:        Assess pain level and characteristics   Administer analgesics as ordered   Assess effectiveness of pain management and report to MD as needed    _M___ Knowledge Deficit:   Assess baseline knowledge   Provide teaching at level of understanding   Provide teaching via preferred learning method   Evaluate teaching effectiveness    _M___ Hemodynamic/Respiratory Status:       (Pre and Post Procedure Monitoring)   Assess/Monitor vital signs and LOC   Assess Baseline SpO2 prior to any sedation   Obtain weight/height   Assess vital signs/ LOC until patient meets discharge criteria   Monitor procedure site and notify MD of any issues    __M__ Infection-Risk of Central Venous Catheter:   Monitor for infection signs and symptoms (catheter site redness, temperature elevation, etc)   Assess for infection risks   Educate regarding infection prevention   Manage central venous catheter (flushes/ dressing changes per protocol)

## 2022-01-17 NOTE — TELEPHONE ENCOUNTER
Spoke to Dr. Taisha Sinclair regarding lab results. She states from her standpoint it is ok for the patient to move forward with her Watchman. The patient has been given a date of a 2/11/2022.

## 2022-01-24 ENCOUNTER — HOSPITAL ENCOUNTER (OUTPATIENT)
Dept: GENERAL RADIOLOGY | Age: 77
Discharge: HOME | End: 2022-01-24
Payer: MEDICARE

## 2022-01-24 ENCOUNTER — HOSPITAL ENCOUNTER (OUTPATIENT)
Age: 77
Discharge: HOME | End: 2022-01-24
Payer: MEDICARE

## 2022-01-24 DIAGNOSIS — Z98.890 POSTPROCEDURAL STATE: ICD-10-CM

## 2022-01-24 DIAGNOSIS — G89.18 POST-OP PAIN: ICD-10-CM

## 2022-01-24 PROCEDURE — 74018 RADEX ABDOMEN 1 VIEW: CPT

## 2022-01-27 NOTE — TELEPHONE ENCOUNTER
Dr. Conley [facility] office called and states that the need to repeat the capsule study before moving forward with the WATCHMAN procedure. Awaiting a date.

## 2022-02-02 NOTE — TELEPHONE ENCOUNTER
Aliyah from Dr. Phan Lugo office called ([phone] ext. [phone]) stating that the patient's capsule study is scheduled for 2/8/2022.

## 2022-02-07 ENCOUNTER — OFFICE VISIT (OUTPATIENT)
Dept: CARDIOLOGY CLINIC | Age: 77
End: 2022-02-07
Payer: MEDICARE

## 2022-02-07 VITALS
DIASTOLIC BLOOD PRESSURE: 68 MMHG | SYSTOLIC BLOOD PRESSURE: 128 MMHG | HEART RATE: 54 BPM | WEIGHT: 228 LBS | HEIGHT: 66 IN | BODY MASS INDEX: 36.64 KG/M2

## 2022-02-07 DIAGNOSIS — I42.0 DILATED CARDIOMYOPATHY (HCC): Primary | ICD-10-CM

## 2022-02-07 DIAGNOSIS — I25.10 CORONARY ARTERY DISEASE INVOLVING NATIVE CORONARY ARTERY OF NATIVE HEART WITHOUT ANGINA PECTORIS: ICD-10-CM

## 2022-02-07 DIAGNOSIS — I10 PRIMARY HYPERTENSION: ICD-10-CM

## 2022-02-07 DIAGNOSIS — I48.0 PAROXYSMAL ATRIAL FIBRILLATION (HCC): ICD-10-CM

## 2022-02-07 PROCEDURE — G8484 FLU IMMUNIZE NO ADMIN: HCPCS | Performed by: NUCLEAR MEDICINE

## 2022-02-07 PROCEDURE — 99214 OFFICE O/P EST MOD 30 MIN: CPT | Performed by: NUCLEAR MEDICINE

## 2022-02-07 PROCEDURE — G8417 CALC BMI ABV UP PARAM F/U: HCPCS | Performed by: NUCLEAR MEDICINE

## 2022-02-07 PROCEDURE — 1036F TOBACCO NON-USER: CPT | Performed by: NUCLEAR MEDICINE

## 2022-02-07 PROCEDURE — G8427 DOCREV CUR MEDS BY ELIG CLIN: HCPCS | Performed by: NUCLEAR MEDICINE

## 2022-02-07 PROCEDURE — 1090F PRES/ABSN URINE INCON ASSESS: CPT | Performed by: NUCLEAR MEDICINE

## 2022-02-07 PROCEDURE — 4040F PNEUMOC VAC/ADMIN/RCVD: CPT | Performed by: NUCLEAR MEDICINE

## 2022-02-07 PROCEDURE — 1123F ACP DISCUSS/DSCN MKR DOCD: CPT | Performed by: NUCLEAR MEDICINE

## 2022-02-07 PROCEDURE — G8400 PT W/DXA NO RESULTS DOC: HCPCS | Performed by: NUCLEAR MEDICINE

## 2022-02-07 RX ORDER — DOXYCYCLINE HYCLATE 50 MG/1
324 CAPSULE, GELATIN COATED ORAL 2 TIMES DAILY
COMMUNITY

## 2022-02-07 RX ORDER — FUROSEMIDE 20 MG/1
20 TABLET ORAL DAILY PRN
COMMUNITY

## 2022-02-07 NOTE — PROGRESS NOTES
[address] [place] [address] [address]  [address]  [facility] [address]  Dept: [phone]  Dept Fax: [phone]  Loc: [phone]    Visit Date: 2/7/2022    Yesenia Waller is a 67 y.o. female who presents todayfor:  Chief Complaint   Patient presents with    Check-Up    Atrial Fibrillation    Shortness of Breath    Cardiomyopathy    Hypertension   known COPD and CAD  No obstructive CAd  Also know CMP and a fib   Seeing Merino for watchman due to GI bleeding   No chest pain   Does have dyspnea on exertion   On home o2  Limited by the lungs   Bp is stable   No obvious dizziness  No syncope  No recent bleeding   Still following with GI   On statins for hyperlipidemia  No obvious side effects         HPI:  HPI  Past Medical History:   Diagnosis Date    Anemia     Atrial fibrillation ([facility] 11/09/2017    CAD (coronary artery disease)     CHF (congestive heart failure) (HCC)     Chronic kidney disease     stage 3 kidney     COPD (chronic obstructive pulmonary disease) ([facility] [facility]     DDD (degenerative disc disease), lumbar     Depression     Frequent PVCs 12/13/2017    GERD (gastroesophageal reflux disease)     History of blood transfusion     Hx of blood clots 09/2017    pulmonary emboli    Hyperlipidemia     Hypertension     Hyperthyroidism     Movement disorder     DDD    Neuropathy     Obesity     Palpitations 01/10/2020    Pneumonia 2016    sepsis    Sleep apnea     usually wears cpap at night    Status post right and left heart catheterization     Type II or unspecified type diabetes mellitus without mention of complication, not stated as uncontrolled       Past Surgical History:   Procedure Laterality Date    ACHILLES TENDON SURGERY Bilateral     BLADDER SUSPENSION      CARDIAC SURGERY  2016    [facility]    COLONOSCOPY Left 05/11/2018    COLONOSCOPY POLYPECTOMY SNARE/COLD BIOPSY performed by Jocelyne Cedillo MD at [facility]  COLONOSCOPY N/A 08/04/2021    COLONOSCOPY performed by Crissy Davis MD at [address] [facility]    COLONOSCOPY  08/04/2021    Dr. Damir Mejia-- STRITAS    ENDOSCOPY, COLON, DIAGNOSTIC      GASTRIC FUNDOPLICATION      HAMMER TOE SURGERY Right     HERNIA REPAIR      HIATAL HERNIA REPAIR  09/06/2016    Laparoscopic Robotic with Nissen Fundoplication - Dr. Moise Mass OFFICE/OUTPT VISIT,PROCEDURE ONLY N/A 09/21/2018    EGD DIAGNOSTIC ONLY performed by Crissy Davis MD at [address]      right    TENDON RELEASE Left 08/09/2016    left foot    TUBAL LIGATION      TUNNELED VENOUS PORT PLACEMENT  11/06/2017    UPPER GASTROINTESTINAL ENDOSCOPY Left 05/10/2018    EGD BIOPSY performed by Mark Harris MD at [facility] Left 07/25/2021    EGD BIOPSY performed by Josiah Duarte MD at [facility]     Family History   Problem Relation Age of Onset    Cancer Mother     Heart Disease Mother     High Blood Pressure Mother     Diabetes Mother     Vision Loss Mother     Stroke Mother     COPD Father     Diabetes Father     COPD Brother      Social History     Tobacco Use    Smoking status: Former Smoker     Packs/day: 1.00     Years: 30.00     Pack years: 30.00     Types: Cigarettes     Quit date: 5/10/2006     Years since quitting: 15.7    Smokeless tobacco: Never Used   Substance Use Topics    Alcohol use: No      Current Outpatient Medications   Medication Sig Dispense Refill    ferrous gluconate (FERGON) 324 (38 Fe) MG tablet Take 324 mg by mouth 2 times daily      furosemide (LASIX) 20 MG tablet Take 20 mg by mouth daily as needed (swelling as needed)      insulin lispro (HUMALOG) 100 UNIT/ML injection vial Inject into the skin 3 times daily (before meals)      hydrOXYzine (ATARAX) 10 MG tablet Take 10 mg by mouth 3 times daily as needed for Itching      Probiotic Product (PROBIOTIC DAILY PO) Take by mouth      cetirizine (ZYRTEC) 10 MG tablet Take 10 mg by mouth daily      polyethyl glycol-propyl glycol 0.4-0.3 % (SYSTANE) 0.4-0.3 % ophthalmic solution 1 drop as needed for Dry Eyes      sucralfate (CARAFATE) 1 GM tablet Take 1 tablet by mouth 4 times daily (before meals and nightly) 120 tablet 0    vitamin D 25 MCG (1000 UT) CAPS Take by mouth daily 125 mcg daily      citalopram (CELEXA) 40 MG tablet Take 40 mg by mouth daily      loperamide (IMODIUM A-D) 2 MG tablet Take by mouth      tiZANidine (ZANAFLEX) 2 MG tablet Take 2 mg by mouth 2 times daily       DULoxetine (CYMBALTA) 20 MG extended release capsule Take 120 mg by mouth daily       insulin glargine (BASAGLAR KWIKPEN) 100 UNIT/ML injection pen Inject 8 Units into the skin nightly       potassium chloride (KLOR-CON M) 10 MEQ extended release tablet Take 1 tablet by mouth daily 90 tablet 2    Dulaglutide (TRULICITY) 5.31 TF/2.4VE SOPN Inject 0.75 mg into the skin once a week Every Wednesday      Cyanocobalamin (SM VITAMIN B-12 PO) Take 2,500 mcg by mouth daily      diphenhydrAMINE (BENADRYL) 25 MG capsule Take 25 mg by mouth as needed for Itching      RA ALCOHOL SWABS 70 % PADS   1    ONE TOUCH ULTRA TEST strip   1    Lancets Misc. (UNISTIK CZT COMFORT) MISC   1    ipratropium-albuterol (DUONEB) 0.5-2.5 (3) MG/3ML SOLN nebulizer solution Inhale 1 vial into the lungs every 4 hours       Linaclotide (LINZESS) 72 MCG CAPS Take 72 mcg by mouth daily       traZODone (DESYREL) 100 MG tablet Take 100 mg by mouth nightly       Multiple Vitamins-Minerals (THERAPEUTIC MULTIVITAMIN-MINERALS) tablet Take 1 tablet by mouth daily      OXYGEN Inhale into the lungs continuous      atorvastatin (LIPITOR) 20 MG tablet Take 1 tablet by mouth nightly 30 tablet 3    magnesium hydroxide (MILK OF MAGNESIA CONCENTRATE) 2400 MG/10ML SUSP Take 30 mLs by mouth once as needed      docusate sodium (COLACE) 100 MG capsule Take 100 mg by mouth 3 times daily as needed  acetaminophen (TYLENOL) 325 MG tablet Take 2 tablets by mouth every 4 hours as needed for Pain 120 tablet 3    levothyroxine (SYNTHROID) 75 MCG tablet Take 75 mcg by mouth Daily      pantoprazole (PROTONIX) 40 MG tablet Take 1 tablet by mouth 2 times daily (before meals) 60 tablet 0    Calcium Carb-Cholecalciferol 500-400 MG-UNIT TABS Take 1 tablet by mouth 2 times daily      pregabalin (LYRICA) 150 MG capsule Take 1 capsule by mouth 3 times daily for 3 days. . 9 capsule 0     No current facility-administered medications for this visit.      Allergies   Allergen Reactions    Bactrim [Sulfamethoxazole-Trimethoprim]      TOLD BY  NEVER TO TAKE AGAIN    Wellbutrin [Bupropion] Other (See Comments)     hallucinations    Silicone Rash     Health Maintenance   Topic Date Due    Hepatitis C screen  Never done    Depression Screen  Never done    DEXA (modify frequency per FRAX score)  Never done    Lipid screen  03/23/2017    Annual Wellness Visit (AWV)  Never done    TSH testing  11/28/2020    Flu vaccine (1) 09/01/2021    COVID-19 Vaccine (4 - Booster for Pfizer series) 03/12/2022    Potassium monitoring  07/26/2022    Creatinine monitoring  07/26/2022    DTaP/Tdap/Td vaccine (2 - Td or Tdap) 03/31/2026    Pneumococcal 65+ years Vaccine  Completed    Hepatitis A vaccine  Aged Out    Hib vaccine  Aged Out    Meningococcal (ACWY) vaccine  Aged Out       Subjective:  Review of Systems  General:   No fever, no chills, some fatigue or weight loss  Pulmonary:    some dyspnea, no wheezing  Cardiac:    Denies recent chest pain,   GI:     No nausea or vomiting, no abdominal pain  Neuro:     No dizziness or light headedness,   Musculoskeletal:  No recent active issues  Extremities:   No edema, no obvious claudication       Objective:  Physical Exam  /68   Pulse 54   Ht 5' 6\" (1.676 m)   Wt 228 lb (103.4 kg)   BMI 36.80 kg/m²   General:   Well developed, well nourished  Lungs:    Clear to auscultation  Heart:    Normal S1 S2, Slight murmur. no rubs, no gallops  Abdomen:   Soft, non tender, no organomegalies, positive bowel sounds  Extremities:   No edema, no cyanosis, good peripheral pulses  Neurological:   Awake, alert, oriented. No obvious focal deficits  Musculoskelatal:  No obvious deformities    Assessment:      Diagnosis Orders   1. Dilated cardiomyopathy (HCC)     2. Paroxysmal atrial fibrillation ([address]     3. Coronary artery disease involving native coronary artery of native heart without angina pectoris     4. Primary hypertension     as above  Cardiac fair for now   Seems stable for now     Plan:  No follow-ups on file. As above  Pending GI and watchman   Continue risk factor modification and medical management  Thank you for allowing me to participate in the care of your patient. Please don't hesitate to contact me regarding any further issues related to the patient care    Orders Placed:  No orders of the defined types were placed in this encounter. Medications Prescribed:  No orders of the defined types were placed in this encounter. Discussed use, benefit, and side effects of prescribed medications. All patient questions answered. Pt voicedunderstanding. Instructed to continue current medications, diet and exercise. Continue risk factor modification and medical management. Patient agreed with treatment plan. Follow up as directed.     Electronically signedby Saturnino Barksdale MD on 2/7/2022 at 11:18 AM

## 2022-02-14 ENCOUNTER — HOSPITAL ENCOUNTER (OUTPATIENT)
Dept: NURSING | Age: 77
Discharge: HOME | End: 2022-02-14
Payer: MEDICARE

## 2022-02-14 VITALS
OXYGEN SATURATION: 93 % | DIASTOLIC BLOOD PRESSURE: 59 MMHG | SYSTOLIC BLOOD PRESSURE: 105 MMHG | BODY MASS INDEX: 36.96 KG/M2 | WEIGHT: 229 LBS | HEART RATE: 75 BPM | TEMPERATURE: 96.9 F | RESPIRATION RATE: 18 BRPM

## 2022-02-14 DIAGNOSIS — D83.9 COMMON VARIABLE IMMUNODEFICIENCY (HCC): Primary | ICD-10-CM

## 2022-02-14 LAB
IGA: 93 MG/DL (ref 70–400)
IGG: 994 MG/DL (ref 700–1600)
IGM: 43 MG/DL (ref 40–230)

## 2022-02-14 PROCEDURE — 2580000003 HC RX 258: Performed by: FAMILY MEDICINE

## 2022-02-14 PROCEDURE — 82784 ASSAY IGA/IGD/IGG/IGM EACH: CPT

## 2022-02-14 PROCEDURE — 6360000002 HC RX W HCPCS: Performed by: FAMILY MEDICINE

## 2022-02-14 PROCEDURE — 96413 CHEMO IV INFUSION 1 HR: CPT

## 2022-02-14 PROCEDURE — 96417 CHEMO IV INFUS EACH ADDL SEQ: CPT

## 2022-02-14 PROCEDURE — 96365 THER/PROPH/DIAG IV INF INIT: CPT

## 2022-02-14 RX ORDER — ALBUTEROL SULFATE 90 UG/1
4 AEROSOL, METERED RESPIRATORY (INHALATION) PRN
Status: CANCELLED | OUTPATIENT
Start: 2022-03-14

## 2022-02-14 RX ORDER — DIPHENHYDRAMINE HCL 25 MG
25 TABLET ORAL ONCE
Status: DISCONTINUED | OUTPATIENT
Start: 2022-02-14 | End: 2022-02-15 | Stop reason: HOSPADM

## 2022-02-14 RX ORDER — SODIUM CHLORIDE 0.9 % (FLUSH) 0.9 %
5-40 SYRINGE (ML) INJECTION PRN
Status: CANCELLED | OUTPATIENT
Start: 2022-03-14

## 2022-02-14 RX ORDER — MEPERIDINE HYDROCHLORIDE 25 MG/ML
12.5 INJECTION INTRAMUSCULAR; INTRAVENOUS; SUBCUTANEOUS PRN
Status: CANCELLED | OUTPATIENT
Start: 2022-03-14

## 2022-02-14 RX ORDER — SODIUM CHLORIDE 9 MG/ML
INJECTION, SOLUTION INTRAVENOUS CONTINUOUS
Status: CANCELLED | OUTPATIENT
Start: 2022-03-14

## 2022-02-14 RX ORDER — DIPHENHYDRAMINE HYDROCHLORIDE 50 MG/ML
50 INJECTION INTRAMUSCULAR; INTRAVENOUS
Status: CANCELLED | OUTPATIENT
Start: 2022-03-14

## 2022-02-14 RX ORDER — HEPARIN SODIUM (PORCINE) LOCK FLUSH IV SOLN 100 UNIT/ML 100 UNIT/ML
500 SOLUTION INTRAVENOUS PRN
Status: CANCELLED | OUTPATIENT
Start: 2022-03-14

## 2022-02-14 RX ORDER — ACETAMINOPHEN 325 MG/1
650 TABLET ORAL ONCE
Status: CANCELLED | OUTPATIENT
Start: 2022-03-14 | End: 2022-03-14

## 2022-02-14 RX ORDER — SODIUM CHLORIDE 0.9 % (FLUSH) 0.9 %
5-40 SYRINGE (ML) INJECTION PRN
Status: DISCONTINUED | OUTPATIENT
Start: 2022-02-14 | End: 2022-02-15 | Stop reason: HOSPADM

## 2022-02-14 RX ORDER — DIPHENHYDRAMINE HCL 25 MG
25 TABLET ORAL ONCE
Status: CANCELLED | OUTPATIENT
Start: 2022-03-14 | End: 2022-03-14

## 2022-02-14 RX ORDER — SODIUM CHLORIDE 9 MG/ML
25 INJECTION, SOLUTION INTRAVENOUS PRN
Status: CANCELLED | OUTPATIENT
Start: 2022-03-14

## 2022-02-14 RX ORDER — HEPARIN SODIUM (PORCINE) LOCK FLUSH IV SOLN 100 UNIT/ML 100 UNIT/ML
500 SOLUTION INTRAVENOUS PRN
Status: DISCONTINUED | OUTPATIENT
Start: 2022-02-14 | End: 2022-02-15 | Stop reason: HOSPADM

## 2022-02-14 RX ORDER — ONDANSETRON 2 MG/ML
8 INJECTION INTRAMUSCULAR; INTRAVENOUS
Status: CANCELLED | OUTPATIENT
Start: 2022-03-14

## 2022-02-14 RX ORDER — ACETAMINOPHEN 325 MG/1
650 TABLET ORAL
Status: CANCELLED | OUTPATIENT
Start: 2022-03-14

## 2022-02-14 RX ORDER — ACETAMINOPHEN 325 MG/1
650 TABLET ORAL ONCE
Status: DISCONTINUED | OUTPATIENT
Start: 2022-02-14 | End: 2022-02-15 | Stop reason: HOSPADM

## 2022-02-14 RX ADMIN — SODIUM CHLORIDE, PRESERVATIVE FREE 10 ML: 5 INJECTION INTRAVENOUS at 11:20

## 2022-02-14 RX ADMIN — IMMUNE GLOBULIN (HUMAN) 5 G: 10 INJECTION INTRAVENOUS; SUBCUTANEOUS at 10:00

## 2022-02-14 RX ADMIN — IMMUNE GLOBULIN (HUMAN) 20 G: 10 INJECTION INTRAVENOUS; SUBCUTANEOUS at 10:31

## 2022-02-14 RX ADMIN — HEPARIN 500 UNITS: 100 SYRINGE at 11:20

## 2022-02-14 ASSESSMENT — PAIN - FUNCTIONAL ASSESSMENT: PAIN_FUNCTIONAL_ASSESSMENT: 0-10

## 2022-02-22 NOTE — TELEPHONE ENCOUNTER
Spoke to Dr. Yair Nicole and she has given clearance to move forward with the [facility] procedure. Unable to reach the patient. Message was left.

## 2022-02-25 ENCOUNTER — TELEPHONE (OUTPATIENT)
Dept: CARDIOLOGY CLINIC | Age: 77
End: 2022-02-25

## 2022-02-25 NOTE — TELEPHONE ENCOUNTER
WATCHMAN shared decision form obtained from Dr. Shannon Cordova. [id number] = [id number]    The patient was diagnosed with Paroxymal Atrial Fibrillation      Orders received from Dr. Ave Dumont to schedule the patient for a WATCHMAN procedure and hold the following medications the morning of the procedure: Insulin, Lasix and Trulicity. WATCHMAN: 3/3/2022 at 0900 have the patient arrive at 0700  Discharge caregiver: [facility]     Post procedural orders: Structural heart follow up phone call on POD 1, have the patient restrart Eliquis 5mg on POD 1 post implant, obtain a CBC and BMP prior to the 45 day JAYLEN (4/18/2022).      CBC/BMP/JAYLEN:   Follow up date with primary cardiologist: Dr. Marty Valdez, can you please schedule this patients JAYLEN?

## 2022-03-02 ENCOUNTER — PREP FOR PROCEDURE (OUTPATIENT)
Dept: CARDIOLOGY | Age: 77
End: 2022-03-02

## 2022-03-02 RX ORDER — SODIUM CHLORIDE 9 MG/ML
75 INJECTION, SOLUTION INTRAVENOUS CONTINUOUS
Status: CANCELLED | OUTPATIENT
Start: 2022-03-02

## 2022-03-02 RX ORDER — SODIUM CHLORIDE 9 MG/ML
INJECTION, SOLUTION INTRAVENOUS PRN
Status: CANCELLED | OUTPATIENT
Start: 2022-03-02

## 2022-03-02 RX ORDER — SODIUM CHLORIDE 0.9 % (FLUSH) 0.9 %
5-40 SYRINGE (ML) INJECTION PRN
Status: CANCELLED | OUTPATIENT
Start: 2022-03-02

## 2022-03-02 RX ORDER — SODIUM CHLORIDE 9 MG/ML
25 INJECTION, SOLUTION INTRAVENOUS PRN
Status: CANCELLED | OUTPATIENT
Start: 2022-03-02

## 2022-03-02 RX ORDER — ACETAMINOPHEN 325 MG/1
650 TABLET ORAL EVERY 4 HOURS PRN
Status: CANCELLED | OUTPATIENT
Start: 2022-03-02

## 2022-03-02 RX ORDER — DIPHENHYDRAMINE HYDROCHLORIDE 50 MG/ML
50 INJECTION INTRAMUSCULAR; INTRAVENOUS ONCE
Status: CANCELLED | OUTPATIENT
Start: 2022-03-02 | End: 2022-03-02

## 2022-03-03 ENCOUNTER — HOSPITAL ENCOUNTER (INPATIENT)
Dept: INPATIENT UNIT | Age: 77
LOS: 1 days | Discharge: NURSING, REHAB OR LONG-TERM CARE | DRG: 274 | End: 2022-03-03
Attending: INTERNAL MEDICINE | Admitting: INTERNAL MEDICINE
Payer: MEDICARE

## 2022-03-03 ENCOUNTER — APPOINTMENT (OUTPATIENT)
Dept: CARDIAC CATH/INVASIVE PROCEDURES | Age: 77
DRG: 274 | End: 2022-03-03
Attending: INTERNAL MEDICINE
Payer: MEDICARE

## 2022-03-03 VITALS
HEART RATE: 87 BPM | RESPIRATION RATE: 13 BRPM | OXYGEN SATURATION: 97 % | SYSTOLIC BLOOD PRESSURE: 131 MMHG | DIASTOLIC BLOOD PRESSURE: 49 MMHG | TEMPERATURE: 98.4 F

## 2022-03-03 PROBLEM — I48.0 PAROXYSMAL ATRIAL FIBRILLATION (HCC): Status: ACTIVE | Noted: 2022-03-03

## 2022-03-03 LAB
ABO: NORMAL
ALBUMIN SERPL-MCNC: 4.1 G/DL (ref 3.5–5.1)
ALP BLD-CCNC: 76 U/L (ref 38–126)
ALT SERPL-CCNC: 11 U/L (ref 11–66)
ANION GAP SERPL CALCULATED.3IONS-SCNC: 14 MEQ/L (ref 8–16)
ANTIBODY SCREEN: NORMAL
APTT: 27.5 SECONDS (ref 22–38)
AST SERPL-CCNC: 19 U/L (ref 5–40)
BILIRUB SERPL-MCNC: 0.5 MG/DL (ref 0.3–1.2)
BUN BLDV-MCNC: 29 MG/DL (ref 7–22)
CALCIUM SERPL-MCNC: 9.6 MG/DL (ref 8.5–10.5)
CHLORIDE BLD-SCNC: 103 MEQ/L (ref 98–111)
CO2: 25 MEQ/L (ref 23–33)
CREAT SERPL-MCNC: 1.5 MG/DL (ref 0.4–1.2)
EKG ATRIAL RATE: 61 BPM
EKG P AXIS: 10 DEGREES
EKG P-R INTERVAL: 162 MS
EKG Q-T INTERVAL: 428 MS
EKG QRS DURATION: 102 MS
EKG QTC CALCULATION (BAZETT): 430 MS
EKG R AXIS: -41 DEGREES
EKG T AXIS: 95 DEGREES
EKG VENTRICULAR RATE: 61 BPM
ERYTHROCYTE [DISTWIDTH] IN BLOOD BY AUTOMATED COUNT: 13.3 % (ref 11.5–14.5)
ERYTHROCYTE [DISTWIDTH] IN BLOOD BY AUTOMATED COUNT: 46.5 FL (ref 35–45)
GFR SERPL CREATININE-BSD FRML MDRD: 34 ML/MIN/1.73M2
GLUCOSE BLD-MCNC: 108 MG/DL (ref 70–108)
HCT VFR BLD CALC: 39.1 % (ref 37–47)
HEMOGLOBIN: 12.5 GM/DL (ref 12–16)
INR BLD: 0.96 (ref 0.85–1.13)
MCH RBC QN AUTO: 30.3 PG (ref 26–33)
MCHC RBC AUTO-ENTMCNC: 32 GM/DL (ref 32.2–35.5)
MCV RBC AUTO: 94.9 FL (ref 81–99)
PLATELET # BLD: 164 THOU/MM3 (ref 130–400)
PMV BLD AUTO: 10.2 FL (ref 9.4–12.4)
POC ACTIVATED CLOTTING TIME KAOLIN: 612 SECONDS (ref 1–150)
POTASSIUM SERPL-SCNC: 4.2 MEQ/L (ref 3.5–5.2)
RBC # BLD: 4.12 MILL/MM3 (ref 4.2–5.4)
RH FACTOR: NORMAL
SODIUM BLD-SCNC: 142 MEQ/L (ref 135–145)
TOTAL PROTEIN: 7.1 G/DL (ref 6.1–8)
WBC # BLD: 4.4 THOU/MM3 (ref 4.8–10.8)

## 2022-03-03 PROCEDURE — 86923 COMPATIBILITY TEST ELECTRIC: CPT

## 2022-03-03 PROCEDURE — B24BZZ4 ULTRASONOGRAPHY OF HEART WITH AORTA, TRANSESOPHAGEAL: ICD-10-PCS | Performed by: INTERNAL MEDICINE

## 2022-03-03 PROCEDURE — 2780000010 HC IMPLANT OTHER

## 2022-03-03 PROCEDURE — 85730 THROMBOPLASTIN TIME PARTIAL: CPT

## 2022-03-03 PROCEDURE — 85347 COAGULATION TIME ACTIVATED: CPT

## 2022-03-03 PROCEDURE — C1893 INTRO/SHEATH, FIXED,NON-PEEL: HCPCS

## 2022-03-03 PROCEDURE — C1769 GUIDE WIRE: HCPCS

## 2022-03-03 PROCEDURE — 33340 PERQ CLSR TCAT L ATR APNDGE: CPT | Performed by: INTERNAL MEDICINE

## 2022-03-03 PROCEDURE — 6360000002 HC RX W HCPCS: Performed by: NURSE PRACTITIONER

## 2022-03-03 PROCEDURE — 6360000002 HC RX W HCPCS

## 2022-03-03 PROCEDURE — 80053 COMPREHEN METABOLIC PANEL: CPT

## 2022-03-03 PROCEDURE — 86850 RBC ANTIBODY SCREEN: CPT

## 2022-03-03 PROCEDURE — 1200000000 HC SEMI PRIVATE

## 2022-03-03 PROCEDURE — C1894 INTRO/SHEATH, NON-LASER: HCPCS

## 2022-03-03 PROCEDURE — 2500000003 HC RX 250 WO HCPCS

## 2022-03-03 PROCEDURE — 33340 PERQ CLSR TCAT L ATR APNDGE: CPT

## 2022-03-03 PROCEDURE — 86901 BLOOD TYPING SEROLOGIC RH(D): CPT

## 2022-03-03 PROCEDURE — 02L73DK OCCLUSION OF LEFT ATRIAL APPENDAGE WITH INTRALUMINAL DEVICE, PERCUTANEOUS APPROACH: ICD-10-PCS | Performed by: INTERNAL MEDICINE

## 2022-03-03 PROCEDURE — 6360000002 HC RX W HCPCS: Performed by: INTERNAL MEDICINE

## 2022-03-03 PROCEDURE — C1760 CLOSURE DEV, VASC: HCPCS

## 2022-03-03 PROCEDURE — 36415 COLL VENOUS BLD VENIPUNCTURE: CPT

## 2022-03-03 PROCEDURE — 86900 BLOOD TYPING SEROLOGIC ABO: CPT

## 2022-03-03 PROCEDURE — 2580000003 HC RX 258: Performed by: NURSE PRACTITIONER

## 2022-03-03 PROCEDURE — 85027 COMPLETE CBC AUTOMATED: CPT

## 2022-03-03 PROCEDURE — 93005 ELECTROCARDIOGRAM TRACING: CPT | Performed by: NURSE PRACTITIONER

## 2022-03-03 PROCEDURE — 85610 PROTHROMBIN TIME: CPT

## 2022-03-03 PROCEDURE — APPSS60 APP SPLIT SHARED TIME 46-60 MINUTES: Performed by: PHYSICIAN ASSISTANT

## 2022-03-03 RX ORDER — OXYCODONE HYDROCHLORIDE AND ACETAMINOPHEN 5; 325 MG/1; MG/1
1 TABLET ORAL EVERY 4 HOURS PRN
Status: DISCONTINUED | OUTPATIENT
Start: 2022-03-03 | End: 2022-03-03 | Stop reason: HOSPADM

## 2022-03-03 RX ORDER — ASPIRIN 81 MG/1
81 TABLET, CHEWABLE ORAL DAILY
Qty: 30 TABLET | Refills: 3 | Status: ON HOLD | OUTPATIENT
Start: 2022-03-04 | End: 2022-04-30 | Stop reason: HOSPADM

## 2022-03-03 RX ORDER — ACETAMINOPHEN 325 MG/1
650 TABLET ORAL EVERY 4 HOURS PRN
Status: DISCONTINUED | OUTPATIENT
Start: 2022-03-03 | End: 2022-03-03 | Stop reason: SDUPTHER

## 2022-03-03 RX ORDER — SODIUM CHLORIDE 0.9 % (FLUSH) 0.9 %
5-40 SYRINGE (ML) INJECTION EVERY 12 HOURS SCHEDULED
Status: DISCONTINUED | OUTPATIENT
Start: 2022-03-03 | End: 2022-03-03 | Stop reason: HOSPADM

## 2022-03-03 RX ORDER — SODIUM CHLORIDE 0.9 % (FLUSH) 0.9 %
5-40 SYRINGE (ML) INJECTION PRN
Status: DISCONTINUED | OUTPATIENT
Start: 2022-03-03 | End: 2022-03-03 | Stop reason: SDUPTHER

## 2022-03-03 RX ORDER — SODIUM CHLORIDE 9 MG/ML
75 INJECTION, SOLUTION INTRAVENOUS CONTINUOUS
Status: DISCONTINUED | OUTPATIENT
Start: 2022-03-03 | End: 2022-03-03 | Stop reason: SDUPTHER

## 2022-03-03 RX ORDER — ACETAMINOPHEN 325 MG/1
650 TABLET ORAL EVERY 4 HOURS PRN
Status: DISCONTINUED | OUTPATIENT
Start: 2022-03-03 | End: 2022-03-03 | Stop reason: HOSPADM

## 2022-03-03 RX ORDER — SODIUM CHLORIDE 9 MG/ML
25 INJECTION, SOLUTION INTRAVENOUS PRN
Status: DISCONTINUED | OUTPATIENT
Start: 2022-03-03 | End: 2022-03-03 | Stop reason: SDUPTHER

## 2022-03-03 RX ORDER — SODIUM CHLORIDE 9 MG/ML
INJECTION, SOLUTION INTRAVENOUS CONTINUOUS
Status: DISCONTINUED | OUTPATIENT
Start: 2022-03-03 | End: 2022-03-03 | Stop reason: HOSPADM

## 2022-03-03 RX ORDER — SODIUM CHLORIDE 9 MG/ML
25 INJECTION, SOLUTION INTRAVENOUS PRN
Status: DISCONTINUED | OUTPATIENT
Start: 2022-03-03 | End: 2022-03-03 | Stop reason: HOSPADM

## 2022-03-03 RX ORDER — ASPIRIN 81 MG/1
81 TABLET, CHEWABLE ORAL DAILY
Status: DISCONTINUED | OUTPATIENT
Start: 2022-03-04 | End: 2022-03-03 | Stop reason: HOSPADM

## 2022-03-03 RX ORDER — SODIUM CHLORIDE 9 MG/ML
INJECTION, SOLUTION INTRAVENOUS PRN
Status: DISCONTINUED | OUTPATIENT
Start: 2022-03-03 | End: 2022-03-03 | Stop reason: HOSPADM

## 2022-03-03 RX ORDER — SODIUM CHLORIDE 0.9 % (FLUSH) 0.9 %
5-40 SYRINGE (ML) INJECTION PRN
Status: DISCONTINUED | OUTPATIENT
Start: 2022-03-03 | End: 2022-03-03 | Stop reason: HOSPADM

## 2022-03-03 RX ORDER — DIPHENHYDRAMINE HYDROCHLORIDE 50 MG/ML
50 INJECTION INTRAMUSCULAR; INTRAVENOUS ONCE
Status: DISCONTINUED | OUTPATIENT
Start: 2022-03-03 | End: 2022-03-03 | Stop reason: HOSPADM

## 2022-03-03 RX ORDER — HEPARIN SODIUM (PORCINE) LOCK FLUSH IV SOLN 100 UNIT/ML 100 UNIT/ML
500 SOLUTION INTRAVENOUS PRN
Status: DISCONTINUED | OUTPATIENT
Start: 2022-03-03 | End: 2022-03-03 | Stop reason: HOSPADM

## 2022-03-03 RX ADMIN — SODIUM CHLORIDE 50 ML/HR: 9 INJECTION, SOLUTION INTRAVENOUS at 08:47

## 2022-03-03 RX ADMIN — HEPARIN 500 UNITS: 100 SYRINGE at 15:03

## 2022-03-03 RX ADMIN — CEFAZOLIN SODIUM 2000 MG: 10 INJECTION, POWDER, FOR SOLUTION INTRAVENOUS at 08:48

## 2022-03-03 NOTE — PROGRESS NOTES
Pt admitted to  2E12 ambulatory for Watchman procedure . Pt NPO. Patient accompanied by son. Vital signs obtained. Assessment and data collection initiated. Oriented to room. Policies and procedures for 2E explained   All questions answered with no further questions at this time. Fall prevention and safety precautions discussed with patient. Care plan reviewed with patient and family. Patient and family verbalize understanding of the plan of care and contribute to goal setting.     Data collection and medication review per J Noe RN  9670 port accessed per IV team RN  6297 to procedure per bed

## 2022-03-03 NOTE — CARE COORDINATION
3/3/22, 7:34 AM EST  DISCHARGE PLANNING EVALUATION:    Omar Gomez       Admitted: 3/3/2022/ Ever day: 0   Location: [facility]/012-A Reason for admit: No admission diagnoses are documented for this encounter. PMH:  has a past medical history of Anemia, Atrial fibrillation (HCC), CAD (coronary artery disease), CHF (congestive heart failure) ([facility] [address] Chronic kidney disease, COPD (chronic obstructive pulmonary disease) ([facility] [address] DDD (degenerative disc disease), lumbar, Depression, Frequent PVCs, GERD (gastroesophageal reflux disease), History of blood transfusion, Hx of blood clots, Hyperlipidemia, Hypertension, Hyperthyroidism, Movement disorder, Neuropathy, Obesity, Palpitations, Pneumonia, Sleep apnea, Status post right and left heart catheterization, and Type II or unspecified type diabetes mellitus without mention of complication, not stated as uncontrolled. Procedure: 3/3 Watchman procedure. Barriers to Discharge:  Here for elective procedure, Watchman procedure today. If pt meets discharge criteria, she may be discharged home today. PCP: Marj Jay MD   %    Patient Goals/Plan/Treatment Preferences: Spoke with pt's son Anayeli Mayers, pt was at her procedure. Pt lives at [address]. She does not drive, but family or [address] takes her to all appts. Pt has home O2 through [facility], wears 1-2L at rest and with activity. She wears 3L at night bled into her CPAP. She also has a Rollator. Plans are for pt to return to [place] at discharge, son will transport. Verified PCP and insurance. Transportation/Food Security/Housekeeping Addressed:  No issues identified.

## 2022-03-03 NOTE — DISCHARGE SUMMARY
Structural Heart Discharge Summary                          Patient Identification:  Frank Serna  : 1945  MRN: [id number]   Account: [de-identified]     Admit date: 3/3/2022  Discharge date: 22  Attending provider: Cinthia Thompson MD        Primary care provider: Kristi Keane MD     Admission Diagnoses:  Paroxysmal Atrial Fibrillation not able to tolerate long term [address]    Discharge Diagnoses:   [facility] Course:   Frank Serna is a 67 y.o. female admitted to [address] [place] [address] on 3/3/2022 post successful percutaneous Left Atrial Appendage Occulusion. Patient is doing well following implant. No respiratiory, cardiac or vascular access issues after the procedure. The patient is ambulating at their baseline level. The patient has met criteria (as denoted below) for early discharge. Early Discharge Criteria:   [x]Uncomplicated intubation/extubation or use of MAC during the case  [x]Ultrasound guided and uncomplicated vascular access and closure  [x]Uncomplicated septal puncture  [x]Hemodynamically stable without use of any pressor supporting agents after the procedure  [x]No pericardial effusion during case  [x]Has a willingness to go home with a caregiver   [x]Patient has transportation for the next morning structural heart follow up appointment or case of urgent return overnight  [x]No need for supplemental O2 or at patients normal baseline level of O2 use  [x]No need for a blood transfusion during stay  [x]Able to tolerate PO intake    [x]Able to ambulate at baseline 6 hours after the procedure  [x]Tolerating [address] post procedure and has medications for home use  [x]No cardiac symptoms at the time of discharge    Pre-procedure Diagnosis: Paroxysmal Atrial Fibrillation    Procedures: Percutaneous Left Atrial Appendage Occlusion implant with the use of nursing anesthesia. A WATCHMAN FLX device was utilized.      Code Status:   FULL CODE    Examination:  Vitals:BP (!) 142/71   Pulse 64   Temp 98.4 °F (36.9 °C) (Oral)   Resp 15   SpO2 98%        General Appearance: alert and oriented to person, place and time, in no acute distress  Cardiovascular: regular rate and rhythm, normal S1 and S2, no murmurs, rubs, clicks, or gallops, distal pulses intact, no carotid bruits, no JVD  Pulmonary/Chest: clear to auscultation bilaterally- no wheezes, rales or rhonchi, normal air movement, no respiratory distress  Abdomen: soft, non-tender, non-distended, normal bowel sounds, no masses   Extremities: no cyanosis, clubbing or edema, pulse   Groin Site: Dressing dry and intact, no signs of oozing, bruising or hematoma.    Skin: warm and dry, no rash or erythema  Head: normocephalic and atraumatic  Eyes: pupils equal, round, and reactive to light  Neck: supple and non-tender without mass, no thyromegaly   Musculoskeletal: normal range of motion, no joint swelling, deformity or tenderness  Neurological: alert, oriented, normal speech, no focal findings or movement disorder noted    Medications:     Medication List      START taking these medications    apixaban 5 MG Tabs tablet  Commonly known as: Eliquis  Take 1 tablet by mouth daily     aspirin 81 MG chewable tablet  Take 1 tablet by mouth daily  Start taking on: March 4, 2022        CONTINUE taking these medications    acetaminophen 325 MG tablet  Commonly known as: TYLENOL  Take 2 tablets by mouth every 4 hours as needed for Pain     atorvastatin 20 MG tablet  Commonly known as: LIPITOR  Take 1 tablet by mouth nightly     Basaglar KwikPen 100 UNIT/ML injection pen  Generic drug: insulin glargine     Calcium Carb-Cholecalciferol 500-400 MG-UNIT Tabs     cetirizine 10 MG tablet  Commonly known as: ZYRTEC     citalopram 40 MG tablet  Commonly known as: CELEXA     diphenhydrAMINE 25 MG capsule  Commonly known as: BENADRYL     docusate sodium 100 MG capsule  Commonly known as: COLACE     DULoxetine 20 MG extended release capsule  Commonly known as: CYMBALTA     ferrous gluconate 324 (38 Fe) MG tablet  Commonly known as: FERGON     furosemide 20 MG tablet  Commonly known as: LASIX     HumaLOG 100 UNIT/ML injection vial  Generic drug: insulin lispro     hydrOXYzine 10 MG tablet  Commonly known as: ATARAX     ipratropium-albuterol 0.5-2.5 (3) MG/3ML Soln nebulizer solution  Commonly known as: DUONEB     levothyroxine 75 MCG tablet  Commonly known as: SYNTHROID     Linzess 72 MCG Caps capsule  Generic drug: linaCLOtide     loperamide 2 MG tablet  Commonly known as: IMODIUM A-D     magnesium hydroxide 2400 MG/10ML Susp  Commonly known as: MILK OF MAGNESIA CONCENTRATE     ONE TOUCH ULTRA TEST strip  Generic drug: blood glucose test strips     OXYGEN     pantoprazole 40 MG tablet  Commonly known as: PROTONIX  Take 1 tablet by mouth 2 times daily (before meals)     polyethyl glycol-propyl glycol 0.4-0.3 % 0.4-0.3 % ophthalmic solution  Commonly known as: SYSTANE     potassium chloride 10 MEQ extended release tablet  Commonly known as: KLOR-CON M  Take 1 tablet by mouth daily     pregabalin 150 MG capsule  Commonly known as: LYRICA  Take 1 capsule by mouth 3 times daily for 3 days. Patricia Gin      PROBIOTIC DAILY PO     RA Alcohol Swabs 70 % Pads     SM VITAMIN B-12 PO     sucralfate 1 GM tablet  Commonly known as: CARAFATE  Take 1 tablet by mouth 4 times daily (before meals and nightly)     therapeutic multivitamin-minerals tablet     tiZANidine 2 MG tablet  Commonly known as: ZANAFLEX     traZODone 100 MG tablet  Commonly known as: DESYREL     Trulicity 7.75 CH/8.6LP Sopn  Generic drug: Dulaglutide     Unistik CZT Comfort Misc     vitamin D 25 MCG (1000 UT) Caps           Where to Get Your Medications      These medications were sent to [address], [address] [place] [address]  [address] [place] [address]  [facility], [address] [address]    Phone: [phone]   · apixaban 5 MG Tabs tablet  · aspirin 81 MG chewable tablet         Recommendations:  Non-valvular afibrillation s/p successful percutaneous left atrial appendage occlusion, [id number]: 6   You will restart OAC the following day   JAYLEN and Labs at 45 days and 12 months   At 45 days, if WATCHMAN device is well seated with adequate seal and residual leak < 5 mm, then: continue Aspirin 81 and start Plavix 75 mg q day    Antibiotic prophylaxis (amoxicillin 2 g once 60 minutes prior to procedure) for 6 months for:          Dental procedures including cleanings          Gastrointestinal (GI) or genitourinary () procedures in patients with ongoing GI or  tract infection          Respiratory procedures involving incision or biopsy         Procedures on infected skin or muscle    Disposition: It is medically necessary that patients undergoing percutaneous left atrial appendage occlusion are admitted as an inpatient. This patient had a recovery that was earlier than expected and can be discharged today. Patient will undergo JAYLEN at 45 days following WATCHMAN implant. Labs: Will be ordered by the Structural Heart Coordinator as outpatient. Patient Instructions: Activity: Activity as tolerated. No driving for 24 hours following procedure. Diet: Cardiac Diet     Follow-up visits: Will be called to you within one week from day of discharge.     Discharge condition: Stable    Disposition: Home    Time spent on discharge: 62 minutes      Electronically signed by Joni Lopez PA-C on 3/3/2022 at 2:02 PM

## 2022-03-03 NOTE — BRIEF OP NOTE
[facility]  Sedation/Analgesia Post Sedation Record    Pt Name: Sanjuanita Dhillon  Account number: [de-identified]  MRN: [id number]  YOB: 1945  Procedure Performed By: MD MD Shane Browning, [address]  Primary Care Physician: Chevy Licona MD  Date: 3/3/2022    POST-PROCEDURE    Physicians/Assistants: MD MD Shane Browning, LELE    Procedure Performed:WATCHMAN      Sedation/Anesthesia: Versed/ Fentanyl and 2% xylocaine local anesthesia. Estimated Blood Loss: < 50 ml. Specimens Removed: None         Disposition of Specimen: N/A        Complications: No Immediate Complications.        Post-procedure Diagnosis/Findings:      WATCHMAN FLX 35              MD MD Shane Browning, LELE  Electronically signed 3/3/2022 at 10:19 AM  Interventional Cardiology

## 2022-03-03 NOTE — H&P
[address] [address]  Sedation/Analgesia History & Physical    Pt Name: Magdalene Verde  Account number: [de-identified]  MRN: [id number]  YOB: 1945  Provider Performing Procedure: Belia Perez MD MD  Referring Provider: Belia Perez MD   Primary Care Physician: Darryle Dixons, MD  Date: 3/3/2022    PRE-PROCEDURE    Code Status: FULL CODE  Brief History/Pre-Procedure Diagnosis:   Paroxysmal Atrial Fibrillation, [id number] = 6  Not a candidate for long term [address] [place] [address]     Consent: : I have discussed with the patient risks, benefits, and alternatives (and relevant risks, benefits, and side effects related to alternatives or not receiving care), and likelihood of the success. The patient and/or representative appear to understand and agree to proceed. The discussion encompasses risks, benefits, and side effects related to the alternatives and the risks related to not receiving the proposed care, treatment, and services. The indication, risks and benefits of the procedure and possible therapeutic consequences and alternatives were discussed with the patient. The patient was given the opportunity to ask questions and to have them answered to his/her satisfaction. Risks of the procedure include but are not limited to the following: Bleeding, hematoma including retroperitoneal hemmorhage, infection, pain and discomfort, injury to the aorta and other blood vessels, rhythm disturbance, low blood pressure, myocardial infarction, stroke, kidney damage/failure, myocardial perforation, allergic reactions to sedatives/contrast material, loss of pulse/vascular compromise requiring surgery, aneurysm/pseudoaneurysm formation, possible loss of a limb/hand/leg, needing blood transfusion, requiring emergent open heart surgery or emergent coronary intervention, the need for intubation/respiratory support, the requirement for defibrillation/cardioversion, and death.  Alternatives to and omission of the suggested procedure were discussed. The patient had no further questions and wished to proceed; the consent form was signed. MEDICAL HISTORY   has a past medical history of Anemia, Atrial fibrillation ([facility] [address] CAD (coronary artery disease), CHF (congestive heart failure) ([facility] [address] Chronic kidney disease, COPD (chronic obstructive pulmonary disease) ([facility] [address] DDD (degenerative disc disease), lumbar, Depression, Frequent PVCs, GERD (gastroesophageal reflux disease), History of blood transfusion, Hx of blood clots, Hyperlipidemia, Hypertension, Hyperthyroidism, Movement disorder, Neuropathy, Obesity, Palpitations, Pneumonia, Sleep apnea, Status post right and left heart catheterization, and Type II or unspecified type diabetes mellitus without mention of complication, not stated as uncontrolled. SURGICAL HISTORY   has a past surgical history that includes Rotator cuff repair; Tubal ligation; Hysterectomy; bladder suspension; tendon release (Left, 08/09/2016); Achilles tendon surgery (Bilateral); Cardiac surgery (2016); Hammer toe surgery (Right); hiatal hernia repair (09/06/2016); hernia repair; Tunneled venous port placement (11/06/2017); Upper gastrointestinal endoscopy (Left, 05/10/2018); Colonoscopy (Left, 05/11/2018); Endoscopy, colon, diagnostic; Gastric fundoplication; pr office/outpt visit,procedure only (N/A, 09/21/2018); Upper gastrointestinal endoscopy (Left, 07/25/2021); Colonoscopy (N/A, 08/04/2021); and Colonoscopy (08/04/2021).   Additional information:       ALLERGIES   Allergies as of 03/03/2022 - Fully Reviewed 03/03/2022   Allergen Reaction Noted    Bactrim [sulfamethoxazole-trimethoprim]  01/02/2018    Wellbutrin [bupropion] Other (See Comments) 59/19/9150    Silicone Rash 42/74/0112     Additional information:       MEDICATIONS     Current Facility-Administered Medications:     0.9 % sodium chloride infusion, 75 mL/hr, IntraVENous, Continuous, BRIAN Zapata - CNP    0.9 % sodium chloride infusion, 25 mL, IntraVENous, PRN, BRIAN Roland CNP    acetaminophen (TYLENOL) tablet 650 mg, 650 mg, Oral, Q4H PRN, BRIAN Roland CNP    ceFAZolin (ANCEF) 2000 mg in dextrose 5 % 50 mL IVPB, 2,000 mg, IntraVENous, On Call to OR, BRIAN Roland CNP    diphenhydrAMINE (BENADRYL) injection 50 mg, 50 mg, IntraVENous, Once, Ara Wilkinsonfield, APRN - CNP    sodium chloride flush 0.9 % injection 5-40 mL, 5-40 mL, IntraVENous, PRN, BRIAN Roland CNP    0.9 % sodium chloride infusion, , IntraVENous, PRN, BRIAN Roland CNP  Prior to Admission medications    Medication Sig Start Date End Date Taking?  Authorizing Provider   ferrous gluconate (FERGON) 324 (38 Fe) MG tablet Take 324 mg by mouth 2 times daily    Historical Provider, MD   furosemide (LASIX) 20 MG tablet Take 20 mg by mouth daily as needed (swelling as needed)    Historical Provider, MD   insulin lispro (HUMALOG) 100 UNIT/ML injection vial Inject into the skin 3 times daily (before meals)    Historical Provider, MD   hydrOXYzine (ATARAX) 10 MG tablet Take 10 mg by mouth 3 times daily as needed for Itching    Historical Provider, MD   Probiotic Product (PROBIOTIC DAILY PO) Take by mouth    Historical Provider, MD   cetirizine (ZYRTEC) 10 MG tablet Take 10 mg by mouth daily    Historical Provider, MD   polyethyl glycol-propyl glycol 0.4-0.3 % (SYSTANE) 0.4-0.3 % ophthalmic solution 1 drop as needed for Dry Eyes    Historical Provider, MD   sucralfate (CARAFATE) 1 GM tablet Take 1 tablet by mouth 4 times daily (before meals and nightly) 7/26/21   Conrad Simeon MD   pantoprazole (PROTONIX) 40 MG tablet Take 1 tablet by mouth 2 times daily (before meals) 7/26/21 8/26/21  Conrad Simeon MD   vitamin D 25 MCG (1000 UT) CAPS Take by mouth daily 125 mcg daily    Historical Provider, MD   citalopram (CELEXA) 40 MG tablet Take 40 mg by mouth daily 6/4/21   Historical Provider, MD   loperamide (IMODIUM A-D) 2 MG tablet Take by mouth    Historical Provider, MD   tiZANidine (ZANAFLEX) 2 MG tablet Take 2 mg by mouth 2 times daily     Historical Provider, MD   DULoxetine (CYMBALTA) 20 MG extended release capsule Take 120 mg by mouth daily     Historical Provider, MD   insulin glargine (BASAGLAR KWIKPEN) 100 UNIT/ML injection pen Inject 8 Units into the skin nightly     Historical Provider, MD   potassium chloride (KLOR-CON M) 10 MEQ extended release tablet Take 1 tablet by mouth daily 6/22/20   Ravinder Dwyer E Temelshonna, DO   Dulaglutide (TRULICITY) 8.65 EB/5.8FP SOPN Inject 0.75 mg into the skin once a week Every Wednesday    Historical Provider, MD   Cyanocobalamin (SM VITAMIN B-12 PO) Take 2,500 mcg by mouth daily    Historical Provider, MD   diphenhydrAMINE (BENADRYL) 25 MG capsule Take 25 mg by mouth as needed for Itching    Historical Provider, MD   Calcium Carb-Cholecalciferol 500-400 MG-UNIT TABS Take 1 tablet by mouth 2 times daily    Historical Provider, MD EVANS ALCOHOL SWABS 70 % PADS  7/4/19   Historical Provider, MD   ONE TOUCH ULTRA TEST strip  7/4/19   Historical Provider, MD   Lancets Mis. (UNISTIK CZT COMFORT) [facility]  7/4/19   Historical Provider, MD   ipratropium-albuterol (DUONEB) 0.5-2.5 (3) MG/3ML SOLN nebulizer solution Inhale 1 vial into the lungs every 4 hours     Historical Provider, MD   Linaclotide (LINZESS) 72 MCG CAPS Take 72 mcg by mouth daily     Historical Provider, MD   pregabalin (LYRICA) 150 MG capsule Take 1 capsule by mouth 3 times daily for 3 days. . 9/22/18 9/2/21  Wes Mayers MD   traZODone (DESYREL) 100 MG tablet Take 100 mg by mouth nightly     Historical Provider, MD   Multiple Vitamins-Minerals (THERAPEUTIC MULTIVITAMIN-MINERALS) tablet Take 1 tablet by mouth daily    Historical Provider, MD   OXYGEN Inhale into the lungs continuous    Historical Provider, MD   atorvastatin (LIPITOR) 20 MG tablet Take 1 tablet by mouth nightly 5/13/18 Monique Madera MD   magnesium hydroxide (MILK OF MAGNESIA CONCENTRATE) 2400 MG/10ML SUSP Take 30 mLs by mouth once as needed    Historical Provider, MD   docusate sodium (COLACE) 100 MG capsule Take 100 mg by mouth 3 times daily as needed     Historical Provider, MD   acetaminophen (TYLENOL) 325 MG tablet Take 2 tablets by mouth every 4 hours as needed for Pain 8/18/16   Sid Lr MD   levothyroxine (SYNTHROID) 75 MCG tablet Take 75 mcg by mouth Daily    Historical Provider, MD     Additional information:       VITAL SIGNS   There were no vitals filed for this visit. PHYSICAL:   General: No acute distress  HEENT:  Unremarkable for age  Neck: without increased JVD, carotid pulses 2+ bilaterally without bruits  Heart: RRR, S1 & S2 WNL, S4 gallop, without murmurs or rubs   NYHA: 2  Lungs: Clear to auscultation    Abdomen: BS present, without HSM, masses, or tenderness    Extremities: without C,C,E.  Pulses 2+ bilaterally  Mental Status: Alert & Oriented        PLANNED PROCEDURE   []Cath  []PCI                []Pacemaker/AICD  []JAYLEN             []Cardioversion []Peripheral angiography/PTA  []Other:      SEDATION  Planned agent:[x]Midazolam []Meperidine [x]Sublimaze []Morphine  []Diazepam  [x]Other: propofol    ASA Classification:  []1 []2 [x]3 []4 []5  Class 1: A normal healthy patient  Class 2: Pt with mild to moderate systemic disease  Class 3: Severe systemic disease or disturbance  Class 4: Severe systemic disorders that are already life threatening. Class 5: Moribund pt with little chances of survival, for more than 24 hours. Mallampati I Airway Classification:   []1 []2 [x]3 []4    [x]Pre-procedure diagnostic studies complete and results available. Comment:    [x]Previous sedation/anesthesia experiences assessed. Comment:    [x]The patient is an appropriate candidate to undergo the planned procedure sedation and anesthesia.  (Refer to nursing sedation/analgesia documentation record)  [x]Formulation and discussion of sedation/procedure plan, risks, and expectations with patient and/or responsible adult completed. [x]Patient examined immediately prior to the procedure.  (Refer to nursing sedation/analgesia documentation record)    Jak Calhoun MD MD   Electronically signed 3/3/2022 at 7:25 AM

## 2022-03-03 NOTE — PROCEDURES
[address] [place] [address]                            CARDIAC CATHETERIZATION    PATIENT NAME: Sammi Desouza                    :        1945  MED REC NO:   [id number]                           ROOM:       0012  ACCOUNT NO:   [de-identified]                           ADMIT DATE: 2022  PROVIDER:     Blayne Laird MD    DATE OF PROCEDURE:  2022    PROCEDURE:  Percutaneous left atrial appendage occlusion (Watchman). INDICATION:  Paroxysmal atrial fibrillation, CHADS2-VASc of 6, not a  candidate for long-term anticoagulation. DESCRIPTION OF PROCEDURE:  After informed consent was obtained from the  patient, she was brought to the cardiac catheterization laboratory and  prepped in sterile fashion. Right femoral vein was chosen as the  primary point of access. Preprocedure timeout was completed. The  patient was sedated and JAYLEN probe was inserted. No left atrial or left  atrial appendage thrombus was seen and no pericardial effusion was seen. The anatomy was deemed appropriate to proceed with the procedure. After infiltration of the right inguinal region with 2% lidocaine using  micropuncture and modified Seldinger technique under fluoroscopic  guidance and ultrasound guidance, I was able to insert a 12-Anguillan short  sheath in the right femoral vein. I then inserted an 8.5-Anguillan  VersaCross pre-shaped catheter over a 0.035 Supra Core wire into the  left subclavian vein. I then removed the Supra Core wire and then I  inserted a Marble transseptal wire. Using JAYLEN guidance, I performed  inferior and posterior transseptal puncture without complications. I  advanced the sheath into the left atrium. Heparin IV was given. ACT  was confirmed to be above 250 seconds.   Based on preprocedure JAYLEN, a  12-Anguillan anterior-curved sheath was chosen and prepped per  's recommendations over the [facility] transseptal wire.  I  removed the VersaCross sheath and inserted the 12-Macedonian anterior-curved  sheath into the patient. I crossed the septum without any complication. I advanced into the left atrium without complications. I then in the  CARRERA caudal projection inserted a 5-Macedonian pigtail catheter under  wet-to-wet connection into the anterior-curved sheath, manipulated into  the left atrial appendage. Angiography was performed. Based on the  preprocedure JAYLEN measurements, the Watchman FLX 35 mm device was chosen  and loaded per [facility] recommendations. This was appropriately  deaired. I removed the 5-Macedonian pigtail catheter, inserted the device  into the anterior-curved sheath under wet-to-wet connection. I then  lined up the appropriate fluoroscopic markers. I then slowly and  steadily unsheathed the device. Once the device was unsheathed, we had  good anchoring with a tug test and checked position by doing limited  contrast injection that demonstrated good seal.    JAYLEN confirmed no color flow around the device and good position of the  device. There was 15% to 20% compression. Given that we had met the  P.A.S.S. criteria, I elected to release the device. The entire delivery  system was then removed from the left atrium. Repeat JAYLEN demonstrated  small left-to-right shunt; no pericardial effusion; well-seated,  well-placed device without any color flow around it. IV protamine was  given. I then exchanged out for a short 12-Macedonian sheath and then  deployed a Vascade closure device for hemostasis. IMMEDIATE COMPLICATIONS:  None. MEDICATIONS:  See EMR. ACCESS:  See above. ESTIMATED BLOOD LOSS:  Less than 50 mL. SUMMARY:  Successful percutaneous left atrial appendage occlusion with  Watchman FLX 35 mm device. PLAN:  1. Bedrest.  2.  Optimal medical therapy. 3.  Risk factor management. 4.  Routine access site care. 5.  Postprocedure anticoagulation. 6.  Inpatient admission.   7.  Restart home meds. 8.  45-day JAYLEN. 9.  Follow the patient per early discharge protocol. All the above was explained to the patient and the patient's family. They are agreeable and amenable to the above plan.         Sivakumar Willams MD    D: 03/03/2022 15:48:47       T: 03/03/2022 16:33:32     [email or web address]  Job#: [id number]     Doc#: [id number]    CC:

## 2022-03-03 NOTE — PROGRESS NOTES
[facility] Daughter, Katy notified that prescriptions were forwarded to [facility] on [address].

## 2022-03-07 ENCOUNTER — TELEPHONE (OUTPATIENT)
Dept: CARDIOLOGY CLINIC | Age: 77
End: 2022-03-07

## 2022-03-07 NOTE — TELEPHONE ENCOUNTER
Post WATCHMAN (same day discharge) follow up call: The WATCHMAN was implanted on 3/3/2022 by Dr. Gretchen Birmingham and met all criteria to be discharged the same day. Follow up phone call was made and patient states that she  is doing well. No complaints of pain, hematoma, bruising, oozing, numbness or tingling. Patient was discharged home on 3/3/2022 and Eliquis 5mg BID and states that she is tolerating it well. The below education was reviewed and the 45 day JAYLEN is set for 4/20/2022. Education was given regarding:  · Walk as much as you can. This will help facilitate the healing process. · Cleanse wound with mild soap and water. Keep wound dry. · A small amount of bloody or clear drainage is normal.   · Watch for signs of infections: redness, incision hot to the touch, fever greater than 101 degrees, swelling at the groin or incision site, or discolored drainage from your incision. · DO NOT STOP TAKING ANY MEDICATION OR YOUR PRESCRIBED ANTICOAGULATION WITHOUT SPEAKING WITH YOUR CARDIOLOGIST! · Take your pills as ordered at time of discharge. · Continue all anticoagulation as instructed at discharge. · Call with any signs of bleeding. · Antibiotic coverage will be needed post WATCHMAN for 6 months post implant if the following is needed: dental procedures including cleanings, gastrointestinal (GI) or genitourinary (), respiratory procedures and procedures on infected skin or muscle    What symptoms or health problems do you need to look out for after you leave the hospital? Call if you have any of these symptoms ([phone]):  · Weight pound gain of 3 pounds in one day or 5 pounds in one week.  Weight gain is NOT normal.    · Chest pain or discomfort  · Swelling of the legs, ankles or feet  · Increasing shortness of breath  · Change in the color or temperature of your lower legs and feet  · Develop abdominal pain or unrelieved nausea and/or vomiting  · Develop any redness, incision, or limited movement of your arms or legs  · Signs of infection: redness, incision hot to the touch, fevers greater than 101 degrees, or colored drainage from your incision  · Hematoma is an accumulation of fluid in the tissues at the groin surgical site. Symptoms may include: a lump near the groin surgical site, clear fluid draining from the site, redness, warmth, or swelling. What to expect post WATCHMAN implant:  · 45 days post implant a JAYLEN and labs will be obtained to evaluate need for further anticoagulation use. · If any issues with bleeding within the 45 day period, please contact Seth [address] Coordinator: [phone]. Patient was instructed to [facility] with any of the above concerns at  [phone].

## 2022-03-14 ENCOUNTER — HOSPITAL ENCOUNTER (OUTPATIENT)
Dept: NURSING | Age: 77
Discharge: HOME | End: 2022-03-14
Payer: MEDICARE

## 2022-03-14 VITALS
DIASTOLIC BLOOD PRESSURE: 63 MMHG | WEIGHT: 229 LBS | OXYGEN SATURATION: 96 % | BODY MASS INDEX: 36.96 KG/M2 | TEMPERATURE: 97.2 F | RESPIRATION RATE: 16 BRPM | SYSTOLIC BLOOD PRESSURE: 133 MMHG | HEART RATE: 78 BPM

## 2022-03-14 DIAGNOSIS — D83.9 COMMON VARIABLE IMMUNODEFICIENCY (HCC): Primary | ICD-10-CM

## 2022-03-14 PROCEDURE — 6360000002 HC RX W HCPCS: Performed by: FAMILY MEDICINE

## 2022-03-14 PROCEDURE — 96413 CHEMO IV INFUSION 1 HR: CPT

## 2022-03-14 PROCEDURE — 99211 OFF/OP EST MAY X REQ PHY/QHP: CPT

## 2022-03-14 RX ORDER — SODIUM CHLORIDE 0.9 % (FLUSH) 0.9 %
5-40 SYRINGE (ML) INJECTION PRN
Status: CANCELLED | OUTPATIENT
Start: 2022-04-11

## 2022-03-14 RX ORDER — ACETAMINOPHEN 325 MG/1
650 TABLET ORAL
Status: CANCELLED | OUTPATIENT
Start: 2022-04-11

## 2022-03-14 RX ORDER — SODIUM CHLORIDE 9 MG/ML
INJECTION, SOLUTION INTRAVENOUS CONTINUOUS
Status: CANCELLED | OUTPATIENT
Start: 2022-04-11

## 2022-03-14 RX ORDER — DIPHENHYDRAMINE HYDROCHLORIDE 50 MG/ML
50 INJECTION INTRAMUSCULAR; INTRAVENOUS
Status: CANCELLED | OUTPATIENT
Start: 2022-04-11

## 2022-03-14 RX ORDER — DIPHENHYDRAMINE HCL 25 MG
25 TABLET ORAL ONCE
Status: CANCELLED | OUTPATIENT
Start: 2022-04-11 | End: 2022-04-11

## 2022-03-14 RX ORDER — ONDANSETRON 2 MG/ML
8 INJECTION INTRAMUSCULAR; INTRAVENOUS
Status: CANCELLED | OUTPATIENT
Start: 2022-04-11

## 2022-03-14 RX ORDER — SODIUM CHLORIDE 9 MG/ML
25 INJECTION, SOLUTION INTRAVENOUS PRN
Status: CANCELLED | OUTPATIENT
Start: 2022-04-11

## 2022-03-14 RX ORDER — ACETAMINOPHEN 325 MG/1
650 TABLET ORAL ONCE
Status: CANCELLED | OUTPATIENT
Start: 2022-04-11 | End: 2022-04-11

## 2022-03-14 RX ORDER — HEPARIN SODIUM (PORCINE) LOCK FLUSH IV SOLN 100 UNIT/ML 100 UNIT/ML
500 SOLUTION INTRAVENOUS PRN
Status: CANCELLED | OUTPATIENT
Start: 2022-04-11

## 2022-03-14 RX ORDER — HEPARIN SODIUM (PORCINE) LOCK FLUSH IV SOLN 100 UNIT/ML 100 UNIT/ML
500 SOLUTION INTRAVENOUS PRN
Status: DISCONTINUED | OUTPATIENT
Start: 2022-03-14 | End: 2022-03-15 | Stop reason: HOSPADM

## 2022-03-14 RX ORDER — ALBUTEROL SULFATE 90 UG/1
4 AEROSOL, METERED RESPIRATORY (INHALATION) PRN
Status: CANCELLED | OUTPATIENT
Start: 2022-04-11

## 2022-03-14 RX ORDER — MEPERIDINE HYDROCHLORIDE 25 MG/ML
12.5 INJECTION INTRAMUSCULAR; INTRAVENOUS; SUBCUTANEOUS PRN
Status: CANCELLED | OUTPATIENT
Start: 2022-04-11

## 2022-03-14 RX ADMIN — HEPARIN 500 UNITS: 100 SYRINGE at 10:52

## 2022-03-14 RX ADMIN — IMMUNE GLOBULIN (HUMAN) 5 G: 10 INJECTION INTRAVENOUS; SUBCUTANEOUS at 09:30

## 2022-03-14 RX ADMIN — IMMUNE GLOBULIN (HUMAN) 20 G: 10 INJECTION INTRAVENOUS; SUBCUTANEOUS at 10:00

## 2022-03-14 ASSESSMENT — PAIN SCALES - GENERAL: PAINLEVEL_OUTOF10: 0

## 2022-03-14 NOTE — PROGRESS NOTES
0908: Patient arrived ambulatory for IVIG infusion. PT RIGHTS AND RESPONSIBILITIES OFFERED TO PT.  Mediport accessed using sterile technique. Patient states she took her tylenol and benadryl before she left. 0930: IVIG infusion started. 1030: Patient tolerating infusion well. No concerns voiced. 1052: Infusion complete. Patient tolerated well. AVS reviewed with patient, voiced understanding. Patient discharged ambulatory.                              _m___ Safety:       (Environmental)   [place] to environment   Ensure ID band is correct and in place/ allergy band as needed   Assess for fall risk   Initiate fall precautions as applicable (fall band, side rails, etc.)   Call light within reach   Bed in low position/ wheels locked    _m___ Pain:        Assess pain level and characteristics   Administer analgesics as ordered   Assess effectiveness of pain management and report to MD as needed    _m___ Knowledge Deficit:   Assess baseline knowledge   Provide teaching at level of understanding   Provide teaching via preferred learning method   Evaluate teaching effectiveness    _m___ Hemodynamic/Respiratory Status:       (Pre and Post Procedure Monitoring)   Assess/Monitor vital signs and LOC   Assess Baseline SpO2 prior to any sedation   Obtain weight/height   Assess vital signs/ LOC until patient meets discharge criteria   Monitor procedure site and notify MD of any issues    _m___ Infection-Risk of Central Venous Catheter:   Monitor for infection signs and symptoms (catheter site redness, temperature elevation, etc)   Assess for infection risks   Educate regarding infection prevention   Manage central venous catheter (flushes/ dressing changes per protocol)

## 2022-04-10 ENCOUNTER — APPOINTMENT (OUTPATIENT)
Dept: CT IMAGING | Age: 77
End: 2022-04-10
Payer: MEDICARE

## 2022-04-10 ENCOUNTER — HOSPITAL ENCOUNTER (EMERGENCY)
Age: 77
Discharge: HOME | End: 2022-04-10
Attending: EMERGENCY MEDICINE
Payer: MEDICARE

## 2022-04-10 VITALS
OXYGEN SATURATION: 92 % | HEART RATE: 78 BPM | HEIGHT: 66 IN | TEMPERATURE: 98.5 F | DIASTOLIC BLOOD PRESSURE: 61 MMHG | SYSTOLIC BLOOD PRESSURE: 133 MMHG | RESPIRATION RATE: 14 BRPM | BODY MASS INDEX: 36.96 KG/M2 | WEIGHT: 230 LBS

## 2022-04-10 DIAGNOSIS — N28.9 RENAL LESION: ICD-10-CM

## 2022-04-10 DIAGNOSIS — K62.5 RECTAL BLEEDING: Primary | ICD-10-CM

## 2022-04-10 LAB
ALBUMIN SERPL-MCNC: 4 G/DL (ref 3.5–5.1)
ALP BLD-CCNC: 90 U/L (ref 38–126)
ALT SERPL-CCNC: 11 U/L (ref 11–66)
ANION GAP SERPL CALCULATED.3IONS-SCNC: 11 MEQ/L (ref 8–16)
AST SERPL-CCNC: 18 U/L (ref 5–40)
BASOPHILS # BLD: 1.1 %
BASOPHILS ABSOLUTE: 0.1 THOU/MM3 (ref 0–0.1)
BILIRUB SERPL-MCNC: 0.2 MG/DL (ref 0.3–1.2)
BUN BLDV-MCNC: 31 MG/DL (ref 7–22)
CALCIUM SERPL-MCNC: 9.1 MG/DL (ref 8.5–10.5)
CHLORIDE BLD-SCNC: 100 MEQ/L (ref 98–111)
CO2: 28 MEQ/L (ref 23–33)
CREAT SERPL-MCNC: 1.4 MG/DL (ref 0.4–1.2)
EOSINOPHIL # BLD: 3 %
EOSINOPHILS ABSOLUTE: 0.2 THOU/MM3 (ref 0–0.4)
ERYTHROCYTE [DISTWIDTH] IN BLOOD BY AUTOMATED COUNT: 13.2 % (ref 11.5–14.5)
ERYTHROCYTE [DISTWIDTH] IN BLOOD BY AUTOMATED COUNT: 45.6 FL (ref 35–45)
GFR SERPL CREATININE-BSD FRML MDRD: 36 ML/MIN/1.73M2
GLUCOSE BLD-MCNC: 95 MG/DL (ref 70–108)
HCT VFR BLD CALC: 35.6 % (ref 37–47)
HEMOGLOBIN: 11.3 GM/DL (ref 12–16)
IMMATURE GRANS (ABS): 0.02 THOU/MM3 (ref 0–0.07)
IMMATURE GRANULOCYTES: 0.4 %
LYMPHOCYTES # BLD: 29.2 %
LYMPHOCYTES ABSOLUTE: 1.6 THOU/MM3 (ref 1–4.8)
MCH RBC QN AUTO: 30.2 PG (ref 26–33)
MCHC RBC AUTO-ENTMCNC: 31.7 GM/DL (ref 32.2–35.5)
MCV RBC AUTO: 95.2 FL (ref 81–99)
MONOCYTES # BLD: 11.5 %
MONOCYTES ABSOLUTE: 0.6 THOU/MM3 (ref 0.4–1.3)
NUCLEATED RED BLOOD CELLS: 0 /100 WBC
OSMOLALITY CALCULATION: 283.9 MOSMOL/KG (ref 275–300)
PLATELET # BLD: 209 THOU/MM3 (ref 130–400)
PMV BLD AUTO: 10 FL (ref 9.4–12.4)
POTASSIUM REFLEX MAGNESIUM: 5 MEQ/L (ref 3.5–5.2)
RBC # BLD: 3.74 MILL/MM3 (ref 4.2–5.4)
SEG NEUTROPHILS: 54.8 %
SEGMENTED NEUTROPHILS ABSOLUTE COUNT: 3.1 THOU/MM3 (ref 1.8–7.7)
SODIUM BLD-SCNC: 139 MEQ/L (ref 135–145)
TOTAL PROTEIN: 6.9 G/DL (ref 6.1–8)
WBC # BLD: 5.6 THOU/MM3 (ref 4.8–10.8)

## 2022-04-10 PROCEDURE — 80053 COMPREHEN METABOLIC PANEL: CPT

## 2022-04-10 PROCEDURE — 74176 CT ABD & PELVIS W/O CONTRAST: CPT

## 2022-04-10 PROCEDURE — 93005 ELECTROCARDIOGRAM TRACING: CPT | Performed by: EMERGENCY MEDICINE

## 2022-04-10 PROCEDURE — 6360000002 HC RX W HCPCS: Performed by: EMERGENCY MEDICINE

## 2022-04-10 PROCEDURE — 85025 COMPLETE CBC W/AUTO DIFF WBC: CPT

## 2022-04-10 PROCEDURE — 99285 EMERGENCY DEPT VISIT HI MDM: CPT

## 2022-04-10 RX ORDER — HEPARIN SODIUM (PORCINE) LOCK FLUSH IV SOLN 100 UNIT/ML 100 UNIT/ML
500 SOLUTION INTRAVENOUS ONCE
Status: COMPLETED | OUTPATIENT
Start: 2022-04-10 | End: 2022-04-10

## 2022-04-10 RX ORDER — HEPARIN SODIUM (PORCINE) LOCK FLUSH IV SOLN 100 UNIT/ML 100 UNIT/ML
300 SOLUTION INTRAVENOUS ONCE
Status: DISCONTINUED | OUTPATIENT
Start: 2022-04-10 | End: 2022-04-10

## 2022-04-10 RX ADMIN — HEPARIN 500 UNITS: 100 SYRINGE at 23:38

## 2022-04-11 ENCOUNTER — TELEPHONE (OUTPATIENT)
Dept: NEPHROLOGY | Age: 77
End: 2022-04-11

## 2022-04-11 ENCOUNTER — HOSPITAL ENCOUNTER (OUTPATIENT)
Dept: NURSING | Age: 77
Discharge: HOME | End: 2022-04-11
Payer: MEDICARE

## 2022-04-11 VITALS
WEIGHT: 230 LBS | HEART RATE: 83 BPM | DIASTOLIC BLOOD PRESSURE: 78 MMHG | BODY MASS INDEX: 37.12 KG/M2 | SYSTOLIC BLOOD PRESSURE: 153 MMHG | RESPIRATION RATE: 16 BRPM | OXYGEN SATURATION: 92 % | TEMPERATURE: 96.5 F

## 2022-04-11 DIAGNOSIS — N28.9 RENAL LESION: Primary | ICD-10-CM

## 2022-04-11 DIAGNOSIS — D83.9 COMMON VARIABLE IMMUNODEFICIENCY (HCC): Primary | ICD-10-CM

## 2022-04-11 LAB
EKG ATRIAL RATE: 66 BPM
EKG P AXIS: 16 DEGREES
EKG P-R INTERVAL: 154 MS
EKG Q-T INTERVAL: 402 MS
EKG QRS DURATION: 98 MS
EKG QTC CALCULATION (BAZETT): 421 MS
EKG R AXIS: -35 DEGREES
EKG T AXIS: 66 DEGREES
EKG VENTRICULAR RATE: 66 BPM

## 2022-04-11 PROCEDURE — 93010 ELECTROCARDIOGRAM REPORT: CPT | Performed by: INTERNAL MEDICINE

## 2022-04-11 PROCEDURE — 96365 THER/PROPH/DIAG IV INF INIT: CPT

## 2022-04-11 PROCEDURE — 96413 CHEMO IV INFUSION 1 HR: CPT

## 2022-04-11 PROCEDURE — 6360000002 HC RX W HCPCS: Performed by: FAMILY MEDICINE

## 2022-04-11 RX ORDER — HEPARIN SODIUM (PORCINE) LOCK FLUSH IV SOLN 100 UNIT/ML 100 UNIT/ML
500 SOLUTION INTRAVENOUS PRN
Status: CANCELLED | OUTPATIENT
Start: 2022-05-09

## 2022-04-11 RX ORDER — SODIUM CHLORIDE 9 MG/ML
25 INJECTION, SOLUTION INTRAVENOUS PRN
Status: CANCELLED | OUTPATIENT
Start: 2022-05-09

## 2022-04-11 RX ORDER — ACETAMINOPHEN 325 MG/1
650 TABLET ORAL ONCE
Status: CANCELLED | OUTPATIENT
Start: 2022-05-09 | End: 2022-05-09

## 2022-04-11 RX ORDER — DIPHENHYDRAMINE HCL 25 MG
25 TABLET ORAL ONCE
Status: DISCONTINUED | OUTPATIENT
Start: 2022-04-11 | End: 2022-04-12 | Stop reason: HOSPADM

## 2022-04-11 RX ORDER — ACETAMINOPHEN 325 MG/1
650 TABLET ORAL
Status: CANCELLED | OUTPATIENT
Start: 2022-05-09

## 2022-04-11 RX ORDER — ACETAMINOPHEN 325 MG/1
650 TABLET ORAL ONCE
Status: DISCONTINUED | OUTPATIENT
Start: 2022-04-11 | End: 2022-04-12 | Stop reason: HOSPADM

## 2022-04-11 RX ORDER — HEPARIN SODIUM (PORCINE) LOCK FLUSH IV SOLN 100 UNIT/ML 100 UNIT/ML
500 SOLUTION INTRAVENOUS PRN
Status: DISCONTINUED | OUTPATIENT
Start: 2022-04-11 | End: 2022-04-12 | Stop reason: HOSPADM

## 2022-04-11 RX ORDER — SODIUM CHLORIDE 9 MG/ML
INJECTION, SOLUTION INTRAVENOUS CONTINUOUS
Status: CANCELLED | OUTPATIENT
Start: 2022-05-09

## 2022-04-11 RX ORDER — DIPHENHYDRAMINE HYDROCHLORIDE 50 MG/ML
50 INJECTION INTRAMUSCULAR; INTRAVENOUS
Status: CANCELLED | OUTPATIENT
Start: 2022-05-09

## 2022-04-11 RX ORDER — SODIUM CHLORIDE 0.9 % (FLUSH) 0.9 %
5-40 SYRINGE (ML) INJECTION PRN
Status: CANCELLED | OUTPATIENT
Start: 2022-05-09

## 2022-04-11 RX ORDER — DIPHENHYDRAMINE HCL 25 MG
25 TABLET ORAL ONCE
Status: CANCELLED | OUTPATIENT
Start: 2022-05-09 | End: 2022-05-09

## 2022-04-11 RX ORDER — ALBUTEROL SULFATE 90 UG/1
4 AEROSOL, METERED RESPIRATORY (INHALATION) PRN
Status: CANCELLED | OUTPATIENT
Start: 2022-05-09

## 2022-04-11 RX ORDER — ONDANSETRON 2 MG/ML
8 INJECTION INTRAMUSCULAR; INTRAVENOUS
Status: CANCELLED | OUTPATIENT
Start: 2022-05-09

## 2022-04-11 RX ORDER — MEPERIDINE HYDROCHLORIDE 25 MG/ML
12.5 INJECTION INTRAMUSCULAR; INTRAVENOUS; SUBCUTANEOUS PRN
Status: CANCELLED | OUTPATIENT
Start: 2022-05-09

## 2022-04-11 RX ADMIN — IMMUNE GLOBULIN (HUMAN) 5 G: 10 INJECTION INTRAVENOUS; SUBCUTANEOUS at 10:10

## 2022-04-11 RX ADMIN — HEPARIN 500 UNITS: 100 SYRINGE at 11:43

## 2022-04-11 RX ADMIN — IMMUNE GLOBULIN (HUMAN) 20 G: 10 INJECTION INTRAVENOUS; SUBCUTANEOUS at 10:45
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
## 2022-04-11 NOTE — ED TRIAGE NOTES
Pt presents to ED via EMS from [facility]. Pt states she had bloody stool one hour PTA. Pt states hx of bloody stool one year ago with no known cause. Pt had walkman filter placed in R atrium on 3/10. Pt was started Eliquis post procedure. Pt denies pain, nausea, or vomiting at this time.  Pt denies taking any meds for sx prior to arrival.

## 2022-04-11 NOTE — ED NOTES
Pt awake and alert on cot. Denies needs at this time. Call light in reach. VSS. resp easy and unlabored. Nurse will continue to monitor.      Mukesh Lewis RN  04/10/22 2127

## 2022-04-11 NOTE — TELEPHONE ENCOUNTER
Pt called and states she was in the ER yesterday for bleeding. They did CT abd/pelvis. Pt states her scan showed a lesion on her kidney. She wants you to review this and see if she needs US?

## 2022-04-11 NOTE — PROGRESS NOTES
0915: Patient arrived ambulatory for IVIG infusion. PT RIGHTS AND RESPONSIBILITIES OFFERED TO PT.  Mediport accessed using sterile technique. Patient states she took her tylenol and benadryl before she left.       1010: IVIG infusion started.     1100: Patient tolerating infusion well. No concerns voiced.      1145: Infusion complete. Patient tolerated well. AVS reviewed with patient, voiced understanding.  Patient discharged ambulatory.                                          _m___ Safety:       (Environmental)  · Alligator to environment  · Ensure ID band is correct and in place/ allergy band as needed  · Assess for fall risk  · Initiate fall precautions as applicable (fall band, side rails, etc.)  · Call light within reach  · Bed in low position/ wheels locked     _m___ Pain:       · Assess pain level and characteristics  · Administer analgesics as ordered  · Assess effectiveness of pain management and report to MD as needed     _m___ Knowledge Deficit:  · Assess baseline knowledge  · Provide teaching at level of understanding  · Provide teaching via preferred learning method  · Evaluate teaching effectiveness     _m___ Hemodynamic/Respiratory Status:                  (Pre and Post Procedure Monitoring)  · Assess/Monitor vital signs and LOC  · Assess Baseline SpO2 prior to any sedation  · Obtain weight/height  · Assess vital signs/ LOC until patient meets discharge criteria  · Monitor procedure site and notify MD of any issues     _m___ Infection-Risk of Central Venous Catheter:  · Monitor for infection signs and symptoms (catheter site redness, temperature elevation, etc)  · Assess for infection risks  · Educate regarding infection prevention  · Manage central venous catheter (flushes/ dressing changes per protocol)

## 2022-04-12 ENCOUNTER — TELEPHONE (OUTPATIENT)
Dept: NEPHROLOGY | Age: 77
End: 2022-04-12

## 2022-04-12 ENCOUNTER — HOSPITAL ENCOUNTER (OUTPATIENT)
Dept: ULTRASOUND IMAGING | Age: 77
Discharge: HOME | End: 2022-04-12
Payer: MEDICARE

## 2022-04-12 DIAGNOSIS — N28.9 RENAL LESION: ICD-10-CM

## 2022-04-12 PROCEDURE — 76770 US EXAM ABDO BACK WALL COMP: CPT

## 2022-04-12 NOTE — TELEPHONE ENCOUNTER
----- Message from Lawson Galeano DO sent at 4/12/2022  3:08 PM EDT -----  Please inform patient the ultrasound is showing simple cyst in each kidney, not of any concern right now.   ----- Message -----  From: Mitchell Ivy Incoming Radiant Results From [employer]  Sent: 4/12/2022   2:12 PM EDT  To: Lawson Galeano DO

## 2022-04-19 ENCOUNTER — PREP FOR PROCEDURE (OUTPATIENT)
Dept: CARDIOLOGY | Age: 77
End: 2022-04-19

## 2022-04-19 RX ORDER — SODIUM CHLORIDE 9 MG/ML
25 INJECTION, SOLUTION INTRAVENOUS PRN
Status: CANCELLED | OUTPATIENT
Start: 2022-04-19

## 2022-04-19 RX ORDER — SODIUM CHLORIDE 0.9 % (FLUSH) 0.9 %
5-40 SYRINGE (ML) INJECTION PRN
Status: CANCELLED | OUTPATIENT
Start: 2022-04-19

## 2022-04-19 RX ORDER — SODIUM CHLORIDE 0.9 % (FLUSH) 0.9 %
5-40 SYRINGE (ML) INJECTION EVERY 12 HOURS SCHEDULED
Status: CANCELLED | OUTPATIENT
Start: 2022-04-19

## 2022-04-19 RX ORDER — SODIUM CHLORIDE 9 MG/ML
INJECTION, SOLUTION INTRAVENOUS CONTINUOUS
Status: CANCELLED | OUTPATIENT
Start: 2022-04-19

## 2022-04-20 ENCOUNTER — HOSPITAL ENCOUNTER (OUTPATIENT)
Age: 77
Setting detail: OUTPATIENT SURGERY
Discharge: OTHER INSTITUTION | End: 2022-04-20
Attending: NUCLEAR MEDICINE | Admitting: NUCLEAR MEDICINE
Payer: MEDICARE

## 2022-04-20 VITALS
OXYGEN SATURATION: 94 % | HEIGHT: 66 IN | DIASTOLIC BLOOD PRESSURE: 67 MMHG | WEIGHT: 231.4 LBS | TEMPERATURE: 97.6 F | RESPIRATION RATE: 18 BRPM | HEART RATE: 106 BPM | BODY MASS INDEX: 37.19 KG/M2 | SYSTOLIC BLOOD PRESSURE: 151 MMHG

## 2022-04-20 DIAGNOSIS — I48.0 PAROXYSMAL ATRIAL FIBRILLATION (HCC): ICD-10-CM

## 2022-04-20 DIAGNOSIS — Z95.818 PRESENCE OF WATCHMAN LEFT ATRIAL APPENDAGE CLOSURE DEVICE: ICD-10-CM

## 2022-04-20 LAB
LV EF: 38 %
LVEF MODALITY: NORMAL

## 2022-04-20 PROCEDURE — 6370000000 HC RX 637 (ALT 250 FOR IP)

## 2022-04-20 PROCEDURE — 7100000011 HC PHASE II RECOVERY - ADDTL 15 MIN: Performed by: NUCLEAR MEDICINE

## 2022-04-20 PROCEDURE — 93312 ECHO TRANSESOPHAGEAL: CPT

## 2022-04-20 PROCEDURE — 99152 MOD SED SAME PHYS/QHP 5/>YRS: CPT | Performed by: NUCLEAR MEDICINE

## 2022-04-20 PROCEDURE — 93320 DOPPLER ECHO COMPLETE: CPT

## 2022-04-20 PROCEDURE — 93325 DOPPLER ECHO COLOR FLOW MAPG: CPT

## 2022-04-20 PROCEDURE — 7100000010 HC PHASE II RECOVERY - FIRST 15 MIN: Performed by: NUCLEAR MEDICINE

## 2022-04-20 PROCEDURE — 6360000002 HC RX W HCPCS: Performed by: NUCLEAR MEDICINE

## 2022-04-20 RX ORDER — SODIUM CHLORIDE 0.9 % (FLUSH) 0.9 %
5-40 SYRINGE (ML) INJECTION EVERY 12 HOURS SCHEDULED
Status: DISCONTINUED | OUTPATIENT
Start: 2022-04-20 | End: 2022-04-20 | Stop reason: HOSPADM

## 2022-04-20 RX ORDER — FENTANYL CITRATE 50 UG/ML
INJECTION, SOLUTION INTRAMUSCULAR; INTRAVENOUS PRN
Status: DISCONTINUED | OUTPATIENT
Start: 2022-04-20 | End: 2022-04-20 | Stop reason: ALTCHOICE

## 2022-04-20 RX ORDER — SODIUM CHLORIDE 9 MG/ML
INJECTION, SOLUTION INTRAVENOUS CONTINUOUS
Status: DISCONTINUED | OUTPATIENT
Start: 2022-04-20 | End: 2022-04-20 | Stop reason: HOSPADM

## 2022-04-20 RX ORDER — MIDAZOLAM HYDROCHLORIDE 1 MG/ML
INJECTION INTRAMUSCULAR; INTRAVENOUS PRN
Status: DISCONTINUED | OUTPATIENT
Start: 2022-04-20 | End: 2022-04-20 | Stop reason: ALTCHOICE

## 2022-04-20 RX ORDER — HEPARIN SODIUM (PORCINE) LOCK FLUSH IV SOLN 100 UNIT/ML 100 UNIT/ML
500 SOLUTION INTRAVENOUS ONCE
Status: COMPLETED | OUTPATIENT
Start: 2022-04-20 | End: 2022-04-20

## 2022-04-20 RX ORDER — SODIUM CHLORIDE 9 MG/ML
25 INJECTION, SOLUTION INTRAVENOUS PRN
Status: DISCONTINUED | OUTPATIENT
Start: 2022-04-20 | End: 2022-04-20 | Stop reason: HOSPADM

## 2022-04-20 RX ORDER — SODIUM CHLORIDE 0.9 % (FLUSH) 0.9 %
5-40 SYRINGE (ML) INJECTION PRN
Status: DISCONTINUED | OUTPATIENT
Start: 2022-04-20 | End: 2022-04-20 | Stop reason: HOSPADM

## 2022-04-20 RX ADMIN — HEPARIN 500 UNITS: 100 SYRINGE at 15:32

## 2022-04-20 ASSESSMENT — PAIN - FUNCTIONAL ASSESSMENT: PAIN_FUNCTIONAL_ASSESSMENT: 0-10

## 2022-04-20 NOTE — H&P
[address] [place] [id number]  Sedation/Analgesia History & Physical    Pt Name: Johnny Avila  Account number: [de-identified]  MRN: [id number]  YOB: 1945  Provider Performing Procedure: Jc Duggan MD MD [facility]  Primary Care Physician: Tremaine Obando MD  Date: 4/20/2022    PRE-PROCEDURE    Code Status: FULL CODE  Brief History/Pre-Procedure Diagnosis:   Watchman      Consent: : I have discussed with the patient risks, benefits, and alternatives (and relevant risks, benefits, and side effects related to alternatives or not receiving care), and likelihood of the success. The patient and/or representative appear to understand and agree to proceed. The discussion encompasses risks, benefits, and side effects related to the alternatives and the risks related to not receiving the proposed care, treatment, and services. MEDICAL HISTORY  []ASHD/ANGINA/MI/CHF   [x]Hypertension  []Diabetes  []Hyperlipidemia  []Smoking  []Family Hx of ASHD  []Additional information:       has a past medical history of Anemia, Atrial fibrillation ([address] CAD (coronary artery disease), CHF (congestive heart failure) ([address] Chronic kidney disease, COPD (chronic obstructive pulmonary disease) ([address] DDD (degenerative disc disease), lumbar, Depression, Frequent PVCs, GERD (gastroesophageal reflux disease), History of blood transfusion, Hx of blood clots, Hyperlipidemia, Hypertension, Hyperthyroidism, Movement disorder, Neuropathy, Obesity, Palpitations, Pneumonia, Sleep apnea, Status post right and left heart catheterization, and Type II or unspecified type diabetes mellitus without mention of complication, not stated as uncontrolled. SURGICAL HISTORY   has a past surgical history that includes Rotator cuff repair; Tubal ligation; Hysterectomy; bladder suspension; tendon release (Left, 08/09/2016); Achilles tendon surgery (Bilateral); Cardiac surgery (2016);  Hammer toe surgery (Right); hiatal hernia repair (09/06/2016); hernia repair; Tunneled venous port placement (11/06/2017); Upper gastrointestinal endoscopy (Left, 05/10/2018); Colonoscopy (Left, 05/11/2018); Endoscopy, colon, diagnostic; Gastric fundoplication; pr office/outpt visit,procedure only (N/A, 09/21/2018); Upper gastrointestinal endoscopy (Left, 07/25/2021); Colonoscopy (N/A, 08/04/2021); and Colonoscopy (08/04/2021). Additional information:       ALLERGIES   Allergies as of 02/28/2022 - Fully Reviewed 02/14/2022   Allergen Reaction Noted    Bactrim [sulfamethoxazole-trimethoprim]  01/02/2018    Wellbutrin [bupropion] Other (See Comments) 04/70/2248    Silicone Rash 95/53/4778     Additional information:       MEDICATIONS   Aspirin  [x] 81 mg  [] 325 mg  [] None  Antiplatelet drug therapy use last 5 days  []No []Yes  Coumadin Use Last 5 Days []No []Yes  Other anticoagulant use last 5 days  []No []Yes    Current Facility-Administered Medications:     0.9 % sodium chloride infusion, , IntraVENous, Continuous, Ashly Jang PA-C    0.9 % sodium chloride infusion, 25 mL, IntraVENous, PRN, CAROLINA Logan-TAO    sodium chloride flush 0.9 % injection 5-40 mL, 5-40 mL, IntraVENous, 2 times per day, CAROLINA Logan-TAO    sodium chloride flush 0.9 % injection 5-40 mL, 5-40 mL, IntraVENous, PRN, Nicky Chapman PA-C  Prior to Admission medications    Medication Sig Start Date End Date Taking?  Authorizing Provider   aspirin 81 MG chewable tablet Take 1 tablet by mouth daily 3/4/22   Sagrario Garcia PA-C   apixaban (ELIQUIS) 5 MG TABS tablet Take 1 tablet by mouth daily  Patient taking differently: Take 5 mg by mouth 2 times daily  3/3/22 5/2/22  Sagrario Garcia PA-C   ferrous gluconate (FERGON) 324 (38 Fe) MG tablet Take 324 mg by mouth 2 times daily    Historical Provider, MD   furosemide (LASIX) 20 MG tablet Take 20 mg by mouth daily as needed (swelling as needed)    Historical Provider, MD   insulin lispro (HUMALOG) 100 UNIT/ML injection vial Inject into the skin 3 times daily (before meals)    Historical Provider, MD   hydrOXYzine (ATARAX) 10 MG tablet Take 10 mg by mouth 3 times daily as needed for Itching    Historical Provider, MD   Probiotic Product (PROBIOTIC DAILY PO) Take by mouth    Historical Provider, MD   cetirizine (ZYRTEC) 10 MG tablet Take 10 mg by mouth daily    Historical Provider, MD   polyethyl glycol-propyl glycol 0.4-0.3 % (SYSTANE) 0.4-0.3 % ophthalmic solution 1 drop as needed for Dry Eyes    Historical Provider, MD   sucralfate (CARAFATE) 1 GM tablet Take 1 tablet by mouth 4 times daily (before meals and nightly) 7/26/21   Sulma Rosa MD   pantoprazole (PROTONIX) 40 MG tablet Take 1 tablet by mouth 2 times daily (before meals) 7/26/21 8/26/21  Sulma Rosa MD   vitamin D 25 MCG (1000 UT) CAPS Take by mouth daily 125 mcg daily    Historical Provider, MD   citalopram (CELEXA) 40 MG tablet Take 40 mg by mouth daily 6/4/21   Historical Provider, MD   loperamide (IMODIUM A-D) 2 MG tablet Take by mouth    Historical Provider, MD   tiZANidine (ZANAFLEX) 2 MG tablet Take 2 mg by mouth 2 times daily     Historical Provider, MD   DULoxetine (CYMBALTA) 20 MG extended release capsule Take 120 mg by mouth daily     Historical Provider, MD   insulin glargine (BASAGLAR KWIKPEN) 100 UNIT/ML injection pen Inject 8 Units into the skin nightly     Historical Provider, MD   potassium chloride (KLOR-CON M) 10 MEQ extended release tablet Take 1 tablet by mouth daily 6/22/20   Bertin Clink E Hemmelgarn, DO   Dulaglutide (TRULICITY) 2.40 MH/7.5XX SOPN Inject 0.75 mg into the skin once a week Every Wednesday    Historical Provider, MD   Cyanocobalamin (SM VITAMIN B-12 PO) Take 2,500 mcg by mouth daily    Historical Provider, MD   diphenhydrAMINE (BENADRYL) 25 MG capsule Take 25 mg by mouth as needed for Itching    Historical Provider, MD   Calcium Carb-Cholecalciferol 500-400 MG-UNIT TABS Take 1 tablet by mouth 2 times daily    Historical Provider, MD EVANS ALCOHOL SWABS 70 % PADS  7/4/19   Historical Provider, MD   ONE TOUCH ULTRA TEST strip  7/4/19   Historical Provider, MD   Lancets Mis. (UNISTIK CZT COMFORT) [address] [facility]  7/4/19   Historical Provider, MD   ipratropium-albuterol (DUONEB) 0.5-2.5 (3) MG/3ML SOLN nebulizer solution Inhale 1 vial into the lungs every 4 hours     Historical Provider, MD   Linaclotide (LINZESS) 72 MCG CAPS Take 72 mcg by mouth daily     Historical Provider, MD   pregabalin (LYRICA) 150 MG capsule Take 1 capsule by mouth 3 times daily for 3 days. . 9/22/18 9/2/21  Argelia Salcido MD   traZODone (DESYREL) 100 MG tablet Take 100 mg by mouth nightly     Historical Provider, MD   Multiple Vitamins-Minerals (THERAPEUTIC MULTIVITAMIN-MINERALS) tablet Take 1 tablet by mouth daily    Historical Provider, MD   OXYGEN Inhale into the lungs continuous    Historical Provider, MD   atorvastatin (LIPITOR) 20 MG tablet Take 1 tablet by mouth nightly 5/13/18   Donell Bain MD   magnesium hydroxide (MILK OF MAGNESIA CONCENTRATE) 2400 MG/10ML SUSP Take 30 mLs by mouth once as needed    Historical Provider, MD   docusate sodium (COLACE) 100 MG capsule Take 100 mg by mouth 3 times daily as needed     Historical Provider, MD   acetaminophen (TYLENOL) 325 MG tablet Take 2 tablets by mouth every 4 hours as needed for Pain 8/18/16   Iwona Rothman MD   levothyroxine (SYNTHROID) 75 MCG tablet Take 75 mcg by mouth Daily    Historical Provider, MD     Additional information:       VITAL SIGNS   Vitals:    04/20/22 1505   BP: (!) 151/67   Pulse: 67   Resp: 14   Temp:    SpO2: (!) 16%       PHYSICAL:   General: No acute distress  HEENT:  Unremarkable for age  Neck: without increased JVD, carotid pulses 2+ bilaterally without bruits  Heart: RRR, S1 & S2 WNL, S4 gallop, without murmurs or rubs    Lungs: Clear to auscultation    Abdomen: BS present, without HSM, masses, or tenderness    Extremities: without C,C,E.  Pulses 2+ bilaterally  Mental Status: Alert & Oriented        PLANNED PROCEDURE   []Cath  []PCI                []Pacemaker/AICD  [x]JAYLEN             []Cardioversion []Peripheral angiography/PTA  []Other:      SEDATION  Planned agent:[x]Midazolam []Meperidine [x]Sublimaze []Morphine  []Diazepam  []Other:     ASA Classification:  []1 [x]2 []3 []4 []5  Class 1: A normal healthy patient  Class 2: Pt with mild to moderate systemic disease  Class 3: Severe systemic disease or disturbance  Class 4: Severe systemic disorders that are already life threatening. Class 5: Moribund pt with little chances of survival, for more than 24 hours. Mallampati I Airway Classification:   []1 [x]2 []3 []4    [x]Pre-procedure diagnostic studies complete and results available. Comment:    [x]Previous sedation/anesthesia experiences assessed. Comment:    [x]The patient is an appropriate candidate to undergo the planned procedure sedation and anesthesia. (Refer to nursing sedation/analgesia documentation record)  [x]Formulation and discussion of sedation/procedure plan, risks, and expectations with patient and/or responsible adult completed. [x]Patient examined immediately prior to the procedure.  (Refer to nursing sedation/analgesia documentation record)    Yanet Ortiz MD MD [facility]  Electronically signed 4/20/2022 at 3:07 PM

## 2022-04-22 NOTE — TELEPHONE ENCOUNTER
This patient had her 45 day JAYLEN with the below results. Dr. Kendy Francis from your standpoint are you ok with the patient stopping Eliquis, continuing Aspirin 81mg and starting Plavix 75mg daily?

## 2022-04-25 RX ORDER — CLOPIDOGREL BISULFATE 75 MG/1
75 TABLET ORAL DAILY
Qty: 30 TABLET | Refills: 3 | Status: CANCELLED | OUTPATIENT
Start: 2022-04-25

## 2022-04-25 NOTE — TELEPHONE ENCOUNTER
Patient notified and message was left with [facility] [phone]. Will try again later.

## 2022-04-26 RX ORDER — CLOPIDOGREL BISULFATE 75 MG/1
75 TABLET ORAL DAILY
Qty: 30 TABLET | Refills: 3 | Status: ON HOLD | OUTPATIENT
Start: 2022-04-26 | End: 2022-04-30 | Stop reason: HOSPADM

## 2022-04-27 ENCOUNTER — HOSPITAL ENCOUNTER (INPATIENT)
Age: 77
LOS: 3 days | Discharge: NURSING, REHAB OR LONG-TERM CARE | DRG: 378 | End: 2022-04-30
Attending: EMERGENCY MEDICINE | Admitting: INTERNAL MEDICINE
Payer: MEDICARE

## 2022-04-27 ENCOUNTER — APPOINTMENT (OUTPATIENT)
Dept: GENERAL RADIOLOGY | Age: 77
DRG: 378 | End: 2022-04-27
Payer: MEDICARE

## 2022-04-27 DIAGNOSIS — Z95.818 PRESENCE OF WATCHMAN LEFT ATRIAL APPENDAGE CLOSURE DEVICE: ICD-10-CM

## 2022-04-27 DIAGNOSIS — K62.5 RECTAL BLEEDING: Primary | ICD-10-CM

## 2022-04-27 DIAGNOSIS — D50.0 ANEMIA DUE TO GASTROINTESTINAL BLOOD LOSS: ICD-10-CM

## 2022-04-27 DIAGNOSIS — Z87.448 HISTORY OF CHRONIC KIDNEY DISEASE: ICD-10-CM

## 2022-04-27 DIAGNOSIS — Z86.79 HISTORY OF ATRIAL FIBRILLATION: ICD-10-CM

## 2022-04-27 LAB
ALBUMIN SERPL-MCNC: 3.7 G/DL (ref 3.5–5.1)
ALP BLD-CCNC: 74 U/L (ref 38–126)
ALT SERPL-CCNC: 9 U/L (ref 11–66)
ANION GAP SERPL CALCULATED.3IONS-SCNC: 7 MEQ/L (ref 8–16)
AST SERPL-CCNC: 15 U/L (ref 5–40)
BACTERIA: ABNORMAL /HPF
BASOPHILS # BLD: 0.6 %
BASOPHILS ABSOLUTE: 0 THOU/MM3 (ref 0–0.1)
BILIRUB SERPL-MCNC: 0.2 MG/DL (ref 0.3–1.2)
BILIRUBIN DIRECT: < 0.2 MG/DL (ref 0–0.3)
BILIRUBIN URINE: NEGATIVE
BLOOD, URINE: NEGATIVE
BUN BLDV-MCNC: 26 MG/DL (ref 7–22)
CALCIUM SERPL-MCNC: 8.7 MG/DL (ref 8.5–10.5)
CASTS 2: ABNORMAL /LPF
CASTS UA: ABNORMAL /LPF
CHARACTER, URINE: CLEAR
CHLORIDE BLD-SCNC: 101 MEQ/L (ref 98–111)
CO2: 28 MEQ/L (ref 23–33)
COLOR: YELLOW
CREAT SERPL-MCNC: 1.5 MG/DL (ref 0.4–1.2)
CRYSTALS, UA: ABNORMAL
EKG ATRIAL RATE: 67 BPM
EKG P AXIS: 25 DEGREES
EKG P-R INTERVAL: 154 MS
EKG Q-T INTERVAL: 402 MS
EKG QRS DURATION: 100 MS
EKG QTC CALCULATION (BAZETT): 469 MS
EKG R AXIS: -30 DEGREES
EKG T AXIS: 90 DEGREES
EKG VENTRICULAR RATE: 82 BPM
EOSINOPHIL # BLD: 2.2 %
EOSINOPHILS ABSOLUTE: 0.1 THOU/MM3 (ref 0–0.4)
EPITHELIAL CELLS, UA: ABNORMAL /HPF
ERYTHROCYTE [DISTWIDTH] IN BLOOD BY AUTOMATED COUNT: 14.1 % (ref 11.5–14.5)
ERYTHROCYTE [DISTWIDTH] IN BLOOD BY AUTOMATED COUNT: 49.5 FL (ref 35–45)
GFR SERPL CREATININE-BSD FRML MDRD: 34 ML/MIN/1.73M2
GLUCOSE BLD-MCNC: 169 MG/DL (ref 70–108)
GLUCOSE URINE: NEGATIVE MG/DL
HCT VFR BLD CALC: 29.7 % (ref 37–47)
HEMOCCULT STL QL: POSITIVE
HEMOGLOBIN: 9.2 GM/DL (ref 12–16)
IMMATURE GRANS (ABS): 0.01 THOU/MM3 (ref 0–0.07)
IMMATURE GRANULOCYTES: 0.2 %
KETONES, URINE: NEGATIVE
LEUKOCYTE ESTERASE, URINE: ABNORMAL
LIPASE: 29 U/L (ref 5.6–51.3)
LYMPHOCYTES # BLD: 25.6 %
LYMPHOCYTES ABSOLUTE: 1.3 THOU/MM3 (ref 1–4.8)
MCH RBC QN AUTO: 30.1 PG (ref 26–33)
MCHC RBC AUTO-ENTMCNC: 31 GM/DL (ref 32.2–35.5)
MCV RBC AUTO: 97.1 FL (ref 81–99)
MISCELLANEOUS 2: ABNORMAL
MONOCYTES # BLD: 10.5 %
MONOCYTES ABSOLUTE: 0.5 THOU/MM3 (ref 0.4–1.3)
NITRITE, URINE: NEGATIVE
NUCLEATED RED BLOOD CELLS: 0 /100 WBC
OSMOLALITY CALCULATION: 280.6 MOSMOL/KG (ref 275–300)
PH UA: 6.5 (ref 5–9)
PLATELET # BLD: 193 THOU/MM3 (ref 130–400)
PMV BLD AUTO: 9.9 FL (ref 9.4–12.4)
POTASSIUM SERPL-SCNC: 4.4 MEQ/L (ref 3.5–5.2)
PROTEIN UA: NEGATIVE
RBC # BLD: 3.06 MILL/MM3 (ref 4.2–5.4)
RBC URINE: ABNORMAL /HPF
RENAL EPITHELIAL, UA: ABNORMAL
SEG NEUTROPHILS: 60.9 %
SEGMENTED NEUTROPHILS ABSOLUTE COUNT: 3 THOU/MM3 (ref 1.8–7.7)
SODIUM BLD-SCNC: 136 MEQ/L (ref 135–145)
SPECIFIC GRAVITY, URINE: 1.01 (ref 1–1.03)
TOTAL PROTEIN: 6.4 G/DL (ref 6.1–8)
UROBILINOGEN, URINE: 0.2 EU/DL (ref 0–1)
WBC # BLD: 5 THOU/MM3 (ref 4.8–10.8)
WBC UA: ABNORMAL /HPF
YEAST: ABNORMAL

## 2022-04-27 PROCEDURE — 82248 BILIRUBIN DIRECT: CPT

## 2022-04-27 PROCEDURE — 99285 EMERGENCY DEPT VISIT HI MDM: CPT

## 2022-04-27 PROCEDURE — 6360000002 HC RX W HCPCS: Performed by: EMERGENCY MEDICINE

## 2022-04-27 PROCEDURE — 36591 DRAW BLOOD OFF VENOUS DEVICE: CPT

## 2022-04-27 PROCEDURE — 1200000003 HC TELEMETRY R&B

## 2022-04-27 PROCEDURE — 99223 1ST HOSP IP/OBS HIGH 75: CPT | Performed by: NURSE PRACTITIONER

## 2022-04-27 PROCEDURE — 81001 URINALYSIS AUTO W/SCOPE: CPT

## 2022-04-27 PROCEDURE — 85025 COMPLETE CBC W/AUTO DIFF WBC: CPT

## 2022-04-27 PROCEDURE — 80053 COMPREHEN METABOLIC PANEL: CPT

## 2022-04-27 PROCEDURE — 82272 OCCULT BLD FECES 1-3 TESTS: CPT

## 2022-04-27 PROCEDURE — 93005 ELECTROCARDIOGRAM TRACING: CPT | Performed by: EMERGENCY MEDICINE

## 2022-04-27 PROCEDURE — 2580000003 HC RX 258: Performed by: NURSE PRACTITIONER

## 2022-04-27 PROCEDURE — 87086 URINE CULTURE/COLONY COUNT: CPT

## 2022-04-27 PROCEDURE — 93010 ELECTROCARDIOGRAM REPORT: CPT | Performed by: INTERNAL MEDICINE

## 2022-04-27 PROCEDURE — 83690 ASSAY OF LIPASE: CPT

## 2022-04-27 PROCEDURE — 71046 X-RAY EXAM CHEST 2 VIEWS: CPT

## 2022-04-27 PROCEDURE — C9113 INJ PANTOPRAZOLE SODIUM, VIA: HCPCS | Performed by: EMERGENCY MEDICINE

## 2022-04-27 RX ORDER — SODIUM CHLORIDE 0.9 % (FLUSH) 0.9 %
5-40 SYRINGE (ML) INJECTION EVERY 12 HOURS SCHEDULED
Status: DISCONTINUED | OUTPATIENT
Start: 2022-04-27 | End: 2022-04-30 | Stop reason: HOSPADM

## 2022-04-27 RX ORDER — ACETAMINOPHEN 325 MG/1
650 TABLET ORAL EVERY 6 HOURS PRN
Status: DISCONTINUED | OUTPATIENT
Start: 2022-04-27 | End: 2022-04-30 | Stop reason: HOSPADM

## 2022-04-27 RX ORDER — ACETAMINOPHEN 650 MG/1
650 SUPPOSITORY RECTAL EVERY 6 HOURS PRN
Status: DISCONTINUED | OUTPATIENT
Start: 2022-04-27 | End: 2022-04-30 | Stop reason: HOSPADM

## 2022-04-27 RX ORDER — HYDROXYZINE HYDROCHLORIDE 10 MG/1
10 TABLET, FILM COATED ORAL 3 TIMES DAILY PRN
Status: DISCONTINUED | OUTPATIENT
Start: 2022-04-27 | End: 2022-04-30 | Stop reason: HOSPADM

## 2022-04-27 RX ORDER — SODIUM CHLORIDE 9 MG/ML
INJECTION, SOLUTION INTRAVENOUS CONTINUOUS
Status: DISCONTINUED | OUTPATIENT
Start: 2022-04-27 | End: 2022-04-30 | Stop reason: HOSPADM

## 2022-04-27 RX ORDER — PANTOPRAZOLE SODIUM 40 MG/10ML
40 INJECTION, POWDER, LYOPHILIZED, FOR SOLUTION INTRAVENOUS ONCE
Status: COMPLETED | OUTPATIENT
Start: 2022-04-27 | End: 2022-04-27

## 2022-04-27 RX ORDER — TRAZODONE HYDROCHLORIDE 100 MG/1
100 TABLET ORAL NIGHTLY
Status: DISCONTINUED | OUTPATIENT
Start: 2022-04-27 | End: 2022-04-30 | Stop reason: HOSPADM

## 2022-04-27 RX ORDER — IPRATROPIUM BROMIDE AND ALBUTEROL SULFATE 2.5; .5 MG/3ML; MG/3ML
1 SOLUTION RESPIRATORY (INHALATION) 4 TIMES DAILY
Status: DISCONTINUED | OUTPATIENT
Start: 2022-04-27 | End: 2022-04-30 | Stop reason: HOSPADM

## 2022-04-27 RX ORDER — DIPHENHYDRAMINE HCL 25 MG
25 TABLET ORAL PRN
Status: DISCONTINUED | OUTPATIENT
Start: 2022-04-27 | End: 2022-04-30 | Stop reason: HOSPADM

## 2022-04-27 RX ORDER — DOCUSATE SODIUM 100 MG/1
100 CAPSULE, LIQUID FILLED ORAL 3 TIMES DAILY PRN
Status: DISCONTINUED | OUTPATIENT
Start: 2022-04-27 | End: 2022-04-30 | Stop reason: HOSPADM

## 2022-04-27 RX ORDER — DULOXETIN HYDROCHLORIDE 60 MG/1
120 CAPSULE, DELAYED RELEASE ORAL DAILY
Status: DISCONTINUED | OUTPATIENT
Start: 2022-04-28 | End: 2022-04-30 | Stop reason: HOSPADM

## 2022-04-27 RX ORDER — ONDANSETRON 2 MG/ML
4 INJECTION INTRAMUSCULAR; INTRAVENOUS EVERY 6 HOURS PRN
Status: DISCONTINUED | OUTPATIENT
Start: 2022-04-27 | End: 2022-04-30 | Stop reason: HOSPADM

## 2022-04-27 RX ORDER — SODIUM CHLORIDE 0.9 % (FLUSH) 0.9 %
5-40 SYRINGE (ML) INJECTION PRN
Status: DISCONTINUED | OUTPATIENT
Start: 2022-04-27 | End: 2022-04-30 | Stop reason: HOSPADM

## 2022-04-27 RX ORDER — ONDANSETRON 4 MG/1
4 TABLET, ORALLY DISINTEGRATING ORAL EVERY 8 HOURS PRN
Status: DISCONTINUED | OUTPATIENT
Start: 2022-04-27 | End: 2022-04-30 | Stop reason: HOSPADM

## 2022-04-27 RX ORDER — POLYVINYL ALCOHOL 14 MG/ML
1 SOLUTION/ DROPS OPHTHALMIC PRN
Status: DISCONTINUED | OUTPATIENT
Start: 2022-04-27 | End: 2022-04-30 | Stop reason: HOSPADM

## 2022-04-27 RX ORDER — CETIRIZINE HYDROCHLORIDE 10 MG/1
10 TABLET ORAL DAILY
Status: DISCONTINUED | OUTPATIENT
Start: 2022-04-28 | End: 2022-04-30 | Stop reason: HOSPADM

## 2022-04-27 RX ORDER — INSULIN GLARGINE 100 [IU]/ML
8 INJECTION, SOLUTION SUBCUTANEOUS NIGHTLY
Status: DISCONTINUED | OUTPATIENT
Start: 2022-04-27 | End: 2022-04-30 | Stop reason: HOSPADM

## 2022-04-27 RX ORDER — SUCRALFATE 1 G/1
1 TABLET ORAL
Status: DISCONTINUED | OUTPATIENT
Start: 2022-04-28 | End: 2022-04-30 | Stop reason: HOSPADM

## 2022-04-27 RX ORDER — TIZANIDINE 4 MG/1
2 TABLET ORAL 2 TIMES DAILY
Status: DISCONTINUED | OUTPATIENT
Start: 2022-04-28 | End: 2022-04-30 | Stop reason: HOSPADM

## 2022-04-27 RX ORDER — CITALOPRAM 40 MG/1
40 TABLET ORAL DAILY
Status: DISCONTINUED | OUTPATIENT
Start: 2022-04-28 | End: 2022-04-30 | Stop reason: HOSPADM

## 2022-04-27 RX ORDER — LEVOTHYROXINE SODIUM 0.07 MG/1
75 TABLET ORAL DAILY
Status: DISCONTINUED | OUTPATIENT
Start: 2022-04-28 | End: 2022-04-30 | Stop reason: HOSPADM

## 2022-04-27 RX ORDER — SODIUM CHLORIDE 9 MG/ML
INJECTION, SOLUTION INTRAVENOUS PRN
Status: DISCONTINUED | OUTPATIENT
Start: 2022-04-27 | End: 2022-04-30 | Stop reason: HOSPADM

## 2022-04-27 RX ORDER — ATORVASTATIN CALCIUM 20 MG/1
20 TABLET, FILM COATED ORAL NIGHTLY
Status: DISCONTINUED | OUTPATIENT
Start: 2022-04-27 | End: 2022-04-30 | Stop reason: HOSPADM

## 2022-04-27 RX ADMIN — PANTOPRAZOLE SODIUM 40 MG: 40 INJECTION, POWDER, FOR SOLUTION INTRAVENOUS at 21:51

## 2022-04-27 RX ADMIN — SODIUM CHLORIDE: 9 INJECTION, SOLUTION INTRAVENOUS at 23:59

## 2022-04-27 ASSESSMENT — ENCOUNTER SYMPTOMS
SORE THROAT: 0
NAUSEA: 0
VOICE CHANGE: 0
COUGH: 0
RHINORRHEA: 0
CONSTIPATION: 0
TROUBLE SWALLOWING: 0
VOMITING: 0
BACK PAIN: 0
CHEST TIGHTNESS: 0
BLOOD IN STOOL: 1
SINUS PRESSURE: 0
SHORTNESS OF BREATH: 1
ABDOMINAL PAIN: 0
WHEEZING: 0
DIARRHEA: 0

## 2022-04-27 ASSESSMENT — PAIN - FUNCTIONAL ASSESSMENT: PAIN_FUNCTIONAL_ASSESSMENT: NONE - DENIES PAIN

## 2022-04-27 NOTE — ED TRIAGE NOTES
Pt presents to ED for evaluation of blood in stool. Pt states this has been going on for months and has been scoped with no true reason for bleeding. Vitals obtained at this time. Dr. Shonna Sosa at bedside at this time.

## 2022-04-27 NOTE — ED PROVIDER NOTES
[address] [address]        Room # 06/006A    CHIEF COMPLAINT    Chief Complaint   Patient presents with    Rectal Bleeding       Nurses Notes reviewed and I agree except as noted in the HPI. HPI    Mary Ann Olivera is a 67 y.o. female who presents for evaluation of blood noted on the stool today. The patient states that been having on and off rectal bleeding while. She has been evaluated twice by gastroenterologist Dr. Nya Carlisle, patient had a series of endoscopy, colonoscopy including capsule endoscopy and did not find anything significant. 1-1/2-month ago the patient was noticing orange color stool with some red tinge on her pull-ups. The patient states that for the past 2 days she has been having fatigue and weak, her stools were orange in color, and today the patient had a large formed stool with blood tinge on the surface like spotting of blood. Denies any shortness of breath denies any chest pain no nausea no vomiting no fever and chills no cough and cold. Patient use 1 L nasal cannula oxygen because of a chronic anemia, prescribed by her pulmonary DrTrish Bhakta recommended the oxygen. The patient had history of atrial fibrillation and been on Eliquis in the past however it was discontinued last week and was placed Plavix. Patient had a watchman procedure 3/3/22 by Dr. Amauri Brink and a JAYLEN last week Dr. Romi Deshpande, with an EF of 35 to 40%. Denies any abdominal pain, no melena . Had chronic kidney disease, diabetes, cardiomyopathy, congestive heart failure, COPD, hypertension,      REVIEW OF SYSTEMS    Review of Systems   Constitutional: Negative for appetite change, chills, diaphoresis, fatigue and fever. HENT: Negative for congestion, ear pain, postnasal drip, rhinorrhea, sinus pressure, sneezing, sore throat, trouble swallowing and voice change. Respiratory: Positive for shortness of breath.  Negative for cough, chest tightness and wheezing. Cardiovascular: Negative for chest pain, palpitations and leg swelling. Gastrointestinal: Positive for blood in stool. Negative for abdominal pain, constipation, diarrhea, nausea and vomiting. Musculoskeletal: Negative for arthralgias, back pain, joint swelling, myalgias, neck pain and neck stiffness. Neurological: Negative for dizziness, syncope, weakness, light-headedness, numbness and headaches. PAST MEDICAL HISTORY     has a past medical history of Anemia, Atrial fibrillation (HCC), CAD (coronary artery disease), CHF (congestive heart failure) ([facility] [address] Chronic kidney disease, COPD (chronic obstructive pulmonary disease) ([facility] [address] DDD (degenerative disc disease), lumbar, Depression, Frequent PVCs, GERD (gastroesophageal reflux disease), History of blood transfusion, Hx of blood clots, Hyperlipidemia, Hypertension, Hyperthyroidism, Movement disorder, Neuropathy, Obesity, Palpitations, Pneumonia, Sleep apnea, Status post right and left heart catheterization, and Type II or unspecified type diabetes mellitus without mention of complication, not stated as uncontrolled. SURGICAL HISTORY   has a past surgical history that includes Rotator cuff repair; Tubal ligation; Hysterectomy; bladder suspension; tendon release (Left, 08/09/2016); Achilles tendon surgery (Bilateral); Cardiac surgery (2016); Hammer toe surgery (Right); hiatal hernia repair (09/06/2016); hernia repair; Tunneled venous port placement (11/06/2017); Upper gastrointestinal endoscopy (Left, 05/10/2018); Colonoscopy (Left, 05/11/2018); Endoscopy, colon, diagnostic; Gastric fundoplication; pr office/outpt visit,procedure only (N/A, 09/21/2018); Upper gastrointestinal endoscopy (Left, 07/25/2021); Colonoscopy (N/A, 08/04/2021); Colonoscopy (08/04/2021); and transesophageal echocardiogram (N/A, 4/20/2022).     CURRENT MEDICATIONS    Previous Medications    ACETAMINOPHEN (TYLENOL) 325 MG TABLET    Take 2 tablets by mouth every 4 hours as needed for Pain    ASPIRIN 81 MG CHEWABLE TABLET    Take 1 tablet by mouth daily    ATORVASTATIN (LIPITOR) 20 MG TABLET    Take 1 tablet by mouth nightly    CALCIUM CARB-CHOLECALCIFEROL 500-400 MG-UNIT TABS    Take 1 tablet by mouth 2 times daily    CETIRIZINE (ZYRTEC) 10 MG TABLET    Take 10 mg by mouth daily    CITALOPRAM (CELEXA) 40 MG TABLET    Take 40 mg by mouth daily    CLOPIDOGREL (PLAVIX) 75 MG TABLET    Take 1 tablet by mouth daily    CYANOCOBALAMIN (SM VITAMIN B-12 PO)    Take 2,500 mcg by mouth daily    DIPHENHYDRAMINE (BENADRYL) 25 MG CAPSULE    Take 25 mg by mouth as needed for Itching    DOCUSATE SODIUM (COLACE) 100 MG CAPSULE    Take 100 mg by mouth 3 times daily as needed     DULAGLUTIDE (TRULICITY) 5.17 AI/8.8GF SOPN    Inject 0.75 mg into the skin once a week Every Wednesday    DULOXETINE (CYMBALTA) 20 MG EXTENDED RELEASE CAPSULE    Take 120 mg by mouth daily     FERROUS GLUCONATE (FERGON) 324 (38 FE) MG TABLET    Take 324 mg by mouth 2 times daily    FUROSEMIDE (LASIX) 20 MG TABLET    Take 20 mg by mouth daily as needed (swelling as needed)    HYDROXYZINE (ATARAX) 10 MG TABLET    Take 10 mg by mouth 3 times daily as needed for Itching    INSULIN GLARGINE (BASAGLAR KWIKPEN) 100 UNIT/ML INJECTION PEN    Inject 8 Units into the skin nightly     INSULIN LISPRO (HUMALOG) 100 UNIT/ML INJECTION VIAL    Inject into the skin 3 times daily (before meals)    IPRATROPIUM-ALBUTEROL (DUONEB) 0.5-2.5 (3) MG/3ML SOLN NEBULIZER SOLUTION    Inhale 1 vial into the lungs every 4 hours     LANCETS MISC. (UNISTIK CZT COMFORT) MISC        LEVOTHYROXINE (SYNTHROID) 75 MCG TABLET    Take 75 mcg by mouth Daily    LINACLOTIDE (LINZESS) 72 MCG CAPS    Take 72 mcg by mouth daily     LOPERAMIDE (IMODIUM A-D) 2 MG TABLET    Take by mouth    MAGNESIUM HYDROXIDE (MILK OF MAGNESIA CONCENTRATE) 2400 MG/10ML SUSP    Take 30 mLs by mouth once as needed    MULTIPLE VITAMINS-MINERALS (THERAPEUTIC MULTIVITAMIN-MINERALS) TABLET    Take 1 tablet by mouth daily    ONE TOUCH ULTRA TEST STRIP        OXYGEN    Inhale into the lungs continuous    PANTOPRAZOLE (PROTONIX) 40 MG TABLET    Take 1 tablet by mouth 2 times daily (before meals)    POLYETHYL GLYCOL-PROPYL GLYCOL 0.4-0.3 % (SYSTANE) 0.4-0.3 % OPHTHALMIC SOLUTION    1 drop as needed for Dry Eyes    POTASSIUM CHLORIDE (KLOR-CON M) 10 MEQ EXTENDED RELEASE TABLET    Take 1 tablet by mouth daily    PREGABALIN (LYRICA) 150 MG CAPSULE    Take 1 capsule by mouth 3 times daily for 3 days. [facility] PRO[facility] PR[facility] (PROBIOTIC DAILY PO)    Take by mouth    RA ALCOHOL SWABS 70 % PADS        SUCRALFATE (CARAFATE) 1 GM TABLET    Take 1 tablet by mouth 4 times daily (before meals and nightly)    TIZANIDINE (ZANAFLEX) 2 MG TABLET    Take 2 mg by mouth 2 times daily     TRAZODONE (DESYREL) 100 MG TABLET    Take 100 mg by mouth nightly     VITAMIN D 25 MCG (1000 UT) CAPS    Take by mouth daily 125 mcg daily       ALLERGIES    is allergic to bactrim [sulfamethoxazole-trimethoprim], wellbutrin [bupropion], and silicone. FAMILY HISTORY    She indicated that her mother is . She indicated that her father is . She indicated that the status of her brother is unknown.   family history includes COPD in her brother and father; Cancer in her mother; Diabetes in her father and mother; Heart Disease in her mother; High Blood Pressure in her mother; Stroke in her mother; Vision Loss in her mother. SOCIAL HISTORY     reports that she quit smoking about 15 years ago. Her smoking use included cigarettes. She has a 30.00 pack-year smoking history. She has never used smokeless tobacco. She reports that she does not drink alcohol and does not use drugs.     PHYSICAL EXAM      INITIAL VITALS: /67   Pulse 73   Temp 98.3 °F (36.8 °C) (Oral)   Resp 16   Ht 5' 6\" (1.676 m)   SpO2 97%   BMI 37.35 kg/m² Estimated body mass index is 37.35 kg/m² as calculated from the following:    Height as of this encounter: 5' 6\" (1.676 m). Weight as of 4/20/22: 231 lb 6.4 oz (105 kg). Physical Exam  Vitals reviewed. Constitutional:       Appearance: She is well-developed. HENT:      Head: Normocephalic and atraumatic. Right Ear: External ear normal.      Left Ear: External ear normal.      Nose: Nose normal.   Eyes:      General: No scleral icterus. Conjunctiva/sclera: Conjunctivae normal.      Pupils: Pupils are equal, round, and reactive to light. Neck:      Thyroid: No thyromegaly. Vascular: No JVD. Cardiovascular:      Rate and Rhythm: Normal rate. Rhythm irregularly irregular. Heart sounds: No murmur heard. No friction rub. Pulmonary:      Effort: Pulmonary effort is normal.      Breath sounds: Normal breath sounds. No wheezing or rales. Chest:      Chest wall: No tenderness. Abdominal:      General: Bowel sounds are normal.      Palpations: Abdomen is soft. There is no mass. Tenderness: There is no abdominal tenderness. Genitourinary:     Comments: Rectal examination was done in presence of the nurse RN Darby Rivers, there is no melena per examination finger, good sphincter tone empty rectal vault, reddish-gray stool noted there is no masses no tenderness  Musculoskeletal:      Cervical back: Normal range of motion and neck supple. Lymphadenopathy:      Cervical: No cervical adenopathy. Skin:     Findings: No rash. Neurological:      Mental Status: She is alert and oriented to person, place, and time. Psychiatric:         Behavior: Behavior is cooperative.          MEDICAL DECISION MAKING    DIFFERENTIAL DIAGNOSIS:  Blood per stool, rectal fissure, hemorrhoid, GI bleeding, history of anemia, history of atrial fibrillation, watchman procedure        DIAGNOSTIC RESULTS    EKG   Interpreted by Ulices Smart MD      Rhythm: Normal sinus rhythm with aberrant rhythm and PAC  Rate: normal  Axis: normal  Ectopy: none  Conduction: normal  ST Segments: no acute change  T Waves: no acute change  Q Waves: none    Clinical Impression: Normal sinus rhythm with aberrant rhythm and PAC EKG      Jihan Saleem MD      RADIOLOGY:    XR CHEST (2 VW)    (Results Pending)       LABS:   Labs Reviewed   CBC WITH AUTO DIFFERENTIAL - Abnormal; Notable for the following components:       Result Value    RBC 3.06 (*)     Hemoglobin 9.2 (*)     Hematocrit 29.7 (*)     MCHC 31.0 (*)     RDW-SD 49.5 (*)     All other components within normal limits   BASIC METABOLIC PANEL - Abnormal; Notable for the following components:    Glucose 169 (*)     BUN 26 (*)     CREATININE 1.5 (*)     All other components within normal limits   HEPATIC FUNCTION PANEL - Abnormal; Notable for the following components: Total Bilirubin 0.2 (*)     ALT 9 (*)     All other components within normal limits   ANION GAP - Abnormal; Notable for the following components:    Anion Gap 7.0 (*)     All other components within normal limits   GLOMERULAR FILTRATION RATE, ESTIMATED - Abnormal; Notable for the following components:    Est, Glom Filt Rate 34 (*)     All other components within normal limits   URINE WITH REFLEXED MICRO - Abnormal; Notable for the following components:    Leukocyte Esterase, Urine SMALL (*)     All other components within normal limits   CULTURE, REFLEXED, URINE    Narrative:     Source: cath urine       Site: catheter -straight cath          Current Antibiotics: not   stated   LIPASE   BLOOD OCCULT STOOL SCREEN #1   OSMOLALITY     All other unresulted laboratory test above are normal:    Vitals:    Vitals:    04/27/22 2016 04/27/22 2036 04/27/22 2046 04/27/22 2106   BP: 125/71 130/71 118/73 128/67   Pulse: 69 69 69 73   Resp: 13 16 16 16   Temp:       TempSrc:       SpO2: 98% 98% 99% 97%   Height:           EMERGENCY DEPARTMENT COURSE:    Medications   pantoprazole (PROTONIX) injection 40 mg (has no administration in time range)       The pt was seen and evaluated by me. Within the department, I observed the pt's vitalsigns to be within acceptable range. Laboratory and Radiological studies were performed, results were reviewed with the patient. Within the department, the pt was treated with Protonix. I observed the pt's condition to be hemodynamically stable during the duration of their stay. I explained my proposed course of treatment to the pt, and they were amenable to my decision. The case is referred to the hospitalist services, Dr. Sobia Cavazos graciously admitted the patient. CRITICAL CARE:   None. CONSULTS:  Hospitalist services Dr. Sobia Cavazos    PROCEDURES:  None. FINAL IMPRESSION       1. Rectal bleeding    2. Anemia due to gastrointestinal blood loss    3. Presence of Watchman left atrial appendage closure device    4. History of paroxysmal atrial fibrillation    5. History of chronic kidney disease stage III          DISPOSITION/PLAN  PATIENT REFERRED TO: Patient's admitted to the hospitalist services by Dr. Sobia Cavazos    (Please note that portions of this note were completed with a voice recognition program and electronically transcribed. Efforts were [facility] edit the dictations but occasionally words are mis-transcribed . The transcription may contain errors not detected in proofreading.   This transcription was electronically signed.)     04/27/22 9:45 PM      Gely Solomon MD      Emergency room physician           Gely Solomon MD  04/27/22 5014

## 2022-04-28 LAB
ABSOLUTE RETIC #: 92 THOU/MM3 (ref 20–115)
ANION GAP SERPL CALCULATED.3IONS-SCNC: 9 MEQ/L (ref 8–16)
APTT: 28.2 SECONDS (ref 22–38)
BUN BLDV-MCNC: 20 MG/DL (ref 7–22)
CALCIUM SERPL-MCNC: 8.3 MG/DL (ref 8.5–10.5)
CHLORIDE BLD-SCNC: 107 MEQ/L (ref 98–111)
CO2: 26 MEQ/L (ref 23–33)
CREAT SERPL-MCNC: 1.2 MG/DL (ref 0.4–1.2)
FERRITIN: 43 NG/ML (ref 10–291)
FOLATE: 10.1 NG/ML (ref 4.8–24.2)
GFR SERPL CREATININE-BSD FRML MDRD: 43 ML/MIN/1.73M2
GLUCOSE BLD-MCNC: 104 MG/DL (ref 70–108)
GLUCOSE BLD-MCNC: 106 MG/DL (ref 70–108)
GLUCOSE BLD-MCNC: 111 MG/DL (ref 70–108)
GLUCOSE BLD-MCNC: 138 MG/DL (ref 70–108)
GLUCOSE BLD-MCNC: 86 MG/DL (ref 70–108)
GLUCOSE BLD-MCNC: 95 MG/DL (ref 70–108)
HCT VFR BLD CALC: 26.5 % (ref 37–47)
HCT VFR BLD CALC: 28.8 % (ref 37–47)
HEMOGLOBIN: 8.4 GM/DL (ref 12–16)
HEMOGLOBIN: 9.1 GM/DL (ref 12–16)
IMMATURE RETIC FRACT: 26.6 % (ref 3–15.9)
INR BLD: 1.03 (ref 0.85–1.13)
IRON SATURATION: 14 % (ref 20–50)
IRON: 35 UG/DL (ref 50–170)
IRON: 47 UG/DL (ref 50–170)
POTASSIUM REFLEX MAGNESIUM: 4 MEQ/L (ref 3.5–5.2)
RETIC HEMOGLOBIN: 31.3 PG (ref 28.2–35.7)
RETICULOCYTE ABSOLUTE COUNT: 3.4 % (ref 0.5–2)
SODIUM BLD-SCNC: 142 MEQ/L (ref 135–145)
TOTAL IRON BINDING CAPACITY: 229 UG/DL (ref 171–450)
TOTAL IRON BINDING CAPACITY: 244 UG/DL (ref 171–450)
VITAMIN B-12: 276 PG/ML (ref 211–911)

## 2022-04-28 PROCEDURE — 83540 ASSAY OF IRON: CPT

## 2022-04-28 PROCEDURE — 6370000000 HC RX 637 (ALT 250 FOR IP): Performed by: NURSE PRACTITIONER

## 2022-04-28 PROCEDURE — 82607 VITAMIN B-12: CPT

## 2022-04-28 PROCEDURE — 94640 AIRWAY INHALATION TREATMENT: CPT

## 2022-04-28 PROCEDURE — 82948 REAGENT STRIP/BLOOD GLUCOSE: CPT

## 2022-04-28 PROCEDURE — 82728 ASSAY OF FERRITIN: CPT

## 2022-04-28 PROCEDURE — 85610 PROTHROMBIN TIME: CPT

## 2022-04-28 PROCEDURE — 82746 ASSAY OF FOLIC ACID SERUM: CPT

## 2022-04-28 PROCEDURE — 1200000003 HC TELEMETRY R&B

## 2022-04-28 PROCEDURE — 80048 BASIC METABOLIC PNL TOTAL CA: CPT

## 2022-04-28 PROCEDURE — 85046 RETICYTE/HGB CONCENTRATE: CPT

## 2022-04-28 PROCEDURE — A4216 STERILE WATER/SALINE, 10 ML: HCPCS | Performed by: NURSE PRACTITIONER

## 2022-04-28 PROCEDURE — 2580000003 HC RX 258: Performed by: NURSE PRACTITIONER

## 2022-04-28 PROCEDURE — 85014 HEMATOCRIT: CPT

## 2022-04-28 PROCEDURE — 94760 N-INVAS EAR/PLS OXIMETRY 1: CPT

## 2022-04-28 PROCEDURE — C9113 INJ PANTOPRAZOLE SODIUM, VIA: HCPCS | Performed by: NURSE PRACTITIONER

## 2022-04-28 PROCEDURE — 6360000002 HC RX W HCPCS: Performed by: NURSE PRACTITIONER

## 2022-04-28 PROCEDURE — 99232 SBSQ HOSP IP/OBS MODERATE 35: CPT | Performed by: PHYSICIAN ASSISTANT

## 2022-04-28 PROCEDURE — 85018 HEMOGLOBIN: CPT

## 2022-04-28 PROCEDURE — 85730 THROMBOPLASTIN TIME PARTIAL: CPT

## 2022-04-28 PROCEDURE — 83550 IRON BINDING TEST: CPT

## 2022-04-28 PROCEDURE — 2700000000 HC OXYGEN THERAPY PER DAY

## 2022-04-28 RX ORDER — INSULIN LISPRO 100 [IU]/ML
0-6 INJECTION, SOLUTION INTRAVENOUS; SUBCUTANEOUS NIGHTLY
Status: DISCONTINUED | OUTPATIENT
Start: 2022-04-28 | End: 2022-04-30 | Stop reason: HOSPADM

## 2022-04-28 RX ORDER — DEXTROSE MONOHYDRATE 50 MG/ML
100 INJECTION, SOLUTION INTRAVENOUS PRN
Status: DISCONTINUED | OUTPATIENT
Start: 2022-04-28 | End: 2022-04-30 | Stop reason: HOSPADM

## 2022-04-28 RX ORDER — PANTOPRAZOLE SODIUM 40 MG/1
40 TABLET, DELAYED RELEASE ORAL
Status: DISCONTINUED | OUTPATIENT
Start: 2022-04-28 | End: 2022-04-30 | Stop reason: HOSPADM

## 2022-04-28 RX ORDER — NICOTINE POLACRILEX 4 MG
15 LOZENGE BUCCAL PRN
Status: DISCONTINUED | OUTPATIENT
Start: 2022-04-28 | End: 2022-04-28 | Stop reason: CLARIF

## 2022-04-28 RX ORDER — HYDROCORTISONE ACETATE 25 MG/1
25 SUPPOSITORY RECTAL 2 TIMES DAILY
Status: DISCONTINUED | OUTPATIENT
Start: 2022-04-28 | End: 2022-04-30 | Stop reason: HOSPADM

## 2022-04-28 RX ORDER — DEXTROSE MONOHYDRATE 25 G/50ML
12.5 INJECTION, SOLUTION INTRAVENOUS PRN
Status: DISCONTINUED | OUTPATIENT
Start: 2022-04-28 | End: 2022-04-28 | Stop reason: CLARIF

## 2022-04-28 RX ORDER — INSULIN LISPRO 100 [IU]/ML
0-12 INJECTION, SOLUTION INTRAVENOUS; SUBCUTANEOUS
Status: DISCONTINUED | OUTPATIENT
Start: 2022-04-28 | End: 2022-04-30 | Stop reason: HOSPADM

## 2022-04-28 RX ADMIN — CITALOPRAM 40 MG: 40 TABLET, FILM COATED ORAL at 10:02

## 2022-04-28 RX ADMIN — HYDROCORTISONE ACETATE 25 MG: 25 SUPPOSITORY RECTAL at 14:09

## 2022-04-28 RX ADMIN — IPRATROPIUM BROMIDE AND ALBUTEROL SULFATE 3 ML: .5; 3 SOLUTION RESPIRATORY (INHALATION) at 15:49

## 2022-04-28 RX ADMIN — LEVOTHYROXINE SODIUM 75 MCG: 0.07 TABLET ORAL at 06:20

## 2022-04-28 RX ADMIN — IPRATROPIUM BROMIDE AND ALBUTEROL SULFATE 3 ML: .5; 3 SOLUTION RESPIRATORY (INHALATION) at 09:09

## 2022-04-28 RX ADMIN — ATORVASTATIN CALCIUM 20 MG: 20 TABLET, FILM COATED ORAL at 21:19

## 2022-04-28 RX ADMIN — HYDROCORTISONE ACETATE 25 MG: 25 SUPPOSITORY RECTAL at 21:24

## 2022-04-28 RX ADMIN — ATORVASTATIN CALCIUM 20 MG: 20 TABLET, FILM COATED ORAL at 00:49

## 2022-04-28 RX ADMIN — CETIRIZINE HYDROCHLORIDE 10 MG: 10 TABLET, FILM COATED ORAL at 10:02

## 2022-04-28 RX ADMIN — ACETAMINOPHEN 650 MG: 325 TABLET ORAL at 21:18

## 2022-04-28 RX ADMIN — IPRATROPIUM BROMIDE AND ALBUTEROL SULFATE 3 ML: .5; 3 SOLUTION RESPIRATORY (INHALATION) at 12:05

## 2022-04-28 RX ADMIN — TIZANIDINE 2 MG: 4 TABLET ORAL at 10:03

## 2022-04-28 RX ADMIN — HYDROXYZINE HYDROCHLORIDE 10 MG: 10 TABLET ORAL at 10:03

## 2022-04-28 RX ADMIN — TIZANIDINE 2 MG: 4 TABLET ORAL at 21:21

## 2022-04-28 RX ADMIN — IPRATROPIUM BROMIDE AND ALBUTEROL SULFATE 3 ML: .5; 3 SOLUTION RESPIRATORY (INHALATION) at 21:54

## 2022-04-28 RX ADMIN — TRAZODONE HYDROCHLORIDE 100 MG: 100 TABLET ORAL at 21:19

## 2022-04-28 RX ADMIN — SUCRALFATE 1 G: 1 TABLET ORAL at 21:21

## 2022-04-28 RX ADMIN — DULOXETINE 120 MG: 60 CAPSULE, DELAYED RELEASE ORAL at 10:02

## 2022-04-28 RX ADMIN — SODIUM CHLORIDE 40 MG: 9 INJECTION, SOLUTION INTRAMUSCULAR; INTRAVENOUS; SUBCUTANEOUS at 06:20

## 2022-04-28 RX ADMIN — SUCRALFATE 1 G: 1 TABLET ORAL at 17:27

## 2022-04-28 RX ADMIN — PANTOPRAZOLE SODIUM 40 MG: 40 TABLET, DELAYED RELEASE ORAL at 17:26

## 2022-04-28 RX ADMIN — SUCRALFATE 1 G: 1 TABLET ORAL at 10:02

## 2022-04-28 RX ADMIN — INSULIN GLARGINE 8 UNITS: 100 INJECTION, SOLUTION SUBCUTANEOUS at 21:26

## 2022-04-28 RX ADMIN — SODIUM CHLORIDE: 9 INJECTION, SOLUTION INTRAVENOUS at 14:09

## 2022-04-28 RX ADMIN — SUCRALFATE 1 G: 1 TABLET ORAL at 06:20

## 2022-04-28 RX ADMIN — INSULIN GLARGINE 8 UNITS: 100 INJECTION, SOLUTION SUBCUTANEOUS at 00:50

## 2022-04-28 RX ADMIN — TRAZODONE HYDROCHLORIDE 100 MG: 100 TABLET ORAL at 00:50

## 2022-04-28 ASSESSMENT — PAIN SCALES - GENERAL
PAINLEVEL_OUTOF10: 7
PAINLEVEL_OUTOF10: 0
PAINLEVEL_OUTOF10: 0

## 2022-04-28 ASSESSMENT — PAIN DESCRIPTION - LOCATION: LOCATION: HEAD

## 2022-04-28 ASSESSMENT — PAIN - FUNCTIONAL ASSESSMENT: PAIN_FUNCTIONAL_ASSESSMENT: ACTIVITIES ARE NOT PREVENTED

## 2022-04-28 NOTE — ED NOTES
Pt resting in bed at this time, no concerns voiced. Call light in reach.      Shun Salter RN  04/27/22 2034

## 2022-04-28 NOTE — PROGRESS NOTES
This RN reported off to [employer] as this RN was being floated to another floor. This RN informed her that patient was seen by GI this early afternoon and the GI recommended that patient have her fmaily bring in her home med (Linzess 72mcg capsules) for her constipation as this hospital does not carry the medication. Family did bring in medication and this RN completed the medication form and sent a copy down to pharmacy after speaking with them on the phone and verifying the process of administering the home medication. The original copy was placed in patients chart. This RN got a hold of a covering provider in  Dr. Barbara Alva who gave this RN a verbal order to give a NOW dose of Linzess for 144mcg after explaining that I spoke with NP Lizbeth Mcfadden from  earlier and that she did change the Linzess order to double the medication from 72mcg to 144mcg but because the order was changed in the afternoon the morning medication was now discontinued and that she wanted the patient to start the medication today. This RN did give patient her all her [id number] medications including the Linzess prior to leaving the floor. This RN reported off to [facility] [place] the iron panel was drawn including her hemoglobin and hematocrit and sent down to lab.

## 2022-04-28 NOTE — CARE COORDINATION
4/28/22, 6:57 AM EDT  DISCHARGE PLANNING EVALUATION:    Tom Muniz       Admitted: 4/27/2022/ [facility] day: 1   Location: -15/015-A Reason for admit: Rectal bleeding [K62.5]  History of atrial fibrillation [Z86.79]  Anemia due to gastrointestinal blood loss [D50.0]  History of chronic kidney disease [Z87.448]  Presence of Watchman left atrial appendage closure device [Z95.818]   PMH:  has a past medical history of Anemia, Atrial fibrillation ([facility] [address] CAD (coronary artery disease), CHF (congestive heart failure) ([facility] [address] Chronic kidney disease, COPD (chronic obstructive pulmonary disease) ([facility] [address] DDD (degenerative disc disease), lumbar, Depression, Frequent PVCs, GERD (gastroesophageal reflux disease), History of blood transfusion, Hx of blood clots, Hyperlipidemia, Hypertension, Hyperthyroidism, Movement disorder, Neuropathy, Obesity, Palpitations, Pneumonia, Sleep apnea, Status post right and left heart catheterization, and Type II or unspecified type diabetes mellitus without mention of complication, not stated as uncontrolled. Procedure:4/27: CXR:  1. No acute cardiopulmonary process. The lung volumes with elevation of    the hemidiaphragms. Linear atelectasis/scarring left midlung. 2. Cardiomegaly with the heart size unchanged. 3. Left chest port with distal tip overlying expected location of the    superior vena cava. Barriers to Discharge: Pt admitted through ER with c/o rectal bleed. H/H 8.4/26.5. GI consult. Hold Plavix. PCP: Kitty Hedrick MD  Readmission Risk Score: 21.4 ( )%    Patient Goals/Plan/Treatment Preferences: Spoke with pt. She currently lives at [address] and plans to return. She no longer drives. Son and daughter primarily transport. She has a walker but doesn't use. She has home O2 through  [facility] during day-3L at night. Transportation/Food Security/Housekeeping Addressed:  No issues identified.

## 2022-04-28 NOTE — CONSULTS
Consult History & Physical      Patient:  Lauri Cruz  YOB: 1945  MRN: [id number]     Acct: [de-identified]    Chief Complaint:    Chief Complaint   Patient presents with    Rectal Bleeding       Date of Service: Pt seen/examined in consultation on 4/28/2022    History Of Present Illness:      67 y.o. female who we are asked to see/evaluate by CAROLINA Alas for medical management of rectal bleeding. She came to the ED yesterday for hematochezia that started a \"about a week ago. \" She states she did not initially think \"anything of it,\" but she had a bowel movement that had \"a lot of blood in it. \" Therefore she decided to come to the ED. She denies abdominal pain and rectal pain. Denies nausea, vomiting, diarrhea, and melena. She has a history of constipation and takes Linzess 72mcg daily. She says she has a BM daily, but does have to strain sometimes. She used to be on a higher dose a couple years ago, but it was decreased due to diarrhea. She is on ASA and Plavix for a history of afib and CVA. She had the Watchman procedure 03/2022. She has a history of chronic anemia, Abilio lesions, and rectal bleeding. She endorses lightheadedness and dizziness ongoing for about a month. She has chronic SOB and is on oxygen at home. She denies other NSAID use. She denies tobacco use, alcohol use, and illicit drug use.      Past Medical History:    Past Medical History:   Diagnosis Date    Anemia     Atrial fibrillation ([address] 11/09/2017    CAD (coronary artery disease)     CHF (congestive heart failure) (HCC)     Chronic kidney disease     stage 3 kidney     COPD (chronic obstructive pulmonary disease) (HCC)     DDD (degenerative disc disease), lumbar     Depression     Frequent PVCs 12/13/2017    GERD (gastroesophageal reflux disease)     History of blood transfusion     Hx of blood clots 09/2017    pulmonary emboli    Hyperlipidemia     Hypertension     Hyperthyroidism     Movement disorder     DDD    Neuropathy     Obesity     Palpitations 01/10/2020    Pneumonia 2016    sepsis    Sleep apnea     usually wears cpap at night    Status post right and left heart catheterization     Type II or unspecified type diabetes mellitus without mention of complication, not stated as uncontrolled        Home Medications:  Prior to Admission medications    Medication Sig Start Date End Date Taking?  Authorizing Provider   clopidogrel (PLAVIX) 75 MG tablet Take 1 tablet by mouth daily 4/26/22   Albertina Borja MD   aspirin 81 MG chewable tablet Take 1 tablet by mouth daily 3/4/22   Anny Garrido PA-C   ferrous gluconate (FERGON) 324 (38 Fe) MG tablet Take 324 mg by mouth 2 times daily    Historical Provider, MD   furosemide (LASIX) 20 MG tablet Take 20 mg by mouth daily as needed (swelling as needed)    Historical Provider, MD   insulin lispro (HUMALOG) 100 UNIT/ML injection vial Inject into the skin 3 times daily (before meals)    Historical Provider, MD   hydrOXYzine (ATARAX) 10 MG tablet Take 10 mg by mouth 3 times daily as needed for Itching    Historical Provider, MD   Probiotic Product (PROBIOTIC DAILY PO) Take by mouth    Historical Provider, MD   cetirizine (ZYRTEC) 10 MG tablet Take 10 mg by mouth daily    Historical Provider, MD   polyethyl glycol-propyl glycol 0.4-0.3 % (SYSTANE) 0.4-0.3 % ophthalmic solution 1 drop as needed for Dry Eyes    Historical Provider, MD   sucralfate (CARAFATE) 1 GM tablet Take 1 tablet by mouth 4 times daily (before meals and nightly) 7/26/21   Cooper Vera MD   pantoprazole (PROTONIX) 40 MG tablet Take 1 tablet by mouth 2 times daily (before meals) 7/26/21 8/26/21  Cooper Vera MD   vitamin D 25 MCG (1000 UT) CAPS Take by mouth daily 125 mcg daily    Historical Provider, MD   citalopram (CELEXA) 40 MG tablet Take 40 mg by mouth daily 6/4/21   Historical Provider, MD   loperamide (IMODIUM A-D) 2 MG tablet Take by mouth    Historical Provider, MD tiZANidine (ZANAFLEX) 2 MG tablet Take 2 mg by mouth 2 times daily     Historical Provider, MD   DULoxetine (CYMBALTA) 20 MG extended release capsule Take 120 mg by mouth daily     Historical Provider, MD   insulin glargine (BASAGLAR KWIKPEN) 100 UNIT/ML injection pen Inject 8 Units into the skin nightly     Historical Provider, MD   potassium chloride (KLOR-CON M) 10 MEQ extended release tablet Take 1 tablet by mouth daily 6/22/20   Bertin Clink E Hemmelgarn, DO   Dulaglutide (TRULICITY) 8.45 XF/1.2VY SOPN Inject 0.75 mg into the skin once a week Every Wednesday    Historical Provider, MD   Cyanocobalamin (SM VITAMIN B-12 PO) Take 2,500 mcg by mouth daily    Historical Provider, MD   diphenhydrAMINE (BENADRYL) 25 MG capsule Take 25 mg by mouth as needed for Itching    Historical Provider, MD   Calcium Carb-Cholecalciferol 500-400 MG-UNIT TABS Take 1 tablet by mouth 2 times daily    Historical Provider, MD   RA ALCOHOL SWABS 70 % PADS  7/4/19   Historical Provider, MD   ONE TOUCH ULTRA TEST strip  7/4/19   Historical Provider, MD   Lancets Misc. (UNISTIK CZT COMFORT) [address] [facility]  7/4/19   Historical Provider, MD   ipratropium-albuterol (DUONEB) 0.5-2.5 (3) MG/3ML SOLN nebulizer solution Inhale 1 vial into the lungs every 4 hours     Historical Provider, MD   Linaclotide (LINZESS) 72 MCG CAPS Take 72 mcg by mouth daily     Historical Provider, MD   pregabalin (LYRICA) 150 MG capsule Take 1 capsule by mouth 3 times daily for 3 days. . 9/22/18 9/2/21  Kaity Grant MD   traZODone (DESYREL) 100 MG tablet Take 100 mg by mouth nightly     Historical Provider, MD   Multiple Vitamins-Minerals (THERAPEUTIC MULTIVITAMIN-MINERALS) tablet Take 1 tablet by mouth daily    Historical Provider, MD   OXYGEN Inhale into the lungs continuous    Historical Provider, MD   atorvastatin (LIPITOR) 20 MG tablet Take 1 tablet by mouth nightly 5/13/18   Kassandra Brand MD   magnesium hydroxide (MILK OF MAGNESIA CONCENTRATE) 2400 MG/10ML SUSP Take 30 mLs by mouth once as needed    Historical Provider, MD   docusate sodium (COLACE) 100 MG capsule Take 100 mg by mouth 3 times daily as needed     Historical Provider, MD   acetaminophen (TYLENOL) 325 MG tablet Take 2 tablets by mouth every 4 hours as needed for Pain 8/18/16   Manisha Nails MD   levothyroxine (SYNTHROID) 75 MCG tablet Take 75 mcg by mouth Daily    Historical Provider, MD       Surgical History:  Past Surgical History:   Procedure Laterality Date    ACHILLES TENDON SURGERY Bilateral     BLADDER SUSPENSION      CARDIAC SURGERY  2016    [facility]--[facility]    COLONOSCOPY Left 05/11/2018    COLONOSCOPY POLYPECTOMY SNARE/COLD BIOPSY performed by Katheran Saint, MD at [facility]    COLONOSCOPY N/A 08/04/2021    [facility]    COLONOSCOPY  08/04/2021    Dr. Denise Fuentes-- [facility], COLON, DIAGNOSTIC      GASTRIC FUNDOPLICATION      HAMMER TOE SURGERY Right     HERNIA REPAIR      HIATAL HERNIA REPAIR  09/06/2016    Laparoscopic Robotic with Nissen Fundoplication - Dr. Alissa Adams OFFICE/OUTPT VISIT,PROCEDURE ONLY N/A 09/21/2018    EGD DIAGNOSTIC ONLY performed by Aldo Tanner MD at [address]      right    TENDON RELEASE Left 08/09/2016    left foot    TRANSESOPHAGEAL ECHOCARDIOGRAM N/A 4/20/2022    TRANSESOPHAGEAL ECHOCARDIOGRAM performed by Yanet Ortiz MD at [address] [facility] TUNNELED VENOUS PORT PLACEMENT  11/06/2017    UPPER GASTROINTESTINAL ENDOSCOPY Left 05/10/2018    EGD BIOPSY performed by Katheran Saint, MD at [address] [facility] Left 07/25/2021    EGD BIOPSY performed by Kiki Jamison MD at [facility]       Family History:  Family History   Problem Relation Age of Onset    Cancer Mother     Heart Disease Mother     High Blood Pressure Mother     Diabetes Mother     Vision Loss Mother     Stroke Mother     COPD Father     Diabetes Father     COPD Brother        Past GI History:  Chronic anemia, Abilio lesions, constipation, GERD, chronic abdominal pain, EGD, colonoscopy, SBCE, diverticulosis, internal hemorrhoids    Last EGD 07/2021- distal esophagitis, small hiatal hernia with linear erosions which are Unice Newer lesions, mild to moderate gastritis, polyp in the proximal stomach  Last colonoscopy 08/2021- colon polyps, sigmoid diverticulosis, grade II internal hemorrhoids    Dr. Darren Yoon patient    Allergies:  Bactrim [sulfamethoxazole-trimethoprim], Wellbutrin [bupropion], and Silicone    Social History:   TOBACCO:   reports that she quit smoking about 15 years ago. Her smoking use included cigarettes. She has a 30.00 pack-year smoking history. She has never used smokeless tobacco.  ETOH:   reports no history of alcohol use. Review Of Systems  GENERAL: No fever, chills or weight loss. EYES:  No blurred vision, double vision   CARDIOVASCULAR: No chest pain or palpitations. RESPIRATORY:  +chronic dyspnea   GI:  See HPI  MUSCULOSKELETAL: No new painful or swollen joints or myalgias. :   No dysuria or hematuria. SKIN:  No rashes or jaundice. NEUROLOGIC:  +dizziness   PSYCH:  No anxiety or depression. ENDOCRINE:  No polyuria or polydipsia. BLOOD:  +anemia    PHYSICAL EXAM:  BP (!) 117/50   Pulse 63   Temp 98.1 °F (36.7 °C) (Oral)   Resp 18   Ht 5' 6\" (1.676 m)   Wt 230 lb (104.3 kg)   SpO2 96%   BMI 37.12 kg/m²     General appearance: No apparent distress, appears stated age and cooperative. HEENT: Normal cephalic, atraumatic without obvious deformity. Pupils equal, round, and reactive to light. Neck: Supple, with full range of motion. No jugular venous distention. Trachea midline. Respiratory:  Normal respiratory effort. Clear to auscultation, bilaterally without Rales/Wheezes/Rhonchi.   Cardiovascular: Regular rate and rhythm without murmurs, rubs or gallops. Abdomen: Soft, obese, tender to epigastrium with palpation, non-distended with active bowel sounds. Musculoskeletal: No clubbing, cyanosis or edema bilaterally. Skin: Pink, warm, dry. No rashes or lesions. Psychiatric: Alert and oriented, thought content appropriate, normal insight  Rectal: No masses, hemorrhoids, or fissures noted. No blood returned. Labs:   Recent Labs     04/27/22 1953 04/28/22  0220 04/28/22  1025   WBC 5.0  --   --    HGB 9.2*   < > 9.1*   HCT 29.7*   < > 28.8*     --   --     < > = values in this interval not displayed. Recent Labs     04/28/22  0637      K 4.0      CO2 26   BUN 20   CREATININE 1.2   CALCIUM 8.3*     Recent Labs     04/27/22 1953   AST 15   ALT 9*   BILIDIR <0.2   BILITOT 0.2*   ALKPHOS 74     Recent Labs     04/28/22  0637   INR 1.03       Radiology:   CXR 04/27/22      Impression   1. No acute cardiopulmonary process. The lung volumes with elevation of    the hemidiaphragms. Linear atelectasis/scarring left midlung. 2. Cardiomegaly with the heart size unchanged. 3. Left chest port with distal tip overlying expected location of the    superior vena cava. Code Status: Full Code    ASSESSMENT:  1. Rectal bleeding- hemorrhoidal vs diverticular  2. Chronic normocytic anemia  3. Chronic hypoxic respiratory failure- on nasal cannula  4. Afib s/p Watchman 03/2022- on ASA & Plavix  5. GERD  6. H/O internal hemorrhoids  7. H/O diverticulosis  8. H/O CVA- on ASA & Plavix  9. CHF, not exacerbated  10. CKD  11.  Chronic constipation    PLAN:     Monitor H & H, transfuse prn   Nursing to monitor stool output & document   Stop IVP PPI, change to PO PPI BID, home dose   Anusol suppository BID   Advance to clear liquid diet, no red dye   Anemia labs   Continue Carafate, home dose   Increase Linzess to 145 mcg daily, instructed patient she will need someone to bring her home Linzess in as the hospital pharmacy does not carry it   No plan for emergent endoscopy at this time. If Hgb significantly trends down and/or overt GI bleeding noted, could consider endoscopy.  Continue Colace prn   Consult cardiology to evaluate about possible stopping Plavix permanently given history of recurrent GIB & chronic anemia   RN updated   Supportive care per primary team  Will follow       Case reviewed and impression/plan reviewed in collaboration with Dr. Yossi Moran  Electronically signed by Collette Shaggy, APRN - CNP on 4/28/2022 at 11:54 AM    [facility]  Thank you for the consultation.

## 2022-04-28 NOTE — PLAN OF CARE
Problem: Respiratory - Adult  Goal: Clear lung sounds  Description: Clear lung sounds  Outcome: Progressing

## 2022-04-28 NOTE — PROGRESS NOTES
Hospitalist Progress Note    Patient:  Shan Mac      Unit/Bed:8A-15/015-A    YOB: 1945    MRN: [id number]       Acct: [de-identified]     PCP: Avni Marie MD    Date of Admission: 4/27/2022    Assessment/Plan:      Rectal bleeding--hold antiplatelets/anticoagulation; consult GI, hemoglobin improved from 8.4 to 9.1. Monitor. Acute blood loss anemia--hemoglobin at 9.2 and on April 10, 2022 was 11.3; stop anticoagulation/antiplatelets; monitor hemoglobin every 6 hours and transfuse if hemoglobin drops less than 7 or becomes hemodynamically unstable. Patient reports chronic anemia. Hematology consulted per patient request.   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 CKD stage III--monitor  1.8 cm solid left renal lesion noted on CT abdomen and pelvis from April 10, 3659  Chronic systolic heart failure--echo from April 20, 2022 reveals EF 35 to 40%  Atrial fibrillation status post watchman procedure on March 3, 2022--on ASA and Plavix which is on hold.   Chronic hypoxic respiratory failure/nocturnal hypoxia--patient reports she is to use 1 L at rest, 2 L with exertion and she does use 3 L at night  Obesity with BMI 37.35         Chief Complaint: Rectal bleeding    Hospital Course: 67 y.o. female who presented to [facility] with rectal bleeding; patient has a past medical history of atrial fibrillation status post watchman procedure on March 3, 2022, diabetes, COPD, hypertension along with using home oxygen; she states approximately 5 days ago she noticed a maroon-colored stool however did not say anything, tonight she states she had a large explosive stool that was reddish/maroonish in color, her Eliquis was recently changed to Plavix; patient relates to lightheaded dizziness for a month, states she has supposed to wear oxygen 1 L at rest and 2 L with activity and states she does not, she does use 3 L of oxygen at night, her lung doctor is Dr. Jet Olivares; she denies chest pain, she has chronic shortness of breath, she denies any significant abdominal pain or nausea; her GI specialist is Dr. Mitchell Lopez. Subjective: Patient lying in bed. No apparent distress. Does endorse abdominal pain but states this pain is chronic in nature. Medications:  Reviewed    Infusion Medications    dextrose      sodium chloride      sodium chloride 75 mL/hr at 04/27/22 0489     Scheduled Medications    insulin lispro  0-12 Units SubCUTAneous TID WC    insulin lispro  0-6 Units SubCUTAneous Nightly    atorvastatin  20 mg Oral Nightly    cetirizine  10 mg Oral Daily    citalopram  40 mg Oral Daily    DULoxetine  120 mg Oral Daily    insulin glargine  8 Units SubCUTAneous Nightly    ipratropium-albuterol  1 vial Inhalation 4x daily    levothyroxine  75 mcg Oral Daily    linaCLOtide  72 mcg Oral Daily    sucralfate  1 g Oral 4x Daily AC & HS    tiZANidine  2 mg Oral BID    traZODone  100 mg Oral Nightly    sodium chloride flush  5-40 mL IntraVENous 2 times per day    pantoprazole (PROTONIX) 40 mg injection  40 mg IntraVENous Daily     PRN Meds: glucagon (rDNA), dextrose, dextrose bolus (hypoglycemia), glucose, diphenhydrAMINE, docusate sodium, hydrOXYzine, polyvinyl alcohol, sodium chloride flush, sodium chloride, ondansetron **OR** ondansetron, acetaminophen **OR** acetaminophen      Intake/Output Summary (Last 24 hours) at 4/28/2022 1103  Last data filed at 4/28/2022 9328  Gross per 24 hour   Intake 200 ml   Output --   Net 200 ml       Diet:  Diet NPO Exceptions are: Ice Chips    Exam:  /72   Pulse 69   Temp 98.1 °F (36.7 °C) (Oral)   Resp 18   Ht 5' 6\" (1.676 m)   Wt 230 lb (104.3 kg)   SpO2 96%   BMI 37.12 kg/m²     General appearance: No apparent distress, appears stated age and cooperative. HEENT: Pupils equal, round, and reactive to light. Conjunctivae/corneas clear. Neck: Supple, with full range of motion. No jugular venous distention.  Trachea midline. Respiratory:  Normal respiratory effort. Clear to auscultation, bilaterally without Rales/Wheezes/Rhonchi. Cardiovascular: Regular rate and rhythm with normal S1/S2 without murmurs, rubs or gallops. Abdomen: Soft, tender, non-distended with normal bowel sounds. Musculoskeletal: passive and active ROM x 4 extremities. Skin: Skin color, texture, turgor normal.  No rashes or lesions. Neurologic:  Neurovascularly intact without any focal sensory/motor deficits. Cranial nerves: II-XII intact, grossly non-focal.  Psychiatric: Alert and oriented, thought content appropriate, normal insight  Capillary Refill: Brisk,< 3 seconds   Peripheral Pulses: +2 palpable, equal bilaterally       Labs:   Recent Labs     04/27/22 1953 04/28/22 0220 04/28/22  1025   WBC 5.0  --   --    HGB 9.2* 8.4* 9.1*   HCT 29.7* 26.5* 28.8*     --   --      Recent Labs     04/27/22 1953 04/28/22  0637    142   K 4.4 4.0    107   CO2 28 26   BUN 26* 20   CREATININE 1.5* 1.2   CALCIUM 8.7 8.3*     Recent Labs     04/27/22 1953   AST 15   ALT 9*   BILIDIR <0.2   BILITOT 0.2*   ALKPHOS 74     Recent Labs     04/28/22  0637   INR 1.03     No results for input(s): Wendie Miller in the last 72 hours. Urinalysis:      Lab Results   Component Value Date    NITRU NEGATIVE 04/27/2022    WBCUA 10-15 04/27/2022    BACTERIA NONE SEEN 04/27/2022    RBCUA 3-5 04/27/2022    BLOODU NEGATIVE 04/27/2022    SPECGRAV 1.009 10/23/2018    GLUCOSEU NEGATIVE 04/27/2022       Radiology:  XR CHEST (2 VW)   Final Result   1. No acute cardiopulmonary process. The lung volumes with elevation of    the hemidiaphragms. Linear atelectasis/scarring left midlung. 2. Cardiomegaly with the heart size unchanged. 3. Left chest port with distal tip overlying expected location of the    superior vena cava.       This document has been electronically signed by: Jet Natarajan DO, MBA on    04/27/2022 09:57 PM          Diet: Diet NPO Exceptions are: Ice Chips    DVT prophylaxis: [] Lovenox                                 [x] SCDs                                 [] SQ Heparin                                 [] Encourage ambulation           [] Already on Anticoagulation     Disposition:    [] Home       [] TCU       [] Rehab       [] Psych       [x] SNF       [] [place]       [] Other-    Code Status: Full Code          Electronically signed by Mary Cr on 4/28/2022 at 11:03 AM  Electronically signed by CAROLINA Schreiber on 4/28/2022 at 6:04 PM

## 2022-04-28 NOTE — ED NOTES
ED to inpatient nurses report    Chief Complaint   Patient presents with    Rectal Bleeding      Present to ED from nursing home  LOC: alert and orientated to name, place, date  Vital signs   Vitals:    04/27/22 1931   BP: (!) 152/87   Pulse: 89   Resp: 18   Temp: 98.3 °F (36.8 °C)   TempSrc: Oral   SpO2: 95%   Height: 5' 6\" (1.676 m)      Oxygen Baseline 1L NC    Current needs required 1L NC   LDAs:    Mobility: Requires assistance * 1  Pending ED orders: none  Present condition: stable    Electronically signed by Sara Bryson, RN on 4/27/2022 at 9:44 PM       Eveline Cortez RN  04/27/22 9097

## 2022-04-28 NOTE — H&P
History & Physical        Patient:  Shira Clancy  YOB: 1945    MRN: [id number]     Acct: [de-identified]    PCP: Ben Sy MD    Date of Admission: 4/27/2022    Date of Service: Pt seen/examined on 04/27/22  and Admitted to Inpatient with expected LOS greater than two midnights due to medical therapy. ASSESSMENT/PLAN:    1. Rectal bleeding--hold antiplatelets/anticoagulation; consult GI, monitor hemoglobin  2. Acute blood loss anemia--hemoglobin at 9.2 and on April 10, 2022 was 11.3; stop anticoagulation/antiplatelets; monitor hemoglobin every 6 hours and transfuse if hemoglobin drops less than 7 or becomes hemodynamically unstable  3. CKD stage III--monitor  4. 1.8 cm solid left renal lesion noted on CT abdomen and pelvis from April 10, 2022  5. Chronic systolic heart failure--echo from April 20, 2022 reveals EF 35 to 40%  6. Atrial fibrillation status post watchman procedure on March 3, 2022--on aspirin and Plavix which is on hold and will need to be reevaluated in the morning  7. Chronic hypoxic respiratory failure/nocturnal hypoxia--patient reports she is to use 1 L at rest, 2 L with exertion and she does use 3 L at night  8.  Obesity with BMI 37.35      Chief Complaint: Rectal bleeding      History Of Present Illness:    67 y.o. female who presented to [facility] with rectal bleeding; patient has a past medical history of atrial fibrillation status post watchman procedure on March 3, 2022, diabetes, COPD, hypertension along with using home oxygen; she states approximately 5 days ago she noticed a maroon-colored stool however did not say anything, tonight she states she had a large explosive stool that was reddish/maroonish in color, her Eliquis was recently changed to Plavix; patient relates to lightheaded dizziness for a month, states she has supposed to wear oxygen 1 L at rest and 2 L with activity and states she does not, she does use 3 L of oxygen at night, her lung doctor is Dr. Tonya Robles; she denies chest pain, she has chronic shortness of breath, she denies any significant abdominal pain or nausea; her GI specialist is Dr. Maisha Evans. In the emergency department, BUN of 26, creatinine of 1.5, hemoglobin 9.2 and on April 10, 2022 her hemoglobin was 11.3, she is being admitted to hospital service for further care and evaluation.     Past Medical History:          Diagnosis Date    Anemia     Atrial fibrillation ([address] 11/09/2017    CAD (coronary artery disease)     [facility]estive [facility]     Chronic kidney disease     stage 3 kidney     COPD (chronic obstructive pulmonary disease) ([facility])     DDD (degenerative disc disease), lumbar     Depression     Frequent PVCs 12/13/2017    GERD (gastroesophageal reflux disease)     History of blood transfusion     Hx of blood clots 09/2017    pulmonary emboli    Hyperlipidemia     Hypertension     Hyperthyroidism     Movement disorder     DDD    Neuropathy     Obesity     Palpitations 01/10/2020    Pneumonia 2016    sepsis    Sleep apnea     usually wears cpap at night    Status post right and left heart catheterization     Type II or unspecified type diabetes mellitus without mention of complication, not stated as uncontrolled        Past Surgical History:          Procedure Laterality Date    ACHILLES TENDON SURGERY Bilateral     BLADDER SUSPENSION      CARDIAC SURGERY  2016    heart cath--[facility]    COLONOSCOPY Left 05/11/2018    COLONOSCOPY POLYPECTOMY SNARE/COLD BIOPSY performed by Pao Green MD at [facility]    COLONOSCOPY N/A 08/04/2021    COLONOSCOPY performed by Ryan Almazan MD at [facility]    COLONOSCOPY  08/04/2021    Dr. Maisha Evans-- [facility]    ENDOSCOPY, COLON, DIAGNOSTIC      GASTRIC FUNDOPLICATION      HAMMER TOE SURGERY Right     HERNIA REPAIR      HIATAL HERNIA REPAIR  09/06/2016    Laparoscopic Robotic with Nissen Fundoplication - Dr. Ivon Lowe HYSTERECTOMY      [place] OFFICE/OUTPT VISIT,PROCEDURE ONLY N/A 09/21/2018    EGD DIAGNOSTIC ONLY performed by Joann Gandhi MD at [address]      right    TENDON RELEASE Left 08/09/2016    left foot    TRANSESOPHAGEAL ECHOCARDIOGRAM N/A 4/20/2022    TRANSESOPHAGEAL ECHOCARDIOGRAM performed by Alvin Humphries MD at [address] [facility] TUNNELED VENOUS PORT PLACEMENT  11/06/2017    UPPER GASTROINTESTINAL ENDOSCOPY Left 05/10/2018    EGD BIOPSY performed by Neelam Mercedes MD at [address] [facility] ENDOSCOPY Left 07/25/2021    EGD BIOPSY performed by Jitendra Munguia MD at [facility]       Medications Prior to Admission:      Prior to Admission medications    Medication Sig Start Date End Date Taking?  Authorizing Provider   clopidogrel (PLAVIX) 75 MG tablet Take 1 tablet by mouth daily 4/26/22   Claude Salinas, MD   aspirin 81 MG chewable tablet Take 1 tablet by mouth daily 3/4/22   Fahad Lloyd PA-C   ferrous gluconate (FERGON) 324 (38 Fe) MG tablet Take 324 mg by mouth 2 times daily    Historical Provider, MD   furosemide (LASIX) 20 MG tablet Take 20 mg by mouth daily as needed (swelling as needed)    Historical Provider, MD   insulin lispro (HUMALOG) 100 UNIT/ML injection vial Inject into the skin 3 times daily (before meals)    Historical Provider, MD   hydrOXYzine (ATARAX) 10 MG tablet Take 10 mg by mouth 3 times daily as needed for Itching    Historical Provider, MD   Probiotic Product (PROBIOTIC DAILY PO) Take by mouth    Historical Provider, MD   cetirizine (ZYRTEC) 10 MG tablet Take 10 mg by mouth daily    Historical Provider, MD   polyethyl glycol-propyl glycol 0.4-0.3 % (SYSTANE) 0.4-0.3 % ophthalmic solution 1 drop as needed for Dry Eyes    Historical Provider, MD   sucralfate (CARAFATE) 1 GM tablet Take 1 tablet by mouth 4 times daily (before meals and nightly) 7/26/21   Dionisio Coleman MD   pantoprazole (PROTONIX) 40 MG tablet Take 1 tablet by mouth 2 times daily (before meals) 7/26/21 8/26/21  Oracio Palomino MD   vitamin D 25 MCG (1000 UT) CAPS Take by mouth daily 125 mcg daily    Historical Provider, MD   citalopram (CELEXA) 40 MG tablet Take 40 mg by mouth daily 6/4/21   Historical Provider, MD   loperamide (IMODIUM A-D) 2 MG tablet Take by mouth    Historical Provider, MD   tiZANidine (ZANAFLEX) 2 MG tablet Take 2 mg by mouth 2 times daily     Historical Provider, MD   DULoxetine (CYMBALTA) 20 MG extended release capsule Take 120 mg by mouth daily     Historical Provider, MD   insulin glargine (BASAGLAR KWIKPEN) 100 UNIT/ML injection pen Inject 8 Units into the skin nightly     Historical Provider, MD   potassium chloride (KLOR-CON M) 10 MEQ extended release tablet Take 1 tablet by mouth daily 6/22/20   Giles MERCADO Hemmelshonna,    Dulaglutide (TRULICITY) 2.30 XG/0.5ZP SOPN Inject 0.75 mg into the skin once a week Every Wednesday    Historical Provider, MD   Cyanocobalamin (SM VITAMIN B-12 PO) Take 2,500 mcg by mouth daily    Historical Provider, MD   diphenhydrAMINE (BENADRYL) 25 MG capsule Take 25 mg by mouth as needed for Itching    Historical Provider, MD   Calcium Carb-Cholecalciferol 500-400 MG-UNIT TABS Take 1 tablet by mouth 2 times daily    Historical Provider, MD EVANS ALCOHOL SWABS 70 % PADS  7/4/19   Historical Provider, MD   ONE TOUCH ULTRA TEST strip  7/4/19   Historical Provider, MD   Lancets [facility] (UNISTIK CZT COMFORT) [facility]  7/4/19   Historical Provider, MD   ipratropium-albuterol (DUONEB) 0.5-2.5 (3) MG/3ML SOLN nebulizer solution Inhale 1 vial into the lungs every 4 hours     Historical Provider, MD   Linaclotide (LINZESS) 72 MCG CAPS Take 72 mcg by mouth daily     Historical Provider, MD   pregabalin (LYRICA) 150 MG capsule Take 1 capsule by mouth 3 times daily for 3 days. . 9/22/18 9/2/21  Wendy Jackson MD   traZODone (DESYREL) 100 MG tablet Take 100 mg by mouth nightly     Historical Provider, MD Multiple Vitamins-Minerals (THERAPEUTIC MULTIVITAMIN-MINERALS) tablet Take 1 tablet by mouth daily    Historical Provider, MD   OXYGEN Inhale into the lungs continuous    Historical Provider, MD   atorvastatin (LIPITOR) 20 MG tablet Take 1 tablet by mouth nightly 5/13/18   Marcela Schultz MD   magnesium hydroxide (MILK OF MAGNESIA CONCENTRATE) 2400 MG/10ML SUSP Take 30 mLs by mouth once as needed    Historical Provider, MD   docusate sodium (COLACE) 100 MG capsule Take 100 mg by mouth 3 times daily as needed     Historical Provider, MD   acetaminophen (TYLENOL) 325 MG tablet Take 2 tablets by mouth every 4 hours as needed for Pain 8/18/16   Dave Maher MD   levothyroxine (SYNTHROID) 75 MCG tablet Take 75 mcg by mouth Daily    Historical Provider, MD       Allergies:  Bactrim [sulfamethoxazole-trimethoprim], Wellbutrin [bupropion], and Silicone    Social History:   reports that she quit smoking about 15 years ago. Her smoking use included cigarettes. She has a 30.00 pack-year smoking history. She has never used smokeless tobacco. She reports that she does not drink alcohol and does not use drugs.     Family History:      Positive as follows:        Problem Relation Age of Onset    Cancer Mother     Heart Disease Mother     High Blood Pressure Mother     Diabetes Mother     Vision Loss Mother     Stroke Mother     COPD Father     Diabetes Father     COPD Brother        REVIEW OF SYSTEMS:     Constitutional: ROS: negative for - chills or fever  Head: no headache, no head injury, no migraine   Eyes ROS: denies blurred/double vision  Ears ROS: no hearing difficulty, no tinnitus  Mouth and Throat ROS: no ulceration, dysphagia, dental caries  Psychological ROS: no depression, no anxiety, no panic attacks, denies suicide/homicide ideation  Endocrine ROS: denies polyuria, polydypsia, no heat or cold intolerance  Respiratory ROS: positive for - shortness of breath  Cardiovascular ROS: no chest pain or dyspnea on exertion  Gastrointestinal ROS: positive for - blood in stools  Genito-Urinary ROS: denies dysuria, frequency, urgency; denies hematuria  Musculoskeletal ROS: negative  Neurological ROS: no syncope, no seizures, no numbness or tingling of hands, no numbness or tingling of feet, no paresis  Dermatology: no skin rash, no eczema  Endocrine: no polyuria, polydypsia, no heat/cold intolerance  Hematology: denies bruising easily, denies bleeding problems, denies clotting disorders    PHYSICAL EXAM:    /67   Pulse 73   Temp 98.3 °F (36.8 °C) (Oral)   Resp 16   Ht 5' 6\" (1.676 m)   SpO2 97%   BMI 37.35 kg/m²     General appearance:  No apparent distress, appears stated age and cooperative. HEENT:  Normal cephalic, atraumatic without obvious deformity. Pupils equal, round, and reactive to light. Conjunctivae/corneas clear. Neck: Supple, with full range of motion. No jugular venous distention. Trachea midline. Respiratory:  Normal respiratory effort. Clear to auscultation, bilaterally without Rales/Wheezes/Rhonchi. Cardiovascular:  Regular rate and rhythm with normal S1/S2 without murmurs, rubs or gallops. Abdomen: Soft, non-tender, non-distended with normal bowel sounds. Musculoskeletal:  No clubbing, cyanosis or edema bilaterally. Full range of motion without deformity. Skin: Skin color, texture, turgor normal.    Neurologic:  Neurovascularly intact without any focal sensory/motor deficits.  Cranial nerves: II-XII intact, grossly non-focal.  Psychiatric:  Alert and oriented, thought content appropriate  Capillary Refill: Brisk,< 3 seconds   Peripheral Pulses: +2 palpable, equal bilaterally       Labs:     Recent Labs     04/27/22 1953   WBC 5.0   HGB 9.2*   HCT 29.7*        Recent Labs     04/27/22 1953      K 4.4      CO2 28   BUN 26*   CREATININE 1.5*   CALCIUM 8.7     Recent Labs     04/27/22 1953   AST 15   ALT 9*   BILIDIR <0.2   BILITOT 0.2*   ALKPHOS 74     No results for input(s): INR in the last 72 hours. No results for input(s): Nicolette Belts in the last 72 hours. Procalcitonin:  No results for input(s): PROCAL in the last 72 hours. Lactic Acid: No results for input(s): LACTA in the last 72 hours. Urinalysis:      Lab Results   Component Value Date    NITRU NEGATIVE 04/27/2022    WBCUA 10-15 04/27/2022    BACTERIA NONE SEEN 04/27/2022    RBCUA 3-5 04/27/2022    BLOODU NEGATIVE 04/27/2022    SPECGRAV 1.009 10/23/2018    GLUCOSEU NEGATIVE 04/27/2022       Radiology:     XR CHEST (2 VW)    Result Date: 4/27/2022  2 view chest x-ray Comparison: October 23, 2018 Findings: Left chest port distal tip overlying expected location of the superior vena cava, unchanged position from prior The lungs are clear. No infiltrate. Linear atelectasis or scarring of the left midlung. Cardiomegaly with the heart size unchanged. Cardiac loop recording device. No acute fracture. Spondylosis of the thoracic spine. 1. No acute cardiopulmonary process. The lung volumes with elevation of the hemidiaphragms. Linear atelectasis/scarring left midlung. 2. Cardiomegaly with the heart size unchanged. 3. Left chest port with distal tip overlying expected location of the superior vena cava. This document has been electronically signed by: Remi Cotter DO, MBA on 04/27/2022 09:57 PM      EKG:  I have reviewed the EKG with the following interpretation: Sinus rhythm heart rate 82 with PVC and inverted T wave in aVL      Thank you Heaven Barksdale MD for the opportunity to be involved in this patient's care.     Electronically signed by BRIAN Ruiz CNP on 4/27/2022 at 10:53 PM

## 2022-04-29 LAB
ANION GAP SERPL CALCULATED.3IONS-SCNC: 15 MEQ/L (ref 8–16)
BUN BLDV-MCNC: 14 MG/DL (ref 7–22)
CALCIUM SERPL-MCNC: 7.3 MG/DL (ref 8.5–10.5)
CHLORIDE BLD-SCNC: 106 MEQ/L (ref 98–111)
CO2: 23 MEQ/L (ref 23–33)
CREAT SERPL-MCNC: 1 MG/DL (ref 0.4–1.2)
GFR SERPL CREATININE-BSD FRML MDRD: 54 ML/MIN/1.73M2
GLUCOSE BLD-MCNC: 119 MG/DL (ref 70–108)
GLUCOSE BLD-MCNC: 138 MG/DL (ref 70–108)
GLUCOSE BLD-MCNC: 155 MG/DL (ref 70–108)
GLUCOSE BLD-MCNC: 155 MG/DL (ref 70–108)
GLUCOSE BLD-MCNC: 95 MG/DL (ref 70–108)
HCT VFR BLD CALC: 28.5 % (ref 37–47)
HCT VFR BLD CALC: 30.4 % (ref 37–47)
HEMOGLOBIN: 8.9 GM/DL (ref 12–16)
HEMOGLOBIN: 9.5 GM/DL (ref 12–16)
POTASSIUM REFLEX MAGNESIUM: 4.7 MEQ/L (ref 3.5–5.2)
SODIUM BLD-SCNC: 144 MEQ/L (ref 135–145)
TSH SERPL DL<=0.05 MIU/L-ACNC: 3.12 UIU/ML (ref 0.4–4.2)
URINE CULTURE REFLEX: NORMAL

## 2022-04-29 PROCEDURE — 6370000000 HC RX 637 (ALT 250 FOR IP): Performed by: NURSE PRACTITIONER

## 2022-04-29 PROCEDURE — 99232 SBSQ HOSP IP/OBS MODERATE 35: CPT | Performed by: PHYSICIAN ASSISTANT

## 2022-04-29 PROCEDURE — 84443 ASSAY THYROID STIM HORMONE: CPT

## 2022-04-29 PROCEDURE — 82948 REAGENT STRIP/BLOOD GLUCOSE: CPT

## 2022-04-29 PROCEDURE — 2580000003 HC RX 258: Performed by: NURSE PRACTITIONER

## 2022-04-29 PROCEDURE — 85018 HEMOGLOBIN: CPT

## 2022-04-29 PROCEDURE — 85014 HEMATOCRIT: CPT

## 2022-04-29 PROCEDURE — 94640 AIRWAY INHALATION TREATMENT: CPT

## 2022-04-29 PROCEDURE — 2700000000 HC OXYGEN THERAPY PER DAY

## 2022-04-29 PROCEDURE — 99223 1ST HOSP IP/OBS HIGH 75: CPT | Performed by: INTERNAL MEDICINE

## 2022-04-29 PROCEDURE — 1200000003 HC TELEMETRY R&B

## 2022-04-29 PROCEDURE — 80048 BASIC METABOLIC PNL TOTAL CA: CPT

## 2022-04-29 PROCEDURE — 6360000002 HC RX W HCPCS: Performed by: NURSE PRACTITIONER

## 2022-04-29 PROCEDURE — 94760 N-INVAS EAR/PLS OXIMETRY 1: CPT

## 2022-04-29 RX ADMIN — TRAZODONE HYDROCHLORIDE 100 MG: 100 TABLET ORAL at 21:52

## 2022-04-29 RX ADMIN — INSULIN LISPRO 1 UNITS: 100 INJECTION, SOLUTION INTRAVENOUS; SUBCUTANEOUS at 21:56

## 2022-04-29 RX ADMIN — LEVOTHYROXINE SODIUM 75 MCG: 0.07 TABLET ORAL at 06:13

## 2022-04-29 RX ADMIN — SUCRALFATE 1 G: 1 TABLET ORAL at 21:53

## 2022-04-29 RX ADMIN — CITALOPRAM 40 MG: 40 TABLET, FILM COATED ORAL at 10:23

## 2022-04-29 RX ADMIN — TIZANIDINE 2 MG: 4 TABLET ORAL at 21:53

## 2022-04-29 RX ADMIN — IRON SUCROSE 300 MG: 20 INJECTION, SOLUTION INTRAVENOUS at 17:25

## 2022-04-29 RX ADMIN — SUCRALFATE 1 G: 1 TABLET ORAL at 10:26

## 2022-04-29 RX ADMIN — ATORVASTATIN CALCIUM 20 MG: 20 TABLET, FILM COATED ORAL at 21:53

## 2022-04-29 RX ADMIN — TIZANIDINE 2 MG: 4 TABLET ORAL at 10:23

## 2022-04-29 RX ADMIN — INSULIN LISPRO 2 UNITS: 100 INJECTION, SOLUTION INTRAVENOUS; SUBCUTANEOUS at 17:26

## 2022-04-29 RX ADMIN — INSULIN GLARGINE 8 UNITS: 100 INJECTION, SOLUTION SUBCUTANEOUS at 21:55

## 2022-04-29 RX ADMIN — SODIUM CHLORIDE: 9 INJECTION, SOLUTION INTRAVENOUS at 03:43

## 2022-04-29 RX ADMIN — ACETAMINOPHEN 650 MG: 325 TABLET ORAL at 16:37

## 2022-04-29 RX ADMIN — HYDROCORTISONE ACETATE 25 MG: 25 SUPPOSITORY RECTAL at 21:52

## 2022-04-29 RX ADMIN — PANTOPRAZOLE SODIUM 40 MG: 40 TABLET, DELAYED RELEASE ORAL at 17:26

## 2022-04-29 RX ADMIN — IPRATROPIUM BROMIDE AND ALBUTEROL SULFATE 3 ML: .5; 3 SOLUTION RESPIRATORY (INHALATION) at 07:51

## 2022-04-29 RX ADMIN — SODIUM CHLORIDE, PRESERVATIVE FREE 10 ML: 5 INJECTION INTRAVENOUS at 21:54

## 2022-04-29 RX ADMIN — HYDROCORTISONE ACETATE 25 MG: 25 SUPPOSITORY RECTAL at 10:23

## 2022-04-29 RX ADMIN — SUCRALFATE 1 G: 1 TABLET ORAL at 17:26

## 2022-04-29 RX ADMIN — IPRATROPIUM BROMIDE AND ALBUTEROL SULFATE 3 ML: .5; 3 SOLUTION RESPIRATORY (INHALATION) at 15:35

## 2022-04-29 RX ADMIN — DULOXETINE 120 MG: 60 CAPSULE, DELAYED RELEASE ORAL at 10:23

## 2022-04-29 RX ADMIN — ACETAMINOPHEN 650 MG: 325 TABLET ORAL at 10:32

## 2022-04-29 RX ADMIN — SUCRALFATE 1 G: 1 TABLET ORAL at 06:13

## 2022-04-29 RX ADMIN — IPRATROPIUM BROMIDE AND ALBUTEROL SULFATE 3 ML: .5; 3 SOLUTION RESPIRATORY (INHALATION) at 11:46

## 2022-04-29 RX ADMIN — SODIUM CHLORIDE: 9 INJECTION, SOLUTION INTRAVENOUS at 19:26

## 2022-04-29 RX ADMIN — IPRATROPIUM BROMIDE AND ALBUTEROL SULFATE 3 ML: .5; 3 SOLUTION RESPIRATORY (INHALATION) at 19:30

## 2022-04-29 RX ADMIN — PANTOPRAZOLE SODIUM 40 MG: 40 TABLET, DELAYED RELEASE ORAL at 06:13

## 2022-04-29 RX ADMIN — CETIRIZINE HYDROCHLORIDE 10 MG: 10 TABLET, FILM COATED ORAL at 10:23

## 2022-04-29 ASSESSMENT — PAIN SCALES - GENERAL
PAINLEVEL_OUTOF10: 0
PAINLEVEL_OUTOF10: 5
PAINLEVEL_OUTOF10: 0
PAINLEVEL_OUTOF10: 0
PAINLEVEL_OUTOF10: 3

## 2022-04-29 ASSESSMENT — PAIN DESCRIPTION - LOCATION
LOCATION: HEAD
LOCATION: HEAD

## 2022-04-29 ASSESSMENT — PAIN DESCRIPTION - DESCRIPTORS
DESCRIPTORS: ACHING
DESCRIPTORS: ACHING

## 2022-04-29 ASSESSMENT — PAIN - FUNCTIONAL ASSESSMENT: PAIN_FUNCTIONAL_ASSESSMENT: ACTIVITIES ARE NOT PREVENTED

## 2022-04-29 NOTE — PLAN OF CARE
Problem: Respiratory - Adult  Goal: Clear lung sounds  Description: Clear lung sounds  4/29/2022 1933 by Perez Schaeffer RCP  Outcome: Progressing     Patient clear/diminished at this time, will continue treatments as ordered to improve lung aeration.

## 2022-04-29 NOTE — PROGRESS NOTES
Gastroenterology Progress Note:     Patient Name:  Morena Candelario   MRN: [id number]  [id number]  YOB: 1945  Admit Date: 4/27/2022  7:27 PM  Primary Care Physician: Brenda Carrillo MD   8A-15/015-A     Patient seen and examined. 24 hours events and chart reviewed. Subjective: Patient resting in bed. She has some mid abdominal discomfort, but it is better. Denies n/v. She had a BM overnight that initially she states had blood & was red then later states it was orange. No descriptors documented. Hgb 8.9    Objective:  BP (!) 123/51   Pulse 50   Temp 97.9 °F (36.6 °C) (Oral)   Resp 16   Ht 5' 6\" (1.676 m)   Wt 230 lb (104.3 kg)   SpO2 96%   BMI 37.12 kg/m²     Physical Exam:    General:  Nourished in no distress  HEENT: Atraumatic, normocephalic. Moist oral mucous membranes. Neck: Supple without adenopathy, JVD, thyromegaly or masses. Trachea midline. CV: Heart RRR, no murmurs, rubs, gallops. Resp: Even, easy without cough or accessory use. Lungs clear to ascultation bilaterally. Abd: Round, soft, obese, nontender. No hepatosplenomegaly or mass present. Active bowel sounds heard. No distention noted. Ext:  Without cyanosis, clubbing, edema. Skin: Pink, warm, dry  Neuro:  Alert, oriented x 3 with no obvious deficits.        Rectal: deferred    Labs:   CBC:   Lab Results   Component Value Date    WBC 5.0 04/27/2022    HGB 8.9 04/29/2022    HCT 28.5 04/29/2022    MCV 97.1 04/27/2022     04/27/2022     BMP:   Lab Results   Component Value Date     04/28/2022    K 4.0 04/28/2022     04/28/2022    CO2 26 04/28/2022    PHOS 4.1 08/12/2016    BUN 20 04/28/2022    CREATININE 1.2 04/28/2022    CREATININE 44.0 03/14/2019    CALCIUM 8.3 04/28/2022     PT/INR:   Lab Results   Component Value Date    INR 1.03 04/28/2022     Lipids:   Lab Results   Component Value Date    ALKPHOS 74 04/27/2022    ALT 9 04/27/2022    AST 15 04/27/2022    BILITOT 0.2 04/27/2022    BILIDIR <0.2 04/27/2022    LABALBU 3.7 04/27/2022    AMYLASE 44 07/25/2021    LIPASE 29.0 04/27/2022      Ref. Range 4/28/2022 16:20   Ferritin Latest Ref Range: 10 - 291 ng/mL 43   Iron Latest Ref Range: 50 - 170 ug/dL 35 (L)   Iron Saturation Latest Ref Range: 20 - 50 % 14 (L)   TIBC Latest Ref Range: 171 - 450 ug/dL 244   FOLATE, FOLAT Latest Ref Range: 4.8 - 24.2 ng/mL 10.1   Vitamin B-12 Latest Ref Range: 211 - 911 pg/mL 276   Immature Retic Fract Latest Ref Range: 3.0 - 15.9 % 26.6 (H)   Retic Ct Abs Latest Ref Range: 0.5 - 2.0 % 3.4 (H)   Absolute Retic # Latest Ref Range: 20.0 - 115.0 thou/mm3 92.0   Retic Hemoglobin Latest Ref Range: 28.2 - 35.7 pg 31.3     Significant Diagnostic Studies: none for 24 hours    Current Meds:  Scheduled Meds:   insulin lispro  0-12 Units SubCUTAneous TID WC    insulin lispro  0-6 Units SubCUTAneous Nightly    linaCLOtide  144 mcg Oral Daily    hydrocortisone  25 mg Rectal BID    pantoprazole  40 mg Oral BID AC    atorvastatin  20 mg Oral Nightly    cetirizine  10 mg Oral Daily    citalopram  40 mg Oral Daily    DULoxetine  120 mg Oral Daily    insulin glargine  8 Units SubCUTAneous Nightly    ipratropium-albuterol  1 vial Inhalation 4x daily    levothyroxine  75 mcg Oral Daily    sucralfate  1 g Oral 4x Daily AC & HS    tiZANidine  2 mg Oral BID    traZODone  100 mg Oral Nightly    sodium chloride flush  5-40 mL IntraVENous 2 times per day     Continuous Infusions:   dextrose      sodium chloride      sodium chloride 75 mL/hr at 04/29/22 0343       Assessment:  66 yo F admitted 04/27/22 for BRBPR. H/O constipation on Linzess, but still strains at times. H/O internal hemorrhoids & diverticulosis. Seh is on ASA & Plavix for a history of afib, s/p Watchman, & CVA. 1. Rectal bleeding- hemorrhoidal vs diverticular  2. Chronic normocytic anemia  3. Chronic hypoxic respiratory failure- on nasal cannula  4. Afib s/p Watchman 03/2022- on ASA & Plavix  5. GERD  6.  H/O internal hemorrhoids  7. H/O diverticulosis  8. H/O CVA- on ASA & Plavix  9. CHF, not exacerbated  10. CKD  11. Chronic constipation- on Linzess PTA    Plan:    · Monitor H & H, transfuse prn  · Nursing to monitor stool output & document- however no descriptors being documented  · Continue PO PPI BID, home dose  · Continue Anusol suppository BID  · Advance to full liquid diet, no red dye  · IV Venofer once  · Continue Carafate, home dose  · Continue Linzess 144 mcg daily  · No plan for emergent endoscopy at this time. If Hgb significantly trends down and/or overt GI bleeding noted, could consider endoscopy. · Continue Colace prn  · Consult cardiology to evaluate about possible stopping Plavix permanently given history of recurrent GIB & chronic anemia  · Case discussed with hospitalist who stated cardiology said patient had recent JAYLEN & can permanently stop the ASA & Plavix  · RN updated  · Supportive care per primary team  Will follow       Case reviewed and impression/plan reviewed in collaboration with Dr. Timi Maldonado  Electronically signed by BRIAN Mix CNP on 4/29/2022 at 11:27 AM    [facility]     If there are any questions or concerns this weekend, please call Dr. Lesly Yoo as he is covering for [facility].

## 2022-04-29 NOTE — FLOWSHEET NOTE
[facility] [address] PROGRESS NOTE      Patient: Kindra Friend  Room #: 8A-15/015-A            YOB: 1945  Age: 67 y.o. Gender: female            Admit Date & Time: 4/27/2022  7:27 PM    Assessment:  Lan Faulkner is a 67year old female who is in bed and has a rectal bleed. She is awake and alert and welcomed this  into her room. We talked about her medical condition and the treatment plan. Interventions:  Prayer is offered and accepted. Outcomes:  encouraged    Plan:    1. Going to be discharged soon.      Electronically signed by Mervin Licona on 4/29/2022 at 5:50 PM.  [facility]  [phone]

## 2022-04-29 NOTE — PLAN OF CARE
Problem: Respiratory - Adult  Goal: Clear lung sounds  Description: Clear lung sounds  4/28/2022 2200 by Sigrid Ivan RCP  Outcome: Progressing   Breath sounds are clear and diminished at this time. Continue with treatments to help improve breath sounds.

## 2022-04-29 NOTE — CARE COORDINATION
Collaborative Discharge Planning    Sharda Almazan  :  1945  MRN:  [id number]    ADMIT DATE:  2022      Discharge Planning    White Board Notes /Social Work Whiteboard Notes  /Social Work Whiteboard: : Plans to return to [facility] O2 through [address] Procedure   :: CXR:  1. No acute cardiopulmonary process. The lung volumes with elevation of    the hemidiaphragms. Linear atelectasis/scarring left midlung. 2. Cardiomegaly with the heart size unchanged. 3. Left chest port with distal tip overlying expected location of the    superior vena cava. Discharge Plan Assisted LIving. [facility]. Discharge Milestones and Delays: Clinical status: plan JAYLEN later today. IVF. Anusol supp. Linzess. Carafate. Protonix.          SIGNED:  Nathanael Crawley RN   2022, 1:32 PM

## 2022-04-29 NOTE — CARE COORDINATION
4/29/22, 4:57 PM EDT    DISCHARGE PLANNING EVALUATION      Spoke with Mehreen at [facility], patient is able to return to [facility]. [facility] needs called and AVS faxed at discharge.

## 2022-04-29 NOTE — PLAN OF CARE
Problem: Respiratory - Adult  Goal: Clear lung sounds  Description: Clear lung sounds  4/29/2022 0755 by Ab Frank RCP  Outcome: Progressing  Note: Bilateral clear to diminished BS; 1lpm daily oxygen baseline, 3lpm HS home O2; Sat WNL

## 2022-04-29 NOTE — PLAN OF CARE
Problem: Safety - Adult  Goal: Free from fall injury  Outcome: Progressing  Note: Call light within reach, bed in lowest position, non skid footwear on, room door open, bed alarm on.  pt using call light appropriately. Care plan reviewed with patient. Patient verbalizes understanding of the plan of care and contributes to goal setting.

## 2022-04-29 NOTE — CONSULTS
[facility] [address] [place] [address]  Cardiology Consult      Patient:  Elva Fernandez  YOB: 1945    MRN: [id number]   Acct: [de-identified]     Primary Care Physician: Missael Rodriguez MD    REASON FOR CONSULT:    h/o afib, s/p WATCHMAN, on ASA & Plavix, h/o recurrent GIB, admitted for GIB, requesting evaluation to see about permanently stopping Plavix     CHIEF COMPLAINT:    Rectal bleeding     HISTORY OF PRESENT ILLNESS:    Elva Fernandez is a pleasant 67year old female patient with past medical history that includes:   Past Medical History:   Diagnosis Date    Anemia     [facility] 11/09/2017    CAD (coronary artery disease)     [facility]     Chronic kidney disease     stage 3 kidney     [facility]     DDD (degenerative disc disease), lumbar     Depression     Frequent PVCs 12/13/2017    GERD (gastroesophageal reflux disease)     History of blood transfusion     Hx of blood clots 09/2017    pulmonary emboli    Hyperlipidemia     Hypertension     Hyperthyroidism     Movement disorder     DDD    Neuropathy     Obesity     Palpitations 01/10/2020    Pneumonia 2016    sepsis    Sleep apnea     usually wears cpap at night    Status post right and left heart catheterization     Type II or unspecified type diabetes mellitus without mention of complication, not stated as uncontrolled    The patient was admitted to the hospital on 4/27/2022. She reports mild intermittent episodes of seeing red blood with her stools for the past two weeks. This has worsened in the past 2-3 days. She has fatigue and mild SOB. Patient denies chest pain, orthopnea, paroxysmal nocturnal dyspnea, palpitations, dizziness, syncope, recent weight gain or leg swelling. Her Hgb was 9.2. Cardiology was consulted to assess for possibly stopping plavix. The patient is s/p ARLET occlusion device placement on 3/3/2022.  JAYLEN was done on 4/20/2022, revealed EF 35-40%, LAAO device on good position without evidence for leaks or thrombi. The patient was subsequently instructed to take ASA/Plavix, and to stop Eliquis. GI was consulted, hemorrhoidal Vs diverticular bleeding was suspected, no planse for emergent endoscopy. Hgb today is 8.9. All labs, EKG's, diagnostic testing and images as well as cardiac cath, stress testing   were reviewed during this encounter    CARDIAC TESTING  Echo:   ECHO: Results for orders placed during the hospital encounter of 06/22/20    Echo 2D w doppler w color complete    Narrative  Transthoracic Echocardiography Report (TTE)    Demographics    Patient Name    Nya Jimenez Gender                Female    MR #            [id number]       Race                      Ethnicity    Account #       [de-identified]       Room Number    Accession       [id number]       Date of Study         06/22/2020  Number    Date of Birth   1945      Referring Physician   Tao Morillo MD    Age             75 year(s)      Estela Medina, CS    Interpreting          Tao Watson MD  Physician    Procedure    Type of Study    TTE procedure:ECHOCARDIOGRAM COMPLETE 2D W DOPPLER W COLOR. Procedure Date  Date: 06/22/2020 Start: 01:10 PM    Study Location: Echo Lab  Technical Quality: Limited visualization due to poor acoustical window. Indications:Paroxysmal atrial fibrillation and Dilated  cardiomyopathy/congestive. Additional Medical History:Coronary artery disease, Diabetes, Hypertension,  Hyperlipidemia, PVCs, CKD, CHF, Sleep apnea, Pneumonia, Palpitations, Blood  clots, GERD, Tobacco use, Family history of heart disease    Patient Status: Routine    Height: 65.75 inches Weight: 231.01 pounds BSA: 2.12 m^2 BMI: 37.57 kg/m^2    BP: 132/82 mmHg    Allergies  - Natural rubber and latex.     Conclusions    Summary  Ejection fraction is visually estimated at 40%.  There was mild global hypokinesis of the left ventricle. Signature    ----------------------------------------------------------------  Electronically signed by Fei To MD (Interpreting  physician) on 06/22/2020 at 02:16 PM  ----------------------------------------------------------------    Findings    Mitral Valve  The mitral valve structure was normal with normal leaflet separation. DOPPLER: The transmitral velocity was within the normal range with no  evidence for mitral stenosis. There was no evidence of mitral  regurgitation. Aortic Valve  The aortic valve was trileaflet with normal thickness and cuspal  separation. DOPPLER: Transaortic velocity was within the normal range with  no evidence of aortic stenosis. There was no evidence of aortic  regurgitation. Tricuspid Valve  Tricuspid valve was not well visualized. Mild tricuspid regurgitation. Pulmonic Valve  The pulmonic valve was not well visualized . Trivial pulmonic regurgitation visualized. Left Atrium  Mildly dilated left atrium. Left Ventricle  Ejection fraction is visually estimated at 40%. There was mild global hypokinesis of the left ventricle. Right Atrium  Right atrial size was normal.    Right Ventricle  The right ventricular size was normal with normal systolic function and  wall thickness. Pericardial Effusion  The pericardium was normal in appearance with no evidence of a pericardial  effusion. Pleural Effusion  No evidence of pleural effusion. Aorta / Great Vessels  -Aortic root dimension within normal limits.  -The Pulmonary artery is within normal limits. -IVC size is within normal limits with normal respiratory phasic changes.     M-Mode/2D Measurements & Calculations    LV Diastolic   LV Systolic Dimension:    AV Cusp Separation: 2.2 cmLA  Dimension: 5.8 4.5 cm                    Dimension: 4.5 cmAO Root  cm             LV Volume Diastolic: 090  Dimension: 3.5 cmLA Area: 24.5  LV FS:22.4 % ml                        cm^2  LV PW          LV Volume Systolic: 84.2  Diastolic: 1.1 ml  cm             LV EDV/LV EDV Index: 167  Septum         ml/79 m^2LV ESV/LV ESV    RV Diastolic Dimension: 2.7 cm  Diastolic: 1.1 Index: 75.2 ml/44 m^2  cm             EF Calculated: 44.7 %     LA/Aorta: 1.29    LV Length: 7.6 cm         LA volume/Index: 79.9 ml /38m^2    LV Area  Diastolic:  81.5 cm^2  LV Area  Systolic: 69.4  cm^2    Doppler Measurements & Calculations    MV Peak E-Wave: 53.1 cm/s  AV Peak Velocity: 171  LVOT Peak Velocity: 88.3  MV Peak A-Wave: 111 cm/s   cm/s                   cm/s  MV E/A Ratio: 0.48         AV Peak Gradient: 11.7 LVOT Peak Gradient: 3  MV Peak Gradient: 1.13     mmHg                   mmHg  mmHg  TV Peak E-Wave: 60 cm/s  MV Deceleration Time: 317                         TV Peak A-Wave: 93 cm/s  msec  MV P1/2t: 93 msec          IVRT: 85 msec          TV Peak Gradient: 1.44  MVA by PHT:2.37 cm^2                              mmHg    MV E' Septal Velocity: 5.4 AV DVI (Vmax):0.52     PV Peak Velocity: 74.6  cm/s                                              cm/s  MV A' Septal Velocity:                            PV Peak Gradient: 2.23  10.6 cm/s                                         mmHg  MV E' Lateral Velocity:  7.8 cm/s  MV A' Lateral Velocity:  12.2 cm/s  E/E' septal: 9.83  E/E' lateral: 6.81  MR Velocity: 301 cm/s    [email or web address] [id number]%3d      Past Medical History:    Past Medical History:   Diagnosis Date    Anemia     [facility] 11/09/2017    CAD (coronary artery disease)     CHF (congestive heart failure) ([facility])     Chronic kidney disease     stage 3 kidney     COPD (chronic obstructive pulmonary disease) ([facility])     DDD (degenerative disc disease), lumbar     Depression     Frequent PVCs 12/13/2017    GERD (gastroesophageal reflux disease)     History of blood transfusion     Hx of blood clots 09/2017    pulmonary emboli    Hyperlipidemia     Hypertension     Hyperthyroidism     Movement disorder     DDD    Neuropathy     Obesity     Palpitations 01/10/2020    Pneumonia 2016    sepsis    Sleep apnea     usually wears cpap at night    Status post right and left heart catheterization     Type II or unspecified type diabetes mellitus without mention of complication, not stated as uncontrolled        Past Surgical History:    Past Surgical History:   Procedure Laterality Date    ACHILLES TENDON SURGERY Bilateral     BLADDER SUSPENSION      CARDIAC SURGERY  2016    heart [facility]--[facility]    COLONOSCOPY Left 05/11/2018    COLONOSCOPY POLYPECTOMY SNARE/COLD BIOPSY performed by Amado Erwin MD at [facility] [place] [facility]    COLONOSCOPY N/A 08/04/2021    COLONOSCOPY performed by [facility]hena [facility] [place] [facility]    COLONOSCOPY  08/04/2021    Dr. Karley Rodriguez-- STRITAS    ENDOSCOPY, COLON, DIAGNOSTIC      GASTRIC FUNDOPLICATION      HAMMER TOE SURGERY Right     HERNIA REPAIR      HIATAL HERNIA REPAIR  09/06/2016    Laparoscopic Robotic with Nissen Fundoplication - Dr. Urszula Sheldon OFFICE/OUTPT VISIT,PROCEDURE ONLY N/A 09/21/2018    EGD DIAGNOSTIC ONLY performed by Jose Bahena MD at [address]      right    TENDON RELEASE Left 08/09/2016    left foot    TRANSESOPHAGEAL ECHOCARDIOGRAM N/A 4/20/2022    TRANSESOPHAGEAL ECHOCARDIOGRAM performed by Ana Maria Maxwell MD at [facility] TUNNELED VENOUS PORT PLACEMENT  11/06/2017    UPPER GASTROINTESTINAL ENDOSCOPY Left 05/10/2018    EGD BIOPSY performed by Amado Erwin MD at [facility] Left 07/25/2021    [facility] [place] [facility]       Medications Prior to Admission:    Medications Prior to Admission: clopidogrel (PLAVIX) 75 MG tablet, Take 1 tablet by mouth daily  aspirin 81 MG chewable tablet, Take 1 tablet by mouth daily  ferrous gluconate (FERGON) 324 (38 Fe) MG tablet, Take 324 mg by mouth 2 times daily  furosemide (LASIX) 20 MG tablet, Take 20 mg by mouth daily as needed (swelling as needed)  insulin lispro (HUMALOG) 100 UNIT/ML injection vial, Inject into the skin 3 times daily (before meals)  hydrOXYzine (ATARAX) 10 MG tablet, Take 10 mg by mouth 3 times daily as needed for Itching  Probiotic Product (PROBIOTIC DAILY PO), Take by mouth  cetirizine (ZYRTEC) 10 MG tablet, Take 10 mg by mouth daily  polyethyl glycol-propyl glycol 0.4-0.3 % (SYSTANE) 0.4-0.3 % ophthalmic solution, 1 drop as needed for Dry Eyes  sucralfate (CARAFATE) 1 GM tablet, Take 1 tablet by mouth 4 times daily (before meals and nightly)  pantoprazole (PROTONIX) 40 MG tablet, Take 1 tablet by mouth 2 times daily (before meals)  vitamin D 25 MCG (1000 UT) CAPS, Take by mouth daily 125 mcg daily  citalopram (CELEXA) 40 MG tablet, Take 40 mg by mouth daily  loperamide (IMODIUM A-D) 2 MG tablet, Take by mouth  tiZANidine (ZANAFLEX) 2 MG tablet, Take 2 mg by mouth 2 times daily   DULoxetine (CYMBALTA) 20 MG extended release capsule, Take 120 mg by mouth daily   insulin glargine (BASAGLAR KWIKPEN) 100 UNIT/ML injection pen, Inject 8 Units into the skin nightly   potassium chloride (KLOR-CON M) 10 MEQ extended release tablet, Take 1 tablet by mouth daily  Dulaglutide (TRULICITY) 1.56 PG/1.4EO SOPN, Inject 0.75 mg into the skin once a week Every Wednesday  Cyanocobalamin (SM VITAMIN B-12 PO), Take 2,500 mcg by mouth daily  diphenhydrAMINE (BENADRYL) 25 MG capsule, Take 25 mg by mouth as needed for Itching  Calcium Carb-Cholecalciferol 500-400 MG-UNIT TABS, Take 1 tablet by mouth 2 times daily  RA ALCOHOL SWABS 70 % PADS,   ONE TOUCH ULTRA TEST strip,   Lancets Misc. (UNISTIK CZT COMFORT) MISC,   ipratropium-albuterol (DUONEB) 0.5-2.5 (3) MG/3ML SOLN nebulizer solution, Inhale 1 vial into the lungs every 4 hours   Linaclotide (LINZESS) 72 MCG CAPS, Take 72 mcg by mouth daily   pregabalin (LYRICA) 150 MG capsule, Take 1 capsule by mouth 3 times daily for 3 days. .  traZODone (DESYREL) 100 MG tablet, Take 100 mg by mouth nightly   Multiple Vitamins-Minerals (THERAPEUTIC MULTIVITAMIN-MINERALS) tablet, Take 1 tablet by mouth daily  OXYGEN, Inhale into the lungs continuous  atorvastatin (LIPITOR) 20 MG tablet, Take 1 tablet by mouth nightly  magnesium hydroxide (MILK OF MAGNESIA CONCENTRATE) 2400 MG/10ML SUSP, Take 30 mLs by mouth once as needed  docusate sodium (COLACE) 100 MG capsule, Take 100 mg by mouth 3 times daily as needed   acetaminophen (TYLENOL) 325 MG tablet, Take 2 tablets by mouth every 4 hours as needed for Pain  levothyroxine (SYNTHROID) 75 MCG tablet, Take 75 mcg by mouth Daily    Allergies:    Bactrim [sulfamethoxazole-trimethoprim], Wellbutrin [bupropion], and Silicone    Social History:    reports that she quit smoking about 15 years ago. Her smoking use included cigarettes. She has a 30.00 pack-year smoking history. She has never used smokeless tobacco. She reports that she does not drink alcohol and does not use drugs. Family History:   family history includes COPD in her brother and father; Cancer in her mother; Diabetes in her father and mother; Heart Disease in her mother; High Blood Pressure in her mother; Stroke in her mother; Vision Loss in her mother. REVIEW OF SYSTEMS:  Constitutional: negative for anorexia, chills and fevers,weight change  Skin: negative for new skin rash per patient  HEENT: negative for head trauma or new visual changes  Respiratory: negative for cough, hemoptysis, wheezing  Cardiovascular: negative for  orthopnea, palpitations and syncope. Gastrointestinal: negative for abdominal pain,nausea , vomiting, constipation, diarrhea.   Hematologic/lymphatic: negative for bruising,blood clots  Musculoskeletal:negative for muscle weakness, myalgias,wasting  Neurological: negative for coordination problems, dizziness, gait problems and vertigo  Behavioral/Psych:negative for mood/sleep disturbance      PHYSICAL EXAM:   Vitals:  Patient Vitals for the past 24 hrs:   BP Temp Temp src Pulse Resp SpO2   04/29/22 1117 (!) 123/51 97.9 °F (36.6 °C) Oral 50 16 96 %   04/29/22 1015 (!) 131/58 98.3 °F (36.8 °C) Oral 62 18 97 %   04/29/22 0754 -- -- -- -- 14 97 %   04/29/22 0345 115/70 98.4 °F (36.9 °C) Oral 64 16 100 %   04/29/22 0015 97/60 98.2 °F (36.8 °C) Oral 59 16 95 %   04/28/22 2015 137/80 98.2 °F (36.8 °C) Axillary 64 16 97 %   04/28/22 1557 (!) 101/53 97.9 °F (36.6 °C) Oral 60 18 98 %   04/28/22 1551 -- -- -- -- 16 --       Last 3 weights: Wt Readings from Last 3 Encounters:   04/28/22 230 lb (104.3 kg)   04/20/22 231 lb 6.4 oz (105 kg)   04/11/22 230 lb (104.3 kg)     24 hour intake/output:    Intake/Output Summary (Last 24 hours) at 4/29/2022 1232  Last data filed at 4/29/2022 0315  Gross per 24 hour   Intake 360 ml   Output 0 ml   Net 360 ml     BMI:Body mass index is 37.12 kg/m². General Appearance: alert and oriented to person, place and time, well developed and well- nourished, in no acute distress  Skin: warm and dry, no rash or erythema  Eyes: pupils equal, round, and reactive to light, extraocular eye movements intact, conjunctivae normal  Neck: supple and non-tender without mass, no thyromegaly or thyroid nodules, no cervical lymphadenopathy  Pulmonary/Chest: clear to auscultation bilaterally- no wheezes, rales or rhonchi, normal air movement, no respiratory distress  Cardiovascular: normal rate, regular rhythm, normal S1 and S2, no murmur. No rubs, clicks, or gallops, distal pulses intact, no carotid bruits, Negative JVD  Radial Pulses: intact 2+  Abdomen: soft, non-tender, non-distended, normal bowel sounds, no masses or organomegaly  Extremities: no cyanosis, clubbing .  no Edema  Musculoskeletal: normal range of motion, no joint swelling, deformity or tenderness      RADIOLOGY   XR CHEST (2 VW)    Result Date: 4/27/2022  2 view chest x-ray Comparison: October 23, 2018 Findings: Left chest port distal tip overlying expected location of the superior vena cava, unchanged position from prior The lungs are clear. No infiltrate. Linear atelectasis or scarring of the left midlung. Cardiomegaly with the heart size unchanged. Cardiac loop recording device. No acute fracture. Spondylosis of the thoracic spine. 1. No acute cardiopulmonary process. The lung volumes with elevation of the hemidiaphragms. Linear atelectasis/scarring left midlung. 2. Cardiomegaly with the heart size unchanged. 3. Left chest port with distal tip overlying expected location of the superior vena cava. This document has been electronically signed by: Nuvia Jeffery DO, MBA on 04/27/2022 09:57 PM      LABS:  No results for input(s): CKTOTAL, CKMB, CKMBINDEX, TROPONINT in the last 72 hours.   CBC:   Lab Results   Component Value Date    WBC 5.0 04/27/2022    RBC 3.06 04/27/2022    HGB 8.9 04/29/2022    HCT 28.5 04/29/2022    MCV 97.1 04/27/2022    MCH 30.1 04/27/2022    MCHC 31.0 04/27/2022    RDW 15.2 06/13/2018     04/27/2022    MPV 9.9 04/27/2022     BMP:    Lab Results   Component Value Date     04/28/2022    K 4.0 04/28/2022     04/28/2022    CO2 26 04/28/2022    BUN 20 04/28/2022    LABALBU 3.7 04/27/2022    CREATININE 1.2 04/28/2022    CREATININE 44.0 03/14/2019    CALCIUM 8.3 04/28/2022    LABGLOM 43 04/28/2022    GLUCOSE 86 04/28/2022     Hepatic Function Panel:    Lab Results   Component Value Date    ALKPHOS 74 04/27/2022    ALT 9 04/27/2022    AST 15 04/27/2022    PROT 6.4 04/27/2022    BILITOT 0.2 04/27/2022    BILIDIR <0.2 04/27/2022    LABALBU 3.7 04/27/2022     Magnesium:    Lab Results   Component Value Date    MG 1.6 07/26/2021     Warfarin PT/INR:  No components found for: PTPATWAR, PTINRWAR  HgBA1c:    Lab Results   Component Value Date LABA1C 5.4 06/13/2018     FLP:    Lab Results   Component Value Date    TRIG 77 03/23/2016    HDL 49 03/23/2016    LDLCALC 63 03/23/2016     TSH:    Lab Results   Component Value Date    TSH 1.900 10/23/2018     BNP: No results found for: BNP      ASSESSMENT:  1. Paroxysmal atrial fibrillation  2. S/p LAAO device placement 3/2022  3. Non-ischemic cardiomyopathy   4. Chronic HFrEF   5. Nonobstructive CAD  6. GI bleeding  7. Acute blood loss anemia  8. Recurrent GI bleeding  9. DM  10. CKD  11. HTN  12. Dyslipidemia     RECOMMENDATIONS:   Patient has h/o recurrent GI bleeding   Now admitted with another episode of GI bleeding   Her Hgb was 9.2, Hgb today is 8.9   Cont to monitor H/H, transfuse as needed   Agree with holding DAPT given GI bleeding, acute blood loss anemia   GI was consulted, hemorrhoidal Vs diverticular bleeding was suspected, no planse for emergent endoscopy   Cardiology was consulted to assess for possibly stopping plavix   The patient is s/p ARLET occlusion device placement on 3/3/2022   JAYLEN was done on 4/20/2022, revealed EF 35-40%, LAAO device on good position without evidence for leaks or thrombi. The patient was subsequently instructed to take ASA/Plavix, and to stop Eliquis   Ideally, DAPT should be continued for 6 months. However, given recurrent GI bleeding, acute blood loss anemia requiring hospitalization and high risk for recurrent bleeding, may stop Plavix and continue ASA 81 mg po daily   Resume ASA 81 mg po daily when Hgb is stable, bleeding resolves and ok with GI    Cont Lipitor   Monitor on telemetry     Above findings and plan of care were discussed with patient, questions were answered, agreeable to plan. Thank you for allowing me to participate in the care of this patient. Please let me know if I can be of any further assistance.       Christiano Solis MD, [place]   12:32 PM  4/29/2022

## 2022-04-29 NOTE — PROGRESS NOTES
Hospitalist Progress Note    Patient:  Catarino Lipoma      Unit/Bed:8A-15/015-A    YOB: 1945    MRN: [id number]       Acct: [de-identified]     PCP: Michael Arango MD    Date of Admission: 4/27/2022    Assessment/Plan:      Rectal bleeding--hold antiplatelets/anticoagulation; GI consulted, appreciate input. H&H (4/29) 8.9/28.5. Monitor. Transfuse PRN. Acute blood loss, iron deficiency anemia--hemoglobin 8.9 (4/29) and on April 10, 2022 was 11.3; stop anticoagulation/antiplatelets; spoke with Dr. Nakia Valladares regarding permanent discontinuation of antiplatelets. Monitor hemoglobin every 6 hours and transfuse if hemoglobin drops less than 7 or becomes hemodynamically unstable. Iron studies complete. Iron-35, Iron sat 14. Patient reports chronic anemia. Hematology consulted per patient request.   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 CKD stage III--monitor  1.8 cm solid left renal lesion noted on CT abdomen and pelvis from April 10, 1958  Chronic systolic heart failure--echo from April 20, 2022 reveals EF 35 to 40%  Atrial fibrillation status post watchman procedure on March 3, 2022--Case discussed with Dr. Nakia Valladares. Stopping ASA & Plavix. No JAYLEN needed due to having JAYLEN last week, WATCHMAN in good position.    Chronic hypoxic respiratory failure/nocturnal hypoxia--patient reports she is to use 1 L at rest, 2 L with exertion and she does use 3 L at night  Obesity with BMI 37.35    Dispo: Plan for DC 4/30         Chief Complaint: Rectal bleeding    Hospital Course: 67 y.o. female who presented to [address] [place] [address] with rectal bleeding; patient has a past medical history of atrial fibrillation status post watchman procedure on March 3, 2022, diabetes, COPD, hypertension along with using home oxygen; she states approximately 5 days ago she noticed a maroon-colored stool however did not say anything, tonight she states she had a large explosive stool that was reddish/maroonish in color, her Eliquis was recently changed to Plavix; patient relates to lightheaded dizziness for a month, states she has supposed to wear oxygen 1 L at rest and 2 L with activity and states she does not, she does use 3 L of oxygen at night, her lung doctor is Dr. Adonis Spears; she denies chest pain, she has chronic shortness of breath, she denies any significant abdominal pain or nausea; her GI specialist is Dr. Brooklyn Lowery. 4/29/22- No acute events overnight. Cardiology consulted for recommendations regarding patients antiplatelet therapy. Subjective: Patient lying in bed. Complains of increased fatigue. Reports \"reddish\" colored stool overnight. GI continues to follow. Medications:  Reviewed    Infusion Medications    dextrose      sodium chloride      sodium chloride 75 mL/hr at 04/29/22 0343     Scheduled Medications    insulin lispro  0-12 Units SubCUTAneous TID WC    insulin lispro  0-6 Units SubCUTAneous Nightly    linaCLOtide  144 mcg Oral Daily    hydrocortisone  25 mg Rectal BID    pantoprazole  40 mg Oral BID AC    atorvastatin  20 mg Oral Nightly    cetirizine  10 mg Oral Daily    citalopram  40 mg Oral Daily    DULoxetine  120 mg Oral Daily    insulin glargine  8 Units SubCUTAneous Nightly    ipratropium-albuterol  1 vial Inhalation 4x daily    levothyroxine  75 mcg Oral Daily    sucralfate  1 g Oral 4x Daily AC & HS    tiZANidine  2 mg Oral BID    traZODone  100 mg Oral Nightly    sodium chloride flush  5-40 mL IntraVENous 2 times per day     PRN Meds: glucagon (rDNA), dextrose, dextrose bolus (hypoglycemia), glucose, diphenhydrAMINE, docusate sodium, hydrOXYzine, polyvinyl alcohol, sodium chloride flush, sodium chloride, ondansetron **OR** ondansetron, acetaminophen **OR** acetaminophen      Intake/Output Summary (Last 24 hours) at 4/29/2022 1002  Last data filed at 4/29/2022 0315  Gross per 24 hour   Intake 360 ml   Output 0 ml   Net 360 ml       Diet:  ADULT DIET;  Clear Liquid; No red dye    Exam:  /70   Pulse 64   Temp 98.4 °F (36.9 °C) (Oral)   Resp 14   Ht 5' 6\" (1.676 m)   Wt 230 lb (104.3 kg)   SpO2 97%   BMI 37.12 kg/m²     General appearance: No apparent distress, appears stated age and cooperative. HEENT: Pupils equal, round, and reactive to light. Conjunctivae/corneas clear. Neck: Supple, with full range of motion. No jugular venous distention. Trachea midline. Respiratory:  Normal respiratory effort. Clear to auscultation, bilaterally without Rales/Wheezes/Rhonchi. Cardiovascular: Regular rate and rhythm with normal S1/S2 without murmurs, rubs or gallops. Abdomen: Soft, tender, non-distended with normal bowel sounds. Musculoskeletal: passive and active ROM x 4 extremities. Skin: Skin color, texture, turgor normal.  No rashes or lesions. Neurologic:  Neurovascularly intact without any focal sensory/motor deficits. Cranial nerves: II-XII intact, grossly non-focal.  Psychiatric: Alert and oriented, thought content appropriate, normal insight  Capillary Refill: Brisk,< 3 seconds   Peripheral Pulses: +2 palpable, equal bilaterally       Labs:   Recent Labs     04/27/22 1953 04/27/22 1953 04/28/22  0220 04/28/22  1025 04/29/22  0730   WBC 5.0  --   --   --   --    HGB 9.2*   < > 8.4* 9.1* 8.9*   HCT 29.7*   < > 26.5* 28.8* 28.5*     --   --   --   --     < > = values in this interval not displayed. Recent Labs     04/27/22 1953 04/28/22  0637    142   K 4.4 4.0    107   CO2 28 26   BUN 26* 20   CREATININE 1.5* 1.2   CALCIUM 8.7 8.3*     Recent Labs     04/27/22 1953   AST 15   ALT 9*   BILIDIR <0.2   BILITOT 0.2*   ALKPHOS 74     Recent Labs     04/28/22  0637   INR 1.03     No results for input(s): Coragraciela Jordan in the last 72 hours.     Urinalysis:      Lab Results   Component Value Date    NITRU NEGATIVE 04/27/2022    WBCUA 10-15 04/27/2022    BACTERIA NONE SEEN 04/27/2022    RBCUA 3-5 04/27/2022    BLOODU NEGATIVE 04/27/2022    SPECGRAV 1.009 10/23/2018    GLUCOSEU NEGATIVE 04/27/2022       Radiology:  XR CHEST (2 VW)   Final Result   1. No acute cardiopulmonary process. The lung volumes with elevation of    the hemidiaphragms. Linear atelectasis/scarring left midlung. 2. Cardiomegaly with the heart size unchanged. 3. Left chest port with distal tip overlying expected location of the    superior vena cava. This document has been electronically signed by: Michelle Domingo DO, MBA on    04/27/2022 09:57 PM          Diet: ADULT DIET; Clear Liquid;  No red dye    DVT prophylaxis: [] Lovenox                                 [x] SCDs                                 [] SQ Heparin                                 [] Encourage ambulation           [] Already on Anticoagulation     Disposition:    [] Home       [] TCU       [] Rehab       [] Psych       [x] SNF       [] [place]       [] Other-    Code Status: Full Code          Electronically signed by Anuj York on 4/29/2022 at 10:02 AM  Electronically signed by Saintclair Lineman, PA on 4/29/2022 at 2:28 PM

## 2022-04-30 VITALS
RESPIRATION RATE: 16 BRPM | HEART RATE: 74 BPM | OXYGEN SATURATION: 97 % | SYSTOLIC BLOOD PRESSURE: 137 MMHG | HEIGHT: 66 IN | DIASTOLIC BLOOD PRESSURE: 72 MMHG | BODY MASS INDEX: 36.96 KG/M2 | TEMPERATURE: 97.9 F | WEIGHT: 230 LBS

## 2022-04-30 PROBLEM — K62.5 RECTAL BLEEDING: Status: RESOLVED | Noted: 2022-04-27 | Resolved: 2022-04-30

## 2022-04-30 LAB
GLUCOSE BLD-MCNC: 122 MG/DL (ref 70–108)
GLUCOSE BLD-MCNC: 131 MG/DL (ref 70–108)
HCT VFR BLD CALC: 25.7 % (ref 37–47)
HCT VFR BLD CALC: 28.1 % (ref 37–47)
HEMOGLOBIN: 8.1 GM/DL (ref 12–16)
HEMOGLOBIN: 8.9 GM/DL (ref 12–16)

## 2022-04-30 PROCEDURE — 94760 N-INVAS EAR/PLS OXIMETRY 1: CPT

## 2022-04-30 PROCEDURE — 85018 HEMOGLOBIN: CPT

## 2022-04-30 PROCEDURE — 6370000000 HC RX 637 (ALT 250 FOR IP): Performed by: NURSE PRACTITIONER

## 2022-04-30 PROCEDURE — 2580000003 HC RX 258: Performed by: NURSE PRACTITIONER

## 2022-04-30 PROCEDURE — 2700000000 HC OXYGEN THERAPY PER DAY

## 2022-04-30 PROCEDURE — 6360000002 HC RX W HCPCS: Performed by: PHYSICIAN ASSISTANT

## 2022-04-30 PROCEDURE — 99239 HOSP IP/OBS DSCHRG MGMT >30: CPT | Performed by: PHYSICIAN ASSISTANT

## 2022-04-30 PROCEDURE — 82948 REAGENT STRIP/BLOOD GLUCOSE: CPT

## 2022-04-30 PROCEDURE — 85014 HEMATOCRIT: CPT

## 2022-04-30 PROCEDURE — 94640 AIRWAY INHALATION TREATMENT: CPT

## 2022-04-30 RX ORDER — HEPARIN SODIUM (PORCINE) LOCK FLUSH IV SOLN 100 UNIT/ML 100 UNIT/ML
500 SOLUTION INTRAVENOUS PRN
Status: DISCONTINUED | OUTPATIENT
Start: 2022-04-30 | End: 2022-04-30 | Stop reason: HOSPADM

## 2022-04-30 RX ORDER — HYDROCORTISONE ACETATE 25 MG/1
25 SUPPOSITORY RECTAL 2 TIMES DAILY
Qty: 10 SUPPOSITORY | Refills: 0 | DISCHARGE
Start: 2022-04-30 | End: 2022-05-05

## 2022-04-30 RX ORDER — PANTOPRAZOLE SODIUM 40 MG/1
40 TABLET, DELAYED RELEASE ORAL
Qty: 60 TABLET | Refills: 0 | Status: SHIPPED | OUTPATIENT
Start: 2022-04-30 | End: 2022-10-17

## 2022-04-30 RX ADMIN — DULOXETINE 120 MG: 60 CAPSULE, DELAYED RELEASE ORAL at 08:27

## 2022-04-30 RX ADMIN — SODIUM CHLORIDE, PRESERVATIVE FREE 10 ML: 5 INJECTION INTRAVENOUS at 13:49

## 2022-04-30 RX ADMIN — IPRATROPIUM BROMIDE AND ALBUTEROL SULFATE 3 ML: .5; 3 SOLUTION RESPIRATORY (INHALATION) at 11:40

## 2022-04-30 RX ADMIN — SUCRALFATE 1 G: 1 TABLET ORAL at 05:24

## 2022-04-30 RX ADMIN — SUCRALFATE 1 G: 1 TABLET ORAL at 11:29

## 2022-04-30 RX ADMIN — CITALOPRAM 40 MG: 40 TABLET, FILM COATED ORAL at 08:27

## 2022-04-30 RX ADMIN — HEPARIN 500 UNITS: 100 SYRINGE at 13:49

## 2022-04-30 RX ADMIN — HYDROCORTISONE ACETATE 25 MG: 25 SUPPOSITORY RECTAL at 08:27

## 2022-04-30 RX ADMIN — TIZANIDINE 2 MG: 4 TABLET ORAL at 08:26

## 2022-04-30 RX ADMIN — IPRATROPIUM BROMIDE AND ALBUTEROL SULFATE 3 ML: .5; 3 SOLUTION RESPIRATORY (INHALATION) at 08:48

## 2022-04-30 RX ADMIN — PANTOPRAZOLE SODIUM 40 MG: 40 TABLET, DELAYED RELEASE ORAL at 05:24

## 2022-04-30 RX ADMIN — SODIUM CHLORIDE: 9 INJECTION, SOLUTION INTRAVENOUS at 08:28

## 2022-04-30 RX ADMIN — CETIRIZINE HYDROCHLORIDE 10 MG: 10 TABLET, FILM COATED ORAL at 08:27

## 2022-04-30 RX ADMIN — LEVOTHYROXINE SODIUM 75 MCG: 0.07 TABLET ORAL at 05:24

## 2022-04-30 ASSESSMENT — PAIN DESCRIPTION - LOCATION
LOCATION: ABDOMEN
LOCATION: ABDOMEN

## 2022-04-30 ASSESSMENT — PAIN DESCRIPTION - PAIN TYPE
TYPE: ACUTE PAIN
TYPE: ACUTE PAIN

## 2022-04-30 ASSESSMENT — PAIN DESCRIPTION - ORIENTATION
ORIENTATION: MID
ORIENTATION: MID

## 2022-04-30 ASSESSMENT — PAIN SCALES - GENERAL
PAINLEVEL_OUTOF10: 2
PAINLEVEL_OUTOF10: 2
PAINLEVEL_OUTOF10: 0
PAINLEVEL_OUTOF10: 0

## 2022-04-30 ASSESSMENT — PAIN DESCRIPTION - FREQUENCY
FREQUENCY: CONTINUOUS
FREQUENCY: CONTINUOUS

## 2022-04-30 ASSESSMENT — PAIN DESCRIPTION - ONSET
ONSET: ON-GOING
ONSET: ON-GOING

## 2022-04-30 ASSESSMENT — PAIN DESCRIPTION - DESCRIPTORS
DESCRIPTORS: ACHING
DESCRIPTORS: ACHING

## 2022-04-30 ASSESSMENT — PAIN - FUNCTIONAL ASSESSMENT
PAIN_FUNCTIONAL_ASSESSMENT: ACTIVITIES ARE NOT PREVENTED
PAIN_FUNCTIONAL_ASSESSMENT: ACTIVITIES ARE NOT PREVENTED

## 2022-04-30 NOTE — PROGRESS NOTES
Pt abnormal lab results avail; hgb 8.1, hct 25. 7.down from the 1700 lab with hgb 9.5,  hct 30.4. Dr Irene Calhoun notified of above, no new orders at this time.

## 2022-04-30 NOTE — PROGRESS NOTES
Discharge teaching and instructions for diagnosis/procedure of rectal bleeding completed with patient using teachback method. AVS reviewed. Printed prescriptions given to patient. Patient voiced understanding regarding prescriptions, follow up appointments, and care of self at home.  Discharged in a wheelchair to  assisted living per family

## 2022-04-30 NOTE — PROGRESS NOTES
Gastroenterology Progress Note:     Patient Name:  Garo Vega   MRN: [id number]  [id number]  YOB: 1945  Admit Date: 4/27/2022  7:27 PM  Primary Care Physician: Eligio Schmidt MD   8A-15/015-A     Patient seen and examined. 24 hours events and chart reviewed. Subjective: Patient resting in bed. She denies abdominal pain, n/v. She endorses bloating after dinner last night. She just \"wants to go home. \" Per RN, she had a stool this morning with a scant amount of blood noted. Hgb 8.9     Objective:  BP (!) 151/81   Pulse 78   Temp 97.9 °F (36.6 °C) (Oral)   Resp 24   Ht 5' 6\" (1.676 m)   Wt 230 lb (104.3 kg)   SpO2 96%   BMI 37.12 kg/m²     Physical Exam:    General:  Nourished in no distress  HEENT: Atraumatic, normocephalic. Moist oral mucous membranes. Neck: Supple without adenopathy, JVD, thyromegaly or masses. Trachea midline. CV: Heart RRR, no murmurs, rubs, gallops. Resp: Even, easy without cough or accessory use. Lungs clear to ascultation bilaterally. Abd: Round, soft, obese, nontender. No hepatosplenomegaly or mass present. Active bowel sounds heard. No distention noted. Ext:  Without cyanosis, clubbing, edema. Skin: Pink, warm, dry  Neuro:  Alert, oriented x 3 with no obvious deficits.        Rectal: deferred    Labs:   CBC:   Lab Results   Component Value Date    WBC 5.0 04/27/2022    HGB 8.9 04/30/2022    HCT 28.1 04/30/2022    MCV 97.1 04/27/2022     04/27/2022     BMP:   Lab Results   Component Value Date     04/29/2022    K 4.7 04/29/2022     04/29/2022    CO2 23 04/29/2022    PHOS 4.1 08/12/2016    BUN 14 04/29/2022    CREATININE 1.0 04/29/2022    CREATININE 44.0 03/14/2019    CALCIUM 7.3 04/29/2022     PT/INR:   Lab Results   Component Value Date    INR 1.03 04/28/2022     Lipids:   Lab Results   Component Value Date    ALKPHOS 74 04/27/2022    ALT 9 04/27/2022    AST 15 04/27/2022    BILITOT 0.2 04/27/2022    BILIDIR <0.2 04/27/2022 LABALBU 3.7 04/27/2022    AMYLASE 44 07/25/2021    LIPASE 29.0 04/27/2022     Significant Diagnostic Studies: none for 24 hours    Current Meds:  Scheduled Meds:   insulin lispro  0-12 Units SubCUTAneous TID WC    insulin lispro  0-6 Units SubCUTAneous Nightly    linaCLOtide  144 mcg Oral Daily    hydrocortisone  25 mg Rectal BID    pantoprazole  40 mg Oral BID AC    atorvastatin  20 mg Oral Nightly    cetirizine  10 mg Oral Daily    citalopram  40 mg Oral Daily    DULoxetine  120 mg Oral Daily    insulin glargine  8 Units SubCUTAneous Nightly    ipratropium-albuterol  1 vial Inhalation 4x daily    levothyroxine  75 mcg Oral Daily    sucralfate  1 g Oral 4x Daily AC & HS    tiZANidine  2 mg Oral BID    traZODone  100 mg Oral Nightly    sodium chloride flush  5-40 mL IntraVENous 2 times per day     Continuous Infusions:   dextrose      sodium chloride      sodium chloride 75 mL/hr at 04/30/22 8710       Assessment:  68 yo F admitted 04/27/22 for BRBPR. H/O constipation on Linzess, but still strains at times. H/O internal hemorrhoids & diverticulosis. Seh is on ASA & Plavix for a history of afib, s/p Watchman, & CVA.      1. Rectal bleeding- hemorrhoidal vs diverticular  2. Chronic normocytic anemia  3. Chronic hypoxic respiratory failure- on nasal cannula  4. Afib s/p Watchman 03/2022- on ASA & Plavix  5. GERD  6. H/O internal hemorrhoids  7. H/O diverticulosis  8. H/O CVA- on ASA & Plavix  9. CHF, not exacerbated  10. CKD  11.  Chronic constipation- on Linzess PTA     Plan:    · Monitor H & H, transfuse prn  · Nursing to monitor stool output & document- however no descriptors being documented  · Continue PO PPI BID, home dose  · Continue Anusol suppository BID for a total of 7 days, script sent  · Advance to high fiber diet  · Continue Carafate, home dose  · Continue Linzess 144 mcg daily, script sent  · Continue Colace prn  · Recommend hemorrhoidal banding OP  · Cardiology okay with permanently stopping Plavix  · Okay to resume ASA from GI standpoint  · Recommend Watchman procedure if patient agreeable  · RN updated  · Supportive care per primary team   Okay to discharge if tolerates high fiber diet   Will need a 4 week follow-up Bossman TORRES  GI signing off    Case reviewed and impression/plan reviewed in collaboration with Dr. Dalton Both  Electronically signed by BRIAN Galarza CNP on 4/30/2022 at 8:51 AM    [facility]

## 2022-04-30 NOTE — PLAN OF CARE
Problem: Chronic Conditions and Co-morbidities  Goal: Patient's chronic conditions and co-morbidity symptoms are monitored and maintained or improved  4/29/2022 2243 by Sandrita Acuna RN  Outcome: Progressing  Flowsheets (Taken 4/28/2022 0800 by Diana Enriquez RN)  Care Plan - Patient's Chronic Conditions and Co-Morbidity Symptoms are Monitored and Maintained or Improved: Monitor and assess patient's chronic conditions and comorbid symptoms for stability, deterioration, or improvement

## 2022-04-30 NOTE — PLAN OF CARE
Problem: Discharge Planning  Goal: Discharge to home or other facility with appropriate resources  Outcome: Progressing     Problem: Safety - Adult  Goal: Free from fall injury  4/29/2022 2243 by Issa Díaz RN  Outcome: Progressing  4/29/2022 1933 by Siva Serrano RN  Outcome: Progressing  Note: Call light within reach, bed in lowest position, non skid footwear on, room door open, bed alarm on.  pt using call light appropriately.         Problem: Chronic Conditions and Co-morbidities  Goal: Patient's chronic conditions and co-morbidity symptoms are monitored and maintained or improved  Outcome: Progressing  Flowsheets (Taken 4/28/2022 0800 by Jemma Borjas, RN)  Care Plan - Patient's Chronic Conditions and Co-Morbidity Symptoms are Monitored and Maintained or Improved: Monitor and assess patient's chronic conditions and comorbid symptoms for stability, deterioration, or improvement     Problem: Respiratory - Adult  Goal: Clear lung sounds  Description: Clear lung sounds  4/29/2022 1933 by Vlad Bear RCP  Outcome: Progressing     Problem: Pain  Goal: Verbalizes/displays adequate comfort level or baseline comfort level  Outcome: Progressing     Problem: ABCDS Injury Assessment  Goal: Absence of physical injury  Outcome: Progressing

## 2022-05-01 NOTE — DISCHARGE SUMMARY
Hospitalist Discharge Summary        Patient: Tom Muniz  YOB: 1945  MRN: [id number]   Acct: [de-identified]    Primary Care Physician: Kitty Hedrick MD    Admit date  4/27/2022    Discharge date: 4/30/2022  1:58 PM    Chief Complaint on presentation :-  Rectal Bleeding     Discharge Assessment and Plan:-   1. Rectal bleeding--hold antiplatelets/anticoagulation; GI consulted, appreciate input. Pt did not have bloody BM day of discharge. Pt tolerated food prior to DC. Continue p.o. PPI twice daily. Continue Anusol suppository twice daily for 7 days. Continue Carafate. Continue Linzess. Continue Colace as needed. 2. Acute blood loss, iron deficiency anemia--hemoglobin 8.9 (4/29) and on April 10, 2022 was 11.3;  Iron-35, Iron sat 14. Pt received IV Iron during admission. Pt did not have bloody BMs day of discharge. 3. 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 4. CKD stage III--monitor  5. 1.8 cm solid left renal lesion noted on CT abdomen and pelvis from April 10, 2022  6. Chronic systolic heart failure--echo from April 20, 2022 reveals EF 35 to 40%  7. Atrial fibrillation status post watchman procedure on March 3, 2022--Case discussed with Dr. Kendy Francis. Stopping ASA & Plavix; pt can start ASA with her PCP after monitoring stool. No JAYLEN needed due to having JAYLEN last week, WATCHMAN in good position. 8. Chronic hypoxic respiratory failure/nocturnal hypoxia--patient reports she is to use 1 L at rest, 2 L with exertion and she does use 3 L at night  9. Obesity with BMI 37.35      Initial H and P and Hospital course:-  Initial H&P \"76 y. o. female who presented to [facility] with rectal bleeding; patient has a past medical history of atrial fibrillation status post watchman procedure on March 3, 2022, diabetes, COPD, hypertension along with using home oxygen; she states approximately 5 days ago she noticed a maroon-colored stool however did not say anything, tonight she states she had a large explosive stool that was reddish/maroonish in color, her Eliquis was recently changed to Plavix; patient relates to lightheaded dizziness for a month, states she has supposed to wear oxygen 1 L at rest and 2 L with activity and states she does not, she does use 3 L of oxygen at night, her lung doctor is Dr. Rossi Cruz denies chest pain, she has chronic shortness of breath, she denies any significant abdominal pain or nausea; her GI specialist is Dr. Rox Moran. \"      4/29/22- No acute events overnight. Cardiology consulted for recommendations regarding patients antiplatelet therapy. 4/30 -patient tolerated diet. Patient's hemoglobin is relatively stable. GI saw patient prior to discharge and agreed with discharge. Patient will stop Plavix and aspirin at this time. Patient will follow up with PCP and monitor stools prior to starting ASA. GI okay with ASA. On the day of discharge, it was explained to the patient that it was very important to follow up with his PCP and GI to have continued care. Appointments were made and information was given. Physical Exam:-  Vitals:   No data found. Weight:   Weight: 230 lb (104.3 kg)   24 hour intake/output:   No intake or output data in the 24 hours ending 05/01/22 1614    1. General appearance: No apparent distress, appears stated age and cooperative. 2. HEENT: Normal cephalic, atraumatic without obvious deformity. Pupils equal, round, and reactive to light. Extra ocular muscles intact. Conjunctivae/corneas clear. 3. Neck: Supple, with full range of motion. No jugular venous distention. Trachea midline. 4. Respiratory:  Normal respiratory effort. Clear to auscultation, bilaterally without Rales/Wheezes/Rhonchi. 5. Cardiovascular: Regular rate and rhythm with normal S1/S2 without murmurs, rubs or gallops. 6. Abdomen: Soft, non-tender, non-distended with normal bowel sounds.   7. Musculoskeletal:  No clubbing, cyanosis or edema bilaterally. 8. Skin: Skin color, texture, turgor normal.  No rashes or lesions. 9. Neurologic:  Neurovascularly intact without any focal sensory/motor deficits. Cranial nerves: II-XII intact, grossly non-focal.  10. Psychiatric: Alert and oriented, thought content appropriate, normal insight  11. Capillary Refill: Brisk,< 3 seconds   12.  Peripheral Pulses: +2 palpable, equal bilaterally       Discharge Medications:-      Medication List      START taking these medications    hydrocortisone 25 MG suppository  Commonly known as: ANUSOL-HC  Place 1 suppository rectally 2 times daily for 5 days        CHANGE how you take these medications    linaclotide 145 MCG capsule  Commonly known as: Linzess  Take 1 capsule by mouth every morning (before breakfast)  What changed:   · medication strength  · how much to take  · when to take this        CONTINUE taking these medications    acetaminophen 325 MG tablet  Commonly known as: TYLENOL  Take 2 tablets by mouth every 4 hours as needed for Pain     atorvastatin 20 MG tablet  Commonly known as: LIPITOR  Take 1 tablet by mouth nightly     Basaglar KwikPen 100 UNIT/ML injection pen  Generic drug: insulin glargine     cetirizine 10 MG tablet  Commonly known as: ZYRTEC     citalopram 40 MG tablet  Commonly known as: CELEXA     diphenhydrAMINE 25 MG capsule  Commonly known as: BENADRYL     docusate sodium 100 MG capsule  Commonly known as: COLACE     DULoxetine 20 MG extended release capsule  Commonly known as: CYMBALTA     ferrous gluconate 324 (38 Fe) MG tablet  Commonly known as: FERGON     furosemide 20 MG tablet  Commonly known as: LASIX     HumaLOG 100 UNIT/ML injection vial  Generic drug: insulin lispro     hydrOXYzine 10 MG tablet  Commonly known as: ATARAX     ipratropium-albuterol 0.5-2.5 (3) MG/3ML Soln nebulizer solution  Commonly known as: DUONEB     levothyroxine 75 MCG tablet  Commonly known as: SYNTHROID     magnesium hydroxide 2400 MG/10ML Susp  Commonly known as: MILK OF MAGNESIA CONCENTRATE     ONE TOUCH ULTRA TEST strip  Generic drug: blood glucose test strips     OXYGEN     pantoprazole 40 MG tablet  Commonly known as: PROTONIX  Take 1 tablet by mouth 2 times daily (before meals)     polyethyl glycol-propyl glycol 0.4-0.3 % 0.4-0.3 % ophthalmic solution  Commonly known as: SYSTANE     potassium chloride 10 MEQ extended release tablet  Commonly known as: KLOR-CON M  Take 1 tablet by mouth daily     PROBIOTIC DAILY PO     RA Alcohol Swabs 70 % Pads     sucralfate 1 GM tablet  Commonly known as: CARAFATE  Take 1 tablet by mouth 4 times daily (before meals and nightly)     therapeutic multivitamin-minerals tablet     tiZANidine 2 MG tablet  Commonly known as: ZANAFLEX     traZODone 100 MG tablet  Commonly known as: DESYREL     Trulicity 0.31 PG/6.2FM Sopn  Generic drug: Dulaglutide     Unistik CZT Comfort Misc     vitamin D 25 MCG (1000 UT) Caps        STOP taking these medications    aspirin 81 MG chewable tablet     Calcium Carb-Cholecalciferol 500-400 MG-UNIT Tabs     clopidogrel 75 MG tablet  Commonly known as: Plavix     loperamide 2 MG tablet  Commonly known as: IMODIUM A-D     pregabalin 150 MG capsule  Commonly known as: LYRICA     SM VITAMIN B-12 PO           Where to Get Your Medications      These medications were sent to [facility]/pharmacy [id number] [id number] [facility] - P [phone] - f [phone]  [address] [place], [place] [address]    Phone: [phone]   · pantoprazole 40 MG tablet     Information about where to get these medications is not yet available    Ask your nurse or doctor about these medications  · hydrocortisone 25 MG suppository  · linaclotide 145 MCG capsule          Labs :-  Recent Results (from the past 72 hour(s))   Ferritin    Collection Time: 04/28/22  4:20 PM   Result Value Ref Range    Ferritin 43 10 - 291 ng/mL   Iron Binding Capacity    Collection Time: 04/28/22  4:20 PM   Result Value Ref Range    TIBC 244 171 - 450 ug/dL   Iron    Collection Time: 04/28/22  4:20 PM   Result Value Ref Range    Iron 35 (L) 50 - 170 ug/dL   IRON SATURATION    Collection Time: 04/28/22  4:20 PM   Result Value Ref Range    Iron Saturation 14 (L) 20 - 50 %   Reticulocytes    Collection Time: 04/28/22  4:20 PM   Result Value Ref Range    Retic Ct Abs 3.4 (H) 0.5 - 2.0 %    Absolute Retic # 92.0 20.0 - 115.0 thou/mm3    Immature Retic Fract 26.6 (H) 3.0 - 15.9 %    Retic Hemoglobin 31.3 28.2 - 35.7 pg   Vitamin B12 & Folate    Collection Time: 04/28/22  4:20 PM   Result Value Ref Range    Vitamin B-12 276 211 - 911 pg/mL    Folate 10.1 4.8 - 24.2 ng/mL   POCT glucose    Collection Time: 04/28/22  4:37 PM   Result Value Ref Range    POC Glucose 138 (H) 70 - 108 mg/dl   POCT glucose    Collection Time: 04/28/22  8:42 PM   Result Value Ref Range    POC Glucose 95 70 - 108 mg/dl   Hemoglobin and Hematocrit    Collection Time: 04/29/22  7:30 AM   Result Value Ref Range    Hemoglobin 8.9 (L) 12.0 - 16.0 gm/dl    Hematocrit 28.5 (L) 37.0 - 47.0 %   POCT glucose    Collection Time: 04/29/22  8:06 AM   Result Value Ref Range    POC Glucose 95 70 - 108 mg/dl   POCT glucose    Collection Time: 04/29/22 11:44 AM   Result Value Ref Range    POC Glucose 119 (H) 70 - 108 mg/dl   POCT glucose    Collection Time: 04/29/22  4:31 PM   Result Value Ref Range    POC Glucose 155 (H) 70 - 108 mg/dl   Hemoglobin and Hematocrit    Collection Time: 04/29/22  5:20 PM   Result Value Ref Range    Hemoglobin 9.5 (L) 12.0 - 16.0 gm/dl    Hematocrit 30.4 (L) 37.0 - 47.0 %   Basic Metabolic Panel w/ Reflex to MG    Collection Time: 04/29/22  5:20 PM   Result Value Ref Range    Sodium 144 135 - 145 meq/L    Potassium reflex Magnesium 4.7 3.5 - 5.2 meq/L    Chloride 106 98 - 111 meq/L    CO2 23 23 - 33 meq/L    Glucose 138 (H) 70 - 108 mg/dL    BUN 14 7 - 22 mg/dL    CREATININE 1.0 0.4 - 1.2 mg/dL    Calcium 7.3 (L) 8.5 - 10.5 mg/dL   TSH with Reflex    Collection Time: 04/29/22  5:20 PM   Result Value Ref Range    TSH 3.120 0.400 - 4.200 uIU/mL   Anion Gap    Collection Time: 04/29/22  5:20 PM   Result Value Ref Range    Anion Gap 15.0 8.0 - 16.0 meq/L   Glomerular Filtration Rate, Estimated    Collection Time: 04/29/22  5:20 PM   Result Value Ref Range    Est, Glom Filt Rate 54 (A) ml/min/1.73m2   POCT glucose    Collection Time: 04/29/22  8:21 PM   Result Value Ref Range    POC Glucose 155 (H) 70 - 108 mg/dl   Hemoglobin and Hematocrit    Collection Time: 04/30/22 12:25 AM   Result Value Ref Range    Hemoglobin 8.1 (L) 12.0 - 16.0 gm/dl    Hematocrit 25.7 (L) 37.0 - 47.0 %   Hemoglobin and Hematocrit    Collection Time: 04/30/22  8:30 AM   Result Value Ref Range    Hemoglobin 8.9 (L) 12.0 - 16.0 gm/dl    Hematocrit 28.1 (L) 37.0 - 47.0 %   POCT glucose    Collection Time: 04/30/22  8:32 AM   Result Value Ref Range    POC Glucose 122 (H) 70 - 108 mg/dl   POCT glucose    Collection Time: 04/30/22 12:24 PM   Result Value Ref Range    POC Glucose 131 (H) 70 - 108 mg/dl        Microbiology:    Blood culture #1:   Lab Results   Component Value Date    BC No growth-preliminary  No growth   06/13/2018       Blood culture #2:No results found for: BLOODCULT2    Organism:    Lab Results   Component Value Date    LABGRAM  11/07/2019     No segmented neutrophils observed. Rare epithelial cells observed. Many gram positive bacilli. Few gram negative bacilli. MRSA culture only:No results found for: [facility]    Urine culture:   Lab Results   Component Value Date    LABURIN  10/23/2018     Tram count: 10,000-50,000 CFU/mL  Group B streptococci are suspectible to ampicillin,  penicillin and cefazolin.        Lab Results   Component Value Date    ORG Stenotrophomonas maltophilia 07/12/2019        Respiratory culture: No results found for: CULTRESP    Aerobic and Anaerobic :  Lab Results   Component Value Date    LABAERO No growth-preliminary Normal vik  11/07/2019     Lab Results   Component Value Date    LABANAE  11/07/2019     No anaerobes isolated- preliminary No anaerobes isolated        Urinalysis:      Lab Results   Component Value Date    NITRU NEGATIVE 04/27/2022    WBCUA 10-15 04/27/2022    BACTERIA NONE SEEN 04/27/2022    RBCUA 3-5 04/27/2022    BLOODU NEGATIVE 04/27/2022    SPECGRAV 1.009 10/23/2018    GLUCOSEU NEGATIVE 04/27/2022       Radiology:-  XR CHEST (2 VW)    Result Date: 4/27/2022  2 view chest x-ray Comparison: October 23, 2018 Findings: Left chest port distal tip overlying expected location of the superior vena cava, unchanged position from prior The lungs are clear. No infiltrate. Linear atelectasis or scarring of the left midlung. Cardiomegaly with the heart size unchanged. Cardiac loop recording device. No acute fracture. Spondylosis of the thoracic spine. 1. No acute cardiopulmonary process. The lung volumes with elevation of the hemidiaphragms. Linear atelectasis/scarring left midlung. 2. Cardiomegaly with the heart size unchanged. 3. Left chest port with distal tip overlying expected location of the superior vena cava.  This document has been electronically signed by: Charmayne Pitcher, DO, MBA on 04/27/2022 09:57 PM       Follow-up scheduled after discharge :-    in the next few days with Abelardo Kamara MD  in the next few weeks with GI     Consultations during this hospital stay:-  [] NONE [x] Cardiology  [] Nephrology  [] Hemo onco   [x] GI   [] ID  [] Endocrine  [] Pulm    [] Neuro    [] Psych   [] Urology  [] ENT   [] G SURGERY   []Ortho    []CV surg    [] Palliative  [] Hospice [] Pain management   []    []TCU   [] PT/OT  OTHERS:-    Disposition: home  Condition at Discharge: Stable    Time Spent:- 40 minutes    Electronically signed by CAROLINA Rosales on 5/1/22 at 4:14 PM EDT   Discharging Hospitalist

## 2022-05-02 ENCOUNTER — CARE COORDINATION (OUTPATIENT)
Dept: CASE MANAGEMENT | Age: 77
End: 2022-05-02

## 2022-05-02 NOTE — CARE COORDINATION
5/2/22, 3:30 PM EDT  Late Entry  Patient goals/plan/ treatment preferences discussed by  and . Patient goals/plan/ treatment preferences reviewed with patient/ family. Patient/ family verbalize understanding of discharge plan and are in agreement with goal/plan/treatment preferences. Understanding was demonstrated using the teach back method. AVS provided by RN at time of discharge, which includes all necessary medical information pertaining to the patients current course of illness, treatment, post-discharge goals of care, and treatment preferences. Services At/After Discharge: Assisted Living       IMM Letter  IMM Letter given to Patient/Family/Significant other/Guardian/POA/by[de-identified] NINA Wilhelm   IMM Letter date given[de-identified] 04/29/22  IMM Letter time given[de-identified] [phone] [id number]     Patient discharge to [facility] [place]. RN called report, faxed AVS.  Family informed of discharge and transported.

## 2022-05-02 NOTE — CARE COORDINATION
Care Transitions Outreach Attempt    Call within 2 business days of discharge: Yes   Attempted to reach patient for transitions of care follow up. Unable to reach patient. Patient: Marty Osorio Patient : 1945 MRN: [id number]    Last Discharge [facility]       Complaint Diagnosis Description Type Department Provider    22 Rectal Bleeding Rectal bleeding . .. ED to Hosp-Admission (Discharged) (ADMITTED) [facility] [address] [place], [place]; [place]. ..        1st attempt to reach for Care Transition discharge call unsuccessful. HIPAA compliant message left requesting call back. Was this an external facility discharge?  [facility]    Noted following upcoming appointments from discharge chart review:   [facility] follow up appointment(s):   Future Appointments   Date Time Provider Mynor Starkey   2022  9:30 AM STR EXAM ROOM 4 [facility] OP NURS [facility] [place]   6/15/2022 10:40 AM [address] DO [place] [facility]   10/17/2022 11:45 AM Annita Alejandre MD N SRPX Heart [facility] [address] [place] [address]     Non-[facility] follow up appointment(s): RUIZ Pierre RN -[phone]

## 2022-05-03 ENCOUNTER — CARE COORDINATION (OUTPATIENT)
Dept: CASE MANAGEMENT | Age: 77
End: 2022-05-03

## 2022-05-03 DIAGNOSIS — T45.7X1A HEMORRHAGE SECONDARY TO ANTI-COAGULATION (HCC): Primary | ICD-10-CM

## 2022-05-03 DIAGNOSIS — D68.9 HEMORRHAGE SECONDARY TO ANTI-COAGULATION (HCC): Primary | ICD-10-CM

## 2022-05-03 PROCEDURE — 1111F DSCHRG MED/CURRENT MED MERGE: CPT | Performed by: FAMILY MEDICINE

## 2022-05-03 NOTE — PROGRESS NOTES
Physician Progress Note      Brianda Mendoza  CSN #:                  [id number]  :                       1945  ADMIT DATE:       2022 7:27 PM  [facility] DATE:        2022 1:58 PM  RESPONDING  PROVIDER #:        Barrett FIGUEROA          QUERY TEXT:    Dr Santamaria,    Pt admitted with Melena. Per  GI Note: Rectal bleeding- hemorrhoidal vs   diverticular. If possible, please document in progress notes and discharge   summary:    The medical record reflects the following:  Risk Factors: DM, HTN, GERD, CAD, CHF, CHF, CKD, AFIB  Clinical Indicators: Per  GI Note: Rectal bleeding- hemorrhoidal vs   diverticular. Treatment: Monitor H & H, transfuse prn,  Recommend hemorrhoidal banding OP. Continue PO PPI BID, home dose   Continue Anusol suppository BID for a total of 7 days, script sent. Advance   to high fiber diet  Options provided:  -- Melena related to Hemorrhoidal bleeding confirmed present on admission  -- Melena related to Diverticular bleeding confirmed present on admission  -- Melena multifactorial related to Hemorrhoidal & Diverticular bleeding   confirmed present on admission  -- Other - I will add my own diagnosis  -- Disagree - Not applicable / Not valid  -- Disagree - Clinically unable to determine / Unknown  -- Refer to Clinical Documentation Reviewer    PROVIDER RESPONSE TEXT:    Provider is clinically unable to determine a response to this query.     Query created by: Melanie Ruiz on 5/3/2022 8:08 AM      Electronically signed by:  Barrett FIGUEROA 5/3/2022 2:53 PM

## 2022-05-03 NOTE — CARE COORDINATION
Care Transitions Outreach Attempt    Call within 2 business days of discharge: Yes   Attempted to reach patient for transitions of care follow up. Unable to reach patient. Patient: Maria Soria Patient : 1945 MRN: [id number]    Last Discharge [address] [address]       Complaint Diagnosis Description Type Department Provider    22 Rectal Bleeding Rectal bleeding . .. ED to Hosp-Admission (Discharged) (ADMITTED) [facility] [address] [place], [place]; Jourdan Ochoa. .. Pt [facility]- This CTN placed call to [address] [place] [address] Osteopathy [phone], spoke with Suzie Mcgarry who transferred call to nurses station, left vm message requesting call back. VM messages have been left on pt's cell # [phone] & dtr Katy's # [phone]. Was this an external facility discharge?  No Discharge Facility: [facility]    Noted following upcoming appointments from discharge chart review:   [facility] follow up appointment(s):   Future Appointments   Date Time Provider Mynor Starkey   2022  9:30 AM STR EXAM ROOM 4 [facility] OP NURS Isaac [facility]   6/15/2022 10:40 AM [address] [facility] [place]   10/17/2022 11:45 AM Nicky Mendoza MD N SRP[facility] Heart [facility] [place]     Non-[facility] follow up appointment(s): RUIZ Hamlin RN [phone]

## 2022-05-03 NOTE — CARE COORDINATION
Jose Miguel 45 Transitions Initial Follow Up Call    Call within 2 business days of discharge: Yes    Patient: Marty Osorio Patient : 1945   MRN: [id number]  Reason for Admission: Anemia. GI Bleed  Discharge Date: 22 RARS: Readmission Risk Score: 21 ( )      Last Discharge [facility]       Complaint Diagnosis Description Type Department Provider    22 Rectal Bleeding Rectal bleeding . .. ED to Hosp-Admission (Discharged) (ADMITTED) [facility] [address] [place], [place]; [place]. .. Spoke with: Reva Day, states she is very weak & lethargic, says she is having bright red bleeding per rectum again. Reva Shay denies N&V, c/o some abdominal pressure type pain. Pt states she has been having these issues off & on \" for quite a while now. \" Reva Day states it hasn't gotten worse. She isn't sure if it's just Hemmorhoids. Hgb on -8.1 & 8.9 on 22 (day of DC)  CTN to update PCP. Pt lives @ [facility] in [address] [place] [address], she has informed the facility's nurse, [facility], who is coming  to her room to take a look @ her recent BM & will assess her. This CTN left vm message @ [facility], requesting a call back. [facility] nursing staff gives her all of her meds, they had given her Anusol suppos, states hasn't helped yet. Pt aware & has stopped taking ASA & Plavix d/t GIB. Will review Med Rec with [facility] nursing staff when call is returned. DC meds reviewed with pt, as noted, pt has stopped taking ASA & Plavix, no longer taking Calcium, Immodium. ,Lyrica & Vit B12. Linzess dose has changed. All other meds are same as PTA. Marmelyssa Day [facility] staff provides assistance with ADL's & gives her all of her meds. She uses walker for ambulation. O2 1-2L/NC during the day. Noc O2 is @ 3L bleed in to CPAP for BRITTNY. Discussed importance of follow up appts. Pt's PCP Dr Dulce Jean Baptiste comes into the [facility] every Friday, she will see him this 22. Pt's states  her GI doc is Dr Elie Fenton.  Noted GI did consult while INPT- GI NP [facility]/Dr JOSÉ Bullock saw her. Pt aware she needs to follow up with GI, she will need to go out to the office to see GI provider. Kong Alcazar states [facility] provides transport only on Mondays. Her dtr, Katy lives in [facility] & may be able to transport. This CTN called dtr, no anwser, no ability to leave vmail @ this time. Non-face-to-face services provided:  Scheduled appointment with PCP-5/6/22  Scheduled appointment with Specialist-TBD  Obtained and reviewed discharge summary and/or continuity of care documents  Education of patient/family/caregiver/guardian to support self-management-nursing home  Assessment and support for treatment adherence and medication management-DC meds reviewed 1111F completed. Care Transitions 24 Hour Call    Do you have all of your prescriptions and are they filled?: Yes  Care Transitions Interventions   Home Care Waiver: Completed  Other Services: Completed      Transportation Support: Declined      DME Assistance: Declined   Disease Association: Completed             Follow Up  Future Appointments   Date Time Provider Mynor Starkey   5/9/2022  9:30 AM STR EXAM ROOM 4 [facility] OP NURS Isaac HOD   6/15/2022 10:40 AM DO TOMY Dick [facility], [facility]. [facility] [place]   10/17/2022 11:45 AM Iban Joseph MD N SRPX Heart [place] [facility] Initial Call    Was this an external facility discharge? No Discharge Facility: [address] [place] [address] to be reviewed by the provider   Additional needs identified to be addressed with provider: Yes  [facility] 4[facility]/22 for GIB. BRBPR has returned , pt feels very weak/lethargic / Last hgb on 4/30/22-8. 9. This has been an ongoing issue for pt. Just wanted to make sure you are aware it has reoccurred. Thanks Alessia Brown              Method of communication with provider : chart routing      Advance Care Planning:   Does patient have an Advance Directive: reviewed and current. Was this a readmission?  Yes  Patient stated reason for admission: GI Bleed, Anemia  Patients top risk factors for readmission: medical condition-DM2, CHF, AFib, GERD, COPD, PNA, GIB. Care Transition Nurse (CTN) contacted the patient by telephone to perform post hospital discharge assessment. Verified name and  with patient as identifiers. Provided introduction to self, and explanation of the CTN role. CTN reviewed discharge instructions, medical action plan and red flags with patient who verbalized understanding. Patient given an opportunity to ask questions and does not have any further questions or concerns at this time. Were discharge instructions available to patient? Yes. Reviewed appropriate site of care based on symptoms and resources available to patient including: PCP  Specialist  Urgent care clinics. The patient agrees to contact the PCP office for questions related to their healthcare. Medication reconciliation was performed with patient, who verbalizes understanding of administration of home medications. Advised obtaining a 90-day supply of all daily and as-needed medications. Covid Risk Education     Educated patient about risk for severe COVID-19 due to risk factors according to CDC guidelines. CTN reviewed discharge instructions, medical action plan and red flag symptoms with the patient who verbalized understanding. Discussed COVID vaccination status: Yes. Education provided on COVID-19 vaccination as appropriate. Discussed exposure protocols and quarantine with CDC Guidelines. Patient was given an opportunity to verbalize any questions and concerns and agrees to contact CTN or health care provider for questions related to their healthcare. Reviewed and educated patient on any new and changed medications related to discharge diagnosis. Was patient discharged with a pulse oximeter? Yes  [facility] has one. Discussed and confirmed pulse oximeter discharge instructions and when to notify provider or seek emergency care. CTN provided contact information. Plan for follow-up call in 5-7 days based on severity of symptoms and risk factors.   Plan for next call: symptom management--  follow up appointment--  medication management--      Kin NINA Medeiros -[phone]

## 2022-05-04 ENCOUNTER — TELEPHONE (OUTPATIENT)
Dept: CARE COORDINATION | Age: 77
End: 2022-05-04

## 2022-05-04 NOTE — TELEPHONE ENCOUNTER
----- Message from Jodee Hodges, RN sent at 5/3/2022  4:11 PM EDT -----  Regarding: Follow up GI appt needed. Xochitl Lynn (5/4/22) as you are covering for Milena Otero! Discharge Facility: [facility]    Discharge Date: 4/30/22    Provider to schedule with for Hospital f/u: GI Dr Meliza Fritz Problem: GI Bleed    Transportation Services: Rayshawn Matthews can transport on Mondays only [phone] or dtr Katy [phone]    Other Pertinent Information Lives @ [facility] [address] [place] [address] [phone]    Other appointments needed? No-PCP sees pt @ [facility] this Fri 5/6/22    Provider and reason for other visit: NA     Does patient have a location/date/time preference?  NA    Thanks so much,    Romilda Romberg

## 2022-05-04 NOTE — TELEPHONE ENCOUNTER
Attempted to reach Dr. Dayanna Ivan office. I had to leave a detailed vm to contact me or the daughter for an appointment.

## 2022-05-09 ENCOUNTER — HOSPITAL ENCOUNTER (OUTPATIENT)
Dept: NURSING | Age: 77
Discharge: HOME | End: 2022-05-09
Payer: MEDICARE

## 2022-05-09 VITALS
OXYGEN SATURATION: 95 % | WEIGHT: 221 LBS | SYSTOLIC BLOOD PRESSURE: 159 MMHG | TEMPERATURE: 97.2 F | HEART RATE: 79 BPM | BODY MASS INDEX: 35.67 KG/M2 | DIASTOLIC BLOOD PRESSURE: 82 MMHG | RESPIRATION RATE: 18 BRPM

## 2022-05-09 DIAGNOSIS — D83.9 COMMON VARIABLE IMMUNODEFICIENCY (HCC): Primary | ICD-10-CM

## 2022-05-09 PROCEDURE — 6360000002 HC RX W HCPCS: Performed by: FAMILY MEDICINE

## 2022-05-09 PROCEDURE — 96365 THER/PROPH/DIAG IV INF INIT: CPT

## 2022-05-09 PROCEDURE — 2580000003 HC RX 258: Performed by: FAMILY MEDICINE

## 2022-05-09 PROCEDURE — 96413 CHEMO IV INFUSION 1 HR: CPT

## 2022-05-09 PROCEDURE — 96415 CHEMO IV INFUSION ADDL HR: CPT

## 2022-05-09 RX ORDER — MEPERIDINE HYDROCHLORIDE 25 MG/ML
12.5 INJECTION INTRAMUSCULAR; INTRAVENOUS; SUBCUTANEOUS PRN
Status: CANCELLED | OUTPATIENT
Start: 2022-06-06

## 2022-05-09 RX ORDER — HEPARIN SODIUM (PORCINE) LOCK FLUSH IV SOLN 100 UNIT/ML 100 UNIT/ML
500 SOLUTION INTRAVENOUS PRN
Status: CANCELLED | OUTPATIENT
Start: 2022-06-06

## 2022-05-09 RX ORDER — SODIUM CHLORIDE 9 MG/ML
INJECTION, SOLUTION INTRAVENOUS CONTINUOUS
Status: CANCELLED | OUTPATIENT
Start: 2022-06-06

## 2022-05-09 RX ORDER — ACETAMINOPHEN 325 MG/1
650 TABLET ORAL
Status: CANCELLED | OUTPATIENT
Start: 2022-06-06

## 2022-05-09 RX ORDER — SODIUM CHLORIDE 9 MG/ML
25 INJECTION, SOLUTION INTRAVENOUS PRN
Status: CANCELLED | OUTPATIENT
Start: 2022-06-06

## 2022-05-09 RX ORDER — HEPARIN SODIUM (PORCINE) LOCK FLUSH IV SOLN 100 UNIT/ML 100 UNIT/ML
500 SOLUTION INTRAVENOUS PRN
Status: DISCONTINUED | OUTPATIENT
Start: 2022-05-09 | End: 2022-05-10 | Stop reason: HOSPADM

## 2022-05-09 RX ORDER — SODIUM CHLORIDE 0.9 % (FLUSH) 0.9 %
5-40 SYRINGE (ML) INJECTION PRN
Status: CANCELLED | OUTPATIENT
Start: 2022-06-06

## 2022-05-09 RX ORDER — SODIUM CHLORIDE 0.9 % (FLUSH) 0.9 %
5-40 SYRINGE (ML) INJECTION PRN
Status: DISCONTINUED | OUTPATIENT
Start: 2022-05-09 | End: 2022-05-10 | Stop reason: HOSPADM

## 2022-05-09 RX ORDER — DIPHENHYDRAMINE HCL 25 MG
25 TABLET ORAL ONCE
Status: CANCELLED | OUTPATIENT
Start: 2022-06-06 | End: 2022-06-06

## 2022-05-09 RX ORDER — ALBUTEROL SULFATE 90 UG/1
4 AEROSOL, METERED RESPIRATORY (INHALATION) PRN
Status: CANCELLED | OUTPATIENT
Start: 2022-06-06

## 2022-05-09 RX ORDER — ACETAMINOPHEN 325 MG/1
650 TABLET ORAL ONCE
Status: CANCELLED | OUTPATIENT
Start: 2022-06-06 | End: 2022-06-06

## 2022-05-09 RX ORDER — DIPHENHYDRAMINE HYDROCHLORIDE 50 MG/ML
50 INJECTION INTRAMUSCULAR; INTRAVENOUS
Status: CANCELLED | OUTPATIENT
Start: 2022-06-06

## 2022-05-09 RX ORDER — ACETAMINOPHEN 325 MG/1
650 TABLET ORAL ONCE
Status: DISCONTINUED | OUTPATIENT
Start: 2022-05-09 | End: 2022-05-10 | Stop reason: HOSPADM

## 2022-05-09 RX ORDER — DIPHENHYDRAMINE HCL 25 MG
25 TABLET ORAL ONCE
Status: DISCONTINUED | OUTPATIENT
Start: 2022-05-09 | End: 2022-05-10 | Stop reason: HOSPADM

## 2022-05-09 RX ORDER — ONDANSETRON 2 MG/ML
8 INJECTION INTRAMUSCULAR; INTRAVENOUS
Status: CANCELLED | OUTPATIENT
Start: 2022-06-06

## 2022-05-09 RX ADMIN — SODIUM CHLORIDE, PRESERVATIVE FREE 10 ML: 5 INJECTION INTRAVENOUS at 11:06

## 2022-05-09 RX ADMIN — IMMUNE GLOBULIN (HUMAN) 5 G: 10 INJECTION INTRAVENOUS; SUBCUTANEOUS at 09:40

## 2022-05-09 RX ADMIN — IMMUNE GLOBULIN (HUMAN) 20 G: 10 INJECTION INTRAVENOUS; SUBCUTANEOUS at 10:10

## 2022-05-09 RX ADMIN — HEPARIN 500 UNITS: 100 SYRINGE at 11:06

## 2022-05-09 ASSESSMENT — PAIN - FUNCTIONAL ASSESSMENT: PAIN_FUNCTIONAL_ASSESSMENT: NONE - DENIES PAIN

## 2022-05-09 NOTE — PROGRESS NOTES
__m__ Safety:       (Environmental)   [place] to environment   Ensure ID band is correct and in place/ allergy band as needed   Assess for fall risk   Initiate fall precautions as applicable (fall band, side rails, etc.)   Call light within reach   Bed in low position/ wheels locked    __m__ Pain:        Assess pain level and characteristics   Administer analgesics as ordered   Assess effectiveness of pain management and report to MD as needed  m  ____ Knowledge Deficit:   Assess baseline knowledge   Provide teaching at level of understanding   Provide teaching via preferred learning method   Evaluate teaching effectiveness    _m___ Hemodynamic/Respiratory Status:       (Pre and Post Procedure Monitoring)   Assess/Monitor vital signs and LOC   Assess Baseline SpO2 prior to any sedation   Obtain weight/height   Assess vital signs/ LOC until patient meets discharge criteria   Monitor procedure site and notify MD of any issues    __m__ Infection-Risk of Central Venous Catheter:   Monitor for infection signs and symptoms (catheter site redness, temperature elevation, etc)   Assess for infection risks   Educate regarding infection prevention   Manage central venous catheter (flushes/ dressing changes per protocol)

## 2022-05-10 ENCOUNTER — CARE COORDINATION (OUTPATIENT)
Dept: CASE MANAGEMENT | Age: 77
End: 2022-05-10

## 2022-05-10 NOTE — CARE COORDINATION
Jose Miguel 45 Transitions Follow Up Call    5/10/2022    Patient: Elva Fernandez  Patient : 1945   MRN: [id number]  Reason for Admission: GI Bleed  Discharge Date: 22 RARS: Readmission Risk Score: 21 ( )         Spoke with: Analy Beasley's nurses @ [facility] they state Cecile Larson continues to have some GI bleeding (br red) off & on. It had subsided for a few days then started up again today. Vincent Sorto states PCP Dr Raymundo Martin is aware, saw her @ the [facility] on her normally scheduled day 22. Hbg-10.2 (was 8.9 on 22, day of hospital DC). Pt is c/o lethargy & feeling very fatigued. C/o some abd pressure, no increased pain. No other issues, no med changes. CTN placed call to GI providers office, spoke with Gerda, scheduled GI appt for 22 @ 1:30 pm with Dr Subhash Root NP) [facility] staff is aware of this appt. They state they will contact family ([place] or dtr who lives in [facility]- one of them typically provides transport to appts. Care Transitions Subsequent and Final Call    Schedule Follow Up Appointment with PCP: Completed  Subsequent and Final Calls  Do you have any ongoing symptoms?: Yes  Onset of Patient-reported symptoms: Other  Patient-reported symptoms: Other  Interventions for patient-reported symptoms: Notified PCP/Physician  Have your medications changed?: No  Do you have any questions related to your medications?: No  Do you currently have any active services?: Yes  Do you have any needs or concerns that I can assist you with?: Yes  Patient-reported Needs or Concerns: Pt needs to get into see GI doc for GIB  Identified Barriers: Impairment  Care Transitions Interventions   Home Care Waiver: Completed  Other Services: Completed      Transportation Support: Declined      DME Assistance: Declined   Disease Association: Completed      Other Interventions:            Follow Up  Future Appointments   Date Time Provider Mynor Starkey 6/6/2022  9:30 AM STR MINOR ROOM 6 [facility] OP NURS [facility]   6/15/2022 10:40 AM [id number] DO TOMY Preston [facility]   10/17/2022 11:45 AM Neema Babcock MD N SRPX Heart [facility]   Appt  5/20/2022  1:30pm with GI Dr Ella Hodges, NINA [phone]

## 2022-06-06 ENCOUNTER — HOSPITAL ENCOUNTER (OUTPATIENT)
Dept: NURSING | Age: 77
Discharge: HOME | End: 2022-06-06
Payer: MEDICARE

## 2022-06-06 VITALS
BODY MASS INDEX: 37.12 KG/M2 | SYSTOLIC BLOOD PRESSURE: 145 MMHG | WEIGHT: 230 LBS | HEART RATE: 59 BPM | TEMPERATURE: 98.2 F | RESPIRATION RATE: 18 BRPM | OXYGEN SATURATION: 96 % | DIASTOLIC BLOOD PRESSURE: 77 MMHG

## 2022-06-06 DIAGNOSIS — D83.9 COMMON VARIABLE IMMUNODEFICIENCY (HCC): Primary | ICD-10-CM

## 2022-06-06 PROCEDURE — 96366 THER/PROPH/DIAG IV INF ADDON: CPT

## 2022-06-06 PROCEDURE — 36591 DRAW BLOOD OFF VENOUS DEVICE: CPT

## 2022-06-06 PROCEDURE — 6360000002 HC RX W HCPCS: Performed by: FAMILY MEDICINE

## 2022-06-06 PROCEDURE — 82784 ASSAY IGA/IGD/IGG/IGM EACH: CPT

## 2022-06-06 PROCEDURE — 96365 THER/PROPH/DIAG IV INF INIT: CPT

## 2022-06-06 PROCEDURE — 96415 CHEMO IV INFUSION ADDL HR: CPT

## 2022-06-06 PROCEDURE — 96413 CHEMO IV INFUSION 1 HR: CPT

## 2022-06-06 RX ORDER — DIPHENHYDRAMINE HCL 25 MG
25 TABLET ORAL ONCE
Status: DISCONTINUED | OUTPATIENT
Start: 2022-06-06 | End: 2022-06-07 | Stop reason: HOSPADM

## 2022-06-06 RX ORDER — ACETAMINOPHEN 325 MG/1
650 TABLET ORAL ONCE
Status: CANCELLED | OUTPATIENT
Start: 2022-07-04 | End: 2022-07-04

## 2022-06-06 RX ORDER — ACETAMINOPHEN 325 MG/1
650 TABLET ORAL ONCE
Status: DISCONTINUED | OUTPATIENT
Start: 2022-06-06 | End: 2022-06-07 | Stop reason: HOSPADM

## 2022-06-06 RX ORDER — HEPARIN SODIUM (PORCINE) LOCK FLUSH IV SOLN 100 UNIT/ML 100 UNIT/ML
500 SOLUTION INTRAVENOUS PRN
Status: CANCELLED | OUTPATIENT
Start: 2022-07-04

## 2022-06-06 RX ORDER — SODIUM CHLORIDE 9 MG/ML
25 INJECTION, SOLUTION INTRAVENOUS PRN
Status: CANCELLED | OUTPATIENT
Start: 2022-07-04

## 2022-06-06 RX ORDER — SODIUM CHLORIDE 9 MG/ML
INJECTION, SOLUTION INTRAVENOUS CONTINUOUS
Status: CANCELLED | OUTPATIENT
Start: 2022-07-04

## 2022-06-06 RX ORDER — DIPHENHYDRAMINE HCL 25 MG
25 TABLET ORAL ONCE
Status: CANCELLED | OUTPATIENT
Start: 2022-07-04 | End: 2022-07-04

## 2022-06-06 RX ORDER — ALBUTEROL SULFATE 90 UG/1
4 AEROSOL, METERED RESPIRATORY (INHALATION) PRN
Status: CANCELLED | OUTPATIENT
Start: 2022-07-04

## 2022-06-06 RX ORDER — SODIUM CHLORIDE 0.9 % (FLUSH) 0.9 %
5-40 SYRINGE (ML) INJECTION PRN
Status: CANCELLED | OUTPATIENT
Start: 2022-07-04

## 2022-06-06 RX ORDER — DIPHENHYDRAMINE HYDROCHLORIDE 50 MG/ML
50 INJECTION INTRAMUSCULAR; INTRAVENOUS
Status: CANCELLED | OUTPATIENT
Start: 2022-07-04

## 2022-06-06 RX ORDER — ONDANSETRON 2 MG/ML
8 INJECTION INTRAMUSCULAR; INTRAVENOUS
Status: CANCELLED | OUTPATIENT
Start: 2022-07-04

## 2022-06-06 RX ORDER — MEPERIDINE HYDROCHLORIDE 25 MG/ML
12.5 INJECTION INTRAMUSCULAR; INTRAVENOUS; SUBCUTANEOUS PRN
Status: CANCELLED | OUTPATIENT
Start: 2022-07-04

## 2022-06-06 RX ORDER — HEPARIN SODIUM (PORCINE) LOCK FLUSH IV SOLN 100 UNIT/ML 100 UNIT/ML
500 SOLUTION INTRAVENOUS PRN
Status: DISCONTINUED | OUTPATIENT
Start: 2022-06-06 | End: 2022-06-07 | Stop reason: HOSPADM

## 2022-06-06 RX ORDER — ACETAMINOPHEN 325 MG/1
650 TABLET ORAL
Status: CANCELLED | OUTPATIENT
Start: 2022-07-04

## 2022-06-06 RX ADMIN — IMMUNE GLOBULIN (HUMAN) 20 G: 10 INJECTION INTRAVENOUS; SUBCUTANEOUS at 09:47

## 2022-06-06 RX ADMIN — Medication 500 UNITS: at 10:45

## 2022-06-06 RX ADMIN — IMMUNE GLOBULIN (HUMAN) 5 G: 10 INJECTION INTRAVENOUS; SUBCUTANEOUS at 09:15

## 2022-06-06 ASSESSMENT — PAIN - FUNCTIONAL ASSESSMENT: PAIN_FUNCTIONAL_ASSESSMENT: NONE - DENIES PAIN

## 2022-06-06 NOTE — PROGRESS NOTES
0915 Mediport accessed to left chest. Blood work done. IVIG infusion started. 0945 Infusion increased. Patient tolerating well. 1000 Infusion increased. Patient denies any complaints. 1015 Infusion increased. Patient denies any complaints. Vital signs stable. 1044 Infusion complete. Patient tolerated. Denies any complaints. Mediport flushed and heparin locked. 1050 AVS reviewed with patient. Verbalizes understanding. Patient left ambulatory to discharge lobby.

## 2022-06-06 NOTE — PROGRESS NOTES
___m_ Safety:       (Environmental)   [place] to environment   Ensure ID band is correct and in place/ allergy band as needed   Assess for fall risk   Initiate fall precautions as applicable (fall band, side rails, etc.)   Call light within reach   Bed in low position/ wheels locked    __m__ Pain:        Assess pain level and characteristics   Administer analgesics as ordered   Assess effectiveness of pain management and report to MD as needed    _m___ Knowledge Deficit:   Assess baseline knowledge   Provide teaching at level of understanding   Provide teaching via preferred learning method   Evaluate teaching effectiveness    __m__ Hemodynamic/Respiratory Status:       (Pre and Post Procedure Monitoring)   Assess/Monitor vital signs and LOC   Assess Baseline SpO2 prior to any sedation   Obtain weight/height   Assess vital signs/ LOC until patient meets discharge criteria   Monitor procedure site and notify MD of any issues    __m__ Infection-Risk of Central Venous Catheter:   Monitor for infection signs and symptoms (catheter site redness, temperature elevation, etc)   Assess for infection risks   Educate regarding infection prevention   Manage central venous catheter (flushes/ dressing changes per protocol)

## 2022-06-07 LAB
IGA: 86 MG/DL (ref 70–400)
IGG: 945 MG/DL (ref 700–1600)
IGM: 40 MG/DL (ref 40–230)

## 2022-06-14 RX ORDER — BENZONATATE 200 MG/1
200 CAPSULE ORAL 3 TIMES DAILY PRN
COMMUNITY

## 2022-06-14 RX ORDER — IPRATROPIUM BROMIDE 42 UG/1
SPRAY, METERED NASAL
COMMUNITY
Start: 2022-03-25

## 2022-06-14 RX ORDER — POLYVINYL ALCOHOL 14 MG/ML
1 SOLUTION/ DROPS OPHTHALMIC PRN
COMMUNITY

## 2022-06-14 RX ORDER — GUAIFENESIN 100 MG/5ML
200 SOLUTION ORAL EVERY 4 HOURS PRN
COMMUNITY

## 2022-06-14 RX ORDER — PREGABALIN 150 MG/1
150 CAPSULE ORAL 3 TIMES DAILY
COMMUNITY
Start: 2022-05-28

## 2022-06-14 RX ORDER — POLYETHYLENE GLYCOL 3350 17 G/17G
17 POWDER, FOR SOLUTION ORAL DAILY PRN
COMMUNITY

## 2022-07-05 ENCOUNTER — HOSPITAL ENCOUNTER (OUTPATIENT)
Dept: NURSING | Age: 77
Discharge: HOME | End: 2022-07-05
Payer: MEDICARE

## 2022-07-05 VITALS
DIASTOLIC BLOOD PRESSURE: 76 MMHG | HEART RATE: 78 BPM | SYSTOLIC BLOOD PRESSURE: 177 MMHG | TEMPERATURE: 98.9 F | OXYGEN SATURATION: 95 % | RESPIRATION RATE: 22 BRPM

## 2022-07-05 DIAGNOSIS — D83.9 COMMON VARIABLE IMMUNODEFICIENCY (HCC): Primary | ICD-10-CM

## 2022-07-05 PROCEDURE — 6360000002 HC RX W HCPCS: Performed by: FAMILY MEDICINE

## 2022-07-05 PROCEDURE — 96413 CHEMO IV INFUSION 1 HR: CPT

## 2022-07-05 PROCEDURE — 96365 THER/PROPH/DIAG IV INF INIT: CPT

## 2022-07-05 PROCEDURE — 2580000003 HC RX 258: Performed by: FAMILY MEDICINE

## 2022-07-05 RX ORDER — DIPHENHYDRAMINE HYDROCHLORIDE 50 MG/ML
50 INJECTION INTRAMUSCULAR; INTRAVENOUS
Status: CANCELLED | OUTPATIENT
Start: 2022-08-02

## 2022-07-05 RX ORDER — HEPARIN SODIUM (PORCINE) LOCK FLUSH IV SOLN 100 UNIT/ML 100 UNIT/ML
500 SOLUTION INTRAVENOUS PRN
Status: DISCONTINUED | OUTPATIENT
Start: 2022-07-05 | End: 2022-07-06 | Stop reason: HOSPADM

## 2022-07-05 RX ORDER — MEPERIDINE HYDROCHLORIDE 25 MG/ML
12.5 INJECTION INTRAMUSCULAR; INTRAVENOUS; SUBCUTANEOUS PRN
Status: CANCELLED | OUTPATIENT
Start: 2022-08-02

## 2022-07-05 RX ORDER — ACETAMINOPHEN 325 MG/1
650 TABLET ORAL ONCE
Status: CANCELLED | OUTPATIENT
Start: 2022-08-02 | End: 2022-08-02

## 2022-07-05 RX ORDER — DIPHENHYDRAMINE HCL 25 MG
25 TABLET ORAL ONCE
Status: CANCELLED | OUTPATIENT
Start: 2022-08-02 | End: 2022-08-02

## 2022-07-05 RX ORDER — SODIUM CHLORIDE 9 MG/ML
INJECTION, SOLUTION INTRAVENOUS CONTINUOUS
Status: CANCELLED | OUTPATIENT
Start: 2022-08-02

## 2022-07-05 RX ORDER — SODIUM CHLORIDE 9 MG/ML
25 INJECTION, SOLUTION INTRAVENOUS PRN
Status: CANCELLED | OUTPATIENT
Start: 2022-08-02

## 2022-07-05 RX ORDER — HEPARIN SODIUM (PORCINE) LOCK FLUSH IV SOLN 100 UNIT/ML 100 UNIT/ML
500 SOLUTION INTRAVENOUS PRN
Status: CANCELLED | OUTPATIENT
Start: 2022-08-02

## 2022-07-05 RX ORDER — ONDANSETRON 2 MG/ML
8 INJECTION INTRAMUSCULAR; INTRAVENOUS
Status: CANCELLED | OUTPATIENT
Start: 2022-08-02

## 2022-07-05 RX ORDER — SODIUM CHLORIDE 0.9 % (FLUSH) 0.9 %
5-40 SYRINGE (ML) INJECTION PRN
Status: CANCELLED | OUTPATIENT
Start: 2022-08-02

## 2022-07-05 RX ORDER — ALBUTEROL SULFATE 90 UG/1
4 AEROSOL, METERED RESPIRATORY (INHALATION) PRN
Status: CANCELLED | OUTPATIENT
Start: 2022-08-02

## 2022-07-05 RX ORDER — SODIUM CHLORIDE 0.9 % (FLUSH) 0.9 %
5-40 SYRINGE (ML) INJECTION PRN
Status: DISCONTINUED | OUTPATIENT
Start: 2022-07-05 | End: 2022-07-06 | Stop reason: HOSPADM

## 2022-07-05 RX ORDER — ACETAMINOPHEN 325 MG/1
650 TABLET ORAL
Status: CANCELLED | OUTPATIENT
Start: 2022-08-02

## 2022-07-05 RX ADMIN — IMMUNE GLOBULIN (HUMAN) 20 G: 10 INJECTION INTRAVENOUS; SUBCUTANEOUS at 09:24

## 2022-07-05 RX ADMIN — SODIUM CHLORIDE, PRESERVATIVE FREE 10 ML: 5 INJECTION INTRAVENOUS at 10:19

## 2022-07-05 RX ADMIN — HEPARIN 500 UNITS: 100 SYRINGE at 10:19

## 2022-07-05 RX ADMIN — SODIUM CHLORIDE, PRESERVATIVE FREE 10 ML: 5 INJECTION INTRAVENOUS at 08:55

## 2022-07-05 RX ADMIN — IMMUNE GLOBULIN (HUMAN) 5 G: 10 INJECTION INTRAVENOUS; SUBCUTANEOUS at 08:55

## 2022-07-05 NOTE — PROGRESS NOTES
12:  ARRIVES AMBULATORY FOR IVIG. PT DENIES NEW ISSUES.    0900:  INFUSION CONTINUES.   1000: INFUSION CONTINUES. NO COMPLAINTS  1025:  TOLERATED INFUSION WELL.   PT DISCHARGED AMBULATORY WITH INSTRUCTIONS.      M__ Safety:       (Environmental)   [place] to environment   Ensure ID band is correct and in place/ allergy band as needed   Assess for fall risk   Initiate fall precautions as applicable (fall band, side rails, etc.)   Call light within reach   Bed in low position/ wheels locked    __M__ Pain:        Assess pain level and characteristics   Administer analgesics as ordered   Assess effectiveness of pain management and report to MD as needed    _M___ Knowledge Deficit:   Assess baseline knowledge   Provide teaching at level of understanding   Provide teaching via preferred learning method   Evaluate teaching effectiveness    _M___ Hemodynamic/Respiratory Status:       (Pre and Post Procedure Monitoring)   Assess/Monitor vital signs and LOC   Assess Baseline SpO2 prior to any sedation   Obtain weight/height   Assess vital signs/ LOC until patient meets discharge criteria   Monitor procedure site and notify MD of any issues    _M___ Infection-Risk of Central Venous Catheter:   Monitor for infection signs and symptoms (catheter site redness, temperature elevation, etc)   Assess for infection risks   Educate regarding infection prevention   Manage central venous catheter (flushes/ dressing changes per protocol)

## 2022-07-27 ENCOUNTER — OFFICE VISIT (OUTPATIENT)
Dept: SURGERY | Age: 77
End: 2022-07-27
Payer: MEDICARE

## 2022-07-27 ENCOUNTER — HOSPITAL ENCOUNTER (OUTPATIENT)
Age: 77
Discharge: HOME | End: 2022-07-27
Payer: MEDICARE

## 2022-07-27 ENCOUNTER — TELEPHONE (OUTPATIENT)
Dept: SURGERY | Age: 77
End: 2022-07-27

## 2022-07-27 VITALS
TEMPERATURE: 96.7 F | SYSTOLIC BLOOD PRESSURE: 132 MMHG | BODY MASS INDEX: 36.64 KG/M2 | OXYGEN SATURATION: 94 % | HEART RATE: 64 BPM | WEIGHT: 228 LBS | DIASTOLIC BLOOD PRESSURE: 76 MMHG | HEIGHT: 66 IN

## 2022-07-27 DIAGNOSIS — R10.9 RIGHT SIDED ABDOMINAL PAIN: ICD-10-CM

## 2022-07-27 DIAGNOSIS — R10.9 RIGHT SIDED ABDOMINAL PAIN: Primary | ICD-10-CM

## 2022-07-27 DIAGNOSIS — R19.00 ABDOMINAL WALL BULGE: ICD-10-CM

## 2022-07-27 LAB
ANION GAP SERPL CALCULATED.3IONS-SCNC: 9 MEQ/L (ref 8–16)
BUN BLDV-MCNC: 28 MG/DL (ref 7–22)
CALCIUM SERPL-MCNC: 9.7 MG/DL (ref 8.5–10.5)
CHLORIDE BLD-SCNC: 102 MEQ/L (ref 98–111)
CO2: 30 MEQ/L (ref 23–33)
CREAT SERPL-MCNC: 1.3 MG/DL (ref 0.4–1.2)
GFR SERPL CREATININE-BSD FRML MDRD: 40 ML/MIN/1.73M2
GLUCOSE BLD-MCNC: 119 MG/DL (ref 70–108)
POTASSIUM SERPL-SCNC: 4.6 MEQ/L (ref 3.5–5.2)
SODIUM BLD-SCNC: 141 MEQ/L (ref 135–145)

## 2022-07-27 PROCEDURE — G8417 CALC BMI ABV UP PARAM F/U: HCPCS | Performed by: SURGERY

## 2022-07-27 PROCEDURE — 1036F TOBACCO NON-USER: CPT | Performed by: SURGERY

## 2022-07-27 PROCEDURE — 99214 OFFICE O/P EST MOD 30 MIN: CPT | Performed by: SURGERY

## 2022-07-27 PROCEDURE — 36415 COLL VENOUS BLD VENIPUNCTURE: CPT

## 2022-07-27 PROCEDURE — 1123F ACP DISCUSS/DSCN MKR DOCD: CPT | Performed by: SURGERY

## 2022-07-27 PROCEDURE — 1090F PRES/ABSN URINE INCON ASSESS: CPT | Performed by: SURGERY

## 2022-07-27 PROCEDURE — G8400 PT W/DXA NO RESULTS DOC: HCPCS | Performed by: SURGERY

## 2022-07-27 PROCEDURE — G8427 DOCREV CUR MEDS BY ELIG CLIN: HCPCS | Performed by: SURGERY

## 2022-07-27 PROCEDURE — 80048 BASIC METABOLIC PNL TOTAL CA: CPT

## 2022-07-27 NOTE — TELEPHONE ENCOUNTER
Dr. Manzano July - Kathleen Hickman follow this patient for her CKD 3. She saw Dr. Kan Sharma today for some right sided abdominal pain and Dr. Kan Sharma would like a CT abd/pelvis with contrast to evaluate her abdominal pain. [id number] Shannan Perez for her to proceed with CT scan? Please see her BMP done today 7/27.

## 2022-07-29 NOTE — TELEPHONE ENCOUNTER
Attempted to reach Katy again, no answer, voicemail full.       Attempted to reach Torri Brady asking her to return my call

## 2022-08-01 ENCOUNTER — HOSPITAL ENCOUNTER (OUTPATIENT)
Dept: NURSING | Age: 77
Discharge: HOME | End: 2022-08-01

## 2022-08-01 DIAGNOSIS — D83.9 COMMON VARIABLE IMMUNODEFICIENCY (HCC): Primary | ICD-10-CM

## 2022-08-01 RX ORDER — ALBUTEROL SULFATE 90 UG/1
4 AEROSOL, METERED RESPIRATORY (INHALATION) PRN
Status: CANCELLED | OUTPATIENT
Start: 2022-08-02

## 2022-08-01 RX ORDER — MEPERIDINE HYDROCHLORIDE 25 MG/ML
12.5 INJECTION INTRAMUSCULAR; INTRAVENOUS; SUBCUTANEOUS PRN
Status: CANCELLED | OUTPATIENT
Start: 2022-08-02

## 2022-08-01 RX ORDER — SODIUM CHLORIDE 9 MG/ML
INJECTION, SOLUTION INTRAVENOUS CONTINUOUS
Status: CANCELLED | OUTPATIENT
Start: 2022-08-02

## 2022-08-01 RX ORDER — ONDANSETRON 2 MG/ML
8 INJECTION INTRAMUSCULAR; INTRAVENOUS
Status: CANCELLED | OUTPATIENT
Start: 2022-08-02

## 2022-08-01 RX ORDER — SODIUM CHLORIDE 9 MG/ML
25 INJECTION, SOLUTION INTRAVENOUS PRN
Status: CANCELLED | OUTPATIENT
Start: 2022-08-02

## 2022-08-01 RX ORDER — DIPHENHYDRAMINE HCL 25 MG
25 TABLET ORAL ONCE
Status: DISCONTINUED | OUTPATIENT
Start: 2022-08-01 | End: 2022-08-01

## 2022-08-01 RX ORDER — SODIUM CHLORIDE 0.9 % (FLUSH) 0.9 %
5-40 SYRINGE (ML) INJECTION PRN
Status: DISCONTINUED | OUTPATIENT
Start: 2022-08-01 | End: 2022-08-01

## 2022-08-01 RX ORDER — DIPHENHYDRAMINE HYDROCHLORIDE 50 MG/ML
50 INJECTION INTRAMUSCULAR; INTRAVENOUS
Status: CANCELLED | OUTPATIENT
Start: 2022-08-02

## 2022-08-01 RX ORDER — HEPARIN SODIUM (PORCINE) LOCK FLUSH IV SOLN 100 UNIT/ML 100 UNIT/ML
500 SOLUTION INTRAVENOUS PRN
Status: CANCELLED | OUTPATIENT
Start: 2022-08-02

## 2022-08-01 RX ORDER — SODIUM CHLORIDE 0.9 % (FLUSH) 0.9 %
5-40 SYRINGE (ML) INJECTION PRN
Status: CANCELLED | OUTPATIENT
Start: 2022-08-02

## 2022-08-01 RX ORDER — ACETAMINOPHEN 325 MG/1
650 TABLET ORAL ONCE
Status: CANCELLED | OUTPATIENT
Start: 2022-08-02 | End: 2022-08-02

## 2022-08-01 RX ORDER — ACETAMINOPHEN 325 MG/1
650 TABLET ORAL ONCE
Status: DISCONTINUED | OUTPATIENT
Start: 2022-08-01 | End: 2022-08-01

## 2022-08-01 RX ORDER — ACETAMINOPHEN 325 MG/1
650 TABLET ORAL
Status: CANCELLED | OUTPATIENT
Start: 2022-08-02

## 2022-08-01 RX ORDER — DIPHENHYDRAMINE HCL 25 MG
25 TABLET ORAL ONCE
Status: CANCELLED | OUTPATIENT
Start: 2022-08-02 | End: 2022-08-02

## 2022-08-01 NOTE — TELEPHONE ENCOUNTER
Spoke w/ pt, asked that she hold Lasix day of CT scan and increase fluids.   Pt voiced understanding

## 2022-08-02 ASSESSMENT — ENCOUNTER SYMPTOMS
WHEEZING: 0
COLOR CHANGE: 0
STRIDOR: 0
CONSTIPATION: 0
EYE DISCHARGE: 0
SHORTNESS OF BREATH: 1
DIARRHEA: 0
VOICE CHANGE: 1
SORE THROAT: 0
ABDOMINAL PAIN: 0
EYE PAIN: 0
BLOOD IN STOOL: 0
SINUS PRESSURE: 0
TROUBLE SWALLOWING: 0
FACIAL SWELLING: 0
PHOTOPHOBIA: 0
APNEA: 1
VOMITING: 0
EYE REDNESS: 0
CHEST TIGHTNESS: 0
COUGH: 0
NAUSEA: 1
CHOKING: 0
ABDOMINAL DISTENTION: 1
BACK PAIN: 0
RECTAL PAIN: 0
EYE ITCHING: 0
ANAL BLEEDING: 0
RHINORRHEA: 0

## 2022-08-02 NOTE — PROGRESS NOTES
Luan Osgood (:  1945)     ASSESSMENT:  1. Right upper quadrant abdominal wall bulge  2. Right upper quadrant abdominal pain  3. History robotic repair moderate hiatal hernia with Nissen fundoplication (9950)  4. Smaller recurrent hiatal hernia  5. GERD    PLAN:  1. Check CT abdomen/pelvis with contrast.  No obvious abdominal wall hernia identified on clinical exam this morning. 2.  Follow-up with GI as directed  3. Dietary modification/restrictions discussed. Nutritional education occurred. 4.  Pain control as needed  5. Continue PPI/antacid as directed by GI recommendations  6. Discussed the pros and cons of recurrent small hiatal hernia repair. Robotic, laparoscopic and open techniques discussed. Concern for tightening the fundoplication anymore causing more significant dysphagia. Patient in agreement and wishes to proceed with observation only. 7.  Follow-up after CT imaging completed  8. Signs and symptoms reviewed with patient that would be concerning and need her to return to office for re-evaluation. Patient states She will call if She has questions or concerns. 9.  May obtain upper GI if GERD/dysphagia worsens. SUBJECTIVE/OBJECTIVE:    Chief Complaint   Patient presents with    Surgical Consult     Est patient-last seen in the office in -Y/B Nissen fundoplication done [facility] with swallowing     HPI  Myriam Marking is a 65-year-old female with multiple significant comorbid conditions presenting for evaluation of right upper quadrant abdominal wall bulge with discomfort. She feels she has a hernia. History of Nissen fundoplication with repair moderate hiatal hernia 2016. Still follows with GI service secondary to GERD. On PPI at this time. She feels this controls her heartburn. Intermittent dysphagia. Has had some issues with GI bleed in the past.  On blood thinners. Recently underwent capsule endoscopy but capsule remained in the stomach. Complains of fatigue. Chronic shortness of breath. Feels the right upper abdominal wall bulges out and occasionally gives her discomfort. Not related to food intake. Not related to activity. Intermittent. Mild to moderate discomfort. No sharp severe shooting pains. Some nausea. No emesis. Headaches. No lightheadedness or dizziness. Denies chest pain. No chronic cough. No choking. Review of Systems   Constitutional:  Positive for fatigue. Negative for activity change, appetite change, chills, diaphoresis, fever and unexpected weight change. HENT:  Positive for postnasal drip, tinnitus and voice change. Negative for congestion, dental problem, drooling, ear discharge, ear pain, facial swelling, hearing loss, mouth sores, nosebleeds, rhinorrhea, sinus pressure, sneezing, sore throat and trouble swallowing. Eyes:  Negative for photophobia, pain, discharge, redness, itching and visual disturbance. Respiratory:  Positive for apnea and shortness of breath. Negative for cough, choking, chest tightness, wheezing and stridor. Cardiovascular:  Negative for chest pain, palpitations and leg swelling. Gastrointestinal:  Positive for abdominal distention and nausea. Negative for abdominal pain, anal bleeding, blood in stool, constipation, diarrhea, rectal pain and vomiting. Genitourinary:  Negative for decreased urine volume, difficulty urinating, dyspareunia, dysuria, enuresis, flank pain, frequency, genital sores, hematuria, menstrual problem, pelvic pain, urgency, vaginal bleeding, vaginal discharge and vaginal pain. Musculoskeletal:  Negative for arthralgias, back pain, gait problem, joint swelling, myalgias, neck pain and neck stiffness. Skin:  Negative for color change, pallor, rash and wound. Allergic/Immunologic: Positive for environmental allergies and immunocompromised state. Neurological:  Positive for headaches.  Negative for dizziness, tremors, seizures, syncope, facial asymmetry, speech difficulty, weakness, light-headedness and numbness. Hematological:  Negative for adenopathy. Does not bruise/bleed easily. Psychiatric/Behavioral:  Negative for agitation, behavioral problems, confusion, decreased concentration, dysphoric mood, hallucinations, self-injury, sleep disturbance and suicidal ideas. The patient is nervous/anxious. The patient is not hyperactive.       Past Medical History:   Diagnosis Date    Anemia     Atrial fibrillation ([address] 11/09/2017    CAD (coronary artery disease)     CHF (congestive heart failure) (HCC)     Chronic kidney disease     stage 3 kidney     COPD (chronic obstructive pulmonary disease) (HCC)     DDD (degenerative disc disease), lumbar     Depression     Frequent PVCs 12/13/2017    GERD (gastroesophageal reflux disease)     History of blood transfusion     Hx of blood clots 09/2017    pulmonary emboli    Hyperlipidemia     Hypertension     Hyperthyroidism     Movement disorder     DDD    Neuropathy     Obesity     Palpitations 01/10/2020    Pneumonia 2016    sepsis    Sleep apnea     usually wears cpap at night    Status post right and left heart catheterization     Type II or unspecified type diabetes mellitus without mention of complication, not stated as uncontrolled        Past Surgical History:   Procedure Laterality Date    ACHILLES TENDON SURGERY Bilateral     BLADDER SUSPENSION      CARDIAC SURGERY  2016    heart cath--[facility]    COLONOSCOPY Left 05/11/2018    COLONOSCOPY POLYPECTOMY SNARE/COLD BIOPSY performed by Beckie Sanz MD at [facility]    COLONOSCOPY N/A 08/04/2021    COLONOSCOPY performed by Corey Muir MD at [facility]    COLONOSCOPY  08/04/2021    Dr. Rivka Duran-- [facility]    ENDOSCOPY, COLON, DIAGNOSTIC      GASTRIC FUNDOPLICATION      HAMMER TOE SURGERY Right     HERNIA REPAIR      HIATAL HERNIA REPAIR  09/06/2016    Laparoscopic Robotic with Nissen Fundoplication - Dr. Sandra Garcia (CERVIX STATUS UNKNOWN)      VT OFFICE/OUTPT VISIT,PROCEDURE ONLY N/A 09/21/2018    EGD DIAGNOSTIC ONLY performed by Debora Jacobs MD at [address]      right    TENDON RELEASE Left 08/09/2016    left foot    TRANSESOPHAGEAL ECHOCARDIOGRAM N/A 4/20/2022    TRANSESOPHAGEAL ECHOCARDIOGRAM performed by Carleen Moses MD at [facility]      TUNNELED VENOUS PORT PLACEMENT  11/06/2017    UPPER GASTROINTESTINAL ENDOSCOPY Left 05/10/2018    EGD BIOPSY performed by Tamir Cardona MD at [facility] Left 07/25/2021    EGD BIOPSY performed by Kristi Lombardo MD at [facility]       Current Outpatient Medications   Medication Sig Dispense Refill    polyvinyl alcohol (LIQUIFILM TEARS) 1.4 % ophthalmic solution 1 drop as needed      benzonatate (TESSALON) 200 MG capsule Take 200 mg by mouth 3 times daily as needed for Cough      polyethylene glycol (GLYCOLAX) 17 g packet Take 17 g by mouth daily as needed for Constipation      guaiFENesin (ROBITUSSIN) 100 MG/5ML SOLN oral solution Take 200 mg by mouth every 4 hours as needed for Cough      ipratropium (ATROVENT) 0.06 % nasal spray       pregabalin (LYRICA) 150 MG capsule Take 150 mg by mouth in the morning, at noon, and at bedtime.        linaclotide (LINZESS) 145 MCG capsule Take 1 capsule by mouth every morning (before breakfast) 30 capsule 2    ferrous gluconate (FERGON) 324 (38 Fe) MG tablet Take 324 mg by mouth 2 times daily      furosemide (LASIX) 20 MG tablet Take 20 mg by mouth daily as needed (swelling as needed)      insulin lispro (HUMALOG) 100 UNIT/ML injection vial Inject into the skin 3 times daily (before meals)      hydrOXYzine (ATARAX) 10 MG tablet Take 10 mg by mouth 3 times daily as needed for Itching      Probiotic Product (PROBIOTIC DAILY PO) Take by mouth      cetirizine (ZYRTEC) 10 MG tablet Take 10 mg by mouth daily      polyethyl glycol-propyl glycol 0.4-0.3 % (SYSTANE) 0.4-0.3 % ophthalmic solution 1 drop as needed for Dry Eyes      sucralfate (CARAFATE) 1 GM tablet Take 1 tablet by mouth 4 times daily (before meals and nightly) 120 tablet 0    vitamin D 25 MCG (1000 UT) CAPS Take by mouth daily 125 mcg daily      citalopram (CELEXA) 40 MG tablet Take 40 mg by mouth daily      tiZANidine (ZANAFLEX) 2 MG tablet Take 2 mg by mouth 2 times daily       DULoxetine (CYMBALTA) 20 MG extended release capsule Take 120 mg by mouth daily       insulin glargine (BASAGLAR KWIKPEN) 100 UNIT/ML injection pen Inject 8 Units into the skin nightly       potassium chloride (KLOR-CON M) 10 MEQ extended release tablet Take 1 tablet by mouth daily 90 tablet 2    Dulaglutide (TRULICITY) 1.48 KN/2.6EL SOPN Inject 0.75 mg into the skin once a week Every Wednesday      diphenhydrAMINE (BENADRYL) 25 MG capsule Take 25 mg by mouth as needed for Itching      RA ALCOHOL SWABS 70 % PADS   1    ONE TOUCH ULTRA TEST strip   1    Lancets Misc. (UNISTIK CZT COMFORT) MISC   1    ipratropium-albuterol (DUONEB) 0.5-2.5 (3) MG/3ML SOLN nebulizer solution Inhale 1 vial into the lungs every 4 hours       traZODone (DESYREL) 100 MG tablet Take 100 mg by mouth nightly       Multiple Vitamins-Minerals (THERAPEUTIC MULTIVITAMIN-MINERALS) tablet Take 1 tablet by mouth daily      OXYGEN Inhale into the lungs continuous      atorvastatin (LIPITOR) 20 MG tablet Take 1 tablet by mouth nightly 30 tablet 3    magnesium hydroxide (MILK OF MAGNESIA CONCENTRATE) 2400 MG/10ML SUSP Take 30 mLs by mouth once as needed      docusate sodium (COLACE) 100 MG capsule Take 100 mg by mouth 3 times daily as needed       acetaminophen (TYLENOL) 325 MG tablet Take 2 tablets by mouth every 4 hours as needed for Pain 120 tablet 3    levothyroxine (SYNTHROID) 75 MCG tablet Take 75 mcg by mouth Daily      pantoprazole (PROTONIX) 40 MG tablet Take 1 tablet by mouth 2 times daily (before meals) 60 tablet 0     No current facility-administered medications for this visit. Allergies   Allergen Reactions    Bactrim [Sulfamethoxazole-Trimethoprim]      TOLD BY  NEVER TO TAKE AGAIN    Wellbutrin [Bupropion] Other (See Comments)     hallucinations    Silicone Rash       Family History   Problem Relation Age of Onset    Cancer Mother     Heart Disease Mother     High Blood Pressure Mother     Diabetes Mother     Vision Loss Mother     Stroke Mother     COPD Father     Diabetes Father     COPD Brother        Social History     Socioeconomic History    Marital status:      Spouse name: Not on file    Number of children: 3    Years of education: Not on file    Highest education level: Not on file   Occupational History    Not on file   Tobacco Use    Smoking status: Former     Packs/day: 1.00     Years: 30.00     Pack years: 30.00     Types: Cigarettes     Quit date: 5/10/2006     Years since quittin.2    Smokeless tobacco: Never   Vaping Use    Vaping Use: Never used   Substance and Sexual Activity    Alcohol use: No    Drug use: No    Sexual activity: Not Currently   Other Topics Concern    Not on file   Social History Narrative    Not on file     Social Determinants of Health     Financial Resource Strain: Not on file   Food Insecurity: Not on file   Transportation Needs: Not on file   Physical Activity: Not on file   Stress: Not on file   Social Connections: Not on file   Intimate Partner Violence: Not on file   Housing Stability: Not on file     Vitals:    22 1022   BP: 132/76   Site: Left Upper Arm   Position: Sitting   Cuff Size: Large Adult   Pulse: 64   Temp: (!) 96.7 °F (35.9 °C)   SpO2: 94%   Weight: 228 lb (103.4 kg)   Height: 5' 6\" (1.676 m)     Body mass index is 36.8 kg/m². Wt Readings from Last 3 Encounters:   22 228 lb (103.4 kg)   22 230 lb (104.3 kg)   22 221 lb (100.2 kg)     Physical Exam  Vitals reviewed. Constitutional:       General: She is not in acute distress. Appearance: She is well-developed.  She is not diaphoretic. HENT:      Head: Normocephalic and atraumatic. Right Ear: External ear normal.      Left Ear: External ear normal.      Nose: Nose normal.   Eyes:      General: No scleral icterus. Right eye: No discharge. Left eye: No discharge. Conjunctiva/sclera: Conjunctivae normal.   Cardiovascular:      Rate and Rhythm: Normal rate and regular rhythm. Heart sounds: Normal heart sounds. Pulmonary:      Effort: Pulmonary effort is normal. No respiratory distress. Breath sounds: Normal breath sounds. No wheezing or rales. Chest:      Chest wall: No tenderness. Abdominal:      General: Bowel sounds are normal. There is no distension. Palpations: Abdomen is soft. There is no mass. Tenderness: There is no abdominal tenderness. There is no guarding or rebound. Musculoskeletal:         General: No tenderness. Normal range of motion. Cervical back: Normal range of motion and neck supple. Skin:     General: Skin is warm and dry. Coloration: Skin is not pale. Findings: No erythema or rash. Neurological:      Mental Status: She is alert and oriented to person, place, and time. Cranial Nerves: No cranial nerve deficit. Psychiatric:         Behavior: Behavior normal.         Thought Content:  Thought content normal.         Judgment: Judgment normal.     Lab Results   Component Value Date    WBC 5.0 04/27/2022    HGB 8.9 (L) 04/30/2022    HCT 28.1 (L) 04/30/2022    MCV 97.1 04/27/2022     04/27/2022     Lab Results   Component Value Date     07/27/2022    K 4.6 07/27/2022     07/27/2022    CO2 30 07/27/2022     Lab Results   Component Value Date    CREATININE 1.3 (H) 07/27/2022     Lab Results   Component Value Date    ALT 9 (L) 04/27/2022    AST 15 04/27/2022    ALKPHOS 74 04/27/2022    BILITOT 0.2 (L) 04/27/2022     Lab Results   Component Value Date    LIPASE 29.0 04/27/2022       Patient Active Problem List   Diagnosis Benign essential tremor    CKD (chronic kidney disease) stage 3, GFR 30-59 ml/min (HCC)    Essential hypertension    Sepsis with hypotension (HCC)    Septic shock (HCC)    Pneumonia of both lower lobes due to infectious organism    Pneumonia of left lung due to infectious organism    Type 2 diabetes mellitus without complication (HCC)    Left ventricular systolic dysfunction    Coronary artery disease involving native coronary artery of native heart without angina pectoris    RUQ abdominal pain    Gallbladder sludge    Bacteremia due to Gram-positive bacteria    Acute systolic congestive heart failure (HCC)    Severe sepsis with septic shock (HCC)    CKD (chronic kidney disease), stage III (HCC)    DM II (diabetes mellitus, type II), controlled (HCC)    Cardiomyopathy, dilated (HCC)    Acute pulmonary edema (HCC)    Acute kidney injury superimposed on CKD (HCC)    HFrEF (heart failure with reduced ejection fraction) ([facility])    Fever    General weakness    Acute on chronic renal insufficiency    Hypotension    Episode of unresponsiveness    Cardiomyopathy ([facility])    Hyperkalemia    Hypokalemia    Selective immunoglobulin deficiency (HCC)    GABBY (acute kidney injury) ([address]    Chronic respiratory failure with hypoxia ([address]    Influenza with respiratory manifestation other than pneumonia    Klebsiella pneumonia (HCC)    Chest pain    Acute on chronic respiratory failure with hypoxia ([facility])    Hiatal hernia    Gastroesophageal reflux disease    Gastroesophageal reflux disease with esophagitis    S/P Nissen fundoplication (without gastrostomy tube) procedure    Bradycardia    Symptomatic sinus bradycardia    Tachycardia-bradycardia syndrome ([facility])    Arrhythmia    Atrial fibrillation ([address]    Encounter for electronic analysis of reveal event recorder    Frequent PVCs    Acute upper GI hemorrhage    Com variab immunodef w predom abnlt of B-cell nums & functn ([address]    Altered mental status    Common variable immunodeficiency [facility])    Acquired hypothyroidism    Palpitations    Anemia    Acute blood loss anemia    Hemorrhage secondary to anti-coagulation [facility])    Physical deconditioning    Abilio ulcer    Paroxysmal atrial fibrillation [facility])       [facility]  Colonoscopy Procedure Note        Patient: Olga Lidia Omer                 : 1945                           Procedure: Colonoscopy with biopsy, polypectomy (cold snare)     Date:  2021      Endoscopist:   Kanika Bernabe MD     Referring Physician: Kanika Bernabe MD  Primary Care Physician: Josephine Sibley MD     Indications: This is a 67y.o. year old female who presents with melena, iron deficiency anemia. 68 yo F PMHx Diabetes, A-fib on long term anticoagulation, Hx of Nissen, Hypothyroidism, CKD with  melena, anemia. Colonoscopy 2018 multiple polyps. EGD 2018 minimal findings. On PPI BID at home. Recently admitted to [facility] and discharged on 21 after presenting with melanotic stool intermittently x1 wk PTA with occasional abdominal pain, nausea, dry heaving. Significant anemia upon admission. EGD 21 Distal esophagitis, small hiatal hernia with linear erosions consistent with [place] lesions, mild-to-moderate gastritis, polyp that was biopsied. Anesthesia: MAC per Anesthesia. Please see anesthesia report. Consent:  The patient or their legal guardian has signed a consent and is aware of the potential risks, benefits, alternatives, and potential complications of this procedure. These include, but are not limited to hemorrhage, bleeding, post procedural pain, perforation, phlebitis, aspiration, hypotension, hypoxia, cardiovascular events such as arrhythmia, and possibly death. Additionally, the possibility of missed colonic polyps and interval colon cancer was discussed in the consent. Description of Procedure:  The patient was then taken to the endoscopy suite and placed in the left lateral decubitus position and the above IV sedation was administered. The perianal area was inspected and a digital rectal examination was performed. A forward-viewing Olympus video adult colonoscope was lubricated and inserted through the patient's anus into the rectum. Under direct visualization, the scope was advanced to the terminal ileum. The cecum was identified by the appendiceal orifice and ileocecal valve. [place] pictures were obtained. The prep was fair. The scope was then slowly withdrawn and circumferential examination of the mucosa was performed. The scope was then withdrawn into the rectum and retroflexed. A retroflexed view of the anal verge and rectum was obtained. The scope was straightened, the colon was decompressed and the scope was withdrawn from the patient. The patient tolerated the procedure well and was taken to the recovery area in good condition. There were no immediate complications. Estimated Blood Loss: minimal     Findings:     Terminal Ileum: Visualized and normal.     Colon: Fair prep colonoscopy. A 4-5 mm polyp in the ascending was removed by cold snare polypectomy and placed in Jar 1. Two 4-5 mm polyps in the descending colon were removed by cold snare polypectomy and placed in Jar 2     Two 4-5 mm polyps in the sigmoid colon were removed by cold snare polypectomy and placed in Jar 3     Sigmoid colon diverticulosis     Rectum with retroflexion: Abnormal appearing mucosa noted at the dentate line. Cold biopsies were taken and placed in Jar 4     Grade II internal hemorrhoids. Recommendations:  - Await pathology. - Continue current medications.  - Repeat colonoscopy in 1 year due to fair prep  - Follow up with primary care physician as scheduled. - Follow up with Nelta Pouch CNP in 6-8 weeks unless previously scheduled, could consider SBCE for further evaluation of melena, iron deficiency anemia.      Electronically signed by Kanika Bernabe MD on 8/4/2021 at 2:45 PM     Kendal Norton MD  [address] [address]                                 OPERATIVE REPORT     PATIENT NAME: Phillip Vallejo                    :        1945  MED REC NO:   [id number]                           ROOM:       0023  ACCOUNT NO:   [de-identified]                           ADMIT DATE: 2021  PROVIDER:     Kiley Angel. Faisal Serra M.D.     Caleb Hyatte:  2021     PROCEDURE:  Upper endoscopy. SURGEON:  Kiley Angel. Faisal Serra MD     INDICATION:  Melena and anemia. ANESTHESIA:  IV Diprivan per Anesthesia. DESCRIPTION OF PROCEDURE:  Prior to procedure, risks, benefits,  complications (including but not limited to the following; bleeding,  infection, perforation, emergency surgery, stroke, heart attack, death,  possible anesthetic risks, and the fact that the procedure is not 100%  accurate or successful), indications, and alternatives of upper  endoscopy were explained to the patient. The patient understood and  agreed to procedure. All questions were answered. With the patient in the left lateral decubitus position, GIF-180  forward-viewing videoscope was introduced without difficulty. Esophagus  was viewed. EG junction was at 35 cm. Distal esophagitis was noted. Irregular Z-line was noted. No ulcerations or erosions. Endoscope was  advanced into proximal stomach. A polyp was present as well as moderate  gastritis. Linear erosions were noted. There was friable mucosa but no  active bleeding. Endoscope was advanced in the distal stomach. Mild-to-moderate  gastritis was noted. Endoscope was advanced to the pylorus, first, second and third portions  of the duodenum. No abnormalities were noted. Endoscope was then  retraced back to the stomach and retroflexed. Cardiac and fundic  portions of the stomach were evaluated. No other abnormalities were  noted.   The

## 2022-08-05 ENCOUNTER — HOSPITAL ENCOUNTER (OUTPATIENT)
Dept: CT IMAGING | Age: 77
Discharge: HOME | End: 2022-08-05
Payer: MEDICARE

## 2022-08-05 DIAGNOSIS — R10.9 RIGHT SIDED ABDOMINAL PAIN: ICD-10-CM

## 2022-08-05 PROCEDURE — 74177 CT ABD & PELVIS W/CONTRAST: CPT

## 2022-08-05 PROCEDURE — 6360000004 HC RX CONTRAST MEDICATION: Performed by: SURGERY

## 2022-08-05 RX ADMIN — IOPAMIDOL 80 ML: 755 INJECTION, SOLUTION INTRAVENOUS at 08:47

## 2022-08-08 ENCOUNTER — HOSPITAL ENCOUNTER (OUTPATIENT)
Dept: NURSING | Age: 77
Discharge: HOME | End: 2022-08-08
Payer: MEDICARE

## 2022-08-08 VITALS
DIASTOLIC BLOOD PRESSURE: 58 MMHG | WEIGHT: 229.2 LBS | BODY MASS INDEX: 36.99 KG/M2 | RESPIRATION RATE: 16 BRPM | TEMPERATURE: 97.8 F | HEART RATE: 69 BPM | SYSTOLIC BLOOD PRESSURE: 123 MMHG | OXYGEN SATURATION: 94 %

## 2022-08-08 DIAGNOSIS — D83.9 COMMON VARIABLE IMMUNODEFICIENCY (HCC): Primary | ICD-10-CM

## 2022-08-08 DIAGNOSIS — N28.9 KIDNEY LESION: Primary | ICD-10-CM

## 2022-08-08 PROCEDURE — 2580000003 HC RX 258: Performed by: FAMILY MEDICINE

## 2022-08-08 PROCEDURE — 6360000002 HC RX W HCPCS: Performed by: FAMILY MEDICINE

## 2022-08-08 PROCEDURE — 96365 THER/PROPH/DIAG IV INF INIT: CPT

## 2022-08-08 PROCEDURE — 96413 CHEMO IV INFUSION 1 HR: CPT

## 2022-08-08 RX ORDER — DIPHENHYDRAMINE HCL 25 MG
25 TABLET ORAL ONCE
Status: CANCELLED | OUTPATIENT
Start: 2022-08-29 | End: 2022-08-29

## 2022-08-08 RX ORDER — SODIUM CHLORIDE 9 MG/ML
25 INJECTION, SOLUTION INTRAVENOUS PRN
Status: CANCELLED | OUTPATIENT
Start: 2022-08-29

## 2022-08-08 RX ORDER — ACETAMINOPHEN 325 MG/1
650 TABLET ORAL ONCE
Status: DISCONTINUED | OUTPATIENT
Start: 2022-08-08 | End: 2022-08-09 | Stop reason: HOSPADM

## 2022-08-08 RX ORDER — ACETAMINOPHEN 325 MG/1
650 TABLET ORAL
Status: CANCELLED | OUTPATIENT
Start: 2022-08-29

## 2022-08-08 RX ORDER — SODIUM CHLORIDE 9 MG/ML
INJECTION, SOLUTION INTRAVENOUS CONTINUOUS
Status: CANCELLED | OUTPATIENT
Start: 2022-08-29

## 2022-08-08 RX ORDER — DIPHENHYDRAMINE HCL 25 MG
25 TABLET ORAL ONCE
Status: DISCONTINUED | OUTPATIENT
Start: 2022-08-08 | End: 2022-08-09 | Stop reason: HOSPADM

## 2022-08-08 RX ORDER — ALBUTEROL SULFATE 90 UG/1
4 AEROSOL, METERED RESPIRATORY (INHALATION) PRN
Status: CANCELLED | OUTPATIENT
Start: 2022-08-29

## 2022-08-08 RX ORDER — HEPARIN SODIUM (PORCINE) LOCK FLUSH IV SOLN 100 UNIT/ML 100 UNIT/ML
500 SOLUTION INTRAVENOUS PRN
Status: DISCONTINUED | OUTPATIENT
Start: 2022-08-08 | End: 2022-08-09 | Stop reason: HOSPADM

## 2022-08-08 RX ORDER — DIPHENHYDRAMINE HYDROCHLORIDE 50 MG/ML
50 INJECTION INTRAMUSCULAR; INTRAVENOUS
Status: CANCELLED | OUTPATIENT
Start: 2022-08-29

## 2022-08-08 RX ORDER — SODIUM CHLORIDE 0.9 % (FLUSH) 0.9 %
5-40 SYRINGE (ML) INJECTION PRN
Status: DISCONTINUED | OUTPATIENT
Start: 2022-08-08 | End: 2022-08-09 | Stop reason: HOSPADM

## 2022-08-08 RX ORDER — ACETAMINOPHEN 325 MG/1
650 TABLET ORAL ONCE
Status: CANCELLED | OUTPATIENT
Start: 2022-08-29 | End: 2022-08-29

## 2022-08-08 RX ORDER — HEPARIN SODIUM (PORCINE) LOCK FLUSH IV SOLN 100 UNIT/ML 100 UNIT/ML
500 SOLUTION INTRAVENOUS PRN
Status: CANCELLED | OUTPATIENT
Start: 2022-08-29

## 2022-08-08 RX ORDER — SODIUM CHLORIDE 0.9 % (FLUSH) 0.9 %
5-40 SYRINGE (ML) INJECTION PRN
Status: CANCELLED | OUTPATIENT
Start: 2022-08-29

## 2022-08-08 RX ORDER — ONDANSETRON 2 MG/ML
8 INJECTION INTRAMUSCULAR; INTRAVENOUS
Status: CANCELLED | OUTPATIENT
Start: 2022-08-29

## 2022-08-08 RX ORDER — MEPERIDINE HYDROCHLORIDE 25 MG/ML
12.5 INJECTION INTRAMUSCULAR; INTRAVENOUS; SUBCUTANEOUS PRN
Status: CANCELLED | OUTPATIENT
Start: 2022-08-29

## 2022-08-08 RX ADMIN — SODIUM CHLORIDE, PRESERVATIVE FREE 10 ML: 5 INJECTION INTRAVENOUS at 10:37

## 2022-08-08 RX ADMIN — Medication 500 UNITS: at 10:37

## 2022-08-08 RX ADMIN — IMMUNE GLOBULIN (HUMAN) 20 G: 10 INJECTION INTRAVENOUS; SUBCUTANEOUS at 09:45

## 2022-08-08 RX ADMIN — IMMUNE GLOBULIN (HUMAN) 5 G: 10 INJECTION INTRAVENOUS; SUBCUTANEOUS at 09:15

## 2022-08-08 NOTE — PROGRESS NOTES
8866: Patient arrived ambulatory for IVIG infusion. PT RIGHTS AND RESPONSIBILITIES OFFERED TO PT.  Mediport accessed using sterile technique. Patient states she took her tylenol and benadryl before she left. 0915: IVIG infusion started. 1000: Patient tolerating infusion well. No concerns voiced. 1037: Infusion complete. Patient tolerated well. AVS reviewed with patient, voiced understanding. Patient discharged ambulatory.                      _m___ Safety:       (Environmental)  [place] to environment  Ensure ID band is correct and in place/ allergy band as needed  Assess for fall risk  Initiate fall precautions as applicable (fall band, side rails, etc.)  Call light within reach  Bed in low position/ wheels locked    _m___ Pain:       Assess pain level and characteristics  Administer analgesics as ordered  Assess effectiveness of pain management and report to MD as needed    _m___ Knowledge Deficit:  Assess baseline knowledge  Provide teaching at level of understanding  Provide teaching via preferred learning method  Evaluate teaching effectiveness    _m___ Hemodynamic/Respiratory Status:       (Pre and Post Procedure Monitoring)  Assess/Monitor vital signs and LOC  Assess Baseline SpO2 prior to any sedation  Obtain weight/height  Assess vital signs/ LOC until patient meets discharge criteria  Monitor procedure site and notify MD of any issues    _m___ Infection-Risk of Central Venous Catheter:  Monitor for infection signs and symptoms (catheter site redness, temperature elevation, etc)  Assess for infection risks  Educate regarding infection prevention  Manage central venous catheter (flushes/ dressing changes per protocol)

## 2022-08-08 NOTE — DISCHARGE INSTRUCTIONS
ACTIVITY:  Continue usual care with your doctor. Call your doctor immediately if any severe problems or go to the nearest emergency room. I have been treated and hereby acknowledge receiving this instruction sheet. Next appointment: September 6th at 10 AM.    Please call [phone] with any questions or concerns.

## 2022-08-29 ENCOUNTER — HOSPITAL ENCOUNTER (OUTPATIENT)
Dept: MRI IMAGING | Age: 77
Discharge: HOME | End: 2022-08-29
Payer: MEDICARE

## 2022-08-29 DIAGNOSIS — N28.9 KIDNEY LESION: ICD-10-CM

## 2022-08-29 PROCEDURE — A9579 GAD-BASE MR CONTRAST NOS,1ML: HCPCS | Performed by: SURGERY

## 2022-08-29 PROCEDURE — 6360000004 HC RX CONTRAST MEDICATION: Performed by: SURGERY

## 2022-08-29 PROCEDURE — 74183 MRI ABD W/O CNTR FLWD CNTR: CPT

## 2022-08-29 RX ADMIN — GADOTERIDOL 20 ML: 279.3 INJECTION, SOLUTION INTRAVENOUS at 11:22

## 2022-08-31 ENCOUNTER — TELEPHONE (OUTPATIENT)
Dept: SURGERY | Age: 77
End: 2022-08-31

## 2022-08-31 NOTE — TELEPHONE ENCOUNTER
Results of MRI reviewed with pt per Dr. Makeznie Dixon request.  Benign appearing cysts, no follow up needed with general surgery.   Continue to follow with PCP

## 2022-09-06 ENCOUNTER — HOSPITAL ENCOUNTER (OUTPATIENT)
Dept: NURSING | Age: 77
Discharge: HOME | End: 2022-09-06
Payer: MEDICARE

## 2022-09-06 VITALS
BODY MASS INDEX: 36.48 KG/M2 | DIASTOLIC BLOOD PRESSURE: 71 MMHG | HEART RATE: 77 BPM | OXYGEN SATURATION: 92 % | SYSTOLIC BLOOD PRESSURE: 169 MMHG | TEMPERATURE: 97.2 F | RESPIRATION RATE: 18 BRPM | WEIGHT: 226 LBS

## 2022-09-06 DIAGNOSIS — D83.9 COMMON VARIABLE IMMUNODEFICIENCY (HCC): Primary | ICD-10-CM

## 2022-09-06 LAB — IGG: 1108 MG/DL (ref 700–1600)

## 2022-09-06 PROCEDURE — 96413 CHEMO IV INFUSION 1 HR: CPT

## 2022-09-06 PROCEDURE — 96365 THER/PROPH/DIAG IV INF INIT: CPT

## 2022-09-06 PROCEDURE — 36591 DRAW BLOOD OFF VENOUS DEVICE: CPT

## 2022-09-06 PROCEDURE — 82784 ASSAY IGA/IGD/IGG/IGM EACH: CPT

## 2022-09-06 PROCEDURE — 6360000002 HC RX W HCPCS: Performed by: FAMILY MEDICINE

## 2022-09-06 RX ORDER — DIPHENHYDRAMINE HCL 25 MG
25 TABLET ORAL ONCE
Status: CANCELLED | OUTPATIENT
Start: 2022-10-04 | End: 2022-10-04

## 2022-09-06 RX ORDER — MEPERIDINE HYDROCHLORIDE 25 MG/ML
12.5 INJECTION INTRAMUSCULAR; INTRAVENOUS; SUBCUTANEOUS PRN
Status: CANCELLED | OUTPATIENT
Start: 2022-10-04

## 2022-09-06 RX ORDER — ACETAMINOPHEN 325 MG/1
650 TABLET ORAL
Status: CANCELLED | OUTPATIENT
Start: 2022-10-04

## 2022-09-06 RX ORDER — ONDANSETRON 2 MG/ML
8 INJECTION INTRAMUSCULAR; INTRAVENOUS
Status: CANCELLED | OUTPATIENT
Start: 2022-10-04

## 2022-09-06 RX ORDER — SODIUM CHLORIDE 0.9 % (FLUSH) 0.9 %
5-40 SYRINGE (ML) INJECTION PRN
Status: CANCELLED | OUTPATIENT
Start: 2022-10-04

## 2022-09-06 RX ORDER — SODIUM CHLORIDE 9 MG/ML
25 INJECTION, SOLUTION INTRAVENOUS PRN
Status: CANCELLED | OUTPATIENT
Start: 2022-10-04

## 2022-09-06 RX ORDER — HEPARIN SODIUM (PORCINE) LOCK FLUSH IV SOLN 100 UNIT/ML 100 UNIT/ML
500 SOLUTION INTRAVENOUS PRN
Status: CANCELLED | OUTPATIENT
Start: 2022-10-04

## 2022-09-06 RX ORDER — SODIUM CHLORIDE 9 MG/ML
INJECTION, SOLUTION INTRAVENOUS CONTINUOUS
Status: CANCELLED | OUTPATIENT
Start: 2022-10-04

## 2022-09-06 RX ORDER — DIPHENHYDRAMINE HYDROCHLORIDE 50 MG/ML
50 INJECTION INTRAMUSCULAR; INTRAVENOUS
Status: CANCELLED | OUTPATIENT
Start: 2022-10-04

## 2022-09-06 RX ORDER — ACETAMINOPHEN 325 MG/1
650 TABLET ORAL ONCE
Status: CANCELLED | OUTPATIENT
Start: 2022-10-04 | End: 2022-10-04

## 2022-09-06 RX ORDER — ALBUTEROL SULFATE 90 UG/1
4 AEROSOL, METERED RESPIRATORY (INHALATION) PRN
Status: CANCELLED | OUTPATIENT
Start: 2022-10-04

## 2022-09-06 RX ORDER — HEPARIN SODIUM (PORCINE) LOCK FLUSH IV SOLN 100 UNIT/ML 100 UNIT/ML
500 SOLUTION INTRAVENOUS PRN
Status: DISCONTINUED | OUTPATIENT
Start: 2022-09-06 | End: 2022-09-07 | Stop reason: HOSPADM

## 2022-09-06 RX ADMIN — IMMUNE GLOBULIN (HUMAN) 20 G: 10 INJECTION INTRAVENOUS; SUBCUTANEOUS at 10:47

## 2022-09-06 RX ADMIN — IMMUNE GLOBULIN (HUMAN) 5 G: 10 INJECTION INTRAVENOUS; SUBCUTANEOUS at 10:14

## 2022-09-06 RX ADMIN — HEPARIN 500 UNITS: 100 SYRINGE at 11:58

## 2022-09-06 NOTE — PROGRESS NOTES
1000 Patient ambulatory to [facility] for IVIG infusion. Mediport accessed using sterlie technique. Blood work sent to lab. Patient states she took her Benadryl and Tylenol at home. PT RIGHTS AND RESPONSIBILITIES OFFERED TO PT.       1014 IVIG infusion started. 1046 Infusion increased. Patient tolerating well. 1105 Infusion increased. Patient tolerating. Denies any complaints. 1120 Patient to restroom. 1151 Infusion complete. Patient tolerated well. AVS reviewed with patient. Verbalizes understanding. Patient left ambulatory to discharge lobby.            __M__ Safety:       (Environmental)  [place] to environment  Ensure ID band is correct and in place/ allergy band as needed  Assess for fall risk  Initiate fall precautions as applicable (fall band, side rails, etc.)  Call light within reach  Bed in low position/ wheels locked    _M___ Pain:       Assess pain level and characteristics  Administer analgesics as ordered  Assess effectiveness of pain management and report to MD as needed    _M___ Knowledge Deficit:  Assess baseline knowledge  Provide teaching at level of understanding  Provide teaching via preferred learning method  Evaluate teaching effectiveness    __M__ Hemodynamic/Respiratory Status:       (Pre and Post Procedure Monitoring)  Assess/Monitor vital signs and LOC  Assess Baseline SpO2 prior to any sedation  Obtain weight/height  Assess vital signs/ LOC until patient meets discharge criteria  Monitor procedure site and notify MD of any issues    _M___ Infection-Risk of Central Venous Catheter:  Monitor for infection signs and symptoms (catheter site redness, temperature elevation, etc)  Assess for infection risks  Educate regarding infection prevention  Manage central venous catheter (flushes/ dressing changes per protocol)

## 2022-09-07 LAB
IGA: 102 MG/DL (ref 70–400)
IGM: 47 MG/DL (ref 40–230)

## 2022-09-12 ENCOUNTER — TELEPHONE (OUTPATIENT)
Dept: CARDIOLOGY CLINIC | Age: 77
End: 2022-09-12

## 2022-09-12 NOTE — TELEPHONE ENCOUNTER
This patient has completed 6 months of DAPT post WATCHMAN. According to the [facility] pharmacological guidelines it is recommended to stop Plavix and continue Aspirin 81 mg daily. After reviewing the patient's chart there is no further indication to continue Plavix. Dr. Humza Esteban are you ok with stopping Plavix and continuing Aspirin 81 mg daily?

## 2022-09-14 RX ORDER — ASPIRIN 81 MG/1
81 TABLET ORAL DAILY
COMMUNITY
End: 2022-10-17

## 2022-09-19 DIAGNOSIS — N18.31 STAGE 3A CHRONIC KIDNEY DISEASE (HCC): Primary | ICD-10-CM

## 2022-09-22 LAB
BILIRUBIN, URINE: NEGATIVE
BLOOD, URINE: NEGATIVE
BUN BLDV-MCNC: 24 MG/DL
CALCIUM SERPL-MCNC: 8.7 MG/DL
CHLORIDE BLD-SCNC: 103 MMOL/L
CLARITY: CLEAR
CO2: 31 MMOL/L
COLOR: YELLOW
CREAT SERPL-MCNC: 1.2 MG/DL
CREATININE, URINE: 74
GFR CALCULATED: 43
GLUCOSE BLD-MCNC: 105 MG/DL
GLUCOSE URINE: NEGATIVE
KETONES, URINE: NEGATIVE
LEUKOCYTE ESTERASE, URINE: NEGATIVE
MICROALBUMIN/CREAT 24H UR: 0.5 MG/G{CREAT}
MICROALBUMIN/CREAT UR-RTO: 6.8
NITRITE, URINE: NEGATIVE
PH UA: 5 (ref 4.5–8)
PHOSPHORUS: 4 MG/DL
POTASSIUM SERPL-SCNC: 4.5 MMOL/L
PROTEIN UA: NEGATIVE
PTH INTACT: 38
SODIUM BLD-SCNC: 141 MMOL/L
SPECIFIC GRAVITY, URINE: 1.01
UROBILINOGEN, URINE: NORMAL
VITAMIN D 25-HYDROXY: 28
VITAMIN D2, 25 HYDROXY: NORMAL
VITAMIN D3,25 HYDROXY: NORMAL

## 2022-09-22 RX ORDER — ONDANSETRON 4 MG/1
TABLET, FILM COATED ORAL EVERY 8 HOURS PRN
COMMUNITY
Start: 2022-09-09 | End: 2022-10-17

## 2022-09-26 ENCOUNTER — OFFICE VISIT (OUTPATIENT)
Dept: NEPHROLOGY | Age: 77
End: 2022-09-26
Payer: MEDICARE

## 2022-09-26 VITALS
DIASTOLIC BLOOD PRESSURE: 62 MMHG | BODY MASS INDEX: 37.77 KG/M2 | OXYGEN SATURATION: 97 % | SYSTOLIC BLOOD PRESSURE: 119 MMHG | WEIGHT: 234 LBS | HEART RATE: 80 BPM

## 2022-09-26 DIAGNOSIS — N28.1 KIDNEY CYSTS: Primary | ICD-10-CM

## 2022-09-26 DIAGNOSIS — N18.31 STAGE 3A CHRONIC KIDNEY DISEASE (HCC): ICD-10-CM

## 2022-09-26 PROCEDURE — 1036F TOBACCO NON-USER: CPT | Performed by: INTERNAL MEDICINE

## 2022-09-26 PROCEDURE — G8400 PT W/DXA NO RESULTS DOC: HCPCS | Performed by: INTERNAL MEDICINE

## 2022-09-26 PROCEDURE — 99213 OFFICE O/P EST LOW 20 MIN: CPT | Performed by: INTERNAL MEDICINE

## 2022-09-26 PROCEDURE — 1090F PRES/ABSN URINE INCON ASSESS: CPT | Performed by: INTERNAL MEDICINE

## 2022-09-26 PROCEDURE — 1123F ACP DISCUSS/DSCN MKR DOCD: CPT | Performed by: INTERNAL MEDICINE

## 2022-09-26 PROCEDURE — G8427 DOCREV CUR MEDS BY ELIG CLIN: HCPCS | Performed by: INTERNAL MEDICINE

## 2022-09-26 PROCEDURE — G8417 CALC BMI ABV UP PARAM F/U: HCPCS | Performed by: INTERNAL MEDICINE

## 2022-09-26 NOTE — PROGRESS NOTES
[facility]    [facility], [address]   [place] [facility]  [phone]  Progress Note  9/26/2022 10:40 AM      Pt Name:    Alvarado Brizuela  YOB: 1945  Primary Care Physician:  Pablo Cobian MD     Chief Complaint:   Chief Complaint   Patient presents with    Follow-up     CKD II        History of Present Illness: This is a follow-up visit for CKD 3 . Patient had WATCHMAN in March, then had GI bleed after that. Saw Dr. Katie Hennessy for possible hernia. CT showed left kidney lesion. MRI showed B/L cysts. Has known renal cyst.    Comorbidities include DM > 40 years,   Afib s/p WATCHMAN, GI Bleed, hypothyroidism, COPD, HFrEF (35-40%)  chronic hypoxic respiratory failure on Home O2, history of immune deficiency receiving IVIG, BRITTNY, history of hiatal hernia. Pertinent items are noted in HPI.          Past History:  Past Medical History:   Diagnosis Date    Anemia     Atrial fibrillation ([address] 11/09/2017    CAD (coronary artery disease)     CHF (congestive heart failure) (HCC)     Chronic kidney disease     stage 3 kidney     COPD (chronic obstructive pulmonary disease) (HCC)     DDD (degenerative disc disease), lumbar     Depression     Frequent PVCs 12/13/2017    GERD (gastroesophageal reflux disease)     History of blood transfusion     Hx of blood clots 09/2017    pulmonary emboli    Hyperlipidemia     Hypertension     Hyperthyroidism     Movement disorder     DDD    Neuropathy     Obesity     Palpitations 01/10/2020    Pneumonia 2016    sepsis    Sleep apnea     usually wears cpap at night    Status post right and left heart catheterization     Type II or unspecified type diabetes mellitus without mention of complication, not stated as uncontrolled      Past Surgical History:   Procedure Laterality Date    ACHILLES TENDON SURGERY Bilateral     BLADDER SUSPENSION      CARDIAC SURGERY  2016    heart cath--[facility]    COLONOSCOPY Left 05/11/2018 COLONOSCOPY POLYPECTOMY SNARE/COLD BIOPSY performed by Leif Padgett MD at [facility]    COLONOSCOPY N/A 08/04/2021    COLONOSCOPY performed by Precious Dang MD at [facility]    COLONOSCOPY  08/04/2021    Dr. Isamar Sam-- STRIT[facility]    ENDOSCOPY, COLON, DIAGNOSTIC      GASTRIC FUNDOPLICATION      HAMMER TOE SURGERY Right     HERNIA REPAIR      HIATAL HERNIA REPAIR  09/06/2016    Laparoscopic Robotic with Nissen Fundoplication - Dr. Alva Broussard (CERVIX STATUS UNKNOWN)      MI OFFICE/OUTPT VISIT,PROCEDURE ONLY N/A 09/21/2018    EGD DIAGNOSTIC ONLY performed by Precious Dang MD at [address] [place] [address]      right    TENDON RELEASE Left 08/09/2016    left foot    TRANSESOPHAGEAL ECHOCARDIOGRAM N/A 4/20/2022    TRANSESOPHAGEAL ECHOCARDIOGRAM performed by Lorenzo Chávez MD at [address] [place] [address]      TUNNELED VENOUS PORT PLACEMENT  11/06/2017    UPPER GASTROINTESTINAL ENDOSCOPY Left 05/10/2018    EGD BIOPSY performed by Leif Padgett MD at [place] [address] Left 07/25/2021    EGD BIOPSY performed by Cory Dean MD at [address] [place] [address],[address]:  /62 (Site: Right Upper Arm, Position: Sitting, Cuff Size: Large Adult)   Pulse 80   Wt 234 lb (106.1 kg)   SpO2 97%   BMI 37.77 kg/m²   Wt Readings from Last 3 Encounters:   09/26/22 234 lb (106.1 kg)   09/06/22 226 lb (102.5 kg)   08/08/22 229 lb 3.2 oz (104 kg)     Body mass index is 37.77 kg/m².      General Appearance: alert and cooperative with exam, appears comfortable, no distress  HEENT: EOMI, moist oral mucus membranes  Neck: No jugular venous distention,  Lungs: rales noted left lung base  Heart: irregularly irregular  GI: soft, non-tender, no guarding, no flank pain  Extremities: 2+ LE edema  Skin: warm, dry  Neurologic: no tremor, no asterixis     Medications:  Current Outpatient Medications   Medication Sig Dispense Refill    ondansetron (ZOFRAN) 4 MG tablet Take by mouth every 8 hours as needed      aspirin 81 MG EC tablet Take 81 mg by mouth daily      polyvinyl alcohol (LIQUIFILM TEARS) 1.4 % ophthalmic solution 1 drop as needed      benzonatate (TESSALON) 200 MG capsule Take 200 mg by mouth 3 times daily as needed for Cough      polyethylene glycol (GLYCOLAX) 17 g packet Take 17 g by mouth daily as needed for Constipation      guaiFENesin (ROBITUSSIN) 100 MG/5ML SOLN oral solution Take 200 mg by mouth every 4 hours as needed for Cough      ipratropium (ATROVENT) 0.06 % nasal spray       pregabalin (LYRICA) 150 MG capsule Take 150 mg by mouth in the morning, at noon, and at bedtime.        pantoprazole (PROTONIX) 40 MG tablet Take 1 tablet by mouth 2 times daily (before meals) 60 tablet 0    linaclotide (LINZESS) 145 MCG capsule Take 1 capsule by mouth every morning (before breakfast) 30 capsule 2    ferrous gluconate (FERGON) 324 (38 Fe) MG tablet Take 324 mg by mouth 2 times daily      furosemide (LASIX) 20 MG tablet Take 20 mg by mouth daily as needed (swelling as needed)      insulin lispro (HUMALOG) 100 UNIT/ML injection vial Inject into the skin 3 times daily (before meals)      hydrOXYzine (ATARAX) 10 MG tablet Take 10 mg by mouth 3 times daily as needed for Itching      Probiotic Product (PROBIOTIC DAILY PO) Take by mouth      cetirizine (ZYRTEC) 10 MG tablet Take 10 mg by mouth daily      polyethyl glycol-propyl glycol 0.4-0.3 % (SYSTANE) 0.4-0.3 % ophthalmic solution 1 drop as needed for Dry Eyes      sucralfate (CARAFATE) 1 GM tablet Take 1 tablet by mouth 4 times daily (before meals and nightly) 120 tablet 0    vitamin D 25 MCG (1000 UT) CAPS Take by mouth daily 125 mcg daily      citalopram (CELEXA) 40 MG tablet Take 40 mg by mouth daily      tiZANidine (ZANAFLEX) 2 MG tablet Take 2 mg by mouth 2 times daily       DULoxetine (CYMBALTA) 20 MG extended release capsule Take 120 mg by mouth daily       insulin glargine (BASAGLAR KWIKPEN) 100 UNIT/ML injection pen Inject 8 Units into the skin nightly       potassium chloride (KLOR-CON M) 10 MEQ extended release tablet Take 1 tablet by mouth daily 90 tablet 2    Dulaglutide (TRULICITY) 8.97 WP/7.2KU SOPN Inject 0.75 mg into the skin once a week Every Wednesday      diphenhydrAMINE (BENADRYL) 25 MG capsule Take 25 mg by mouth as needed for Itching      RA ALCOHOL SWABS 70 % PADS   1    ONE TOUCH ULTRA TEST strip   1    Lancets Misc. (UNISTIK CZT COMFORT) MISC   1    ipratropium-albuterol (DUONEB) 0.5-2.5 (3) MG/3ML SOLN nebulizer solution Inhale 1 vial into the lungs every 4 hours       traZODone (DESYREL) 100 MG tablet Take 100 mg by mouth nightly       Multiple Vitamins-Minerals (THERAPEUTIC MULTIVITAMIN-MINERALS) tablet Take 1 tablet by mouth daily      OXYGEN Inhale into the lungs continuous      atorvastatin (LIPITOR) 20 MG tablet Take 1 tablet by mouth nightly 30 tablet 3    magnesium hydroxide (MILK OF MAGNESIA CONCENTRATE) 2400 MG/10ML SUSP Take 30 mLs by mouth once as needed      docusate sodium (COLACE) 100 MG capsule Take 100 mg by mouth 3 times daily as needed       acetaminophen (TYLENOL) 325 MG tablet Take 2 tablets by mouth every 4 hours as needed for Pain 120 tablet 3    levothyroxine (SYNTHROID) 75 MCG tablet Take 75 mcg by mouth Daily       No current facility-administered medications for this visit.         Laboratory & Diagnostics:  CBC:   Lab Results   Component Value Date    WBC 5.0 04/27/2022    HGB 8.9 (L) 04/30/2022    HCT 28.1 (L) 04/30/2022    MCV 97.1 04/27/2022     04/27/2022     BMP:    Lab Results   Component Value Date     09/22/2022     07/27/2022     04/29/2022    K 4.5 09/22/2022    K 4.6 07/27/2022    K 4.7 04/29/2022     09/22/2022     07/27/2022     04/29/2022    CO2 31 09/22/2022    CO2 30 07/27/2022    CO2 23 04/29/2022    BUN 24 09/22/2022    BUN 28 (H) 07/27/2022    BUN 14 04/29/2022    CREATININE 1.2 09/22/2022    CREATININE 1.3 (H) 07/27/2022 CREATININE 1.0 04/29/2022    GLUCOSE 105 09/22/2022    GLUCOSE 119 (H) 07/27/2022    GLUCOSE 138 (H) 04/29/2022      Hepatic:   Lab Results   Component Value Date    AST 15 04/27/2022    AST 18 04/10/2022    AST 19 03/03/2022    ALT 9 (L) 04/27/2022    ALT 11 04/10/2022    ALT 11 03/03/2022    BILITOT 0.2 (L) 04/27/2022    BILITOT 0.2 (L) 04/10/2022    BILITOT 0.5 03/03/2022    ALKPHOS 74 04/27/2022    ALKPHOS 90 04/10/2022    ALKPHOS 76 03/03/2022     BNP: No results found for: BNP  Lipids:   Lab Results   Component Value Date    CHOL 127 03/23/2016    HDL 49 03/23/2016     INR:   Lab Results   Component Value Date    INR 1.03 04/28/2022    INR 0.96 03/03/2022    INR 1.38 (H) 07/23/2021     URINE:   Lab Results   Component Value Date/Time    NAUR 88 03/22/2016 11:00 AM     Lab Results   Component Value Date/Time    NITRU Negative 09/22/2022 12:00 AM    COLORU Yellow 09/22/2022 12:00 AM    PHUR 5.0 09/22/2022 12:00 AM    LABCAST NONE SEEN 10/23/2018 05:30 PM    LABCAST NONE SEEN 10/23/2018 05:30 PM    WBCUA 10-15 04/27/2022 08:15 PM    RBCUA 3-5 04/27/2022 08:15 PM    YEAST NONE SEEN 04/27/2022 08:15 PM    BACTERIA NONE SEEN 04/27/2022 08:15 PM    CLARITYU Clear 09/22/2022 12:00 AM    SPECGRAV 1.009 10/23/2018 05:30 PM    LEUKOCYTESUR Negative 09/22/2022 12:00 AM    UROBILINOGEN Normal 09/22/2022 12:00 AM    BILIRUBINUR Negative 09/22/2022 12:00 AM    BLOODU Negative 09/22/2022 12:00 AM    GLUCOSEU negative 09/22/2022 12:00 AM    KETUA Negative 09/22/2022 12:00 AM    AMORPHOUS DEBRIS 06/12/2018 03:30 PM      Microalbumen/Creatinine ratio:  No components found for: RUCREAT        Impression/Plan:   1. CKD III -due to diabetes, hypertension : stable Goals of care include slowing rate of progression by controlling blood pressure, blood glucoses and albuminuria and by avoiding nephrotoxins such as NSAIDs and IV contrast.  2.Electrolytes:stable, on KCl 10 meq   3. DM - insulin requiring, no proteinuria  4.  Hx UTIs  5. Hypothyroidism   6. HTN - controlled  7. Chronic systolic CHF: takes PRN lasix  8. Renal cysts: repeat US next year          Bloodwork and medications were reviewed and plan of care discussed with the patient. Return to clinic in 1 year  or sooner if the need arises.       Zina Glover,   [facility]

## 2022-10-10 ENCOUNTER — HOSPITAL ENCOUNTER (OUTPATIENT)
Dept: NURSING | Age: 77
Discharge: HOME | End: 2022-10-10
Payer: MEDICARE

## 2022-10-10 VITALS
HEART RATE: 70 BPM | TEMPERATURE: 98 F | BODY MASS INDEX: 37.61 KG/M2 | RESPIRATION RATE: 18 BRPM | DIASTOLIC BLOOD PRESSURE: 75 MMHG | WEIGHT: 233 LBS | SYSTOLIC BLOOD PRESSURE: 161 MMHG | OXYGEN SATURATION: 92 %

## 2022-10-10 DIAGNOSIS — D83.9 COMMON VARIABLE IMMUNODEFICIENCY (HCC): Primary | ICD-10-CM

## 2022-10-10 PROCEDURE — 6360000002 HC RX W HCPCS: Performed by: FAMILY MEDICINE

## 2022-10-10 PROCEDURE — 96413 CHEMO IV INFUSION 1 HR: CPT

## 2022-10-10 PROCEDURE — 2580000003 HC RX 258: Performed by: FAMILY MEDICINE

## 2022-10-10 PROCEDURE — 96365 THER/PROPH/DIAG IV INF INIT: CPT

## 2022-10-10 PROCEDURE — 96366 THER/PROPH/DIAG IV INF ADDON: CPT

## 2022-10-10 RX ORDER — SODIUM CHLORIDE 9 MG/ML
25 INJECTION, SOLUTION INTRAVENOUS PRN
Status: CANCELLED | OUTPATIENT
Start: 2022-11-01

## 2022-10-10 RX ORDER — ACETAMINOPHEN 325 MG/1
650 TABLET ORAL ONCE
Status: CANCELLED | OUTPATIENT
Start: 2022-11-01 | End: 2022-11-01

## 2022-10-10 RX ORDER — ACETAMINOPHEN 325 MG/1
650 TABLET ORAL ONCE
Status: DISCONTINUED | OUTPATIENT
Start: 2022-10-10 | End: 2022-10-11 | Stop reason: HOSPADM

## 2022-10-10 RX ORDER — ALBUTEROL SULFATE 90 UG/1
4 AEROSOL, METERED RESPIRATORY (INHALATION) PRN
Status: CANCELLED | OUTPATIENT
Start: 2022-11-01

## 2022-10-10 RX ORDER — SODIUM CHLORIDE 9 MG/ML
INJECTION, SOLUTION INTRAVENOUS CONTINUOUS
Status: CANCELLED | OUTPATIENT
Start: 2022-11-01

## 2022-10-10 RX ORDER — MEPERIDINE HYDROCHLORIDE 25 MG/ML
12.5 INJECTION INTRAMUSCULAR; INTRAVENOUS; SUBCUTANEOUS PRN
Status: CANCELLED | OUTPATIENT
Start: 2022-11-01

## 2022-10-10 RX ORDER — SODIUM CHLORIDE 0.9 % (FLUSH) 0.9 %
5-40 SYRINGE (ML) INJECTION PRN
Status: DISCONTINUED | OUTPATIENT
Start: 2022-10-10 | End: 2022-10-11 | Stop reason: HOSPADM

## 2022-10-10 RX ORDER — ONDANSETRON 2 MG/ML
8 INJECTION INTRAMUSCULAR; INTRAVENOUS
Status: CANCELLED | OUTPATIENT
Start: 2022-11-01

## 2022-10-10 RX ORDER — DIPHENHYDRAMINE HYDROCHLORIDE 50 MG/ML
50 INJECTION INTRAMUSCULAR; INTRAVENOUS
Status: CANCELLED | OUTPATIENT
Start: 2022-11-01

## 2022-10-10 RX ORDER — DIPHENHYDRAMINE HCL 25 MG
25 TABLET ORAL ONCE
Status: CANCELLED | OUTPATIENT
Start: 2022-11-01 | End: 2022-11-01

## 2022-10-10 RX ORDER — ACETAMINOPHEN 325 MG/1
650 TABLET ORAL
Status: CANCELLED | OUTPATIENT
Start: 2022-11-01

## 2022-10-10 RX ORDER — DIPHENHYDRAMINE HCL 25 MG
25 TABLET ORAL ONCE
Status: DISCONTINUED | OUTPATIENT
Start: 2022-10-10 | End: 2022-10-11 | Stop reason: HOSPADM

## 2022-10-10 RX ORDER — SODIUM CHLORIDE 0.9 % (FLUSH) 0.9 %
5-40 SYRINGE (ML) INJECTION PRN
Status: CANCELLED | OUTPATIENT
Start: 2022-11-01

## 2022-10-10 RX ORDER — HEPARIN SODIUM (PORCINE) LOCK FLUSH IV SOLN 100 UNIT/ML 100 UNIT/ML
500 SOLUTION INTRAVENOUS PRN
Status: CANCELLED | OUTPATIENT
Start: 2022-11-01

## 2022-10-10 RX ORDER — HEPARIN SODIUM (PORCINE) LOCK FLUSH IV SOLN 100 UNIT/ML 100 UNIT/ML
500 SOLUTION INTRAVENOUS PRN
Status: DISCONTINUED | OUTPATIENT
Start: 2022-10-10 | End: 2022-10-11 | Stop reason: HOSPADM

## 2022-10-10 RX ADMIN — HEPARIN 500 UNITS: 100 SYRINGE at 11:34

## 2022-10-10 RX ADMIN — Medication 10 ML: at 11:34

## 2022-10-10 RX ADMIN — Medication 10 ML: at 10:03

## 2022-10-10 RX ADMIN — IMMUNE GLOBULIN (HUMAN) 20 G: 10 INJECTION INTRAVENOUS; SUBCUTANEOUS at 10:40

## 2022-10-10 RX ADMIN — IMMUNE GLOBULIN (HUMAN) 5 G: 10 INJECTION INTRAVENOUS; SUBCUTANEOUS at 10:10

## 2022-10-10 ASSESSMENT — PAIN - FUNCTIONAL ASSESSMENT: PAIN_FUNCTIONAL_ASSESSMENT: NONE - DENIES PAIN

## 2022-10-10 ASSESSMENT — PAIN SCALES - GENERAL: PAINLEVEL_OUTOF10: 0

## 2022-10-10 NOTE — PROGRESS NOTES
1000 Patient arrived to [facility] ambulatory for IVIG infusion. Oriented to room and call light  PT RIGHTS AND RESPONSIBILITIES OFFERED TO PT. She states she took her premedications at home    [address] accessed using sterile technique.   She denies complaints    1010 IVIG started and she denies complaints    1040 medication infusing and she denies complaints    1055 medication infusing and she denies complaints     1110 medication infusing and she denies complaints     __M__ Safety:       (Environmental)  [place] to environment  Ensure ID band is correct and in place/ allergy band as needed  Assess for fall risk  Initiate fall precautions as applicable (fall band, side rails, etc.)  Call light within reach  Bed in low position/ wheels locked    __M__ Pain:       Assess pain level and characteristics  Administer analgesics as ordered  Assess effectiveness of pain management and report to MD as needed    __M__ Knowledge Deficit:  Assess baseline knowledge  Provide teaching at level of understanding  Provide teaching via preferred learning method  Evaluate teaching effectiveness    __M__ Hemodynamic/Respiratory Status:       (Pre and Post Procedure Monitoring)  Assess/Monitor vital signs and LOC  Assess Baseline SpO2 prior to any sedation  Obtain weight/height  Assess vital signs/ LOC until patient meets discharge criteria  Monitor procedure site and notify MD of any issues    __M__ Infection-Risk of Central Venous Catheter:  Monitor for infection signs and symptoms (catheter site redness, temperature elevation, etc)  Assess for infection risks  Educate regarding infection prevention  Manage central venous catheter (flushes/ dressing changes per protocol)

## 2022-10-10 NOTE — DISCHARGE INSTRUCTIONS
Next appointment is scheduled for November 7 at 10:00    Please call outpatient nursing the morning of your appointment at [phone]      ACTIVITY:  Continue usual care with your doctor. Call your doctor immediately if any severe problems or go to the nearest emergency room. I have been treated and hereby acknowledge receiving this instruction sheet.

## 2022-10-17 ENCOUNTER — OFFICE VISIT (OUTPATIENT)
Dept: CARDIOLOGY CLINIC | Age: 77
End: 2022-10-17
Payer: MEDICARE

## 2022-10-17 VITALS
WEIGHT: 226.4 LBS | DIASTOLIC BLOOD PRESSURE: 66 MMHG | BODY MASS INDEX: 36.38 KG/M2 | HEIGHT: 66 IN | SYSTOLIC BLOOD PRESSURE: 130 MMHG | HEART RATE: 72 BPM

## 2022-10-17 DIAGNOSIS — I48.0 PAROXYSMAL ATRIAL FIBRILLATION (HCC): Primary | ICD-10-CM

## 2022-10-17 DIAGNOSIS — I42.0 DILATED CARDIOMYOPATHY (HCC): ICD-10-CM

## 2022-10-17 DIAGNOSIS — J44.9 CHRONIC OBSTRUCTIVE PULMONARY DISEASE, UNSPECIFIED COPD TYPE (HCC): ICD-10-CM

## 2022-10-17 PROCEDURE — 99214 OFFICE O/P EST MOD 30 MIN: CPT | Performed by: NUCLEAR MEDICINE

## 2022-10-17 PROCEDURE — 1090F PRES/ABSN URINE INCON ASSESS: CPT | Performed by: NUCLEAR MEDICINE

## 2022-10-17 PROCEDURE — 1123F ACP DISCUSS/DSCN MKR DOCD: CPT | Performed by: NUCLEAR MEDICINE

## 2022-10-17 PROCEDURE — 3023F SPIROM DOC REV: CPT | Performed by: NUCLEAR MEDICINE

## 2022-10-17 PROCEDURE — G8400 PT W/DXA NO RESULTS DOC: HCPCS | Performed by: NUCLEAR MEDICINE

## 2022-10-17 PROCEDURE — 1036F TOBACCO NON-USER: CPT | Performed by: NUCLEAR MEDICINE

## 2022-10-17 PROCEDURE — G8427 DOCREV CUR MEDS BY ELIG CLIN: HCPCS | Performed by: NUCLEAR MEDICINE

## 2022-10-17 PROCEDURE — G8417 CALC BMI ABV UP PARAM F/U: HCPCS | Performed by: NUCLEAR MEDICINE

## 2022-10-17 PROCEDURE — G8484 FLU IMMUNIZE NO ADMIN: HCPCS | Performed by: NUCLEAR MEDICINE

## 2022-10-17 RX ORDER — VALSARTAN 40 MG/1
40 TABLET ORAL DAILY
COMMUNITY
End: 2022-10-17 | Stop reason: SDUPTHER

## 2022-10-17 RX ORDER — METOPROLOL SUCCINATE 25 MG/1
25 TABLET, EXTENDED RELEASE ORAL DAILY
COMMUNITY
End: 2022-10-17 | Stop reason: SDUPTHER

## 2022-10-17 RX ORDER — METOPROLOL SUCCINATE 25 MG/1
25 TABLET, EXTENDED RELEASE ORAL DAILY
Qty: 30 TABLET | Refills: 11 | Status: SHIPPED | OUTPATIENT
Start: 2022-10-17

## 2022-10-17 RX ORDER — VALSARTAN 40 MG/1
40 TABLET ORAL DAILY
Qty: 30 TABLET | Refills: 11 | Status: SHIPPED | OUTPATIENT
Start: 2022-10-17

## 2022-10-17 NOTE — PROGRESS NOTES
[facility] [address] [place] [address]  Dept: [phone]  Dept Fax: [phone]  Loc: [phone]    Visit Date: 10/17/2022    Teresita Honeycutt is a 65 y.o. female who presents todayfor:  Chief Complaint   Patient presents with    Follow-up    Hypertension    Coronary Artery Disease    Cardiomyopathy   Had a watchman on the basis of GI bleeding   Did fair  Known CMP and mild CAD   Some dyspnea  Baseline symptoms  Some more fatigue   Not on any CHF meds !!!  She doesn't known why !!   Known pulmonary disease  No chest pain   BP is stable   No dizziness  No syncope        HPI:  HPI  Past Medical History:   Diagnosis Date    Anemia     Atrial fibrillation ([address] 11/09/2017    CAD (coronary artery disease)     CHF (congestive heart failure) (HCC)     Chronic kidney disease     stage 3 kidney     COPD (chronic obstructive pulmonary disease) (HCC)     DDD (degenerative disc disease), lumbar     Depression     Frequent PVCs 12/13/2017    GERD (gastroesophageal reflux disease)     History of blood transfusion     Hx of blood clots 09/2017    pulmonary emboli    Hyperlipidemia     Hypertension     Hyperthyroidism     Movement disorder     DDD    Neuropathy     Obesity     Palpitations 01/10/2020    Pneumonia 2016    sepsis    Sleep apnea     usually wears cpap at night    Status post right and left heart catheterization     Type II or unspecified type diabetes mellitus without mention of complication, not stated as uncontrolled       Past Surgical History:   Procedure Laterality Date    ACHILLES TENDON SURGERY Bilateral     BLADDER SUSPENSION      CARDIAC SURGERY  2016    heart cath--[facility]    COLONOSCOPY Left 05/11/2018    COLONOSCOPY POLYPECTOMY SNARE/COLD BIOPSY performed by Merle Townsend MD at [facility]    COLONOSCOPY N/A 08/04/2021    COLONOSCOPY performed by Tejinder Peter MD at [facility]    COLONOSCOPY  08/04/2021     Wilber-- STRITAS    ENDOSCOPY, COLON, DIAGNOSTIC      GASTRIC FUNDOPLICATION      HAMMER TOE SURGERY Right     HERNIA REPAIR      HIATAL HERNIA REPAIR  2016    Laparoscopic Robotic with Nissen Fundoplication - Dr. Roger Pringle (CERVIX STATUS UNKNOWN)      AL OFFICE/OUTPT VISIT,PROCEDURE ONLY N/A 2018    EGD DIAGNOSTIC ONLY performed by Leah Nageotte, MD at [address]      right    TENDON RELEASE Left 2016    left foot    TRANSESOPHAGEAL ECHOCARDIOGRAM N/A 2022    TRANSESOPHAGEAL ECHOCARDIOGRAM performed by Cori Rust MD at [facility]      TUNNELED VENOUS PORT PLACEMENT  2017    UPPER GASTROINTESTINAL ENDOSCOPY Left 05/10/2018    EGD BIOPSY performed by Susana Dominguez MD at [place] [address] Left 2021    EGD BIOPSY performed by Manuela Moody MD at [facility]     Family History   Problem Relation Age of Onset    Cancer Mother     Heart Disease Mother     High Blood Pressure Mother     Diabetes Mother     Vision Loss Mother     Stroke Mother     COPD Father     Diabetes Father     COPD Brother      Social History     Tobacco Use    Smoking status: Former     Packs/day: 1.00     Years: 30.00     Pack years: 30.00     Types: Cigarettes     Quit date: 5/10/2006     Years since quittin.4    Smokeless tobacco: Never   Substance Use Topics    Alcohol use: No      Current Outpatient Medications   Medication Sig Dispense Refill    polyvinyl alcohol (LIQUIFILM TEARS) 1.4 % ophthalmic solution 1 drop as needed      benzonatate (TESSALON) 200 MG capsule Take 200 mg by mouth 3 times daily as needed for Cough      polyethylene glycol (GLYCOLAX) 17 g packet Take 17 g by mouth daily as needed for Constipation      guaiFENesin (ROBITUSSIN) 100 MG/5ML SOLN oral solution Take 200 mg by mouth every 4 hours as needed for Cough      ipratropium (ATROVENT) 0.06 % nasal spray pregabalin (LYRICA) 150 MG capsule Take 150 mg by mouth in the morning, at noon, and at bedtime.        pantoprazole (PROTONIX) 40 MG tablet Take 1 tablet by mouth 2 times daily (before meals) 60 tablet 0    linaclotide (LINZESS) 145 MCG capsule Take 1 capsule by mouth every morning (before breakfast) 30 capsule 2    ferrous gluconate (FERGON) 324 (38 Fe) MG tablet Take 324 mg by mouth 2 times daily      furosemide (LASIX) 20 MG tablet Take 20 mg by mouth daily as needed (swelling as needed)      insulin lispro (HUMALOG) 100 UNIT/ML injection vial Inject into the skin 3 times daily (before meals)      hydrOXYzine (ATARAX) 10 MG tablet Take 10 mg by mouth 3 times daily as needed for Itching      Probiotic Product (PROBIOTIC DAILY PO) Take by mouth      cetirizine (ZYRTEC) 10 MG tablet Take 10 mg by mouth daily      polyethyl glycol-propyl glycol 0.4-0.3 % (SYSTANE) 0.4-0.3 % ophthalmic solution 1 drop as needed for Dry Eyes      sucralfate (CARAFATE) 1 GM tablet Take 1 tablet by mouth 4 times daily (before meals and nightly) 120 tablet 0    vitamin D 25 MCG (1000 UT) CAPS Take by mouth daily 125 mcg daily      citalopram (CELEXA) 40 MG tablet Take 40 mg by mouth daily      tiZANidine (ZANAFLEX) 2 MG tablet Take 2 mg by mouth 2 times daily       DULoxetine (CYMBALTA) 20 MG extended release capsule Take 120 mg by mouth daily       insulin glargine (BASAGLAR KWIKPEN) 100 UNIT/ML injection pen Inject 8 Units into the skin nightly       potassium chloride (KLOR-CON M) 10 MEQ extended release tablet Take 1 tablet by mouth daily 90 tablet 2    Dulaglutide (TRULICITY) 4.32 OL/8.3AN SOPN Inject 0.75 mg into the skin once a week Every Wednesday      diphenhydrAMINE (BENADRYL) 25 MG capsule Take 25 mg by mouth as needed for Itching      RA ALCOHOL SWABS 70 % PADS   1    ONE TOUCH ULTRA TEST strip   1    Lancets Misc. (UNISTIK CZT COMFORT) MISC   1    ipratropium-albuterol (DUONEB) 0.5-2.5 (3) MG/3ML SOLN nebulizer solution Inhale 1 vial into the lungs every 4 hours       traZODone (DESYREL) 100 MG tablet Take 100 mg by mouth nightly       Multiple Vitamins-Minerals (THERAPEUTIC MULTIVITAMIN-MINERALS) tablet Take 1 tablet by mouth daily      OXYGEN Inhale into the lungs continuous      atorvastatin (LIPITOR) 20 MG tablet Take 1 tablet by mouth nightly 30 tablet 3    magnesium hydroxide (MILK OF MAGNESIA CONCENTRATE) 2400 MG/10ML SUSP Take 30 mLs by mouth once as needed      docusate sodium (COLACE) 100 MG capsule Take 100 mg by mouth 3 times daily as needed       acetaminophen (TYLENOL) 325 MG tablet Take 2 tablets by mouth every 4 hours as needed for Pain 120 tablet 3    levothyroxine (SYNTHROID) 75 MCG tablet Take 75 mcg by mouth Daily       No current facility-administered medications for this visit.      Allergies   Allergen Reactions    Bactrim [Sulfamethoxazole-Trimethoprim]      TOLD BY  NEVER TO TAKE AGAIN    Wellbutrin [Bupropion] Other (See Comments)     hallucinations    Silicone Rash     Health Maintenance   Topic Date Due    Depression Screen  Never done    Hepatitis C screen  Never done    DEXA (modify frequency per FRAX score)  Never done    Lipids  03/23/2017    Annual Wellness Visit (AWV)  Never done    COVID-19 Vaccine (4 - Booster for Pfizer series) 01/04/2022    Flu vaccine (1) 08/01/2022    DTaP/Tdap/Td vaccine (2 - Td or Tdap) 03/31/2026    Pneumococcal 65+ years Vaccine  Completed    Hepatitis A vaccine  Aged Out    Hib vaccine  Aged Out    Meningococcal (ACWY) vaccine  Aged Out       Subjective:  Review of Systems  General:   No fever, no chills, some fatigue or weight loss  Pulmonary:    Some baseline dyspnea, no wheezing  Cardiac:    Denies recent chest pain,   GI:     No nausea or vomiting, no abdominal pain  Neuro:    No dizziness or light headedness,   Musculoskeletal:  No recent active issues  Extremities:   No edema, no obvious claudication     Objective:  Physical Exam  /66   Pulse 72   Ht 5' 6\" (1.676 m)   Wt 226 lb 6.4 oz (102.7 kg)   BMI 36.54 kg/m²   General:   Well developed, well nourished  Lungs:   Clear to auscultation  Heart:    Normal S1 S2, Slight murmur. no rubs, no gallops  Abdomen:   Soft, non tender, no organomegalies, positive bowel sounds  Extremities:   No edema, no cyanosis, good peripheral pulses  Neurological:   Awake, alert, oriented. No obvious focal deficits  Musculoskelatal:  No obvious deformities    Assessment:      Diagnosis Orders   1. Paroxysmal atrial fibrillation (HCC)        2. Dilated cardiomyopathy ([facility] [address]        3. Chronic obstructive pulmonary disease, unspecified COPD type ([facility] [address]        As above  Cardiac seems stable   On no cardiac meds ? ???  Plan:  No follow-ups on file. Low dose ARB   Consider beta blockers as well  Continue risk factor modification and medical management  Thank you for allowing me to participate in the care of your patient. Please don't hesitate to contact me regarding any further issues related to the patient care      Orders Placed:  No orders of the defined types were placed in this encounter. Medications Prescribed:  No orders of the defined types were placed in this encounter. Discussed use, benefit, and side effects of prescribed medications. All patient questions answered. Pt voicedunderstanding. Instructed to continue current medications, diet and exercise. Continue risk factor modification and medical management. Patient agreed with treatment plan. Follow up as directed.     Electronically signedby Nikolai Maguire MD on 10/17/2022 at 11:47 AM

## 2022-10-17 NOTE — PATIENT INSTRUCTIONS
Start   metoprolol succinate (TOPROL XL) 25 MG extended release tablet by mouth 1 tablet daily    valsartan (DIOVAN) 40 MG tablet by mouth 1 tablet daily

## 2022-10-19 DIAGNOSIS — D83.9 COMMON VARIABLE IMMUNODEFICIENCY (HCC): Primary | ICD-10-CM

## 2022-10-19 RX ORDER — MEPERIDINE HYDROCHLORIDE 25 MG/ML
12.5 INJECTION INTRAMUSCULAR; INTRAVENOUS; SUBCUTANEOUS PRN
OUTPATIENT
Start: 2022-10-19

## 2022-10-19 RX ORDER — DIPHENHYDRAMINE HCL 25 MG
25 TABLET ORAL ONCE
OUTPATIENT
Start: 2022-10-19 | End: 2022-10-19

## 2022-10-19 RX ORDER — HEPARIN SODIUM (PORCINE) LOCK FLUSH IV SOLN 100 UNIT/ML 100 UNIT/ML
500 SOLUTION INTRAVENOUS PRN
OUTPATIENT
Start: 2022-10-19

## 2022-10-19 RX ORDER — ACETAMINOPHEN 325 MG/1
650 TABLET ORAL ONCE
OUTPATIENT
Start: 2022-10-19 | End: 2022-10-19

## 2022-10-19 RX ORDER — ALBUTEROL SULFATE 90 UG/1
4 AEROSOL, METERED RESPIRATORY (INHALATION) PRN
OUTPATIENT
Start: 2022-10-19

## 2022-10-19 RX ORDER — SODIUM CHLORIDE 9 MG/ML
5-250 INJECTION, SOLUTION INTRAVENOUS PRN
OUTPATIENT
Start: 2022-10-19

## 2022-10-19 RX ORDER — ONDANSETRON 2 MG/ML
8 INJECTION INTRAMUSCULAR; INTRAVENOUS
OUTPATIENT
Start: 2022-10-19

## 2022-10-19 RX ORDER — EPINEPHRINE 1 MG/ML
0.3 INJECTION, SOLUTION, CONCENTRATE INTRAVENOUS PRN
OUTPATIENT
Start: 2022-10-19

## 2022-10-19 RX ORDER — SODIUM CHLORIDE 9 MG/ML
INJECTION, SOLUTION INTRAVENOUS CONTINUOUS
OUTPATIENT
Start: 2022-10-19

## 2022-10-19 RX ORDER — ACETAMINOPHEN 325 MG/1
650 TABLET ORAL
OUTPATIENT
Start: 2022-10-19

## 2022-10-19 RX ORDER — SODIUM CHLORIDE 0.9 % (FLUSH) 0.9 %
5-40 SYRINGE (ML) INJECTION PRN
OUTPATIENT
Start: 2022-10-19

## 2022-10-19 RX ORDER — DIPHENHYDRAMINE HYDROCHLORIDE 50 MG/ML
50 INJECTION INTRAMUSCULAR; INTRAVENOUS
OUTPATIENT
Start: 2022-10-19

## 2022-10-20 ENCOUNTER — HOSPITAL ENCOUNTER (OUTPATIENT)
Age: 77
Setting detail: OUTPATIENT SURGERY
Discharge: HOME | End: 2022-10-20
Attending: INTERNAL MEDICINE | Admitting: INTERNAL MEDICINE
Payer: MEDICARE

## 2022-10-20 ENCOUNTER — ANESTHESIA EVENT (OUTPATIENT)
Dept: ENDOSCOPY | Age: 77
End: 2022-10-20
Payer: MEDICARE

## 2022-10-20 ENCOUNTER — ANESTHESIA (OUTPATIENT)
Dept: ENDOSCOPY | Age: 77
End: 2022-10-20
Payer: MEDICARE

## 2022-10-20 VITALS
OXYGEN SATURATION: 99 % | SYSTOLIC BLOOD PRESSURE: 123 MMHG | RESPIRATION RATE: 16 BRPM | TEMPERATURE: 97.9 F | HEART RATE: 50 BPM | BODY MASS INDEX: 38.02 KG/M2 | HEIGHT: 65 IN | DIASTOLIC BLOOD PRESSURE: 68 MMHG | WEIGHT: 228.2 LBS

## 2022-10-20 LAB — GLUCOSE BLD-MCNC: 95 MG/DL (ref 70–108)

## 2022-10-20 PROCEDURE — 6360000002 HC RX W HCPCS: Performed by: INTERNAL MEDICINE

## 2022-10-20 PROCEDURE — 2580000003 HC RX 258: Performed by: INTERNAL MEDICINE

## 2022-10-20 PROCEDURE — 88305 TISSUE EXAM BY PATHOLOGIST: CPT

## 2022-10-20 PROCEDURE — 6360000002 HC RX W HCPCS: Performed by: NURSE ANESTHETIST, CERTIFIED REGISTERED

## 2022-10-20 PROCEDURE — 3609010600 HC COLONOSCOPY POLYPECTOMY SNARE/COLD BIOPSY: Performed by: INTERNAL MEDICINE

## 2022-10-20 PROCEDURE — 7100000011 HC PHASE II RECOVERY - ADDTL 15 MIN: Performed by: INTERNAL MEDICINE

## 2022-10-20 PROCEDURE — 82948 REAGENT STRIP/BLOOD GLUCOSE: CPT

## 2022-10-20 PROCEDURE — 3700000000 HC ANESTHESIA ATTENDED CARE: Performed by: INTERNAL MEDICINE

## 2022-10-20 PROCEDURE — 2709999900 HC NON-CHARGEABLE SUPPLY: Performed by: INTERNAL MEDICINE

## 2022-10-20 PROCEDURE — 3700000001 HC ADD 15 MINUTES (ANESTHESIA): Performed by: INTERNAL MEDICINE

## 2022-10-20 PROCEDURE — 7100000010 HC PHASE II RECOVERY - FIRST 15 MIN: Performed by: INTERNAL MEDICINE

## 2022-10-20 RX ORDER — PROPOFOL 10 MG/ML
INJECTION, EMULSION INTRAVENOUS PRN
Status: DISCONTINUED | OUTPATIENT
Start: 2022-10-20 | End: 2022-10-20 | Stop reason: SDUPTHER

## 2022-10-20 RX ORDER — SODIUM CHLORIDE 9 MG/ML
INJECTION, SOLUTION INTRAVENOUS CONTINUOUS
Status: DISCONTINUED | OUTPATIENT
Start: 2022-10-20 | End: 2022-10-20 | Stop reason: HOSPADM

## 2022-10-20 RX ORDER — HEPARIN SODIUM (PORCINE) LOCK FLUSH IV SOLN 100 UNIT/ML 100 UNIT/ML
100 SOLUTION INTRAVENOUS PRN
Status: DISCONTINUED | OUTPATIENT
Start: 2022-10-20 | End: 2022-10-20 | Stop reason: HOSPADM

## 2022-10-20 RX ADMIN — PROPOFOL 50 MG: 10 INJECTION, EMULSION INTRAVENOUS at 09:48

## 2022-10-20 RX ADMIN — PROPOFOL 50 MG: 10 INJECTION, EMULSION INTRAVENOUS at 09:58

## 2022-10-20 RX ADMIN — PROPOFOL 50 MG: 10 INJECTION, EMULSION INTRAVENOUS at 09:49

## 2022-10-20 RX ADMIN — PROPOFOL 50 MG: 10 INJECTION, EMULSION INTRAVENOUS at 09:53

## 2022-10-20 RX ADMIN — HEPARIN 100 UNITS: 100 SYRINGE at 10:50

## 2022-10-20 RX ADMIN — PROPOFOL 50 MG: 10 INJECTION, EMULSION INTRAVENOUS at 10:05

## 2022-10-20 RX ADMIN — SODIUM CHLORIDE: 9 INJECTION, SOLUTION INTRAVENOUS at 08:21

## 2022-10-20 RX ADMIN — PROPOFOL 50 MG: 10 INJECTION, EMULSION INTRAVENOUS at 10:09

## 2022-10-20 ASSESSMENT — PAIN - FUNCTIONAL ASSESSMENT: PAIN_FUNCTIONAL_ASSESSMENT: 0-10

## 2022-10-20 ASSESSMENT — COPD QUESTIONNAIRES: CAT_SEVERITY: SEVERE

## 2022-10-20 ASSESSMENT — PAIN SCALES - GENERAL: PAINLEVEL_OUTOF10: 0

## 2022-10-20 NOTE — DISCHARGE INSTRUCTIONS
- Await pathology. - Continue current medications.  - Repeat colonoscopy in 3 years with 2 day prep. - Follow up with primary care physician as scheduled. - Follow up with Ralph Bates CNP in 6-8 weeks unless previously scheduled. 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

## 2022-10-20 NOTE — ANESTHESIA POSTPROCEDURE EVALUATION
Department of Anesthesiology  Postprocedure Note    Patient: Ishaan Davis  MRN: [id number]  YOB: 1945  Date of evaluation: 10/20/2022      Procedure Summary     Date: 10/20/22 Room / Location: [address] [place] [address] / [address] [place] [address]    Anesthesia Start: 6654 Anesthesia Stop: 1152    Procedure: COLONOSCOPY POLYPECTOMY SNARE/COLD BIOPSY Diagnosis:       Hematochezia      Anemia, unspecified type      (Hematochezia [K92.1])      (Anemia, unspecified type [D64.9])    Surgeons: Linh Coburn MD Responsible Provider: Kong Amos DO    Anesthesia Type: MAC ASA Status: 3          Anesthesia Type: No value filed.     Navdeep Phase I: Navdeep Score: 10    Navdeep Phase II:        Anesthesia Post Evaluation    Patient location during evaluation: bedside  Patient participation: complete - patient participated  Level of consciousness: awake and alert  Airway patency: patent  Nausea & Vomiting: no nausea and no vomiting  Complications: no  Cardiovascular status: hemodynamically stable  Respiratory status: acceptable, room air and spontaneous ventilation  Hydration status: euvolemic

## 2022-10-20 NOTE — ANESTHESIA PRE PROCEDURE
Department of Anesthesiology  Preprocedure Note       Name:  Anayeli Esposito   Age:  65 y.o.  :  1945                                          MRN:  [id number]         Date:  10/20/2022      Surgeon: Clare Roland):  Nik Lucero MD    Procedure: Procedure(s):  COLONOSCOPY    Medications prior to admission:   Prior to Admission medications    Medication Sig Start Date End Date Taking? Authorizing Provider   metoprolol succinate (TOPROL XL) 25 MG extended release tablet Take 1 tablet by mouth daily 10/17/22   Aureliano Sky MD   valsartan (DIOVAN) 40 MG tablet Take 1 tablet by mouth daily 10/17/22   Aureliano Sky MD   polyvinyl alcohol (LIQUIFILM TEARS) 1.4 % ophthalmic solution 1 drop as needed    Historical Provider, MD   benzonatate (TESSALON) 200 MG capsule Take 200 mg by mouth 3 times daily as needed for Cough    Historical Provider, MD   polyethylene glycol (GLYCOLAX) 17 g packet Take 17 g by mouth daily as needed for Constipation    Historical Provider, MD   guaiFENesin (ROBITUSSIN) 100 MG/5ML SOLN oral solution Take 200 mg by mouth every 4 hours as needed for Cough    Historical Provider, MD   ipratropium (ATROVENT) 0.06 % nasal spray  3/25/22   Historical Provider, MD   pregabalin (LYRICA) 150 MG capsule Take 150 mg by mouth in the morning, at noon, and at bedtime.   22   Historical Provider, MD   pantoprazole (PROTONIX) 40 MG tablet Take 1 tablet by mouth 2 times daily (before meals) 4/30/22 10/17/22  CAROLINA Erickson   linaclotide Ignacia President) 145 MCG capsule Take 1 capsule by mouth every morning (before breakfast) 22   BRIAN Ferro - CNP   ferrous gluconate (FERGON) 324 (38 Fe) MG tablet Take 324 mg by mouth 2 times daily    Historical Provider, MD   furosemide (LASIX) 20 MG tablet Take 20 mg by mouth daily as needed (swelling as needed)    Historical Provider, MD   insulin lispro (HUMALOG) 100 UNIT/ML injection vial Inject into the skin 3 times daily (before meals) Historical Provider, MD   hydrOXYzine (ATARAX) 10 MG tablet Take 10 mg by mouth 3 times daily as needed for Itching    Historical Provider, MD   Probiotic Product (PROBIOTIC DAILY PO) Take by mouth    Historical Provider, MD   cetirizine (ZYRTEC) 10 MG tablet Take 10 mg by mouth daily    Historical Provider, MD   polyethyl glycol-propyl glycol 0.4-0.3 % (SYSTANE) 0.4-0.3 % ophthalmic solution 1 drop as needed for Dry Eyes    Historical Provider, MD   sucralfate (CARAFATE) 1 GM tablet Take 1 tablet by mouth 4 times daily (before meals and nightly) 7/26/21   Edin Gardner MD   vitamin D 25 MCG (1000 UT) CAPS Take by mouth daily 125 mcg daily    Historical Provider, MD   citalopram (CELEXA) 40 MG tablet Take 40 mg by mouth daily 6/4/21   Historical Provider, MD   tiZANidine (ZANAFLEX) 2 MG tablet Take 2 mg by mouth 2 times daily     Historical Provider, MD   DULoxetine (CYMBALTA) 20 MG extended release capsule Take 120 mg by mouth daily     Historical Provider, MD   insulin glargine (BASAGLAR KWIKPEN) 100 UNIT/ML injection pen Inject 8 Units into the skin nightly     Historical Provider, MD   potassium chloride (KLOR-CON M) 10 MEQ extended release tablet Take 1 tablet by mouth daily 6/22/20   Berna Su,    Dulaglutide (TRULICITY) 3.64 ND/9.4KE SOPN Inject 0.75 mg into the skin once a week Every Wednesday    Historical Provider, MD   diphenhydrAMINE (BENADRYL) 25 MG capsule Take 25 mg by mouth as needed for Itching    Historical Provider, MD EVANS ALCOHOL SWABS 70 % PADS  7/4/19   Historical Provider, MD   ONE TOUCH ULTRA TEST strip  7/4/19   Historical Provider, MD   Lancets Mis. (UNISTIK CZT COMFORT) [address] [facility]  7/4/19   Historical Provider, MD   ipratropium-albuterol (DUONEB) 0.5-2.5 (3) MG/3ML SOLN nebulizer solution Inhale 1 vial into the lungs every 4 hours     Historical Provider, MD   traZODone (DESYREL) 100 MG tablet Take 100 mg by mouth nightly     Historical Provider, MD   Multiple Vitamins-Minerals (THERAPEUTIC MULTIVITAMIN-MINERALS) tablet Take 1 tablet by mouth daily    Historical Provider, MD   OXYGEN Inhale into the lungs continuous    Historical Provider, MD   atorvastatin (LIPITOR) 20 MG tablet Take 1 tablet by mouth nightly 5/13/18   Praneeth Vences MD   magnesium hydroxide (MILK OF MAGNESIA CONCENTRATE) 2400 MG/10ML SUSP Take 30 mLs by mouth once as needed    Historical Provider, MD   docusate sodium (COLACE) 100 MG capsule Take 100 mg by mouth 3 times daily as needed     Historical Provider, MD   acetaminophen (TYLENOL) 325 MG tablet Take 2 tablets by mouth every 4 hours as needed for Pain 8/18/16   Sharifa Yeung MD   levothyroxine (SYNTHROID) 75 MCG tablet Take 75 mcg by mouth Daily    Historical Provider, MD       Current medications:    Current Facility-Administered Medications   Medication Dose Route Frequency Provider Last Rate Last Admin    0.9 % sodium chloride infusion   IntraVENous Continuous Linh Coburn MD 75 mL/hr at 10/20/22 0821 New Bag at 10/20/22 8212       Allergies:     Allergies   Allergen Reactions    Bactrim [Sulfamethoxazole-Trimethoprim]      TOLD BY  NEVER TO TAKE AGAIN    Wellbutrin [Bupropion] Other (See Comments)     hallucinations    Silicone Rash       Problem List:    Patient Active Problem List   Diagnosis Code    Benign essential tremor G25.0    CKD (chronic kidney disease) stage 3, GFR 30-59 ml/min ([facility]) N18.30    Essential hypertension I10    Sepsis with hypotension ([facility]) A41.9, I95.9    Septic shock ([facility] A41.9, R65.21    Pneumonia of both lower lobes due to infectious organism J18.9    Pneumonia of left lung due to infectious organism J18.9    Type 2 diabetes mellitus without complication ([facility]) Q01.8    Left ventricular systolic dysfunction S02.6    Coronary artery disease involving native coronary artery of native heart without angina pectoris I25.10    RUQ abdominal pain R10.11    Gallbladder sludge K82.8    Bacteremia due to Gram-positive bacteria U86.23    Acute systolic congestive heart failure ([facility]) I50.21    Severe sepsis with septic shock ([facility]) A41.9, R65.21    CKD (chronic kidney disease), stage III ([facility]) N18.30    DM II (diabetes mellitus, type II), controlled ([facility] E11.9    Cardiomyopathy, dilated ([facility]) I42.0    Acute pulmonary edema ([facility]) J81.0    Acute kidney injury superimposed on CKD ([facility]) N17.9, N18.9    HFrEF (heart failure with reduced ejection fraction) ([facility]) I50.20    Fever R50.9    General weakness R53.1    Acute on chronic renal insufficiency N28.9, N18.9    Hypotension I95.9    Episode of unresponsiveness R41.89    Cardiomyopathy ([facility]) I42.9    Hyperkalemia E87.5    Hypokalemia E87.6    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    GABBY (acute kidney injury) [facility] N17.9    Chronic respiratory failure with hypoxia ([facility]) J96.11    Influenza with respiratory manifestation other than pneumonia J11.1    Klebsiella pneumonia ([facility]) J15.0    Chest pain R07.9    Acute on chronic respiratory failure with hypoxia ([facility]) J96.21    Hiatal hernia K44.9    Gastroesophageal reflux disease K21.9    Gastroesophageal reflux disease with esophagitis K21.00    S/P Nissen fundoplication (without gastrostomy tube) procedure Z98.890    Bradycardia R00.1    Symptomatic sinus bradycardia R00.1    Tachycardia-bradycardia syndrome ([facility]) I49.5    Arrhythmia I49.9    Atrial fibrillation ([facility]) I48.91    Encounter for electronic analysis of reveal event recorder Z45.09    Frequent PVCs I49.3    Acute upper GI hemorrhage K92.2    Com variab immunodef w predom abnlt of B-cell nums & functn ([facility]) D83.0    Altered mental status R41.82    Common variable immunodeficiency ([facility]) D83.9    Acquired hypothyroidism E03.9    Palpitations R00.2    Anemia D64.9    Acute blood loss anemia D62    Hemorrhage secondary to anti-coagulation ([facility] T45.7X1A, D68.9    Physical deconditioning R53.81    Abilio ulcer K25.9 Betty Hernandez MD at [facility]    TUBAL LIGATION      TUNNELED VENOUS PORT PLACEMENT  2017    UPPER GASTROINTESTINAL ENDOSCOPY Left 05/10/2018    EGD BIOPSY performed by Marivel Hernandez MD at [place] [address] Left 2021    EGD BIOPSY performed by Jolanta Brenner MD at [facility]       Social History:    Social History     Tobacco Use    Smoking status: Former     Packs/day: 1.00     Years: 30.00     Pack years: 30.00     Types: Cigarettes     Quit date: 5/10/2006     Years since quittin.4    Smokeless tobacco: Never   Substance Use Topics    Alcohol use: No                                Counseling given: Not Answered      Vital Signs (Current):   Vitals:    10/20/22 0753   BP: 128/73   Pulse: 64   Resp: 18   Temp: 36.6 °C (97.9 °F)   TempSrc: Temporal   SpO2: 95%   Weight: 228 lb 3.2 oz (103.5 kg)   Height: 5' 5\" (1.651 m)                                              BP Readings from Last 3 Encounters:   10/20/22 128/73   10/17/22 130/66   10/10/22 (!) 161/75       NPO Status: Time of last liquid consumption: 0500 (sip with meds)                        Time of last solid consumption: 0800                        Date of last liquid consumption: 10/20/22                        Date of last solid food consumption: 10/19/22    BMI:   Wt Readings from Last 3 Encounters:   10/20/22 228 lb 3.2 oz (103.5 kg)   10/17/22 226 lb 6.4 oz (102.7 kg)   10/10/22 233 lb (105.7 kg)     Body mass index is 37.97 kg/m².     CBC:   Lab Results   Component Value Date/Time    WBC 5.0 2022 07:53 PM    RBC 3.06 2022 07:53 PM    HGB 8.9 2022 08:30 AM    HCT 28.1 2022 08:30 AM    MCV 97.1 2022 07:53 PM    RDW 15.2 2018 11:31 AM     2022 07:53 PM       CMP:   Lab Results   Component Value Date/Time     2022 12:00 AM    K 4.5 2022 12:00 AM    K 4.7 2022 05:20 PM     2022 12:00 AM    CO2 31 09/22/2022 12:00 AM    BUN 24 09/22/2022 12:00 AM    CREATININE 1.2 09/22/2022 12:00 AM    CREATININE 44.0 03/14/2019 12:00 AM    LABGLOM 43 09/22/2022 12:00 AM    LABGLOM 40 07/27/2022 11:28 AM    GLUCOSE 105 09/22/2022 12:00 AM    PROT 6.4 04/27/2022 07:53 PM    CALCIUM 8.7 09/22/2022 12:00 AM    BILITOT 0.2 04/27/2022 07:53 PM    ALKPHOS 74 04/27/2022 07:53 PM    AST 15 04/27/2022 07:53 PM    ALT 9 04/27/2022 07:53 PM       POC Tests:   Recent Labs     10/20/22  0816   POCGLU 95       Coags:   Lab Results   Component Value Date/Time    INR 1.03 04/28/2022 06:37 AM    APTT 28.2 04/28/2022 06:37 AM       HCG (If Applicable): No results found for: PREGTESTUR, PREGSERUM, HCG, HCGQUANT     ABGs: No results found for: PHART, PO2ART, VGB2EPG, CWH1YMB, BEART, P6CMQYHX     Type & Screen (If Applicable):  Lab Results   Component Value Date    LABRH POS 03/03/2022       Drug/Infectious Status (If Applicable):  No results found for: HIV, HEPCAB    COVID-19 Screening (If Applicable):   Lab Results   Component Value Date/Time    COVID19 Invalid 08/21/2020 12:00 PM           Anesthesia Evaluation  Patient summary reviewed and Nursing notes reviewed no history of anesthetic complications:   Airway: Mallampati: II  TM distance: >3 FB   Neck ROM: full  Mouth opening: > = 3 FB   Dental:          Pulmonary:   (+) COPD: severe,  sleep apnea: on CPAP,                            ROS comment: 3L O2 at night   Cardiovascular:  Exercise tolerance: poor (<4 METS),   (+) hypertension:, CAD:, dysrhythmias: atrial fibrillation, CHF: systolic,                   Neuro/Psych:   (+) depression/anxiety             GI/Hepatic/Renal:   (+) hiatal hernia, GERD:, PUD, renal disease: CRI,           Endo/Other:    (+) DiabetesType II DM, using insulin, hypothyroidism, blood dyscrasia: anemia:., .                 Abdominal:             Vascular:   + DVT, PE.        Other Findings:           Anesthesia Plan      MAC     ASA 3       Induction: intravenous. Anesthetic plan and risks discussed with patient. Plan discussed with attending.                     BRIAN Paul CRNA   10/20/2022

## 2022-10-20 NOTE — PROGRESS NOTES
Scope # . Colonoscopy completed, tolerated well. Polyps removed with cold snare, 2 jars labeled and sent to lab for processing. Photos taken.

## 2022-10-20 NOTE — H&P
[facility]   Pre-Operative History and Physical: Colonoscopy    Patient: Anayeli Esposito  : 1945      History Obtained From:  patient, electronic medical record    HISTORY OF PRESENT ILLNESS:    The patient is a 65 y.o. female who present for colonoscopy with previous fair prep colonoscopy, personal history of adenomatous polyps.      Past Medical History:        Diagnosis Date    Anemia     Atrial fibrillation ([address] 2017    CAD (coronary artery disease)     CHF (congestive heart failure) (HCC)     Chronic kidney disease     stage 3 kidney     COPD (chronic obstructive pulmonary disease) (HCC)     DDD (degenerative disc disease), lumbar     Depression     Frequent PVCs 2017    GERD (gastroesophageal reflux disease)     History of blood transfusion     Hx of blood clots 2017    pulmonary emboli    Hyperlipidemia     Hypertension     Hyperthyroidism     Movement disorder     DDD    Neuropathy     Obesity     Palpitations 01/10/2020    Pneumonia 2016    sepsis    Sleep apnea     usually wears cpap at night    Status post right and left heart catheterization     Type II or unspecified type diabetes mellitus without mention of complication, not stated as uncontrolled      Past Surgical History:        Procedure Laterality Date    ACHILLES TENDON SURGERY Bilateral     BLADDER SUSPENSION      CARDIAC [facility]GERY  2016    [facility]    COLONOSCOPY Left 2018    COLONOSCOPY POLYPECTOMY SNARE/COLD BIOPSY performed by Genesis Rabago MD at [facility]    COLONOSCOPY N/A 2021    COLONOSCOPY performed by Nik Lucero MD at [facility]    COLONOSCOPY  2021    Dr. Aurora Bear-- [facility]    ENDOSCOPY, COLON, DIAGNOSTIC      GASTRIC FUNDOPLICATION      HAMMER TOE SURGERY Right     HERNIA REPAIR      HIATAL HERNIA REPAIR  2016    Laparoscopic Robotic with Nissen Fundoplication - Dr. Reyna Rose (CERVIX STATUS UNKNOWN)      ND OFFICE/OUTPT VISIT,PROCEDURE ONLY N/A 09/21/2018    EGD DIAGNOSTIC ONLY performed by Wayne Hudson MD at [address] [facility]      right    TENDON RELEASE Left 08/09/2016    left foot    TRANSESOPHAGEAL ECHOCARDIOGRAM N/A 4/20/2022    TRANSESOPHAGEAL ECHOCARDIOGRAM performed by Jose Francisco Miramontes MD at [facility]      TUNNELED VENOUS PORT PLACEMENT  11/06/2017    UPPER GASTROINTESTINAL ENDOSCOPY Left 05/10/2018    EGD BIOPSY performed by Seb Ceja MD at [facility] Left 07/25/2021    EGD BIOPSY performed by Kathleen Villafuerte MD at [facility]     Medications Prior to Admission:   No current facility-administered medications on file prior to encounter. Current Outpatient Medications on File Prior to Encounter   Medication Sig Dispense Refill    polyvinyl alcohol (LIQUIFILM TEARS) 1.4 % ophthalmic solution 1 drop as needed      benzonatate (TESSALON) 200 MG capsule Take 200 mg by mouth 3 times daily as needed for Cough      polyethylene glycol (GLYCOLAX) 17 g packet Take 17 g by mouth daily as needed for Constipation      guaiFENesin (ROBITUSSIN) 100 MG/5ML SOLN oral solution Take 200 mg by mouth every 4 hours as needed for Cough      ipratropium (ATROVENT) 0.06 % nasal spray       pregabalin (LYRICA) 150 MG capsule Take 150 mg by mouth in the morning, at noon, and at bedtime.        pantoprazole (PROTONIX) 40 MG tablet Take 1 tablet by mouth 2 times daily (before meals) 60 tablet 0    linaclotide (LINZESS) 145 MCG capsule Take 1 capsule by mouth every morning (before breakfast) 30 capsule 2    ferrous gluconate (FERGON) 324 (38 Fe) MG tablet Take 324 mg by mouth 2 times daily      furosemide (LASIX) 20 MG tablet Take 20 mg by mouth daily as needed (swelling as needed)      insulin lispro (HUMALOG) 100 UNIT/ML injection vial Inject into the skin 3 times daily (before meals)      hydrOXYzine (ATARAX) 10 MG tablet Take 10 mg by mouth 3 times daily as needed for Itching      Probiotic Product (PROBIOTIC DAILY PO) Take by mouth      cetirizine (ZYRTEC) 10 MG tablet Take 10 mg by mouth daily      polyethyl glycol-propyl glycol 0.4-0.3 % (SYSTANE) 0.4-0.3 % ophthalmic solution 1 drop as needed for Dry Eyes      sucralfate (CARAFATE) 1 GM tablet Take 1 tablet by mouth 4 times daily (before meals and nightly) 120 tablet 0    vitamin D 25 MCG (1000 UT) CAPS Take by mouth daily 125 mcg daily      citalopram (CELEXA) 40 MG tablet Take 40 mg by mouth daily      tiZANidine (ZANAFLEX) 2 MG tablet Take 2 mg by mouth 2 times daily       DULoxetine (CYMBALTA) 20 MG extended release capsule Take 120 mg by mouth daily       insulin glargine (BASAGLAR KWIKPEN) 100 UNIT/ML injection pen Inject 8 Units into the skin nightly       potassium chloride (KLOR-CON M) 10 MEQ extended release tablet Take 1 tablet by mouth daily 90 tablet 2    Dulaglutide (TRULICITY) 2.02 NP/9.0US SOPN Inject 0.75 mg into the skin once a week Every Wednesday      diphenhydrAMINE (BENADRYL) 25 MG capsule Take 25 mg by mouth as needed for Itching      RA ALCOHOL SWABS 70 % PADS   1    ONE TOUCH ULTRA TEST strip   1    Lancets Misc. (UNISTIK CZT COMFORT) MISC   1    ipratropium-albuterol (DUONEB) 0.5-2.5 (3) MG/3ML SOLN nebulizer solution Inhale 1 vial into the lungs every 4 hours       traZODone (DESYREL) 100 MG tablet Take 100 mg by mouth nightly       Multiple Vitamins-Minerals (THERAPEUTIC MULTIVITAMIN-MINERALS) tablet Take 1 tablet by mouth daily      OXYGEN Inhale into the lungs continuous      atorvastatin (LIPITOR) 20 MG tablet Take 1 tablet by mouth nightly 30 tablet 3    magnesium hydroxide (MILK OF MAGNESIA CONCENTRATE) 2400 MG/10ML SUSP Take 30 mLs by mouth once as needed      docusate sodium (COLACE) 100 MG capsule Take 100 mg by mouth 3 times daily as needed       acetaminophen (TYLENOL) 325 MG tablet Take 2 tablets by mouth every 4 hours as needed for Pain 120 tablet 3    levothyroxine (SYNTHROID) 75 MCG tablet Take 75 mcg by mouth Daily          Allergies:  Bactrim [sulfamethoxazole-trimethoprim], Wellbutrin [bupropion], and Silicone    Social History:   TOBACCO:   reports that she quit smoking about 16 years ago. Her smoking use included cigarettes. She has a 30.00 pack-year smoking history. She has never used smokeless tobacco.  ETOH:   reports no history of alcohol use. DRUGS:   reports no history of drug use. Family History:       Problem Relation Age of Onset    Cancer Mother     Heart Disease Mother     High Blood Pressure Mother     Diabetes Mother     Vision Loss Mother     Stroke Mother     COPD Father     Diabetes Father     COPD Brother        PHYSICAL EXAM:    /73   Pulse 64   Temp 97.9 °F (36.6 °C) (Temporal)   Resp 18   Ht 5' 5\" (1.651 m)   Wt 228 lb 3.2 oz (103.5 kg)   SpO2 95%   BMI 37.97 kg/m²       Heart:  Regular rate and rhythm  Lungs:  No increased work of breathing  Abdomen:  BS+, soft, non-distended, non-tender, no masses palpated    ASA Grade:  See Anesthesia documentation    Mallampati Classification:  See Anesthesia documentation    ASSESSMENT AND PLAN:    1. Patient is a 65 y.o. female here for a colonoscopy with MAC.    2.  Procedure options, risks and benefits reviewed with patient. We specifically discussed that risks include but are not limited to infection, bleeding, perforation, death, and missed lesions. Patient expresses understanding.     Electronically signed by Candido Raya MD on 10/20/2022 at [facility], MD  [facility]

## 2022-10-20 NOTE — OP NOTE
[facility]  Colonoscopy Procedure Note      Patient: Teresita Honeycutt  : 1945      Procedure: Colonoscopy with polypectomy (cold snare)    Date:  10/20/2022     Endoscopist:   Tejinder Peter MD    Referring Physician: Tejinder Peter MD  Primary Care Physician: Carin Tsai MD    Indications: This is a 65y.o. year old female with DMII,  A-fib s/p Watchman 3/2022, Hx of Nissen, Hypothyroidism, CKD who presents with previous fair prep colonoscopy and a personal history of adenomatous polyps. Colonoscopy 21 with FAIR prep, tubular adenomas, anal condyloma. Repeat was recommended in 1 year. Anesthesia: MAC per Anesthesia. Please see anesthesia report. Consent:  The patient or their legal guardian has signed a consent and is aware of the potential risks, benefits, alternatives, and potential complications of this procedure. These include, but are not limited to hemorrhage, bleeding, post procedural pain, perforation, phlebitis, aspiration, hypotension, hypoxia, cardiovascular events such as arrhythmia, and possibly death. Additionally, the possibility of missed colonic polyps and interval colon cancer was discussed in the consent. Description of Procedure: The patient was then taken to the endoscopy suite and placed in the left lateral decubitus position and the above IV sedation was administered. The perianal area was inspected and a digital rectal examination was performed. A forward-viewing Olympus video adult colonoscope was lubricated and inserted through the patient's anus into the rectum. Under direct visualization, the scope was advanced to the terminal ileum. The cecum was identified by the appendiceal orifice and ileocecal valve. [place] pictures were obtained. The prep was fair. The scope was then slowly withdrawn and circumferential examination of the mucosa was performed. The scope was then withdrawn into the rectum and retroflexed.   A retroflexed view of the anal verge and rectum was obtained. The scope was straightened, the colon was decompressed and the scope was withdrawn from the patient. The patient tolerated the procedure well and was taken to the recovery area in good condition. There were no immediate complications. Estimated Blood Loss: minimal    Findings:    Terminal Ileum: Visualized and normal.    Colon: FAIR prep colonoscopy    Two 4-5 mm polyps in the ascending colon were removed by cold snare polypectomy and placed in Jar 1. A 4-5 mm polyp in the descending colon was removed by cold snare polypectomy and placed in Jar 2. Sigmoid diverticulosis. Rectum with retroflexion:     Anal condyloma as previously noted appears slightly larger than previously. Grade II internal hemorrhoids    Recommendations:   - Await pathology. - Continue current medications.  - Repeat colonoscopy in 3 years with 2 day prep. - Follow up with primary care physician as scheduled. - Follow up with Michaela Rajan CNP in 6-8 weeks unless previously scheduled.       Electronically signed by Zeinab Dubose MD on 10/20/2022 at [address] MD [place]  [facility]

## 2022-10-22 NOTE — PROGRESS NOTES
0945  Pt ambulated into room for ivig. Pt rights and responsibilities offered to pt. Pt states she took her tylenol and benadryl at 0900. Pt questions answered and new ordered received via telephone with Dr. Krzysztof Nicolas. Pt was offered drink and snack. Pt has no other needs at this time. Port accessed using sterile technique,  No lab orders for today. 0958  ivig infusion started. Pt has no other needs at this time. 1030  Pt infusing infusing. Pt reading her book and has no needs at this time. 1045  Pt tolerating infusion. Pt reading her book and has no needs at this time. 1149         Infusion complete and pt tolerated infusion well. D/c instructions explained and pt verbalized understanding.  Pt ambulated out for d/c.                __m__ Safety:       (Environmental)   [place] to environment   Ensure ID band is correct and in place/ allergy band as needed   Assess for fall risk   Initiate fall precautions as applicable (fall band, side rails, etc.)   Call light within reach   Bed in low position/ wheels locked    __m__ Pain:        Assess pain level and characteristics   Administer analgesics as ordered   Assess effectiveness of pain management and report to MD as needed    _m___ Knowledge Deficit:   Assess baseline knowledge   Provide teaching at level of understanding   Provide teaching via preferred learning method   Evaluate teaching effectiveness    _m___ Hemodynamic/Respiratory Status:       (Pre and Post Procedure Monitoring)   Assess/Monitor vital signs and LOC   Assess Baseline SpO2 prior to any sedation   Obtain weight/height   Assess vital signs/ LOC until patient meets discharge criteria   Monitor procedure site and notify MD of any issues    _ Breath sounds clear and equal bilaterally.

## 2022-11-07 NOTE — DISCHARGE INSTRUCTIONS
Next appointment is scheduled for December 12 at 11:00    Please call outpatient nursing the morning of your appointment at [phone]    ACTIVITY:  Continue usual care with your doctor. Call your doctor immediately if any severe problems or go to the nearest emergency room. I have been treated and hereby acknowledge receiving this instruction sheet.

## 2022-11-10 ENCOUNTER — HOSPITAL ENCOUNTER (OUTPATIENT)
Dept: NURSING | Age: 77
Discharge: HOME | End: 2022-11-10
Payer: MEDICARE

## 2022-11-10 VITALS
HEART RATE: 59 BPM | DIASTOLIC BLOOD PRESSURE: 76 MMHG | OXYGEN SATURATION: 96 % | TEMPERATURE: 97.8 F | SYSTOLIC BLOOD PRESSURE: 142 MMHG | BODY MASS INDEX: 37.61 KG/M2 | WEIGHT: 226 LBS | RESPIRATION RATE: 16 BRPM

## 2022-11-10 DIAGNOSIS — D83.9 COMMON VARIABLE IMMUNODEFICIENCY (HCC): Primary | ICD-10-CM

## 2022-11-10 PROCEDURE — 6360000002 HC RX W HCPCS: Performed by: FAMILY MEDICINE

## 2022-11-10 PROCEDURE — 2580000003 HC RX 258: Performed by: FAMILY MEDICINE

## 2022-11-10 PROCEDURE — 96413 CHEMO IV INFUSION 1 HR: CPT

## 2022-11-10 PROCEDURE — 96365 THER/PROPH/DIAG IV INF INIT: CPT

## 2022-11-10 RX ORDER — SODIUM CHLORIDE 9 MG/ML
5-250 INJECTION, SOLUTION INTRAVENOUS PRN
OUTPATIENT
Start: 2022-12-04

## 2022-11-10 RX ORDER — HEPARIN SODIUM (PORCINE) LOCK FLUSH IV SOLN 100 UNIT/ML 100 UNIT/ML
500 SOLUTION INTRAVENOUS PRN
Status: DISCONTINUED | OUTPATIENT
Start: 2022-11-10 | End: 2022-11-11 | Stop reason: HOSPADM

## 2022-11-10 RX ORDER — ACETAMINOPHEN 325 MG/1
650 TABLET ORAL
OUTPATIENT
Start: 2022-12-04

## 2022-11-10 RX ORDER — SODIUM CHLORIDE 0.9 % (FLUSH) 0.9 %
5-40 SYRINGE (ML) INJECTION PRN
OUTPATIENT
Start: 2022-12-04

## 2022-11-10 RX ORDER — ACETAMINOPHEN 325 MG/1
650 TABLET ORAL ONCE
OUTPATIENT
Start: 2022-12-04 | End: 2022-12-04

## 2022-11-10 RX ORDER — DIPHENHYDRAMINE HYDROCHLORIDE 50 MG/ML
50 INJECTION INTRAMUSCULAR; INTRAVENOUS
OUTPATIENT
Start: 2022-12-04

## 2022-11-10 RX ORDER — ONDANSETRON 2 MG/ML
8 INJECTION INTRAMUSCULAR; INTRAVENOUS
OUTPATIENT
Start: 2022-12-04

## 2022-11-10 RX ORDER — DIPHENHYDRAMINE HCL 25 MG
25 TABLET ORAL ONCE
Status: DISCONTINUED | OUTPATIENT
Start: 2022-11-10 | End: 2022-11-11 | Stop reason: HOSPADM

## 2022-11-10 RX ORDER — SODIUM CHLORIDE 0.9 % (FLUSH) 0.9 %
5-40 SYRINGE (ML) INJECTION PRN
Status: DISCONTINUED | OUTPATIENT
Start: 2022-11-10 | End: 2022-11-11 | Stop reason: HOSPADM

## 2022-11-10 RX ORDER — ACETAMINOPHEN 325 MG/1
650 TABLET ORAL ONCE
Status: DISCONTINUED | OUTPATIENT
Start: 2022-11-10 | End: 2022-11-11 | Stop reason: HOSPADM

## 2022-11-10 RX ORDER — SODIUM CHLORIDE 9 MG/ML
INJECTION, SOLUTION INTRAVENOUS CONTINUOUS
OUTPATIENT
Start: 2022-12-04

## 2022-11-10 RX ORDER — ALBUTEROL SULFATE 90 UG/1
4 AEROSOL, METERED RESPIRATORY (INHALATION) PRN
OUTPATIENT
Start: 2022-12-04

## 2022-11-10 RX ORDER — DIPHENHYDRAMINE HCL 25 MG
25 TABLET ORAL ONCE
OUTPATIENT
Start: 2022-12-04 | End: 2022-12-04

## 2022-11-10 RX ORDER — MEPERIDINE HYDROCHLORIDE 25 MG/ML
12.5 INJECTION INTRAMUSCULAR; INTRAVENOUS; SUBCUTANEOUS PRN
OUTPATIENT
Start: 2022-12-04

## 2022-11-10 RX ORDER — HEPARIN SODIUM (PORCINE) LOCK FLUSH IV SOLN 100 UNIT/ML 100 UNIT/ML
500 SOLUTION INTRAVENOUS PRN
OUTPATIENT
Start: 2022-12-04

## 2022-11-10 RX ADMIN — SODIUM CHLORIDE, PRESERVATIVE FREE 10 ML: 5 INJECTION INTRAVENOUS at 11:03

## 2022-11-10 RX ADMIN — IMMUNE GLOBULIN (HUMAN) 5 G: 10 INJECTION INTRAVENOUS; SUBCUTANEOUS at 11:10

## 2022-11-10 RX ADMIN — IMMUNE GLOBULIN (HUMAN) 20 G: 10 INJECTION INTRAVENOUS; SUBCUTANEOUS at 11:40

## 2022-11-10 RX ADMIN — SODIUM CHLORIDE, PRESERVATIVE FREE 10 ML: 5 INJECTION INTRAVENOUS at 12:32

## 2022-11-10 RX ADMIN — HEPARIN 500 UNITS: 100 SYRINGE at 12:32

## 2022-11-10 ASSESSMENT — PAIN - FUNCTIONAL ASSESSMENT: PAIN_FUNCTIONAL_ASSESSMENT: NONE - DENIES PAIN

## 2022-11-10 NOTE — PROGRESS NOTES
1235:  tolerated infusion well and pt discharged ambulatory with instructions.       _m___ Safety:       (Environmental)  [place] to environment  Ensure ID band is correct and in place/ allergy band as needed  Assess for fall risk  Initiate fall precautions as applicable (fall band, side rails, etc.)  Call light within reach  Bed in low position/ wheels locked    _m___ Pain:       Assess pain level and characteristics  Administer analgesics as ordered  Assess effectiveness of pain management and report to MD as needed    __m__ Knowledge Deficit:  Assess baseline knowledge  Provide teaching at level of understanding  Provide teaching via preferred learning method  Evaluate teaching effectiveness    _m___ Hemodynamic/Respiratory Status:       (Pre and Post Procedure Monitoring)  Assess/Monitor vital signs and LOC  Assess Baseline SpO2 prior to any sedation  Obtain weight/height  Assess vital signs/ LOC until patient meets discharge criteria  Monitor procedure site and notify MD of any issues    _m___ Infection-Risk of Central Venous Catheter:  Monitor for infection signs and symptoms (catheter site redness, temperature elevation, etc)  Assess for infection risks  Educate regarding infection prevention  Manage central venous catheter (flushes/ dressing changes per protocol)

## 2022-11-10 NOTE — PROGRESS NOTES
1052 Patient arrived to [facility] ambulatory for IVIG infusion. Oriented to room and call light  PT RIGHTS AND RESPONSIBILITIES OFFERED TO PT. Patient states she took her premedications at home. [address] accessed using sterile technique.       1110 medication started and she denies complaints    1140 Medication infusing and she denies complaints    1155 Medication infusing and she denies complaints      1210 Medication infusing and she denies complaints    _M___ Safety:       (Environmental)  [place] to environment  Ensure ID band is correct and in place/ allergy band as needed  Assess for fall risk  Initiate fall precautions as applicable (fall band, side rails, etc.)  Call light within reach  Bed in low position/ wheels locked    _M___ Pain:       Assess pain level and characteristics  Administer analgesics as ordered  Assess effectiveness of pain management and report to MD as needed    _M___ Knowledge Deficit:  Assess baseline knowledge  Provide teaching at level of understanding  Provide teaching via preferred learning method  Evaluate teaching effectiveness    _M___ Hemodynamic/Respiratory Status:       (Pre and Post Procedure Monitoring)  Assess/Monitor vital signs and LOC  Assess Baseline SpO2 prior to any sedation  Obtain weight/height  Assess vital signs/ LOC until patient meets discharge criteria  Monitor procedure site and notify MD of any issues    _M___ Infection-Risk of Central Venous Catheter:  Monitor for infection signs and symptoms (catheter site redness, temperature elevation, etc)  Assess for infection risks  Educate regarding infection prevention  Manage central venous catheter (flushes/ dressing changes per protocol)

## 2022-11-14 ENCOUNTER — TELEPHONE (OUTPATIENT)
Dept: SURGERY | Age: 77
End: 2022-11-14

## 2022-12-12 ENCOUNTER — HOSPITAL ENCOUNTER (OUTPATIENT)
Dept: NURSING | Age: 77
Discharge: HOME | End: 2022-12-12
Payer: MEDICARE

## 2022-12-12 VITALS
RESPIRATION RATE: 16 BRPM | OXYGEN SATURATION: 95 % | BODY MASS INDEX: 38.44 KG/M2 | HEART RATE: 86 BPM | WEIGHT: 231 LBS | DIASTOLIC BLOOD PRESSURE: 81 MMHG | SYSTOLIC BLOOD PRESSURE: 131 MMHG | TEMPERATURE: 97.6 F

## 2022-12-12 DIAGNOSIS — D83.9 COMMON VARIABLE IMMUNODEFICIENCY (HCC): Primary | ICD-10-CM

## 2022-12-12 PROCEDURE — 96365 THER/PROPH/DIAG IV INF INIT: CPT

## 2022-12-12 PROCEDURE — 6360000002 HC RX W HCPCS: Performed by: FAMILY MEDICINE

## 2022-12-12 PROCEDURE — 96415 CHEMO IV INFUSION ADDL HR: CPT

## 2022-12-12 PROCEDURE — 96413 CHEMO IV INFUSION 1 HR: CPT

## 2022-12-12 RX ORDER — DIPHENHYDRAMINE HCL 25 MG
25 TABLET ORAL ONCE
OUTPATIENT
Start: 2023-01-01 | End: 2023-01-01

## 2022-12-12 RX ORDER — SODIUM CHLORIDE 9 MG/ML
5-250 INJECTION, SOLUTION INTRAVENOUS PRN
OUTPATIENT
Start: 2023-01-01

## 2022-12-12 RX ORDER — MEPERIDINE HYDROCHLORIDE 25 MG/ML
12.5 INJECTION INTRAMUSCULAR; INTRAVENOUS; SUBCUTANEOUS PRN
OUTPATIENT
Start: 2023-01-01

## 2022-12-12 RX ORDER — ONDANSETRON 2 MG/ML
8 INJECTION INTRAMUSCULAR; INTRAVENOUS
OUTPATIENT
Start: 2023-01-01

## 2022-12-12 RX ORDER — HEPARIN SODIUM (PORCINE) LOCK FLUSH IV SOLN 100 UNIT/ML 100 UNIT/ML
500 SOLUTION INTRAVENOUS PRN
OUTPATIENT
Start: 2023-01-01

## 2022-12-12 RX ORDER — ACETAMINOPHEN 325 MG/1
650 TABLET ORAL
OUTPATIENT
Start: 2023-01-01

## 2022-12-12 RX ORDER — ACETAMINOPHEN 325 MG/1
650 TABLET ORAL ONCE
Status: DISCONTINUED | OUTPATIENT
Start: 2022-12-12 | End: 2022-12-13 | Stop reason: HOSPADM

## 2022-12-12 RX ORDER — SODIUM CHLORIDE 0.9 % (FLUSH) 0.9 %
5-40 SYRINGE (ML) INJECTION PRN
OUTPATIENT
Start: 2023-01-01

## 2022-12-12 RX ORDER — ALBUTEROL SULFATE 90 UG/1
4 AEROSOL, METERED RESPIRATORY (INHALATION) PRN
OUTPATIENT
Start: 2023-01-01

## 2022-12-12 RX ORDER — SODIUM CHLORIDE 9 MG/ML
INJECTION, SOLUTION INTRAVENOUS CONTINUOUS
OUTPATIENT
Start: 2023-01-01

## 2022-12-12 RX ORDER — ACETAMINOPHEN 325 MG/1
650 TABLET ORAL ONCE
OUTPATIENT
Start: 2023-01-01 | End: 2023-01-01

## 2022-12-12 RX ORDER — HEPARIN SODIUM (PORCINE) LOCK FLUSH IV SOLN 100 UNIT/ML 100 UNIT/ML
500 SOLUTION INTRAVENOUS PRN
Status: DISCONTINUED | OUTPATIENT
Start: 2022-12-12 | End: 2022-12-13 | Stop reason: HOSPADM

## 2022-12-12 RX ORDER — DIPHENHYDRAMINE HYDROCHLORIDE 50 MG/ML
50 INJECTION INTRAMUSCULAR; INTRAVENOUS
OUTPATIENT
Start: 2023-01-01

## 2022-12-12 RX ORDER — DIPHENHYDRAMINE HCL 25 MG
25 TABLET ORAL ONCE
Status: DISCONTINUED | OUTPATIENT
Start: 2022-12-12 | End: 2022-12-13 | Stop reason: HOSPADM

## 2022-12-12 RX ADMIN — IMMUNE GLOBULIN (HUMAN) 20 G: 10 INJECTION INTRAVENOUS; SUBCUTANEOUS at 12:03

## 2022-12-12 RX ADMIN — IMMUNE GLOBULIN (HUMAN) 5 G: 10 INJECTION INTRAVENOUS; SUBCUTANEOUS at 11:31

## 2022-12-12 RX ADMIN — Medication 500 UNITS: at 12:48

## 2022-12-12 ASSESSMENT — PAIN SCALES - GENERAL: PAINLEVEL_OUTOF10: 0

## 2022-12-12 NOTE — PROGRESS NOTES
[id number] Luke Rayo ambulated into room for ivig infusion. Pt rights and responsibilities offered to her. Infusion explained and questions were answered. Port accessed using sterile technique and ivig infusion started. Alyx Gavin was offered snack and drink and blanket. She has the call light within reach. No other needs at this time. [address] tolerating infusion. Call light within reach. 1250     Infusion complete and Analy tolerated well. D/c instructions explained and Alyx Gavin verbalized understanding.  Alyx Gavin ambulated out per self for d/c.       __m__ Safety:       (Environmental)  [place] to environment  Ensure ID band is correct and in place/ allergy band as needed  Assess for fall risk  Initiate fall precautions as applicable (fall band, side rails, etc.)  Call light within reach  Bed in low position/ wheels locked    __m__ Pain:       Assess pain level and characteristics  Administer analgesics as ordered  Assess effectiveness of pain management and report to MD as needed    __m__ Knowledge Deficit:  Assess baseline knowledge  Provide teaching at level of understanding  Provide teaching via preferred learning method  Evaluate teaching effectiveness    __m__ Hemodynamic/Respiratory Status:       (Pre and Post Procedure Monitoring)  Assess/Monitor vital signs and LOC  Assess Baseline SpO2 prior to any sedation  Obtain weight/height  Assess vital signs/ LOC until patient meets discharge criteria  Monitor procedure site and notify MD of any issues    __m__ Infection-Risk of Central Venous Catheter:  Monitor for infection signs and symptoms (catheter site redness, temperature elevation, etc)  Assess for infection risks  Educate regarding infection prevention  Manage central venous catheter (flushes/ dressing changes per protocol)

## 2022-12-20 ENCOUNTER — HOSPITAL ENCOUNTER (OUTPATIENT)
Dept: GENERAL RADIOLOGY | Age: 77
Discharge: HOME | End: 2022-12-20
Payer: MEDICARE

## 2022-12-20 DIAGNOSIS — R13.10 DYSPHAGIA, UNSPECIFIED TYPE: ICD-10-CM

## 2022-12-20 DIAGNOSIS — R05.9 COUGH, UNSPECIFIED TYPE: ICD-10-CM

## 2022-12-20 PROCEDURE — 92611 MOTION FLUOROSCOPY/SWALLOW: CPT | Performed by: SPEECH-LANGUAGE PATHOLOGIST

## 2022-12-20 PROCEDURE — 2500000003 HC RX 250 WO HCPCS: Performed by: NURSE PRACTITIONER

## 2022-12-20 PROCEDURE — 74230 X-RAY XM SWLNG FUNCJ C+: CPT

## 2022-12-20 RX ADMIN — BARIUM SULFATE 30 ML: 400 PASTE ORAL at 08:33

## 2022-12-20 RX ADMIN — BARIUM SULFATE 100 ML: 0.81 POWDER, FOR SUSPENSION ORAL at 08:33

## 2022-12-20 NOTE — DISCHARGE SUMMARY
[facility] RADIOLOGY  Modified Barium Swallow  SLP Individual Minutes  Time In: 7588  Time Out: 0032  Minutes: 31  Timed Code Treatment Minutes: 0 Minutes       Date: 2022  Patient Name: Pratima Morin      CSN: [id number]   : 1945  (65 y.o.)  Gender: female   Referring Physician:  LILLY Feliz  Diagnosis: dysphagia, unspecified type R13.10; cough, unspecified type R05.9  History of Present Illness/Injury: Patient lives in [place] [address] at [facility]. Patient reports she gets a \"strangulation\" feeling and it feels like foods stick in her throat. Reports she cough during meals at times, usually not much more than 1 time per month. Reports these symptoms have been ongoing x 2 years. Reports she has a history of GERD and a hiatal hernia and she had surgery to repair this in 2016. Patient also reports she has had EGD's in the past, but is unsure of the most recent. Patient presents with a hoarse and strained vocal quality, which she reports has been ongoing for at least 1 year. MBS to assess current swallow function for safest diet recommendation. Patient  has a past medical history of Anemia, Atrial fibrillation ([address] CAD (coronary artery disease), CHF (congestive heart failure) ([address] Chronic kidney disease, COPD (chronic obstructive pulmonary disease) ([address] DDD (degenerative disc disease), lumbar, Depression, Frequent PVCs, GERD (gastroesophageal reflux disease), History of blood transfusion, Hx of blood clots, Hyperlipidemia, Hypertension, Hyperthyroidism, Movement disorder, Neuropathy, Obesity, Palpitations, Pneumonia, Sleep apnea, Status post right and left heart catheterization, and Type II or unspecified type diabetes mellitus without mention of complication, not stated as uncontrolled. Current Diet: Regular with thin liquids    Pain: No pain reported.     SUBJECTIVE:  Patient sitting upright in the radiology suite chair throughout testing. Patient cooperative. No family present. OBJECTIVE:    Respiratory Status:  Room Air    Behavioral Observation:  Alert    PATIENT WAS EVALUATED USING:  Barium: Thin Liquids, Puree, Coarse Solids, and Mixed Consistency    ORAL PHASE OMAR SCORE: (Dysphagia outcome and severity scale)  6 = WFL/Modified Independent - Normal Diet - May have mild oral delay    PHARYNGEAL PHASE OMAR SCORE: (Dysphagia outcome and severity scale)  5 = Mild Dysphagia - may need one consistency restricted - May have one or more of the following: Aspiration with thin - cough to clear, Airway penetration midway to the vocal cords with one or more consistency or to the vocal folds with one consistency, but clears spontaneously - Residue in the pharynx clears spontaneously    EVIDENCE FOR LARYNGEAL PENETRATION AND/OR ASPIRATION:  No evidence of aspiration  Inconsistent laryngeal penetration evident with thin liquids via cup ad straw, however, this clears spontaneously. PENETRATION-ASPIRATION SCALE (PAS): Thin Liquids: 2 = Material enters the airway, remains above vocal folds, and is ejected from the airway  Puree:  1 = Material does not enter the airway  Mixed Consistencies: 1 = Material does not enter the airway  Hard Solid: 1 = Material does not enter the airway    ESOPHAGEAL PHASE:   No significant findings at the level of the UES. ATTEMPTED TECHNIQUES:  Small Bolus Size Effective    Straw Effective    Cup Effective    Large Drinks Effective    Consecutive Drinks Not Attempted    Chin Tuck Not Attempted    Head Turn Not Attempted    Spoon Presentations Not Attempted with liquids    Volitional Cough Not Attempted    Spontaneous Cough Not Attempted           DIAGNOSTIC IMPRESSIONS:  Patient's oral phase of the swallow presents at a level that is within functional limits, despite mildly decreased bolus control and occasional trace oral residue that clears with an additional dry swallow.  Occasional decreased control of part of the bolus to the level of the valleculae. Patient presents with mild pharyngeal phase deficits characterized by a mildly delayed swallow resulting in inconsistent, flash laryngeal penetration with thin liquids via cup and straw that spontaneously clears with completion of the swallow. No aspiration evident throughout this study. Diet Recommendations:  Regular with thin liquids  Strategies:  Full Upright Position, Small Bite/Sip, Pulmonary Monitoring, Limit Distractions, and slow rate of intake    Rehabilitation Potential: excellent    EDUCATION:  Learner: Patient  Education:  Reviewed results and recommendations of this evaluation via the KATHY unit immediately following completion of the study, Reviewed diet and strategies, and Education Related to Potential Risks and Complications Due to Impairment/Illness/Injury  Evaluation of Education: Verbalizes understanding, Needs further instruction, and Family not present    PLAN:  No further speech therapy services indicated.       JARON Dee.S. Tamra Diaz [id number]

## 2022-12-21 ENCOUNTER — OFFICE VISIT (OUTPATIENT)
Dept: SURGERY | Age: 77
End: 2022-12-21

## 2022-12-21 ENCOUNTER — TELEPHONE (OUTPATIENT)
Dept: SURGERY | Age: 77
End: 2022-12-21

## 2022-12-21 VITALS
RESPIRATION RATE: 18 BRPM | HEIGHT: 65 IN | SYSTOLIC BLOOD PRESSURE: 120 MMHG | HEART RATE: 50 BPM | BODY MASS INDEX: 37.94 KG/M2 | OXYGEN SATURATION: 95 % | DIASTOLIC BLOOD PRESSURE: 62 MMHG | TEMPERATURE: 96.7 F | WEIGHT: 227.7 LBS

## 2022-12-21 DIAGNOSIS — A63.0 ANAL CONDYLOMA: Primary | ICD-10-CM

## 2022-12-21 DIAGNOSIS — Z01.818 PRE-OP TESTING: ICD-10-CM

## 2022-12-21 DIAGNOSIS — E66.01 SEVERE OBESITY (BMI 35.0-39.9) WITH COMORBIDITY (HCC): ICD-10-CM

## 2022-12-22 ASSESSMENT — ENCOUNTER SYMPTOMS
RHINORRHEA: 1
FACIAL SWELLING: 0
SORE THROAT: 0
TROUBLE SWALLOWING: 0
EYE REDNESS: 0
ABDOMINAL DISTENTION: 1
WHEEZING: 1
EYE ITCHING: 0
SHORTNESS OF BREATH: 1
SINUS PAIN: 1
SINUS PRESSURE: 1
VOICE CHANGE: 1
CONSTIPATION: 1
NAUSEA: 0
STRIDOR: 0
ANAL BLEEDING: 1
RECTAL PAIN: 0
EYE DISCHARGE: 0
COLOR CHANGE: 0
COUGH: 1
PHOTOPHOBIA: 0
VOMITING: 0
CHOKING: 0
ABDOMINAL PAIN: 0
DIARRHEA: 1
EYE PAIN: 0
APNEA: 1
CHEST TIGHTNESS: 0
BACK PAIN: 1
BLOOD IN STOOL: 1

## 2022-12-22 NOTE — PROGRESS NOTES
George Morataya (:  1945)     ASSESSMENT:  1. Rectal lesion/condyloma  2. Obesity (BMI 37)  3. Internal hemorrhoids    PLAN:  1. Schedule Analy for anal exam under anesthesia with anal/rectal condyloma excision; possible hemorrhoidectomy. 2. She will undergo pre-operative clearance per anesthesia guidelines with risk factors listed under the past medical history diagnosis & problem list.  3. The risks, benefits and alternatives were discussed with [facility] including non-operative management. All questions answered. She understands and wishes to proceed with surgical intervention. 4. Restrictions discussed with [facility] and she expresses understanding. 5. She is advised to call back directly if there are further questions/concerns, or if her symptoms worsen prior to surgery. 6.  Following up with GI service as directed    SUBJECTIVE/OBJECTIVE:    Chief Complaint   Patient presents with    Surgical Consult     Est patient-last seen in the office on 2022-referred by Dr Josselyn Bryson lesion     HPI  [facility] is a 65-year-old female who presents for evaluation secondary to a enlarging rectal/anal lesion. Found to be condyloma. Internal hemorrhoids. Recent colonoscopy demonstrated the lesion to be slightly larger. Intermittent rectal/anal bleeding. Denies melena. Chronic shortness of breath. Apnea. Chronic cough. Chronic leg swelling. Patient admits to a lot of sinus issues with congestion and sinus pressure with postnasal drip. Chronic back and neck discomfort. Fatigue. Tolerating diet. No nausea or vomiting. Intermittent loose stools and constipation. No new urinary complaints. Intermittent pelvic pain. Review of Systems   Constitutional:  Positive for diaphoresis and fatigue. Negative for activity change, appetite change, chills, fever and unexpected weight change.    HENT:  Positive for congestion, ear discharge, mouth sores, postnasal drip, rhinorrhea, sinus pressure, sinus pain, tinnitus and voice change. Negative for dental problem, drooling, ear pain, facial swelling, hearing loss, nosebleeds, sneezing, sore throat and trouble swallowing. Eyes:  Negative for photophobia, pain, discharge, redness, itching and visual disturbance. Respiratory:  Positive for apnea, cough, shortness of breath and wheezing. Negative for choking, chest tightness and stridor. Cardiovascular:  Positive for palpitations and leg swelling. Negative for chest pain. Gastrointestinal:  Positive for abdominal distention, anal bleeding, blood in stool, constipation and diarrhea. Negative for abdominal pain, nausea, rectal pain and vomiting. Endocrine: Positive for heat intolerance. Negative for cold intolerance, polydipsia and polyuria. Genitourinary:  Positive for genital sores and pelvic pain. Negative for decreased urine volume, difficulty urinating, dyspareunia, dysuria, enuresis, flank pain, frequency, hematuria, menstrual problem, urgency, vaginal bleeding, vaginal discharge and vaginal pain. Musculoskeletal:  Positive for arthralgias, back pain, neck pain and neck stiffness. Negative for gait problem, joint swelling and myalgias. Skin:  Negative for color change, pallor, rash and wound. Allergic/Immunologic: Positive for immunocompromised state. Negative for environmental allergies and food allergies. Neurological:  Positive for tremors. Negative for dizziness, seizures, syncope, facial asymmetry, speech difficulty, weakness, light-headedness, numbness and headaches. Hematological:  Negative for adenopathy. Does not bruise/bleed easily. Psychiatric/Behavioral:  Positive for dysphoric mood. Negative for agitation, behavioral problems, confusion, decreased concentration, hallucinations, self-injury, sleep disturbance and suicidal ideas. The patient is nervous/anxious. The patient is not hyperactive.       Past Medical History:   Diagnosis Date    Anemia     Atrial fibrillation ([facility] [address] 11/09/2017    CAD (coronary artery disease)     CHF (congestive heart failure) (HCC)     Chronic kidney disease     stage 3 kidney     COPD (chronic obstructive pulmonary disease) (HCC)     DDD (degenerative disc disease), lumbar     Depression     Frequent PVCs 12/13/2017    GERD (gastroesophageal reflux disease)     History of blood transfusion     Hx of blood clots 09/2017    pulmonary emboli    Hyperlipidemia     Hypertension     sees Baki    Hyperthyroidism     Kidney disease     hemmelgarn    Movement disorder     DDD    Neuropathy     Obesity     Palpitations 01/10/2020    Pneumonia 2016    sepsis    Sleep apnea     usually wears cpap at night    Status post right and left heart catheterization     Type II or unspecified type diabetes mellitus without mention of complication, not stated as uncontrolled        Past Surgical History:   Procedure Laterality Date    ACHILLES TENDON SURGERY Bilateral     BLADDER SUSPENSION      CARDIAC SURGERY  2016    heart cath--[facility]    COLONOSCOPY Left 05/11/2018    COLONOSCOPY POLYPECTOMY SNARE/COLD BIOPSY performed by Shazia Martin MD at [facility]    COLONOSCOPY N/A 08/04/2021    COLONOSCOPY performed by Porter Carlos MD at [facility]    COLONOSCOPY  08/04/2021    Dr. Herson Melvin-- [facility]    COLONOSCOPY N/A 10/20/2022    COLONOSCOPY POLYPECTOMY SNARE/COLD BIOPSY performed by Porter Carlos MD at [facility]    ENDOSCOPY, COLON, DIAGNOSTIC      GASTRIC FUNDOPLICATION      HAMMER TOE SURGERY Right     HERNIA REPAIR      HIATAL HERNIA REPAIR  09/06/2016    Laparoscopic Robotic with Nissen Fundoplication - Dr. Jud Paulino (CERVIX STATUS UNKNOWN)      CT OFFICE/OUTPT VISIT,PROCEDURE ONLY N/A 09/21/2018    EGD DIAGNOSTIC ONLY performed by Porter Carlos MD at [address] [place] [address]      right    TENDON RELEASE Left 08/09/2016    left foot    TRANSESOPHAGEAL ECHOCARDIOGRAM N/A 4/20/2022    TRANSESOPHAGEAL ECHOCARDIOGRAM performed by Neema VILLARREAL Fe Scott MD at [facility]    TUBAL LIGATION      TUNNELED VENOUS PORT PLACEMENT  11/06/2017    UPPER GASTROINTESTINAL ENDOSCOPY Left 05/10/2018    EGD BIOPSY performed by Jp Maher MD at [facility] Left 07/25/2021    EGD BIOPSY performed by Jorge Luis Rueda MD at [facility]       Current Outpatient Medications   Medication Sig Dispense Refill    metoprolol succinate (TOPROL XL) 25 MG extended release tablet Take 1 tablet by mouth daily 30 tablet 11    valsartan (DIOVAN) 40 MG tablet Take 1 tablet by mouth daily 30 tablet 11    polyvinyl alcohol (LIQUIFILM TEARS) 1.4 % ophthalmic solution 1 drop as needed      benzonatate (TESSALON) 200 MG capsule Take 200 mg by mouth 3 times daily as needed for Cough      polyethylene glycol (GLYCOLAX) 17 g packet Take 17 g by mouth daily as needed for Constipation      guaiFENesin (ROBITUSSIN) 100 MG/5ML SOLN oral solution Take 200 mg by mouth every 4 hours as needed for Cough      ipratropium (ATROVENT) 0.06 % nasal spray       pregabalin (LYRICA) 150 MG capsule Take 150 mg by mouth in the morning, at noon, and at bedtime.        linaclotide (LINZESS) 145 MCG capsule Take 1 capsule by mouth every morning (before breakfast) 30 capsule 2    ferrous gluconate (FERGON) 324 (38 Fe) MG tablet Take 324 mg by mouth 2 times daily      furosemide (LASIX) 20 MG tablet Take 20 mg by mouth daily as needed (swelling as needed)      insulin lispro (HUMALOG) 100 UNIT/ML injection vial Inject into the skin 3 times daily (before meals)      hydrOXYzine (ATARAX) 10 MG tablet Take 10 mg by mouth 3 times daily as needed for Itching      Probiotic Product (PROBIOTIC DAILY PO) Take by mouth      cetirizine (ZYRTEC) 10 MG tablet Take 10 mg by mouth daily      polyethyl glycol-propyl glycol 0.4-0.3 % (SYSTANE) 0.4-0.3 % ophthalmic solution 1 drop as needed for Dry Eyes      sucralfate (CARAFATE) 1 GM tablet Take 1 tablet by mouth 4 times daily (before meals and nightly) 120 tablet 0    vitamin D 25 MCG (1000 UT) CAPS Take by mouth daily 125 mcg daily      citalopram (CELEXA) 40 MG tablet Take 40 mg by mouth daily      tiZANidine (ZANAFLEX) 2 MG tablet Take 2 mg by mouth 2 times daily       DULoxetine (CYMBALTA) 20 MG extended release capsule Take 120 mg by mouth daily       insulin glargine (BASAGLAR KWIKPEN) 100 UNIT/ML injection pen Inject 8 Units into the skin nightly       potassium chloride (KLOR-CON M) 10 MEQ extended release tablet Take 1 tablet by mouth daily 90 tablet 2    Dulaglutide (TRULICITY) 9.74 NP/8.8EH SOPN Inject 0.75 mg into the skin once a week Every Wednesday      diphenhydrAMINE (BENADRYL) 25 MG capsule Take 25 mg by mouth as needed for Itching      RA ALCOHOL SWABS 70 % PADS   1    ONE TOUCH ULTRA TEST strip   1    Lancets Misc. (UNISTIK CZT COMFORT) MISC   1    ipratropium-albuterol (DUONEB) 0.5-2.5 (3) MG/3ML SOLN nebulizer solution Inhale 1 vial into the lungs every 4 hours       traZODone (DESYREL) 100 MG tablet Take 100 mg by mouth nightly       Multiple Vitamins-Minerals (THERAPEUTIC MULTIVITAMIN-MINERALS) tablet Take 1 tablet by mouth daily      OXYGEN Inhale into the lungs continuous      atorvastatin (LIPITOR) 20 MG tablet Take 1 tablet by mouth nightly 30 tablet 3    magnesium hydroxide (MILK OF MAGNESIA CONCENTRATE) 2400 MG/10ML SUSP Take 30 mLs by mouth once as needed      docusate sodium (COLACE) 100 MG capsule Take 100 mg by mouth 3 times daily as needed       acetaminophen (TYLENOL) 325 MG tablet Take 2 tablets by mouth every 4 hours as needed for Pain 120 tablet 3    levothyroxine (SYNTHROID) 75 MCG tablet Take 75 mcg by mouth Daily      pantoprazole (PROTONIX) 40 MG tablet Take 1 tablet by mouth 2 times daily (before meals) 60 tablet 0     No current facility-administered medications for this visit.        Allergies   Allergen Reactions    Bactrim [Sulfamethoxazole-Trimethoprim]      TOLD BY DR ROSALES TO TAKE AGAIN Wellbutrin [Bupropion] Other (See Comments)     hallucinations    Silicone Rash       Family History   Problem Relation Age of Onset    Cancer Mother     Heart Disease Mother     High Blood Pressure Mother     Diabetes Mother     Vision Loss Mother     Stroke Mother     COPD Father     Diabetes Father     COPD Brother        Social History     Socioeconomic History    Marital status:      Spouse name: Not on file    Number of children: 3    Years of education: Not on file    Highest education level: Not on file   Occupational History    Not on file   Tobacco Use    Smoking status: Former     Packs/day: 1.00     Years: 30.00     Pack years: 30.00     Types: Cigarettes     Quit date: 5/10/2006     Years since quittin.6    Smokeless tobacco: Never   Vaping Use    Vaping Use: Never used   Substance and Sexual Activity    Alcohol use: No    Drug use: No    Sexual activity: Not Currently   Other Topics Concern    Not on file   Social History Narrative    Not on file     Social Determinants of Health     Financial Resource Strain: Not on file   Food Insecurity: Not on file   Transportation Needs: Not on file   Physical Activity: Not on file   Stress: Not on file   Social Connections: Not on file   Intimate Partner Violence: Not on file   Housing Stability: Not on file     Vitals:    22 1042   BP: 120/62   Site: Left Upper Arm   Position: Sitting   Cuff Size: Medium Adult   Pulse: 50   Resp: 18   Temp: (!) 96.7 °F (35.9 °C)   TempSrc: Temporal   SpO2: 95%   Weight: 227 lb 11.2 oz (103.3 kg)   Height: 5' 5\" (1.651 m)     Body mass index is 37.89 kg/m². Wt Readings from Last 3 Encounters:   22 227 lb 11.2 oz (103.3 kg)   22 231 lb (104.8 kg)   11/10/22 226 lb (102.5 kg)     Physical Exam  Vitals reviewed. Constitutional:       General: She is not in acute distress. Appearance: She is well-developed. She is not diaphoretic. HENT:      Head: Normocephalic and atraumatic.       Right Ear: External ear normal.      Left Ear: External ear normal.      Nose: Nose normal.   Eyes:      General: No scleral icterus. Right eye: No discharge. Left eye: No discharge. Conjunctiva/sclera: Conjunctivae normal.   Cardiovascular:      Rate and Rhythm: Normal rate and regular rhythm. Heart sounds: Normal heart sounds. Pulmonary:      Effort: Pulmonary effort is normal. No respiratory distress. Breath sounds: Normal breath sounds. No wheezing or rales. Chest:      Chest wall: No tenderness. Abdominal:      General: Bowel sounds are normal. There is no distension. Palpations: Abdomen is soft. There is no mass. Tenderness: There is no abdominal tenderness. There is no guarding or rebound. Genitourinary:     Rectum: No mass, tenderness or anal fissure. Musculoskeletal:         General: No tenderness. Normal range of motion. Cervical back: Normal range of motion and neck supple. Skin:     General: Skin is warm and dry. Coloration: Skin is not pale. Findings: No erythema or rash. Neurological:      Mental Status: She is alert and oriented to person, place, and time. Cranial Nerves: No cranial nerve deficit. Psychiatric:         Behavior: Behavior normal.         Thought Content:  Thought content normal.         Judgment: Judgment normal.     Lab Results   Component Value Date    WBC 5.0 04/27/2022    HGB 8.9 (L) 04/30/2022    HCT 28.1 (L) 04/30/2022    MCV 97.1 04/27/2022     04/27/2022     Lab Results   Component Value Date     09/22/2022    K 4.5 09/22/2022     09/22/2022    CO2 31 09/22/2022     Lab Results   Component Value Date    CREATININE 1.2 09/22/2022     Lab Results   Component Value Date    ALT 9 (L) 04/27/2022    AST 15 04/27/2022    ALKPHOS 74 04/27/2022    BILITOT 0.2 (L) 04/27/2022     Lab Results   Component Value Date    LIPASE 29.0 04/27/2022       Patient Active Problem List   Diagnosis    Benign essential tremor    CKD (chronic kidney disease) stage 3, GFR 30-59 ml/min (HCC)    Essential hypertension    Sepsis with hypotension (HCC)    Septic shock (HCC)    Pneumonia of both lower lobes due to infectious organism    Pneumonia of left lung due to infectious organism    Type 2 diabetes mellitus without complication (HCC)    Left ventricular systolic dysfunction    Coronary artery disease involving native coronary artery of native heart without angina pectoris    RUQ abdominal pain    Gallbladder sludge    Bacteremia due to Gram-positive bacteria    Acute systolic congestive heart failure (HCC)    Severe sepsis with septic shock (HCC)    CKD (chronic kidney disease), stage III (HCC)    DM II (diabetes mellitus, type II), controlled (HCC)    Cardiomyopathy, dilated (HCC)    Acute pulmonary edema (HCC)    Acute kidney injury superimposed on CKD (HCC)    HFrEF (heart failure with reduced ejection fraction) ([facility])    Fever    General weakness    Acute on chronic renal insufficiency    Hypotension    Episode of unresponsiveness    Cardiomyopathy ([facility])    Hyperkalemia    Hypokalemia    Selective immunoglobulin deficiency (HCC)    GABBY (acute kidney injury) ([address]    Chronic respiratory failure with hypoxia ([address]    Influenza with respiratory manifestation other than pneumonia    Klebsiella pneumonia (HCC)    Chest pain    Acute on chronic respiratory failure with hypoxia ([address]    Hiatal hernia    Gastroesophageal reflux disease    Gastroesophageal reflux disease with esophagitis    S/P Nissen fundoplication (without gastrostomy tube) procedure    Bradycardia    Symptomatic sinus bradycardia    Tachycardia-bradycardia syndrome ([address]    Arrhythmia    Atrial fibrillation ([address]    Encounter for electronic analysis of reveal event recorder    Frequent PVCs    Acute upper GI hemorrhage    Com variab immunodef w predom abnlt of B-cell nums & functn ([address]    Altered mental status    Common variable immunodeficiency ([address]    Acquired hypothyroidism    Palpitations    Anemia    Acute blood loss anemia    Hemorrhage secondary to anti-coagulation [facility])    Physical deconditioning    Abilio ulcer    Paroxysmal atrial fibrillation [facility])   Media Information      Media Information      An electronic signature was used to authenticate this note.     --Theeliel Gautam MD

## 2022-12-27 ENCOUNTER — TELEPHONE (OUTPATIENT)
Dept: SURGERY | Age: 77
End: 2022-12-27

## 2022-12-27 NOTE — TELEPHONE ENCOUNTER
Nurse from [facility] called stating patient tested positive for COVID and is symptomatic with fever, congestion, headache. Informed her since she is symptomatic we would cancel surgery and they can contact me when patient is feeling better to r/s.   She voiced understanding

## 2023-01-09 ENCOUNTER — HOSPITAL ENCOUNTER (OUTPATIENT)
Dept: NURSING | Age: 78
Discharge: HOME | End: 2023-01-09

## 2023-01-10 ENCOUNTER — APPOINTMENT (OUTPATIENT)
Dept: GENERAL RADIOLOGY | Age: 78
DRG: 309 | End: 2023-01-10
Payer: MEDICARE

## 2023-01-10 ENCOUNTER — HOSPITAL ENCOUNTER (INPATIENT)
Age: 78
LOS: 1 days | Discharge: HOME | DRG: 309 | End: 2023-01-13
Attending: STUDENT IN AN ORGANIZED HEALTH CARE EDUCATION/TRAINING PROGRAM
Payer: MEDICARE

## 2023-01-10 DIAGNOSIS — N18.9 ACUTE KIDNEY INJURY SUPERIMPOSED ON CKD (HCC): ICD-10-CM

## 2023-01-10 DIAGNOSIS — R07.9 ACUTE CHEST PAIN: Primary | ICD-10-CM

## 2023-01-10 DIAGNOSIS — N17.9 ACUTE KIDNEY INJURY SUPERIMPOSED ON CKD (HCC): ICD-10-CM

## 2023-01-10 LAB
ALBUMIN SERPL-MCNC: 3.8 G/DL (ref 3.5–5.1)
ALP BLD-CCNC: 86 U/L (ref 38–126)
ALT SERPL-CCNC: 12 U/L (ref 11–66)
ANION GAP SERPL CALCULATED.3IONS-SCNC: 13 MEQ/L (ref 8–16)
AST SERPL-CCNC: 20 U/L (ref 5–40)
BASOPHILS # BLD: 0.9 %
BASOPHILS ABSOLUTE: 0.1 THOU/MM3 (ref 0–0.1)
BILIRUB SERPL-MCNC: 0.4 MG/DL (ref 0.3–1.2)
BUN BLDV-MCNC: 33 MG/DL (ref 7–22)
CALCIUM SERPL-MCNC: 8.9 MG/DL (ref 8.5–10.5)
CHLORIDE BLD-SCNC: 103 MEQ/L (ref 98–111)
CO2: 24 MEQ/L (ref 23–33)
CREAT SERPL-MCNC: 1.5 MG/DL (ref 0.4–1.2)
EKG ATRIAL RATE: 61 BPM
EKG P AXIS: 7 DEGREES
EKG P-R INTERVAL: 162 MS
EKG Q-T INTERVAL: 394 MS
EKG QRS DURATION: 102 MS
EKG QTC CALCULATION (BAZETT): 445 MS
EKG R AXIS: -36 DEGREES
EKG T AXIS: 114 DEGREES
EKG VENTRICULAR RATE: 77 BPM
EOSINOPHIL # BLD: 1 %
EOSINOPHILS ABSOLUTE: 0.1 THOU/MM3 (ref 0–0.4)
ERYTHROCYTE [DISTWIDTH] IN BLOOD BY AUTOMATED COUNT: 12.9 % (ref 11.5–14.5)
ERYTHROCYTE [DISTWIDTH] IN BLOOD BY AUTOMATED COUNT: 45.5 FL (ref 35–45)
GFR SERPL CREATININE-BSD FRML MDRD: 35 ML/MIN/1.73M2
GLUCOSE BLD-MCNC: 123 MG/DL (ref 70–108)
GLUCOSE BLD-MCNC: 93 MG/DL (ref 70–108)
HCT VFR BLD CALC: 43.5 % (ref 37–47)
HEMOGLOBIN: 13.9 GM/DL (ref 12–16)
IMMATURE GRANS (ABS): 0.01 THOU/MM3 (ref 0–0.07)
IMMATURE GRANULOCYTES: 0.2 %
LYMPHOCYTES # BLD: 21.3 %
LYMPHOCYTES ABSOLUTE: 1.2 THOU/MM3 (ref 1–4.8)
MCH RBC QN AUTO: 30.7 PG (ref 26–33)
MCHC RBC AUTO-ENTMCNC: 32 GM/DL (ref 32.2–35.5)
MCV RBC AUTO: 96 FL (ref 81–99)
MONOCYTES # BLD: 9.2 %
MONOCYTES ABSOLUTE: 0.5 THOU/MM3 (ref 0.4–1.3)
NUCLEATED RED BLOOD CELLS: 0 /100 WBC
OSMOLALITY CALCULATION: 288 MOSMOL/KG (ref 275–300)
PLATELET # BLD: 193 THOU/MM3 (ref 130–400)
PMV BLD AUTO: 10.1 FL (ref 9.4–12.4)
POTASSIUM REFLEX MAGNESIUM: 5.4 MEQ/L (ref 3.5–5.2)
POTASSIUM SERPL-SCNC: 5.3 MEQ/L (ref 3.5–5.2)
RBC # BLD: 4.53 MILL/MM3 (ref 4.2–5.4)
SEG NEUTROPHILS: 67.4 %
SEGMENTED NEUTROPHILS ABSOLUTE COUNT: 3.8 THOU/MM3 (ref 1.8–7.7)
SODIUM BLD-SCNC: 140 MEQ/L (ref 135–145)
TOTAL PROTEIN: 7 G/DL (ref 6.1–8)
TROPONIN T: < 0.01 NG/ML
TROPONIN T: < 0.01 NG/ML
TSH SERPL DL<=0.05 MIU/L-ACNC: 1.6 UIU/ML (ref 0.4–4.2)
WBC # BLD: 5.7 THOU/MM3 (ref 4.8–10.8)

## 2023-01-10 PROCEDURE — 82948 REAGENT STRIP/BLOOD GLUCOSE: CPT

## 2023-01-10 PROCEDURE — G0378 HOSPITAL OBSERVATION PER HR: HCPCS

## 2023-01-10 PROCEDURE — 93005 ELECTROCARDIOGRAM TRACING: CPT | Performed by: NURSE PRACTITIONER

## 2023-01-10 PROCEDURE — 84443 ASSAY THYROID STIM HORMONE: CPT

## 2023-01-10 PROCEDURE — 80053 COMPREHEN METABOLIC PANEL: CPT

## 2023-01-10 PROCEDURE — 6370000000 HC RX 637 (ALT 250 FOR IP): Performed by: NURSE PRACTITIONER

## 2023-01-10 PROCEDURE — 99285 EMERGENCY DEPT VISIT HI MDM: CPT

## 2023-01-10 PROCEDURE — 83036 HEMOGLOBIN GLYCOSYLATED A1C: CPT

## 2023-01-10 PROCEDURE — 96372 THER/PROPH/DIAG INJ SC/IM: CPT

## 2023-01-10 PROCEDURE — 2700000000 HC OXYGEN THERAPY PER DAY

## 2023-01-10 PROCEDURE — 84132 ASSAY OF SERUM POTASSIUM: CPT

## 2023-01-10 PROCEDURE — 36415 COLL VENOUS BLD VENIPUNCTURE: CPT

## 2023-01-10 PROCEDURE — 6360000002 HC RX W HCPCS: Performed by: NURSE PRACTITIONER

## 2023-01-10 PROCEDURE — 99223 1ST HOSP IP/OBS HIGH 75: CPT

## 2023-01-10 PROCEDURE — 84484 ASSAY OF TROPONIN QUANT: CPT

## 2023-01-10 PROCEDURE — 94640 AIRWAY INHALATION TREATMENT: CPT

## 2023-01-10 PROCEDURE — 71046 X-RAY EXAM CHEST 2 VIEWS: CPT

## 2023-01-10 PROCEDURE — 93010 ELECTROCARDIOGRAM REPORT: CPT | Performed by: NUCLEAR MEDICINE

## 2023-01-10 PROCEDURE — 85025 COMPLETE CBC W/AUTO DIFF WBC: CPT

## 2023-01-10 PROCEDURE — 94761 N-INVAS EAR/PLS OXIMETRY MLT: CPT

## 2023-01-10 RX ORDER — BENZONATATE 100 MG/1
200 CAPSULE ORAL 3 TIMES DAILY PRN
Status: DISCONTINUED | OUTPATIENT
Start: 2023-01-10 | End: 2023-01-13 | Stop reason: HOSPADM

## 2023-01-10 RX ORDER — PREGABALIN 75 MG/1
150 CAPSULE ORAL 3 TIMES DAILY
Status: DISCONTINUED | OUTPATIENT
Start: 2023-01-10 | End: 2023-01-13 | Stop reason: HOSPADM

## 2023-01-10 RX ORDER — LEVOTHYROXINE SODIUM 0.07 MG/1
75 TABLET ORAL DAILY
Status: DISCONTINUED | OUTPATIENT
Start: 2023-01-11 | End: 2023-01-13 | Stop reason: HOSPADM

## 2023-01-10 RX ORDER — SODIUM CHLORIDE 0.9 % (FLUSH) 0.9 %
5-40 SYRINGE (ML) INJECTION EVERY 12 HOURS SCHEDULED
Status: DISCONTINUED | OUTPATIENT
Start: 2023-01-10 | End: 2023-01-13 | Stop reason: HOSPADM

## 2023-01-10 RX ORDER — DOCUSATE SODIUM 100 MG/1
100 CAPSULE, LIQUID FILLED ORAL 3 TIMES DAILY PRN
Status: DISCONTINUED | OUTPATIENT
Start: 2023-01-10 | End: 2023-01-13 | Stop reason: HOSPADM

## 2023-01-10 RX ORDER — INSULIN LISPRO 100 [IU]/ML
0-4 INJECTION, SOLUTION INTRAVENOUS; SUBCUTANEOUS NIGHTLY
Status: DISCONTINUED | OUTPATIENT
Start: 2023-01-10 | End: 2023-01-11

## 2023-01-10 RX ORDER — SODIUM CHLORIDE 9 MG/ML
INJECTION, SOLUTION INTRAVENOUS PRN
Status: DISCONTINUED | OUTPATIENT
Start: 2023-01-10 | End: 2023-01-13 | Stop reason: HOSPADM

## 2023-01-10 RX ORDER — IPRATROPIUM BROMIDE AND ALBUTEROL SULFATE 2.5; .5 MG/3ML; MG/3ML
1 SOLUTION RESPIRATORY (INHALATION) EVERY 4 HOURS
Status: DISCONTINUED | OUTPATIENT
Start: 2023-01-10 | End: 2023-01-11

## 2023-01-10 RX ORDER — DEXTROSE MONOHYDRATE 100 MG/ML
INJECTION, SOLUTION INTRAVENOUS CONTINUOUS PRN
Status: DISCONTINUED | OUTPATIENT
Start: 2023-01-10 | End: 2023-01-13 | Stop reason: HOSPADM

## 2023-01-10 RX ORDER — SUCRALFATE 1 G/1
1 TABLET ORAL
Status: DISCONTINUED | OUTPATIENT
Start: 2023-01-10 | End: 2023-01-13 | Stop reason: HOSPADM

## 2023-01-10 RX ORDER — ASPIRIN 81 MG/1
324 TABLET, CHEWABLE ORAL ONCE
Status: COMPLETED | OUTPATIENT
Start: 2023-01-10 | End: 2023-01-10

## 2023-01-10 RX ORDER — SODIUM CHLORIDE 0.9 % (FLUSH) 0.9 %
5-40 SYRINGE (ML) INJECTION PRN
Status: DISCONTINUED | OUTPATIENT
Start: 2023-01-10 | End: 2023-01-13 | Stop reason: HOSPADM

## 2023-01-10 RX ORDER — ACETAMINOPHEN 650 MG/1
650 SUPPOSITORY RECTAL EVERY 6 HOURS PRN
Status: DISCONTINUED | OUTPATIENT
Start: 2023-01-10 | End: 2023-01-13 | Stop reason: HOSPADM

## 2023-01-10 RX ORDER — CETIRIZINE HYDROCHLORIDE 10 MG/1
10 TABLET ORAL DAILY
Status: DISCONTINUED | OUTPATIENT
Start: 2023-01-11 | End: 2023-01-13 | Stop reason: HOSPADM

## 2023-01-10 RX ORDER — TRAZODONE HYDROCHLORIDE 100 MG/1
100 TABLET ORAL NIGHTLY
Status: DISCONTINUED | OUTPATIENT
Start: 2023-01-10 | End: 2023-01-13 | Stop reason: HOSPADM

## 2023-01-10 RX ORDER — METOPROLOL SUCCINATE 25 MG/1
25 TABLET, EXTENDED RELEASE ORAL DAILY
Status: DISCONTINUED | OUTPATIENT
Start: 2023-01-10 | End: 2023-01-13 | Stop reason: HOSPADM

## 2023-01-10 RX ORDER — ONDANSETRON 4 MG/1
4 TABLET, ORALLY DISINTEGRATING ORAL EVERY 8 HOURS PRN
Status: DISCONTINUED | OUTPATIENT
Start: 2023-01-10 | End: 2023-01-13 | Stop reason: HOSPADM

## 2023-01-10 RX ORDER — FUROSEMIDE 20 MG/1
20 TABLET ORAL DAILY PRN
Status: DISCONTINUED | OUTPATIENT
Start: 2023-01-10 | End: 2023-01-13 | Stop reason: HOSPADM

## 2023-01-10 RX ORDER — PANTOPRAZOLE SODIUM 40 MG/1
40 TABLET, DELAYED RELEASE ORAL
Status: DISCONTINUED | OUTPATIENT
Start: 2023-01-11 | End: 2023-01-13 | Stop reason: HOSPADM

## 2023-01-10 RX ORDER — QUINIDINE GLUCONATE 324 MG
240 TABLET, EXTENDED RELEASE ORAL 2 TIMES DAILY
Status: DISCONTINUED | OUTPATIENT
Start: 2023-01-10 | End: 2023-01-13 | Stop reason: HOSPADM

## 2023-01-10 RX ORDER — DULOXETIN HYDROCHLORIDE 60 MG/1
120 CAPSULE, DELAYED RELEASE ORAL DAILY
Status: DISCONTINUED | OUTPATIENT
Start: 2023-01-10 | End: 2023-01-13 | Stop reason: HOSPADM

## 2023-01-10 RX ORDER — ENOXAPARIN SODIUM 100 MG/ML
40 INJECTION SUBCUTANEOUS EVERY 24 HOURS
Status: DISCONTINUED | OUTPATIENT
Start: 2023-01-10 | End: 2023-01-13 | Stop reason: HOSPADM

## 2023-01-10 RX ORDER — ACETAMINOPHEN 325 MG/1
650 TABLET ORAL EVERY 6 HOURS PRN
Status: DISCONTINUED | OUTPATIENT
Start: 2023-01-10 | End: 2023-01-13 | Stop reason: HOSPADM

## 2023-01-10 RX ORDER — INSULIN LISPRO 100 [IU]/ML
0-4 INJECTION, SOLUTION INTRAVENOUS; SUBCUTANEOUS
Status: DISCONTINUED | OUTPATIENT
Start: 2023-01-11 | End: 2023-01-11

## 2023-01-10 RX ORDER — TIZANIDINE 4 MG/1
2 TABLET ORAL 2 TIMES DAILY
Status: DISCONTINUED | OUTPATIENT
Start: 2023-01-10 | End: 2023-01-13 | Stop reason: HOSPADM

## 2023-01-10 RX ORDER — INSULIN GLARGINE 100 [IU]/ML
8 INJECTION, SOLUTION SUBCUTANEOUS NIGHTLY
Status: DISCONTINUED | OUTPATIENT
Start: 2023-01-10 | End: 2023-01-13 | Stop reason: HOSPADM

## 2023-01-10 RX ORDER — POLYETHYLENE GLYCOL 3350 17 G/17G
17 POWDER, FOR SOLUTION ORAL DAILY PRN
Status: DISCONTINUED | OUTPATIENT
Start: 2023-01-10 | End: 2023-01-13 | Stop reason: HOSPADM

## 2023-01-10 RX ORDER — ATORVASTATIN CALCIUM 20 MG/1
20 TABLET, FILM COATED ORAL NIGHTLY
Status: DISCONTINUED | OUTPATIENT
Start: 2023-01-10 | End: 2023-01-13 | Stop reason: HOSPADM

## 2023-01-10 RX ORDER — ONDANSETRON 2 MG/ML
4 INJECTION INTRAMUSCULAR; INTRAVENOUS EVERY 6 HOURS PRN
Status: DISCONTINUED | OUTPATIENT
Start: 2023-01-10 | End: 2023-01-13 | Stop reason: HOSPADM

## 2023-01-10 RX ORDER — CITALOPRAM 40 MG/1
40 TABLET ORAL DAILY
Status: DISCONTINUED | OUTPATIENT
Start: 2023-01-11 | End: 2023-01-13 | Stop reason: HOSPADM

## 2023-01-10 RX ORDER — VALSARTAN 40 MG/1
40 TABLET ORAL DAILY
Status: DISCONTINUED | OUTPATIENT
Start: 2023-01-10 | End: 2023-01-13 | Stop reason: HOSPADM

## 2023-01-10 RX ADMIN — ASPIRIN 324 MG: 81 TABLET, CHEWABLE ORAL at 15:55

## 2023-01-10 RX ADMIN — ATORVASTATIN CALCIUM 20 MG: 20 TABLET, FILM COATED ORAL at 23:16

## 2023-01-10 RX ADMIN — SUCRALFATE 1 G: 1 TABLET ORAL at 23:16

## 2023-01-10 RX ADMIN — INSULIN GLARGINE 8 UNITS: 100 INJECTION, SOLUTION SUBCUTANEOUS at 23:21

## 2023-01-10 RX ADMIN — DULOXETINE HYDROCHLORIDE 120 MG: 60 CAPSULE, DELAYED RELEASE ORAL at 23:16

## 2023-01-10 RX ADMIN — PREGABALIN 150 MG: 75 CAPSULE ORAL at 23:17

## 2023-01-10 RX ADMIN — TIZANIDINE 2 MG: 4 TABLET ORAL at 23:17

## 2023-01-10 RX ADMIN — IPRATROPIUM BROMIDE AND ALBUTEROL SULFATE 3 ML: .5; 3 SOLUTION RESPIRATORY (INHALATION) at 22:39

## 2023-01-10 RX ADMIN — ENOXAPARIN SODIUM 40 MG: 100 INJECTION SUBCUTANEOUS at 23:21

## 2023-01-10 RX ADMIN — TRAZODONE HYDROCHLORIDE 100 MG: 100 TABLET ORAL at 23:16

## 2023-01-10 RX ADMIN — Medication 240 MG: at 23:16

## 2023-01-10 ASSESSMENT — ENCOUNTER SYMPTOMS
SINUS PRESSURE: 1
CONSTIPATION: 0
ABDOMINAL DISTENTION: 0
COLOR CHANGE: 0
RHINORRHEA: 0
VOMITING: 0
NAUSEA: 1
DIARRHEA: 0
SHORTNESS OF BREATH: 0
SORE THROAT: 0

## 2023-01-10 ASSESSMENT — PAIN - FUNCTIONAL ASSESSMENT
PAIN_FUNCTIONAL_ASSESSMENT: NONE - DENIES PAIN
PAIN_FUNCTIONAL_ASSESSMENT: 0-10
PAIN_FUNCTIONAL_ASSESSMENT: NONE - DENIES PAIN

## 2023-01-10 ASSESSMENT — PAIN DESCRIPTION - DESCRIPTORS: DESCRIPTORS: NUMBNESS

## 2023-01-10 ASSESSMENT — PAIN SCALES - GENERAL
PAINLEVEL_OUTOF10: 5
PAINLEVEL_OUTOF10: 5

## 2023-01-10 ASSESSMENT — HEART SCORE: ECG: 1

## 2023-01-10 NOTE — ED NOTES
Patient resting in bed. Respirations easy and unlabored. No distress noted. Call light within reach.      Shona Hernandez Notice) JARON Fields RN  01/10/23 1056

## 2023-01-10 NOTE — ED NOTES
ED to inpatient nurses report    Chief Complaint   Patient presents with    Chest Pain      Present to ED from nursing home  LOC: alert and orientated to name, place, date  Vital signs   Vitals:    01/10/23 1403 01/10/23 1509 01/10/23 1631 01/10/23 1710   BP: 100/70 119/68 116/78 136/70   Pulse: 78 74 76 63   Resp: 17 16 17 14   Temp: 98 °F (36.7 °C)      TempSrc: Oral      SpO2: 96% 97% 98% 95%   Weight: 220 lb (99.8 kg)      Height: 5' 5\" (1.651 m)         Oxygen Baseline RA    Current needs required RA Bipap/Cpap No  LDAs:   Peripheral IV 01/10/23 Left Antecubital (Active)   Site Assessment Clean, dry & intact 01/10/23 1710   Line Status Normal saline locked 01/10/23 1710   Phlebitis Assessment No symptoms 01/10/23 1710   Infiltration Assessment 0 01/10/23 1710     Mobility: Requires assistance * 2  Pending ED orders: None  Present condition: Stable      C-SSRS    Swallow Screening    Preferred Language: [place]     Electronically signed by Vinay De Oliveira RN on 1/10/2023 at 5:20 PM       Vinay De Oliveira RN  01/10/23 1720

## 2023-01-10 NOTE — H&P
History & Physical    Patient:  Anayeli Esposito  YOB: 1945  Date of Service: 1/10/2023  MRN: [id number]   Acct:  [de-identified]   Primary Care Physician: Nya Braswell MD    Chief Complaint:Chest pain for 3 days PTA      Assessment / Plan:  Chest pain possible symptomatic due to 2. Initial troponin negative. EKG with no acute ischemic changes. Trend troponin to rule out ACS. Heart score 6. Check thyroid and fasting lipid panel. Obtain a limited echo. Telemetry. Bradycardia. Possibly iatrogenic due to medications. S/P Lidocaine per EMS. Hold Toprol XL, this was recently started 10/2022. Telemetry. Consult to cardiology. Hyperkalemia, mild. Hold potassium supplement and Diovan, recently started 10/2022. Repeat K, consider treatment pending results. Specimen was likely hemolyzed. Mild GABBY on CKD stage III. Creatinine 1.5. Baseline appears 1.2-1.3. Renally dose medications. Avoid nephrotoxic agents. Holding ARB and Lasix. Repeat BMP in AM.    PAFIB. Currently in NSR. Status post Watchman procedure 4/2020  due to history of GI bleed. Holding BB. Type 2 diabetes. Hold GLP1. Continue basal insulin. Add low dose scale. Accu checks ac/hs. Hypoglycemic protocol. Hypothyroidism. Restart Synthroid. Check TSH. Chronic respiratory failure due to COPD. Resume home inhalers. Do not suspect acute exacerbation at this time. Chronic systolic heart failure. EF 35-40% 4/2022. PRN lasix at home. On ARB and BB. Holding due to above. Daily weights. Strict intake and output. Low sodium 2L fluid restriction. Obesity. BMI 36.61  BRITTNY, resume home CPAP. History of immune deficiency receiving IVIG. Renal cysts. Being monitored by nephology. HLD, resume statin. Constipation. On Linzess at home. GERD resume home PPI and Carafate. Depression home medications have been resumed. History of Present Illness:     Johnathon Millan is a 65 y.o. female who presents with 3 day history of chest pain. States pain has progressively worsened today hence the call for EMS. Pt was resting in her bed today when she had an aid over to help her when she suddenly had substernal chest discomfort and pain and light headedness. PT characterizes the pain as constant, usually last 30 mins in duration,  non radiating, gnawing pain. She states she has had several episodes of this chest pain in the last 3-4 weeks. She reports associated palpitations at times, lightheadedness with position changes, nausea, but no exacerbating or triggering factors and no specific factor that improves her pain. She was noted to be bradycardic in the squad. Paramedics gave her IVP Lidocaine with improvement in HR. HR while in the ED has been stable in the 70's. She currently denies chest pain, shortness of breath F/C, N/V, abdominal pain.        Past Medical History:        Diagnosis Date    Anemia     Atrial fibrillation ([facility] [address] 11/09/2017    CAD (coronary artery disease)     CHF (congestive heart failure) ([facility])     Chronic kidney disease     stage 3 kidney     COPD (chronic obstructive pulmonary disease) ([facility])     DDD (degenerative disc disease), lumbar     Depression     Frequent PVCs 12/13/2017    GERD (gastroesophageal reflux disease)     History of blood transfusion     Hx of blood clots 09/2017    pulmonary emboli    Hyperlipidemia     Hypertension     sees Baki    Hyperthyroidism     Kidney disease     hemmelgarn    Movement disorder     DDD    Neuropathy     Obesity     Palpitations 01/10/2020    Pneumonia 2016    sepsis    Sleep apnea     usually wears cpap at night    Status post right and left heart catheterization     Type II or unspecified type diabetes mellitus without mention of complication, not stated as uncontrolled        Past Surgical History:        Procedure Laterality Date    ACHILLES TENDON SURGERY Bilateral     BLADDER SUSPENSION      [facility]    COLONOSCOPY Left 05/11/2018    COLONOSCOPY POLYPECTOMY SNARE/COLD BIOPSY performed by Paris Eisenmenger, MD at [facility]    COLONOSCOPY N/A 08/04/2021    COLONOSCOPY performed by Linh Coburn MD at [facility]    COLONOSCOPY  08/04/2021    Dr. Farhan Vera    COLONOSCOPY N/A 10/20/2022    COLONOSCOPY POLYPECTOMY SNARE/COLD [facility]    ENDOSCOPY, COLON, DIAGNOSTIC      GASTRIC FUNDOPLICATION      HAMMER TOE SURGERY Right     HERNIA REPAIR      HIATAL HERNIA REPAIR  09/06/2016    Laparoscopic Robotic with Nissen Fundoplication - Dr. Alecia Christiansen (CERVIX STATUS UNKNOWN)      [place] OFFICE/OUTPT VISIT,PROCEDURE ONLY N/A 09/21/2018    EGD DIAGNOSTIC ONLY performed by Linh Coburn MD at [address] [place] [address]      right    TENDON RELEASE Left 08/09/2016    left foot    TRANSESOPHAGEAL ECHOCARDIOGRAM N/A 4/20/2022    TRANSESOPHAGEAL ECHOCARDIOGRAM performed by Grace Lee MD at [facility]      TUNNELED VENOUS PORT PLACEMENT  11/06/2017    UPPER GASTROINTESTINAL ENDOSCOPY Left 05/10/2018    EGD BIOPSY performed by Paris Eisenmenger, MD at [facility] Left 07/25/2021    EGD BIOPSY performed by Holli Saha MD at [facility]       Home Medications:   No current facility-administered medications on file prior to encounter.      Current Outpatient Medications on File Prior to Encounter   Medication Sig Dispense Refill    metoprolol succinate (TOPROL XL) 25 MG extended release tablet Take 1 tablet by mouth daily 30 tablet 11    valsartan (DIOVAN) 40 MG tablet Take 1 tablet by mouth daily 30 tablet 11    polyvinyl alcohol (LIQUIFILM TEARS) 1.4 % ophthalmic solution 1 drop as needed      benzonatate (TESSALON) 200 MG capsule Take 200 mg by mouth 3 times daily as needed for Cough      polyethylene glycol (GLYCOLAX) 17 g packet Take 17 g by mouth daily as needed for Constipation      guaiFENesin (ROBITUSSIN) 100 MG/5ML SOLN oral solution Take 200 mg by mouth every 4 hours as needed for Cough      ipratropium (ATROVENT) 0.06 % nasal spray       pregabalin (LYRICA) 150 MG capsule Take 150 mg by mouth in the morning, at noon, and at bedtime.        pantoprazole (PROTONIX) 40 MG tablet Take 1 tablet by mouth 2 times daily (before meals) 60 tablet 0    linaclotide (LINZESS) 145 MCG capsule Take 1 capsule by mouth every morning (before breakfast) 30 capsule 2    ferrous gluconate (FERGON) 324 (38 Fe) MG tablet Take 324 mg by mouth 2 times daily      furosemide (LASIX) 20 MG tablet Take 20 mg by mouth daily as needed (swelling as needed)      insulin lispro (HUMALOG) 100 UNIT/ML injection vial Inject into the skin 3 times daily (before meals)      hydrOXYzine (ATARAX) 10 MG tablet Take 10 mg by mouth 3 times daily as needed for Itching      Probiotic Product (PROBIOTIC DAILY PO) Take by mouth      cetirizine (ZYRTEC) 10 MG tablet Take 10 mg by mouth daily      polyethyl glycol-propyl glycol 0.4-0.3 % (SYSTANE) 0.4-0.3 % ophthalmic solution 1 drop as needed for Dry Eyes      sucralfate (CARAFATE) 1 GM tablet Take 1 tablet by mouth 4 times daily (before meals and nightly) 120 tablet 0    vitamin D 25 MCG (1000 UT) CAPS Take by mouth daily 125 mcg daily      citalopram (CELEXA) 40 MG tablet Take 40 mg by mouth daily      tiZANidine (ZANAFLEX) 2 MG tablet Take 2 mg by mouth 2 times daily       DULoxetine (CYMBALTA) 20 MG extended release capsule Take 120 mg by mouth daily       insulin glargine (BASAGLAR KWIKPEN) 100 UNIT/ML injection pen Inject 8 Units into the skin nightly       potassium chloride (KLOR-CON M) 10 MEQ extended release tablet Take 1 tablet by mouth daily 90 tablet 2    Dulaglutide (TRULICITY) 6.79 TO/3.0UK SOPN Inject 0.75 mg into the skin once a week Every Wednesday      diphenhydrAMINE (BENADRYL) 25 MG capsule Take 25 mg by mouth as needed for Itching      RA ALCOHOL SWABS 70 % PADS   1    ONE TOUCH ULTRA TEST strip   1 Lancets Misc. (UNISTIK CZT COMFORT) MISC   1    ipratropium-albuterol (DUONEB) 0.5-2.5 (3) MG/3ML SOLN nebulizer solution Inhale 1 vial into the lungs every 4 hours       traZODone (DESYREL) 100 MG tablet Take 100 mg by mouth nightly       Multiple Vitamins-Minerals (THERAPEUTIC MULTIVITAMIN-MINERALS) tablet Take 1 tablet by mouth daily      OXYGEN Inhale into the lungs continuous      atorvastatin (LIPITOR) 20 MG tablet Take 1 tablet by mouth nightly 30 tablet 3    magnesium hydroxide (MILK OF MAGNESIA CONCENTRATE) 2400 MG/10ML SUSP Take 30 mLs by mouth once as needed      docusate sodium (COLACE) 100 MG capsule Take 100 mg by mouth 3 times daily as needed       acetaminophen (TYLENOL) 325 MG tablet Take 2 tablets by mouth every 4 hours as needed for Pain 120 tablet 3    levothyroxine (SYNTHROID) 75 MCG tablet Take 75 mcg by mouth Daily         Allergies:  Bactrim [sulfamethoxazole-trimethoprim], Wellbutrin [bupropion], and Silicone    Social History:    reports that she quit smoking about 16 years ago. Her smoking use included cigarettes. She has a 30.00 pack-year smoking history. She has never used smokeless tobacco. She reports that she does not drink alcohol and does not use drugs. Family History:       Problem Relation Age of Onset    Cancer Mother     Heart Disease Mother     High Blood Pressure Mother     Diabetes Mother     Vision Loss Mother     Stroke Mother     COPD Father     Diabetes Father     COPD Brother        Review of systems:  Review of Systems   Constitutional:  Positive for activity change. Negative for appetite change, chills, fatigue, fever and unexpected weight change. HENT:  Positive for congestion and sinus pressure. Negative for rhinorrhea and sore throat. Eyes:  Negative for visual disturbance. Respiratory:  Negative for shortness of breath. Cardiovascular:  Positive for palpitations. Negative for chest pain and leg swelling. Gastrointestinal:  Positive for nausea. Negative for abdominal distention, constipation, diarrhea and vomiting. Genitourinary:  Negative for difficulty urinating. Musculoskeletal:  Negative for arthralgias and gait problem. Skin:  Negative for color change. Neurological:  Positive for weakness and light-headedness. Negative for dizziness. Psychiatric/Behavioral:  The patient is not nervous/anxious. Vitals:   Vitals:    01/10/23 1710   BP: 136/70   Pulse: 63   Resp: 14   Temp:    SpO2: 95%      BMI: Body mass index is 36.61 kg/m². Exam:  Physical Examination: General appearance - alert, well appearing, and in no distress, oriented to person, place, and time, and chronically ill appearing  Mental status - alert, oriented to person, place, and time  Neck - supple, no significant adenopathy, no JVD, or carotid bruits  Chest - clear to auscultation, no wheezes, rales or rhonchi, symmetric air entry  Heart - normal rate, regular rhythm, normal S1, S2, no murmurs, rubs, clicks or gallops, S1 and S2 normal, no murmurs noted  Abdomen - soft, nontender, nondistended, no masses or organomegaly  Neurological - alert, oriented, normal speech, no focal findings or movement disorder noted  Musculoskeletal - no joint tenderness, deformity or swelling, scant non-pitting LE edema noted.    Skin - normal coloration and turgor, no rashes, no suspicious skin lesions noted      Review of Labs and Diagnostic Testing:    Recent Results (from the past 24 hour(s))   EKG Emergency    Collection Time: 01/10/23  1:56 PM   Result Value Ref Range    Ventricular Rate 77 BPM    Atrial Rate 61 BPM    P-R Interval 162 ms    QRS Duration 102 ms    Q-T Interval 394 ms    QTc Calculation (Bazett) 445 ms    P Axis 7 degrees    R Axis -36 degrees    T Axis 114 degrees   CBC with Auto Differential    Collection Time: 01/10/23  2:39 PM   Result Value Ref Range    WBC 5.7 4.8 - 10.8 thou/mm3    RBC 4.53 4.20 - 5.40 mill/mm3    Hemoglobin 13.9 12.0 - 16.0 gm/dl Hematocrit 43.5 37.0 - 47.0 %    MCV 96.0 81.0 - 99.0 fL    MCH 30.7 26.0 - 33.0 pg    MCHC 32.0 (L) 32.2 - 35.5 gm/dl    RDW-CV 12.9 11.5 - 14.5 %    RDW-SD 45.5 (H) 35.0 - 45.0 fL    Platelets 794 341 - 014 thou/mm3    MPV 10.1 9.4 - 12.4 fL    Seg Neutrophils 67.4 %    Lymphocytes 21.3 %    Monocytes 9.2 %    Eosinophils 1.0 %    Basophils 0.9 %    Immature Granulocytes 0.2 %    Segs Absolute 3.8 1.8 - 7.7 thou/mm3    Lymphocytes Absolute 1.2 1.0 - 4.8 thou/mm3    Monocytes Absolute 0.5 0.4 - 1.3 thou/mm3    Eosinophils Absolute 0.1 0.0 - 0.4 thou/mm3    Basophils Absolute 0.1 0.0 - 0.1 thou/mm3    Immature Grans (Abs) 0.01 0.00 - 0.07 thou/mm3    nRBC 0 /100 wbc   Comprehensive Metabolic Panel w/ Reflex to MG    Collection Time: 01/10/23  2:39 PM   Result Value Ref Range    Glucose 123 (H) 70 - 108 mg/dL    Creatinine 1.5 (H) 0.4 - 1.2 mg/dL    BUN 33 (H) 7 - 22 mg/dL    Sodium 140 135 - 145 meq/L    Potassium reflex Magnesium 5.4 (H) 3.5 - 5.2 meq/L    Chloride 103 98 - 111 meq/L    CO2 24 23 - 33 meq/L    Calcium 8.9 8.5 - 10.5 mg/dL    AST 20 5 - 40 U/L    Alkaline Phosphatase 86 38 - 126 U/L    Total Protein 7.0 6.1 - 8.0 g/dL    Albumin 3.8 3.5 - 5.1 g/dL    Total Bilirubin 0.4 0.3 - 1.2 mg/dL    ALT 12 11 - 66 U/L   Troponin    Collection Time: 01/10/23  2:39 PM   Result Value Ref Range    Troponin T < 0.010 ng/ml   Anion Gap    Collection Time: 01/10/23  2:39 PM   Result Value Ref Range    Anion Gap 13.0 8.0 - 16.0 meq/L   Glomerular Filtration Rate, Estimated    Collection Time: 01/10/23  2:39 PM   Result Value Ref Range    Est, Glom Filt Rate 35 (A) >60 ml/min/1.73m2   Osmolality    Collection Time: 01/10/23  2:39 PM   Result Value Ref Range    Osmolality Calc 288.0 275.0 - 300.0 mOsmol/kg       Radiology:     XR CHEST (2 VW)    Result Date: 1/10/2023  PROCEDURE: XR CHEST (2 VW) CLINICAL INFORMATION: chest pain COMPARISON: 4/27/2022 TECHNIQUE: PA and lateral views of the chest were obtained.      1. Borderline heart size. Relative \"rounding\" of the left ventricular margin, suggesting concentric left ventricular hypertrophy. Loop recorder projects over the cardiac silhouette. MediPort left side, catheter tip in SVC. Evidence for old, healed granulomatous disease. A watchman device projects over the left atrial appendage. 2. No acute findings. No infiltrates or effusions are seen. **This report has been created using voice recognition software. It may contain minor errors which are inherent in voice recognition technology. ** Final report electronically signed by Dr. Ely Gonzalez on 1/10/2023 3:16 PM        EKG: NSR        DVT prophylaxis: [x] Lovenox                                 [] SCDs                                 [] SQ Heparin                                 [] Encourage ambulation, low risk for DVT, no chemical or mechanical prophylaxis necessary              [] Already on Anticoagulation                Anticipated Disposition upon discharge: [x] Home                                                                         [] Home with Home Health                                                                         [] Anand Hernandez                                                                         [] [address]          Electronically signed by BRIAN Gonzales CNP on 1/10/2023 at 5:13 PM

## 2023-01-10 NOTE — ED NOTES
Presents to ER with complaints of chest pain that began 3 days ago but worsened this morning. Pt states she does have a cardiac history and sees Dr. Hall Settler. Upon arrival pt states her chest pressure has resolved. EMS states they noted pt's HR to be 30s. Pt as given lidocaine in route. Pt is a DNR-CCA. EKG completed.      Cassandra Millie Fothergill) M Billing, RN  01/10/23 [id number]

## 2023-01-10 NOTE — ED NOTES
Patient resting in bed. Respirations easy and unlabored. No distress noted. Call light within reach.      Shona Fields RN  01/10/23 2600

## 2023-01-10 NOTE — ED NOTES
PT resting in bed. Respirations even and unlabored. SpO2 89% on room air. PT placed on 2 L NC and now is 94%. PT and VS assessed. PT denies pain. Call light in reach.         Angeles Pritchard RN  01/10/23 [id number]

## 2023-01-10 NOTE — ED NOTES
Assumed care at this time. Report received from [facility]. PT resting in bed. respirations even and unlabored. PT and VS assessed. PT denies needs at this time. PT denies any pain.      Lyndsey Villagran RN  01/10/23 [id number]

## 2023-01-10 NOTE — Clinical Note
Discharge Plan[de-identified] Other/[facility])   Telemetry/Cardiac Monitoring Required?: Yes

## 2023-01-10 NOTE — ED NOTES
Patient resting in bed. Respirations easy and unlabored. No distress noted. Call light within reach.      Shona Hernandez Notice) JARON Fields RN  01/10/23 [id number]

## 2023-01-11 ENCOUNTER — APPOINTMENT (OUTPATIENT)
Dept: ULTRASOUND IMAGING | Age: 78
DRG: 309 | End: 2023-01-11
Payer: MEDICARE

## 2023-01-11 LAB
ANION GAP SERPL CALCULATED.3IONS-SCNC: 12 MEQ/L (ref 8–16)
ANION GAP SERPL CALCULATED.3IONS-SCNC: 9 MEQ/L (ref 8–16)
AVERAGE GLUCOSE: 141 MG/DL (ref 70–126)
BACTERIA: ABNORMAL /HPF
BASOPHILS # BLD: 1.2 %
BASOPHILS ABSOLUTE: 0.1 THOU/MM3 (ref 0–0.1)
BILIRUBIN URINE: NEGATIVE
BLOOD, URINE: NEGATIVE
BUN BLDV-MCNC: 37 MG/DL (ref 7–22)
BUN BLDV-MCNC: 39 MG/DL (ref 7–22)
CALCIUM SERPL-MCNC: 8.2 MG/DL (ref 8.5–10.5)
CALCIUM SERPL-MCNC: 8.7 MG/DL (ref 8.5–10.5)
CASTS 2: ABNORMAL /LPF
CASTS UA: ABNORMAL /LPF
CHARACTER, URINE: CLEAR
CHLORIDE BLD-SCNC: 101 MEQ/L (ref 98–111)
CHLORIDE BLD-SCNC: 106 MEQ/L (ref 98–111)
CO2: 25 MEQ/L (ref 23–33)
CO2: 28 MEQ/L (ref 23–33)
COLOR: YELLOW
CREAT SERPL-MCNC: 1.6 MG/DL (ref 0.4–1.2)
CREAT SERPL-MCNC: 1.7 MG/DL (ref 0.4–1.2)
CREATININE URINE: 95.9 MG/DL
CRYSTALS, UA: ABNORMAL
EOSINOPHIL # BLD: 1.6 %
EOSINOPHILS ABSOLUTE: 0.1 THOU/MM3 (ref 0–0.4)
EPITHELIAL CELLS, UA: ABNORMAL /HPF
ERYTHROCYTE [DISTWIDTH] IN BLOOD BY AUTOMATED COUNT: 12.9 % (ref 11.5–14.5)
ERYTHROCYTE [DISTWIDTH] IN BLOOD BY AUTOMATED COUNT: 45.4 FL (ref 35–45)
GFR SERPL CREATININE-BSD FRML MDRD: 30 ML/MIN/1.73M2
GFR SERPL CREATININE-BSD FRML MDRD: 33 ML/MIN/1.73M2
GLUCOSE BLD-MCNC: 123 MG/DL (ref 70–108)
GLUCOSE BLD-MCNC: 132 MG/DL (ref 70–108)
GLUCOSE BLD-MCNC: 168 MG/DL (ref 70–108)
GLUCOSE BLD-MCNC: 183 MG/DL (ref 70–108)
GLUCOSE BLD-MCNC: 190 MG/DL (ref 70–108)
GLUCOSE BLD-MCNC: 279 MG/DL (ref 70–108)
GLUCOSE BLD-MCNC: 92 MG/DL (ref 70–108)
GLUCOSE BLD-MCNC: 96 MG/DL (ref 70–108)
GLUCOSE URINE: NEGATIVE MG/DL
HBA1C MFR BLD: 6.7 % (ref 4.4–6.4)
HCT VFR BLD CALC: 37.5 % (ref 37–47)
HEMOGLOBIN: 12 GM/DL (ref 12–16)
IMMATURE GRANS (ABS): 0.01 THOU/MM3 (ref 0–0.07)
IMMATURE GRANULOCYTES: 0.2 %
KETONES, URINE: NEGATIVE
LEUKOCYTE ESTERASE, URINE: ABNORMAL
LV EF: 43 %
LVEF MODALITY: NORMAL
LYMPHOCYTES # BLD: 28.2 %
LYMPHOCYTES ABSOLUTE: 1.4 THOU/MM3 (ref 1–4.8)
MCH RBC QN AUTO: 30.5 PG (ref 26–33)
MCHC RBC AUTO-ENTMCNC: 32 GM/DL (ref 32.2–35.5)
MCV RBC AUTO: 95.2 FL (ref 81–99)
MISCELLANEOUS 2: ABNORMAL
MONOCYTES # BLD: 13.7 %
MONOCYTES ABSOLUTE: 0.7 THOU/MM3 (ref 0.4–1.3)
NITRITE, URINE: NEGATIVE
NUCLEATED RED BLOOD CELLS: 0 /100 WBC
OSMOLALITY CALCULATION: 295.7 MOSMOL/KG (ref 275–300)
PH UA: 6 (ref 5–9)
PLATELET # BLD: 173 THOU/MM3 (ref 130–400)
PMV BLD AUTO: 10.4 FL (ref 9.4–12.4)
POTASSIUM REFLEX MAGNESIUM: 5.2 MEQ/L (ref 3.5–5.2)
POTASSIUM REFLEX MAGNESIUM: 5.7 MEQ/L (ref 3.5–5.2)
PROTEIN UA: NEGATIVE
RBC # BLD: 3.94 MILL/MM3 (ref 4.2–5.4)
RBC URINE: ABNORMAL /HPF
RENAL EPITHELIAL, UA: ABNORMAL
SEG NEUTROPHILS: 55.1 %
SEGMENTED NEUTROPHILS ABSOLUTE COUNT: 2.8 THOU/MM3 (ref 1.8–7.7)
SODIUM BLD-SCNC: 138 MEQ/L (ref 135–145)
SODIUM BLD-SCNC: 143 MEQ/L (ref 135–145)
SODIUM URINE: 63 MEQ/L
SPECIFIC GRAVITY, URINE: 1.02 (ref 1–1.03)
UROBILINOGEN, URINE: 1 EU/DL (ref 0–1)
WBC # BLD: 5 THOU/MM3 (ref 4.8–10.8)
WBC UA: ABNORMAL /HPF
YEAST: ABNORMAL

## 2023-01-11 PROCEDURE — 6370000000 HC RX 637 (ALT 250 FOR IP)

## 2023-01-11 PROCEDURE — 2580000003 HC RX 258: Performed by: INTERNAL MEDICINE

## 2023-01-11 PROCEDURE — 6370000000 HC RX 637 (ALT 250 FOR IP): Performed by: NURSE PRACTITIONER

## 2023-01-11 PROCEDURE — 85025 COMPLETE CBC W/AUTO DIFF WBC: CPT

## 2023-01-11 PROCEDURE — 6360000002 HC RX W HCPCS: Performed by: NURSE PRACTITIONER

## 2023-01-11 PROCEDURE — 2580000003 HC RX 258

## 2023-01-11 PROCEDURE — 93307 TTE W/O DOPPLER COMPLETE: CPT

## 2023-01-11 PROCEDURE — 99223 1ST HOSP IP/OBS HIGH 75: CPT | Performed by: INTERNAL MEDICINE

## 2023-01-11 PROCEDURE — G0378 HOSPITAL OBSERVATION PER HR: HCPCS

## 2023-01-11 PROCEDURE — 81001 URINALYSIS AUTO W/SCOPE: CPT

## 2023-01-11 PROCEDURE — 82570 ASSAY OF URINE CREATININE: CPT

## 2023-01-11 PROCEDURE — 96365 THER/PROPH/DIAG IV INF INIT: CPT

## 2023-01-11 PROCEDURE — 96361 HYDRATE IV INFUSION ADD-ON: CPT

## 2023-01-11 PROCEDURE — 82948 REAGENT STRIP/BLOOD GLUCOSE: CPT

## 2023-01-11 PROCEDURE — 94640 AIRWAY INHALATION TREATMENT: CPT

## 2023-01-11 PROCEDURE — 99233 SBSQ HOSP IP/OBS HIGH 50: CPT

## 2023-01-11 PROCEDURE — 6360000002 HC RX W HCPCS

## 2023-01-11 PROCEDURE — 96375 TX/PRO/DX INJ NEW DRUG ADDON: CPT

## 2023-01-11 PROCEDURE — 80048 BASIC METABOLIC PNL TOTAL CA: CPT

## 2023-01-11 PROCEDURE — 96372 THER/PROPH/DIAG INJ SC/IM: CPT

## 2023-01-11 PROCEDURE — P9047 ALBUMIN (HUMAN), 25%, 50ML: HCPCS

## 2023-01-11 PROCEDURE — 99222 1ST HOSP IP/OBS MODERATE 55: CPT | Performed by: INTERNAL MEDICINE

## 2023-01-11 PROCEDURE — 84300 ASSAY OF URINE SODIUM: CPT

## 2023-01-11 PROCEDURE — 76770 US EXAM ABDO BACK WALL COMP: CPT

## 2023-01-11 PROCEDURE — 2580000003 HC RX 258: Performed by: PHYSICIAN ASSISTANT

## 2023-01-11 PROCEDURE — 96366 THER/PROPH/DIAG IV INF ADDON: CPT

## 2023-01-11 PROCEDURE — 36415 COLL VENOUS BLD VENIPUNCTURE: CPT

## 2023-01-11 RX ORDER — DEXTROSE MONOHYDRATE 100 MG/ML
INJECTION, SOLUTION INTRAVENOUS CONTINUOUS PRN
Status: DISCONTINUED | OUTPATIENT
Start: 2023-01-11 | End: 2023-01-13 | Stop reason: HOSPADM

## 2023-01-11 RX ORDER — CALCIUM GLUCONATE 94 MG/ML
1000 INJECTION, SOLUTION INTRAVENOUS ONCE
Status: COMPLETED | OUTPATIENT
Start: 2023-01-11 | End: 2023-01-11

## 2023-01-11 RX ORDER — LIDOCAINE 4 G/G
3 PATCH TOPICAL DAILY
Status: DISCONTINUED | OUTPATIENT
Start: 2023-01-11 | End: 2023-01-13 | Stop reason: HOSPADM

## 2023-01-11 RX ORDER — SODIUM CHLORIDE 9 MG/ML
INJECTION, SOLUTION INTRAVENOUS CONTINUOUS
Status: ACTIVE | OUTPATIENT
Start: 2023-01-11 | End: 2023-01-12

## 2023-01-11 RX ORDER — IPRATROPIUM BROMIDE AND ALBUTEROL SULFATE 2.5; .5 MG/3ML; MG/3ML
1 SOLUTION RESPIRATORY (INHALATION) EVERY 4 HOURS PRN
Status: DISCONTINUED | OUTPATIENT
Start: 2023-01-11 | End: 2023-01-13 | Stop reason: HOSPADM

## 2023-01-11 RX ORDER — ALBUMIN (HUMAN) 12.5 G/50ML
25 SOLUTION INTRAVENOUS ONCE
Status: COMPLETED | OUTPATIENT
Start: 2023-01-11 | End: 2023-01-11

## 2023-01-11 RX ORDER — 0.9 % SODIUM CHLORIDE 0.9 %
500 INTRAVENOUS SOLUTION INTRAVENOUS ONCE
Status: COMPLETED | OUTPATIENT
Start: 2023-01-11 | End: 2023-01-11

## 2023-01-11 RX ORDER — INSULIN LISPRO 100 [IU]/ML
0-8 INJECTION, SOLUTION INTRAVENOUS; SUBCUTANEOUS
Status: DISCONTINUED | OUTPATIENT
Start: 2023-01-11 | End: 2023-01-13 | Stop reason: HOSPADM

## 2023-01-11 RX ORDER — INSULIN LISPRO 100 [IU]/ML
0-4 INJECTION, SOLUTION INTRAVENOUS; SUBCUTANEOUS NIGHTLY
Status: DISCONTINUED | OUTPATIENT
Start: 2023-01-11 | End: 2023-01-13 | Stop reason: HOSPADM

## 2023-01-11 RX ADMIN — ATORVASTATIN CALCIUM 20 MG: 20 TABLET, FILM COATED ORAL at 23:13

## 2023-01-11 RX ADMIN — CETIRIZINE HYDROCHLORIDE 10 MG: 10 TABLET, FILM COATED ORAL at 08:22

## 2023-01-11 RX ADMIN — SUCRALFATE 1 G: 1 TABLET ORAL at 15:43

## 2023-01-11 RX ADMIN — LEVOTHYROXINE SODIUM 75 MCG: 0.07 TABLET ORAL at 08:21

## 2023-01-11 RX ADMIN — TIZANIDINE 2 MG: 4 TABLET ORAL at 08:22

## 2023-01-11 RX ADMIN — SODIUM ZIRCONIUM CYCLOSILICATE 5 G: 5 POWDER, FOR SUSPENSION ORAL at 21:30

## 2023-01-11 RX ADMIN — DULOXETINE HYDROCHLORIDE 120 MG: 60 CAPSULE, DELAYED RELEASE ORAL at 08:21

## 2023-01-11 RX ADMIN — PREGABALIN 150 MG: 75 CAPSULE ORAL at 15:32

## 2023-01-11 RX ADMIN — ENOXAPARIN SODIUM 40 MG: 100 INJECTION SUBCUTANEOUS at 23:00

## 2023-01-11 RX ADMIN — TIZANIDINE 2 MG: 4 TABLET ORAL at 23:13

## 2023-01-11 RX ADMIN — SUCRALFATE 1 G: 1 TABLET ORAL at 11:35

## 2023-01-11 RX ADMIN — INSULIN GLARGINE 8 UNITS: 100 INJECTION, SOLUTION SUBCUTANEOUS at 22:01

## 2023-01-11 RX ADMIN — TRAZODONE HYDROCHLORIDE 100 MG: 100 TABLET ORAL at 23:13

## 2023-01-11 RX ADMIN — Medication 240 MG: at 08:21

## 2023-01-11 RX ADMIN — DEXTROSE MONOHYDRATE 250 ML: 100 INJECTION, SOLUTION INTRAVENOUS at 08:27

## 2023-01-11 RX ADMIN — CITALOPRAM 40 MG: 40 TABLET, FILM COATED ORAL at 08:22

## 2023-01-11 RX ADMIN — PANTOPRAZOLE SODIUM 40 MG: 40 TABLET, DELAYED RELEASE ORAL at 15:32

## 2023-01-11 RX ADMIN — SODIUM ZIRCONIUM CYCLOSILICATE 5 G: 5 POWDER, FOR SUSPENSION ORAL at 15:32

## 2023-01-11 RX ADMIN — INSULIN HUMAN 5 UNITS: 100 INJECTION, SOLUTION PARENTERAL at 08:45

## 2023-01-11 RX ADMIN — PREGABALIN 150 MG: 75 CAPSULE ORAL at 23:13

## 2023-01-11 RX ADMIN — SUCRALFATE 1 G: 1 TABLET ORAL at 23:14

## 2023-01-11 RX ADMIN — SODIUM CHLORIDE 500 ML: 9 INJECTION, SOLUTION INTRAVENOUS at 02:44

## 2023-01-11 RX ADMIN — ACETAMINOPHEN 650 MG: 325 TABLET ORAL at 21:30

## 2023-01-11 RX ADMIN — IPRATROPIUM BROMIDE AND ALBUTEROL SULFATE 3 ML: .5; 3 SOLUTION RESPIRATORY (INHALATION) at 07:49

## 2023-01-11 RX ADMIN — ALBUMIN (HUMAN) 25 G: 0.25 INJECTION, SOLUTION INTRAVENOUS at 10:10

## 2023-01-11 RX ADMIN — PANTOPRAZOLE SODIUM 40 MG: 40 TABLET, DELAYED RELEASE ORAL at 08:21

## 2023-01-11 RX ADMIN — Medication 240 MG: at 23:14

## 2023-01-11 RX ADMIN — SODIUM CHLORIDE: 9 INJECTION, SOLUTION INTRAVENOUS at 18:02

## 2023-01-11 RX ADMIN — CALCIUM GLUCONATE 1000 MG: 98 INJECTION, SOLUTION INTRAVENOUS at 08:12

## 2023-01-11 RX ADMIN — ALBUMIN (HUMAN) 25 G: 0.25 INJECTION, SOLUTION INTRAVENOUS at 08:52

## 2023-01-11 RX ADMIN — PREGABALIN 150 MG: 75 CAPSULE ORAL at 08:22

## 2023-01-11 RX ADMIN — SUCRALFATE 1 G: 1 TABLET ORAL at 08:21

## 2023-01-11 ASSESSMENT — PAIN - FUNCTIONAL ASSESSMENT
PAIN_FUNCTIONAL_ASSESSMENT: NONE - DENIES PAIN

## 2023-01-11 ASSESSMENT — PAIN SCALES - GENERAL
PAINLEVEL_OUTOF10: 5
PAINLEVEL_OUTOF10: 0

## 2023-01-11 ASSESSMENT — PAIN DESCRIPTION - LOCATION: LOCATION: SHOULDER

## 2023-01-11 ASSESSMENT — PAIN DESCRIPTION - ORIENTATION: ORIENTATION: LEFT

## 2023-01-11 ASSESSMENT — PAIN DESCRIPTION - DESCRIPTORS: DESCRIPTORS: DISCOMFORT

## 2023-01-11 NOTE — ED NOTES
PT resting in bed. Med Rec completed. PT and VS assessed. PT denies needs at this time.      Tayla Obregon RN  01/10/23 [id number]

## 2023-01-11 NOTE — PROGRESS NOTES
Hospitalist Progress Note    Patient:  Emelyn Matos      Unit/Bed:8A-02/002-A    YOB: 1945    MRN: [id number]       Acct: [de-identified]     PCP: Brooke Guerra MD    Date of Admission: 1/10/2023    Assessment/Plan:    Chest pain possible symptomatic due to #2. Initial troponin negative. EKG with no acute ischemic changes. Trend troponin to rule out ACS. Heart score 6. Check thyroid and fasting lipid panel. Obtain a limited echo. Telemetry. Bradycardia. Possibly iatrogenic due to medications. S/P Lidocaine per EMS as per patient was noted to be in bigeminy. Hold Toprol XL, this was recently started 10/2022. Telemetry. Consult to cardiology. Patient noted to be in bigeminy on telemetry. Limited echo notable for EF 40-45%, normal wall thickness of LV, right ventricular size was normal with normal systolic function and wall thickness     Hyperkalemia, mild. Hold potassium supplement and Diovan, recently started 10/2022. Repeat K, consider treatment pending results. Specimen was likely hemolyzed. Will give 5 units insulin with dextrose for potassium of 5.7. Repeat potassium this afternoon if still elevated will give 2 doses low,  Daily lab     Mild GABBY on CKD stage III. Creatinine 1.5. Baseline appears 1.2-1.3. Renally dose medications. Avoid nephrotoxic agents. Holding ARB and Lasix. Repeat BMP in AM.  - Cr. 1.7 today, noted to have episodes hypotension. Likely secondary to renal hypoperfusion  -Renal ultrasound notable for small probable cyst in the upper pole the right kidney measuring 1.7 x 1.7 x 1.2 cm in size 1.4 x 1.3 x 1.2 cm in size. Possible small left renal cyst measuring 1.1 x 1.1 cm in size, tiny amount of postvoid residual in the bladder, left ureteric jet was seen. Renal cyst chronically being monitored by nephrology, may follow-up outpatient. UA notable for negative nitrite, trace LE, 0-2 WBC, no bacteria.   - Continuous IVF for gentle hydration  - Daily lab       Hypotension: Received 500 mL fluid bolus. Remained hypotensive. Will give 50g albumin. Assess response. Hold antihypertensives. Cardiology recs appreciated. PAFIB. Currently in NSR. Status post Watchman procedure 4/2020  due to history of GI bleed. Holding BB. Type 2 diabetes. Hold GLP1. Continue basal insulin. Medium dose scale. Accu checks ac/hs. Hypoglycemic protocol. Hypothyroidism. Restart Synthroid. TSH 1.6. Chronic respiratory failure due to COPD. Resume home inhalers. Do not suspect acute exacerbation at this time. Chronic systolic heart failure. JAYLEN notable for EF 35-40% (4/2022), moderate global hypokinesis of LV, moderately dilate left atrium, LAAO device in good position, mild MR, trivial TR, trival AZ, moderate layered, immobile plaque in the transverse and descending aorta. PRN lasix at home. On ARB and BB. Holding due to above. Daily weights. Strict intake and output. Low sodium 2L fluid restriction. Obesity. BMI 36.61. Discussed and educated on lifestyle modifications  BRITTNY, resume home CPAP. History of immune deficiency receiving IVIG. Renal cysts. Being monitored by nephology. HLD, resume statin. Constipation. On Linzess at home. GERD resume home PPI and Carafate. Depression home medications have been resumed. Expected discharge date:  Pending clinical course    Disposition:    [x] Home       [] TCU       [] Rehab       [] Psych       [] SNF       [] [facility]       [] Other-    Chief Complaint: Chest pain x 3 days PTA    Hospital Course: Pr HPI documented 1/10/23: \"Analy is a 65 y.o. female who presents with 3 day history of chest pain. States pain has progressively worsened today hence the call for EMS. Pt was resting in her bed today when she had an aid over to help her when she suddenly had substernal chest discomfort and pain and light headedness.  PT characterizes the pain as constant, usually last 30 mins in duration,  non radiating, gnawing pain. She states she has had several episodes of this chest pain in the last 3-4 weeks. She reports associated palpitations at times, lightheadedness with position changes, nausea, but no exacerbating or triggering factors and no specific factor that improves her pain. She was noted to be bradycardic in the squad. Paramedics gave her IVP Lidocaine with improvement in HR. HR while in the ED has been stable in the 70's. She currently denies chest pain, shortness of breath F/C, N/V, abdominal pain. \"    1/11/23: Resumed care of patient today. Patient afebrile, satting  96% on RA . Patient sitting up in bed, nontoxic in appearance, no apparent distress. Patient stating she feels tired this morning states that she is no longer having any chest pains but did have some soreness across her chest and epigastric discomfort after she ate food. Patient still noted to be in bigeminy and intermittent bradycardia on telemetry. Creatinine elevated to 1.7. She did have some episodes of hypotension overnight. She was given 500 mL fluid bolus given her reduced EF. We will add albumin to this and reassess response. Awaiting cardiology input at this time. Will obtain renal ultrasound for better evaluation of elevated creatinine. Limited echo ordered. Subjective (past 24 hours):      Denies palpitations, dizziness, lightheadedness, chest pains, shortness of breath at rest, dyspnea on exertion, nausea, vomiting, diarrhea, fever, chills.     Medications:  Reviewed    Infusion Medications    sodium chloride      dextrose       Scheduled Medications    GI cocktail   Oral Once    atorvastatin  20 mg Oral Nightly    cetirizine  10 mg Oral Daily    citalopram  40 mg Oral Daily    DULoxetine  120 mg Oral Daily    ferrous gluconate  240 mg Oral BID    insulin glargine  8 Units SubCUTAneous Nightly    insulin lispro  0-4 Units SubCUTAneous TID     insulin lispro  0-4 Units SubCUTAneous Nightly ipratropium-albuterol  1 vial Inhalation Q4H    levothyroxine  75 mcg Oral Daily    linaclotide  145 mcg Oral QAM AC    [Held by provider] metoprolol succinate  25 mg Oral Daily    pantoprazole  40 mg Oral BID AC    pregabalin  150 mg Oral TID    sucralfate  1 g Oral 4x Daily AC & HS    tiZANidine  2 mg Oral BID    traZODone  100 mg Oral Nightly    [Held by provider] valsartan  40 mg Oral Daily    sodium chloride flush  5-40 mL IntraVENous 2 times per day    enoxaparin  40 mg SubCUTAneous Q24H     PRN Meds: benzonatate, docusate sodium, [Held by provider] furosemide, sodium chloride flush, sodium chloride, ondansetron **OR** ondansetron, polyethylene glycol, acetaminophen **OR** acetaminophen, glucose, dextrose bolus **OR** dextrose bolus, glucagon (rDNA), dextrose      Intake/Output Summary (Last 24 hours) at 1/11/2023 0636  Last data filed at 1/11/2023 0244  Gross per 24 hour   Intake 100 ml   Output --   Net 100 ml       Diet:  ADULT DIET; Regular; 5 carb choices (75 gm/meal); Low Sodium (2 gm); Low Potassium (Less than 3000 mg/day); 2000 ml    Exam:  BP (!) 84/48   Pulse 68   Temp 97.5 °F (36.4 °C) (Oral)   Resp 18   Ht 5' 5\" (1.651 m)   Wt 220 lb (99.8 kg)   SpO2 96%   BMI 36.61 kg/m²     General appearance: No apparent distress, appears older than stated age and cooperative. Chronically ill-appearing  HEENT: Pupils equal, round, and reactive to light. Conjunctivae/corneas clear. Neck: Supple, with full range of motion. No jugular venous distention. Trachea midline. Respiratory:  Normal respiratory effort. Able to speak full clear sentences. Clear to auscultation, bilaterally without Rales/Wheezes/Rhonchi. Cardiovascular: Slow rate and regular rhythm with normal S1/S2 without murmurs, rubs or gallops. Abdomen: Soft, non-tender, non-distended with normal bowel sounds. Musculoskeletal: passive and active ROM x 4 extremities.   Skin: Skin color, texture, turgor normal.  No rashes or lesions. Neurologic:  Neurovascularly intact without any focal sensory/motor deficits. Cranial nerves: II-XII intact, grossly non-focal.  Psychiatric: Alert and oriented, thought content appropriate, normal insight  Capillary Refill: Brisk,< 3 seconds   Peripheral Pulses: +2 palpable, equal bilaterally       Labs:   Recent Labs     01/10/23  1439 01/11/23  0532   WBC 5.7 5.0   HGB 13.9 12.0   HCT 43.5 37.5    173     Recent Labs     01/10/23  1439 01/10/23  2226 01/11/23  0532     --  143   K 5.4* 5.3* 5.7*     --  106   CO2 24  --  28   BUN 33*  --  39*   CREATININE 1.5*  --  1.7*   CALCIUM 8.9  --  8.2*     Recent Labs     01/10/23  1439   AST 20   ALT 12   BILITOT 0.4   ALKPHOS 86     No results for input(s): INR in the last 72 hours. No results for input(s): Douglas Ebgerald in the last 72 hours. Microbiology:      Urinalysis:      Lab Results   Component Value Date/Time    NITRU Negative 09/22/2022 12:00 AM    WBCUA 10-15 04/27/2022 08:15 PM    BACTERIA NONE SEEN 04/27/2022 08:15 PM    RBCUA 3-5 04/27/2022 08:15 PM    BLOODU Negative 09/22/2022 12:00 AM    SPECGRAV 1.009 10/23/2018 05:30 PM    GLUCOSEU negative 09/22/2022 12:00 AM       Radiology:  XR CHEST (2 VW)    Result Date: 1/10/2023  PROCEDURE: XR CHEST (2 VW) CLINICAL INFORMATION: chest pain COMPARISON: 4/27/2022 TECHNIQUE: PA and lateral views of the chest were obtained. 1. Borderline heart size. Relative \"rounding\" of the left ventricular margin, suggesting concentric left ventricular hypertrophy. Loop recorder projects over the cardiac silhouette. MediPort left side, catheter tip in SVC. Evidence for old, healed granulomatous disease. A watchman device projects over the left atrial appendage. 2. No acute findings. No infiltrates or effusions are seen. **This report has been created using voice recognition software. It may contain minor errors which are inherent in voice recognition technology. ** Final report electronically signed by Dr. Dayday Fisher on 1/10/2023 3:16 PM      DVT prophylaxis: [x] Lovenox                                 [] SCDs                                 [] SQ Heparin                                 [] Encourage ambulation           [] Already on Anticoagulation     Code Status: Full Code    PT/OT Eval Status: Pending recommendations    Tele:   [x] yes             [] no  Sinus bradycardia with bigeminy does have intermittent episodes of normal sinus rhythm.   Active Hospital Problems    Diagnosis Date Noted    Chest pain in adult [R07.9] 01/10/2023     Priority: Medium    Bradycardia [R00.1]        Electronically signed by BRIAN Kurtz CNP on 1/11/2023 at 6:36 AM

## 2023-01-11 NOTE — ED NOTES
..  ED to inpatient nurses report    Chief Complaint   Patient presents with    Chest Pain      Present to ED from nursing home  LOC: alert and orientated to name, place, date  Vital signs   Vitals:    01/10/23 2326 01/11/23 0014 01/11/23 0109 01/11/23 0133   BP: 129/85 135/61 (!) 94/53 (!) 91/48   Pulse: 83 89 72 77   Resp: 18 21 21    Temp:       TempSrc:       SpO2: 98% 100% 97% (!) 2%   Weight:       Height:          Oxygen Baseline 2L    Current needs required  Bipap/Cpap No  LDAs:   Peripheral IV 01/10/23 Left Antecubital (Active)   Site Assessment Clean, dry & intact 01/10/23 1710   Line Status Normal saline locked 01/10/23 1710   Phlebitis Assessment No symptoms 01/10/23 1710   Infiltration Assessment 0 01/10/23 1710     Mobility: Requires assistance * 1  Pending ED orders:   Present condition: stable  C-SSRS    Swallow Screening    Preferred Language: [place]     Electronically signed by Brian Ashford RN on 1/11/2023 at 1:34 AM     Brian Ashford RN  01/11/23 6738

## 2023-01-11 NOTE — ED NOTES
PT resting in bed. Respirations even and unlabored. PT denies needs at this time. PT and VS assessed.      Tanisha Oneil RN  01/10/23 1920

## 2023-01-11 NOTE — RT PROTOCOL NOTE
RT Inhaler-Nebulizer Bronchodilator Protocol Note    There is a bronchodilator order in the chart from a provider indicating to follow the RT Bronchodilator Protocol and there is an Initiate RT Inhaler-Nebulizer Bronchodilator Protocol order as well (see protocol at bottom of note). CXR Findings:  No results found. The findings from the last RT Protocol Assessment were as follows:   History Pulmonary Disease: Smoker 15 pack years or more  Respiratory Pattern: Regular pattern and RR 12-20 bpm  Breath Sounds: Slightly diminished and/or crackles  Cough: Strong, spontaneous, non-productive  Indication for Bronchodilator Therapy:    Bronchodilator Assessment Score: 3    Aerosolized bronchodilator medication orders have been revised according to the RT Inhaler-Nebulizer Bronchodilator Protocol below. Respiratory Therapist to perform RT Therapy Protocol Assessment initially then follow the protocol. Repeat RT Therapy Protocol Assessment PRN for score 0-3 or on second treatment, BID, and PRN for scores above 3. No Indications - adjust the frequency to every 6 hours PRN wheezing or bronchospasm, if no treatments needed after 48 hours then discontinue using Per Protocol order mode. If indication present, adjust the RT bronchodilator orders based on the Bronchodilator Assessment Score as indicated below. Use Inhaler orders unless patient has one or more of the following: on home nebulizer, not able to hold breath for 10 seconds, is not alert and oriented, cannot activate and use MDI correctly, or respiratory rate 25 breaths per minute or more, then use the equivalent nebulizer order(s) with same Frequency and PRN reasons based on the score. If a patient is on this medication at home then do not decrease Frequency below that used at home.     0-3 - enter or revise RT bronchodilator order(s) to equivalent RT Bronchodilator order with Frequency of every 4 hours PRN for wheezing or increased work of breathing using Per Protocol order mode. 4-6 - enter or revise RT Bronchodilator order(s) to two equivalent RT bronchodilator orders with one order with BID Frequency and one order with Frequency of every 4 hours PRN wheezing or increased work of breathing using Per Protocol order mode. 7-10 - enter or revise RT Bronchodilator order(s) to two equivalent RT bronchodilator orders with one order with TID Frequency and one order with Frequency of every 4 hours PRN wheezing or increased work of breathing using Per Protocol order mode. 11-13 - enter or revise RT Bronchodilator order(s) to one equivalent RT bronchodilator order with QID Frequency and an Albuterol order with Frequency of every 4 hours PRN wheezing or increased work of breathing using Per Protocol order mode. Greater than 13 - enter or revise RT Bronchodilator order(s) to one equivalent RT bronchodilator order with every 4 hours Frequency and an Albuterol order with Frequency of every 2 hours PRN wheezing or increased work of breathing using Per Protocol order mode. RT to enter RT Home Evaluation for COPD & MDI Assessment order using Per Protocol order mode.     Electronically signed by Remo Ahumada, RCP on 1/11/2023 at 7:53 AM

## 2023-01-11 NOTE — ED NOTES
Grab-n-go meal given, pt tolerated food without any difficulties.       Yohan Workman RN  01/11/23 3979

## 2023-01-11 NOTE — CARE COORDINATION
1/11/23, 1:50 PM EST      DISCHARGE PLANNING EVALUATION    Mary Portillo  Admitted: 1/10/2023  Hospital Day: 0    Location: 8A-02/002-A Reason for admit: Bradycardia [R00.1]  Acute chest pain [R07.9]  Chest pain in adult [R07.9]    Past Medical History:   Diagnosis Date    Anemia     Atrial fibrillation ([address] 11/09/2017    CAD (coronary artery disease)     CHF (congestive heart failure) (HCC)     Chronic kidney disease     stage 3 kidney     COPD (chronic obstructive pulmonary disease) (HCC)     DDD (degenerative disc disease), lumbar     Depression     Frequent PVCs 12/13/2017    GERD (gastroesophageal reflux disease)     History of blood transfusion     Hx of blood clots 09/2017    pulmonary emboli    Hyperlipidemia     Hypertension     sees Baki    Hyperthyroidism     Kidney disease     hemmelgarn    Movement disorder     DDD    Neuropathy     Obesity     Palpitations 01/10/2020    Pneumonia 2016    sepsis    Sleep apnea     usually wears cpap at night    Status post right and left heart catheterization     Type II or unspecified type diabetes mellitus without mention of complication, not stated as uncontrolled        Procedure: 1/10 CXR: Borderline heart size. Relative \"rounding\" of the left ventricular margin, suggesting concentric left ventricular hypertrophy. Loop recorder projects over the cardiac silhouette. MediPort left side, catheter tip in SVC. Evidence for old, healed   granulomatous disease. A watchman device projects over the left atrial appendage. No acute findings. No infiltrates or effusions are seen. Barriers to Discharge: Admit from ER with CP for 3 days, bradycardic. Hypotensive down to 74/29 yesterday. Cardiology consult. Nephrology consulted. Hold metoprolol and valsartan. On 2 L. Glucose management.      PCP: Simone Jones MD    Readmission Risk              Risk of Unplanned Readmission:  0         Patient's Healthcare Decision Maker: Named in [address] [place] [address]    Patient Goals/Plan/Treatment Preferences: From [facility] with [facility]. SW consulted. Transportation/Food Security/Housekeeping Addressed: No issues identified.

## 2023-01-11 NOTE — CARE COORDINATION
1/11/23, 10:21 AM EST  Discharge Planning Evaluation  Social work consult received, patient from [facility]. Patient/Family preference is to return to [facility], per patient. Also current with [facility], nursing and PT. The patient's current payor source at the facility is Medicaid. Medicare skilled days available: yes  Insurance precert:  no  Spoke with [employer] at the facility. Patient bed hold: yes  Anticipated transport plan: daughter  Do they require COVID 19 test to return to [facility]:no  Is there a required time frame which which COVID test needs done:n/a  Patient's Healthcare Decision Maker: Named in [address] [place] [address]    Readmission Risk (Low < 19, Mod (19-27), High > 27): Readmission Risk Score: 21 ( )    Current PCP: Jayden Rutledge MD  PCP verified by CM? Yes    Patient Orientation: Alert and Oriented    Patient Cognition: Alert  History Provided by: Patient    Advance Directives:      Code Status: Full Code   Patient's Primary Decision Maker is: Named in [address] [place] [address]    Primary Decision MakerCthelma Madison Child - [phone]     Discharge Planning:    Patient lives with: Other (Comment) (sl) Type of Home: Assisted living  Primary Care Giver: Self (From [facility])  Patient Support Systems include: Other (Comment) (From [facility])   Current Financial resources:    Current community resources:    Current services prior to admission: None            Current DME:              Type of Home Care services:  None    ADLS  Prior functional level:    Current functional level: Independent in ADLs/IADLs    Family can provide assistance at DC: Other (comment) (From [facility])  Would you like Case Management to discuss the discharge plan with any other family members/significant others, and if so, who?  No  Plans to Return to Present Housing: Yes  Other Identified Issues/Barriers to RETURNING to current housing: none  Potential Assistance needed at discharge: N/A            Potential DME:    Patient expects to discharge to: Assisted living  Plan for transportation at discharge:      Financial  Payor: Festus Sites / Plan: Mehdi Walters / Product Type: *No Product type* /     Potential assistance Purchasing Medications:  no

## 2023-01-11 NOTE — CONSULTS
[facility]  Cardiology Consult        Patient:  Coty Morin  YOB: 1945  MRN: [id number]     Acct: [de-identified]    PCP: Claudine Aj MD    Date of Admission: 1/10/2023      Reason for Consultation:  CP/Bradycardia      History Of Present Illness:    65 y.o. pleasant female c hx of BRITTNY, HLD, GERD, Depression, COPD, DM, chronic systolic HF EF 95-68%, pAfib s/p watchmen who presented to the hospital with complaints of being cold and sweaty. Patient was admitted for bradycardia. She was recently started on toprol XL. Patient follows up with Dr. Tamra Navarro. The feeling of coldness was sudden onset. EKG is SR, PVC. Echo from 4/2022 showed EF 35-40%. Normal coronaries in the past.  Patient states she feels back to herself. She does report some epigastric pain which improves on not eating. All labs, EKG's, diagnostic testing and images as well as cardiac cath, stress testing were reviewed during this encounter.     Past Medical History:          Diagnosis Date    Anemia     Atrial fibrillation ([address] 11/09/2017    CAD (coronary artery disease)     CHF (congestive heart failure) (HCC)     Chronic kidney disease     stage 3 kidney     COPD (chronic obstructive pulmonary disease) (HCC)     DDD (degenerative disc disease), lumbar     Depression     Frequent PVCs 12/13/2017    GERD (gastroesophageal reflux disease)     History of blood transfusion     Hx of blood clots 09/2017    pulmonary emboli    Hyperlipidemia     Hypertension     sees Baki    Hyperthyroidism     Kidney disease     hemmelgarn    Movement disorder     DDD    Neuropathy     Obesity     Palpitations 01/10/2020    Pneumonia 2016    sepsis    Sleep apnea     usually wears cpap at night    Status post right and left heart catheterization     Type II or unspecified type diabetes mellitus without mention of complication, not stated as uncontrolled        Past Surgical History:          Procedure Laterality Date    ACHILLES TENDON SURGERY Bilateral     BLADDER SUSPENSION      CARDIAC SURGERY  2016    heart cath--[facility]    COLONOSCOPY Left 05/11/2018    COLONOSCOPY POLYPECTOMY SNARE/COLD BIOPSY performed by Helen Vang MD at [facility]    COLONOSCOPY N/A 08/04/2021    [facility]    COLONOSCOPY  08/04/2021    Dr. Hedy Tovar    COLONOSCOPY N/A 10/20/2022    COLONOSCOPY POLYPECTOMY SNARE/COLD BIOPSY performed by María Cuevas MD at [facility]    ENDOSCOPY, COLON, DIAGNOSTIC      GASTRIC FUNDOPLICATION      HAMMER TOE SURGERY Right     HERNIA REPAIR      HIATAL HERNIA REPAIR  09/06/2016    Laparoscopic Robotic with Nissen Fundoplication - Dr. Almas Tinajero (CERVIX STATUS UNKNOWN)      MS OFFICE/OUTPT VISIT,PROCEDURE ONLY N/A 09/21/2018    EGD DIAGNOSTIC ONLY performed by María Cuevas MD at [address] [place] [address]      right    TENDON RELEASE Left 08/09/2016    left foot    TRANSESOPHAGEAL ECHOCARDIOGRAM N/A 4/20/2022    TRANSESOPHAGEAL ECHOCARDIOGRAM performed by Binu Fulton MD at [facility]      TUNNELED VENOUS PORT PLACEMENT  11/06/2017    UPPER GASTROINTESTINAL ENDOSCOPY Left 05/10/2018    EGD BIOPSY performed by Helen Vang MD at [address] [place] [address] Left 07/25/2021    [facility]       Medications Prior to Admission:      Prior to Admission medications    Medication Sig Start Date End Date Taking?  Authorizing Provider   metoprolol succinate (TOPROL XL) 25 MG extended release tablet Take 1 tablet by mouth daily 10/17/22   Binu Fulton MD   valsartan (DIOVAN) 40 MG tablet Take 1 tablet by mouth daily 10/17/22   Binu Fulton MD   polyvinyl alcohol (LIQUIFILM TEARS) 1.4 % ophthalmic solution 1 drop as needed    Historical Provider, MD   benzonatate (TESSALON) 200 MG capsule Take 200 mg by mouth 3 times daily as needed for Cough    Historical Provider, MD   polyethylene glycol (GLYCOLAX) 17 g packet Take 17 g by mouth daily as needed for Constipation    Historical Provider, MD   guaiFENesin (ROBITUSSIN) 100 MG/5ML SOLN oral solution Take 200 mg by mouth every 4 hours as needed for Cough    Historical Provider, MD   ipratropium (ATROVENT) 0.06 % nasal spray  3/25/22   Historical Provider, MD   pregabalin (LYRICA) 150 MG capsule Take 150 mg by mouth in the morning, at noon, and at bedtime.   5/28/22   Historical Provider, MD   pantoprazole (PROTONIX) 40 MG tablet Take 1 tablet by mouth 2 times daily (before meals) 4/30/22 1/10/23  CAROLINA Tobias   linaclotide [facility]) 145 MCG capsule Take 1 capsule by mouth every morning (before breakfast) 4/30/22   BRIAN Ta CNP   ferrous gluconate (FERGON) 324 (38 Fe) MG tablet Take 324 mg by mouth 2 times daily    Historical Provider, MD   furosemide (LASIX) 20 MG tablet Take 20 mg by mouth daily as needed (swelling as needed)    Historical Provider, MD   insulin lispro (HUMALOG) 100 UNIT/ML injection vial Inject into the skin 3 times daily (before meals)    Historical Provider, MD   hydrOXYzine (ATARAX) 10 MG tablet Take 10 mg by mouth 3 times daily as needed for Itching    Historical Provider, MD   Probiotic Product (PROBIOTIC DAILY PO) Take by mouth    Historical Provider, MD   cetirizine (ZYRTEC) 10 MG tablet Take 10 mg by mouth daily    Historical Provider, MD   polyethyl glycol-propyl glycol 0.4-0.3 % (SYSTANE) 0.4-0.3 % ophthalmic solution 1 drop as needed for Dry Eyes    Historical Provider, MD   sucralfate (CARAFATE) 1 GM tablet Take 1 tablet by mouth 4 times daily (before meals and nightly) 7/26/21   Carla Siddiqi MD   vitamin D 25 MCG (1000 UT) CAPS Take by mouth daily 125 mcg daily    Historical Provider, MD   citalopram (CELEXA) 40 MG tablet Take 40 mg by mouth daily 6/4/21   Historical Provider, MD   tiZANidine (ZANAFLEX) 2 MG tablet Take 2 mg by mouth 2 times daily     Historical Provider, MD   DULoxetine (CYMBALTA) 20 MG extended release capsule Take 120 mg by mouth daily     Historical Provider, MD   insulin glargine (BASAGLAR KWIKPEN) 100 UNIT/ML injection pen Inject 8 Units into the skin nightly     Historical Provider, MD   potassium chloride (KLOR-CON M) 10 MEQ extended release tablet Take 1 tablet by mouth daily 6/22/20   Nelli Su, DO   Dulaglutide (TRULICITY) 2.41 RX/3.0KU SOPN Inject 0.75 mg into the skin once a week Every Wednesday    Historical Provider, MD   diphenhydrAMINE (BENADRYL) 25 MG capsule Take 25 mg by mouth as needed for Itching    Historical Provider, MD   RA ALCOHOL SWABS 70 % PADS  7/4/19   Historical Provider, MD   ONE TOUCH ULTRA TEST strip  7/4/19   Historical Provider, MD   Lancets [facility] (UNISTIK CZT COMFORT) [address] [facility]  7/4/19   Historical Provider, MD   ipratropium-albuterol (DUONEB) 0.5-2.5 (3) MG/3ML SOLN nebulizer solution Inhale 1 vial into the lungs every 4 hours     Historical Provider, MD   traZODone (DESYREL) 100 MG tablet Take 100 mg by mouth nightly     Historical Provider, MD   Multiple Vitamins-Minerals (THERAPEUTIC MULTIVITAMIN-MINERALS) tablet Take 1 tablet by mouth daily    Historical Provider, MD   OXYGEN Inhale into the lungs continuous    Historical Provider, MD   atorvastatin (LIPITOR) 20 MG tablet Take 1 tablet by mouth nightly 5/13/18   Marcela Anderson MD   magnesium hydroxide (MILK OF MAGNESIA CONCENTRATE) 2400 MG/10ML SUSP Take 30 mLs by mouth once as needed    Historical Provider, MD   docusate sodium (COLACE) 100 MG capsule Take 100 mg by mouth 3 times daily as needed     Historical Provider, MD   acetaminophen (TYLENOL) 325 MG tablet Take 2 tablets by mouth every 4 hours as needed for Pain 8/18/16   Willard Chao MD   levothyroxine (SYNTHROID) 75 MCG tablet Take 75 mcg by mouth Daily    Historical Provider, MD       Current Facility-Administered Medications   Medication Dose Route Frequency Provider Last Rate Last Admin    glucose chewable tablet 16 g  4 tablet Oral PRN BRIAN Kurtz - CNP        dextrose bolus 10% 125 mL  125 mL IntraVENous PRN BRIAN Kurtz - CNP        Or    dextrose bolus 10% 250 mL  250 mL IntraVENous PRN Marisabel Marie, APRN - CNP        glucagon (rDNA) injection 1 mg  1 mg SubCUTAneous PRN BRIAN Kurtz - CNP        dextrose 10 % infusion   IntraVENous Continuous PRN BRIAN Kurtz - RADHA        ipratropium-albuterol (DUONEB) nebulizer solution 3 mL  1 vial Inhalation Q4H PRN BRIAN Love - CNP        insulin lispro (HUMALOG) injection vial 0-8 Units  0-8 Units SubCUTAneous TID WC BRIAN Kurtz - CNP        insulin lispro (HUMALOG) injection vial 0-4 Units  0-4 Units SubCUTAneous Nightly BRIAN Kurtz - CNP        aluminum & magnesium hydroxide-simethicone (MAALOX) 30 mL, lidocaine viscous hcl (XYLOCAINE) 5 mL (GI COCKTAIL)   Oral Once Giselle Wise MD        atorvastatin (LIPITOR) tablet 20 mg  20 mg Oral Nightly Roxanne Lucero APRN - CNP   20 mg at 01/10/23 2316    benzonatate (TESSALON) capsule 200 mg  200 mg Oral TID PRN Roxanne Lucero APRN - CNP        cetirizine (ZYRTEC) tablet 10 mg  10 mg Oral Daily Roxanne Lucero APRN - CNP   10 mg at 01/11/23 0580    citalopram (CELEXA) tablet 40 mg  40 mg Oral Daily Roxanne Lucero, APRN - CNP   40 mg at 01/11/23 8279    docusate sodium (COLACE) capsule 100 mg  100 mg Oral TID PRN Roxanne Lucero APRN - CNP        DULoxetine (CYMBALTA) extended release capsule 120 mg  120 mg Oral Daily Roxanne Lucero, APRN - CNP   120 mg at 01/11/23 0821    ferrous gluconate (FERGON) tablet 240 mg  240 mg Oral BID Roxanne Lucero APRN - CNP   240 mg at 01/11/23 0095    [Held by provider] furosemide (LASIX) tablet 20 mg  20 mg Oral Daily PRN Roxanne Lucero, APRN - CNP        insulin glargine (LANTUS) injection vial 8 Units  8 Units SubCUTAneous Nightly Matt Beau Lucero, APRN - CNP   8 Units at 01/10/23 2321    levothyroxine (SYNTHROID) tablet 75 mcg  75 mcg Oral Daily Matt Stantonnanette Nic, APRN - CNP   75 mcg at 01/11/23 8183    linaclotide (LINZESS) capsule 145 mcg (Patient Supplied)  145 mcg Oral QAM AC Jessica Lucero, APRN - CNP        [Held by provider] metoprolol succinate (TOPROL XL) extended release tablet 25 mg  25 mg Oral Daily Cadye TOMY Hendersonchialison, APRN - CNP        pantoprazole (PROTONIX) tablet 40 mg  40 mg Oral BID AC Matt Beau Lucero, APRN - CNP   40 mg at 01/11/23 0821    pregabalin (LYRICA) capsule 150 mg  150 mg Oral TID Darrold Liz, APRN - CNP   150 mg at 01/11/23 0822    sucralfate (CARAFATE) tablet 1 g  1 g Oral 4x Daily AC & HS Matt Beau Lucero, APRN - CNP   1 g at 01/11/23 1135    tiZANidine (ZANAFLEX) tablet 2 mg  2 mg Oral BID Darrold Liz, APRN - CNP   2 mg at 01/11/23 2027    traZODone (DESYREL) tablet 100 mg  100 mg Oral Nightly Matt Stantonnanette Santiagochith, APRN - CNP   100 mg at 01/10/23 2316    [Held by provider] valsartan (DIOVAN) tablet 40 mg  40 mg Oral Daily Jessica Lucero, APRN - CNP        sodium chloride flush 0.9 % injection 5-40 mL  5-40 mL IntraVENous 2 times per day Rickylie TOMY Hendersonchialison, APRN - CNP        sodium chloride flush 0.9 % injection 5-40 mL  5-40 mL IntraVENous PRN Jessica Lucero, APRN - CNP        0.9 % sodium chloride infusion   IntraVENous PRN Matt Beau Lucero, APRN - CNP        enoxaparin (LOVENOX) injection 40 mg  40 mg SubCUTAneous Q24H Jessica Lucero, APRN - CNP   40 mg at 01/10/23 2321    ondansetron (ZOFRAN-ODT) disintegrating tablet 4 mg  4 mg Oral Q8H PRN BRIAN Gan CNP        Or    ondansetron (ZOFRAN) injection 4 mg  4 mg IntraVENous Q6H PRN BRIAN Gan CNP        polyethylene glycol (GLYCOLAX) packet 17 g  17 g Oral Daily PRN Jessica TOMY Lucero APRN - CNP        acetaminophen (TYLENOL) tablet 650 mg  650 mg Oral Q6H PRN Jessica Lucero APRN - CNP        Or    acetaminophen (TYLENOL) suppository 650 mg  650 mg Rectal Q6H PRN Jessica Lucero, APRN - CNP        glucagon (rDNA) injection 1 mg  1 mg SubCUTAneous PRN Rickylie TOMY Lucero, APRN - CNP        dextrose 10 % infusion   IntraVENous Continuous PRN Jessica Lucero, APRN - CNP           Allergies:  Bactrim [sulfamethoxazole-trimethoprim], Wellbutrin [bupropion], and Silicone    Social History:    TOBACCO:   reports that she quit smoking about 16 years ago. Her smoking use included cigarettes. She has a 30.00 pack-year smoking history. She has never used smokeless tobacco.  ETOH:   reports no history of alcohol use. Family History:        Problem Relation Age of Onset    Cancer Mother     Heart Disease Mother     High Blood Pressure Mother     Diabetes Mother     Vision Loss Mother     Stroke Mother     COPD Father     Diabetes Father     COPD Brother          Review of Systems -   General ROS: negative  Psychological ROS: negative  Hematological and Lymphatic ROS: No history of blood clots or bleeding disorder. Respiratory ROS: no cough, shortness of breath, or wheezing  Cardiovascular ROS: As per HPI  Gastrointestinal ROS: negative  Genito-Urinary ROS: no dysuria, trouble voiding, or hematuria  Musculoskeletal ROS: negative  Neurological ROS: no TIA or stroke symptoms  Dermatological ROS: negative    All others reviewed and are negative.        BP (!) 100/54   Pulse 72   Temp 98.1 °F (36.7 °C) (Oral)   Resp 16   Ht 5' 5\" (1.651 m)   Wt 220 lb (99.8 kg)   SpO2 92%   BMI 36.61 kg/m²       Physical Examination:   General appearance - alert, in no distress  Mental status - alert, oriented to person, place, and time  Neck - supple, no significant adenopathy, no JVD, or carotid bruits  Chest - clear to auscultation, no wheezes, rales or rhonchi, symmetric air entry  Heart - normal rate, regular rhythm, normal S1, S2, no murmurs, rubs, clicks or gallops  Abdomen - soft, nontender, nondistended, no masses or organomegaly  Neurological - alert, oriented, normal speech, no focal findings or movement disorder noted  Musculoskeletal - no joint tenderness, deformity or swelling  Extremities - peripheral pulses normal, no pedal edema, no clubbing or cyanosis  Skin - normal coloration and turgor, no rashes, no suspicious skin lesions noted      LABS:    Recent Labs     01/10/23  1439 01/10/23  2226   TROPONINT < 0.010 < 0.010     CBC:   Lab Results   Component Value Date/Time    WBC 5.0 01/11/2023 05:32 AM    RBC 3.94 01/11/2023 05:32 AM    HGB 12.0 01/11/2023 05:32 AM    HCT 37.5 01/11/2023 05:32 AM    MCV 95.2 01/11/2023 05:32 AM    MCH 30.5 01/11/2023 05:32 AM    MCHC 32.0 01/11/2023 05:32 AM    RDW 15.2 06/13/2018 11:31 AM     01/11/2023 05:32 AM    MPV 10.4 01/11/2023 05:32 AM     BMP:    Lab Results   Component Value Date/Time     01/11/2023 05:32 AM    K 5.7 01/11/2023 05:32 AM     01/11/2023 05:32 AM    CO2 28 01/11/2023 05:32 AM    BUN 39 01/11/2023 05:32 AM    LABALBU 3.8 01/10/2023 02:39 PM    CREATININE 1.7 01/11/2023 05:32 AM    CREATININE 44.0 03/14/2019 12:00 AM    CALCIUM 8.2 01/11/2023 05:32 AM    LABGLOM 30 01/11/2023 05:32 AM    GLUCOSE 123 01/11/2023 05:32 AM     Hepatic Function Panel:    Lab Results   Component Value Date/Time    ALKPHOS 86 01/10/2023 02:39 PM    ALT 12 01/10/2023 02:39 PM    AST 20 01/10/2023 02:39 PM    PROT 7.0 01/10/2023 02:39 PM    BILITOT 0.4 01/10/2023 02:39 PM    BILIDIR <0.2 04/27/2022 07:53 PM    LABALBU 3.8 01/10/2023 02:39 PM     Magnesium:    Lab Results   Component Value Date/Time    MG 1.6 07/26/2021 09:15 AM     Warfarin PT/INR:  No components found for: PTPATWAR, PTINRWAR  HgBA1c:    Lab Results   Component Value Date/Time    LABA1C 6.7 01/10/2023 02:39 PM     FLP:    Lab Results   Component Value Date/Time    TRIG 77 03/23/2016 05:00 AM    HDL 49 03/23/2016 05:00 AM    LDLCALC 63 03/23/2016 05:00 AM     TSH:    Lab Results   Component Value Date/Time    TSH 1.600 01/10/2023 02:39 PM     BNP: No components found for: PRO-BNP      Assessment/Plan:    Patient Active Problem List   Diagnosis    Benign essential tremor    CKD (chronic kidney disease) stage 3, GFR 30-59 ml/min ([facility])    Essential hypertension    Sepsis with hypotension (HCC)    Septic shock ([facility])    Pneumonia of both lower lobes due to infectious organism    Pneumonia of left lung due to infectious organism    Type 2 diabetes mellitus without complication (HCC)    Left ventricular systolic dysfunction    Coronary artery disease involving native coronary artery of native heart without angina pectoris    RUQ abdominal pain    Gallbladder sludge    Bacteremia due to Gram-positive bacteria    Acute systolic congestive heart failure (HCC)    Severe sepsis with septic shock ([facility])    CKD (chronic kidney disease), stage III ([facility])    DM II (diabetes mellitus, type II), controlled ([address]    Cardiomyopathy, dilated ([address]    Acute pulmonary edema (HCC)    Acute kidney injury superimposed on CKD (HCC)    HFrEF (heart failure with reduced ejection fraction) ([address]    Fever    General weakness    Acute on chronic renal insufficiency    Hypotension    Episode of unresponsiveness    Cardiomyopathy ([facility])    Hyperkalemia    Hypokalemia    Selective immunoglobulin deficiency (HCC)    GABBY (acute kidney injury) ([address]    Chronic respiratory failure with hypoxia ([address]    Influenza with respiratory manifestation other than pneumonia    Klebsiella pneumonia ([address]    Chest pain    Acute on chronic respiratory failure with hypoxia ([address]    Hiatal hernia    Gastroesophageal reflux disease    Gastroesophageal reflux disease with esophagitis    S/P Nissen fundoplication (without gastrostomy tube) procedure    Bradycardia    Symptomatic sinus bradycardia    Tachycardia-bradycardia syndrome ([facility]    Arrhythmia    Atrial fibrillation ([facility]    Encounter for electronic analysis of reveal event recorder    Frequent PVCs    Acute upper GI hemorrhage    Com variab immunodef w predom abnlt of B-cell nums & functn ([facility])    Altered mental status    Common variable immunodeficiency ([facility]    Acquired hypothyroidism    Palpitations    Anemia    Acute blood loss anemia    Hemorrhage secondary to anti-coagulation ([facility])    Physical deconditioning    Abilio ulcer    Paroxysmal atrial fibrillation ([facility])    Chest pain in adult     Atypical chest pain  GERD  Coldness  Bradycardia     Telemetry  Would d/c metoprolol  D/c valsartan also  Monitor BP  Gentle IV hydration  If patient clinically improves with these measures, she can be discharged and follow up with Dr. Lisa Turner as outpatient. Please do note hesitate to contact me for any further questions. Thank you for the opportunity to be involved in this patient's care.     Code Status: Full Code    Electronically signed by Cynthia Hernandez MD on 1/11/2023 at 12:09 PM

## 2023-01-11 NOTE — PLAN OF CARE
Problem: Discharge Planning  Goal: Discharge to home or other facility with appropriate resources  Outcome: Not Progressing  Discharge to home or other facility with appropriate resources: Identify barriers to discharge with patient and caregiver     Problem: Safety - Adult  Goal: Free from fall injury  Outcome: Progressing

## 2023-01-11 NOTE — ED NOTES
PT resting in bed watching TV. PT and VS assessed. PT denies needs at this time. Respirations even and unlabored. PT denies pain.      Tayla Delgado RN  01/10/23 2044

## 2023-01-11 NOTE — PLAN OF CARE
Consult received, see sw note dated 1/11/2023.     Problem: Discharge Planning  Goal: Discharge to home or other facility with appropriate resources  1/11/2023 1029 by JESSIE Mazariegos  Outcome: Progressing  1/11/2023 1028 by JESSIE Mazariegos  Outcome: Progressing  1/11/2023 1024 by JESSIE Mazariegos  Outcome: Progressing  1/11/2023 0951 by Belia Booker RN  Outcome: Not Progressing  Flowsheets (Taken 1/11/2023 0214 by Nilda Interiano RN)  Discharge to home or other facility with appropriate resources: Identify barriers to discharge with patient and caregiver

## 2023-01-12 PROBLEM — I49.8 BIGEMINY: Status: ACTIVE | Noted: 2023-01-12

## 2023-01-12 LAB
ANION GAP SERPL CALCULATED.3IONS-SCNC: 10 MEQ/L (ref 8–16)
BUN BLDV-MCNC: 33 MG/DL (ref 7–22)
CALCIUM SERPL-MCNC: 8.3 MG/DL (ref 8.5–10.5)
CHLORIDE BLD-SCNC: 107 MEQ/L (ref 98–111)
CO2: 26 MEQ/L (ref 23–33)
CREAT SERPL-MCNC: 1.3 MG/DL (ref 0.4–1.2)
GFR SERPL CREATININE-BSD FRML MDRD: 42 ML/MIN/1.73M2
GLUCOSE BLD-MCNC: 114 MG/DL (ref 70–108)
GLUCOSE BLD-MCNC: 116 MG/DL (ref 70–108)
GLUCOSE BLD-MCNC: 118 MG/DL (ref 70–108)
GLUCOSE BLD-MCNC: 130 MG/DL (ref 70–108)
GLUCOSE BLD-MCNC: 135 MG/DL (ref 70–108)
POTASSIUM SERPL-SCNC: 5 MEQ/L (ref 3.5–5.2)
POTASSIUM SERPL-SCNC: 5.5 MEQ/L (ref 3.5–5.2)
SODIUM BLD-SCNC: 143 MEQ/L (ref 135–145)

## 2023-01-12 PROCEDURE — 99232 SBSQ HOSP IP/OBS MODERATE 35: CPT | Performed by: INTERNAL MEDICINE

## 2023-01-12 PROCEDURE — 6370000000 HC RX 637 (ALT 250 FOR IP): Performed by: NURSE PRACTITIONER

## 2023-01-12 PROCEDURE — 2580000003 HC RX 258

## 2023-01-12 PROCEDURE — 84132 ASSAY OF SERUM POTASSIUM: CPT

## 2023-01-12 PROCEDURE — 99232 SBSQ HOSP IP/OBS MODERATE 35: CPT

## 2023-01-12 PROCEDURE — 2580000003 HC RX 258: Performed by: NURSE PRACTITIONER

## 2023-01-12 PROCEDURE — 1200000003 HC TELEMETRY R&B

## 2023-01-12 PROCEDURE — 80048 BASIC METABOLIC PNL TOTAL CA: CPT

## 2023-01-12 PROCEDURE — 36415 COLL VENOUS BLD VENIPUNCTURE: CPT

## 2023-01-12 PROCEDURE — 82948 REAGENT STRIP/BLOOD GLUCOSE: CPT

## 2023-01-12 PROCEDURE — G0378 HOSPITAL OBSERVATION PER HR: HCPCS

## 2023-01-12 PROCEDURE — 96361 HYDRATE IV INFUSION ADD-ON: CPT

## 2023-01-12 PROCEDURE — 6360000002 HC RX W HCPCS: Performed by: NURSE PRACTITIONER

## 2023-01-12 PROCEDURE — 6370000000 HC RX 637 (ALT 250 FOR IP)

## 2023-01-12 RX ORDER — SODIUM CHLORIDE 9 MG/ML
INJECTION, SOLUTION INTRAVENOUS CONTINUOUS
Status: ACTIVE | OUTPATIENT
Start: 2023-01-12 | End: 2023-01-12

## 2023-01-12 RX ADMIN — CETIRIZINE HYDROCHLORIDE 10 MG: 10 TABLET, FILM COATED ORAL at 09:42

## 2023-01-12 RX ADMIN — INSULIN GLARGINE 8 UNITS: 100 INJECTION, SOLUTION SUBCUTANEOUS at 23:01

## 2023-01-12 RX ADMIN — LEVOTHYROXINE SODIUM 75 MCG: 0.07 TABLET ORAL at 06:12

## 2023-01-12 RX ADMIN — SUCRALFATE 1 G: 1 TABLET ORAL at 06:12

## 2023-01-12 RX ADMIN — SODIUM CHLORIDE, PRESERVATIVE FREE 10 ML: 5 INJECTION INTRAVENOUS at 09:43

## 2023-01-12 RX ADMIN — TIZANIDINE 2 MG: 4 TABLET ORAL at 09:41

## 2023-01-12 RX ADMIN — PREGABALIN 150 MG: 75 CAPSULE ORAL at 09:42

## 2023-01-12 RX ADMIN — CITALOPRAM 40 MG: 40 TABLET, FILM COATED ORAL at 09:42

## 2023-01-12 RX ADMIN — DULOXETINE HYDROCHLORIDE 120 MG: 60 CAPSULE, DELAYED RELEASE ORAL at 09:42

## 2023-01-12 RX ADMIN — SUCRALFATE 1 G: 1 TABLET ORAL at 09:41

## 2023-01-12 RX ADMIN — TIZANIDINE 2 MG: 4 TABLET ORAL at 20:10

## 2023-01-12 RX ADMIN — SODIUM CHLORIDE, PRESERVATIVE FREE 5 ML: 5 INJECTION INTRAVENOUS at 20:18

## 2023-01-12 RX ADMIN — PREGABALIN 150 MG: 75 CAPSULE ORAL at 15:06

## 2023-01-12 RX ADMIN — SUCRALFATE 1 G: 1 TABLET ORAL at 20:10

## 2023-01-12 RX ADMIN — ATORVASTATIN CALCIUM 20 MG: 20 TABLET, FILM COATED ORAL at 20:10

## 2023-01-12 RX ADMIN — PANTOPRAZOLE SODIUM 40 MG: 40 TABLET, DELAYED RELEASE ORAL at 06:12

## 2023-01-12 RX ADMIN — PREGABALIN 150 MG: 75 CAPSULE ORAL at 20:11

## 2023-01-12 RX ADMIN — Medication 240 MG: at 20:10

## 2023-01-12 RX ADMIN — PANTOPRAZOLE SODIUM 40 MG: 40 TABLET, DELAYED RELEASE ORAL at 15:06

## 2023-01-12 RX ADMIN — SODIUM CHLORIDE: 9 INJECTION, SOLUTION INTRAVENOUS at 15:05

## 2023-01-12 RX ADMIN — Medication 240 MG: at 09:42

## 2023-01-12 RX ADMIN — ENOXAPARIN SODIUM 40 MG: 100 INJECTION SUBCUTANEOUS at 23:01

## 2023-01-12 RX ADMIN — SUCRALFATE 1 G: 1 TABLET ORAL at 15:07

## 2023-01-12 RX ADMIN — TRAZODONE HYDROCHLORIDE 100 MG: 100 TABLET ORAL at 20:11

## 2023-01-12 ASSESSMENT — PAIN DESCRIPTION - ORIENTATION
ORIENTATION: LEFT
ORIENTATION: LEFT

## 2023-01-12 ASSESSMENT — PAIN DESCRIPTION - LOCATION
LOCATION: SHOULDER
LOCATION: SHOULDER

## 2023-01-12 ASSESSMENT — PAIN SCALES - GENERAL
PAINLEVEL_OUTOF10: 3
PAINLEVEL_OUTOF10: 2

## 2023-01-12 ASSESSMENT — HEART SCORE: ECG: 0

## 2023-01-12 ASSESSMENT — PAIN DESCRIPTION - PAIN TYPE: TYPE: ACUTE PAIN

## 2023-01-12 ASSESSMENT — PAIN DESCRIPTION - DESCRIPTORS
DESCRIPTORS: DISCOMFORT
DESCRIPTORS: DISCOMFORT

## 2023-01-12 ASSESSMENT — PAIN DESCRIPTION - FREQUENCY: FREQUENCY: INTERMITTENT

## 2023-01-12 ASSESSMENT — PAIN - FUNCTIONAL ASSESSMENT: PAIN_FUNCTIONAL_ASSESSMENT: ACTIVITIES ARE NOT PREVENTED

## 2023-01-12 ASSESSMENT — PAIN DESCRIPTION - ONSET: ONSET: ON-GOING

## 2023-01-12 NOTE — PROGRESS NOTES
[facility]   Nephrology progress note  1/12/2023, 2:38 PM      Pt Name:    Jory Keenan  MRN:     [id number]     YOB: 1945  Admit Date:    1/10/2023  1:57 PM    Chief Complaint: Nephrology following for GABBY    Subjective:  Patient was seen and examined this morning  No chest pain or shortness of breath  Feels okay    Objective:  24HR INTAKE/OUTPUT:    Intake/Output Summary (Last 24 hours) at 1/12/2023 1438  Last data filed at 1/12/2023 0033  Gross per 24 hour   Intake 1201.65 ml   Output 300 ml   Net 901.65 ml         I/O last 3 completed shifts: In: 1401.7 [P.O.:690; IV Piggyback:711.7]  Out: 300 [Urine:300]  No intake/output data recorded.    Admission weight: 220 lb (99.8 kg)  Wt Readings from Last 3 Encounters:   01/10/23 220 lb (99.8 kg)   12/21/22 227 lb 11.2 oz (103.3 kg)   12/12/22 231 lb (104.8 kg)        Vitals :   Vitals:    01/11/23 2200 01/12/23 0100 01/12/23 0929 01/12/23 1133   BP: 131/60 (!) 107/47 118/74 115/69   Pulse: 84 98 61 60   Resp: 18 18 18 16   Temp: 98.3 °F (36.8 °C) 97.9 °F (36.6 °C) 98 °F (36.7 °C) 97.8 °F (36.6 °C)   TempSrc: Oral Oral Oral Oral   SpO2: 94%  95% 93%   Weight:       Height:           Physical examination  General Appearance: alert and cooperative with exam, appears comfortable, no distress  Mouth/Throat: Oral mucosa moist  Neck: No JVD  Lungs: Air entry B/L, no rales, no use of accessory muscles  Heart:  S1, S2 heard  GI: soft, non-tender, no guarding    Medications:  Infusion:    sodium chloride      dextrose      sodium chloride      dextrose       Meds:    insulin lispro  0-8 Units SubCUTAneous TID WC    insulin lispro  0-4 Units SubCUTAneous Nightly    lidocaine  3 patch TransDERmal Daily    GI cocktail   Oral Once    atorvastatin  20 mg Oral Nightly    cetirizine  10 mg Oral Daily    citalopram  40 mg Oral Daily    DULoxetine  120 mg Oral Daily    ferrous gluconate  240 mg Oral BID    insulin glargine  8 Units SubCUTAneous Nightly    levothyroxine  75 mcg Oral Daily    linaclotide  145 mcg Oral QAM AC    [Held by provider] metoprolol succinate  25 mg Oral Daily    pantoprazole  40 mg Oral BID AC    pregabalin  150 mg Oral TID    sucralfate  1 g Oral 4x Daily AC & HS    tiZANidine  2 mg Oral BID    traZODone  100 mg Oral Nightly    [Held by provider] valsartan  40 mg Oral Daily    sodium chloride flush  5-40 mL IntraVENous 2 times per day    enoxaparin  40 mg SubCUTAneous Q24H     Meds prn: glucose, dextrose bolus **OR** dextrose bolus, glucagon (rDNA), dextrose, ipratropium-albuterol, benzonatate, docusate sodium, [Held by provider] furosemide, sodium chloride flush, sodium chloride, ondansetron **OR** ondansetron, polyethylene glycol, acetaminophen **OR** acetaminophen, glucagon (rDNA), dextrose     Lab Data :  CBC:   Recent Labs     01/10/23  1439 01/11/23  0532   WBC 5.7 5.0   HGB 13.9 12.0   HCT 43.5 37.5    173     CMP:  Recent Labs     01/11/23  0532 01/11/23  1410 01/12/23  0535 01/12/23  0900    138 143  --    K 5.7* 5.2 5.5* 5.0    101 107  --    CO2 28 25 26  --    BUN 39* 37* 33*  --    CREATININE 1.7* 1.6* 1.3*  --    GLUCOSE 123* 183* 130*  --    CALCIUM 8.2* 8.7 8.3*  --      Hepatic:   Recent Labs     01/10/23  1439   LABALBU 3.8   AST 20   ALT 12   BILITOT 0.4   ALKPHOS 86         Assessment and Plan:  1. GABBY on CKD. GABBY in setting of bradycardia, hypotension. Improved with IV fluids. Clinically not hypervolemic. UA no blood, no protein  Continue with IV fluids    2. Hyperkalemia in setting of hypotension and GABBY and Diovan. Improving  3. Chronic systolic cardiomyopathy. 4.  IDDM  5. History of hypertension  6. Renal cysts      Nato Pacheco MD  [facility]    This report has been created using voice recognition software.  It may contain minor errors which are inherent in voice recognition technology

## 2023-01-12 NOTE — PROGRESS NOTES
Hospitalist Progress Note    Patient:  Jonathon Molina      Unit/Bed:8A-02/002-A    YOB: 1945    MRN: [id number]       Acct: [de-identified]     PCP: Neil Carter MD    Date of Admission: 1/10/2023    Assessment/Plan:    Chest pain, resolved  Possible symptomatic due to #2. Heart score 6. Troponin X 2 negative. EKG noting SR w/ frequent PVC's, left axis deviation, with no acute ischemic changes. Check thyroid > TSH 1.600   Fasting lipid panel ordered. Limited echo noting improved EF to 40-45%, was 35-40 on JAYLEN dated 4/20/22. Telemetry. Cardiology consulted: Discontinue Valsartan and Metoprolol. F/u w/ Baki in 1-2 weeks. Bradycardia w/ bigeminy   ? Possibly iatrogenic due to medications? S/p Lidocaine per EMS as per patient was noted to be in bigeminy. Discontinue Toprol XL per cardiology. This med was recently started 10/2022. Limited echo notable for EF 40-45%, normal wall thickness of LV, right ventricular size was normal with normal systolic function and wall thickness  Continuous telemetry  Cardiology consulted, appreciate recs: Stop Toprol XL. 2 week continous cardiac event monitor at discharge. F/u w/ Baki in 1-2 weeks upon discharge. Hyperkalemia, mild, improving   Hold potassium supplement and Diovan, recently started 10/2022. S/p 5 units insulin with dextrose, 2 doses of Lokelma on 1/11  Daily lab     GABBY on CKD stage III. Noted to have episodes hypotension. Likely secondary to renal hypoperfusion and bradycardia  Creatinine 1.3, down from 1.7 (1/11). Baseline appears ~1.2-1.3. Renally dose medications. Avoid nephrotoxic agents. Holding ARB and Lasix. UA unremarkable. Renal ultrasound notable for small probable cyst in the upper pole the right kidney measuring 1.7 x 1.7 x 1.2 cm in size 1.4 x 1.3 x 1.2 cm in size.   Possible small left renal cyst measuring 1.1 x 1.1 cm in size, tiny amount of postvoid residual in the bladder, left ureteric jet was seen. Renal cyst chronically being monitored by nephrology, may follow-up outpatient. Continuous IVF for gentle hydration  Nephrology consulted, appreciate recs  Daily lab     Hypotension, resolved:   Received 500 mL fluid bolus. Remained hypotensive. S/p 50g albumin. Assess response. Hold antihypertensives. 1/12: BP's remaining stable in normal range. Paroxysmal AFIB. Status post Watchman procedure 4/2020 due to history of GI bleed. Not on [address]  Stop BB per cardiology     7. Type 2 diabetes. Hemoglobin A1c 6.7 (1/10/2023)   Hold GLP1 while in hospital  Continue 8 units Lantus. Medium dose scale. Accu checks ac/hs. Hypoglycemic protocol. Diabetic diet     8. Hypothyroidism  TSH 1.6. Continue Synthroid         Resume home inhalers. 10.   Chronic systolic heart failure, compensated  JAYLEN notable for EF 35-40% (4/2022), moderate global hypokinesis of LV, moderately dilate left atrium, LAAO device in good position, mild MR, trivial TR, trival WI, moderate layered, immobile plaque in the transverse and descending aorta. PRN lasix at home > on hold given GABBY  On ARB and BB. Stopping due to above and per cardiology recommendations. Daily weights. Strict intake and output. Low sodium 2L fluid restriction. 11.   Obesity. BMI 36.61 kg per metered squared. Discussed and educated on lifestyle modifications    12. BRITTNY:  Resume home CPAP. 13.   History of immune deficiency receiving IVIG:  Noted via chart review    14. Renal cysts. Being monitored by nephology. 15. HLD  Resume statin. 16.   Constipation. On Linzess at home - continue    16. GERD  Resume home PPI and Carafate. 18.   Depression  Continue home medications have been resumed.       Expected discharge date: Next 24 to 48 hours    Disposition:    [x] Home       [] TCU       [] Rehab       [] Psych       [] SNF       [] [facility]       [] Other-    Chief Complaint: Chest pain x 3 days PTA    Hospital Course: Pr HPI documented 1/10/23: \"Analy is a 65 y.o. female who presents with 3 day history of chest pain. States pain has progressively worsened today hence the call for EMS. Pt was resting in her bed today when she had an aid over to help her when she suddenly had substernal chest discomfort and pain and light headedness. PT characterizes the pain as constant, usually last 30 mins in duration,  non radiating, gnawing pain. She states she has had several episodes of this chest pain in the last 3-4 weeks. She reports associated palpitations at times, lightheadedness with position changes, nausea, but no exacerbating or triggering factors and no specific factor that improves her pain. She was noted to be bradycardic in the squad. Paramedics gave her IVP Lidocaine with improvement in HR. HR while in the ED has been stable in the 70's. She currently denies chest pain, shortness of breath F/C, N/V, abdominal pain. \"    1/11/23: Resumed care of patient today. Patient afebrile, satting  96% on RA . Patient sitting up in bed, nontoxic in appearance, no apparent distress. Patient stating she feels tired this morning states that she is no longer having any chest pains but did have some soreness across her chest and epigastric discomfort after she ate food. Patient still noted to be in bigeminy and intermittent bradycardia on telemetry. Creatinine elevated to 1.7. She did have some episodes of hypotension overnight. She was given 500 mL fluid bolus given her reduced EF. We will add albumin to this and reassess response. Awaiting cardiology input at this time. Will obtain renal ultrasound for better evaluation of elevated creatinine. Limited echo ordered. 1/12/23: Afebrile, hemodynamically stable. No acute events overnight. Patient stating she feels much better this morning. States that she still has some fatigue but overall feels much improved. Has no complaints this morning.   Denies chest pains. States that she has not had any more cold sweats however prior to the hospital she did have them spontaneously. Denies dizziness or lightheadedness. On telemetry monitor patient noted to be in continuous bigeminy. Her beta-blocker was discontinued. Reconsulted cardiology as they signed off on patient case yesterday. They will reevaluate later this afternoon. Patient will likely need to go home on continuous cardiac event monitor at discharge. Kidney function improved to 1.3, will continue IV fluids per nephrology recs. Subjective (past 24 hours):      Denies chest pains, shortness of breath at rest, TREJO, palpitations, dizziness, lightheadedness, abdominal pain, nausea, vomiting, diarrhea, fever, chills.     Medications:  Reviewed    Infusion Medications    dextrose      sodium chloride      dextrose       Scheduled Medications    insulin lispro  0-8 Units SubCUTAneous TID WC    insulin lispro  0-4 Units SubCUTAneous Nightly    lidocaine  3 patch TransDERmal Daily    GI cocktail   Oral Once    atorvastatin  20 mg Oral Nightly    cetirizine  10 mg Oral Daily    citalopram  40 mg Oral Daily    DULoxetine  120 mg Oral Daily    ferrous gluconate  240 mg Oral BID    insulin glargine  8 Units SubCUTAneous Nightly    levothyroxine  75 mcg Oral Daily    linaclotide  145 mcg Oral QAM AC    [Held by provider] metoprolol succinate  25 mg Oral Daily    pantoprazole  40 mg Oral BID AC    pregabalin  150 mg Oral TID    sucralfate  1 g Oral 4x Daily AC & HS    tiZANidine  2 mg Oral BID    traZODone  100 mg Oral Nightly    [Held by provider] valsartan  40 mg Oral Daily    sodium chloride flush  5-40 mL IntraVENous 2 times per day    enoxaparin  40 mg SubCUTAneous Q24H     PRN Meds: glucose, dextrose bolus **OR** dextrose bolus, glucagon (rDNA), dextrose, ipratropium-albuterol, benzonatate, docusate sodium, [Held by provider] furosemide, sodium chloride flush, sodium chloride, ondansetron **OR** ondansetron, polyethylene glycol, acetaminophen **OR** acetaminophen, glucagon (rDNA), dextrose      Intake/Output Summary (Last 24 hours) at 1/12/2023 0824  Last data filed at 1/12/2023 0033  Gross per 24 hour   Intake 1301.65 ml   Output 300 ml   Net 1001.65 ml       Diet:  ADULT DIET; Regular; 5 carb choices (75 gm/meal); Low Sodium (2 gm); Low Potassium (Less than 3000 mg/day); 2000 ml    Exam:  BP (!) 107/47   Pulse 98   Temp 97.9 °F (36.6 °C) (Oral)   Resp 18   Ht 5' 5\" (1.651 m)   Wt 220 lb (99.8 kg)   SpO2 94%   BMI 36.61 kg/m²     General appearance: No apparent distress, appears older than stated age and cooperative. Chronically ill-appearing  HEENT: Pupils equal, round, and reactive to light. Conjunctivae/corneas clear. Neck: Supple, with full range of motion. No jugular venous distention. Trachea midline. Respiratory:  Normal respiratory effort. Able to speak full clear sentences. Clear to auscultation, bilaterally without Rales/Wheezes/Rhonchi. Cardiovascular: Regular rate and regular rhythm with normal S1/S2 without murmurs, rubs or gallops. Abdomen: Soft, non-tender, non-distended with normal bowel sounds. Musculoskeletal: passive and active ROM x 4 extremities. Skin: Skin color, texture, turgor normal.  No rashes or lesions. Neurologic:  Neurovascularly intact without any focal sensory/motor deficits.  Cranial nerves: II-XII intact, grossly non-focal.  Psychiatric: Alert and oriented, thought content appropriate, normal insight  Capillary Refill: Brisk,< 3 seconds   Peripheral Pulses: +2 palpable, equal bilaterally       Labs:   Recent Labs     01/10/23  1439 01/11/23  0532   WBC 5.7 5.0   HGB 13.9 12.0   HCT 43.5 37.5    173     Recent Labs     01/11/23  0532 01/11/23  1410 01/12/23  0535    138 143   K 5.7* 5.2 5.5*    101 107   CO2 28 25 26   BUN 39* 37* 33*   CREATININE 1.7* 1.6* 1.3*   CALCIUM 8.2* 8.7 8.3*     Recent Labs     01/10/23  1439   AST 20   ALT 12 BILITOT 0.4   ALKPHOS 86     No results for input(s): INR in the last 72 hours. No results for input(s): Randene Holes in the last 72 hours. Microbiology:      Urinalysis:      Lab Results   Component Value Date/Time    NITRU NEGATIVE 01/11/2023 10:15 AM    WBCUA 0-2 01/11/2023 10:15 AM    BACTERIA NONE SEEN 01/11/2023 10:15 AM    RBCUA 0-2 01/11/2023 10:15 AM    BLOODU NEGATIVE 01/11/2023 10:15 AM    SPECGRAV 1.009 10/23/2018 05:30 PM    GLUCOSEU NEGATIVE 01/11/2023 10:15 AM       Radiology:  XR CHEST (2 VW)    Result Date: 1/10/2023  PROCEDURE: XR CHEST (2 VW) CLINICAL INFORMATION: chest pain COMPARISON: 4/27/2022 TECHNIQUE: PA and lateral views of the chest were obtained. 1. Borderline heart size. Relative \"rounding\" of the left ventricular margin, suggesting concentric left ventricular hypertrophy. Loop recorder projects over the cardiac silhouette. MediPort left side, catheter tip in SVC. Evidence for old, healed granulomatous disease. A watchman device projects over the left atrial appendage. 2. No acute findings. No infiltrates or effusions are seen. **This report has been created using voice recognition software. It may contain minor errors which are inherent in voice recognition technology. ** Final report electronically signed by Dr. Pan Chisholm on 1/10/2023 3:16 PM      DVT prophylaxis: [x] Lovenox                                 [] SCDs                                 [] SQ Heparin                                 [] Encourage ambulation           [] Already on Anticoagulation     Code Status: Full Code    PT/OT Eval Status: Pending Recs    Tele:   [x] yes             [] no    Sinus rhythm with bigeminy      Active Hospital Problems    Diagnosis Date Noted    Acute chest pain [R07.9] 01/10/2023     Priority: Medium    Bradycardia [R00.1]        Electronically signed by BRIAN Burgos CNP on 1/12/2023 at 8:24 AM

## 2023-01-12 NOTE — CARE COORDINATION
1/12/23, 11:59 AM EST    DISCHARGE ON GOING EVALUATION    Hui Harris day: 0  Location: 8A-02/002-A Reason for admit: Bradycardia [R00.1]  Acute chest pain [R07.9]  Chest pain in adult [R07.9]   Procedure: 1/10 CXR: Borderline heart size. Relative \"rounding\" of the left ventricular margin, suggesting concentric left ventricular hypertrophy. Loop recorder projects over the cardiac silhouette. MediPort left side, catheter tip in SVC. Evidence for old, healed granulomatous disease. A watchman device projects over the left atrial appendage. No acute findings. No infiltrates or effusions are seen. 1/11 US Renal: No evidence of hydronephrosis on either side. Small probable cysts in the upper pole of the right kidney measuring 1.7 x 1.7 x 1.2 cm in size and 1.4 x 1.3 x 1.2 cm in size. Possible small left renal cyst measuring 1.1 x 1.1 cm in size. Tiny amount of postvoid residual in the bladder. The left ureteric jet was seen. Barriers to Discharge: Hospitalist, Nephrology, & Cardiology following. IVF infusion completed today. Holding Toprol and Diovan. 1/11/23 14:10 1/12/23 05:35 1/12/23 09:00   Potassium 5.2 5.5 (H) 5.0   Creatinine 1.6 (H) 1.3 (H)    PCP: Estefany Roach MD   %  Patient Goals/Plan/Treatment Preferences: From [facility] with [facility]. Patient plans to return. See SW note from 1/11.

## 2023-01-12 NOTE — PLAN OF CARE
Problem: Discharge Planning  Goal: Discharge to home or other facility with appropriate resources  Outcome: Progressing  Flowsheets  Taken 1/12/2023 0229  Discharge to home or other facility with appropriate resources:   Identify barriers to discharge with patient and caregiver   Arrange for needed discharge resources and transportation as appropriate  Taken 1/11/2023 2200  Discharge to home or other facility with appropriate resources: Identify barriers to discharge with patient and caregiver     Problem: Safety - Adult  Goal: Free from fall injury  Outcome: Progressing  Flowsheets (Taken 1/12/2023 0229)  Free From Fall Injury:   Instruct family/caregiver on patient safety   Based on caregiver fall risk screen, instruct family/caregiver to ask for assistance with transferring infant if caregiver noted to have fall risk factors     Problem: Pain  Goal: Verbalizes/displays adequate comfort level or baseline comfort level  Outcome: Progressing  Flowsheets (Taken 1/12/2023 0229)  Verbalizes/displays adequate comfort level or baseline comfort level:   Encourage patient to monitor pain and request assistance   Assess pain using appropriate pain scale

## 2023-01-12 NOTE — ED PROVIDER NOTES
[facility]      EMERGENCY MEDICINE     Pt Name: Thao Dejesus  MRN: [id number]  [place] 1945  Date of evaluation: 1/10/2023  Provider: Nicole Beckford MD    CHIEF COMPLAINT       Chief Complaint   Patient presents with    Chest Pain     HISTORY OF PRESENT ILLNESS   Thao Dejesus is a pleasant 65 y.o. female who presents to the emergency department from from nursing home, brought in by EMS for evaluation of chest pain. Pt stated she started with chest pain approximately one hour prior to arrival. Pt stated she also had chest pain the night before exam. Pt states the pain was mid chest pressure at worse 8/10 on pain scale. Pt did have associated shortness of breath and belching. Pt came by EMS and was given lidocaine  by EMS and per report had heart rate of 30. Prior to arrival.  No chest pain currently and shortness of breath has resolved.      PASTMEDICAL HISTORY     Past Medical History:   Diagnosis Date    Anemia     Atrial fibrillation ([facility] [address] 11/09/2017    CAD (coronary artery disease)     CHF (congestive heart failure) ([facility])     Chronic kidney disease     stage 3 kidney     COPD (chronic obstructive pulmonary disease) ([facility])     DDD (degenerative disc disease), lumbar     Depression     Frequent PVCs 12/13/2017    GERD (gastroesophageal reflux disease)     History of blood transfusion     Hx of blood clots 09/2017    pulmonary emboli    Hyperlipidemia     Hypertension     sees Baki    Hyperthyroidism     Kidney disease     hemmelgarn    Movement disorder     DDD    Neuropathy     Obesity     Palpitations 01/10/2020    Pneumonia 2016    sepsis    Sleep apnea     usually wears cpap at night    Status post right and left heart catheterization     Type II or unspecified type diabetes mellitus without mention of complication, not stated as uncontrolled        Patient Active Problem List   Diagnosis Code    Benign essential tremor G25.0    CKD (chronic kidney disease) stage 3, GFR 30-59 ml/min ([facility]) N18.30    Essential hypertension I10    Sepsis with hypotension ([facility]) A41.9, I95.9    Septic shock ([facility]) A41.9, R65.21    Pneumonia of both lower lobes due to infectious organism J18.9    Pneumonia of left lung due to infectious organism J18.9    Type 2 diabetes mellitus without complication ([facility]) L67.1    Left ventricular systolic dysfunction Z40.3    Coronary artery disease involving native coronary artery of native heart without angina pectoris I25.10    RUQ abdominal pain R10.11    Gallbladder sludge K82.8    Bacteremia due to Gram-positive bacteria J60.79    Acute systolic congestive heart failure (HCC) I50.21    Severe sepsis with septic shock ([facility]) A41.9, R65.21    CKD (chronic kidney disease), stage III ([facility]) N18.30    DM II (diabetes mellitus, type II), controlled ([facility] [address] E11.9    Cardiomyopathy, dilated ([facility]) I42.0    Acute pulmonary edema ([facility]) J81.0    Acute kidney injury superimposed on CKD ([facility]) N17.9, N18.9    HFrEF (heart failure with reduced ejection fraction) ([facility]) I50.20    Fever R50.9    General weakness R53.1    Acute on chronic renal insufficiency N28.9, N18.9    Hypotension I95.9    Episode of unresponsiveness R41.89    Cardiomyopathy ([facility]) I42.9    Hyperkalemia E87.5    Hypokalemia E87.6    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    GABBY (acute kidney injury) ([facility] [address] N17.9    Chronic respiratory failure with hypoxia ([facility]) J96.11    Influenza with respiratory manifestation other than pneumonia J11.1    Klebsiella pneumonia ([facility]) J15.0    Chest pain R07.9    Acute on chronic respiratory failure with hypoxia ([facility]) J96.21    Hiatal hernia K44.9    Gastroesophageal reflux disease K21.9    Gastroesophageal reflux disease with esophagitis K21.00    S/P Nissen fundoplication (without gastrostomy tube) procedure Z98.890    Bradycardia R00.1    Symptomatic sinus bradycardia R00.1    Tachycardia-bradycardia syndrome ([facility]) I49.5    Arrhythmia I49.9    Atrial fibrillation ([facility] [address] I48.91    Encounter for electronic analysis of reveal event recorder Z45.09    Frequent PVCs I49.3    Acute upper GI hemorrhage K92.2    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) D83.0    Altered mental status R41.82    Common variable immunodeficiency (HCC) D83.9    Acquired hypothyroidism E03.9    Palpitations R00.2    Anemia D64.9    Acute blood loss anemia D62    Hemorrhage secondary to anti-coagulation (HCC) T45.7X1A, D68.9    Physical deconditioning R53.81    Abilio ulcer K25.9    Paroxysmal atrial fibrillation (HCC) I48.0    Acute chest pain R07.9     SURGICAL HISTORY       Past Surgical History:   Procedure Laterality Date    ACHILLES TENDON SURGERY Bilateral     BLADDER SUSPENSION      CARDIAC SURGERY  2016    heart cath--[facility]    COLONOSCOPY Left 05/11/2018    COLONOSCOPY POLYPECTOMY SNARE/COLD BIOPSY performed by Aure Larios MD at [facility]    COLONOSCOPY N/A 08/04/2021    COLONOSCOPY performed by Steven Sun MD at [facility]    COLONOSCOPY  08/04/2021    Dr. Starla Rivas    COLONOSCOPY N/A 10/20/2022    COLONOSCOPY POLYPECTOMY SNARE/COLD BIOPSY performed by Steven Sun MD at [facility]    ENDOSCOPY, COLON, DIAGNOSTIC      GASTRIC FUNDOPLICATION      HAMMER TOE SURGERY Right     [address]  09/06/2016    Laparoscopic Robotic with Nissen Fundoplication - Dr. Jad Valencia (CERVIX STATUS UNKNOWN)      HI OFFICE/OUTPT VISIT,PROCEDURE ONLY N/A 09/21/2018    EGD DIAGNOSTIC ONLY performed by Steven Sun MD at [address]      right    TENDON RELEASE Left 08/09/2016    left foot    TRANSESOPHAGEAL ECHOCARDIOGRAM N/A 4/20/2022    TRANSESOPHAGEAL ECHOCARDIOGRAM performed by Bard Thuy MD at [facility]      TUNNELED VENOUS PORT PLACEMENT  11/06/2017    UPPER GASTROINTESTINAL ENDOSCOPY Left 05/10/2018    EGD BIOPSY performed by Aure Larios MD at [address] [place] [address] ENDOSCOPY Left 07/25/2021    EGD BIOPSY performed by Samaria Johansen MD at [address] [facility]       Current Discharge Medication List        CONTINUE these medications which have NOT CHANGED    Details   metoprolol succinate (TOPROL XL) 25 MG extended release tablet Take 1 tablet by mouth daily  Qty: 30 tablet, Refills: 11      valsartan (DIOVAN) 40 MG tablet Take 1 tablet by mouth daily  Qty: 30 tablet, Refills: 11      polyvinyl alcohol (LIQUIFILM TEARS) 1.4 % ophthalmic solution 1 drop as needed      benzonatate (TESSALON) 200 MG capsule Take 200 mg by mouth 3 times daily as needed for Cough      polyethylene glycol (GLYCOLAX) 17 g packet Take 17 g by mouth daily as needed for Constipation      guaiFENesin (ROBITUSSIN) 100 MG/5ML SOLN oral solution Take 200 mg by mouth every 4 hours as needed for Cough      ipratropium (ATROVENT) 0.06 % nasal spray       pregabalin (LYRICA) 150 MG capsule Take 150 mg by mouth in the morning, at noon, and at bedtime.        pantoprazole (PROTONIX) 40 MG tablet Take 1 tablet by mouth 2 times daily (before meals)  Qty: 60 tablet, Refills: 0      linaclotide (LINZESS) 145 MCG capsule Take 1 capsule by mouth every morning (before breakfast)  Qty: 30 capsule, Refills: 2      ferrous gluconate (FERGON) 324 (38 Fe) MG tablet Take 324 mg by mouth 2 times daily      furosemide (LASIX) 20 MG tablet Take 20 mg by mouth daily as needed (swelling as needed)      insulin lispro (HUMALOG) 100 UNIT/ML injection vial Inject into the skin 3 times daily (before meals)      hydrOXYzine (ATARAX) 10 MG tablet Take 10 mg by mouth 3 times daily as needed for Itching      Probiotic Product (PROBIOTIC DAILY PO) Take by mouth      cetirizine (ZYRTEC) 10 MG tablet Take 10 mg by mouth daily      polyethyl glycol-propyl glycol 0.4-0.3 % (SYSTANE) 0.4-0.3 % ophthalmic solution 1 drop as needed for Dry Eyes      sucralfate (CARAFATE) 1 GM tablet Take 1 tablet by mouth 4 times daily (before meals and nightly)  Qty: 120 tablet, Refills: 0      vitamin D 25 MCG (1000 UT) CAPS Take by mouth daily 125 mcg daily      citalopram (CELEXA) 40 MG tablet Take 40 mg by mouth daily      tiZANidine (ZANAFLEX) 2 MG tablet Take 2 mg by mouth 2 times daily       DULoxetine (CYMBALTA) 20 MG extended release capsule Take 120 mg by mouth daily       insulin glargine (BASAGLAR KWIKPEN) 100 UNIT/ML injection pen Inject 8 Units into the skin nightly       potassium chloride (KLOR-CON M) 10 MEQ extended release tablet Take 1 tablet by mouth daily  Qty: 90 tablet, Refills: 2      Dulaglutide (TRULICITY) 1.93 HA/9.8YP SOPN Inject 0.75 mg into the skin once a week Every Wednesday      diphenhydrAMINE (BENADRYL) 25 MG capsule Take 25 mg by mouth as needed for Itching      RA ALCOHOL SWABS 70 % PADS Refills: 1      ONE TOUCH ULTRA TEST strip Refills: 1      Lancets Misc. (UNISTIK CZT COMFORT) MISC Refills: 1      ipratropium-albuterol (DUONEB) 0.5-2.5 (3) MG/3ML SOLN nebulizer solution Inhale 1 vial into the lungs every 4 hours       traZODone (DESYREL) 100 MG tablet Take 100 mg by mouth nightly       Multiple Vitamins-Minerals (THERAPEUTIC MULTIVITAMIN-MINERALS) tablet Take 1 tablet by mouth daily      OXYGEN Inhale into the lungs continuous      atorvastatin (LIPITOR) 20 MG tablet Take 1 tablet by mouth nightly  Qty: 30 tablet, Refills: 3      magnesium hydroxide (MILK OF MAGNESIA CONCENTRATE) 2400 MG/10ML SUSP Take 30 mLs by mouth once as needed      docusate sodium (COLACE) 100 MG capsule Take 100 mg by mouth 3 times daily as needed       acetaminophen (TYLENOL) 325 MG tablet Take 2 tablets by mouth every 4 hours as needed for Pain  Qty: 120 tablet, Refills: 3      levothyroxine (SYNTHROID) 75 MCG tablet Take 75 mcg by mouth Daily             ALLERGIES     is allergic to bactrim [sulfamethoxazole-trimethoprim], wellbutrin [bupropion], and silicone. FAMILY HISTORY     She indicated that her mother is .  She indicated that her father is . She indicated that her sister is alive. She indicated that her brother is alive. SOCIAL HISTORY       Social History     Tobacco Use    Smoking status: Former     Packs/day: 1.00     Years: 30.00     Pack years: 30.00     Types: Cigarettes     Quit date: 5/10/2006     Years since quittin.6    Smokeless tobacco: Never   Vaping Use    Vaping Use: Never used   Substance Use Topics    Alcohol use: No    Drug use: No       PHYSICAL EXAM       ED Triage Vitals [01/10/23 1403]   BP Temp Temp Source Heart Rate Resp SpO2 Height Weight   100/70 98 °F (36.7 °C) Oral 78 17 96 % 5' 5\" (1.651 m) 220 lb (99.8 kg)       Additional Vital Signs:  Vitals:    23 1133   BP: 115/69   Pulse: 60   Resp: 16   Temp: 97.8 °F (36.6 °C)   SpO2: 93%     Physical Exam  Constitutional:       Appearance: Normal appearance. HENT:      Head: Normocephalic. Right Ear: External ear normal.      Left Ear: External ear normal.      Nose: Nose normal.      Mouth/Throat:      Mouth: Mucous membranes are moist.      Pharynx: Oropharynx is clear. Eyes:      Extraocular Movements: Extraocular movements intact. Conjunctiva/sclera: Conjunctivae normal.      Pupils: Pupils are equal, round, and reactive to light. Cardiovascular:      Rate and Rhythm: Normal rate and regular rhythm. Pulses: Normal pulses. Heart sounds: Normal heart sounds. Pulmonary:      Effort: Pulmonary effort is normal.      Breath sounds: Normal breath sounds. Abdominal:      General: Bowel sounds are normal.      Palpations: Abdomen is soft. Musculoskeletal:         General: Normal range of motion. Cervical back: Normal range of motion and neck supple. Skin:     General: Skin is warm and dry. Capillary Refill: Capillary refill takes less than 2 seconds. Neurological:      General: No focal deficit present. Mental Status: She is alert and oriented to person, place, and time.    Psychiatric: Mood and Affect: Mood normal.         Behavior: Behavior normal.       FORMAL DIAGNOSTIC RESULTS     RADIOLOGY: Interpretation per the Radiologist below, if available at the time of this note (none if blank):    US RENAL COMPLETE   Final Result       1. No evidence of hydronephrosis on either side. 2. Small probable cysts in the upper pole of the right kidney measuring 1.7 x 1.7 x 1.2 cm in size and 1.4 x 1.3 x 1.2 cm in size. 3. Possible small left renal cyst measuring 1.1 x 1.1 cm in size. 4. Tiny amount of postvoid residual in the bladder. 5. The left ureteric jet was seen. .               **This report has been created using voice recognition software. It may contain minor errors which are inherent in voice recognition technology. **      Final report electronically signed by DR Gayla Landau on 1/11/2023 8:59 PM      XR CHEST (2 VW)   Final Result   1. Borderline heart size. Relative \"rounding\" of the left ventricular margin, suggesting concentric left ventricular hypertrophy. Loop recorder projects over the cardiac silhouette. MediPort left side, catheter tip in SVC. Evidence for old, healed    granulomatous disease. A watchman device projects over the left atrial appendage. 2. No acute findings. No infiltrates or effusions are seen. **This report has been created using voice recognition software. It may contain minor errors which are inherent in voice recognition technology. **      Final report electronically signed by Dr. Betty Henry on 1/10/2023 3:16 PM          LABS: (none if blank)  Labs Reviewed   CBC WITH AUTO DIFFERENTIAL - Abnormal; Notable for the following components:       Result Value    MCHC 32.0 (*)     RDW-SD 45.5 (*)     All other components within normal limits   COMPREHENSIVE METABOLIC PANEL W/ REFLEX TO MG FOR LOW K - Abnormal; Notable for the following components:    Glucose 123 (*)     Creatinine 1.5 (*)     BUN 33 (*)     Potassium reflex Magnesium 5.4 (*)     All other components within normal limits   GLOMERULAR FILTRATION RATE, ESTIMATED - Abnormal; Notable for the following components:    Est, Glom Filt Rate 35 (*)     All other components within normal limits   POTASSIUM - Abnormal; Notable for the following components:    Potassium 5.3 (*)     All other components within normal limits   HEMOGLOBIN A1C - Abnormal; Notable for the following components:    Hemoglobin A1C 6.7 (*)     AVERAGE GLUCOSE 141 (*)     All other components within normal limits   BASIC METABOLIC PANEL W/ REFLEX TO MG FOR LOW K - Abnormal; Notable for the following components:    Potassium reflex Magnesium 5.7 (*)     Glucose 123 (*)     BUN 39 (*)     Creatinine 1.7 (*)     Calcium 8.2 (*)     All other components within normal limits   CBC WITH AUTO DIFFERENTIAL - Abnormal; Notable for the following components:    RBC 3.94 (*)     MCHC 32.0 (*)     RDW-SD 45.4 (*)     All other components within normal limits   GLOMERULAR FILTRATION RATE, ESTIMATED - Abnormal; Notable for the following components:    Est, Glom Filt Rate 30 (*)     All other components within normal limits   URINE WITH REFLEXED MICRO - Abnormal; Notable for the following components:    Leukocyte Esterase, Urine TRACE (*)     All other components within normal limits   BASIC METABOLIC PANEL W/ REFLEX TO MG FOR LOW K - Abnormal; Notable for the following components:    Glucose 183 (*)     BUN 37 (*)     Creatinine 1.6 (*)     All other components within normal limits   GLOMERULAR FILTRATION RATE, ESTIMATED - Abnormal; Notable for the following components:    Est, Glom Filt Rate 33 (*)     All other components within normal limits   BASIC METABOLIC PANEL - Abnormal; Notable for the following components:    Potassium 5.5 (*)     Glucose 130 (*)     BUN 33 (*)     Creatinine 1.3 (*)     Calcium 8.3 (*)     All other components within normal limits   GLOMERULAR FILTRATION RATE, ESTIMATED - Abnormal; Notable for the following components: Est, Glom Filt Rate 42 (*)     All other components within normal limits   POCT GLUCOSE - Abnormal; Notable for the following components:    POC Glucose 279 (*)     All other components within normal limits   POCT GLUCOSE - Abnormal; Notable for the following components:    POC Glucose 190 (*)     All other components within normal limits   POCT GLUCOSE - Abnormal; Notable for the following components:    POC Glucose 168 (*)     All other components within normal limits   POCT GLUCOSE - Abnormal; Notable for the following components:    POC Glucose 132 (*)     All other components within normal limits   POCT GLUCOSE - Abnormal; Notable for the following components:    POC Glucose 116 (*)     All other components within normal limits   POCT GLUCOSE - Abnormal; Notable for the following components:    POC Glucose 135 (*)     All other components within normal limits   TROPONIN   ANION GAP   OSMOLALITY   TROPONIN   TSH WITH REFLEX   ANION GAP   OSMOLALITY   SODIUM, URINE, RANDOM   CREATININE, RANDOM URINE   ANION GAP   ANION GAP   POTASSIUM   POCT GLUCOSE   POCT GLUCOSE   POCT GLUCOSE       (Any cultures that may have been sent were not resulted at the time of this patient visit)    [facility] / ED COURSE:     1) Number and Complexity of Problems            Problem List This Visit:         Chief Complaint   Patient presents with    Chest Pain            Differential Diagnosis includes (but not limited to):  Chest pain acute, ACS, STEMI, NSTEMI, angina- unstable vs stable. 2)  Data Reviewed (none if left blank)             Labs:   hyperkalemia, no anemia, no leukocytosis, negative troponin, hyperkalemia, CKD                 Decision Rules/Clinical Scores utilized:    Heart Score    Heart Score for chest pain patients  History:  Moderately Suspicious  ECG: Non-Specifc repolarization disturbance/LBTB/PM  Patient Age: > 65 years  *Risk factors for Atherosclerotic disease: Hypertension, Obesity, Coronary Artery Disease  Risk Factors: > 3 Risk factors or history of atherosclerotic disease*  Troponin: < 1X normal limit  Heart Score Total: 6    HEART Score recommendations:  Score 0 - 3:   <1.7%  risk of MACE over next 6 wks = Outpatient workup  Score 4 - 6: 12-16% risk of MACE over next 6 wks = Admission for observation  Score 7- 10: 50-65% risk of MACE over next 6 wks = Early invasive strategy    MACE: Major Acute Cardiac Event (All Cause Mortality, AMI or revascularization)  Chest Pain in the Emergency Room: A Multicenter Validation of the [address]. Ernestine BE, Six AJ, Ramona MATILDE, Mast TP, [employer], Mast EG, Lio SH, [facility]. Crit Pathw Cardiol. 2010 Sep; 9(3): 164-169. \"A prospective validation of the HEART score for chest pain patients at the emergency department. \" Int J Cardiol. 2013 Oct 3;168(3):2153-8. doi: 10.1016/j.ijcard. 2013.01.255. Epub 2013 Mar 7. External Documentation Reviewed:         Previous patient encounter documents & history available on EMR was reviewed              See Formal Diagnostic Results above for the lab and radiology tests and orders.     3)  Treatment and Disposition         ED Reassessment:  remains chest pain free         Case discussed with consulting clinician: hospitlist team          Shared Decision-Making was performed and disposition discussed with the        Patient/Family and questions answered                Code Status: Full       Summary of Patient Presentation:      MDM  Number of Diagnoses or Management Options  Acute chest pain: new, needed workup     Amount and/or Complexity of Data Reviewed  Clinical lab tests: ordered and reviewed  Tests in the radiology section of CPT®: ordered and reviewed  Tests in the medicine section of CPT®: ordered and reviewed  Decide to obtain previous medical records or to obtain history from someone other than the patient: yes  Obtain history from someone other than the patient: yes  Review and summarize past medical records: yes  Discuss the patient with other providers: yes  Independent visualization of images, tracings, or specimens: yes    Risk of Complications, Morbidity, and/or Mortality  Presenting problems: high  Diagnostic procedures: high  Management options: high    Patient Progress  Patient progress: stable  /  ED Course as of 01/12/23 1505   Tue Oscar 10, 2023   1657 XR CHEST (2 VW) [CN]      ED Course User Index  [CN] Fay Moore MD     Vitals Reviewed:    Vitals:    01/11/23 2200 01/12/23 0100 01/12/23 0929 01/12/23 1133   BP: 131/60 (!) 107/47 118/74 115/69   Pulse: 84 98 61 60   Resp: 18 18 18 16   Temp: 98.3 °F (36.8 °C) 97.9 °F (36.6 °C) 98 °F (36.7 °C) 97.8 °F (36.6 °C)   TempSrc: Oral Oral Oral Oral   SpO2: 94%  95% 93%   Weight:       Height:         Pt is a 65 yr old female with PMHX of DMII , CKD, HTN and left ventricular dysfunction who presents to the ED with chest pain. Pt on arrival with no chest pain. Pt has heart score of 5 with significant risk factors so will be admitted for further evaluation and treatment. The patient was seen and examined. Appropriate diagnostic testing was performed and results reviewed with the patient.         ED Medications administered this visit:  (None if blank)  Medications   aluminum & magnesium hydroxide-simethicone (MAALOX) 30 mL, lidocaine viscous hcl (XYLOCAINE) 5 mL (GI COCKTAIL) ( Oral Held 1/10/23 1547)   atorvastatin (LIPITOR) tablet 20 mg (20 mg Oral Given 1/11/23 2313)   benzonatate (TESSALON) capsule 200 mg (has no administration in time range)   cetirizine (ZYRTEC) tablet 10 mg (10 mg Oral Given 1/12/23 0942)   citalopram (CELEXA) tablet 40 mg (40 mg Oral Given 1/12/23 0942)   docusate sodium (COLACE) capsule 100 mg (has no administration in time range)   DULoxetine (CYMBALTA) extended release capsule 120 mg (120 mg Oral Given 1/12/23 0942)   ferrous gluconate (FERGON) tablet 240 mg (240 mg Oral Given 1/12/23 0778)   furosemide (LASIX) tablet 20 mg ( Oral Held by provider 1/10/23 2044)   insulin glargine (LANTUS) injection vial 8 Units (8 Units SubCUTAneous Given 1/11/23 2201)   levothyroxine (SYNTHROID) tablet 75 mcg (75 mcg Oral Given 1/12/23 0612)   linaclotide (LINZESS) capsule 145 mcg (Patient Supplied) (145 mcg Oral Given 1/12/23 0612)   metoprolol succinate (TOPROL XL) extended release tablet 25 mg ( Oral Automatically Held 1/16/23 0900)   pantoprazole (PROTONIX) tablet 40 mg (40 mg Oral Given 1/12/23 0612)   pregabalin (LYRICA) capsule 150 mg (150 mg Oral Given 1/12/23 0942)   sucralfate (CARAFATE) tablet 1 g (1 g Oral Given 1/12/23 0941)   tiZANidine (ZANAFLEX) tablet 2 mg (2 mg Oral Given 1/12/23 0941)   traZODone (DESYREL) tablet 100 mg (100 mg Oral Given 1/11/23 2313)   valsartan (DIOVAN) tablet 40 mg ( Oral Automatically Held 1/16/23 0900)   sodium chloride flush 0.9 % injection 5-40 mL (10 mLs IntraVENous Given 1/12/23 0943)   sodium chloride flush 0.9 % injection 5-40 mL (has no administration in time range)   0.9 % sodium chloride infusion (has no administration in time range)   enoxaparin (LOVENOX) injection 40 mg (40 mg SubCUTAneous Given 1/11/23 2300)   ondansetron (ZOFRAN-ODT) disintegrating tablet 4 mg (has no administration in time range)     Or   ondansetron (ZOFRAN) injection 4 mg (has no administration in time range)   polyethylene glycol (GLYCOLAX) packet 17 g (has no administration in time range)   acetaminophen (TYLENOL) tablet 650 mg (650 mg Oral Given 1/11/23 2130)     Or   acetaminophen (TYLENOL) suppository 650 mg ( Rectal See Alternative 1/11/23 2130)   glucagon (rDNA) injection 1 mg (has no administration in time range)   dextrose 10 % infusion (has no administration in time range)   glucose chewable tablet 16 g (has no administration in time range)   dextrose bolus 10% 125 mL (has no administration in time range)     Or   dextrose bolus 10% 250 mL (has no administration in time range) glucagon (rDNA) injection 1 mg (has no administration in time range)   dextrose 10 % infusion (has no administration in time range)   ipratropium-albuterol (DUONEB) nebulizer solution 3 mL (has no administration in time range)   insulin lispro (HUMALOG) injection vial 0-8 Units (0 Units SubCUTAneous Not Given 1/12/23 1223)   insulin lispro (HUMALOG) injection vial 0-4 Units (0 Units SubCUTAneous Not Given 1/11/23 2155)   lidocaine 4 % external patch 3 patch (3 patches TransDERmal Patch Applied 1/12/23 0942)   0.9 % sodium chloride infusion (0 mL/hr IntraVENous Stopped 1/12/23 0615)   0.9 % sodium chloride infusion (has no administration in time range)   aspirin chewable tablet 324 mg (324 mg Oral Given 1/10/23 1555)   0.9 % sodium chloride bolus (0 mLs IntraVENous Stopped 1/11/23 0530)   insulin regular (HUMULIN R;NOVOLIN R) injection 5 Units (5 Units IntraVENous Given 1/11/23 0845)     And   dextrose bolus 10% 250 mL (0 mLs IntraVENous Stopped 1/11/23 0843)   calcium gluconate 10 % injection 1,000 mg (1,000 mg IntraVENous Given 1/11/23 0812)   albumin human 25 % IV solution 25 g (0 g IntraVENous Stopped 1/11/23 0952)     Followed by   albumin human 25 % IV solution 25 g (0 g IntraVENous Stopped 1/11/23 1110)   sodium zirconium cyclosilicate (LOKELMA) oral suspension 5 g (5 g Oral Given 1/11/23 2130)             DISCHARGE PRESCRIPTIONS: (None if blank)  Current Discharge Medication List          FINAL IMPRESSION      1.  Acute chest pain          DISPOSITION/PLAN   DISPOSITION Admitted 01/10/2023 05:50:19 PM      OUTPATIENT FOLLOW UP THE PATIENT:  Win Morales PA-C  [facility]  [phone]    Follow up on 2/8/2023  Follow up appointment February 8, 2023 at [facility] MD Martin Carney MD  Resident  01/12/23 6934

## 2023-01-12 NOTE — DISCHARGE INSTRUCTIONS
Follow up with Dr. Shaun Willis in 2 weeks  Stop taking Valsartan  Stop taking metoprolol  Please wear continuous cardiac monitor and follow up with Dr. Shaun Willis about this. Please get repeat lab work collected to check you electrolytes and kidney function and follow up with your nephrologist.  Checo Smith can follow up with your PCP within 1 week   IF you are to experience any new or worsening symptoms, please return back to the emergency department for re-evaluation.

## 2023-01-13 VITALS
HEART RATE: 63 BPM | SYSTOLIC BLOOD PRESSURE: 116 MMHG | DIASTOLIC BLOOD PRESSURE: 60 MMHG | RESPIRATION RATE: 16 BRPM | WEIGHT: 220 LBS | OXYGEN SATURATION: 99 % | BODY MASS INDEX: 36.65 KG/M2 | TEMPERATURE: 98.8 F | HEIGHT: 65 IN

## 2023-01-13 LAB
ANION GAP SERPL CALCULATED.3IONS-SCNC: 10 MEQ/L (ref 8–16)
BUN BLDV-MCNC: 29 MG/DL (ref 7–22)
CALCIUM SERPL-MCNC: 8.5 MG/DL (ref 8.5–10.5)
CHLORIDE BLD-SCNC: 107 MEQ/L (ref 98–111)
CHOLESTEROL, FASTING: 138 MG/DL (ref 100–199)
CO2: 26 MEQ/L (ref 23–33)
CREAT SERPL-MCNC: 1.2 MG/DL (ref 0.4–1.2)
GFR SERPL CREATININE-BSD FRML MDRD: 46 ML/MIN/1.73M2
GLUCOSE BLD-MCNC: 87 MG/DL (ref 70–108)
GLUCOSE BLD-MCNC: 90 MG/DL (ref 70–108)
HDLC SERPL-MCNC: 38 MG/DL
LDL CHOLESTEROL CALCULATED: 86 MG/DL
POTASSIUM SERPL-SCNC: 4.7 MEQ/L (ref 3.5–5.2)
SODIUM BLD-SCNC: 143 MEQ/L (ref 135–145)
TRIGLYCERIDE, FASTING: 72 MG/DL (ref 0–199)

## 2023-01-13 PROCEDURE — 99232 SBSQ HOSP IP/OBS MODERATE 35: CPT | Performed by: INTERNAL MEDICINE

## 2023-01-13 PROCEDURE — 80061 LIPID PANEL: CPT

## 2023-01-13 PROCEDURE — 80048 BASIC METABOLIC PNL TOTAL CA: CPT

## 2023-01-13 PROCEDURE — 6360000002 HC RX W HCPCS

## 2023-01-13 PROCEDURE — 93270 REMOTE 30 DAY ECG REV/REPORT: CPT

## 2023-01-13 PROCEDURE — 6370000000 HC RX 637 (ALT 250 FOR IP): Performed by: NURSE PRACTITIONER

## 2023-01-13 PROCEDURE — 2580000003 HC RX 258: Performed by: NURSE PRACTITIONER

## 2023-01-13 PROCEDURE — 82948 REAGENT STRIP/BLOOD GLUCOSE: CPT

## 2023-01-13 PROCEDURE — 99239 HOSP IP/OBS DSCHRG MGMT >30: CPT

## 2023-01-13 PROCEDURE — 36415 COLL VENOUS BLD VENIPUNCTURE: CPT

## 2023-01-13 RX ORDER — HEPARIN SODIUM (PORCINE) LOCK FLUSH IV SOLN 100 UNIT/ML 100 UNIT/ML
500 SOLUTION INTRAVENOUS PRN
Status: DISCONTINUED | OUTPATIENT
Start: 2023-01-13 | End: 2023-01-13 | Stop reason: HOSPADM

## 2023-01-13 RX ORDER — HEPARIN SODIUM,PORCINE/PF 1 UNIT/ML
500 SYRINGE (ML) INTRAVENOUS PRN
Status: DISCONTINUED | OUTPATIENT
Start: 2023-01-13 | End: 2023-01-13

## 2023-01-13 RX ADMIN — SUCRALFATE 1 G: 1 TABLET ORAL at 05:14

## 2023-01-13 RX ADMIN — Medication 240 MG: at 08:34

## 2023-01-13 RX ADMIN — TIZANIDINE 2 MG: 4 TABLET ORAL at 08:36

## 2023-01-13 RX ADMIN — SODIUM CHLORIDE, PRESERVATIVE FREE 10 ML: 5 INJECTION INTRAVENOUS at 08:35

## 2023-01-13 RX ADMIN — PANTOPRAZOLE SODIUM 40 MG: 40 TABLET, DELAYED RELEASE ORAL at 05:14

## 2023-01-13 RX ADMIN — DULOXETINE HYDROCHLORIDE 120 MG: 60 CAPSULE, DELAYED RELEASE ORAL at 08:35

## 2023-01-13 RX ADMIN — CITALOPRAM 40 MG: 40 TABLET, FILM COATED ORAL at 08:34

## 2023-01-13 RX ADMIN — HEPARIN 500 UNITS: 100 SYRINGE at 13:10

## 2023-01-13 RX ADMIN — CETIRIZINE HYDROCHLORIDE 10 MG: 10 TABLET, FILM COATED ORAL at 08:34

## 2023-01-13 RX ADMIN — LEVOTHYROXINE SODIUM 75 MCG: 0.07 TABLET ORAL at 05:14

## 2023-01-13 RX ADMIN — PREGABALIN 150 MG: 75 CAPSULE ORAL at 08:34

## 2023-01-13 ASSESSMENT — PAIN SCALES - GENERAL
PAINLEVEL_OUTOF10: 0
PAINLEVEL_OUTOF10: 0

## 2023-01-13 NOTE — PROGRESS NOTES
Physician Progress Note      Tammy Fontanez  CSN #:                  [id number]  :                       1945  ADMIT DATE:       1/10/2023 1:57 PM  [address] [place] DATE:  RESPONDING  PROVIDER #:        González Ortiz CNP          QUERY Jremaine Velez,    Pt admitted with Chest pain that began 3 days PTA but worsened when she had an   aid over to help her. Pt prescribed Lidocaine Patches TID to left   shoulder/bicep & Zanaflex. Pt noted to have Loop recorder in place per CXR. If   possible, please document in progress notes and discharge summary if you are   evaluating and/or treating any of the following: The medical record reflects the following:  Risk Factors: HTN, GERD, DDD, COPD, Loop recorder in place per CXR. Clinical Indicators: Chest pain that began 3 days PTA but worsened when she   had an aid over to help her. Treatment:  Lidocaine Patches TID to left shoulder/bicep. Zanaflex. Options provided:  -- Chest pain Multifactorial due to Bradycardia, Costochondritis, GERD, &   DDD-POA. -- Chest pain due to Bradycardia. -- Chest pain due to Costochondritis. -- Chest pain due to DDD.  -- Chest pain due to GERD.  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     Query created by: Gayla Rider on 2023 6:28 AM      Electronically signed by:  González Ortiz CNP 2023 9:28 AM

## 2023-01-13 NOTE — PROCEDURES
14 Day Cardiac Event Monitor was applied to patient. Instructions were given and skin/monitor prep and application was demonstrated. Patient was instructed to remove monitor on 01/27/2023 and mail back to [employer].

## 2023-01-13 NOTE — PROGRESS NOTES
Report called to pt's assisted living. No questions at this time. Pt dressed. Sister to transport home  Iv and tele removed. Avs reviewed. pt has no questions at this time.

## 2023-01-13 NOTE — PROGRESS NOTES
[facility]   Nephrology progress note  1/13/2023, 11:14 AM      Pt Name:    Eleanor Arguelles  MRN:     [id number]     YOB: 1945  Admit Date:    1/10/2023  1:57 PM    Chief Complaint: Nephrology following for GABBY    Subjective:  Patient was seen and examined this morning  No chest pain or shortness of breath  Feels okay    Objective:  24HR INTAKE/OUTPUT:  No intake or output data in the 24 hours ending 01/13/23 1114        I/O last 3 completed shifts: In: 951.7 [P.O.:240; IV Piggyback:711.7]  Out: -   No intake/output data recorded.    Admission weight: 220 lb (99.8 kg)  Wt Readings from Last 3 Encounters:   01/10/23 220 lb (99.8 kg)   12/21/22 227 lb 11.2 oz (103.3 kg)   12/12/22 231 lb (104.8 kg)        Vitals :   Vitals:    01/13/23 0455 01/13/23 0815 01/13/23 0840 01/13/23 1109   BP: 101/60 98/60  116/60   Pulse: 60 63 88 63   Resp: 16 16     Temp: 97.7 °F (36.5 °C) 97.6 °F (36.4 °C)  98.8 °F (37.1 °C)   TempSrc: Oral Oral  Oral   SpO2: 95% 95%  99%   Weight:       Height:           Physical examination  General Appearance: alert and cooperative with exam, appears comfortable, no distress  Mouth/Throat: Oral mucosa moist  Neck: No JVD  Lungs: no use of accessory muscles  Heart:  S1, S2 heard  GI: soft, non-tender, no guarding    Medications:  Infusion:    dextrose      sodium chloride      dextrose       Meds:    insulin lispro  0-8 Units SubCUTAneous TID WC    insulin lispro  0-4 Units SubCUTAneous Nightly    lidocaine  3 patch TransDERmal Daily    GI cocktail   Oral Once    atorvastatin  20 mg Oral Nightly    cetirizine  10 mg Oral Daily    citalopram  40 mg Oral Daily    DULoxetine  120 mg Oral Daily    ferrous gluconate  240 mg Oral BID    insulin glargine  8 Units SubCUTAneous Nightly    levothyroxine  75 mcg Oral Daily    linaclotide  145 mcg Oral QAM AC    [Held by provider] metoprolol succinate  25 mg Oral Daily    pantoprazole  40 mg Oral BID AC    pregabalin  150 mg Oral TID    sucralfate  1 g Oral 4x Daily AC & HS    tiZANidine  2 mg Oral BID    traZODone  100 mg Oral Nightly    [Held by provider] valsartan  40 mg Oral Daily    sodium chloride flush  5-40 mL IntraVENous 2 times per day    enoxaparin  40 mg SubCUTAneous Q24H     Meds prn: diclofenac sodium, glucose, dextrose bolus **OR** dextrose bolus, glucagon (rDNA), dextrose, ipratropium-albuterol, benzonatate, docusate sodium, [Held by provider] furosemide, sodium chloride flush, sodium chloride, ondansetron **OR** ondansetron, polyethylene glycol, acetaminophen **OR** acetaminophen, glucagon (rDNA), dextrose     Lab Data :  CBC:   Recent Labs     01/10/23  1439 01/11/23  0532   WBC 5.7 5.0   HGB 13.9 12.0   HCT 43.5 37.5    173       CMP:  Recent Labs     01/11/23  1410 01/12/23  0535 01/12/23  0900 01/13/23  0326    143  --  143   K 5.2 5.5* 5.0 4.7    107  --  107   CO2 25 26  --  26   BUN 37* 33*  --  29*   CREATININE 1.6* 1.3*  --  1.2   GLUCOSE 183* 130*  --  90   CALCIUM 8.7 8.3*  --  8.5       Hepatic:   Recent Labs     01/10/23  1439   LABALBU 3.8   AST 20   ALT 12   BILITOT 0.4   ALKPHOS 86           Assessment and Plan:  1. GABBY on CKD. GABBY in setting of bradycardia, hypotension. Improved with IV fluids. Creat is down to 1.2  Clinically not hypervolemic. UA no blood, no protein  Overall stable renal function    2. Hyperkalemia in setting of hypotension and GABBY and Diovan. Resolved. 3.  Chronic systolic cardiomyopathy. 4.  IDDM  5. History of hypertension  6. Renal cysts    If discharged then pls schedule appt with me in office in 4 weeks with Edna Paniagua MD  [facility]    This report has been created using voice recognition software.  It may contain minor errors which are inherent in voice recognition technology

## 2023-01-13 NOTE — DISCHARGE SUMMARY
Hospital Medicine Discharge Summary      Patient Identification:   Genesis Moreno   : 1945  MRN: [id number]   Account: [de-identified]      Patient's PCP: Jeff Mariee MD    Admit Date: 1/10/2023     Discharge Date: 2023      Admitting Physician: No admitting provider for patient encounter. Discharging Nurse Practitioner: Nicky Jaimes, APRN - CNP     Discharge Diagnoses with Assessment/Plan:    Chest pain, resolved  Possible symptomatic due to #2. Heart score 6. Troponin X 2 negative. EKG noting SR w/ frequent PVC's, left axis deviation, with no acute ischemic changes. Check thyroid > TSH 1.600   Fasting lipid panel for cholesterol 138, HDL 38, LDL 86, triglycerides 72  Limited echo noting improved EF to 40-45%, was 35-40 on JAYLEN dated 22. Telemetry. Cardiology consulted: Discontinue Valsartan and Metoprolol. F/u w/ Baki in 1-2 weeks. Bradycardia w/ bigeminy   ? Possibly iatrogenic due to medications? S/p Lidocaine per EMS as per patient was noted to be in bigeminy. Discontinue Toprol XL per cardiology. This med was recently started 10/2022. Limited echo notable for EF 40-45%, normal wall thickness of LV, right ventricular size was normal with normal systolic function and wall thickness  Continuous telemetry  Cardiology consulted, appreciate recs: Stop Toprol XL and Diovan. 2 week continous cardiac event monitor at discharge. F/u w/ Baki in 1-2 weeks upon discharge. Hyperkalemia, mild, resolved -  4.7  Hold potassium supplement and Diovan, recently started 10/2022. S/p 5 units insulin with dextrose, 2 doses of Lokelma on   Repeat BMP and f/u with Dr. Preethi Pace.     GABBY on CKD stage III, resolved. Noted to have episodes hypotension. Likely secondary to renal hypoperfusion and bradycardia  Creatinine 1.2, down from 1.7 (). Baseline appears ~1.2-1.3. Renally dose medications. Avoid nephrotoxic agents. Okay to resume lasix.  Discontinue ARB per cardiology. UA unremarkable. Renal ultrasound notable for small probable cyst in the upper pole the right kidney measuring 1.7 x 1.7 x 1.2 cm in size 1.4 x 1.3 x 1.2 cm in size. Possible small left renal cyst measuring 1.1 x 1.1 cm in size, tiny amount of postvoid residual in the bladder, left ureteric jet was seen. Renal cyst chronically being monitored by nephrology, may follow-up outpatient. Continuous IVF for gentle hydration  Nephrology consulted, appreciate recs: F/u with Dr. Florencia Ross in 4 weeks. Get repeat BMP prior to that appointment. Hypotension, resolved:   Received 500 mL fluid bolus. Remained hypotensive. S/p 50g albumin. Assess response. Hold antihypertensives. 1/12: BP's remaining stable in normal range. Paroxysmal AFIB. Status post Watchman procedure 4/2020 due to history of GI bleed. Not on [address]  Stop BB per cardiology     7. Type 2 diabetes, controlled. Hemoglobin A1c 6.7 (1/10/2023)   Okay to resume home medications   Accu checks ac/hs. Hypoglycemic protocol. Diabetic diet     8. Hypothyroidism  TSH 1.6. Continue Synthroid      9. COPD without acute exacerbation  Resume home inhalers. Pulmonary toilet: IS, acapella      10. Chronic systolic heart failure, compensated  JAYLEN notable for EF 35-40% (4/2022), moderate global hypokinesis of LV, moderately dilate left atrium, LAAO device in good position, mild MR, trivial TR, trival OH, moderate layered, immobile plaque in the transverse and descending aorta. PRN lasix at home > okay to resume  On ARB and BB. Stopping due to above and per cardiology recommendations. Daily weights. Strict intake and output. Low sodium 2L fluid restriction. 11.   Obesity. BMI 36.61 kg per metered squared. Discussed and educated on lifestyle modifications    12. BRITTNY:  Resume home CPAP. 13.   History of immune deficiency receiving IVIG:  Noted via chart review    14. Renal cysts. Being monitored by nephology.     15. HLD  Resume statin. 16.   Constipation. On Linzess at home - continue    16. GERD  Resume home PPI and Carafate. 18.   Depression  Continue home medications have been resumed. The patient was seen and examined on day of discharge and this discharge summary is in conjunction with any daily progress note from day of discharge. Hospital Course:   Pr HPI documented 1/10/23: \"Analy is a 65 y.o. female who presents with 3 day history of chest pain. States pain has progressively worsened today hence the call for EMS. Pt was resting in her bed today when she had an aid over to help her when she suddenly had substernal chest discomfort and pain and light headedness. PT characterizes the pain as constant, usually last 30 mins in duration,  non radiating, gnawing pain. She states she has had several episodes of this chest pain in the last 3-4 weeks. She reports associated palpitations at times, lightheadedness with position changes, nausea, but no exacerbating or triggering factors and no specific factor that improves her pain. She was noted to be bradycardic in the squad. Paramedics gave her IVP Lidocaine with improvement in HR. HR while in the ED has been stable in the 70's. She currently denies chest pain, shortness of breath F/C, N/V, abdominal pain. \"     1/11/23: Resumed care of patient today. Patient afebrile, satting  96% on RA . Patient sitting up in bed, nontoxic in appearance, no apparent distress. Patient stating she feels tired this morning states that she is no longer having any chest pains but did have some soreness across her chest and epigastric discomfort after she ate food. Patient still noted to be in bigeminy and intermittent bradycardia on telemetry. Creatinine elevated to 1.7. She did have some episodes of hypotension overnight. She was given 500 mL fluid bolus given her reduced EF. We will add albumin to this and reassess response. Awaiting cardiology input at this time.   Will obtain renal ultrasound for better evaluation of elevated creatinine. Limited echo ordered. 1/12/23: Afebrile, hemodynamically stable. No acute events overnight. Patient stating she feels much better this morning. States that she still has some fatigue but overall feels much improved. Has no complaints this morning. Denies chest pains. States that she has not had any more cold sweats however prior to the hospital she did have them spontaneously. Denies dizziness or lightheadedness. On telemetry monitor patient noted to be in continuous bigeminy. Her beta-blocker was discontinued. Reconsulted cardiology as they signed off on patient case yesterday. They will reevaluate later this afternoon. Patient will likely need to go home on continuous cardiac event monitor at discharge. Kidney function improved to 1.3, will continue IV fluids per nephrology recs. 1/13/23: Afebrile, hemodynamically stable. No acute events overnight. Patient stating she feels better this morning. Continues to deny chest pains, shortness of breath rest, TREJO, abdominal pain, nausea, vomiting, diarrhea, fever, chills. Denies palpitations, dizziness, lightheadedness, sweats. States that she is eating and drinking well. States that she is moving around her room okay. Denies generalized weakness. On telemetry patient noted to be in normal sinus rhythm, did have intermittent episodes of sinus bradycardia with bigeminy however patient was evaluated by cardiology services yesterday afternoon. They recommended she wear a continuous cardiac event monitor for 2 weeks upon discharge and follow-up with Dr. Carlos Mike within 1 to 2 weeks upon discharge. Patient educated on this. Patient will also have her metoprolol and Diovan discontinued at the recommendations of cardiology services. It is okay for her to resume her as needed Lasix at this time. Her creatinine is stable, down to 1.2 which is in her baseline range.   Patient was evaluated by nephrology services for GABBY on CKD. They are clearing her for discharge. Patient to follow-up outpatient with Dr. Jonah Hoang in 4 weeks with orders for repeat BMP to be collected prior to that appointment. Home health services were offered to patient, she is already established with [facility]. Instructed to follow-up with her PCP within 1 week for posthospital follow-up. Patient instructed that if she was to experience any new or worsening symptoms she should seek immediate medical care and return back to the emergency department. Prior to discharge patient was provided opportunity to ask questions. At this time patient is stable for discharge. Exam:     Vitals:  Vitals:    01/13/23 0455 01/13/23 0815 01/13/23 0840 01/13/23 1109   BP: 101/60 98/60  116/60   Pulse: 60 63 88 63   Resp: 16 16     Temp: 97.7 °F (36.5 °C) 97.6 °F (36.4 °C)  98.8 °F (37.1 °C)   TempSrc: Oral Oral  Oral   SpO2: 95% 95%  99%   Weight:       Height:         Weight: Weight: 220 lb (99.8 kg)     24 hour intake/output:No intake or output data in the 24 hours ending 01/13/23 2217      General appearance: No apparent distress, appears older than stated age and cooperative. Chronically ill-appearing  HEENT: Pupils equal, round, and reactive to light. Conjunctivae/corneas clear. Neck: Supple, with full range of motion. No jugular venous distention. Trachea midline. Respiratory:  Normal respiratory effort. Able to speak full clear sentences. Clear to auscultation, bilaterally without Rales/Wheezes/Rhonchi. Cardiovascular: Regular rate and regular rhythm with normal S1/S2 without murmurs, rubs or gallops. Abdomen: Soft, non-tender, non-distended with normal bowel sounds. Musculoskeletal: passive and active ROM x 4 extremities. Skin: Skin color, texture, turgor normal.  No rashes or lesions. Neurologic:  Neurovascularly intact without any focal sensory/motor deficits.  Cranial nerves: II-XII intact, grossly non-focal.  Psychiatric: Alert and oriented, thought content appropriate, normal insight  Capillary Refill: Brisk,< 3 seconds   Peripheral Pulses: +2 palpable, equal bilaterally       Labs: For convenience and continuity at follow-up the following most recent labs are provided:      CBC:    Lab Results   Component Value Date/Time    WBC 5.0 01/11/2023 05:32 AM    HGB 12.0 01/11/2023 05:32 AM    HCT 37.5 01/11/2023 05:32 AM     01/11/2023 05:32 AM       Renal:    Lab Results   Component Value Date/Time     01/13/2023 03:26 AM    K 4.7 01/13/2023 03:26 AM    K 5.2 01/11/2023 02:10 PM     01/13/2023 03:26 AM    CO2 26 01/13/2023 03:26 AM    BUN 29 01/13/2023 03:26 AM    CREATININE 1.2 01/13/2023 03:26 AM    CREATININE 44.0 03/14/2019 12:00 AM    CALCIUM 8.5 01/13/2023 03:26 AM    PHOS 4.0 09/22/2022 12:00 AM       Cardiac:   Recent Labs     01/10/23  2226   TROPONINT < 0.010       Significant Diagnostic Studies    Radiology:   US RENAL COMPLETE   Final Result       1. No evidence of hydronephrosis on either side. 2. Small probable cysts in the upper pole of the right kidney measuring 1.7 x 1.7 x 1.2 cm in size and 1.4 x 1.3 x 1.2 cm in size. 3. Possible small left renal cyst measuring 1.1 x 1.1 cm in size. 4. Tiny amount of postvoid residual in the bladder. 5. The left ureteric jet was seen. .               **This report has been created using voice recognition software. It may contain minor errors which are inherent in voice recognition technology. **      Final report electronically signed by DR Aron Lyn on 1/11/2023 8:59 PM      XR CHEST (2 VW)   Final Result   1. Borderline heart size. Relative \"rounding\" of the left ventricular margin, suggesting concentric left ventricular hypertrophy. Loop recorder projects over the cardiac silhouette. MediPort left side, catheter tip in SVC. Evidence for old, healed    granulomatous disease. A watchman device projects over the left atrial appendage. 2. No acute findings. No infiltrates or effusions are seen. **This report has been created using voice recognition software. It may contain minor errors which are inherent in voice recognition technology. **      Final report electronically signed by Dr. Jon Cook on 1/10/2023 3:16 PM             Consults:     IP CONSULT TO CARDIOLOGY  IP CONSULT TO NEPHROLOGY  IP CONSULT TO SOCIAL WORK    Disposition:    [x] Home       [] TCU       [] Rehab       [] Psych       [] SNF       [] [place]       [] Other-    Condition at Discharge: Stable    Code Status:  Prior     Pending tests at discharge: None    Patient Instructions:    Discharge lab work: BMP  Activity: activity as tolerated  Diet: No diet orders on file      Follow-up visits:   Wendy Amaro PA-C  [facility] [address]  [phone]    Follow up on 2/8/2023  Follow up appointment February 8, 2023 at [address] [place] [address]    Follow up on 1/31/2023  Follow up appointment January 31, 2023 at 11:20am.    Mandy Thompson MD  [address] [place] [address]  [phone]    Follow up  see within one week of discharge at assisted living         Discharge Medications:        Medication List        CONTINUE taking these medications      acetaminophen 325 MG tablet  Commonly known as: TYLENOL  Take 2 tablets by mouth every 4 hours as needed for Pain     atorvastatin 20 MG tablet  Commonly known as: LIPITOR  Take 1 tablet by mouth nightly     Basaglar KwikPen 100 UNIT/ML injection pen  Generic drug: insulin glargine     benzonatate 200 MG capsule  Commonly known as: TESSALON     cetirizine 10 MG tablet  Commonly known as: ZYRTEC     citalopram 40 MG tablet  Commonly known as: CELEXA     diphenhydrAMINE 25 MG capsule  Commonly known as: BENADRYL     docusate sodium 100 MG capsule  Commonly known as: COLACE     DULoxetine 20 MG extended release capsule  Commonly known as: CYMBALTA     ferrous gluconate 324 (38 Fe) MG tablet  Commonly known as: FERGON     furosemide 20 MG tablet  Commonly known as: LASIX     guaiFENesin 100 MG/5ML Soln oral solution  Commonly known as: ROBITUSSIN     hydrOXYzine HCl 10 MG tablet  Commonly known as: ATARAX     insulin lispro 100 UNIT/ML injection vial  Commonly known as: HUMALOG     ipratropium 0.06 % nasal spray  Commonly known as: ATROVENT     ipratropium-albuterol 0.5-2.5 (3) MG/3ML Soln nebulizer solution  Commonly known as: DUONEB     levothyroxine 75 MCG tablet  Commonly known as: SYNTHROID     linaclotide 145 MCG capsule  Commonly known as: Linzess  Take 1 capsule by mouth every morning (before breakfast)     magnesium hydroxide 2400 MG/10ML Susp  Commonly known as: MILK OF MAGNESIA CONCENTRATE     ONE TOUCH ULTRA TEST strip  Generic drug: blood glucose test strips     OXYGEN     pantoprazole 40 MG tablet  Commonly known as: PROTONIX  Take 1 tablet by mouth 2 times daily (before meals)     polyethylene glycol 17 g packet  Commonly known as: GLYCOLAX     polyvinyl alcohol 1.4 % ophthalmic solution  Commonly known as: LIQUIFILM TEARS     potassium chloride 10 MEQ extended release tablet  Commonly known as: KLOR-CON M  Take 1 tablet by mouth daily     pregabalin 150 MG capsule  Commonly known as: LYRICA     PROBIOTIC DAILY PO     RA Alcohol Swabs 70 % Pads     sucralfate 1 GM tablet  Commonly known as: CARAFATE  Take 1 tablet by mouth 4 times daily (before meals and nightly)     therapeutic multivitamin-minerals tablet     tiZANidine 2 MG tablet  Commonly known as: ZANAFLEX     traZODone 100 MG tablet  Commonly known as: DESYREL     Trulicity 5.05 TC/6.2MV Sopn  Generic drug: Dulaglutide     Unistik CZT Comfort Misc     vitamin D 25 MCG (1000 UT) Caps            STOP taking these medications      metoprolol succinate 25 MG extended release tablet  Commonly known as: TOPROL XL     polyethyl glycol-propyl glycol 0.4-0.3 % 0.4-0.3 % ophthalmic solution  Commonly known as: SYSTANE     valsartan 40 MG tablet  Commonly known as: DIOVAN              Time Spent on discharge is more than 1 hour in the examination, evaluation, counseling and review of medications and discharge plan. Signed: Thank you Saulo Omer MD for the opportunity to be involved in this patient's care.     Electronically signed by BRIAN Burgos CNP on 1/13/2023 at 10:17 PM

## 2023-01-13 NOTE — PLAN OF CARE
Problem: Discharge Planning  Goal: Discharge to home or other facility with appropriate resources  Outcome: Progressing     Problem: Safety - Adult  Goal: Free from fall injury  Outcome: Progressing     Problem: Pain  Goal: Verbalizes/displays adequate comfort level or baseline comfort level  Outcome: Progressing     Problem: Chronic Conditions and Co-morbidities  Goal: Patient's chronic conditions and co-morbidity symptoms are monitored and maintained or improved  Outcome: Progressing   Care plan reviewed with patient. Patient  verbalized understanding of the plan of care and contribute to goal setting.

## 2023-01-16 ENCOUNTER — CARE COORDINATION (OUTPATIENT)
Dept: CASE MANAGEMENT | Age: 78
End: 2023-01-16

## 2023-01-16 ENCOUNTER — HOSPITAL ENCOUNTER (OUTPATIENT)
Dept: NURSING | Age: 78
Discharge: HOME | End: 2023-01-16
Payer: MEDICARE

## 2023-01-16 VITALS
TEMPERATURE: 96.8 F | BODY MASS INDEX: 37.61 KG/M2 | SYSTOLIC BLOOD PRESSURE: 125 MMHG | WEIGHT: 226 LBS | HEART RATE: 45 BPM | DIASTOLIC BLOOD PRESSURE: 56 MMHG | OXYGEN SATURATION: 97 % | RESPIRATION RATE: 16 BRPM

## 2023-01-16 DIAGNOSIS — D83.9 COMMON VARIABLE IMMUNODEFICIENCY (HCC): Primary | ICD-10-CM

## 2023-01-16 PROCEDURE — 96365 THER/PROPH/DIAG IV INF INIT: CPT

## 2023-01-16 PROCEDURE — 2580000003 HC RX 258: Performed by: FAMILY MEDICINE

## 2023-01-16 PROCEDURE — 6360000002 HC RX W HCPCS: Performed by: FAMILY MEDICINE

## 2023-01-16 PROCEDURE — 6370000000 HC RX 637 (ALT 250 FOR IP): Performed by: FAMILY MEDICINE

## 2023-01-16 PROCEDURE — 82784 ASSAY IGA/IGD/IGG/IGM EACH: CPT

## 2023-01-16 PROCEDURE — 96413 CHEMO IV INFUSION 1 HR: CPT

## 2023-01-16 PROCEDURE — 36591 DRAW BLOOD OFF VENOUS DEVICE: CPT

## 2023-01-16 RX ORDER — SODIUM CHLORIDE 0.9 % (FLUSH) 0.9 %
5-40 SYRINGE (ML) INJECTION PRN
Status: DISCONTINUED | OUTPATIENT
Start: 2023-01-16 | End: 2023-01-17 | Stop reason: HOSPADM

## 2023-01-16 RX ORDER — ACETAMINOPHEN 325 MG/1
650 TABLET ORAL ONCE
Status: COMPLETED | OUTPATIENT
Start: 2023-01-16 | End: 2023-01-16

## 2023-01-16 RX ORDER — DIPHENHYDRAMINE HYDROCHLORIDE 50 MG/ML
50 INJECTION INTRAMUSCULAR; INTRAVENOUS
OUTPATIENT
Start: 2023-01-29

## 2023-01-16 RX ORDER — DIPHENHYDRAMINE HCL 25 MG
25 TABLET ORAL ONCE
OUTPATIENT
Start: 2023-01-29 | End: 2023-01-29

## 2023-01-16 RX ORDER — ACETAMINOPHEN 325 MG/1
650 TABLET ORAL ONCE
OUTPATIENT
Start: 2023-01-29 | End: 2023-01-29

## 2023-01-16 RX ORDER — SODIUM CHLORIDE 9 MG/ML
5-250 INJECTION, SOLUTION INTRAVENOUS PRN
OUTPATIENT
Start: 2023-01-29

## 2023-01-16 RX ORDER — DIPHENHYDRAMINE HCL 25 MG
25 TABLET ORAL ONCE
Status: COMPLETED | OUTPATIENT
Start: 2023-01-16 | End: 2023-01-16

## 2023-01-16 RX ORDER — ONDANSETRON 2 MG/ML
8 INJECTION INTRAMUSCULAR; INTRAVENOUS
OUTPATIENT
Start: 2023-01-29

## 2023-01-16 RX ORDER — HEPARIN SODIUM (PORCINE) LOCK FLUSH IV SOLN 100 UNIT/ML 100 UNIT/ML
500 SOLUTION INTRAVENOUS PRN
Status: DISCONTINUED | OUTPATIENT
Start: 2023-01-16 | End: 2023-01-17 | Stop reason: HOSPADM

## 2023-01-16 RX ORDER — ACETAMINOPHEN 325 MG/1
650 TABLET ORAL
OUTPATIENT
Start: 2023-01-29

## 2023-01-16 RX ORDER — HEPARIN SODIUM (PORCINE) LOCK FLUSH IV SOLN 100 UNIT/ML 100 UNIT/ML
500 SOLUTION INTRAVENOUS PRN
OUTPATIENT
Start: 2023-01-29

## 2023-01-16 RX ORDER — CLINDAMYCIN HYDROCHLORIDE 300 MG/1
300 CAPSULE ORAL 3 TIMES DAILY
COMMUNITY
End: 2023-01-25

## 2023-01-16 RX ORDER — SODIUM CHLORIDE 9 MG/ML
INJECTION, SOLUTION INTRAVENOUS CONTINUOUS
OUTPATIENT
Start: 2023-01-29

## 2023-01-16 RX ORDER — SODIUM CHLORIDE 0.9 % (FLUSH) 0.9 %
5-40 SYRINGE (ML) INJECTION PRN
OUTPATIENT
Start: 2023-01-29

## 2023-01-16 RX ORDER — SIMETHICONE 125 MG
CAPSULE ORAL
COMMUNITY

## 2023-01-16 RX ORDER — MEPERIDINE HYDROCHLORIDE 25 MG/ML
12.5 INJECTION INTRAMUSCULAR; INTRAVENOUS; SUBCUTANEOUS PRN
OUTPATIENT
Start: 2023-01-29

## 2023-01-16 RX ORDER — ALBUTEROL SULFATE 90 UG/1
4 AEROSOL, METERED RESPIRATORY (INHALATION) PRN
OUTPATIENT
Start: 2023-01-29

## 2023-01-16 RX ADMIN — DIPHENHYDRAMINE HYDROCHLORIDE 25 MG: 25 TABLET ORAL at 11:15

## 2023-01-16 RX ADMIN — IMMUNE GLOBULIN (HUMAN) 5 G: 10 INJECTION INTRAVENOUS; SUBCUTANEOUS at 11:41

## 2023-01-16 RX ADMIN — IMMUNE GLOBULIN (HUMAN) 20 G: 10 INJECTION INTRAVENOUS; SUBCUTANEOUS at 12:16

## 2023-01-16 RX ADMIN — SODIUM CHLORIDE, PRESERVATIVE FREE 10 ML: 5 INJECTION INTRAVENOUS at 13:08

## 2023-01-16 RX ADMIN — HEPARIN 500 UNITS: 100 SYRINGE at 13:08

## 2023-01-16 RX ADMIN — ACETAMINOPHEN 650 MG: 325 TABLET ORAL at 11:15

## 2023-01-16 NOTE — PROGRESS NOTES
1050: Patient arrived ambulatory for IVIG infusion. Patient rights and responsibilities offered to patient. Patient's mediport accessed using sterile technique. Blood work collected and sent to lab. 1115: Pre-medications administered, patient tolerated well. 1141: IVIG infusion started. 1307: Infusion complete, patient tolerated well. Mediport de-accessed. AVS reviewed with patient, voiced understanding. Patient discharged ambulatory.                         _m___ Safety:       (Environmental)  [place] to environment  Ensure ID band is correct and in place/ allergy band as needed  Assess for fall risk  Initiate fall precautions as applicable (fall band, side rails, etc.)  Call light within reach  Bed in low position/ wheels locked    _m___ Pain:       Assess pain level and characteristics  Administer analgesics as ordered  Assess effectiveness of pain management and report to MD as needed    _m___ Knowledge Deficit:  Assess baseline knowledge  Provide teaching at level of understanding  Provide teaching via preferred learning method  Evaluate teaching effectiveness    _m___ Hemodynamic/Respiratory Status:       (Pre and Post Procedure Monitoring)  Assess/Monitor vital signs and LOC  Assess Baseline SpO2 prior to any sedation  Obtain weight/height  Assess vital signs/ LOC until patient meets discharge criteria  Monitor procedure site and notify MD of any issues    _m___ Infection-Risk of Central Venous Catheter:  Monitor for infection signs and symptoms (catheter site redness, temperature elevation, etc)  Assess for infection risks  Educate regarding infection prevention  Manage central venous catheter (flushes/ dressing changes per protocol)

## 2023-01-16 NOTE — CARE COORDINATION
Care Transitions Outreach Attempt    Call within 2 business days of discharge: Yes   Patient: Xavier Ricks Patient : 1945 MRN: [id number]    Last Discharge [address]       Date Complaint Diagnosis Description Type Department Provider    1/10/23 Chest Pain Acute chest pain . .. ED to Hosp-Admission (Discharged) (ADMITTED) 2 [facility],[address], APRN - CNP; [facility]. .. Was this an external facility discharge? No Discharge Facility: [facility]    Noted following upcoming appointments from discharge chart review:   [facility][1] follow up appointment(s):   Future Appointments   Date Time Provider Mynor Starkey   2023 11:20 AM Ricardo Dickens MD N [place]   2023  8:00 AM Nancy Barber PA-C N [facility] [place]   2023 12:00 PM Austen Moya MD N [facility] [place]   2023 11:00 AM STR ULTRASOUND RM 2 STRZ US STR Radiolog   2023  1:20 PM [id number] Elizabeth Rayo DO N CHRISTOPHER KNIG [facility] [place]       1st attempt to reach for Care Transition discharge call unsuccessful. HIPAA compliant message left requesting call back. UTR letter to Patient via [address] Challenges to be reviewed by the provider   Additional needs identified to be addressed with provider:    1/10/23-23 3980 Adi elizondo CP, Bradycardia w/ Prashant, GABBY  Please contact Patient for scheduling of 7 day hospital follow up appt.         Method of communication with provider : chart routing      [address] [place] [address] [phone]

## 2023-01-16 NOTE — CARE COORDINATION
1/16/23, 4:05 PM EST  Late entry    Patient goals/plan/ treatment preferences discussed by  and . Patient goals/plan/ treatment preferences reviewed with patient/ family. Patient/ family verbalize understanding of discharge plan and are in agreement with goal/plan/treatment preferences. Understanding was demonstrated using the teach back method. AVS provided by RN at time of discharge, which includes all necessary medical information pertaining to the patients current course of illness, treatment, post-discharge goals of care, and treatment preferences.      Services At/After Discharge: Assisted Living, Home Health, OT, and PT       IMM Letter  IMM Letter given to Patient/Family/Significant other/Guardian/POA/by[de-identified] Samy Washington RN CM  IMM Letter date given[de-identified] 01/13/23  IMM Letter time given[de-identified] 0713  Observation Status Letter date given[de-identified] 01/10/23  Observation Status Letter time given[de-identified] 6474  Observation Status Letter given to Patient/Family/Significant other/Guardian/POA/by[de-identified] patient registration

## 2023-01-16 NOTE — DISCHARGE INSTRUCTIONS
ACTIVITY:  Continue usual care with your doctor. Call your doctor immediately if any severe problems or go to the nearest emergency room. I have been treated and hereby acknowledge receiving this instruction sheet. Next appointment: February 13th at 11 AM.     Please call [phone] with any questions or concerns.

## 2023-01-17 ENCOUNTER — CARE COORDINATION (OUTPATIENT)
Dept: CASE MANAGEMENT | Age: 78
End: 2023-01-17

## 2023-01-17 LAB
IGA: 94 MG/DL (ref 70–400)
IGG: 972 MG/DL (ref 700–1600)
IGM: 46 MG/DL (ref 40–230)

## 2023-01-17 NOTE — CARE COORDINATION
Care Transitions Outreach Attempt    Call within 2 business days of discharge: Yes   Patient: Rachana Suarez Patient : 1945 MRN: [id number]    Last Discharge [address]       Date Complaint Diagnosis Description Type Department Provider    1/10/23 Chest Pain Acute chest pain . .. ED to Hosp-Admission (Discharged) (ADMITTED) 2 [facility],[address], APRN - CNP; [facility]. .. Was this an external facility discharge? No [facility]    Noted following upcoming appointments from discharge chart review:   [facility] follow up appointment(s):   Future Appointments   Date Time Provider Mynor Starkey   2023 11:20 AM Franci Carver MD N [address] [facility] [place]   2023  8:00 AM Jovanna Hdez PA-C N [facility] [place]   2023 11:00 AM STR EXAM ROOM 10 STRZ OP NURS Isaac HOD   2023 12:00  Sheridan Fields MD N [facility] [place]   2023 11:00 AM STR ULTRASOUND RM 2 STRZ US STR Radiolog   2023  1:20 PM [id number] DO TOMY Preston       2nd unsuccessful attempt to reach for Care Transition discharge call. HIPAA compliant message left requesting call back. Episode closed per protocol, no further outreach scheduled.  [address] [place] [address] [phone]

## 2023-01-19 ENCOUNTER — TELEPHONE (OUTPATIENT)
Dept: CARDIOLOGY CLINIC | Age: 78
End: 2023-01-19

## 2023-01-19 NOTE — TELEPHONE ENCOUNTER
Juliocesar faxed strips and report for day 6 of 14. Report analysis: Ventricular Tachycardia (7 sec) , Sinus Rhythm w/Run of V-Tach ( 3-5 beats)/Bigeminal PVCS/PVCS (15 in 1 min). DR. POLK ADVISE?

## 2023-01-24 DIAGNOSIS — N18.31 STAGE 3A CHRONIC KIDNEY DISEASE (HCC): Primary | ICD-10-CM

## 2023-01-25 RX ORDER — LOPERAMIDE HYDROCHLORIDE 2 MG/1
CAPSULE ORAL
COMMUNITY
Start: 2022-12-06

## 2023-01-25 RX ORDER — LIDOCAINE HYDROCHLORIDE AND EPINEPHRINE BITARTRATE 20; .01 MG/ML; MG/ML
20 INJECTION, SOLUTION SUBCUTANEOUS ONCE
COMMUNITY

## 2023-01-25 RX ORDER — DEXTRAN 70/HYPROMELLOSE
DROPS OPHTHALMIC (EYE)
COMMUNITY

## 2023-01-25 RX ORDER — PYRAZINAMIDE 500 MG/1
TABLET ORAL
COMMUNITY
Start: 2022-11-28

## 2023-01-25 RX ORDER — GUAIFENESIN AND DEXTROMETHORPHAN HYDROBROMIDE 1200; 60 MG/1; MG/1
TABLET, EXTENDED RELEASE ORAL
COMMUNITY

## 2023-01-26 LAB
BUN BLDV-MCNC: 27 MG/DL
CALCIUM SERPL-MCNC: 8.7 MG/DL
CHLORIDE BLD-SCNC: 106 MMOL/L
CO2: 29 MMOL/L
CREAT SERPL-MCNC: 1.2 MG/DL
GFR SERPL CREATININE-BSD FRML MDRD: 43 ML/MIN/{1.73_M2}
GLUCOSE BLD-MCNC: 83 MG/DL
POTASSIUM SERPL-SCNC: 5.3 MMOL/L
SODIUM BLD-SCNC: 143 MMOL/L

## 2023-01-31 ENCOUNTER — OFFICE VISIT (OUTPATIENT)
Dept: NEPHROLOGY | Age: 78
End: 2023-01-31
Payer: MEDICARE

## 2023-01-31 VITALS
BODY MASS INDEX: 37.28 KG/M2 | WEIGHT: 224 LBS | DIASTOLIC BLOOD PRESSURE: 82 MMHG | OXYGEN SATURATION: 90 % | HEART RATE: 59 BPM | SYSTOLIC BLOOD PRESSURE: 140 MMHG

## 2023-01-31 DIAGNOSIS — I12.9 HYPERTENSIVE RENAL DISEASE, STAGE 1 THROUGH STAGE 4 OR UNSPECIFIED CHRONIC KIDNEY DISEASE: ICD-10-CM

## 2023-01-31 DIAGNOSIS — E87.5 HYPERKALEMIA: ICD-10-CM

## 2023-01-31 DIAGNOSIS — N18.31 CHRONIC KIDNEY DISEASE, STAGE 3A (HCC): Primary | ICD-10-CM

## 2023-01-31 PROCEDURE — G8427 DOCREV CUR MEDS BY ELIG CLIN: HCPCS | Performed by: INTERNAL MEDICINE

## 2023-01-31 PROCEDURE — 1111F DSCHRG MED/CURRENT MED MERGE: CPT | Performed by: INTERNAL MEDICINE

## 2023-01-31 PROCEDURE — 1123F ACP DISCUSS/DSCN MKR DOCD: CPT | Performed by: INTERNAL MEDICINE

## 2023-01-31 PROCEDURE — G8400 PT W/DXA NO RESULTS DOC: HCPCS | Performed by: INTERNAL MEDICINE

## 2023-01-31 PROCEDURE — 1090F PRES/ABSN URINE INCON ASSESS: CPT | Performed by: INTERNAL MEDICINE

## 2023-01-31 PROCEDURE — G8484 FLU IMMUNIZE NO ADMIN: HCPCS | Performed by: INTERNAL MEDICINE

## 2023-01-31 PROCEDURE — 99213 OFFICE O/P EST LOW 20 MIN: CPT | Performed by: INTERNAL MEDICINE

## 2023-01-31 PROCEDURE — 1036F TOBACCO NON-USER: CPT | Performed by: INTERNAL MEDICINE

## 2023-01-31 PROCEDURE — 3077F SYST BP >= 140 MM HG: CPT | Performed by: INTERNAL MEDICINE

## 2023-01-31 PROCEDURE — 3079F DIAST BP 80-89 MM HG: CPT | Performed by: INTERNAL MEDICINE

## 2023-01-31 PROCEDURE — G8417 CALC BMI ABV UP PARAM F/U: HCPCS | Performed by: INTERNAL MEDICINE

## 2023-01-31 NOTE — PROGRESS NOTES
[facility]    [facility], [address]   [place] [address] [address]  [phone]  Progress Note  1/31/2023 10:40 AM      Pt Name:    Coty Morin  YOB: 1945  Primary Care Physician:  Claudine Aj MD     Chief Complaint:   Chief Complaint   Patient presents with    Follow-Up from Hospital     2 week [facility]        History of Present Illness: This is a follow-up visit for CKD 3. Patient usually follows with Dr. Romero Ray. Patient had WATCHMAN in March 2022, then had GI bleed after that. Saw Dr. Juan Antonio Clay for possible hernia. CT showed left kidney lesion. MRI showed B/L cysts. Has known renal cyst-being followed by Dr. Romero Ray. Comorbidities include DM > 40 years,   Afib s/p WATCHMAN, GI Bleed, hypothyroidism, COPD, HFrEF (35-40%)  chronic hypoxic respiratory failure on Home O2, history of immune deficiency receiving IVIG, BRITTNY, history of hiatal hernia. Doing well, was recently in hospital with GABBY on CKD. Creat was 1.7 and came down to 1.2 on discharge. No complaints. K was high in setting of diovan and hypotension. Bp is reported to be okay. Sugars are okay per patient.   She is on potassium supplements     Past History:  Past Medical History:   Diagnosis Date    Anemia     Atrial fibrillation ([address] 11/09/2017    CAD (coronary artery disease)     CHF (congestive heart failure) (HCC)     Chronic kidney disease     stage 3 kidney     COPD (chronic obstructive pulmonary disease) (HCC)     DDD (degenerative disc disease), lumbar     Depression     Frequent PVCs 12/13/2017    GERD (gastroesophageal reflux disease)     History of blood transfusion     Hx of blood clots 09/2017    pulmonary emboli    Hyperlipidemia     Hypertension     sees Baki    Hyperthyroidism     Kidney disease     hemmelgarn    Movement disorder     DDD    Neuropathy     Obesity     Palpitations 01/10/2020    Pneumonia 2016    sepsis    Sleep apnea     usually wears cpap at night Status post right and left heart catheterization     Type II or unspecified type diabetes mellitus without mention of complication, not stated as uncontrolled      Past Surgical History:   Procedure Laterality Date    ACHILLES TENDON SURGERY Bilateral     BLADDER SUSPENSION      CARDIAC SURGERY  2016    heart cath--[facility]    COLONOSCOPY Left 05/11/2018    COLONOSCOPY POLYPECTOMY SNARE/COLD BIOPSY performed by Pranav Menezes MD at [facility]    COLONOSCOPY N/A 08/04/2021    COLONOSCOPY performed by Chay Jay MD at [facility]    COLONOSCOPY  08/04/2021    Dr. Shania Treadwell    COLONOSCOPY N/A 10/20/2022    COLONOSCOPY POLYPECTOMY SNARE/COLD BIOPSY performed by Chay Jay MD at [facility]    ENDOSCOPY, COLON, DIAGNOSTIC      GASTRIC FUNDOPLICATION      HAMMER TOE SURGERY Right     HERNIA REPAIR      HIATAL HERNIA REPAIR  09/06/2016    Laparoscopic Robotic with Nissen Fundoplication - Dr. Lindsey Phelps (CERVIX STATUS UNKNOWN)      HI OFFICE/OUTPT VISIT,PROCEDURE ONLY N/A 09/21/2018    EGD DIAGNOSTIC ONLY performed by Chay Jay MD at [address] [place] [address]      right    TENDON RELEASE Left 08/09/2016    left foot    TRANSESOPHAGEAL ECHOCARDIOGRAM N/A 4/20/2022    TRANSESOPHAGEAL ECHOCARDIOGRAM performed by Jo Pruitt MD at [facility]  11/06/2017    UPPER GASTROINTESTINAL ENDOSCOPY Left 05/10/2018    EGD BIOPSY performed by Pranav Menezes MD at [address] [place] [address] Left 07/25/2021    EGD BIOPSY performed by Hernando Medina MD at [address] [place] [address],[address]:  BP (!) 140/82 (Site: Left Upper Arm, Position: Sitting, Cuff Size: Large Adult)   Pulse 59   Wt 224 lb (101.6 kg)   SpO2 90%   BMI 37.28 kg/m²   Wt Readings from Last 3 Encounters:   01/31/23 224 lb (101.6 kg)   01/16/23 226 lb (102.5 kg)   01/10/23 220 lb (99.8 kg)     Body mass index is 37.28 kg/m². General Appearance: alert and cooperative with exam, appears comfortable, no distress  Lungs: Air entry bilaterally without any rhonchi or any rales  Heart: S1-S2, no S3 gallops  GI: soft, non-tender, no guarding, no flank pain  Extremities: 1+ LE edema  Skin: warm, dry     Medications:  Current Outpatient Medications   Medication Sig Dispense Refill    ARTIFICIAL TEAR OP Apply to eye      loperamide (IMODIUM) 2 MG capsule       Dextromethorphan-guaiFENesin (MUCINEX DM MAXIMUM STRENGTH)  MG TB12 Take by mouth      Artificial Tear Solution (JUST TEARS EYE DROPS) SOLN Apply to eye      acetaminophen-codeine (TYLENOL #3) 300-30 MG per tablet       lidocaine-EPINEPHrine 2 percent-1:549278 injection Inject 20 mLs into the skin once      Aspirin-Acetaminophen-Caffeine (EXCEDRIN PO) Take by mouth      Simethicone 125 MG CAPS Take by mouth      Ondansetron HCl (ZOFRAN PO) Take by mouth      benzonatate (TESSALON) 200 MG capsule Take 200 mg by mouth 3 times daily as needed for Cough      guaiFENesin (ROBITUSSIN) 100 MG/5ML SOLN oral solution Take 200 mg by mouth every 4 hours as needed for Cough      ipratropium (ATROVENT) 0.06 % nasal spray       pregabalin (LYRICA) 150 MG capsule Take 150 mg by mouth in the morning, at noon, and at bedtime.        linaclotide (LINZESS) 145 MCG capsule Take 1 capsule by mouth every morning (before breakfast) 30 capsule 2    ferrous gluconate (FERGON) 324 (38 Fe) MG tablet Take 324 mg by mouth 2 times daily      furosemide (LASIX) 20 MG tablet Take 20 mg by mouth daily as needed (swelling as needed)      insulin lispro (HUMALOG) 100 UNIT/ML injection vial Inject into the skin 3 times daily (before meals) Sliding scale      hydrOXYzine (ATARAX) 10 MG tablet Take 10 mg by mouth 3 times daily as needed for Itching      Probiotic Product (PROBIOTIC DAILY PO) Take by mouth      cetirizine (ZYRTEC) 10 MG tablet Take 10 mg by mouth daily      vitamin D 25 MCG (1000 UT) CAPS Take by mouth daily 125 mcg daily      citalopram (CELEXA) 40 MG tablet Take 40 mg by mouth daily      tiZANidine (ZANAFLEX) 2 MG tablet Take 2 mg by mouth 2 times daily       DULoxetine (CYMBALTA) 20 MG extended release capsule Take 120 mg by mouth daily       potassium chloride (KLOR-CON M) 10 MEQ extended release tablet Take 1 tablet by mouth daily 90 tablet 2    Dulaglutide (TRULICITY) 9.57 QW/8.5FV SOPN Inject 0.75 mg into the skin once a week Every Wednesday      diphenhydrAMINE (BENADRYL) 25 MG capsule Take 25 mg by mouth as needed for Itching      RA ALCOHOL SWABS 70 % PADS   1    ONE TOUCH ULTRA TEST strip   1    Lancets Misc. (UNISTIK CZT COMFORT) MISC   1    ipratropium-albuterol (DUONEB) 0.5-2.5 (3) MG/3ML SOLN nebulizer solution Inhale 1 vial into the lungs every 4 hours       traZODone (DESYREL) 100 MG tablet Take 100 mg by mouth nightly       Multiple Vitamins-Minerals (THERAPEUTIC MULTIVITAMIN-MINERALS) tablet Take 1 tablet by mouth daily      OXYGEN Inhale into the lungs continuous      atorvastatin (LIPITOR) 20 MG tablet Take 1 tablet by mouth nightly 30 tablet 3    magnesium hydroxide (MILK OF MAGNESIA CONCENTRATE) 2400 MG/10ML SUSP Take 30 mLs by mouth once as needed      docusate sodium (COLACE) 100 MG capsule Take 100 mg by mouth 3 times daily as needed       acetaminophen (TYLENOL) 325 MG tablet Take 2 tablets by mouth every 4 hours as needed for Pain 120 tablet 3    levothyroxine (SYNTHROID) 75 MCG tablet Take 75 mcg by mouth Daily      pantoprazole (PROTONIX) 40 MG tablet Take 1 tablet by mouth 2 times daily (before meals) 60 tablet 0     No current facility-administered medications for this visit.         Laboratory & Diagnostics:  CBC:   Lab Results   Component Value Date    WBC 5.0 01/11/2023    HGB 12.0 01/11/2023    HCT 37.5 01/11/2023    MCV 95.2 01/11/2023     01/11/2023     BMP:    Lab Results   Component Value Date     01/26/2023     01/13/2023     01/12/2023    K 5.3 01/26/2023    K 4.7 01/13/2023    K 5.0 01/12/2023     01/26/2023     01/13/2023     01/12/2023    CO2 29 01/26/2023    CO2 26 01/13/2023    CO2 26 01/12/2023    BUN 27 01/26/2023    BUN 29 (H) 01/13/2023    BUN 33 (H) 01/12/2023    CREATININE 1.2 01/26/2023    CREATININE 1.2 01/13/2023    CREATININE 1.3 (H) 01/12/2023    GLUCOSE 83 01/26/2023    GLUCOSE 90 01/13/2023    GLUCOSE 130 (H) 01/12/2023      Hepatic:   Lab Results   Component Value Date    AST 20 01/10/2023    AST 15 04/27/2022    AST 18 04/10/2022    ALT 12 01/10/2023    ALT 9 (L) 04/27/2022    ALT 11 04/10/2022    BILITOT 0.4 01/10/2023    BILITOT 0.2 (L) 04/27/2022    BILITOT 0.2 (L) 04/10/2022    ALKPHOS 86 01/10/2023    ALKPHOS 74 04/27/2022    ALKPHOS 90 04/10/2022     BNP: No results found for: BNP  Lipids:   Lab Results   Component Value Date    CHOL 127 03/23/2016    HDL 38 01/13/2023     INR:   Lab Results   Component Value Date    INR 1.03 04/28/2022    INR 0.96 03/03/2022    INR 1.38 (H) 07/23/2021     URINE:   Lab Results   Component Value Date/Time    NAUR 63 01/11/2023 10:15 AM     Lab Results   Component Value Date/Time    NITRU NEGATIVE 01/11/2023 10:15 AM    COLORU YELLOW 01/11/2023 10:15 AM    PHUR 6.0 01/11/2023 10:15 AM    LABCAST NONE SEEN 10/23/2018 05:30 PM    LABCAST NONE SEEN 10/23/2018 05:30 PM    WBCUA 0-2 01/11/2023 10:15 AM    RBCUA 0-2 01/11/2023 10:15 AM    YEAST NONE SEEN 01/11/2023 10:15 AM    BACTERIA NONE SEEN 01/11/2023 10:15 AM    CLARITYU Clear 09/22/2022 12:00 AM    SPECGRAV 1.009 10/23/2018 05:30 PM    LEUKOCYTESUR TRACE 01/11/2023 10:15 AM    UROBILINOGEN 1.0 01/11/2023 10:15 AM    BILIRUBINUR NEGATIVE 01/11/2023 10:15 AM    BILIRUBINUR Negative 09/22/2022 12:00 AM    BLOODU NEGATIVE 01/11/2023 10:15 AM    GLUCOSEU NEGATIVE 01/11/2023 10:15 AM    KETUA NEGATIVE 01/11/2023 10:15 AM    AMORPHOUS DEBRIS 06/12/2018 03:30 PM      Microalbumen/Creatinine ratio:  No components found for: RUCREAT   Oscar 2023: K 5.3, creat 1.2     Impression/Plan:   1. CKD III -due to diabetes, hypertension: Recently admitted with GABBY. Creat peaked at 1.7, now down to 1.2. Off diovan at this time due to recent GABBY and hyperkalemia. 2 hyperklaemia: stop KCL, check potassium in 1 week  3. DM - insulin requiring. Check UPCR next visit. 4. Hx UTIs: no symptoms or complaints at this time  5. HTN -stable at this time  6. Chronic systolic CHF: takes PRN lasix, EF 40 to 45%  7.  Renal cysts, B/L    Orders Placed This Encounter   Procedures    Basic Metabolic Panel    Protein / creatinine ratio, urine    Potassium         Terry Suresh MD  [facility]

## 2023-02-07 NOTE — PROGRESS NOTES
[facility] PROFESSIONAL SERVICES  [facility] [address]   [address] [address]   Dept: [phone]   Dept Fax: [phone]   Loc: [phone]      Chief Complaint   Patient presents with    Follow-up       Cardiologist:  Dr. Lory Umana  64 yo female presents for hfu for atypical chest pain. GERD, bradycardia. Metoprolol and valsartan stopped. Hx of PAF and watchman, CAD, CMP. No chest pain. SOB with activity, very fatigued. Monitor shows very frequent PVCs, episode of vtach. Frequent dizziness, usually with standing.      General:   No fever, no chills, no weight loss, + fatigue  Pulmonary:    + dyspnea, no wheezing  Cardiac:    Denies recent chest pain   GI:     No nausea or vomiting, no abdominal pain  Neuro:      + dizziness or light headedness  Musculoskeletal:  No recent active issues  Extremities:   + edema      Past Medical History:   Diagnosis Date    Anemia     Atrial fibrillation ([facility] [address] 11/09/2017    CAD (coronary artery disease)     CHF (congestive heart failure) ([facility])     Chronic kidney disease     stage 3 kidney     COPD (chronic obstructive pulmonary disease) ([facility])     DDD (degenerative disc disease), lumbar     Depression     Frequent PVCs 12/13/2017    GERD (gastroesophageal reflux disease)     History of blood transfusion     Hx of blood clots 09/2017    pulmonary emboli    Hyperlipidemia     Hypertension     sees Lory Umana    Hyperthyroidism     Kidney disease     hemmelgarn    Movement disorder     DDD    Neuropathy     Obesity     Palpitations 01/10/2020    Pneumonia 2016    sepsis    Sleep apnea     usually wears cpap at night    Status post right and left heart catheterization     Type II or unspecified type diabetes mellitus without mention of complication, not stated as uncontrolled        Allergies   Allergen Reactions    Bactrim [Sulfamethoxazole-Trimethoprim]      TOLD BY  NEVER TO TAKE AGAIN    Wellbutrin [Bupropion] Other (See Comments) hallucinations    Silicone Rash       Current Outpatient Medications   Medication Sig Dispense Refill    amiodarone (CORDARONE) 200 MG tablet Take 1 tablet by mouth 2 times daily 14 tablet 0    [START ON 2/15/2023] amiodarone (PACERONE) 100 MG tablet Take 1 tablet by mouth daily 30 tablet 5    ARTIFICIAL TEAR OP Apply to eye      loperamide (IMODIUM) 2 MG capsule       Dextromethorphan-guaiFENesin (MUCINEX DM MAXIMUM STRENGTH)  MG TB12 Take by mouth      Artificial Tear Solution (JUST TEARS EYE DROPS) SOLN Apply to eye      acetaminophen-codeine (TYLENOL #3) 300-30 MG per tablet       lidocaine-EPINEPHrine 2 percent-1:966921 injection Inject 20 mLs into the skin once      Aspirin-Acetaminophen-Caffeine (EXCEDRIN PO) Take by mouth      Simethicone 125 MG CAPS Take by mouth      Ondansetron HCl (ZOFRAN PO) Take by mouth      benzonatate (TESSALON) 200 MG capsule Take 200 mg by mouth 3 times daily as needed for Cough      guaiFENesin (ROBITUSSIN) 100 MG/5ML SOLN oral solution Take 200 mg by mouth every 4 hours as needed for Cough      ipratropium (ATROVENT) 0.06 % nasal spray       pregabalin (LYRICA) 150 MG capsule Take 150 mg by mouth in the morning, at noon, and at bedtime.        pantoprazole (PROTONIX) 40 MG tablet Take 1 tablet by mouth 2 times daily (before meals) 60 tablet 0    linaclotide (LINZESS) 145 MCG capsule Take 1 capsule by mouth every morning (before breakfast) 30 capsule 2    ferrous gluconate (FERGON) 324 (38 Fe) MG tablet Take 324 mg by mouth 2 times daily      furosemide (LASIX) 20 MG tablet Take 20 mg by mouth daily as needed (swelling as needed)      insulin lispro (HUMALOG) 100 UNIT/ML injection vial Inject into the skin 3 times daily (before meals) Sliding scale      hydrOXYzine (ATARAX) 10 MG tablet Take 10 mg by mouth 3 times daily as needed for Itching      Probiotic Product (PROBIOTIC DAILY PO) Take by mouth      cetirizine (ZYRTEC) 10 MG tablet Take 10 mg by mouth daily      vitamin D 25 MCG (1000 UT) CAPS Take by mouth daily 125 mcg daily      citalopram (CELEXA) 40 MG tablet Take 40 mg by mouth daily      tiZANidine (ZANAFLEX) 2 MG tablet Take 2 mg by mouth 2 times daily       DULoxetine (CYMBALTA) 20 MG extended release capsule Take 120 mg by mouth daily       Dulaglutide (TRULICITY) 3.86 PC/7.1RR SOPN Inject 0.75 mg into the skin once a week Every Wednesday      diphenhydrAMINE (BENADRYL) 25 MG capsule Take 25 mg by mouth as needed for Itching      RA ALCOHOL SWABS 70 % PADS   1    ONE TOUCH ULTRA TEST strip   1    Lancets Misc. (UNISTIK CZT COMFORT) MISC   1    ipratropium-albuterol (DUONEB) 0.5-2.5 (3) MG/3ML SOLN nebulizer solution Inhale 1 vial into the lungs every 4 hours       traZODone (DESYREL) 100 MG tablet Take 100 mg by mouth nightly       Multiple Vitamins-Minerals (THERAPEUTIC MULTIVITAMIN-MINERALS) tablet Take 1 tablet by mouth daily      OXYGEN Inhale into the lungs continuous      atorvastatin (LIPITOR) 20 MG tablet Take 1 tablet by mouth nightly 30 tablet 3    magnesium hydroxide (MILK OF MAGNESIA CONCENTRATE) 2400 MG/10ML SUSP Take 30 mLs by mouth once as needed      docusate sodium (COLACE) 100 MG capsule Take 100 mg by mouth 3 times daily as needed       acetaminophen (TYLENOL) 325 MG tablet Take 2 tablets by mouth every 4 hours as needed for Pain 120 tablet 3    levothyroxine (SYNTHROID) 75 MCG tablet Take 75 mcg by mouth Daily       No current facility-administered medications for this visit.        Social History     Socioeconomic History    Marital status:      Spouse name: None    Number of children: 3    Years of education: None    Highest education level: None   Tobacco Use    Smoking status: Former     Packs/day: 1.00     Years: 30.00     Pack years: 30.00     Types: Cigarettes     Quit date: 5/10/2006     Years since quittin.7    Smokeless tobacco: Never   Vaping Use    Vaping Use: Never used   Substance and Sexual Activity    Alcohol use: No    Drug use: No    Sexual activity: Not Currently       Family History   Problem Relation Age of Onset    Cancer Mother     Heart Disease Mother     High Blood Pressure Mother     Diabetes Mother     Vision Loss Mother     Stroke Mother     COPD Father     Diabetes Father     COPD Brother        Blood pressure 112/72, pulse (!) 44, height 5' 5\" (1.651 m), weight 229 lb 9.6 oz (104.1 kg), SpO2 97 %, not currently breastfeeding. General:   Well developed, well nourished  Lungs:   Clear to auscultation, no rales  Heart:    RRR, Normal S1 S2, No murmur, rubs, or gallops  Abdomen:   Soft, non tender, no organomegalies, positive bowel sounds  Extremities:   No edema, no cyanosis, good peripheral pulses  Neurological:   Awake, alert, oriented. No obvious focal deficits  Musculoskeletal:  No obvious deformities    EKG:          Diagnosis Orders   1. Bradycardia        2. Cardiomyopathy, dilated (HCC)        3. Paroxysmal atrial fibrillation ([address]        4. Frequent PVCs            No orders of the defined types were placed in this encounter. Assessment/Plan:   Sinus bradycardia/PAF/PVCs--  BP is lower, gets dizziness frequently. BB not likely a good option with her orthostatic dizziness. Will trial amiodarone with 1 week loading and see if this improves her symptoms. She understands ablation may be an option and seeing Dr Bam Hunt if no improvement. Dilated CMP-40-45%, improvement in rhythm issues may improve her CMP. Cannot tolerate any increase in CMP meds currently given orthostatic dizziness and lower BP at baseline.      Disposition:   Follow up with Dr Jo Lopez as scheduled or sooner if needed

## 2023-02-08 ENCOUNTER — OFFICE VISIT (OUTPATIENT)
Dept: CARDIOLOGY CLINIC | Age: 78
End: 2023-02-08
Payer: MEDICARE

## 2023-02-08 VITALS
HEART RATE: 44 BPM | HEIGHT: 65 IN | SYSTOLIC BLOOD PRESSURE: 112 MMHG | WEIGHT: 229.6 LBS | OXYGEN SATURATION: 97 % | BODY MASS INDEX: 38.25 KG/M2 | DIASTOLIC BLOOD PRESSURE: 72 MMHG

## 2023-02-08 DIAGNOSIS — I49.3 FREQUENT PVCS: ICD-10-CM

## 2023-02-08 DIAGNOSIS — I48.0 PAROXYSMAL ATRIAL FIBRILLATION (HCC): ICD-10-CM

## 2023-02-08 DIAGNOSIS — R00.1 BRADYCARDIA: Primary | ICD-10-CM

## 2023-02-08 DIAGNOSIS — I42.0 CARDIOMYOPATHY, DILATED (HCC): ICD-10-CM

## 2023-02-08 PROCEDURE — G8484 FLU IMMUNIZE NO ADMIN: HCPCS | Performed by: STUDENT IN AN ORGANIZED HEALTH CARE EDUCATION/TRAINING PROGRAM

## 2023-02-08 PROCEDURE — G8400 PT W/DXA NO RESULTS DOC: HCPCS | Performed by: STUDENT IN AN ORGANIZED HEALTH CARE EDUCATION/TRAINING PROGRAM

## 2023-02-08 PROCEDURE — 3074F SYST BP LT 130 MM HG: CPT | Performed by: STUDENT IN AN ORGANIZED HEALTH CARE EDUCATION/TRAINING PROGRAM

## 2023-02-08 PROCEDURE — G8427 DOCREV CUR MEDS BY ELIG CLIN: HCPCS | Performed by: STUDENT IN AN ORGANIZED HEALTH CARE EDUCATION/TRAINING PROGRAM

## 2023-02-08 PROCEDURE — 1090F PRES/ABSN URINE INCON ASSESS: CPT | Performed by: STUDENT IN AN ORGANIZED HEALTH CARE EDUCATION/TRAINING PROGRAM

## 2023-02-08 PROCEDURE — 1111F DSCHRG MED/CURRENT MED MERGE: CPT | Performed by: STUDENT IN AN ORGANIZED HEALTH CARE EDUCATION/TRAINING PROGRAM

## 2023-02-08 PROCEDURE — 99214 OFFICE O/P EST MOD 30 MIN: CPT | Performed by: STUDENT IN AN ORGANIZED HEALTH CARE EDUCATION/TRAINING PROGRAM

## 2023-02-08 PROCEDURE — 1036F TOBACCO NON-USER: CPT | Performed by: STUDENT IN AN ORGANIZED HEALTH CARE EDUCATION/TRAINING PROGRAM

## 2023-02-08 PROCEDURE — 3078F DIAST BP <80 MM HG: CPT | Performed by: STUDENT IN AN ORGANIZED HEALTH CARE EDUCATION/TRAINING PROGRAM

## 2023-02-08 PROCEDURE — G8417 CALC BMI ABV UP PARAM F/U: HCPCS | Performed by: STUDENT IN AN ORGANIZED HEALTH CARE EDUCATION/TRAINING PROGRAM

## 2023-02-08 PROCEDURE — 1123F ACP DISCUSS/DSCN MKR DOCD: CPT | Performed by: STUDENT IN AN ORGANIZED HEALTH CARE EDUCATION/TRAINING PROGRAM

## 2023-02-08 RX ORDER — AMIODARONE HYDROCHLORIDE 100 MG/1
100 TABLET ORAL DAILY
Qty: 30 TABLET | Refills: 5 | Status: SHIPPED | OUTPATIENT
Start: 2023-02-15

## 2023-02-08 RX ORDER — AMIODARONE HYDROCHLORIDE 200 MG/1
200 TABLET ORAL 2 TIMES DAILY
Qty: 14 TABLET | Refills: 0 | Status: SHIPPED | OUTPATIENT
Start: 2023-02-08

## 2023-02-13 ENCOUNTER — HOSPITAL ENCOUNTER (OUTPATIENT)
Dept: NURSING | Age: 78
Discharge: HOME | End: 2023-02-13
Payer: MEDICARE

## 2023-02-13 VITALS
BODY MASS INDEX: 38.11 KG/M2 | DIASTOLIC BLOOD PRESSURE: 65 MMHG | SYSTOLIC BLOOD PRESSURE: 141 MMHG | RESPIRATION RATE: 16 BRPM | OXYGEN SATURATION: 94 % | TEMPERATURE: 97 F | WEIGHT: 229 LBS | HEART RATE: 54 BPM

## 2023-02-13 DIAGNOSIS — D83.9 COMMON VARIABLE IMMUNODEFICIENCY (HCC): Primary | ICD-10-CM

## 2023-02-13 PROCEDURE — 96413 CHEMO IV INFUSION 1 HR: CPT

## 2023-02-13 PROCEDURE — 96366 THER/PROPH/DIAG IV INF ADDON: CPT

## 2023-02-13 PROCEDURE — 6360000002 HC RX W HCPCS: Performed by: FAMILY MEDICINE

## 2023-02-13 PROCEDURE — 96415 CHEMO IV INFUSION ADDL HR: CPT

## 2023-02-13 PROCEDURE — 96365 THER/PROPH/DIAG IV INF INIT: CPT

## 2023-02-13 RX ORDER — HEPARIN SODIUM (PORCINE) LOCK FLUSH IV SOLN 100 UNIT/ML 100 UNIT/ML
500 SOLUTION INTRAVENOUS PRN
Status: DISCONTINUED | OUTPATIENT
Start: 2023-02-13 | End: 2023-02-14 | Stop reason: HOSPADM

## 2023-02-13 RX ORDER — MEPERIDINE HYDROCHLORIDE 25 MG/ML
12.5 INJECTION INTRAMUSCULAR; INTRAVENOUS; SUBCUTANEOUS PRN
OUTPATIENT
Start: 2023-02-27

## 2023-02-13 RX ORDER — ACETAMINOPHEN 325 MG/1
650 TABLET ORAL ONCE
Status: DISCONTINUED | OUTPATIENT
Start: 2023-02-13 | End: 2023-02-14 | Stop reason: HOSPADM

## 2023-02-13 RX ORDER — ACETAMINOPHEN 325 MG/1
650 TABLET ORAL ONCE
OUTPATIENT
Start: 2023-02-27 | End: 2023-02-27

## 2023-02-13 RX ORDER — SODIUM CHLORIDE 9 MG/ML
5-250 INJECTION, SOLUTION INTRAVENOUS PRN
OUTPATIENT
Start: 2023-02-27

## 2023-02-13 RX ORDER — DIPHENHYDRAMINE HYDROCHLORIDE 50 MG/ML
50 INJECTION INTRAMUSCULAR; INTRAVENOUS
OUTPATIENT
Start: 2023-02-27

## 2023-02-13 RX ORDER — SODIUM CHLORIDE 9 MG/ML
INJECTION, SOLUTION INTRAVENOUS CONTINUOUS
OUTPATIENT
Start: 2023-02-27

## 2023-02-13 RX ORDER — ONDANSETRON 2 MG/ML
8 INJECTION INTRAMUSCULAR; INTRAVENOUS
OUTPATIENT
Start: 2023-02-27

## 2023-02-13 RX ORDER — SODIUM CHLORIDE 0.9 % (FLUSH) 0.9 %
5-40 SYRINGE (ML) INJECTION PRN
OUTPATIENT
Start: 2023-02-27

## 2023-02-13 RX ORDER — ACETAMINOPHEN 325 MG/1
650 TABLET ORAL
OUTPATIENT
Start: 2023-02-27

## 2023-02-13 RX ORDER — HEPARIN SODIUM (PORCINE) LOCK FLUSH IV SOLN 100 UNIT/ML 100 UNIT/ML
500 SOLUTION INTRAVENOUS PRN
OUTPATIENT
Start: 2023-02-27

## 2023-02-13 RX ORDER — ALBUTEROL SULFATE 90 UG/1
4 AEROSOL, METERED RESPIRATORY (INHALATION) PRN
OUTPATIENT
Start: 2023-02-27

## 2023-02-13 RX ORDER — DIPHENHYDRAMINE HCL 25 MG
25 TABLET ORAL ONCE
Status: DISCONTINUED | OUTPATIENT
Start: 2023-02-13 | End: 2023-02-14 | Stop reason: HOSPADM

## 2023-02-13 RX ORDER — DIPHENHYDRAMINE HCL 25 MG
25 TABLET ORAL ONCE
OUTPATIENT
Start: 2023-02-27 | End: 2023-02-27

## 2023-02-13 RX ADMIN — IMMUNE GLOBULIN (HUMAN) 20 G: 10 INJECTION INTRAVENOUS; SUBCUTANEOUS at 11:30

## 2023-02-13 RX ADMIN — HEPARIN 500 UNITS: 100 SYRINGE at 12:45

## 2023-02-13 RX ADMIN — IMMUNE GLOBULIN (HUMAN) 5 G: 10 INJECTION INTRAVENOUS; SUBCUTANEOUS at 10:54

## 2023-02-13 NOTE — PROGRESS NOTES
1045: Patient arrived ambulatory for IVIG infusion. Patient rights and responsibilities offered to patient. Patient's mediport accessed using sterile technique. Patient states she took her pre-medications at home. 1054: IVIG infusion started. 1236: Infusion complete, patient tolerated well. Mediport de-accessed. AVS reviewed with patient, voiced understanding. Patient discharged ambulatory.               _m___ Safety:       (Environmental)  [place] to environment  Ensure ID band is correct and in place/ allergy band as needed  Assess for fall risk  Initiate fall precautions as applicable (fall band, side rails, etc.)  Call light within reach  Bed in low position/ wheels locked    _m___ Pain:       Assess pain level and characteristics  Administer analgesics as ordered  Assess effectiveness of pain management and report to MD as needed    _m___ Knowledge Deficit:  Assess baseline knowledge  Provide teaching at level of understanding  Provide teaching via preferred learning method  Evaluate teaching effectiveness    _m___ Hemodynamic/Respiratory Status:       (Pre and Post Procedure Monitoring)  Assess/Monitor vital signs and LOC  Assess Baseline SpO2 prior to any sedation  Obtain weight/height  Assess vital signs/ LOC until patient meets discharge criteria  Monitor procedure site and notify MD of any issues    _m___ Infection-Risk of Central Venous Catheter:  Monitor for infection signs and symptoms (catheter site redness, temperature elevation, etc)  Assess for infection risks  Educate regarding infection prevention  Manage central venous catheter (flushes/ dressing changes per protocol)

## 2023-02-15 ENCOUNTER — TELEPHONE (OUTPATIENT)
Dept: CARDIOLOGY CLINIC | Age: 78
End: 2023-02-15

## 2023-02-15 DIAGNOSIS — I49.8 BIGEMINY: Primary | ICD-10-CM

## 2023-02-15 DIAGNOSIS — R00.1 BRADYCARDIA: ICD-10-CM

## 2023-02-15 NOTE — TELEPHONE ENCOUNTER
Nurse Pawel Lamar from [facility] called stating pt is still taking Amiodarone 200mg BID for loading dose and is feeling dizzy, fatigued, clammy, and HR is 45, /60.     Nurse Pawel Lamar was advised to take pt to ED for evaluation, but was informed that at last OV with [facility], it was discussed that if symptoms persist that referral to Dr. Kai Cuadra for possible ablation could be considered    Please advise, thank you

## 2023-02-16 LAB — POTASSIUM (K+): 4.3

## 2023-02-16 NOTE — TELEPHONE ENCOUNTER
Ivy at [facility] notified and voiced understanding. Referral placed and given to [facility] [place].   Med list updated

## 2023-02-28 ENCOUNTER — OFFICE VISIT (OUTPATIENT)
Dept: CARDIOLOGY CLINIC | Age: 78
End: 2023-02-28
Payer: MEDICARE

## 2023-02-28 VITALS
OXYGEN SATURATION: 96 % | DIASTOLIC BLOOD PRESSURE: 58 MMHG | WEIGHT: 231.2 LBS | BODY MASS INDEX: 38.52 KG/M2 | HEIGHT: 65 IN | SYSTOLIC BLOOD PRESSURE: 140 MMHG | HEART RATE: 64 BPM

## 2023-02-28 DIAGNOSIS — I48.0 PAROXYSMAL ATRIAL FIBRILLATION (HCC): ICD-10-CM

## 2023-02-28 DIAGNOSIS — R00.1 BRADYCARDIA: Primary | ICD-10-CM

## 2023-02-28 PROCEDURE — 99204 OFFICE O/P NEW MOD 45 MIN: CPT | Performed by: INTERNAL MEDICINE

## 2023-02-28 PROCEDURE — 1090F PRES/ABSN URINE INCON ASSESS: CPT | Performed by: INTERNAL MEDICINE

## 2023-02-28 PROCEDURE — G8427 DOCREV CUR MEDS BY ELIG CLIN: HCPCS | Performed by: INTERNAL MEDICINE

## 2023-02-28 PROCEDURE — G8400 PT W/DXA NO RESULTS DOC: HCPCS | Performed by: INTERNAL MEDICINE

## 2023-02-28 PROCEDURE — G8417 CALC BMI ABV UP PARAM F/U: HCPCS | Performed by: INTERNAL MEDICINE

## 2023-02-28 PROCEDURE — 3078F DIAST BP <80 MM HG: CPT | Performed by: INTERNAL MEDICINE

## 2023-02-28 PROCEDURE — 1036F TOBACCO NON-USER: CPT | Performed by: INTERNAL MEDICINE

## 2023-02-28 PROCEDURE — 3077F SYST BP >= 140 MM HG: CPT | Performed by: INTERNAL MEDICINE

## 2023-02-28 PROCEDURE — G8484 FLU IMMUNIZE NO ADMIN: HCPCS | Performed by: INTERNAL MEDICINE

## 2023-02-28 PROCEDURE — 1123F ACP DISCUSS/DSCN MKR DOCD: CPT | Performed by: INTERNAL MEDICINE

## 2023-02-28 PROCEDURE — 93000 ELECTROCARDIOGRAM COMPLETE: CPT | Performed by: INTERNAL MEDICINE

## 2023-02-28 NOTE — PROGRESS NOTES
[address] ) [address] [facility]  Dept: [phone]  Cardiac Electrophysiology: Consultation Note  Patient's demographics:  Date:   2/28/2023  Patient name:              Sapna Aggarwal  YOB: 1945  Sex:    female   MRN:   [id number]    Primary Care Physician:  Fabian Mcmahon MD    Cardiologist:  Noemy Trinidad MD    Reason for Consultation:  Atrial fibrillation, intolerant to amiodarone. Clinical Summary:  77/F has history of frequent premature complexes since 2016, diagnosed during the evaluation of a new left ventricle systolic dysfunction, ejection fraction of time was 35%. Her previously low burden was about 25%. She was started on metoprolol and remained fairly well. She has been experiencing frequent episodes of palpitations at bedtime. She was evaluated in the cardiology outpatient clinic. Echocardiogram showed LVEF 40 to 45%. A 48-hour Holter monitor showed PVC burden of 43%. She was started on amiodarone and started to experience dizziness and worsening shortness of breath. Amiodarone was discontinued. The patient is known to have shortness of breath at rest and has been felt pretty from COPD, currently on continuous home oxygen. Denies chest pain, shortness orthopnea, proximal nocturnal dyspnea or lower extremity swelling. No syncope. She lives by herself and is fairly active otherwise. Denies smoking or alcohol use. Medical Hx: PAF dx 2018 on ILR (low burden), watchman FLX LAAO for GI bleed (3/2022), sinus bradycardia, PVCs since 2016 (burden 43%), non-obstructive CAD, NICM dx 2016 (LVEF 40-45%), HTN, HPL, DM 2/peripheral neuropathy, obesity, severe COPD on continuous oxygen, BRITTNY/CPAP, CKD-3 and anemia of CKD. Review of systems:  Constitutional: Negative for chills and fever  HENT: Negative for congestion, sinus pressure, sneezing and sore throat.     Eyes: Negative for pain, discharge, redness and itching. Respiratory: Negative for apnea, cough  Gastrointestinal: Negative for blood in stool, constipation, diarrhea   Endocrine: Negative for cold intolerance, heat intolerance, polydipsia. Genitourinary: Negative for dysuria, enuresis, flank pain and hematuria. Musculoskeletal: Negative for arthralgias, joint swelling and neck pain. Neurological: Negative for numbness and headaches. Psychiatric/Behavioral: Negative for agitation, confusion, decreased concentration and dysphoric mood.       Past Medical History[de-identified]  Past Medical History:   Diagnosis Date    Anemia     Atrial fibrillation ([address] 11/09/2017    CAD (coronary artery disease)     CHF (congestive heart failure) (HCC)     Chronic kidney disease     stage 3 kidney     COPD (chronic obstructive pulmonary disease) (HCC)     DDD (degenerative disc disease), lumbar     Depression     Frequent PVCs 12/13/2017    GERD (gastroesophageal reflux disease)     History of blood transfusion     Hx of blood clots 09/2017    pulmonary emboli    Hyperlipidemia     Hypertension     sees Baki    Hyperthyroidism     Kidney disease     hemmelgarn    Movement disorder     DDD    Neuropathy     Obesity     Palpitations 01/10/2020    Pneumonia 2016    sepsis    Sleep apnea     usually wears cpap at night    Status post right and left heart catheterization     Type II or unspecified type diabetes mellitus without mention of complication, not stated as uncontrolled        Past Surgical History:  Past Surgical History:   Procedure Laterality Date    ACHILLES TENDON SURGERY Bilateral     BLADDER SUSPENSION      CARDIAC SURGERY  2016    heart cath--[facility]    COLONOSCOPY Left 05/11/2018    COLONOSCOPY POLYPECTOMY SNARE/COLD BIOPSY performed by Samuel Altman MD at [facility]    COLONOSCOPY N/A 08/04/2021    COLONOSCOPY performed by Juan Berumen MD at [facility]    [facility]  [facility]04/20[facility]    COLONOSCOPY N/A 10/20/2022    COLONOSCOPY POLYPECTOMY SNARE/COLD BIOPSY performed by Donald Santoro MD at [facility]    ENDOSCOPY, COLON, DIAGNOSTIC      GASTRIC FUNDOPLICATION      HAMMER TOE SURGERY Right     HERNIA REPAIR      HIATAL HERNIA REPAIR  2016    Laparoscopic Robotic with Nissen Fundoplication - Dr. Tess Gerard (CERVIX STATUS UNKNOWN)      [place] OFFICE/OUTPT VISIT,PROCEDURE ONLY N/A 2018    EGD DIAGNOSTIC ONLY performed by Donald Santoro MD at [address] [place] [address]      right    TENDON RELEASE Left 2016    left foot    TRANSESOPHAGEAL ECHOCARDIOGRAM N/A 2022    TRANSESOPHAGEAL ECHOCARDIOGRAM performed by Gamal Cooper MD at [facility]      TUNNELED VENOUS PORT PLACEMENT  2017    UPPER GASTROINTESTINAL ENDOSCOPY Left 05/10/2018    EGD BIOPSY performed by Michelle Moya MD at [place] [address] Left 2021    EGD BIOPSY performed by Haven Tatum MD at [facility]       Family History:  Family History   Problem Relation Age of Onset    Cancer Mother     Heart Disease Mother     High Blood Pressure Mother     Diabetes Mother     Vision Loss Mother     Stroke Mother     COPD Father     Diabetes Father     COPD Brother         Social History:  Social History     Socioeconomic History    Marital status:     Number of children: 3   Tobacco Use    Smoking status: Former     Packs/day: 1.00     Years: 30.00     Pack years: 30.00     Types: Cigarettes     Quit date: 5/10/2006     Years since quittin.8    Smokeless tobacco: Never   Vaping Use    Vaping Use: Never used   Substance and Sexual Activity    Alcohol use: No    Drug use: No    Sexual activity: Not Currently        Allergies:   Allergies   Allergen Reactions    Bactrim [Sulfamethoxazole-Trimethoprim]      TOLD BY  NEVER TO TAKE AGAIN    Wellbutrin [Bupropion] Other (See Comments)     hallucinations    Silicone Rash        Medications:  Current Outpatient Medications   Medication Sig Dispense Refill    amiodarone (PACERONE) 100 MG tablet Take 1 tablet by mouth daily 30 tablet 5    ARTIFICIAL TEAR OP Apply to eye      loperamide (IMODIUM) 2 MG capsule       Dextromethorphan-guaiFENesin (MUCINEX DM MAXIMUM STRENGTH)  MG TB12 Take by mouth      Artificial Tear Solution (JUST TEARS EYE DROPS) SOLN Apply to eye      acetaminophen-codeine (TYLENOL #3) 300-30 MG per tablet       lidocaine-EPINEPHrine 2 percent-1:639888 injection Inject 20 mLs into the skin once      Aspirin-Acetaminophen-Caffeine (EXCEDRIN PO) Take by mouth      Simethicone 125 MG CAPS Take by mouth      Ondansetron HCl (ZOFRAN PO) Take by mouth      benzonatate (TESSALON) 200 MG capsule Take 200 mg by mouth 3 times daily as needed for Cough      guaiFENesin (ROBITUSSIN) 100 MG/5ML SOLN oral solution Take 200 mg by mouth every 4 hours as needed for Cough      ipratropium (ATROVENT) 0.06 % nasal spray       pregabalin (LYRICA) 150 MG capsule Take 150 mg by mouth in the morning, at noon, and at bedtime.        pantoprazole (PROTONIX) 40 MG tablet Take 1 tablet by mouth 2 times daily (before meals) 60 tablet 0    linaclotide (LINZESS) 145 MCG capsule Take 1 capsule by mouth every morning (before breakfast) 30 capsule 2    ferrous gluconate (FERGON) 324 (38 Fe) MG tablet Take 324 mg by mouth 2 times daily      furosemide (LASIX) 20 MG tablet Take 20 mg by mouth daily as needed (swelling as needed)      insulin lispro (HUMALOG) 100 UNIT/ML injection vial Inject into the skin 3 times daily (before meals) Sliding scale      hydrOXYzine (ATARAX) 10 MG tablet Take 10 mg by mouth 3 times daily as needed for Itching      Probiotic Product (PROBIOTIC DAILY PO) Take by mouth      cetirizine (ZYRTEC) 10 MG tablet Take 10 mg by mouth daily      vitamin D 25 MCG (1000 UT) CAPS Take by mouth daily 125 mcg daily      citalopram (CELEXA) 40 MG tablet Take 40 mg by mouth daily      tiZANidine (ZANAFLEX) 2 MG tablet Take 2 mg by mouth 2 times daily       DULoxetine (CYMBALTA) 20 MG extended release capsule Take 120 mg by mouth daily       Dulaglutide (TRULICITY) 8.67 VU/6.5TT SOPN Inject 0.75 mg into the skin once a week Every Wednesday      diphenhydrAMINE (BENADRYL) 25 MG capsule Take 25 mg by mouth as needed for Itching      RA ALCOHOL SWABS 70 % PADS   1    ONE TOUCH ULTRA TEST strip   1    Lancets Misc. (UNISTIK CZT COMFORT) MISC   1    ipratropium-albuterol (DUONEB) 0.5-2.5 (3) MG/3ML SOLN nebulizer solution Inhale 1 vial into the lungs every 4 hours       traZODone (DESYREL) 100 MG tablet Take 100 mg by mouth nightly       Multiple Vitamins-Minerals (THERAPEUTIC MULTIVITAMIN-MINERALS) tablet Take 1 tablet by mouth daily      OXYGEN Inhale into the lungs continuous      atorvastatin (LIPITOR) 20 MG tablet Take 1 tablet by mouth nightly 30 tablet 3    magnesium hydroxide (MILK OF MAGNESIA CONCENTRATE) 2400 MG/10ML SUSP Take 30 mLs by mouth once as needed      docusate sodium (COLACE) 100 MG capsule Take 100 mg by mouth 3 times daily as needed       acetaminophen (TYLENOL) 325 MG tablet Take 2 tablets by mouth every 4 hours as needed for Pain 120 tablet 3    levothyroxine (SYNTHROID) 75 MCG tablet Take 75 mcg by mouth Daily       No current facility-administered medications for this visit. Physical Examination:  Vitals:    02/28/23 1430   BP: (!) 140/58   Pulse: 64   SpO2: 96%       GENERAL: Alert and oriented. No distress. EYES: No pallor or icterus. ENT: No cyanosis. bradycardia oxygen via nasal cannula. VESSELS: No jugular venous distension or carotid bruits. HEART: Normal S1/S2. No murmur, rub or gallop. LUNGS: Decreased air entry bilaterally. Occasional expiratory wheezes. ABDOMEN: Soft and non-tender. EXTREMITIES: No lower extremity edema.    NEUROLOGICAL: Grossly normal.     Laboratory And Diagnostic Data  I have personally reviewed and interpreted the results of the following diagnostic testing    Lab Results   Component Value Date    WBC 5.0 01/11/2023    HGB 12.0 01/11/2023    HCT 37.5 01/11/2023     01/11/2023    CHOL 127 03/23/2016    TRIG 77 03/23/2016    HDL 38 01/13/2023    ALT 12 01/10/2023    AST 20 01/10/2023     01/26/2023    K 4.3 02/16/2023     01/26/2023    CREATININE 1.2 01/26/2023    BUN 27 01/26/2023    CO2 29 01/26/2023    TSH 1.600 01/10/2023    INR 1.03 04/28/2022    LABA1C 6.7 (H) 01/10/2023       Echocardiogram 1/10/23: LVEF 40-45%, LVEDD 5.8 cm, LVWT 1 cm, LAD/ADITYA 62. No significant valvular abnormalities. ECG 1/10/23: SR, frequent PVC (RBIA RS I and aVL  Coronary angiogram 2016: Mild CAD. Takotsubo cardiomyopathy. MPI lexiscan:  1/10/28: Negative for ischemia. Holter interrogation 1/10/2023: SR, no AF and ,072 (43%). Impression:  High burden symptomatic premature ventricular complex, likely idiopathic. NICM, EF 40 to 45%. Possibly related to the high burden PVCs. Low burden paroxysmal atrial fibrillation s/p Watchman left atrial occluder. Severe COPD on continuous home oxygen. HTN, HPL, DM 2/peripheral neuropathy,     Assessment And Recommendations: The patient has-symptomatic premature ventricular complexes, likely idiopathic originating from LVOT/MC. Her left-ventricular systolic dysfunction is possibly related to the high PVC burden. She has failed and has been intolerant to amiodarone. Discussed management options including cessation of different antiarrhythmic therapy versus catheter ablation. She wants to proceed with catheter ablation, which I believe is a reasonable option in her case. Risk and benefits of catheter ablation for PVC discussed. Questions answered. Thank you for allowing me to participate in the care of your patient. Please call me if you have any questions. **This report has been created using voice recognition software.  It may contain minor errors which are inherent in voice recognition technology. **     Robin Azevedo MD, MRCP, [facility], [facility] on 2/28/2023 at 3:07 PM

## 2023-03-01 ENCOUNTER — PREP FOR PROCEDURE (OUTPATIENT)
Dept: CARDIOLOGY | Age: 78
End: 2023-03-01

## 2023-03-01 RX ORDER — SODIUM CHLORIDE 9 MG/ML
INJECTION, SOLUTION INTRAVENOUS PRN
Status: CANCELLED | OUTPATIENT
Start: 2023-03-01

## 2023-03-01 RX ORDER — SODIUM CHLORIDE 0.9 % (FLUSH) 0.9 %
5-40 SYRINGE (ML) INJECTION PRN
Status: CANCELLED | OUTPATIENT
Start: 2023-03-01

## 2023-03-01 RX ORDER — SODIUM CHLORIDE 0.9 % (FLUSH) 0.9 %
5-40 SYRINGE (ML) INJECTION EVERY 12 HOURS SCHEDULED
Status: CANCELLED | OUTPATIENT
Start: 2023-03-01

## 2023-03-02 ENCOUNTER — HOSPITAL ENCOUNTER (OUTPATIENT)
Dept: INPATIENT UNIT | Age: 78
Discharge: NURSING, REHAB OR LONG-TERM CARE | End: 2023-03-03
Attending: INTERNAL MEDICINE | Admitting: INTERNAL MEDICINE
Payer: MEDICARE

## 2023-03-02 PROBLEM — Z86.79 STATUS POST ABLATION OF VENTRICULAR ARRHYTHMIA: Status: ACTIVE | Noted: 2023-03-02

## 2023-03-02 PROBLEM — Z98.890 STATUS POST ABLATION OF VENTRICULAR ARRHYTHMIA: Status: ACTIVE | Noted: 2023-03-02

## 2023-03-02 LAB
ABO: NORMAL
ACTIVATED CLOTTING TIME: 107 SECONDS (ref 1–150)
ACTIVATED CLOTTING TIME: 257 SECONDS (ref 1–150)
ACTIVATED CLOTTING TIME: 299 SECONDS (ref 1–150)
ACTIVATED CLOTTING TIME: 420 SECONDS (ref 1–150)
ACTIVATED CLOTTING TIME: 456 SECONDS (ref 1–150)
ACTIVATED CLOTTING TIME: 528 SECONDS (ref 1–150)
ACTIVATED CLOTTING TIME: 708 SECONDS (ref 1–150)
ANION GAP SERPL CALC-SCNC: 13 MEQ/L (ref 8–16)
ANTIBODY SCREEN: NORMAL
APTT PPP: 28.2 SECONDS (ref 22–38)
BUN SERPL-MCNC: 19 MG/DL (ref 7–22)
CALCIUM SERPL-MCNC: 9.3 MG/DL (ref 8.5–10.5)
CHLORIDE SERPL-SCNC: 104 MEQ/L (ref 98–111)
CO2 SERPL-SCNC: 24 MEQ/L (ref 23–33)
CREAT SERPL-MCNC: 1.2 MG/DL (ref 0.4–1.2)
DEPRECATED RDW RBC AUTO: 52.5 FL (ref 35–45)
EKG ATRIAL RATE: 69 BPM
EKG P AXIS: -3 DEGREES
EKG P-R INTERVAL: 182 MS
EKG Q-T INTERVAL: 394 MS
EKG Q-T INTERVAL: 418 MS
EKG QRS DURATION: 104 MS
EKG QRS DURATION: 108 MS
EKG QTC CALCULATION (BAZETT): 447 MS
EKG QTC CALCULATION (BAZETT): 484 MS
EKG R AXIS: -35 DEGREES
EKG R AXIS: -5 DEGREES
EKG T AXIS: 107 DEGREES
EKG T AXIS: 135 DEGREES
EKG VENTRICULAR RATE: 69 BPM
EKG VENTRICULAR RATE: 91 BPM
ERYTHROCYTE [DISTWIDTH] IN BLOOD BY AUTOMATED COUNT: 14.4 % (ref 11.5–14.5)
GFR SERPL CREATININE-BSD FRML MDRD: 46 ML/MIN/1.73M2
GLUCOSE SERPL-MCNC: 120 MG/DL (ref 70–108)
HCT VFR BLD AUTO: 41.8 % (ref 37–47)
HGB BLD-MCNC: 13 GM/DL (ref 12–16)
INR PPP: 0.98 (ref 0.85–1.13)
MCH RBC QN AUTO: 30.9 PG (ref 26–33)
MCHC RBC AUTO-ENTMCNC: 31.1 GM/DL (ref 32.2–35.5)
MCV RBC AUTO: 99.3 FL (ref 81–99)
PLATELET # BLD AUTO: 180 THOU/MM3 (ref 130–400)
PMV BLD AUTO: 10.5 FL (ref 9.4–12.4)
POTASSIUM SERPL-SCNC: 4.6 MEQ/L (ref 3.5–5.2)
RBC # BLD AUTO: 4.21 MILL/MM3 (ref 4.2–5.4)
RH FACTOR: NORMAL
SODIUM SERPL-SCNC: 141 MEQ/L (ref 135–145)
WBC # BLD AUTO: 5.1 THOU/MM3 (ref 4.8–10.8)

## 2023-03-02 PROCEDURE — 93654 COMPRE EP EVAL TX VT: CPT | Performed by: INTERNAL MEDICINE

## 2023-03-02 PROCEDURE — C1769 GUIDE WIRE: HCPCS

## 2023-03-02 PROCEDURE — C1760 CLOSURE DEV, VASC: HCPCS

## 2023-03-02 PROCEDURE — 86923 COMPATIBILITY TEST ELECTRIC: CPT

## 2023-03-02 PROCEDURE — 36415 COLL VENOUS BLD VENIPUNCTURE: CPT

## 2023-03-02 PROCEDURE — 6360000004 HC RX CONTRAST MEDICATION: Performed by: INTERNAL MEDICINE

## 2023-03-02 PROCEDURE — 93654 COMPRE EP EVAL TX VT: CPT

## 2023-03-02 PROCEDURE — C1730 CATH, EP, 19 OR FEW ELECT: HCPCS

## 2023-03-02 PROCEDURE — 93653 COMPRE EP EVAL TX SVT: CPT

## 2023-03-02 PROCEDURE — G0269 OCCLUSIVE DEVICE IN VEIN ART: HCPCS

## 2023-03-02 PROCEDURE — 80048 BASIC METABOLIC PNL TOTAL CA: CPT

## 2023-03-02 PROCEDURE — 85610 PROTHROMBIN TIME: CPT

## 2023-03-02 PROCEDURE — 93462 L HRT CATH TRNSPTL PUNCTURE: CPT

## 2023-03-02 PROCEDURE — 93010 ELECTROCARDIOGRAM REPORT: CPT | Performed by: INTERNAL MEDICINE

## 2023-03-02 PROCEDURE — C1732 CATH, EP, DIAG/ABL, 3D/VECT: HCPCS

## 2023-03-02 PROCEDURE — 93462 L HRT CATH TRNSPTL PUNCTURE: CPT | Performed by: INTERNAL MEDICINE

## 2023-03-02 PROCEDURE — 85027 COMPLETE CBC AUTOMATED: CPT

## 2023-03-02 PROCEDURE — 86850 RBC ANTIBODY SCREEN: CPT

## 2023-03-02 PROCEDURE — 93005 ELECTROCARDIOGRAM TRACING: CPT | Performed by: PHYSICIAN ASSISTANT

## 2023-03-02 PROCEDURE — 86901 BLOOD TYPING SEROLOGIC RH(D): CPT

## 2023-03-02 PROCEDURE — 2720000010 HC SURG SUPPLY STERILE

## 2023-03-02 PROCEDURE — 2580000003 HC RX 258: Performed by: PHYSICIAN ASSISTANT

## 2023-03-02 PROCEDURE — 85347 COAGULATION TIME ACTIVATED: CPT

## 2023-03-02 PROCEDURE — C1894 INTRO/SHEATH, NON-LASER: HCPCS

## 2023-03-02 PROCEDURE — 93005 ELECTROCARDIOGRAM TRACING: CPT | Performed by: INTERNAL MEDICINE

## 2023-03-02 PROCEDURE — 86900 BLOOD TYPING SEROLOGIC ABO: CPT

## 2023-03-02 PROCEDURE — 93622 COMP EP EVAL L VENTR PAC&REC: CPT

## 2023-03-02 PROCEDURE — 6370000000 HC RX 637 (ALT 250 FOR IP): Performed by: INTERNAL MEDICINE

## 2023-03-02 PROCEDURE — 93662 INTRACARDIAC ECG (ICE): CPT

## 2023-03-02 PROCEDURE — 93662 INTRACARDIAC ECG (ICE): CPT | Performed by: INTERNAL MEDICINE

## 2023-03-02 PROCEDURE — C1759 CATH, INTRA ECHOCARDIOGRAPHY: HCPCS

## 2023-03-02 PROCEDURE — 6360000002 HC RX W HCPCS

## 2023-03-02 PROCEDURE — C1766 INTRO/SHEATH,STRBLE,NON-PEEL: HCPCS

## 2023-03-02 PROCEDURE — 2500000003 HC RX 250 WO HCPCS

## 2023-03-02 PROCEDURE — 85730 THROMBOPLASTIN TIME PARTIAL: CPT

## 2023-03-02 PROCEDURE — 2709999900 HC NON-CHARGEABLE SUPPLY

## 2023-03-02 RX ORDER — FERROUS SULFATE 325(65) MG
325 TABLET ORAL 2 TIMES DAILY
Status: DISCONTINUED | OUTPATIENT
Start: 2023-03-02 | End: 2023-03-03 | Stop reason: HOSPADM

## 2023-03-02 RX ORDER — HYDROXYZINE HYDROCHLORIDE 10 MG/1
10 TABLET, FILM COATED ORAL 3 TIMES DAILY PRN
Status: DISCONTINUED | OUTPATIENT
Start: 2023-03-02 | End: 2023-03-03 | Stop reason: HOSPADM

## 2023-03-02 RX ORDER — BENZONATATE 100 MG/1
200 CAPSULE ORAL 3 TIMES DAILY PRN
Status: DISCONTINUED | OUTPATIENT
Start: 2023-03-02 | End: 2023-03-03 | Stop reason: HOSPADM

## 2023-03-02 RX ORDER — SODIUM CHLORIDE 0.9 % (FLUSH) 0.9 %
5-40 SYRINGE (ML) INJECTION EVERY 12 HOURS SCHEDULED
Status: DISCONTINUED | OUTPATIENT
Start: 2023-03-02 | End: 2023-03-03 | Stop reason: HOSPADM

## 2023-03-02 RX ORDER — CETIRIZINE HYDROCHLORIDE 10 MG/1
10 TABLET ORAL DAILY
Status: DISCONTINUED | OUTPATIENT
Start: 2023-03-03 | End: 2023-03-03 | Stop reason: HOSPADM

## 2023-03-02 RX ORDER — TIZANIDINE 4 MG/1
2 TABLET ORAL 2 TIMES DAILY
Status: DISCONTINUED | OUTPATIENT
Start: 2023-03-02 | End: 2023-03-03 | Stop reason: HOSPADM

## 2023-03-02 RX ORDER — PANTOPRAZOLE SODIUM 40 MG/1
40 TABLET, DELAYED RELEASE ORAL
Status: DISCONTINUED | OUTPATIENT
Start: 2023-03-03 | End: 2023-03-03 | Stop reason: HOSPADM

## 2023-03-02 RX ORDER — CITALOPRAM 40 MG/1
40 TABLET ORAL DAILY
Status: DISCONTINUED | OUTPATIENT
Start: 2023-03-02 | End: 2023-03-03 | Stop reason: HOSPADM

## 2023-03-02 RX ORDER — SODIUM CHLORIDE 9 MG/ML
INJECTION, SOLUTION INTRAVENOUS PRN
Status: DISCONTINUED | OUTPATIENT
Start: 2023-03-02 | End: 2023-03-03 | Stop reason: HOSPADM

## 2023-03-02 RX ORDER — TRAZODONE HYDROCHLORIDE 100 MG/1
100 TABLET ORAL NIGHTLY
Status: DISCONTINUED | OUTPATIENT
Start: 2023-03-02 | End: 2023-03-03 | Stop reason: HOSPADM

## 2023-03-02 RX ORDER — PREGABALIN 75 MG/1
150 CAPSULE ORAL 3 TIMES DAILY
Status: DISCONTINUED | OUTPATIENT
Start: 2023-03-02 | End: 2023-03-03 | Stop reason: HOSPADM

## 2023-03-02 RX ORDER — FUROSEMIDE 20 MG/1
20 TABLET ORAL DAILY PRN
Status: DISCONTINUED | OUTPATIENT
Start: 2023-03-02 | End: 2023-03-03 | Stop reason: HOSPADM

## 2023-03-02 RX ORDER — DEXTROSE MONOHYDRATE 100 MG/ML
INJECTION, SOLUTION INTRAVENOUS CONTINUOUS PRN
Status: DISCONTINUED | OUTPATIENT
Start: 2023-03-02 | End: 2023-03-03 | Stop reason: HOSPADM

## 2023-03-02 RX ORDER — ASPIRIN 81 MG/1
81 TABLET ORAL DAILY
Status: DISCONTINUED | OUTPATIENT
Start: 2023-03-02 | End: 2023-03-03 | Stop reason: HOSPADM

## 2023-03-02 RX ORDER — SODIUM CHLORIDE 0.9 % (FLUSH) 0.9 %
5-40 SYRINGE (ML) INJECTION PRN
Status: DISCONTINUED | OUTPATIENT
Start: 2023-03-02 | End: 2023-03-03 | Stop reason: HOSPADM

## 2023-03-02 RX ORDER — ACETAMINOPHEN 325 MG/1
650 TABLET ORAL EVERY 4 HOURS PRN
Status: DISCONTINUED | OUTPATIENT
Start: 2023-03-02 | End: 2023-03-03 | Stop reason: HOSPADM

## 2023-03-02 RX ORDER — ATORVASTATIN CALCIUM 20 MG/1
20 TABLET, FILM COATED ORAL NIGHTLY
Status: DISCONTINUED | OUTPATIENT
Start: 2023-03-02 | End: 2023-03-03 | Stop reason: HOSPADM

## 2023-03-02 RX ORDER — DOCUSATE SODIUM 100 MG/1
100 CAPSULE, LIQUID FILLED ORAL 3 TIMES DAILY PRN
Status: DISCONTINUED | OUTPATIENT
Start: 2023-03-02 | End: 2023-03-03 | Stop reason: HOSPADM

## 2023-03-02 RX ORDER — DULOXETIN HYDROCHLORIDE 60 MG/1
120 CAPSULE, DELAYED RELEASE ORAL DAILY
Status: DISCONTINUED | OUTPATIENT
Start: 2023-03-02 | End: 2023-03-03 | Stop reason: HOSPADM

## 2023-03-02 RX ORDER — ONDANSETRON 4 MG/1
4 TABLET, FILM COATED ORAL EVERY 8 HOURS PRN
Status: DISCONTINUED | OUTPATIENT
Start: 2023-03-02 | End: 2023-03-03 | Stop reason: HOSPADM

## 2023-03-02 RX ORDER — INSULIN LISPRO 100 [IU]/ML
5 INJECTION, SOLUTION INTRAVENOUS; SUBCUTANEOUS
Status: DISCONTINUED | OUTPATIENT
Start: 2023-03-02 | End: 2023-03-03 | Stop reason: HOSPADM

## 2023-03-02 RX ORDER — DIPHENHYDRAMINE HCL 25 MG
25 TABLET ORAL DAILY PRN
Status: DISCONTINUED | OUTPATIENT
Start: 2023-03-02 | End: 2023-03-03 | Stop reason: HOSPADM

## 2023-03-02 RX ADMIN — DULOXETINE HYDROCHLORIDE 120 MG: 60 CAPSULE, DELAYED RELEASE ORAL at 21:49

## 2023-03-02 RX ADMIN — ACETAMINOPHEN 650 MG: 325 TABLET ORAL at 22:31

## 2023-03-02 RX ADMIN — SODIUM CHLORIDE: 9 INJECTION, SOLUTION INTRAVENOUS at 12:04

## 2023-03-02 RX ADMIN — ATORVASTATIN CALCIUM 20 MG: 20 TABLET, FILM COATED ORAL at 20:05

## 2023-03-02 RX ADMIN — PREGABALIN 150 MG: 150 CAPSULE ORAL at 21:50

## 2023-03-02 RX ADMIN — FERROUS SULFATE TAB 325 MG (65 MG ELEMENTAL FE) 325 MG: 325 (65 FE) TAB at 20:05

## 2023-03-02 RX ADMIN — TIZANIDINE 2 MG: 4 TABLET ORAL at 20:05

## 2023-03-02 RX ADMIN — TRAZODONE HYDROCHLORIDE 100 MG: 100 TABLET ORAL at 20:05

## 2023-03-02 RX ADMIN — IOPAMIDOL 20 ML: 755 INJECTION, SOLUTION INTRAVENOUS at 17:16

## 2023-03-02 ASSESSMENT — PAIN SCALES - GENERAL
PAINLEVEL_OUTOF10: 6
PAINLEVEL_OUTOF10: 0

## 2023-03-02 ASSESSMENT — PAIN DESCRIPTION - ORIENTATION: ORIENTATION: LOWER

## 2023-03-02 ASSESSMENT — PAIN - FUNCTIONAL ASSESSMENT: PAIN_FUNCTIONAL_ASSESSMENT: ACTIVITIES ARE NOT PREVENTED

## 2023-03-02 ASSESSMENT — PAIN DESCRIPTION - DESCRIPTORS: DESCRIPTORS: ACHING;DISCOMFORT

## 2023-03-02 ASSESSMENT — PAIN DESCRIPTION - LOCATION: LOCATION: BACK

## 2023-03-02 NOTE — H&P
[address] [facility] ()  [address] [facility] [address]  H&P and SEDATION/ANALGESIA     Patient demographics:  Date:   3/2/2023  Patient name: Vikas Melendez  YOB: 1945  Sex: female   MRN:   [id number]    Reason for admission or planned procedure:  PVC ablation. Code Status: Full Code    Consent:The risk and benefits and alternatives of VT ablation, including bleeding, vascular injury, stroke, MI, cardiac perforation requiring emergency pericardiocentesis/sternotomy, heart failure decompensation, death, potential exposure to radiation and recurrent arrhythmia requiring medical therapy or repeat ablation. She wishes to proceed. Questions answered and She verbalized understanding of the discussion. 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 Medical Hx: PAF dx 2018 on ILR (low burden), watchman FLX LAAO for GI bleed (3/2022), sinus bradycardia, PVCs since 2016 (burden 43%), non-obstructive CAD, NICM dx 2016 (LVEF 40-45%), HTN, HPL, DM 2/peripheral neuropathy, obesity, severe COPD on continuous oxygen, BRITTNY/CPAP, CKD-3 and anemia of CKD.     Past Medical History:  Past Medical History:   Diagnosis Date    Anemia     Atrial fibrillation ([address] 11/09/2017    CAD (coronary artery disease)     CHF (congestive heart failure) (HCC)     Chronic kidney disease     stage 3 kidney     COPD (chronic obstructive pulmonary disease) (HCC)     DDD (degenerative disc disease), lumbar     Depression     Frequent PVCs 12/13/2017    GERD (gastroesophageal reflux disease)     History of blood transfusion     Hx of blood clots 09/2017    pulmonary emboli    Hyperlipidemia     Hypertension     sees Baki    Hyperthyroidism     Kidney disease     hemmelgarn    Movement disorder     DDD    Neuropathy     Obesity Palpitations 01/10/2020    Pneumonia 2016    sepsis    Sleep apnea     usually wears cpap at night    Status post right and left heart catheterization     Type II or unspecified type diabetes mellitus without mention of complication, not stated as uncontrolled        Past Surgical History:  Past Surgical History:   Procedure Laterality Date    ACHILLES TENDON SURGERY Bilateral     BLADDER SUSPENSION      CARDIAC SURGERY  2016    heart cath--[facility]    COLONOSCOPY Left 05/11/2018    COLONOSCOPY POLYPECTOMY SNARE/COLD BIOPSY performed by Ana Raymundo MD at [facility]    COLONOSCOPY N/A 08/04/2021    COLONOSCOPY performed by Beatris Jolly MD at [facility]    COLONOSCOPY  08/04/2021    Dr. Lissy Hilton    COLONOSCOPY N/A 10/20/2022    COLONOSCOPY POLYPECTOMY SNARE/COLD BIOPSY performed by Beatris Jolly MD at [facility]    ENDOSCOPY, COLON, DIAGNOSTIC      GASTRIC FUNDOPLICATION      HAMMER TOE SURGERY Right     HERNIA REPAIR      HIATAL HERNIA REPAIR  09/06/2016    Laparoscopic Robotic with Nissen Fundoplication - Dr. Genevieve Elizalde (CERVIX STATUS UNKNOWN)      [place] OFFICE/OUTPT VISIT,PROCEDURE ONLY N/A 09/21/2018    EGD DIAGNOSTIC ONLY performed by Beatris Jolly MD at [address] [place] [address]      right    TENDON RELEASE Left 08/09/2016    left foot    TRANSESOPHAGEAL ECHOCARDIOGRAM N/A 4/20/2022    TRANSESOPHAGEAL ECHOCARDIOGRAM performed by Salma Lauren MD at [facility]  11/06/2017    UPPER GASTROINTESTINAL ENDOSCOPY Left 05/10/2018    EGD BIOPSY performed by Ana Raymundo MD at [place] [address] Left 07/25/2021    [facility]        Allergies:    Allergies as of 03/02/2023 - Fully Reviewed 02/28/2023   Allergen Reaction Noted    Bactrim [sulfamethoxazole-trimethoprim]  01/02/2018    Wellbutrin [bupropion] Other (See Comments) 19/92/7781    Silicone Rash 86/81/5400     Additional information:       Medications     Current Facility-Administered Medications:     sodium chloride flush 0.9 % injection 5-40 mL, 5-40 mL, IntraVENous, 2 times per day, Hannah Vera PA-C    sodium chloride flush 0.9 % injection 5-40 mL, 5-40 mL, IntraVENous, PRN, Ashly Jang PA-C    0.9 % sodium chloride infusion, , IntraVENous, PRN, Hannah Vera PA-C  Prior to Admission medications    Medication Sig Start Date End Date Taking?  Authorizing Provider   amiodarone (PACERONE) 100 MG tablet Take 1 tablet by mouth daily  Patient not taking: No sig reported 2/15/23   Matthew Solorzano PA-C   ARTIFICIAL TEAR OP Apply to eye  Patient not taking: No sig reported    Historical Provider, MD   loperamide (IMODIUM) 2 MG capsule  12/6/22   Historical Provider, MD   Dextromethorphan-guaiFENesin ([address] DM MAXIMUM STRENGTH)  MG TB12 Take by mouth  Patient not taking: No sig reported    Historical Provider, MD   Artificial Tear Solution (JUST TEARS EYE DROPS) SOLN Apply to eye  Patient not taking: No sig reported    Historical Provider, MD   acetaminophen-codeine (TYLENOL #3) 300-30 MG per tablet  11/28/22   Historical Provider, MD   lidocaine-EPINEPHrine 2 percent-1:373699 injection Inject 20 mLs into the skin once  Patient not taking: No sig reported    Historical Provider, MD   Aspirin-Acetaminophen-Caffeine (EXCEDRIN PO) Take by mouth    Historical Provider, MD   Simethicone 125 MG CAPS Take by mouth    Historical Provider, MD   Ondansetron HCl (ZOFRAN PO) Take by mouth    Historical Provider, MD   benzonatate (TESSALON) 200 MG capsule Take 200 mg by mouth 3 times daily as needed for Cough  Patient not taking: No sig reported    Historical Provider, MD   guaiFENesin (ROBITUSSIN) 100 MG/5ML SOLN oral solution Take 200 mg by mouth every 4 hours as needed for Cough  Patient not taking: No sig reported    Historical Provider, MD   ipratropium (ATROVENT) 0.06 % nasal spray  3/25/22   Historical Provider, MD   pregabalin (LYRICA) 150 MG capsule Take 150 mg by mouth in the morning, at noon, and at bedtime.   5/28/22   Historical Provider, MD   pantoprazole (PROTONIX) 40 MG tablet Take 1 tablet by mouth 2 times daily (before meals) 4/30/22 3/2/23  CAROLINA García   linaclotide [facility]) 145 MCG capsule Take 1 capsule by mouth every morning (before breakfast) 4/30/22   BRIAN Mcelroy CNP   ferrous gluconate (FERGON) 324 (38 Fe) MG tablet Take 324 mg by mouth 2 times daily    Historical Provider, MD   furosemide (LASIX) 20 MG tablet Take 20 mg by mouth daily as needed (swelling as needed)    Historical Provider, MD   insulin lispro (HUMALOG) 100 UNIT/ML injection vial Inject into the skin 3 times daily (before meals) Sliding scale    Historical Provider, MD   hydrOXYzine (ATARAX) 10 MG tablet Take 10 mg by mouth 3 times daily as needed for Itching    Historical Provider, MD   Probiotic Product (PROBIOTIC DAILY PO) Take by mouth    Historical Provider, MD   cetirizine (ZYRTEC) 10 MG tablet Take 10 mg by mouth daily    Historical Provider, MD   vitamin D 25 MCG (1000 UT) CAPS Take by mouth daily 125 mcg daily    Historical Provider, MD   citalopram (CELEXA) 40 MG tablet Take 40 mg by mouth daily 6/4/21   Historical Provider, MD   tiZANidine (ZANAFLEX) 2 MG tablet Take 2 mg by mouth 2 times daily     Historical Provider, MD   DULoxetine (CYMBALTA) 20 MG extended release capsule Take 120 mg by mouth daily     Historical Provider, MD   Dulaglutide (TRULICITY) 9.96 PW/7.3AN SOPN Inject 0.75 mg into the skin once a week Every Wednesday    Historical Provider, MD   diphenhydrAMINE (BENADRYL) 25 MG capsule Take 25 mg by mouth as needed for Itching    Historical Provider, MD EVANS ALCOHOL SWABS 70 % PADS  7/4/19   Historical Provider, MD   ONE TOUCH ULTRA TEST strip  7/4/19   Historical Provider, MD   Lancets Misc. Fan Mancia CZT COMFORT) [address] [facility]  7/4/19   Historical Provider, MD   ipratropium-albuterol (DUONEB) 0.5-2.5 (3) MG/3ML SOLN nebulizer solution Inhale 1 vial into the lungs every 4 hours   Patient not taking: No sig reported    Historical Provider, MD   traZODone (DESYREL) 100 MG tablet Take 100 mg by mouth nightly     Historical Provider, MD   Multiple Vitamins-Minerals (THERAPEUTIC MULTIVITAMIN-MINERALS) tablet Take 1 tablet by mouth daily    Historical Provider, MD   OXYGEN Inhale into the lungs continuous    Historical Provider, MD   atorvastatin (LIPITOR) 20 MG tablet Take 1 tablet by mouth nightly 5/13/18   Nette Chowdhury MD   magnesium hydroxide (MILK OF MAGNESIA CONCENTRATE) 2400 MG/10ML SUSP Take 30 mLs by mouth once as needed    Historical Provider, MD   docusate sodium (COLACE) 100 MG capsule Take 100 mg by mouth 3 times daily as needed     Historical Provider, MD   acetaminophen (TYLENOL) 325 MG tablet Take 2 tablets by mouth every 4 hours as needed for Pain 8/18/16   Oni Mcneill MD   levothyroxine (SYNTHROID) 75 MCG tablet Take 75 mcg by mouth Daily    Historical Provider, MD     Aspirin  [] 81 mg  [] 325 mg  [] None  Antiplatelet drug therapy use last 5 days  []No []Yes  Coumadin Use Last 5 Days []No []Yes  Other anticoagulant use last 5 days  []No []Yes  Additional information: Per medical records       Vitals:   Vitals:    03/02/23 1106   BP: (!) 166/59   Pulse: 71   Resp: 11   Temp: 98.5 °F (36.9 °C)   SpO2: 95%       Physical Examination:   GENERAL: Alert and oriented. No distress. EYES: No pallor or icterus. ENT: No central cyanosis. VESSELS: No jugular venous distension or carotid bruits. HEART: Irregular S1/S2. No murmur, rub or gallop. LUNGS: Clear to auscultation. ABDOMEN: Soft and non-tender. EXTREMITIES: No lower extremity edema. Feet are warm. NEUROLOGICAL: Grossly intact.       Procedure Plannd:   [] AF ablation [] Atrial Flutter ablation [] SVT ablation [x] PVC/VT ablation [x] EP study  [] Pacemaker implantation [] AICD implantation [] BiV PM/ICD implantation   [] Generator change [] Loop recorder implantation [] Loop recorder explantation. [] Micra leadless pacemaker implantation. [] His bundle/Left bundle pacemaker implantation. [] Lead revision. [] Pocket Revision. [] JAYLEN. [] Cardioversion    []Other:       Planned Sedation/Analgesia:  [] General anesthesia. [x] Midazolam [x] Fentanyl [] Propofol [] Propofol with anesthesia team.    []Midazolam []Meperidine []Sublimaze []Morphine [] Diazepam    []Other:       ASA Classification  []1 []2 [x]3 []4 []5  Class 1: A normal healthy patient  Class 2: Pt with mild to moderate systemic disease  Class 3: Severe systemic disease or disturbance  Class 4: Severe systemic disorders that are already life threatening. Class 5: Moribund pt with little chances of survival, for more than 24 hours. Mallampati Airway Classification:   []1 []2 [x]3 []4    [x]Pre-procedure diagnostic studies complete and results available. Comment:    [x]Previous sedation/anesthesia experiences assessed. Comment:    [x]The patient is an appropriate candidate to undergo the planned procedure sedation and anesthesia. (Refer to nursing sedation/analgesia documentation record)  [x]Formulation and discussion of sedation/procedure plan, risks, and expectations with patient and/or responsible adult completed. [x]Patient examined immediately prior to the procedure.  (Refer to nursing sedation/analgesia documentation record)        Dandy Quiroz MD MD [facility]  Electronically signed 3/2/2023 at 11:51 AM

## 2023-03-02 NOTE — FLOWSHEET NOTE
1100 Patient admitted to [place] [address]  Ambulatory for PVC ablation  Patient NPO. Patient accompanied by son, José Rogers  Vital signs obtained. Assessment and data collection intiated. Oriented to room. Policies and procedures for 2E explained. All questions answered with no further questions at this time. Fall precautions reviewed with patient. [address] Deepak Trevizo reviewed with patient and son. Patient and son verbalize understanding of the plan of care and contribute to goal setting. 1215 to cath lab per bed, stable condition. 1715care taken over from cath lab, site with dressing to right femoral artery and vein , femstop over quick clot angioseal and vascade. Dressing has blood on the top dressing that hasnt changed and bruise distal to femstop that is soft to touch. Left femoral vein with quik clot and vascade. Drainage noted on closure gauze. Site remains soft and stable. Patient reinstructed not to bend groin, not to cross legs, not to lift head off of pillow, if laughs or coughs to hold site for reinforcement. Voices understanding , taking water without difficulty. 1815 report called to Carl Hensley. 1830 post EKG performed. [address] Angelo Barney at bedside. Checked all sites, ok to move patient to [facility] 99 patient transported with transport team.  Gave an update to Carl Hensley at bedside. Pedal pulse check and groin site checks performed.

## 2023-03-02 NOTE — BRIEF OP NOTE
Brief Postoperative Note    Date:   3/2/2023  Patient name: Samantha Martinez  YOB: 1945  Sex: female   MRN:   [id number]    PCP: Milena Tamayo MD     Procedure:PV    Pre-Op Diagnosis: PVC ablation. Post-Op Diagnosis: LV pap muscle PVC, successful ablation. Surgeon: Christin Hoffman MD, [facility], [place], [place]    Assistant: Sherita Fong. Anesthesia/sedation:  Local lidocaine/fentanyl and midazolam as needed. Estimated Blood Loss (mL): less than 50     Complications: None    Recommendations:  See orders in Epic. Admit for observation. Bed rest for 8 hours. Watch access site/s for bleeding or swelling. Hold pressure if bleeding or swelling.   Stop IV fluids once patient starts eating          Electronically signed by Sugar Riggins MD, Livia Cruz on 3/2/2023 at 5:27 PM

## 2023-03-02 NOTE — PROCEDURES
[facility] [place]pt: [phone]  CARDIAC ELECTROPHYSIOLOGY: PROCEDURE NOTE  Patient Demographics:  Date:   3/2/2023  Patient name:              Lupe Lea  YOB: 1945  Sex: female   MRN:   [id number]    Cardiac Electrophysiologist:  Bharath Armstrong MD, [facility], [place], [place]    Primary Care Provider:  Leonid Gould MD     Cardiologist:  Nikolai Gudino MD    Procedure Planned:  Idiopathic premature ventricular complex ablation. Preoperative Diagnosis:   Symptomatic idiopathic premature ventricular complexes. Postoperative Diagnosis:  Left ventricular anterolateral papillary muscle premature ventricular complexes. Brief Clinical Summary:  77/F with symptomatic idiopathic premature ventricular complexes (burden 43%) refractory to amiodarone and mild LV systolic dysfunction (34-44%) and negative MPI, electively presented for PVC catheter ablation. Medical Hx: PAF dx 2018 on ILR (low burden), watchman FLX LAAO for GI bleed (3/2022), sinus bradycardia, PVCs since 2016 (burden 43%), non-obstructive CAD, NICM dx 2016 (LVEF 40-45%), HTN, HPL, DM 2/peripheral neuropathy, obesity, severe COPD on continuous oxygen, BRITTNY/CPAP, CKD-3 and anemia of CKD. Procedure/s Performed:  Induction of arrhythmia without and with isoproterenol. 3D electro anatomical mapping ([employer])). Successful ablation of premature ventricular complex, originating from left ventricular anterolateral papillary muscle . Description of the Procedure: The patient was brought to the electrophysiology lab in a fasting and nonsedated status. She was prepped and draped in the usual sterile fashion. Intravenous Fentanyl and Midazolam were used as needed for analgesia and conscious sedation. Lidocaine, 2% was injected into femoral regions and right side of neck for local anesthesia.  Vascular access was obtained under ultrasound guidance using 21G micropuncture needle and hemostatic short sheaths placed over the J-tip 0.035\" guidewires (9-Argentine and 7-Argentine sheaths right femoral vein, 7-Argentine left femoral vein and 4-Argentine sheath in right femoral artery). A 3D geometry of right atrium, coronary sinus and RVOT was created using using 3.5 mm open irrigated tip (STSF, DF curve) catheter. A 6F, deflectable quadripolar catheter was positioned at right ventricular apex for mapping and pacing. At baseline, the patient was in sinus rhythm with a sinus cycle length 741 milliseconds, DE interval 186 milliseconds, QRSd 102 milliseconds, QT interval 452 milliseconds, AH interval 92 and HV interval of 44. Frequent premature ventricular complexes (PVC) in bigeminy pattern were noted at baseline, (Right bundle with RS in II and III QS in III and M pattern in V1 with a small q). The PVC coupling interval was 358 milliseconds. The mapping/ablation catheter was advanced from the right femoral vein and sequential activation mapping of PVC was performed in RVOT, RV apex, and AIC. The activation timing at the sites as expected was noted to be late. We decided to map the left side. The 7 Western Michelle hemostatic short sheath in the left femoral vein was exchanged with an 6 Western Michelle hemostatic sheath through which a 10 Western Michelle phased array ultrasound catheter was advanced into the right atrium for intracardiac echocardiography. Baseline imaging showed no pericardial effusion, left atrial or LV clots. A real-time anatomy of the proximal aorta, coronary cusps, right and left ventricular cavities was created. The coronary arteries in the papillary muscle were tagged separately. Following this, a Perclose suture was deployed into the right femoral artery after femoral artery angiography showed an appropriate stick. A 9 Argentine 25 cm long hemostatic short sheath was placed inside the right femoral vein artery over 150 cm JJ guidewire.   A 5000 heparin bolus was administered intravenously and additional boluses given to maintain ACT between 300-350 seconds throughout the left heart catheterization. The ablation catheter was advanced through the femoral artery and sequential mapping of coronary cusps, LVOT, aorto mitral continuity and left ventricular cavity was performed. The earliest activation site of the premature ventricular complex was noted to be along the medial head of anterolateral papillary muscle. The activation time was 22 ms earlier than the onset of the surface QRS with a sharp QRS electrocardiogram noted on the unipole. Obese map of 96% was noted on PASO. Radiofrequency energy was delivered at the site starting at 27 W and escalating to a maximum of 40 W for a maximum of 60 seconds per lesion. After several lesions significant suppression of the PVC was noted, however would continue to come occasionally. Further mapping did not reveal any earlier sites. We were not able to establish good contact along lateral aspect the papillary muscle. We decided to proceed with transseptal puncture for additional mapping. The 7 Western Michelle hemostatic short sheath in the right femoral vein was exchanged with an 8.5 Western Michelle long steerable sheath (Diagonal View). A single transseptal puncture was performed under the ICE guidance using the steerable long sheath and 98 cm [place] needle. The ablation catheter was advanced through the physical sheath into the left ventricular cavity and activation mapping performed. We noted earlier site 1 cm inferior and latera to previously ablated site. 28 ms ahead of onset of urface QRS with morphology correlation match of 98%. Radiofrequency energy was delivered at this site with a maximum power of 36 W which resulted in complete suppression of the premature ventricular complexes. Several ablation lesions were delivered around the side, initially during for 60 seconds.   The catheter manipulation was performed under the electroanatomical map and ICE guidance. During the catheter manipulation via retrograde approach the patient developed left bundle branch block which persisted throughout the procedure time. The patient was monitored for 45 minutes after last ablation lesion. No clinical PVCs were noted. Occasional nonclinical PVCs were noted which were not mapped or ablated. We did not use Isuprel. Post ablation sinus cycle length was 770 ms, KY interval 196 ms, QRS duration 138 ms, QT interval 460 ms, AH interval 94 ms and HV interval 55 ms. At the end of the procedure imaging with ICE showed no pericardial effusion, left atrial or left ventricular clots. Protamine was administered to reverse heparin anticoagulation. During tightening of the Perclose suture, the suture broke and Angio-Seal device was used to close the right femoral arterectomy site. Vascade closure device were used to close the venotomy sites on both sides. Hemostasis was ensured by manual compression. A light dressing was applied to both femoral access sites. Ablation Summary:  Total number of RF lesions 42. Total RF time 19.25 minutes, maximum energy used 40 wolf, mean catheter tip temperature 33 °C.      Medications:  Midazolam 2.5 mg intravenously. Fentanyl 250 mcg intravenously. Lidocaine 20 cc SQ for local anesthesia. Heparin 15,000 units intravenously. Protamine 60 mg intravenously. Fluoroscopy Time:  1 minutes     Blood Loss:  20 cc. Fluid Balance:    1500/0 cc. Complications:  Left bundle branch block. Summary:  Successful ablation with complete suppression of PVC originating from her left ventricle anterolateral papillary muscle. Left bundle branch block due to mechanical trauma. Deployment of Perclose and Angio-Seal closure devices in the right femoral artery and Vascade and femoral veins. Recommendations:  Admit for overnight observation. Discontinue amiodarone. Aspirin 81 mg for 1 month. Electronically signed by Killian Sharif MD, Cristiano  on 3/2/2023 at 5:40 PM

## 2023-03-03 VITALS
SYSTOLIC BLOOD PRESSURE: 129 MMHG | HEIGHT: 65 IN | BODY MASS INDEX: 38.49 KG/M2 | TEMPERATURE: 98.6 F | OXYGEN SATURATION: 98 % | DIASTOLIC BLOOD PRESSURE: 70 MMHG | WEIGHT: 231 LBS | HEART RATE: 85 BPM | RESPIRATION RATE: 18 BRPM

## 2023-03-03 LAB — GLUCOSE BLD STRIP.AUTO-MCNC: 97 MG/DL (ref 70–108)

## 2023-03-03 PROCEDURE — 82948 REAGENT STRIP/BLOOD GLUCOSE: CPT

## 2023-03-03 PROCEDURE — 99232 SBSQ HOSP IP/OBS MODERATE 35: CPT | Performed by: STUDENT IN AN ORGANIZED HEALTH CARE EDUCATION/TRAINING PROGRAM

## 2023-03-03 PROCEDURE — 2580000003 HC RX 258: Performed by: PHYSICIAN ASSISTANT

## 2023-03-03 PROCEDURE — 6370000000 HC RX 637 (ALT 250 FOR IP): Performed by: INTERNAL MEDICINE

## 2023-03-03 RX ORDER — ASPIRIN 81 MG/1
81 TABLET ORAL DAILY
Qty: 30 TABLET | Refills: 3 | Status: SHIPPED | OUTPATIENT
Start: 2023-03-03 | End: 2023-04-02

## 2023-03-03 RX ADMIN — CITALOPRAM 40 MG: 40 TABLET, FILM COATED ORAL at 09:08

## 2023-03-03 RX ADMIN — FERROUS SULFATE TAB 325 MG (65 MG ELEMENTAL FE) 325 MG: 325 (65 FE) TAB at 09:07

## 2023-03-03 RX ADMIN — PREGABALIN 150 MG: 150 CAPSULE ORAL at 10:10

## 2023-03-03 RX ADMIN — SODIUM CHLORIDE, PRESERVATIVE FREE 10 ML: 5 INJECTION INTRAVENOUS at 09:08

## 2023-03-03 RX ADMIN — DULOXETINE HYDROCHLORIDE 120 MG: 60 CAPSULE, DELAYED RELEASE ORAL at 09:08

## 2023-03-03 RX ADMIN — PANTOPRAZOLE SODIUM 40 MG: 40 TABLET, DELAYED RELEASE ORAL at 05:58

## 2023-03-03 RX ADMIN — CETIRIZINE HYDROCHLORIDE 10 MG: 10 TABLET, FILM COATED ORAL at 09:08

## 2023-03-03 RX ADMIN — ASPIRIN 81 MG: 81 TABLET, COATED ORAL at 09:08

## 2023-03-03 RX ADMIN — TIZANIDINE 2 MG: 4 TABLET ORAL at 09:07

## 2023-03-03 ASSESSMENT — LIFESTYLE VARIABLES
HOW OFTEN DO YOU HAVE A DRINK CONTAINING ALCOHOL: NEVER
HOW MANY STANDARD DRINKS CONTAINING ALCOHOL DO YOU HAVE ON A TYPICAL DAY: PATIENT DOES NOT DRINK

## 2023-03-03 ASSESSMENT — PAIN DESCRIPTION - LOCATION: LOCATION: HEAD

## 2023-03-03 ASSESSMENT — PAIN DESCRIPTION - DESCRIPTORS: DESCRIPTORS: ACHING

## 2023-03-03 ASSESSMENT — PAIN SCALES - GENERAL: PAINLEVEL_OUTOF10: 8

## 2023-03-03 ASSESSMENT — PAIN DESCRIPTION - ORIENTATION: ORIENTATION: OTHER (COMMENT)

## 2023-03-03 NOTE — CARE COORDINATION
DISCHARGE PLANNING EVALUATION: OP/OBSERVATION        3/3/23, 7:25 AM HIWOT Aggarwal       Admitted from: 2E 3/2/2023/ 1046   Location: 3B-22/022-A Reason for admit: Status post ablation of ventricular arrhythmia [Z98.890, Z86.79]     Procedure: by Dr. Jeal Randle. Induction of arrhythmia without and with isoproterenol. 3D electro anatomical mapping ([employer])). Successful ablation of premature ventricular complex, originating from left ventricular anterolateral papillary muscle . Pertinent Info/Orders/Treatment Plan:  Here for elective procedure. POD #1. Plan overnight observation. Anticipate discharge today. PCP: Fabian Mcmahon MD    Patient Goals/Plan/Treatment Preferences: Pt is from [facility]. SW consulted and full CM assessment deferred to SW. Transportation/Food Security/Housekeeping Addressed:  No issues identified.

## 2023-03-03 NOTE — PROGRESS NOTES
AVS reviewed with patient. Patient verbalizes understanding. Patient discharged in stable condition.

## 2023-03-03 NOTE — CARE COORDINATION
3/3/23, 10:01 AM EST    Patient goals/plan/ treatment preferences discussed by  and . Patient goals/plan/ treatment preferences reviewed with patient/ family. Patient/ family verbalize understanding of discharge plan and are in agreement with goal/plan/treatment preferences. Understanding was demonstrated using the teach back method. AVS provided by RN at time of discharge, which includes all necessary medical information pertaining to the patients current course of illness, treatment, post-discharge goals of care, and treatment preferences. Services At/After Discharge: Assisted Living          Full consult deferred. Pt from [place] [address] and would like to return. Spoke with Tiffany Grant at the facility and she is able to return. She is aware of discharge today, will access AVS in [email or web address] and will let the AL know. Pt son to transport.      Electronically signed by Ruben Rascon, BURKEW Student on 3/3/2023 at 10:04 AM

## 2023-03-03 NOTE — PROGRESS NOTES
Cardiology Progress Note      Patient:  Lupe Lea  YOB: 1945  MRN: [id number]   Acct: [de-identified]  Admit Date:  3/2/2023  Primary Cardiologist: Nikolai Gudino MD/ [facility]    Patient presented for outpatient PVC ablation, intolerant to AAD. Subjective (Events in last 24 hours):   Pt awake, alert. NAD. Denies cp or sob. Has been feeling well since the procedure. Her groin is a little sore and bruised but no significant pain. D/w aptient about precautions for the next 7-10 days. She has follows with clinic  in 1 week then zora in 1 month. She will monitor her groin for any complications and let us know if any further issues.      Right femoral- some ecchymosis and tenderness, good pedal pulses and NVI   Objective:   /64   Pulse 77   Temp 97.9 °F (36.6 °C) (Oral)   Resp 16   Ht 5' 5\" (1.651 m)   Wt 231 lb (104.8 kg)   SpO2 96%   BMI 38.44 kg/m²      vss  TELEMETRY: nsr    Physical Exam:  General Appearance: alert and oriented to person, place and time, in no acute distress  Cardiovascular: normal rate, regular rhythm, normal S1 and S2, no murmurs, rubs, clicks, or gallops, distal pulses intact, no carotid bruits, no JVD  Pulmonary/Chest: clear to auscultation bilaterally- no wheezes, rales or rhonchi, normal air movement, no respiratory distress  Abdomen: soft, non-tender, non-distended, normal bowel sounds, no masses   Extremities: no cyanosis, clubbing or edema, pulse   Skin: warm and dry, no rash or erythema  Neurological: alert, oriented, normal speech, no focal findings or movement disorder noted    Medications:    sodium chloride flush  5-40 mL IntraVENous 2 times per day    atorvastatin  20 mg Oral Nightly    cetirizine  10 mg Oral Daily    citalopram  40 mg Oral Daily    DULoxetine  120 mg Oral Daily    ferrous sulfate  325 mg Oral BID    pantoprazole  40 mg Oral BID AC    pregabalin  150 mg Oral TID    tiZANidine  2 mg Oral BID    traZODone  100 mg Oral Nightly    aspirin 81 mg Oral Daily    insulin lispro  5 Units SubCUTAneous TID AC      sodium chloride 75 mL/hr at 03/02/23 1204    dextrose       sodium chloride flush, 5-40 mL, PRN  sodium chloride, , PRN  acetaminophen, 650 mg, Q4H PRN  benzonatate, 200 mg, TID PRN  diphenhydrAMINE, 25 mg, Daily PRN  docusate sodium, 100 mg, TID PRN  furosemide, 20 mg, Daily PRN  guaiFENesin, 200 mg, Q4H PRN  hydrOXYzine HCl, 10 mg, TID PRN  ondansetron, 4 mg, Q8H PRN  glucose, 4 tablet, PRN  dextrose bolus, 125 mL, PRN   Or  dextrose bolus, 250 mL, PRN  glucagon (rDNA), 1 mg, PRN  dextrose, , Continuous PRN        Diagnostics:  EKG:     Recommendations:  See orders in Epic. Admit for observation. Bed rest for 8 hours. Watch access site/s for bleeding or swelling. Hold pressure if bleeding or swelling. Stop IV fluids once patient starts eating              Electronically signed by Robin Azevedo MD, Karlie Burns on 3/2/2023     Summary:  Successful ablation with complete suppression of PVC originating from her left ventricle anterolateral papillary muscle. Left bundle branch block due to mechanical trauma. Deployment of Perclose and Angio-Seal closure devices in the right femoral artery and Vascade and femoral veins. Recommendations:  Admit for overnight observation. Discontinue amiodarone. Aspirin 81 mg for 1 month. Electronically signed by Robin Azevedo MD, Karlie Burns on 3/2/2023     Lab Data:    Cardiac Enzymes:  No results for input(s): CKTOTAL, CKMB, CKMBINDEX, TROPONINI in the last 72 hours.     CBC:   Lab Results   Component Value Date/Time    WBC 5.1 03/02/2023 11:04 AM    RBC 4.21 03/02/2023 11:04 AM    HGB 13.0 03/02/2023 11:04 AM    HCT 41.8 03/02/2023 11:04 AM     03/02/2023 11:04 AM       CMP:    Lab Results   Component Value Date/Time     03/02/2023 11:04 AM    K 4.6 03/02/2023 11:04 AM    K 5.2 01/11/2023 02:10 PM     03/02/2023 11:04 AM    CO2 24 03/02/2023 11:04 AM    BUN 19 03/02/2023 11:04 AM    CREATININE 1.2 03/02/2023 11:04 AM    CREATININE 44.0 03/14/2019 12:00 AM    LABGLOM 46 03/02/2023 11:04 AM    GLUCOSE 120 03/02/2023 11:04 AM    CALCIUM 9.3 03/02/2023 11:04 AM       Hepatic Function Panel:    Lab Results   Component Value Date/Time    ALKPHOS 86 01/10/2023 02:39 PM    ALT 12 01/10/2023 02:39 PM    AST 20 01/10/2023 02:39 PM    PROT 7.0 01/10/2023 02:39 PM    BILITOT 0.4 01/10/2023 02:39 PM    BILIDIR <0.2 04/27/2022 07:53 PM    LABALBU 3.8 01/10/2023 02:39 PM       Magnesium:    Lab Results   Component Value Date/Time    MG 1.6 07/26/2021 09:15 AM       PT/INR:    Lab Results   Component Value Date/Time    INR 0.98 03/02/2023 11:04 AM       HgBA1c:    Lab Results   Component Value Date/Time    LABA1C 6.7 01/10/2023 02:39 PM       FLP:    Lab Results   Component Value Date/Time    TRIG 77 03/23/2016 05:00 AM    HDL 38 01/13/2023 03:26 AM    LDLCALC 86 01/13/2023 03:26 AM       TSH:    Lab Results   Component Value Date/Time    TSH 1.600 01/10/2023 02:39 PM         Assessment:  Frequent PVC, and some NSVtach   S/p Successful ablation with complete suppression of PVC originating from her left ventricle anterolateral papillary muscle    Hx of PAF and watchman     Plan:    Per DR Bates's note today: Blanca Schmitz underwent successful ablation of anterolateral PAP muscle yesterday. She had small right groin hematoma. Doing well. No complaints. Mild bruising both groins. Sinus rhythm, no ectopy. Hemodynamically stable. Good right lower extremity distal pulses. We will ambulate her today and discharged home today  Discontinue amiodarone. Aspirin 81 mg for a month. Discussed plan with the patient and nursing staff.      Patient condition at discharge: stable  Disposition: home  Time spent on discharge: >30 mins      Electronically signed by Ace Zarate PA-C on 3/3/2023 at 8:12 AM

## 2023-03-03 NOTE — PLAN OF CARE
Problem: Chronic Conditions and Co-morbidities  Goal: Patient's chronic conditions and co-morbidity symptoms are monitored and maintained or improved  3/3/2023 0937 by Patricia Velazco  Outcome: Progressing  Flowsheets (Taken 3/3/2023 0937)  Care Plan - Patient's Chronic Conditions and Co-Morbidity Symptoms are Monitored and Maintained or Improved:   Monitor and assess patient's chronic conditions and comorbid symptoms for stability, deterioration, or improvement   Collaborate with multidisciplinary team to address chronic and comorbid conditions and prevent exacerbation or deterioration   Update acute care plan with appropriate goals if chronic or comorbid symptoms are exacerbated and prevent overall improvement and discharge  3/3/2023 0634 by Jarek Doss RN  Outcome: Progressing  [address] (Taken 3/2/2023 2000)  Care Plan - Patient's Chronic Conditions and Co-Morbidity Symptoms are Monitored and Maintained or Improved:   Monitor and assess patient's chronic conditions and comorbid symptoms for stability, deterioration, or improvement   Collaborate with multidisciplinary team to address chronic and comorbid conditions and prevent exacerbation or deterioration   Update acute care plan with appropriate goals if chronic or comorbid symptoms are exacerbated and prevent overall improvement and discharge     Problem: Discharge Planning  Goal: Discharge to home or other facility with appropriate resources  3/3/2023 0937 by Patricia Velazco  Outcome: Progressing  Flowsheets (Taken 3/3/2023 4886)  Discharge to home or other facility with appropriate resources:   Identify barriers to discharge with patient and caregiver   Identify discharge learning needs (meds, wound care, etc)   Refer to discharge planning if patient needs post-hospital services based on physician order or complex needs related to functional status, cognitive ability or social support system   Arrange for needed discharge resources and transportation as appropriate   Arrange for interpreters to assist at discharge as needed  3/3/2023 0634 by Avery Beard RN  Outcome: Progressing  Flowsheets  Taken 3/3/2023 0321  Discharge to home or other facility with appropriate resources:   Identify barriers to discharge with patient and caregiver   Identify discharge learning needs (meds, wound care, etc)   Refer to discharge planning if patient needs post-hospital services based on physician order or complex needs related to functional status, cognitive ability or social support system   Arrange for needed discharge resources and transportation as appropriate   Arrange for interpreters to assist at discharge as needed  Taken 3/2/2023 2000  Discharge to home or other facility with appropriate resources:   Identify barriers to discharge with patient and caregiver   Identify discharge learning needs (meds, wound care, etc)   Arrange for needed discharge resources and transportation as appropriate     Problem: Safety - Adult  Goal: Free from fall injury  3/3/2023 0937 by Brigido Auguste  Outcome: Progressing  Flowsheets (Taken 3/3/2023 0937)  Free From Fall Injury:   Instruct family/caregiver on patient safety   Based on caregiver fall risk screen, instruct family/caregiver to ask for assistance with transferring infant if caregiver noted to have fall risk factors  3/3/2023 0634 by Avery Beard RN  Outcome: Progressing    Care plan reviewed with patient. Patient verbalizes understanding of the care plan and contributes to goal setting.

## 2023-03-03 NOTE — PROGRESS NOTES
Mario Phelps underwent successful ablation of anterolateral PAP muscle yesterday. She had small right groin hematoma. Doing well. No complaints. Mild bruising both groins. Sinus rhythm, no ectopy. Hemodynamically stable. Good right lower extremity distal pulses. We will ambulate her today and discharged home today  Discontinue amiodarone. Aspirin 81 mg for a month. Discussed plan with the patient and nursing staff.      Jasbir Benavides MD, MRCP, [facility] on 3/3/2023 at 7:13 AM

## 2023-03-03 NOTE — PLAN OF CARE
Problem: Chronic Conditions and Co-morbidities  Goal: Patient's chronic conditions and co-morbidity symptoms are monitored and maintained or improved  Outcome: Progressing  Flowsheets (Taken 3/2/2023 2000)  Care Plan - Patient's Chronic Conditions and Co-Morbidity Symptoms are Monitored and Maintained or Improved:   Monitor and assess patient's chronic conditions and comorbid symptoms for stability, deterioration, or improvement   Collaborate with multidisciplinary team to address chronic and comorbid conditions and prevent exacerbation or deterioration   Update acute care plan with appropriate goals if chronic or comorbid symptoms are exacerbated and prevent overall improvement and discharge     Problem: Discharge Planning  Goal: Discharge to home or other facility with appropriate resources  Outcome: Progressing  Flowsheets  Taken 3/3/2023 0321  Discharge to home or other facility with appropriate resources:   Identify barriers to discharge with patient and caregiver   Identify discharge learning needs (meds, wound care, etc)   Refer to discharge planning if patient needs post-hospital services based on physician order or complex needs related to functional status, cognitive ability or social support system   Arrange for needed discharge resources and transportation as appropriate   Arrange for interpreters to assist at discharge as needed  Taken 3/2/2023 2000  Discharge to home or other facility with appropriate resources:   Identify barriers to discharge with patient and caregiver   Identify discharge learning needs (meds, wound care, etc)   Arrange for needed discharge resources and transportation as appropriate     Problem: Safety - Adult  Goal: Free from fall injury  Outcome: Progressing

## 2023-03-03 NOTE — DISCHARGE INSTRUCTIONS
F/u with device clinic in 1 week for site check. F/u with dr Vivi Yang in 1 month. Normal Observation: You may or may not experience these. Soreness or tenderness that may last a few weeks. Possible bruising that could last a few weeks and up to one month. Formation of a small lump (dime to quarter size) that should last only a few weeks. Care of your incision  You may shower 24 hours after the procedure. Wet the dressing thoroughly and gently remove the bandage from the hospital during showering. It is easier to remove this way. Gently clean your site daily using soap and water while standing in the shower. Dry thoroughly. Do not apply powders or lotions to the site for 2 weeks. Keep the site clean and dry to prevent infection. Do not sit in a bathtub or a pool of water for 7 days. Inspect the site daily. Activity   You may resume normal activity in 2 days, including driving, letting pain be your     guide. Limit lifting over 5 pounds (half gallon of milk) to one week or until site heals. Limit vigorous activity (contact sports) to two weeks time. You will be able to return to work in 1-3 days. Call our office immediately if you experience any of the following  Significant bleeding does not stop after 10 minutes of applying firm pressure directly over incision. Increased swelling of groin or leg. Unusual pain at groin or down that leg. Signs of infection: redness, warmth to touch, drainage, poorly healing incision, fever, or chills.

## 2023-03-06 ENCOUNTER — TELEPHONE (OUTPATIENT)
Dept: CARDIOLOGY CLINIC | Age: 78
End: 2023-03-06

## 2023-03-06 DIAGNOSIS — Z95.818 PRESENCE OF WATCHMAN LEFT ATRIAL APPENDAGE CLOSURE DEVICE: Primary | ICD-10-CM

## 2023-03-06 DIAGNOSIS — I48.0 PAROXYSMAL ATRIAL FIBRILLATION (HCC): ICD-10-CM

## 2023-03-06 NOTE — TELEPHONE ENCOUNTER
JAYLEN scheduled for 3/20/23 at 3:30pm arrive at 2:00pm with Dr. Sophia Harris. Order faxed to OR scheduling. On doc's schedule. Order to obtain blood work given.

## 2023-03-06 NOTE — TELEPHONE ENCOUNTER
Orders received from Dr. Tami Castillo to schedule the patient for a 1 year post Watchman follow up appointment with an JAYLEN, CBC and BMP prior. Watchman implanted on 3/3/2022  Appt scheduled with Dennise on 3/21/2023  JAYLEN, CBC and BMP ordered. Liyah Mccarthy can you please schedule and notify patient    Dr. Tami Castillo please agree.

## 2023-03-06 NOTE — TELEPHONE ENCOUNTER
No bleeding/drainage/swelling, states she has more energy, no palpitations or \"skipped beats\"  like before   denies discoloration or swelling. Legs move as they should     Does c/o of legs feeling like \"jello\" from knees down. States she has been more active in the house. Bottoms of feet hurt but states she does have neuropathy    Thinks she is doing more in the house than she had been. \"Could be sore muscles\"  asked to take tylenol and see if that helps, will call her back after letting Dr Altaf Perdomo know.

## 2023-03-09 ENCOUNTER — NURSE ONLY (OUTPATIENT)
Dept: CARDIOLOGY CLINIC | Age: 78
End: 2023-03-09

## 2023-03-09 NOTE — PROGRESS NOTES
Pt here post PVC ablation (3/2/23) for incision check. Bilateral groin with small hard knots, no redness or drainage. Right groin with lots of bruising down through thigh. No numbness or tingling noted. She is feeling great. \"Could jump up and down\" wishes she would have done this procedure sooner. Aware to call the office in anything changes.

## 2023-03-13 ENCOUNTER — HOSPITAL ENCOUNTER (OUTPATIENT)
Dept: NURSING | Age: 78
Discharge: HOME | End: 2023-03-13
Payer: MEDICARE

## 2023-03-13 VITALS
HEART RATE: 61 BPM | WEIGHT: 231.8 LBS | BODY MASS INDEX: 38.57 KG/M2 | RESPIRATION RATE: 18 BRPM | TEMPERATURE: 98.3 F | SYSTOLIC BLOOD PRESSURE: 160 MMHG | OXYGEN SATURATION: 98 % | DIASTOLIC BLOOD PRESSURE: 61 MMHG

## 2023-03-13 DIAGNOSIS — D83.9 COMMON VARIABLE IMMUNODEFICIENCY (HCC): Primary | ICD-10-CM

## 2023-03-13 PROCEDURE — 96365 THER/PROPH/DIAG IV INF INIT: CPT

## 2023-03-13 PROCEDURE — 2580000003 HC RX 258: Performed by: FAMILY MEDICINE

## 2023-03-13 PROCEDURE — 96413 CHEMO IV INFUSION 1 HR: CPT

## 2023-03-13 PROCEDURE — 96415 CHEMO IV INFUSION ADDL HR: CPT

## 2023-03-13 PROCEDURE — 6360000002 HC RX W HCPCS: Performed by: FAMILY MEDICINE

## 2023-03-13 PROCEDURE — 96366 THER/PROPH/DIAG IV INF ADDON: CPT

## 2023-03-13 RX ORDER — DIPHENHYDRAMINE HCL 25 MG
25 TABLET ORAL ONCE
Status: DISCONTINUED | OUTPATIENT
Start: 2023-03-13 | End: 2023-03-14 | Stop reason: HOSPADM

## 2023-03-13 RX ORDER — SODIUM CHLORIDE 0.9 % (FLUSH) 0.9 %
5-40 SYRINGE (ML) INJECTION PRN
OUTPATIENT
Start: 2023-03-27

## 2023-03-13 RX ORDER — EPINEPHRINE 1 MG/ML
0.3 INJECTION, SOLUTION INTRAMUSCULAR; SUBCUTANEOUS PRN
OUTPATIENT
Start: 2023-03-27

## 2023-03-13 RX ORDER — SODIUM CHLORIDE 9 MG/ML
INJECTION, SOLUTION INTRAVENOUS CONTINUOUS
OUTPATIENT
Start: 2023-03-27

## 2023-03-13 RX ORDER — SODIUM CHLORIDE 9 MG/ML
5-250 INJECTION, SOLUTION INTRAVENOUS PRN
OUTPATIENT
Start: 2023-03-27

## 2023-03-13 RX ORDER — ALBUTEROL SULFATE 90 UG/1
4 AEROSOL, METERED RESPIRATORY (INHALATION) PRN
OUTPATIENT
Start: 2023-03-27

## 2023-03-13 RX ORDER — ACETAMINOPHEN 325 MG/1
650 TABLET ORAL ONCE
Status: DISCONTINUED | OUTPATIENT
Start: 2023-03-13 | End: 2023-03-14 | Stop reason: HOSPADM

## 2023-03-13 RX ORDER — HEPARIN SODIUM (PORCINE) LOCK FLUSH IV SOLN 100 UNIT/ML 100 UNIT/ML
500 SOLUTION INTRAVENOUS PRN
Status: DISCONTINUED | OUTPATIENT
Start: 2023-03-13 | End: 2023-03-14 | Stop reason: HOSPADM

## 2023-03-13 RX ORDER — ACETAMINOPHEN 325 MG/1
650 TABLET ORAL ONCE
OUTPATIENT
Start: 2023-03-27 | End: 2023-03-27

## 2023-03-13 RX ORDER — SODIUM CHLORIDE 0.9 % (FLUSH) 0.9 %
5-40 SYRINGE (ML) INJECTION PRN
Status: DISCONTINUED | OUTPATIENT
Start: 2023-03-13 | End: 2023-03-14 | Stop reason: HOSPADM

## 2023-03-13 RX ORDER — ONDANSETRON 2 MG/ML
8 INJECTION INTRAMUSCULAR; INTRAVENOUS
OUTPATIENT
Start: 2023-03-27

## 2023-03-13 RX ORDER — DIPHENHYDRAMINE HYDROCHLORIDE 50 MG/ML
50 INJECTION INTRAMUSCULAR; INTRAVENOUS
OUTPATIENT
Start: 2023-03-27

## 2023-03-13 RX ORDER — MEPERIDINE HYDROCHLORIDE 25 MG/ML
12.5 INJECTION INTRAMUSCULAR; INTRAVENOUS; SUBCUTANEOUS PRN
OUTPATIENT
Start: 2023-03-27

## 2023-03-13 RX ORDER — ACETAMINOPHEN 325 MG/1
650 TABLET ORAL
OUTPATIENT
Start: 2023-03-27

## 2023-03-13 RX ORDER — DIPHENHYDRAMINE HCL 25 MG
25 TABLET ORAL ONCE
OUTPATIENT
Start: 2023-03-27 | End: 2023-03-27

## 2023-03-13 RX ORDER — HEPARIN SODIUM (PORCINE) LOCK FLUSH IV SOLN 100 UNIT/ML 100 UNIT/ML
500 SOLUTION INTRAVENOUS PRN
OUTPATIENT
Start: 2023-03-27

## 2023-03-13 RX ADMIN — SODIUM CHLORIDE, PRESERVATIVE FREE 10 ML: 5 INJECTION INTRAVENOUS at 11:27

## 2023-03-13 RX ADMIN — IMMUNE GLOBULIN (HUMAN) 5 G: 10 INJECTION INTRAVENOUS; SUBCUTANEOUS at 11:35

## 2023-03-13 RX ADMIN — SODIUM CHLORIDE, PRESERVATIVE FREE 10 ML: 5 INJECTION INTRAVENOUS at 13:15

## 2023-03-13 RX ADMIN — IMMUNE GLOBULIN (HUMAN) 20 G: 10 INJECTION INTRAVENOUS; SUBCUTANEOUS at 12:05

## 2023-03-13 RX ADMIN — HEPARIN 500 UNITS: 100 SYRINGE at 13:15

## 2023-03-13 ASSESSMENT — PAIN - FUNCTIONAL ASSESSMENT: PAIN_FUNCTIONAL_ASSESSMENT: NONE - DENIES PAIN

## 2023-03-13 NOTE — DISCHARGE INSTRUCTIONS
Next appointment is scheduled for April 10th at 11:00    Please call outpatient nursing the morning of your appointment at [phone]    ACTIVITY:  Continue usual care with your doctor. Call your doctor immediately if any severe problems or go to the nearest emergency room. I have been treated and hereby acknowledge receiving this instruction sheet.

## 2023-03-13 NOTE — PROGRESS NOTES
1112 Patient arrived to [facility] ambulatory for IVIG infusion. Oriented to room and call light  PT RIGHTS AND RESPONSIBILITIES OFFERED TO PT. Patient states she took her pre-medications at home. [address] accessed using sterile technique. 1135 Medication started and she denies complaints    1205 Medication infusing and she denies complaints    1230 Medication infusing and she denies complaints    1245 Medication infusing and she denies complaints     1311 Medication completed and she denies complaints. Port de-accessed. Discharge instructions given and explained and she denies questions.   Discharged ambulatory    __M__ Safety:       (Environmental)  [place] to environment  Ensure ID band is correct and in place/ allergy band as needed  Assess for fall risk  Initiate fall precautions as applicable (fall band, side rails, etc.)  Call light within reach  Bed in low position/ wheels locked    _M___ Pain:       Assess pain level and characteristics  Administer analgesics as ordered  Assess effectiveness of pain management and report to MD as needed    _M___ Knowledge Deficit:  Assess baseline knowledge  Provide teaching at level of understanding  Provide teaching via preferred learning method  Evaluate teaching effectiveness    _M___ Hemodynamic/Respiratory Status:       (Pre and Post Procedure Monitoring)  Assess/Monitor vital signs and LOC  Assess Baseline SpO2 prior to any sedation  Obtain weight/height  Assess vital signs/ LOC until patient meets discharge criteria  Monitor procedure site and notify MD of any issues    _M___ Infection-Risk of Central Venous Catheter:  Monitor for infection signs and symptoms (catheter site redness, temperature elevation, etc)  Assess for infection risks  Educate regarding infection prevention  Manage central venous catheter (flushes/ dressing changes per protocol)

## 2023-03-20 ENCOUNTER — HOSPITAL ENCOUNTER (OUTPATIENT)
Age: 78
Setting detail: OUTPATIENT SURGERY
Discharge: HOME | End: 2023-03-20
Attending: NUCLEAR MEDICINE | Admitting: NUCLEAR MEDICINE
Payer: MEDICARE

## 2023-03-20 VITALS
TEMPERATURE: 98.1 F | OXYGEN SATURATION: 94 % | BODY MASS INDEX: 36.52 KG/M2 | HEIGHT: 66 IN | DIASTOLIC BLOOD PRESSURE: 76 MMHG | WEIGHT: 227.2 LBS | HEART RATE: 90 BPM | RESPIRATION RATE: 18 BRPM | SYSTOLIC BLOOD PRESSURE: 134 MMHG

## 2023-03-20 DIAGNOSIS — Z95.818 PRESENCE OF WATCHMAN LEFT ATRIAL APPENDAGE CLOSURE DEVICE: ICD-10-CM

## 2023-03-20 DIAGNOSIS — I48.0 PAROXYSMAL ATRIAL FIBRILLATION (HCC): ICD-10-CM

## 2023-03-20 PROCEDURE — 2709999900 HC NON-CHARGEABLE SUPPLY: Performed by: NUCLEAR MEDICINE

## 2023-03-20 PROCEDURE — 93312 ECHO TRANSESOPHAGEAL: CPT

## 2023-03-20 PROCEDURE — 7100000010 HC PHASE II RECOVERY - FIRST 15 MIN: Performed by: NUCLEAR MEDICINE

## 2023-03-20 PROCEDURE — 93325 DOPPLER ECHO COLOR FLOW MAPG: CPT

## 2023-03-20 PROCEDURE — 7100000011 HC PHASE II RECOVERY - ADDTL 15 MIN: Performed by: NUCLEAR MEDICINE

## 2023-03-20 PROCEDURE — 93320 DOPPLER ECHO COMPLETE: CPT

## 2023-03-20 PROCEDURE — 93312 ECHO TRANSESOPHAGEAL: CPT | Performed by: NUCLEAR MEDICINE

## 2023-03-20 PROCEDURE — 6360000002 HC RX W HCPCS: Performed by: NUCLEAR MEDICINE

## 2023-03-20 RX ORDER — DULAGLUTIDE 0.75 MG/.5ML
0.75 INJECTION, SOLUTION SUBCUTANEOUS WEEKLY
COMMUNITY

## 2023-03-20 RX ORDER — MIDAZOLAM HYDROCHLORIDE 1 MG/ML
INJECTION INTRAMUSCULAR; INTRAVENOUS PRN
Status: DISCONTINUED | OUTPATIENT
Start: 2023-03-20 | End: 2023-03-20 | Stop reason: ALTCHOICE

## 2023-03-20 RX ORDER — HYDROXYZINE HYDROCHLORIDE 10 MG/1
10 TABLET, FILM COATED ORAL 3 TIMES DAILY PRN
COMMUNITY

## 2023-03-20 RX ORDER — FENTANYL CITRATE 50 UG/ML
INJECTION, SOLUTION INTRAMUSCULAR; INTRAVENOUS PRN
Status: DISCONTINUED | OUTPATIENT
Start: 2023-03-20 | End: 2023-03-20 | Stop reason: ALTCHOICE

## 2023-03-20 RX ORDER — DIPHENHYDRAMINE HCL 25 MG
25 CAPSULE ORAL EVERY 6 HOURS PRN
COMMUNITY

## 2023-03-20 ASSESSMENT — PAIN - FUNCTIONAL ASSESSMENT: PAIN_FUNCTIONAL_ASSESSMENT: 0-10

## 2023-03-20 NOTE — PROGRESS NOTES
Recovery mode. Patient denies discomfort.  discussed findings with sister. Shantell Brooks Discharge instructions provided and understanding verbalized.

## 2023-03-20 NOTE — PROGRESS NOTES
Blanca Schmitz admitted to [facility] for richard with Dr. Aldona Mortimer. Consent form signed and verified.

## 2023-03-21 ENCOUNTER — TELEPHONE (OUTPATIENT)
Dept: CARDIOLOGY CLINIC | Age: 78
End: 2023-03-21

## 2023-03-21 NOTE — TELEPHONE ENCOUNTER
Pt had JAYLEN yesterday with Dr Jo Lopez. Message was left on nurse line from Dr Jo Lopez stating patient felt appointment today with Sheryle Sell was too soon after the JAYLEN, also too early in the morning for patient. Pt would like appointment rescheduled. This appointment is a 1 year Watchman f/u. Appt cancelled for this morning. Please reach out to the patient to reschedule.

## 2023-03-21 NOTE — TELEPHONE ENCOUNTER
[address] where patient lives and spoke with the nurse to r/s patient to next available appt. Patient is scheduled for 4/25/23 at 9:30am with Silvestre

## 2023-04-01 LAB
EKG ATRIAL RATE: 69 BPM
EKG P AXIS: -3 DEGREES
EKG P-R INTERVAL: 182 MS
EKG Q-T INTERVAL: 394 MS
EKG Q-T INTERVAL: 418 MS
EKG QRS DURATION: 104 MS
EKG QRS DURATION: 108 MS
EKG QTC CALCULATION (BAZETT): 447 MS
EKG QTC CALCULATION (BAZETT): 484 MS
EKG R AXIS: -35 DEGREES
EKG R AXIS: -5 DEGREES
EKG T AXIS: 107 DEGREES
EKG T AXIS: 135 DEGREES
EKG VENTRICULAR RATE: 69 BPM
EKG VENTRICULAR RATE: 91 BPM

## 2023-04-10 ENCOUNTER — HOSPITAL ENCOUNTER (OUTPATIENT)
Dept: NURSING | Age: 78
Discharge: HOME | End: 2023-04-10

## 2023-04-10 VITALS — WEIGHT: 226 LBS | BODY MASS INDEX: 36.48 KG/M2

## 2023-04-10 DIAGNOSIS — D83.9 COMMON VARIABLE IMMUNODEFICIENCY (HCC): Primary | ICD-10-CM

## 2023-04-10 RX ORDER — DIPHENHYDRAMINE HYDROCHLORIDE 50 MG/ML
50 INJECTION INTRAMUSCULAR; INTRAVENOUS
OUTPATIENT
Start: 2023-04-24

## 2023-04-10 RX ORDER — ALBUTEROL SULFATE 90 UG/1
4 AEROSOL, METERED RESPIRATORY (INHALATION) PRN
OUTPATIENT
Start: 2023-04-24

## 2023-04-10 RX ORDER — SODIUM CHLORIDE 9 MG/ML
INJECTION, SOLUTION INTRAVENOUS CONTINUOUS
OUTPATIENT
Start: 2023-04-24

## 2023-04-10 RX ORDER — DIPHENHYDRAMINE HCL 25 MG
25 TABLET ORAL ONCE
Status: CANCELLED | OUTPATIENT
Start: 2023-04-24 | End: 2023-04-24

## 2023-04-10 RX ORDER — HEPARIN SODIUM (PORCINE) LOCK FLUSH IV SOLN 100 UNIT/ML 100 UNIT/ML
500 SOLUTION INTRAVENOUS PRN
Status: CANCELLED | OUTPATIENT
Start: 2023-04-24

## 2023-04-10 RX ORDER — DIPHENHYDRAMINE HCL 25 MG
25 TABLET ORAL ONCE
Status: DISCONTINUED | OUTPATIENT
Start: 2023-04-10 | End: 2023-04-13 | Stop reason: HOSPADM

## 2023-04-10 RX ORDER — ACETAMINOPHEN 325 MG/1
650 TABLET ORAL ONCE
Status: DISCONTINUED | OUTPATIENT
Start: 2023-04-10 | End: 2023-04-13 | Stop reason: HOSPADM

## 2023-04-10 RX ORDER — ACETAMINOPHEN 325 MG/1
650 TABLET ORAL ONCE
Status: CANCELLED | OUTPATIENT
Start: 2023-04-24 | End: 2023-04-24

## 2023-04-10 RX ORDER — MEPERIDINE HYDROCHLORIDE 25 MG/ML
12.5 INJECTION INTRAMUSCULAR; INTRAVENOUS; SUBCUTANEOUS PRN
OUTPATIENT
Start: 2023-04-24

## 2023-04-10 RX ORDER — SODIUM CHLORIDE 0.9 % (FLUSH) 0.9 %
5-40 SYRINGE (ML) INJECTION PRN
Status: CANCELLED | OUTPATIENT
Start: 2023-04-24

## 2023-04-10 RX ORDER — ONDANSETRON 2 MG/ML
8 INJECTION INTRAMUSCULAR; INTRAVENOUS
OUTPATIENT
Start: 2023-04-24

## 2023-04-10 RX ORDER — ACETAMINOPHEN 325 MG/1
650 TABLET ORAL
OUTPATIENT
Start: 2023-04-24

## 2023-04-10 RX ORDER — HEPARIN SODIUM (PORCINE) LOCK FLUSH IV SOLN 100 UNIT/ML 100 UNIT/ML
500 SOLUTION INTRAVENOUS PRN
Status: DISCONTINUED | OUTPATIENT
Start: 2023-04-10 | End: 2023-04-11 | Stop reason: HOSPADM

## 2023-04-10 RX ORDER — EPINEPHRINE 1 MG/ML
0.3 INJECTION, SOLUTION INTRAMUSCULAR; SUBCUTANEOUS PRN
OUTPATIENT
Start: 2023-04-24

## 2023-04-10 RX ORDER — SODIUM CHLORIDE 0.9 % (FLUSH) 0.9 %
5-40 SYRINGE (ML) INJECTION PRN
Status: DISCONTINUED | OUTPATIENT
Start: 2023-04-10 | End: 2023-04-11 | Stop reason: HOSPADM

## 2023-04-10 RX ORDER — SODIUM CHLORIDE 9 MG/ML
5-250 INJECTION, SOLUTION INTRAVENOUS PRN
OUTPATIENT
Start: 2023-04-24

## 2023-04-17 ENCOUNTER — HOSPITAL ENCOUNTER (OUTPATIENT)
Dept: NURSING | Age: 78
Discharge: HOME | End: 2023-04-17
Payer: MEDICARE

## 2023-04-17 VITALS
WEIGHT: 231 LBS | RESPIRATION RATE: 16 BRPM | DIASTOLIC BLOOD PRESSURE: 79 MMHG | TEMPERATURE: 97.4 F | HEART RATE: 70 BPM | BODY MASS INDEX: 37.28 KG/M2 | SYSTOLIC BLOOD PRESSURE: 131 MMHG | OXYGEN SATURATION: 97 %

## 2023-04-17 DIAGNOSIS — D83.9 COMMON VARIABLE IMMUNODEFICIENCY (HCC): Primary | ICD-10-CM

## 2023-04-17 PROCEDURE — 6360000002 HC RX W HCPCS: Performed by: FAMILY MEDICINE

## 2023-04-17 PROCEDURE — 96413 CHEMO IV INFUSION 1 HR: CPT

## 2023-04-17 PROCEDURE — 2580000003 HC RX 258: Performed by: FAMILY MEDICINE

## 2023-04-17 PROCEDURE — 96365 THER/PROPH/DIAG IV INF INIT: CPT

## 2023-04-17 PROCEDURE — 96366 THER/PROPH/DIAG IV INF ADDON: CPT

## 2023-04-17 PROCEDURE — 96415 CHEMO IV INFUSION ADDL HR: CPT

## 2023-04-17 RX ORDER — MEPERIDINE HYDROCHLORIDE 25 MG/ML
12.5 INJECTION INTRAMUSCULAR; INTRAVENOUS; SUBCUTANEOUS PRN
OUTPATIENT
Start: 2023-04-24

## 2023-04-17 RX ORDER — EPINEPHRINE 1 MG/ML
0.3 INJECTION, SOLUTION INTRAMUSCULAR; SUBCUTANEOUS PRN
OUTPATIENT
Start: 2023-04-24

## 2023-04-17 RX ORDER — SODIUM CHLORIDE 0.9 % (FLUSH) 0.9 %
5-40 SYRINGE (ML) INJECTION PRN
OUTPATIENT
Start: 2023-04-24

## 2023-04-17 RX ORDER — SODIUM CHLORIDE 9 MG/ML
INJECTION, SOLUTION INTRAVENOUS CONTINUOUS
OUTPATIENT
Start: 2023-04-24

## 2023-04-17 RX ORDER — DIPHENHYDRAMINE HCL 25 MG
25 TABLET ORAL ONCE
OUTPATIENT
Start: 2023-04-24 | End: 2023-04-24

## 2023-04-17 RX ORDER — ACETAMINOPHEN 325 MG/1
650 TABLET ORAL ONCE
Status: DISCONTINUED | OUTPATIENT
Start: 2023-04-17 | End: 2023-04-18 | Stop reason: HOSPADM

## 2023-04-17 RX ORDER — DIPHENHYDRAMINE HCL 25 MG
25 TABLET ORAL ONCE
Status: DISCONTINUED | OUTPATIENT
Start: 2023-04-17 | End: 2023-04-18 | Stop reason: HOSPADM

## 2023-04-17 RX ORDER — SODIUM CHLORIDE 0.9 % (FLUSH) 0.9 %
5-40 SYRINGE (ML) INJECTION PRN
Status: DISCONTINUED | OUTPATIENT
Start: 2023-04-17 | End: 2023-04-18 | Stop reason: HOSPADM

## 2023-04-17 RX ORDER — ACETAMINOPHEN 325 MG/1
650 TABLET ORAL
OUTPATIENT
Start: 2023-04-24

## 2023-04-17 RX ORDER — HEPARIN SODIUM (PORCINE) LOCK FLUSH IV SOLN 100 UNIT/ML 100 UNIT/ML
500 SOLUTION INTRAVENOUS PRN
Status: DISCONTINUED | OUTPATIENT
Start: 2023-04-17 | End: 2023-04-18 | Stop reason: HOSPADM

## 2023-04-17 RX ORDER — ONDANSETRON 2 MG/ML
8 INJECTION INTRAMUSCULAR; INTRAVENOUS
OUTPATIENT
Start: 2023-04-24

## 2023-04-17 RX ORDER — HEPARIN SODIUM (PORCINE) LOCK FLUSH IV SOLN 100 UNIT/ML 100 UNIT/ML
500 SOLUTION INTRAVENOUS PRN
OUTPATIENT
Start: 2023-04-24

## 2023-04-17 RX ORDER — ACETAMINOPHEN 325 MG/1
650 TABLET ORAL ONCE
OUTPATIENT
Start: 2023-04-24 | End: 2023-04-24

## 2023-04-17 RX ORDER — DIPHENHYDRAMINE HYDROCHLORIDE 50 MG/ML
50 INJECTION INTRAMUSCULAR; INTRAVENOUS
OUTPATIENT
Start: 2023-04-24

## 2023-04-17 RX ORDER — ALBUTEROL SULFATE 90 UG/1
4 AEROSOL, METERED RESPIRATORY (INHALATION) PRN
OUTPATIENT
Start: 2023-04-24

## 2023-04-17 RX ORDER — SODIUM CHLORIDE 9 MG/ML
5-250 INJECTION, SOLUTION INTRAVENOUS PRN
OUTPATIENT
Start: 2023-04-24

## 2023-04-17 RX ADMIN — IMMUNE GLOBULIN (HUMAN) 5 G: 10 INJECTION INTRAVENOUS; SUBCUTANEOUS at 11:20

## 2023-04-17 RX ADMIN — HEPARIN 500 UNITS: 100 SYRINGE at 12:48

## 2023-04-17 RX ADMIN — SODIUM CHLORIDE, PRESERVATIVE FREE 10 ML: 5 INJECTION INTRAVENOUS at 12:48

## 2023-04-17 RX ADMIN — IMMUNE GLOBULIN (HUMAN) 20 G: 10 INJECTION INTRAVENOUS; SUBCUTANEOUS at 11:56

## 2023-04-17 ASSESSMENT — PAIN SCALES - GENERAL: PAINLEVEL_OUTOF10: 2

## 2023-04-17 ASSESSMENT — PAIN DESCRIPTION - LOCATION: LOCATION: SHOULDER

## 2023-04-17 ASSESSMENT — PAIN DESCRIPTION - ORIENTATION: ORIENTATION: LEFT

## 2023-04-17 NOTE — PROGRESS NOTES
temperature elevation, etc)  Assess for infection risks  Educate regarding infection prevention  Manage central venous catheter (flushes/ dressing changes per protocol)

## 2023-04-17 NOTE — DISCHARGE INSTRUCTIONS
ACTIVITY:  Continue usual care with your doctor. Call your doctor immediately if any severe problems or go to the nearest emergency room. I have been treated and hereby acknowledge receiving this instruction sheet.                     Next IVIG infusion: May 15 @ 11:00am

## 2023-04-18 NOTE — PATIENT INSTRUCTIONS
You may receive a survey regarding the care you received during your visit. Your input is valuable to us. We encourage you to complete and return your survey. We hope you will choose us in the future for your healthcare needs.     Your Medical Assistant Today:  Ruiz Lemos   Your RN Today:  Yvonne GUIDO  Your Provider for Today: Dr. Fany Yeung blood pressure daily-  approximately 2 hrs after taking medications (losartan)   Call office with BP readings

## 2023-04-19 ENCOUNTER — OFFICE VISIT (OUTPATIENT)
Dept: CARDIOLOGY CLINIC | Age: 78
End: 2023-04-19
Payer: MEDICARE

## 2023-04-19 VITALS
BODY MASS INDEX: 36.48 KG/M2 | SYSTOLIC BLOOD PRESSURE: 193 MMHG | WEIGHT: 227 LBS | OXYGEN SATURATION: 98 % | HEART RATE: 65 BPM | HEIGHT: 66 IN | DIASTOLIC BLOOD PRESSURE: 104 MMHG

## 2023-04-19 DIAGNOSIS — I49.3 FREQUENT PVCS: Primary | ICD-10-CM

## 2023-04-19 PROCEDURE — 3080F DIAST BP >= 90 MM HG: CPT | Performed by: INTERNAL MEDICINE

## 2023-04-19 PROCEDURE — G8427 DOCREV CUR MEDS BY ELIG CLIN: HCPCS | Performed by: INTERNAL MEDICINE

## 2023-04-19 PROCEDURE — 93000 ELECTROCARDIOGRAM COMPLETE: CPT | Performed by: INTERNAL MEDICINE

## 2023-04-19 PROCEDURE — 99214 OFFICE O/P EST MOD 30 MIN: CPT | Performed by: INTERNAL MEDICINE

## 2023-04-19 PROCEDURE — 1036F TOBACCO NON-USER: CPT | Performed by: INTERNAL MEDICINE

## 2023-04-19 PROCEDURE — 3077F SYST BP >= 140 MM HG: CPT | Performed by: INTERNAL MEDICINE

## 2023-04-19 PROCEDURE — G8417 CALC BMI ABV UP PARAM F/U: HCPCS | Performed by: INTERNAL MEDICINE

## 2023-04-19 PROCEDURE — 1123F ACP DISCUSS/DSCN MKR DOCD: CPT | Performed by: INTERNAL MEDICINE

## 2023-04-19 PROCEDURE — G8400 PT W/DXA NO RESULTS DOC: HCPCS | Performed by: INTERNAL MEDICINE

## 2023-04-19 PROCEDURE — 1090F PRES/ABSN URINE INCON ASSESS: CPT | Performed by: INTERNAL MEDICINE

## 2023-04-19 RX ORDER — LOSARTAN POTASSIUM 25 MG/1
25 TABLET ORAL DAILY
Qty: 90 TABLET | Refills: 1 | Status: SHIPPED | OUTPATIENT
Start: 2023-04-19

## 2023-04-19 NOTE — PROGRESS NOTES
EKG obtained   S/p PVC ablation 03.02.2023  High BP since stopping Metoprolol
4/19/2023: Sinus rhythm. Coronary angiogram 2016: Mild CAD. Takotsubo cardiomyopathy. MPI lexiscan:  1/10/28: Negative for ischemia. Holter interrogation 1/10/2023: SR, no AF and ,072 (43%). Impression:  Status post anterolateral papillary muscle catheter ablation. NICM, EF 40-45% => 35%. Euvolumic. Low burden paroxysmal atrial fibrillation s/p Watchman left atrial occluder. COPD, stable. HTN (uncontrolled), HPL, DM 2/peripheral neuropathy,     Assessment And Recommendations:  Jie Waddell is doing well. Her palpitations have resolved. Breathing has improved. Off oxygen. Reports more exercise capacity. Electrocardiogram unremarkable. Off metoprolol. No groin complications. We will start her on losartan 25 mg daily, low-salt diet and follow-up in a year. A Holter monitor and a limited echo/     Thank you for allowing me to participate in the care of your patient. Please call me if you have any questions. **This report has been created using voice recognition software. It may contain minor errors which are inherent in voice recognition technology. **     Reva Grimes MD, MRCP, [facility], [facility] on 4/19/2023 at 8:17 AM

## 2023-04-24 ENCOUNTER — OFFICE VISIT (OUTPATIENT)
Dept: CARDIOLOGY CLINIC | Age: 78
End: 2023-04-24
Payer: MEDICARE

## 2023-04-24 VITALS
HEIGHT: 66 IN | DIASTOLIC BLOOD PRESSURE: 62 MMHG | HEART RATE: 84 BPM | SYSTOLIC BLOOD PRESSURE: 100 MMHG | BODY MASS INDEX: 36 KG/M2 | WEIGHT: 224 LBS

## 2023-04-24 DIAGNOSIS — I10 PRIMARY HYPERTENSION: ICD-10-CM

## 2023-04-24 DIAGNOSIS — I48.0 PAROXYSMAL ATRIAL FIBRILLATION (HCC): ICD-10-CM

## 2023-04-24 DIAGNOSIS — I42.0 DILATED CARDIOMYOPATHY (HCC): Primary | ICD-10-CM

## 2023-04-24 PROCEDURE — G8427 DOCREV CUR MEDS BY ELIG CLIN: HCPCS | Performed by: NUCLEAR MEDICINE

## 2023-04-24 PROCEDURE — 1036F TOBACCO NON-USER: CPT | Performed by: NUCLEAR MEDICINE

## 2023-04-24 PROCEDURE — 3074F SYST BP LT 130 MM HG: CPT | Performed by: NUCLEAR MEDICINE

## 2023-04-24 PROCEDURE — 1090F PRES/ABSN URINE INCON ASSESS: CPT | Performed by: NUCLEAR MEDICINE

## 2023-04-24 PROCEDURE — 99213 OFFICE O/P EST LOW 20 MIN: CPT | Performed by: NUCLEAR MEDICINE

## 2023-04-24 PROCEDURE — G8400 PT W/DXA NO RESULTS DOC: HCPCS | Performed by: NUCLEAR MEDICINE

## 2023-04-24 PROCEDURE — 1123F ACP DISCUSS/DSCN MKR DOCD: CPT | Performed by: NUCLEAR MEDICINE

## 2023-04-24 PROCEDURE — G8417 CALC BMI ABV UP PARAM F/U: HCPCS | Performed by: NUCLEAR MEDICINE

## 2023-04-24 PROCEDURE — 3078F DIAST BP <80 MM HG: CPT | Performed by: NUCLEAR MEDICINE

## 2023-04-24 NOTE — PROGRESS NOTES
[id number] [facility] .  [address] [place] [id number]  Dept: [phone]  Dept Fax: [phone]  Loc: [phone]    Visit Date: 4/24/2023    Mathieu Dillon is a 65 y.o. female who presents todayfor:  Chief Complaint   Patient presents with    6 Month Follow-Up    Cardiomyopathy    Coronary Artery Disease    Hypertension    Atrial Fibrillation   Known A fib and watchman   Had PVC ablation lately   No chest pain  No changes in breathing  BP is better   No issues with meds       HPI:  HPI  Past Medical History:   Diagnosis Date    Anemia     Atrial fibrillation ([address] 11/09/2017    CAD (coronary artery disease)     CHF (congestive heart failure) (HCC)     Chronic kidney disease     stage 3 kidney     COPD (chronic obstructive pulmonary disease) (HCC)     DDD (degenerative disc disease), lumbar     Depression     Frequent PVCs 12/13/2017    GERD (gastroesophageal reflux disease)     History of blood transfusion     Hx of blood clots 09/2017    pulmonary emboli    Hyperlipidemia     Hypertension     sees Baki    Hyperthyroidism     Kidney disease     hemmelgarn    Movement disorder     DDD    Neuropathy     Obesity     Palpitations 01/10/2020    Pneumonia 2016    sepsis    Sleep apnea     usually wears cpap at night    Status post right and left heart catheterization     Type II or unspecified type diabetes mellitus without mention of complication, not stated as uncontrolled       Past Surgical History:   Procedure Laterality Date    ACHILLES TENDON SURGERY Bilateral     BLADDER SUSPENSION      C[facility]GERY  [facility]    [facility]    COLONOSCOPY Left 05/11/2018    COLONOSCOPY POLYPECTOMY SNARE/COLD BIOPSY performed by Maria Ines Al MD at [facility]    COLONOSCOPY N/A 08/04/2021    COLONOSCOPY performed by Gaby Antony MD at [facility]    COLONOSCOPY  08/04/2021    Dr. Petra Fernandes-- [facility]    COLONOSCOPY N/A 10/20/2022    COLONOSCOPY

## 2023-05-09 ENCOUNTER — OFFICE VISIT (OUTPATIENT)
Dept: CARDIOLOGY CLINIC | Age: 78
End: 2023-05-09

## 2023-05-09 VITALS
SYSTOLIC BLOOD PRESSURE: 99 MMHG | DIASTOLIC BLOOD PRESSURE: 68 MMHG | HEIGHT: 66 IN | WEIGHT: 229.8 LBS | BODY MASS INDEX: 36.93 KG/M2 | HEART RATE: 103 BPM

## 2023-05-09 DIAGNOSIS — I42.0 DILATED CARDIOMYOPATHY (HCC): ICD-10-CM

## 2023-05-09 DIAGNOSIS — I25.10 CORONARY ARTERY DISEASE INVOLVING NATIVE CORONARY ARTERY OF NATIVE HEART WITHOUT ANGINA PECTORIS: ICD-10-CM

## 2023-05-09 DIAGNOSIS — I48.0 PAROXYSMAL ATRIAL FIBRILLATION (HCC): ICD-10-CM

## 2023-05-09 DIAGNOSIS — Z95.818 PRESENCE OF WATCHMAN LEFT ATRIAL APPENDAGE CLOSURE DEVICE: Primary | ICD-10-CM

## 2023-05-15 ENCOUNTER — HOSPITAL ENCOUNTER (OUTPATIENT)
Dept: NURSING | Age: 78
Discharge: HOME | End: 2023-05-15
Payer: MEDICARE

## 2023-05-15 VITALS
HEART RATE: 83 BPM | BODY MASS INDEX: 36.8 KG/M2 | TEMPERATURE: 96.8 F | WEIGHT: 228 LBS | OXYGEN SATURATION: 93 % | RESPIRATION RATE: 18 BRPM | DIASTOLIC BLOOD PRESSURE: 65 MMHG | SYSTOLIC BLOOD PRESSURE: 144 MMHG

## 2023-05-15 DIAGNOSIS — D83.9 COMMON VARIABLE IMMUNODEFICIENCY (HCC): Primary | ICD-10-CM

## 2023-05-15 PROCEDURE — 96365 THER/PROPH/DIAG IV INF INIT: CPT

## 2023-05-15 PROCEDURE — 96413 CHEMO IV INFUSION 1 HR: CPT

## 2023-05-15 PROCEDURE — 96366 THER/PROPH/DIAG IV INF ADDON: CPT

## 2023-05-15 PROCEDURE — 6360000002 HC RX W HCPCS: Performed by: FAMILY MEDICINE

## 2023-05-15 PROCEDURE — 2580000003 HC RX 258: Performed by: FAMILY MEDICINE

## 2023-05-15 PROCEDURE — 96415 CHEMO IV INFUSION ADDL HR: CPT

## 2023-05-15 RX ORDER — SODIUM CHLORIDE 9 MG/ML
INJECTION, SOLUTION INTRAVENOUS CONTINUOUS
OUTPATIENT
Start: 2023-05-22

## 2023-05-15 RX ORDER — SODIUM CHLORIDE 0.9 % (FLUSH) 0.9 %
5-40 SYRINGE (ML) INJECTION PRN
OUTPATIENT
Start: 2023-05-22

## 2023-05-15 RX ORDER — DIPHENHYDRAMINE HCL 25 MG
25 TABLET ORAL ONCE
OUTPATIENT
Start: 2023-05-22 | End: 2023-05-22

## 2023-05-15 RX ORDER — ALBUTEROL SULFATE 90 UG/1
4 AEROSOL, METERED RESPIRATORY (INHALATION) PRN
OUTPATIENT
Start: 2023-05-22

## 2023-05-15 RX ORDER — DIPHENHYDRAMINE HCL 25 MG
25 TABLET ORAL ONCE
Status: DISCONTINUED | OUTPATIENT
Start: 2023-05-15 | End: 2023-05-16 | Stop reason: HOSPADM

## 2023-05-15 RX ORDER — MEPERIDINE HYDROCHLORIDE 25 MG/ML
12.5 INJECTION INTRAMUSCULAR; INTRAVENOUS; SUBCUTANEOUS PRN
OUTPATIENT
Start: 2023-05-22

## 2023-05-15 RX ORDER — DIPHENHYDRAMINE HYDROCHLORIDE 50 MG/ML
50 INJECTION INTRAMUSCULAR; INTRAVENOUS
OUTPATIENT
Start: 2023-05-22

## 2023-05-15 RX ORDER — HEPARIN SODIUM (PORCINE) LOCK FLUSH IV SOLN 100 UNIT/ML 100 UNIT/ML
500 SOLUTION INTRAVENOUS PRN
Status: DISCONTINUED | OUTPATIENT
Start: 2023-05-15 | End: 2023-05-16 | Stop reason: HOSPADM

## 2023-05-15 RX ORDER — EPINEPHRINE 1 MG/ML
0.3 INJECTION, SOLUTION INTRAMUSCULAR; SUBCUTANEOUS PRN
OUTPATIENT
Start: 2023-05-22

## 2023-05-15 RX ORDER — HEPARIN SODIUM (PORCINE) LOCK FLUSH IV SOLN 100 UNIT/ML 100 UNIT/ML
500 SOLUTION INTRAVENOUS PRN
OUTPATIENT
Start: 2023-05-22

## 2023-05-15 RX ORDER — ACETAMINOPHEN 325 MG/1
650 TABLET ORAL ONCE
OUTPATIENT
Start: 2023-05-22 | End: 2023-05-22

## 2023-05-15 RX ORDER — SODIUM CHLORIDE 9 MG/ML
5-250 INJECTION, SOLUTION INTRAVENOUS PRN
OUTPATIENT
Start: 2023-05-22

## 2023-05-15 RX ORDER — SODIUM CHLORIDE 0.9 % (FLUSH) 0.9 %
5-40 SYRINGE (ML) INJECTION PRN
Status: DISCONTINUED | OUTPATIENT
Start: 2023-05-15 | End: 2023-05-16 | Stop reason: HOSPADM

## 2023-05-15 RX ORDER — ONDANSETRON 2 MG/ML
8 INJECTION INTRAMUSCULAR; INTRAVENOUS
OUTPATIENT
Start: 2023-05-22

## 2023-05-15 RX ORDER — ACETAMINOPHEN 325 MG/1
650 TABLET ORAL
OUTPATIENT
Start: 2023-05-22

## 2023-05-15 RX ORDER — ACETAMINOPHEN 325 MG/1
650 TABLET ORAL ONCE
Status: DISCONTINUED | OUTPATIENT
Start: 2023-05-15 | End: 2023-05-16 | Stop reason: HOSPADM

## 2023-05-15 RX ADMIN — IMMUNE GLOBULIN (HUMAN) 20 G: 10 INJECTION INTRAVENOUS; SUBCUTANEOUS at 12:05

## 2023-05-15 RX ADMIN — HEPARIN 500 UNITS: 100 SYRINGE at 12:56

## 2023-05-15 RX ADMIN — SODIUM CHLORIDE, PRESERVATIVE FREE 10 ML: 5 INJECTION INTRAVENOUS at 12:56

## 2023-05-15 RX ADMIN — IMMUNE GLOBULIN (HUMAN) 5 G: 10 INJECTION INTRAVENOUS; SUBCUTANEOUS at 11:30

## 2023-05-15 NOTE — DISCHARGE INSTRUCTIONS
ACTIVITY:  Continue usual care with your doctor. Call your doctor immediately if any severe problems or go to the nearest emergency room. I have been treated and hereby acknowledge receiving this instruction sheet.                   Next IVIG infusion: June 12 @ 11:00am

## 2023-05-15 NOTE — PROGRESS NOTES
1115- patient arrived to hospitals ambulatory for IVIG infusion. Patient familiar with medication. Patient denies changes since last infusion. Vitals stable. Call light placed within reach. PT RIGHTS AND RESPONSIBILITIES OFFERED TO PT.     1130- Mediport accessed using sterile technique. Infusion started. Vitals stable. 1200- Infusion increased. Patient tolerated. Vitals stable. 1215- Infusion increased. Patient tolerated. Vitals stable. 1230- Infusion increased to max rate. Patient tolerated. Vitals stable. 1248- Infusion complete. Patient tolerated well with no issues. Vitals stable. 1256- Mediport flushed and de accessed using heparin per [facility]     1300- Discharge instructions reviewed with patient. Verbalized understanding with no further questions. AVS provided. Discharged ambulatory to [facility] in stable condition.            __M__ Safety:       (Environmental)  [place] to environment  Ensure ID band is correct and in place/ allergy band as needed  Assess for fall risk  Initiate fall precautions as applicable (fall band, side rails, etc.)  Call light within reach  Bed in low position/ wheels locked    __M__ Pain:       Assess pain level and characteristics  Administer analgesics as ordered  Assess effectiveness of pain management and report to MD as needed    __M__ Knowledge Deficit:  Assess baseline knowledge  Provide teaching at level of understanding  Provide teaching via preferred learning method  Evaluate teaching effectiveness    __M__ Hemodynamic/Respiratory Status:       (Pre and Post Procedure Monitoring)  Assess/Monitor vital signs and LOC  Assess Baseline SpO2 prior to any sedation  Obtain weight/height  Assess vital signs/ LOC until patient meets discharge criteria  Monitor procedure site and notify MD of any issues    __M__ Infection-Risk of Central Venous Catheter:  Monitor for infection signs and symptoms (catheter site redness, temperature elevation, etc)  Assess for infection

## 2023-05-31 ENCOUNTER — OFFICE VISIT (OUTPATIENT)
Dept: NEPHROLOGY | Age: 78
End: 2023-05-31
Payer: MEDICARE

## 2023-05-31 VITALS
OXYGEN SATURATION: 93 % | BODY MASS INDEX: 38.12 KG/M2 | HEART RATE: 71 BPM | SYSTOLIC BLOOD PRESSURE: 135 MMHG | DIASTOLIC BLOOD PRESSURE: 83 MMHG | WEIGHT: 236.2 LBS

## 2023-05-31 DIAGNOSIS — N18.31 CHRONIC KIDNEY DISEASE, STAGE 3A (HCC): Primary | ICD-10-CM

## 2023-05-31 PROCEDURE — 99213 OFFICE O/P EST LOW 20 MIN: CPT | Performed by: INTERNAL MEDICINE

## 2023-05-31 PROCEDURE — 1036F TOBACCO NON-USER: CPT | Performed by: INTERNAL MEDICINE

## 2023-05-31 PROCEDURE — 3075F SYST BP GE 130 - 139MM HG: CPT | Performed by: INTERNAL MEDICINE

## 2023-05-31 PROCEDURE — G8417 CALC BMI ABV UP PARAM F/U: HCPCS | Performed by: INTERNAL MEDICINE

## 2023-05-31 PROCEDURE — G8427 DOCREV CUR MEDS BY ELIG CLIN: HCPCS | Performed by: INTERNAL MEDICINE

## 2023-05-31 PROCEDURE — G8400 PT W/DXA NO RESULTS DOC: HCPCS | Performed by: INTERNAL MEDICINE

## 2023-05-31 PROCEDURE — 3079F DIAST BP 80-89 MM HG: CPT | Performed by: INTERNAL MEDICINE

## 2023-05-31 PROCEDURE — 1090F PRES/ABSN URINE INCON ASSESS: CPT | Performed by: INTERNAL MEDICINE

## 2023-05-31 PROCEDURE — 1123F ACP DISCUSS/DSCN MKR DOCD: CPT | Performed by: INTERNAL MEDICINE

## 2023-05-31 NOTE — PROGRESS NOTES
(H) 03/02/2023    GLUCOSE 83 01/26/2023      Hepatic:   Lab Results   Component Value Date    AST 20 01/10/2023    AST 15 04/27/2022    AST 18 04/10/2022    ALT 12 01/10/2023    ALT 9 (L) 04/27/2022    ALT 11 04/10/2022    BILITOT 0.4 01/10/2023    BILITOT 0.2 (L) 04/27/2022    BILITOT 0.2 (L) 04/10/2022    ALKPHOS 86 01/10/2023    ALKPHOS 74 04/27/2022    ALKPHOS 90 04/10/2022     BNP: No results found for: BNP  Lipids:   Lab Results   Component Value Date    CHOL 127 03/23/2016    HDL 38 01/13/2023     INR:   Lab Results   Component Value Date    INR 0.98 03/02/2023    INR 1.03 04/28/2022    INR 0.96 03/03/2022     URINE:   Lab Results   Component Value Date/Time    NAUR 63 01/11/2023 10:15 AM    PROTUR 6.3 05/24/2023 10:30 PM     Lab Results   Component Value Date/Time    NITRU NEGATIVE 01/11/2023 10:15 AM    COLORU YELLOW 01/11/2023 10:15 AM    PHUR 6.0 01/11/2023 10:15 AM    LABCAST NONE SEEN 10/23/2018 05:30 PM    LABCAST NONE SEEN 10/23/2018 05:30 PM    WBCUA 0-2 01/11/2023 10:15 AM    RBCUA 0-2 01/11/2023 10:15 AM    YEAST NONE SEEN 01/11/2023 10:15 AM    BACTERIA NONE SEEN 01/11/2023 10:15 AM    CLARITYU Clear 09/22/2022 12:00 AM    SPECGRAV 1.009 10/23/2018 05:30 PM    LEUKOCYTESUR TRACE 01/11/2023 10:15 AM    UROBILINOGEN 1.0 01/11/2023 10:15 AM    BILIRUBINUR NEGATIVE 01/11/2023 10:15 AM    BILIRUBINUR Negative 09/22/2022 12:00 AM    BLOODU NEGATIVE 01/11/2023 10:15 AM    GLUCOSEU NEGATIVE 01/11/2023 10:15 AM    KETUA NEGATIVE 01/11/2023 10:15 AM    AMORPHOUS DEBRIS 06/12/2018 03:30 PM      Microalbumen/Creatinine ratio:  No components found for: RUCREAT        Impression/Plan:   1. CKD III -due to diabetes, hypertension : stable. Goals of care include slowing rate of progression by controlling blood pressure, blood glucoses and albuminuria and by avoiding nephrotoxins such as NSAIDs and IV contrast.  2.Electrolytes:recent hyperkalemia, resolved. Back on losartan  3.  DM - insulin requiring, no

## 2023-06-12 ENCOUNTER — HOSPITAL ENCOUNTER (OUTPATIENT)
Dept: NURSING | Age: 78
Discharge: HOME | End: 2023-06-12
Payer: MEDICARE

## 2023-06-12 VITALS
OXYGEN SATURATION: 96 % | DIASTOLIC BLOOD PRESSURE: 80 MMHG | TEMPERATURE: 98.2 F | RESPIRATION RATE: 18 BRPM | HEART RATE: 70 BPM | SYSTOLIC BLOOD PRESSURE: 180 MMHG | WEIGHT: 236 LBS | BODY MASS INDEX: 38.09 KG/M2

## 2023-06-12 DIAGNOSIS — D83.9 COMMON VARIABLE IMMUNODEFICIENCY (HCC): Primary | ICD-10-CM

## 2023-06-12 LAB
IGA SERPL-MCNC: 90 MG/DL (ref 70–400)
IGG SERPL-MCNC: 997 MG/DL (ref 700–1600)
IGM SERPL-MCNC: 46 MG/DL (ref 40–230)

## 2023-06-12 PROCEDURE — 96365 THER/PROPH/DIAG IV INF INIT: CPT

## 2023-06-12 PROCEDURE — 96413 CHEMO IV INFUSION 1 HR: CPT

## 2023-06-12 PROCEDURE — 6360000002 HC RX W HCPCS: Performed by: FAMILY MEDICINE

## 2023-06-12 PROCEDURE — 96366 THER/PROPH/DIAG IV INF ADDON: CPT

## 2023-06-12 PROCEDURE — 2580000003 HC RX 258: Performed by: FAMILY MEDICINE

## 2023-06-12 PROCEDURE — 96415 CHEMO IV INFUSION ADDL HR: CPT

## 2023-06-12 PROCEDURE — 36591 DRAW BLOOD OFF VENOUS DEVICE: CPT

## 2023-06-12 PROCEDURE — 82784 ASSAY IGA/IGD/IGG/IGM EACH: CPT

## 2023-06-12 RX ORDER — ACETAMINOPHEN 325 MG/1
650 TABLET ORAL
OUTPATIENT
Start: 2023-06-19

## 2023-06-12 RX ORDER — MEPERIDINE HYDROCHLORIDE 25 MG/ML
12.5 INJECTION INTRAMUSCULAR; INTRAVENOUS; SUBCUTANEOUS PRN
OUTPATIENT
Start: 2023-06-19

## 2023-06-12 RX ORDER — SODIUM CHLORIDE 0.9 % (FLUSH) 0.9 %
5-40 SYRINGE (ML) INJECTION PRN
Status: DISCONTINUED | OUTPATIENT
Start: 2023-06-12 | End: 2023-06-13 | Stop reason: HOSPADM

## 2023-06-12 RX ORDER — HEPARIN SODIUM 100 [USP'U]/ML
500 INJECTION, SOLUTION INTRAVENOUS PRN
OUTPATIENT
Start: 2023-06-19

## 2023-06-12 RX ORDER — DIPHENHYDRAMINE HYDROCHLORIDE 50 MG/ML
50 INJECTION INTRAMUSCULAR; INTRAVENOUS
OUTPATIENT
Start: 2023-06-19

## 2023-06-12 RX ORDER — SODIUM CHLORIDE 9 MG/ML
5-250 INJECTION, SOLUTION INTRAVENOUS PRN
OUTPATIENT
Start: 2023-06-19

## 2023-06-12 RX ORDER — SODIUM CHLORIDE 0.9 % (FLUSH) 0.9 %
5-40 SYRINGE (ML) INJECTION PRN
OUTPATIENT
Start: 2023-06-19

## 2023-06-12 RX ORDER — EPINEPHRINE 1 MG/ML
0.3 INJECTION, SOLUTION INTRAMUSCULAR; SUBCUTANEOUS PRN
OUTPATIENT
Start: 2023-06-19

## 2023-06-12 RX ORDER — DIPHENHYDRAMINE HCL 25 MG
25 TABLET ORAL ONCE
Status: DISCONTINUED | OUTPATIENT
Start: 2023-06-12 | End: 2023-06-13 | Stop reason: HOSPADM

## 2023-06-12 RX ORDER — HEPARIN SODIUM 100 [USP'U]/ML
500 INJECTION, SOLUTION INTRAVENOUS PRN
Status: DISCONTINUED | OUTPATIENT
Start: 2023-06-12 | End: 2023-06-13 | Stop reason: HOSPADM

## 2023-06-12 RX ORDER — DIPHENHYDRAMINE HCL 25 MG
25 TABLET ORAL ONCE
OUTPATIENT
Start: 2023-06-19 | End: 2023-06-19

## 2023-06-12 RX ORDER — ACETAMINOPHEN 325 MG/1
650 TABLET ORAL ONCE
OUTPATIENT
Start: 2023-06-19 | End: 2023-06-19

## 2023-06-12 RX ORDER — ONDANSETRON 2 MG/ML
8 INJECTION INTRAMUSCULAR; INTRAVENOUS
OUTPATIENT
Start: 2023-06-19

## 2023-06-12 RX ORDER — ACETAMINOPHEN 325 MG/1
650 TABLET ORAL ONCE
Status: DISCONTINUED | OUTPATIENT
Start: 2023-06-12 | End: 2023-06-13 | Stop reason: HOSPADM

## 2023-06-12 RX ORDER — ALBUTEROL SULFATE 90 UG/1
4 AEROSOL, METERED RESPIRATORY (INHALATION) PRN
OUTPATIENT
Start: 2023-06-19

## 2023-06-12 RX ORDER — SODIUM CHLORIDE 9 MG/ML
INJECTION, SOLUTION INTRAVENOUS CONTINUOUS
OUTPATIENT
Start: 2023-06-19

## 2023-06-12 RX ADMIN — SODIUM CHLORIDE, PRESERVATIVE FREE 10 ML: 5 INJECTION INTRAVENOUS at 12:56

## 2023-06-12 RX ADMIN — SODIUM CHLORIDE, PRESERVATIVE FREE 10 ML: 5 INJECTION INTRAVENOUS at 11:30

## 2023-06-12 RX ADMIN — IMMUNE GLOBULIN (HUMAN) 20 G: 10 INJECTION INTRAVENOUS; SUBCUTANEOUS at 12:05

## 2023-06-12 RX ADMIN — IMMUNE GLOBULIN (HUMAN) 5 G: 10 INJECTION INTRAVENOUS; SUBCUTANEOUS at 11:35

## 2023-06-12 RX ADMIN — HEPARIN 500 UNITS: 100 SYRINGE at 12:56

## 2023-06-12 ASSESSMENT — PAIN - FUNCTIONAL ASSESSMENT: PAIN_FUNCTIONAL_ASSESSMENT: NONE - DENIES PAIN

## 2023-06-12 NOTE — PROGRESS NOTES
1118 Patient arrived to [facility] ambulatory for IVIG infusion. Oriented to room and call light  PT RIGHTS AND RESPONSIBILITIES OFFERED TO PT. Patient states she took her pre-medications at home    1130 port accessed using sterile technique. Blood work obtained and sent to lab. 1135 medication started and she denies complaints    1205 medication infusing and she denies complaints    1220 Medication infusing and she denies complaints    1236 Medication infusing and she denies complaints    1254 Medication completed and she denies complaints. Port de-accessed. Discharge instructions given and explained and she denies questions.   Discharged ambulatory    _M___ Safety:       (Environmental)  [place] to environment  Ensure ID band is correct and in place/ allergy band as needed  Assess for fall risk  Initiate fall precautions as applicable (fall band, side rails, etc.)  Call light within reach  Bed in low position/ wheels locked    _M___ Pain:       Assess pain level and characteristics  Administer analgesics as ordered  Assess effectiveness of pain management and report to MD as needed    _M___ Knowledge Deficit:  Assess baseline knowledge  Provide teaching at level of understanding  Provide teaching via preferred learning method  Evaluate teaching effectiveness    __M__ Hemodynamic/Respiratory Status:       (Pre and Post Procedure Monitoring)  Assess/Monitor vital signs and LOC  Assess Baseline SpO2 prior to any sedation  Obtain weight/height  Assess vital signs/ LOC until patient meets discharge criteria  Monitor procedure site and notify MD of any issues    __M__ Infection-Risk of Central Venous Catheter:  Monitor for infection signs and symptoms (catheter site redness, temperature elevation, etc)  Assess for infection risks  Educate regarding infection prevention  Manage central venous catheter (flushes/ dressing changes per protocol)

## 2023-06-12 NOTE — DISCHARGE INSTRUCTIONS
Next appointment is scheduled for July 10 at 11:00    Please call outpatient nursing the morning of your appointment at [phone]      ACTIVITY:  Continue usual care with your doctor. Call your doctor immediately if any severe problems or go to the nearest emergency room. I have been treated and hereby acknowledge receiving this instruction sheet.

## 2023-06-21 ENCOUNTER — OFFICE VISIT (OUTPATIENT)
Dept: SURGERY | Age: 78
End: 2023-06-21
Payer: MEDICARE

## 2023-06-21 ENCOUNTER — TELEPHONE (OUTPATIENT)
Dept: SURGERY | Age: 78
End: 2023-06-21

## 2023-06-21 VITALS
OXYGEN SATURATION: 94 % | RESPIRATION RATE: 16 BRPM | BODY MASS INDEX: 37.78 KG/M2 | HEIGHT: 66 IN | TEMPERATURE: 97 F | SYSTOLIC BLOOD PRESSURE: 128 MMHG | WEIGHT: 235.1 LBS | DIASTOLIC BLOOD PRESSURE: 64 MMHG | HEART RATE: 63 BPM

## 2023-06-21 DIAGNOSIS — A63.0 ANAL CONDYLOMA: Primary | ICD-10-CM

## 2023-06-21 DIAGNOSIS — Z01.818 PRE-OP TESTING: ICD-10-CM

## 2023-06-21 PROCEDURE — 3074F SYST BP LT 130 MM HG: CPT | Performed by: SURGERY

## 2023-06-21 PROCEDURE — 1090F PRES/ABSN URINE INCON ASSESS: CPT | Performed by: SURGERY

## 2023-06-21 PROCEDURE — G8427 DOCREV CUR MEDS BY ELIG CLIN: HCPCS | Performed by: SURGERY

## 2023-06-21 PROCEDURE — 1036F TOBACCO NON-USER: CPT | Performed by: SURGERY

## 2023-06-21 PROCEDURE — 3078F DIAST BP <80 MM HG: CPT | Performed by: SURGERY

## 2023-06-21 PROCEDURE — G8400 PT W/DXA NO RESULTS DOC: HCPCS | Performed by: SURGERY

## 2023-06-21 PROCEDURE — 99213 OFFICE O/P EST LOW 20 MIN: CPT | Performed by: SURGERY

## 2023-06-21 PROCEDURE — 1123F ACP DISCUSS/DSCN MKR DOCD: CPT | Performed by: SURGERY

## 2023-06-21 PROCEDURE — G8417 CALC BMI ABV UP PARAM F/U: HCPCS | Performed by: SURGERY

## 2023-06-21 RX ORDER — BUSPIRONE HYDROCHLORIDE 5 MG/1
5 TABLET ORAL 3 TIMES DAILY
COMMUNITY

## 2023-06-21 RX ORDER — DULAGLUTIDE 0.75 MG/.5ML
INJECTION, SOLUTION SUBCUTANEOUS
COMMUNITY
Start: 2023-05-11

## 2023-06-21 NOTE — TELEPHONE ENCOUNTER
Pt of Dr. Cinthia Goodell last seen 4/24/23 is scheduled for anal exam under anesthesia, anal condyloma excision with Dr. Oziel Hickey 7/6/23. Can she be cleared for surgery?

## 2023-06-22 ENCOUNTER — NURSE ONLY (OUTPATIENT)
Dept: LAB | Age: 78
End: 2023-06-22

## 2023-06-22 ENCOUNTER — PREP FOR PROCEDURE (OUTPATIENT)
Dept: SURGERY | Age: 78
End: 2023-06-22

## 2023-06-22 LAB
HCT VFR BLD AUTO: 37.3 % (ref 37–47)
HGB BLD-MCNC: 11.7 GM/DL (ref 12–16)

## 2023-06-22 RX ORDER — SODIUM CHLORIDE 9 MG/ML
INJECTION, SOLUTION INTRAVENOUS CONTINUOUS
Status: CANCELLED | OUTPATIENT
Start: 2023-06-22

## 2023-06-22 ASSESSMENT — ENCOUNTER SYMPTOMS
ANAL BLEEDING: 1
EYE DISCHARGE: 0
RHINORRHEA: 1
CHEST TIGHTNESS: 0
CONSTIPATION: 0
EYE REDNESS: 0
APNEA: 0
CHOKING: 0
NAUSEA: 1
WHEEZING: 0
ABDOMINAL PAIN: 0
COUGH: 0
STRIDOR: 0
PHOTOPHOBIA: 0
VOICE CHANGE: 1
FACIAL SWELLING: 0
SHORTNESS OF BREATH: 1
COLOR CHANGE: 0
DIARRHEA: 0
ABDOMINAL DISTENTION: 1
BLOOD IN STOOL: 0
VOMITING: 0
RECTAL PAIN: 0
BACK PAIN: 1
TROUBLE SWALLOWING: 0
SINUS PRESSURE: 0
EYE PAIN: 0
SORE THROAT: 0
EYE ITCHING: 0

## 2023-06-22 NOTE — PROGRESS NOTES
needed)      insulin lispro (HUMALOG) 100 UNIT/ML injection vial Inject into the skin 3 times daily (before meals) Sliding scale      Probiotic Product (PROBIOTIC DAILY PO) Take by mouth      cetirizine (ZYRTEC) 10 MG tablet Take 1 tablet by mouth daily      vitamin D 25 MCG (1000 UT) CAPS Take by mouth daily 125 mcg daily      citalopram (CELEXA) 40 MG tablet Take 1 tablet by mouth daily      tiZANidine (ZANAFLEX) 2 MG tablet Take 1 tablet by mouth 2 times daily      DULoxetine (CYMBALTA) 20 MG extended release capsule Take 6 capsules by mouth daily      RA ALCOHOL SWABS 70 % PADS   1    ONE TOUCH ULTRA TEST strip   1    Lancets Misc. (UNISTIK CZT COMFORT) MISC   1    traZODone (DESYREL) 100 MG tablet Take 1 tablet by mouth nightly      Multiple Vitamins-Minerals (THERAPEUTIC MULTIVITAMIN-MINERALS) tablet Take 1 tablet by mouth daily      OXYGEN Inhale into the lungs continuous      atorvastatin (LIPITOR) 20 MG tablet Take 1 tablet by mouth nightly 30 tablet 3    magnesium hydroxide (MILK OF MAGNESIA CONCENTRATE) 2400 MG/10ML SUSP Take 30 mLs by mouth once as needed      docusate sodium (COLACE) 100 MG capsule Take 1 capsule by mouth 3 times daily as needed      acetaminophen (TYLENOL) 325 MG tablet Take 2 tablets by mouth every 4 hours as needed for Pain 120 tablet 3    levothyroxine (SYNTHROID) 75 MCG tablet Take 1 tablet by mouth Daily       No current facility-administered medications for this visit. Allergies   Allergen Reactions    Bactrim [Sulfamethoxazole-Trimethoprim]      TOLD BY  NEVER TO TAKE AGAIN    Wellbutrin [Bupropion] Other (See Comments)     hallucinations    Silicone Rash       Family History   Problem Relation Age of Onset    Cancer Mother     Heart Disease Mother     High Blood Pressure Mother     Diabetes Mother     Vision Loss Mother     Stroke Mother     COPD Father     Diabetes Father     COPD Brother        Social History     Socioeconomic History    Marital status:

## 2023-07-06 ENCOUNTER — ANESTHESIA (OUTPATIENT)
Dept: OPERATING ROOM | Age: 78
End: 2023-07-06
Payer: MEDICARE

## 2023-07-06 ENCOUNTER — ANESTHESIA EVENT (OUTPATIENT)
Dept: OPERATING ROOM | Age: 78
End: 2023-07-06
Payer: MEDICARE

## 2023-07-06 ENCOUNTER — HOSPITAL ENCOUNTER (OUTPATIENT)
Age: 78
Setting detail: OUTPATIENT SURGERY
Discharge: HOME | End: 2023-07-06
Attending: SURGERY | Admitting: SURGERY
Payer: MEDICARE

## 2023-07-06 VITALS
DIASTOLIC BLOOD PRESSURE: 67 MMHG | OXYGEN SATURATION: 92 % | BODY MASS INDEX: 38.39 KG/M2 | RESPIRATION RATE: 16 BRPM | TEMPERATURE: 96.7 F | HEIGHT: 65 IN | SYSTOLIC BLOOD PRESSURE: 93 MMHG | HEART RATE: 93 BPM | WEIGHT: 230.4 LBS

## 2023-07-06 DIAGNOSIS — K62.9 RECTAL LESION: Primary | ICD-10-CM

## 2023-07-06 DIAGNOSIS — A63.0 ANAL CONDYLOMA: ICD-10-CM

## 2023-07-06 LAB
GLUCOSE BLD STRIP.AUTO-MCNC: 150 MG/DL (ref 70–108)
POTASSIUM SERPL-SCNC: 4.4 MEQ/L (ref 3.5–5.2)

## 2023-07-06 PROCEDURE — 84132 ASSAY OF SERUM POTASSIUM: CPT

## 2023-07-06 PROCEDURE — 2580000003 HC RX 258: Performed by: NURSE PRACTITIONER

## 2023-07-06 PROCEDURE — 7100000011 HC PHASE II RECOVERY - ADDTL 15 MIN: Performed by: SURGERY

## 2023-07-06 PROCEDURE — 7100000001 HC PACU RECOVERY - ADDTL 15 MIN: Performed by: SURGERY

## 2023-07-06 PROCEDURE — 6360000002 HC RX W HCPCS

## 2023-07-06 PROCEDURE — 2500000003 HC RX 250 WO HCPCS

## 2023-07-06 PROCEDURE — 3600000002 HC SURGERY LEVEL 2 BASE: Performed by: SURGERY

## 2023-07-06 PROCEDURE — 6360000002 HC RX W HCPCS: Performed by: NURSE PRACTITIONER

## 2023-07-06 PROCEDURE — 7100000010 HC PHASE II RECOVERY - FIRST 15 MIN: Performed by: SURGERY

## 2023-07-06 PROCEDURE — 82948 REAGENT STRIP/BLOOD GLUCOSE: CPT

## 2023-07-06 PROCEDURE — 3700000000 HC ANESTHESIA ATTENDED CARE: Performed by: SURGERY

## 2023-07-06 PROCEDURE — 2500000003 HC RX 250 WO HCPCS: Performed by: SURGERY

## 2023-07-06 PROCEDURE — 2720000010 HC SURG SUPPLY STERILE: Performed by: SURGERY

## 2023-07-06 PROCEDURE — 7100000000 HC PACU RECOVERY - FIRST 15 MIN: Performed by: SURGERY

## 2023-07-06 PROCEDURE — 2709999900 HC NON-CHARGEABLE SUPPLY: Performed by: SURGERY

## 2023-07-06 PROCEDURE — 36415 COLL VENOUS BLD VENIPUNCTURE: CPT

## 2023-07-06 PROCEDURE — 3700000001 HC ADD 15 MINUTES (ANESTHESIA): Performed by: SURGERY

## 2023-07-06 PROCEDURE — 3600000012 HC SURGERY LEVEL 2 ADDTL 15MIN: Performed by: SURGERY

## 2023-07-06 PROCEDURE — 46922 EXCISION OF ANAL LESION(S): CPT | Performed by: SURGERY

## 2023-07-06 PROCEDURE — 88305 TISSUE EXAM BY PATHOLOGIST: CPT

## 2023-07-06 RX ORDER — MEPERIDINE HYDROCHLORIDE 25 MG/ML
12.5 INJECTION INTRAMUSCULAR; INTRAVENOUS; SUBCUTANEOUS EVERY 4 HOURS PRN
Status: DISCONTINUED | OUTPATIENT
Start: 2023-07-06 | End: 2023-07-06 | Stop reason: HOSPADM

## 2023-07-06 RX ORDER — MORPHINE SULFATE 2 MG/ML
2 INJECTION, SOLUTION INTRAMUSCULAR; INTRAVENOUS
Status: CANCELLED | OUTPATIENT
Start: 2023-07-06

## 2023-07-06 RX ORDER — METOCLOPRAMIDE HYDROCHLORIDE 5 MG/ML
10 INJECTION INTRAMUSCULAR; INTRAVENOUS
Status: DISCONTINUED | OUTPATIENT
Start: 2023-07-06 | End: 2023-07-06 | Stop reason: HOSPADM

## 2023-07-06 RX ORDER — ONDANSETRON 2 MG/ML
INJECTION INTRAMUSCULAR; INTRAVENOUS PRN
Status: DISCONTINUED | OUTPATIENT
Start: 2023-07-06 | End: 2023-07-06 | Stop reason: SDUPTHER

## 2023-07-06 RX ORDER — ROCURONIUM BROMIDE 10 MG/ML
INJECTION, SOLUTION INTRAVENOUS PRN
Status: DISCONTINUED | OUTPATIENT
Start: 2023-07-06 | End: 2023-07-06 | Stop reason: SDUPTHER

## 2023-07-06 RX ORDER — SODIUM CHLORIDE 0.9 % (FLUSH) 0.9 %
5-40 SYRINGE (ML) INJECTION PRN
Status: DISCONTINUED | OUTPATIENT
Start: 2023-07-06 | End: 2023-07-06 | Stop reason: HOSPADM

## 2023-07-06 RX ORDER — ONDANSETRON 2 MG/ML
4 INJECTION INTRAMUSCULAR; INTRAVENOUS EVERY 6 HOURS PRN
Status: CANCELLED | OUTPATIENT
Start: 2023-07-06

## 2023-07-06 RX ORDER — DEXAMETHASONE SODIUM PHOSPHATE 10 MG/ML
INJECTION, EMULSION INTRAMUSCULAR; INTRAVENOUS PRN
Status: DISCONTINUED | OUTPATIENT
Start: 2023-07-06 | End: 2023-07-06 | Stop reason: SDUPTHER

## 2023-07-06 RX ORDER — DIPHENHYDRAMINE HYDROCHLORIDE 50 MG/ML
12.5 INJECTION INTRAMUSCULAR; INTRAVENOUS
Status: DISCONTINUED | OUTPATIENT
Start: 2023-07-06 | End: 2023-07-06 | Stop reason: HOSPADM

## 2023-07-06 RX ORDER — SODIUM CHLORIDE 0.9 % (FLUSH) 0.9 %
5-40 SYRINGE (ML) INJECTION EVERY 12 HOURS SCHEDULED
Status: DISCONTINUED | OUTPATIENT
Start: 2023-07-06 | End: 2023-07-06 | Stop reason: HOSPADM

## 2023-07-06 RX ORDER — SODIUM CHLORIDE 9 MG/ML
INJECTION, SOLUTION INTRAVENOUS PRN
Status: DISCONTINUED | OUTPATIENT
Start: 2023-07-06 | End: 2023-07-06 | Stop reason: HOSPADM

## 2023-07-06 RX ORDER — SODIUM CHLORIDE 0.9 % (FLUSH) 0.9 %
5-40 SYRINGE (ML) INJECTION PRN
Status: CANCELLED | OUTPATIENT
Start: 2023-07-06

## 2023-07-06 RX ORDER — OXYCODONE HYDROCHLORIDE 5 MG/1
10 TABLET ORAL EVERY 4 HOURS PRN
Status: CANCELLED | OUTPATIENT
Start: 2023-07-06

## 2023-07-06 RX ORDER — LIDOCAINE HCL/PF 100 MG/5ML
SYRINGE (ML) INJECTION PRN
Status: DISCONTINUED | OUTPATIENT
Start: 2023-07-06 | End: 2023-07-06 | Stop reason: SDUPTHER

## 2023-07-06 RX ORDER — FENTANYL CITRATE 50 UG/ML
50 INJECTION, SOLUTION INTRAMUSCULAR; INTRAVENOUS EVERY 5 MIN PRN
Status: DISCONTINUED | OUTPATIENT
Start: 2023-07-06 | End: 2023-07-06 | Stop reason: HOSPADM

## 2023-07-06 RX ORDER — ONDANSETRON 4 MG/1
4 TABLET, ORALLY DISINTEGRATING ORAL EVERY 8 HOURS PRN
Status: CANCELLED | OUTPATIENT
Start: 2023-07-06

## 2023-07-06 RX ORDER — FENTANYL CITRATE 50 UG/ML
INJECTION, SOLUTION INTRAMUSCULAR; INTRAVENOUS PRN
Status: DISCONTINUED | OUTPATIENT
Start: 2023-07-06 | End: 2023-07-06 | Stop reason: SDUPTHER

## 2023-07-06 RX ORDER — SODIUM CHLORIDE 0.9 % (FLUSH) 0.9 %
5-40 SYRINGE (ML) INJECTION EVERY 12 HOURS SCHEDULED
Status: CANCELLED | OUTPATIENT
Start: 2023-07-06

## 2023-07-06 RX ORDER — MORPHINE SULFATE 4 MG/ML
4 INJECTION, SOLUTION INTRAMUSCULAR; INTRAVENOUS
Status: CANCELLED | OUTPATIENT
Start: 2023-07-06

## 2023-07-06 RX ORDER — OXYCODONE HYDROCHLORIDE 5 MG/1
5 TABLET ORAL EVERY 4 HOURS PRN
Status: CANCELLED | OUTPATIENT
Start: 2023-07-06

## 2023-07-06 RX ORDER — SODIUM CHLORIDE 9 MG/ML
INJECTION, SOLUTION INTRAVENOUS CONTINUOUS
Status: DISCONTINUED | OUTPATIENT
Start: 2023-07-06 | End: 2023-07-06 | Stop reason: HOSPADM

## 2023-07-06 RX ORDER — GLYCOPYRROLATE 1 MG/5 ML
SYRINGE (ML) INTRAVENOUS PRN
Status: DISCONTINUED | OUTPATIENT
Start: 2023-07-06 | End: 2023-07-06 | Stop reason: SDUPTHER

## 2023-07-06 RX ORDER — BUPIVACAINE HYDROCHLORIDE AND EPINEPHRINE 5; 5 MG/ML; UG/ML
INJECTION, SOLUTION EPIDURAL; INTRACAUDAL; PERINEURAL PRN
Status: DISCONTINUED | OUTPATIENT
Start: 2023-07-06 | End: 2023-07-06 | Stop reason: ALTCHOICE

## 2023-07-06 RX ORDER — FENTANYL CITRATE 50 UG/ML
25 INJECTION, SOLUTION INTRAMUSCULAR; INTRAVENOUS EVERY 5 MIN PRN
Status: DISCONTINUED | OUTPATIENT
Start: 2023-07-06 | End: 2023-07-06 | Stop reason: HOSPADM

## 2023-07-06 RX ORDER — PROPOFOL 10 MG/ML
INJECTION, EMULSION INTRAVENOUS PRN
Status: DISCONTINUED | OUTPATIENT
Start: 2023-07-06 | End: 2023-07-06 | Stop reason: SDUPTHER

## 2023-07-06 RX ORDER — HYDROCODONE BITARTRATE AND ACETAMINOPHEN 5; 325 MG/1; MG/1
1-2 TABLET ORAL EVERY 6 HOURS PRN
Qty: 20 TABLET | Refills: 0 | Status: SHIPPED | OUTPATIENT
Start: 2023-07-06 | End: 2023-07-12

## 2023-07-06 RX ORDER — SODIUM CHLORIDE 9 MG/ML
INJECTION, SOLUTION INTRAVENOUS PRN
Status: CANCELLED | OUTPATIENT
Start: 2023-07-06

## 2023-07-06 RX ADMIN — DEXAMETHASONE SODIUM PHOSPHATE 10 MG: 10 INJECTION, EMULSION INTRAMUSCULAR; INTRAVENOUS at 09:54

## 2023-07-06 RX ADMIN — SUGAMMADEX 200 MG: 100 INJECTION, SOLUTION INTRAVENOUS at 10:25

## 2023-07-06 RX ADMIN — Medication 40 MG: at 09:51

## 2023-07-06 RX ADMIN — PROPOFOL 130 MG: 10 INJECTION, EMULSION INTRAVENOUS at 09:51

## 2023-07-06 RX ADMIN — Medication 0.1 MG: at 10:19

## 2023-07-06 RX ADMIN — SODIUM CHLORIDE: 9 INJECTION, SOLUTION INTRAVENOUS at 08:19

## 2023-07-06 RX ADMIN — Medication 2000 MG: at 09:54

## 2023-07-06 RX ADMIN — ONDANSETRON 4 MG: 2 INJECTION INTRAMUSCULAR; INTRAVENOUS at 10:10

## 2023-07-06 RX ADMIN — ROCURONIUM BROMIDE 35 MG: 10 INJECTION INTRAVENOUS at 09:51

## 2023-07-06 RX ADMIN — Medication 0.1 MG: at 10:22

## 2023-07-06 RX ADMIN — FENTANYL CITRATE 25 MCG: 50 INJECTION, SOLUTION INTRAMUSCULAR; INTRAVENOUS at 10:01

## 2023-07-06 RX ADMIN — FENTANYL CITRATE 25 MCG: 50 INJECTION, SOLUTION INTRAMUSCULAR; INTRAVENOUS at 09:51

## 2023-07-06 ASSESSMENT — PAIN SCALES - GENERAL
PAINLEVEL_OUTOF10: 4
PAINLEVEL_OUTOF10: 2
PAINLEVEL_OUTOF10: 2
PAINLEVEL_OUTOF10: 3

## 2023-07-06 ASSESSMENT — COPD QUESTIONNAIRES: CAT_SEVERITY: SEVERE

## 2023-07-06 NOTE — INTERVAL H&P NOTE
Update History & Physical    The patient's History and Physical was reviewed with the patient and I examined the patient. There was no change. The surgical site was confirmed by the patient and me. Plan: The risks, benefits, expected outcome, and alternative to the recommended procedure have been discussed with the patient. Patient understands and wants to proceed with the procedure.      Electronically signed by Raúl Lopez MD on 7/6/2023 at 7:38 AM

## 2023-07-06 NOTE — DISCHARGE INSTRUCTIONS
DR. Dorinda Boo DISCHARGE INSTRUCTIONS    Pt Name: Gonsalo Oneil  Medical Record Number: [id number]  Today's Date: 7/6/2023    GENERAL ANESTHESIA OR SEDATION  1. Do not drive or operate hazardous machinery for 24 hours. 2. Do not make important business or personal decisions for 24 hours. 3. Do not drink alcoholic beverages or use tobacco for 24 hours. ACTIVITY INSTRUCTIONS:  [] Rest today. Resume light to normal activity tomorrow. [x] You may resume normal activity tomorrow. Do not engage in strenuous activity that may place stress on your incision. [x] Do not drive for 3-5 days and avoid heavy lifting, tugging, pullings greater than 10-20 lbs until seen in the office. DIET INSTRUCTIONS:  [x]Begin with clear liquids. If not nauseated, may increase to a low-fat diet when you desire. Greasy and spicy foods are not advised. [x]Regular diet as tolerated. []Other:     MEDICATIONS  [x]Prescription sent with you to be used as directed. []Valium   [x]Norco   []Percocet   []Toradol   []Oxycontin     Do not drink alcohol or drive while taking these medications. You may experience dizziness or drowsiness with these medications. You may also experience constipation which can be relieved with stool softners or laxatives. - Take Colace 100 mg 1-2 tabs daily as needed for constipation  - Take MiraLax 1-2 cap fulls daily as needed for constipation    [x]You may resume your daily prescription medication schedule unless otherwise specified. []Do not take 325mg Aspirin or other blood thinners such as Coumadin or Plavix for 5 days. **Pain medication at discharge - use only as prescribed- refills may be available to you at your follow up appointments if needed and warranted.   Narcotics should be used for only short term and we highly encouraged our patients to wean off appropriately and use other means for pain such as non pharmacologic measures and over the counter tylenol or ibuprofen if no restrictions

## 2023-07-06 NOTE — H&P
discharge. Left eye: No discharge. Conjunctiva/sclera: Conjunctivae normal.   Cardiovascular:      Rate and Rhythm: Normal rate and regular rhythm. Heart sounds: Normal heart sounds. Pulmonary:      Effort: Pulmonary effort is normal. No respiratory distress. Breath sounds: Normal breath sounds. No wheezing or rales. Chest:      Chest wall: No tenderness. Abdominal:      General: Bowel sounds are normal. There is no distension. Palpations: Abdomen is soft. There is no mass. Tenderness: There is no abdominal tenderness. There is no guarding or rebound. Musculoskeletal:         General: No tenderness. Normal range of motion. Cervical back: Normal range of motion and neck supple. Skin:     General: Skin is warm and dry. Coloration: Skin is not pale. Findings: No erythema or rash. Neurological:      Mental Status: She is alert and oriented to person, place, and time. Cranial Nerves: No cranial nerve deficit. Psychiatric:         Behavior: Behavior normal.         Thought Content:  Thought content normal.         Judgment: Judgment normal.            Lab Results   Component Value Date     WBC 5.1 03/02/2023     HGB 11.7 (L) 06/22/2023     HCT 37.3 06/22/2023     MCV 99.3 (H) 03/02/2023      03/02/2023            Lab Results   Component Value Date      05/25/2023     K 4.7 05/25/2023      05/25/2023     CO2 27 05/25/2023            Lab Results   Component Value Date     CREATININE 1.1 05/25/2023            Lab Results   Component Value Date     ALT 12 01/10/2023     AST 20 01/10/2023     ALKPHOS 86 01/10/2023     BILITOT 0.4 01/10/2023            Lab Results   Component Value Date     LIPASE 29.0 04/27/2022             Patient Active Problem List   Diagnosis    Benign essential tremor    CKD (chronic kidney disease) stage 3, GFR 30-59 ml/min ([facility])    Essential hypertension    Sepsis with hypotension ([address])    Septic shock ([address])

## 2023-07-06 NOTE — BRIEF OP NOTE
Brief Postoperative Note      Patient: Silvia Yang  YOB: 1945  MRN: [id number]    Date of Procedure: 7/6/2023    Preoperative diagnosis:  1. Left Rectal lesion/condyloma  2. Obesity (BMI 37)    Post-Op Diagnosis: Same       Procedure:  1. Anal exam under anesthesia  2.   Left rectal lesion/condyloma excision (1.5 cm)    Surgeon(s):  Moris Cuellar MD    Assistant:  First Assistant: Diogenes Powell RN    Anesthesia: General/local    Estimated Blood Loss (mL): 3ml    Complications: None    Specimens:   ID Type Source Tests Collected by Time Destination   A : Condyloma Tissue Anus SURGICAL PATHOLOGY Moris Cuellar MD 7/6/2023 1029      Implants:  * No implants in log *      Drains: * No LDAs found *    Findings: as above - see op note for details      Electronically signed by Moris Cuellar MD on 7/6/2023 at 10:40 AM

## 2023-07-06 NOTE — OP NOTE
[place] [address]                                OPERATIVE REPORT    PATIENT NAME: Jp Narvaez                    :        1945  MED REC NO:   [id number]                           ROOM:  ACCOUNT NO:   [de-identified]                           ADMIT DATE: 2023  PROVIDER:     MAC Morse OF PROCEDURE:  2023    PREOPERATIVE DIAGNOSES:  1. Left rectal lesion/condyloma. 2.  Obesity (BMI 37). POSTOPERATIVE DIAGNOSES:  1. Left rectal lesion/condyloma. 2.  Obesity (BMI 37). PROCEDURES PERFORMED:  1. Anal exam under anesthesia. 2.  Left rectal lesion/condyloma excision (1.5 cm). SURGEON:  Weston Nix MD    ASSISTANT:  Cory Duran [facility]    ANESTHESIA:  General/local.    ESTIMATED BLOOD LOSS:  3 mL. DRAINS:  None. COMPLICATIONS:  None. DISPOSITION:  Stable to the recovery room. INDICATIONS:  The patient is a 76-year-old female, who had been found to  have a distal anal/upper rectal lesion concerning for a condyloma. Both  operative and nonoperative intervention plans were discussed. Risks of  surgery were further discussed. Some of the risks included but were not  limited to bleeding, infection, the need for reoperation, severe chronic  postoperative pain or numbness, major vascular or nerve injury,  cardiopulmonary complications, anesthetic complications, seroma or  hematoma formation, wound breakdown, anal stricture/stenosis, chronic  pain, incontinence, and death. After all of the questions were answered  in their entirety and the patient was completely aware of the current  situation, she wished to proceed with surgery. DESCRIPTION OF PROCEDURE:  After informed consent was signed and placed  on the chart, the patient was taken back to the operating room and  placed supine on the operating room table. General anesthesia was  induced.   She tolerated this

## 2023-07-06 NOTE — ANESTHESIA PRE PROCEDURE
08:02 AM    PROT 7.0 01/10/2023 02:39 PM    CALCIUM 8.9 05/25/2023 08:02 AM    BILITOT 0.4 01/10/2023 02:39 PM    ALKPHOS 86 01/10/2023 02:39 PM    AST 20 01/10/2023 02:39 PM    ALT 12 01/10/2023 02:39 PM       POC Tests:   Recent Labs     07/06/23  0746   POCGLU 150*       Coags:   Lab Results   Component Value Date/Time    INR 0.98 03/02/2023 11:04 AM    APTT 28.2 03/02/2023 11:04 AM       HCG (If Applicable): No results found for: PREGTESTUR, PREGSERUM, HCG, HCGQUANT     ABGs: No results found for: PHART, PO2ART, NEE8QCJ, ZMI1ROJ, BEART, V9CBDPCJ     Type & Screen (If Applicable):  Lab Results   Component Value Date    LABRH POS 03/02/2023       Drug/Infectious Status (If Applicable):  No results found for: HIV, HEPCAB    COVID-19 Screening (If Applicable):   Lab Results   Component Value Date/Time    COVID19 Invalid 08/21/2020 12:00 PM           Anesthesia Evaluation  Patient summary reviewed and Nursing notes reviewed no history of anesthetic complications:   Airway: Mallampati: II  TM distance: >3 FB   Neck ROM: full  Mouth opening: > = 3 FB   Dental:          Pulmonary:   (+) COPD: severe,  sleep apnea: on CPAP,                            ROS comment: 3L O2 at night   Cardiovascular:  Exercise tolerance: poor (<4 METS),   (+) hypertension:, CAD:, dysrhythmias: atrial fibrillation, CHF: systolic,                   Neuro/Psych:   (+) depression/anxiety             GI/Hepatic/Renal:   (+) hiatal hernia, GERD:, PUD, renal disease: CRI,           Endo/Other:    (+) DiabetesType II DM, using insulin, hypothyroidism, blood dyscrasia: anemia:., .                 Abdominal:             Vascular:   + DVT, PE. Other Findings:             Anesthesia Plan      general     ASA 3       Induction: intravenous and rapid sequence. MIPS: Postoperative opioids intended and Prophylactic antiemetics administered. Anesthetic plan and risks discussed with patient.       Plan discussed with CRNA and surgical

## 2023-07-07 ENCOUNTER — TELEPHONE (OUTPATIENT)
Dept: SURGERY | Age: 78
End: 2023-07-07

## 2023-07-07 NOTE — TELEPHONE ENCOUNTER
[address] [place] [address] and spoke with Isamar Kothari, pt nurse for S/P 1. Anal exam under anesthesia. 2.  Left rectal lesion/condyloma excision (1.5 cm). 07/06/23. Isamar Kothari stated pt had no concerns at this time. Advised if taking any pain meds to add a stool softener with it. Pt notified of f/u appt time and date. Advised to call if any questions or concerns.

## 2023-07-10 ENCOUNTER — HOSPITAL ENCOUNTER (OUTPATIENT)
Dept: NURSING | Age: 78
Discharge: HOME | End: 2023-07-10
Payer: MEDICARE

## 2023-07-10 VITALS
BODY MASS INDEX: 38.51 KG/M2 | RESPIRATION RATE: 18 BRPM | HEART RATE: 74 BPM | OXYGEN SATURATION: 92 % | DIASTOLIC BLOOD PRESSURE: 64 MMHG | TEMPERATURE: 98 F | WEIGHT: 231.4 LBS | SYSTOLIC BLOOD PRESSURE: 144 MMHG

## 2023-07-10 DIAGNOSIS — D83.9 COMMON VARIABLE IMMUNODEFICIENCY (HCC): Primary | ICD-10-CM

## 2023-07-10 PROCEDURE — 96365 THER/PROPH/DIAG IV INF INIT: CPT

## 2023-07-10 PROCEDURE — 96366 THER/PROPH/DIAG IV INF ADDON: CPT

## 2023-07-10 PROCEDURE — 96415 CHEMO IV INFUSION ADDL HR: CPT

## 2023-07-10 PROCEDURE — 6360000002 HC RX W HCPCS: Performed by: FAMILY MEDICINE

## 2023-07-10 PROCEDURE — 96413 CHEMO IV INFUSION 1 HR: CPT

## 2023-07-10 RX ORDER — DIPHENHYDRAMINE HCL 25 MG
25 TABLET ORAL ONCE
OUTPATIENT
Start: 2023-07-17 | End: 2023-07-17

## 2023-07-10 RX ORDER — EPINEPHRINE 1 MG/ML
0.3 INJECTION, SOLUTION INTRAMUSCULAR; SUBCUTANEOUS PRN
OUTPATIENT
Start: 2023-07-17

## 2023-07-10 RX ORDER — HEPARIN SODIUM 100 [USP'U]/ML
500 INJECTION, SOLUTION INTRAVENOUS PRN
Status: DISCONTINUED | OUTPATIENT
Start: 2023-07-10 | End: 2023-07-11 | Stop reason: HOSPADM

## 2023-07-10 RX ORDER — SODIUM CHLORIDE 9 MG/ML
INJECTION, SOLUTION INTRAVENOUS CONTINUOUS
OUTPATIENT
Start: 2023-07-17

## 2023-07-10 RX ORDER — ACETAMINOPHEN 325 MG/1
650 TABLET ORAL
OUTPATIENT
Start: 2023-07-17

## 2023-07-10 RX ORDER — SODIUM CHLORIDE 9 MG/ML
5-250 INJECTION, SOLUTION INTRAVENOUS PRN
OUTPATIENT
Start: 2023-07-17

## 2023-07-10 RX ORDER — DIPHENHYDRAMINE HCL 25 MG
25 TABLET ORAL ONCE
Status: DISCONTINUED | OUTPATIENT
Start: 2023-07-10 | End: 2023-07-11 | Stop reason: HOSPADM

## 2023-07-10 RX ORDER — DIPHENHYDRAMINE HYDROCHLORIDE 50 MG/ML
50 INJECTION INTRAMUSCULAR; INTRAVENOUS
OUTPATIENT
Start: 2023-07-17

## 2023-07-10 RX ORDER — ALBUTEROL SULFATE 90 UG/1
4 AEROSOL, METERED RESPIRATORY (INHALATION) PRN
OUTPATIENT
Start: 2023-07-17

## 2023-07-10 RX ORDER — HEPARIN SODIUM 100 [USP'U]/ML
500 INJECTION, SOLUTION INTRAVENOUS PRN
OUTPATIENT
Start: 2023-07-17

## 2023-07-10 RX ORDER — ACETAMINOPHEN 325 MG/1
650 TABLET ORAL ONCE
OUTPATIENT
Start: 2023-07-17 | End: 2023-07-17

## 2023-07-10 RX ORDER — ACETAMINOPHEN 325 MG/1
650 TABLET ORAL ONCE
Status: DISCONTINUED | OUTPATIENT
Start: 2023-07-10 | End: 2023-07-11 | Stop reason: HOSPADM

## 2023-07-10 RX ORDER — MEPERIDINE HYDROCHLORIDE 25 MG/ML
12.5 INJECTION INTRAMUSCULAR; INTRAVENOUS; SUBCUTANEOUS PRN
OUTPATIENT
Start: 2023-07-17

## 2023-07-10 RX ORDER — SODIUM CHLORIDE 0.9 % (FLUSH) 0.9 %
5-40 SYRINGE (ML) INJECTION PRN
OUTPATIENT
Start: 2023-07-17

## 2023-07-10 RX ORDER — ONDANSETRON 2 MG/ML
8 INJECTION INTRAMUSCULAR; INTRAVENOUS
OUTPATIENT
Start: 2023-07-17

## 2023-07-10 RX ADMIN — HEPARIN 500 UNITS: 100 SYRINGE at 12:10

## 2023-07-10 RX ADMIN — IMMUNE GLOBULIN (HUMAN) 20 G: 10 INJECTION INTRAVENOUS; SUBCUTANEOUS at 11:16

## 2023-07-10 RX ADMIN — IMMUNE GLOBULIN (HUMAN) 5 G: 10 INJECTION INTRAVENOUS; SUBCUTANEOUS at 10:39

## 2023-07-10 ASSESSMENT — PAIN SCALES - GENERAL: PAINLEVEL_OUTOF10: 5

## 2023-07-10 NOTE — PROGRESS NOTES
1404 [facility] ambulated into room with cane for ivig infusion. pt rights and responsibilities offered to her. She states she took her tylenol and benadryl prior to coming today. Infusion started and she was offered drink and snack. No other needs at this time. [address] tolerating infusion with no needs at this time. Call light within reach. 1210    Infusion complete and Analy tolerated well. D/c instructions explained and Adore Areas verbalized understanding.  Adore Areas ambulated out per self today for d/c.           _m___ Safety:       (Environmental)  [place] to environment  Ensure ID band is correct and in place/ allergy band as needed  Assess for fall risk  Initiate fall precautions as applicable (fall band, side rails, etc.)  Call light within reach  Bed in low position/ wheels locked    __m__ Pain:       Assess pain level and characteristics  Administer analgesics as ordered  Assess effectiveness of pain management and report to MD as needed    __m__ Knowledge Deficit:  Assess baseline knowledge  Provide teaching at level of understanding  Provide teaching via preferred learning method  Evaluate teaching effectiveness    m____ Hemodynamic/Respiratory Status:       (Pre and Post Procedure Monitoring)  Assess/Monitor vital signs and LOC  Assess Baseline SpO2 prior to any sedation  Obtain weight/height  Assess vital signs/ LOC until patient meets discharge criteria  Monitor procedure site and notify MD of any issues    m____ Infection-Risk of Central Venous Catheter:  Monitor for infection signs and symptoms (catheter site redness, temperature elevation, etc)  Assess for infection risks  Educate regarding infection prevention  Manage central venous catheter (flushes/ dressing changes per protocol)

## 2023-07-19 ENCOUNTER — OFFICE VISIT (OUTPATIENT)
Dept: SURGERY | Age: 78
End: 2023-07-19
Payer: MEDICARE

## 2023-07-19 VITALS
RESPIRATION RATE: 18 BRPM | HEIGHT: 65 IN | TEMPERATURE: 97.7 F | DIASTOLIC BLOOD PRESSURE: 68 MMHG | BODY MASS INDEX: 39.17 KG/M2 | WEIGHT: 235.1 LBS | SYSTOLIC BLOOD PRESSURE: 122 MMHG | OXYGEN SATURATION: 98 % | HEART RATE: 80 BPM

## 2023-07-19 DIAGNOSIS — A63.0 ANAL CONDYLOMA: Primary | ICD-10-CM

## 2023-07-19 PROCEDURE — 3078F DIAST BP <80 MM HG: CPT | Performed by: NURSE PRACTITIONER

## 2023-07-19 PROCEDURE — G8417 CALC BMI ABV UP PARAM F/U: HCPCS | Performed by: NURSE PRACTITIONER

## 2023-07-19 PROCEDURE — 1090F PRES/ABSN URINE INCON ASSESS: CPT | Performed by: NURSE PRACTITIONER

## 2023-07-19 PROCEDURE — G8400 PT W/DXA NO RESULTS DOC: HCPCS | Performed by: NURSE PRACTITIONER

## 2023-07-19 PROCEDURE — 1036F TOBACCO NON-USER: CPT | Performed by: NURSE PRACTITIONER

## 2023-07-19 PROCEDURE — 1123F ACP DISCUSS/DSCN MKR DOCD: CPT | Performed by: NURSE PRACTITIONER

## 2023-07-19 PROCEDURE — 99212 OFFICE O/P EST SF 10 MIN: CPT | Performed by: NURSE PRACTITIONER

## 2023-07-19 PROCEDURE — G8427 DOCREV CUR MEDS BY ELIG CLIN: HCPCS | Performed by: NURSE PRACTITIONER

## 2023-07-19 PROCEDURE — 3074F SYST BP LT 130 MM HG: CPT | Performed by: NURSE PRACTITIONER

## 2023-07-19 NOTE — PROGRESS NOTES
distress. Appearance: Normal appearance. She is well-developed. She is not ill-appearing or toxic-appearing. HENT:      Head: Normocephalic and atraumatic. Right Ear: Hearing and external ear normal.      Left Ear: Hearing and external ear normal.      Nose: Nose normal.      Mouth/Throat:      Mouth: Mucous membranes are not pale, not dry and not cyanotic. Eyes:      General: Lids are normal.   Neck:      Trachea: Trachea and phonation normal.   Cardiovascular:      Rate and Rhythm: Normal rate and regular rhythm. Pulses: Normal pulses. Heart sounds: S1 normal and S2 normal.   Pulmonary:      Effort: Pulmonary effort is normal.      Breath sounds: Normal breath sounds. Abdominal:      General: Bowel sounds are normal.      Palpations: Abdomen is soft. Musculoskeletal:         General: No tenderness. Normal range of motion. Cervical back: Normal range of motion and neck supple. Skin:     General: Skin is warm and dry. Neurological:      Mental Status: She is alert and oriented to person, place, and time. Psychiatric:         Speech: Speech normal.         Behavior: Behavior normal.         Thought Content: Thought content normal.     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 Negative for invasive malignancy. Specimen:   CONDYLOMA       Gross Examination:   The container is labeled Maisha Perez, condyloma. Received in   formalin are two unoriented fragments of pink-tan tissue measuring 1 x   1 x 0.4 cm and 2 x 1.2 x 0.8 cm. PCF/DKR:[id number]     Microscopic Examination:   Microscopic examination demonstrates papillomatous fragments of   squamous mucosa and squamocolumnar junction.   The overlying squamous   epithelium demonstrates changes consistent with human papilloma

## 2023-07-20 ENCOUNTER — HOSPITAL ENCOUNTER (OUTPATIENT)
Dept: MRI IMAGING | Age: 78
Discharge: HOME | End: 2023-07-20
Payer: MEDICARE

## 2023-07-20 DIAGNOSIS — R51.9 FREQUENT HEADACHES: ICD-10-CM

## 2023-07-20 DIAGNOSIS — M54.2 CHRONIC NECK PAIN WITH ABNORMAL NEUROLOGIC EXAMINATION: ICD-10-CM

## 2023-07-20 DIAGNOSIS — G89.29 CHRONIC NECK PAIN WITH ABNORMAL NEUROLOGIC EXAMINATION: ICD-10-CM

## 2023-07-20 PROCEDURE — 72141 MRI NECK SPINE W/O DYE: CPT

## 2023-07-20 ASSESSMENT — ENCOUNTER SYMPTOMS
TROUBLE SWALLOWING: 0
RHINORRHEA: 0
SORE THROAT: 0
VOMITING: 0
CONSTIPATION: 0
CHOKING: 0
ABDOMINAL DISTENTION: 0
EYE PAIN: 0
ABDOMINAL PAIN: 0
DIARRHEA: 0
ANAL BLEEDING: 0
BACK PAIN: 0
SINUS PRESSURE: 0
COUGH: 0
APNEA: 0
PHOTOPHOBIA: 0
ALLERGIC/IMMUNOLOGIC NEGATIVE: 1
BLOOD IN STOOL: 0
EYE DISCHARGE: 0
NAUSEA: 0
COLOR CHANGE: 0
CHEST TIGHTNESS: 0
EYE ITCHING: 0
VOICE CHANGE: 0
WHEEZING: 0
SHORTNESS OF BREATH: 1
EYE REDNESS: 0
RECTAL PAIN: 1
FACIAL SWELLING: 0
STRIDOR: 0

## 2023-08-07 ENCOUNTER — HOSPITAL ENCOUNTER (OUTPATIENT)
Dept: NURSING | Age: 78
Discharge: HOME | End: 2023-08-07
Payer: MEDICARE

## 2023-08-07 VITALS
HEART RATE: 62 BPM | SYSTOLIC BLOOD PRESSURE: 167 MMHG | WEIGHT: 234 LBS | DIASTOLIC BLOOD PRESSURE: 79 MMHG | OXYGEN SATURATION: 93 % | RESPIRATION RATE: 18 BRPM | BODY MASS INDEX: 38.94 KG/M2 | TEMPERATURE: 97.6 F

## 2023-08-07 DIAGNOSIS — D83.9 COMMON VARIABLE IMMUNODEFICIENCY (HCC): Primary | ICD-10-CM

## 2023-08-07 PROCEDURE — 96413 CHEMO IV INFUSION 1 HR: CPT

## 2023-08-07 PROCEDURE — 6360000002 HC RX W HCPCS: Performed by: FAMILY MEDICINE

## 2023-08-07 PROCEDURE — 96365 THER/PROPH/DIAG IV INF INIT: CPT

## 2023-08-07 RX ORDER — ALBUTEROL SULFATE 90 UG/1
4 AEROSOL, METERED RESPIRATORY (INHALATION) PRN
OUTPATIENT
Start: 2023-08-14

## 2023-08-07 RX ORDER — MEPERIDINE HYDROCHLORIDE 25 MG/ML
12.5 INJECTION INTRAMUSCULAR; INTRAVENOUS; SUBCUTANEOUS PRN
OUTPATIENT
Start: 2023-08-14

## 2023-08-07 RX ORDER — ACETAMINOPHEN 325 MG/1
650 TABLET ORAL ONCE
Status: DISCONTINUED | OUTPATIENT
Start: 2023-08-07 | End: 2023-08-08 | Stop reason: HOSPADM

## 2023-08-07 RX ORDER — DIPHENHYDRAMINE HCL 25 MG
25 TABLET ORAL ONCE
OUTPATIENT
Start: 2023-08-14 | End: 2023-08-14

## 2023-08-07 RX ORDER — HEPARIN 100 UNIT/ML
500 SYRINGE INTRAVENOUS PRN
Status: DISCONTINUED | OUTPATIENT
Start: 2023-08-07 | End: 2023-08-08 | Stop reason: HOSPADM

## 2023-08-07 RX ORDER — ACETAMINOPHEN 325 MG/1
650 TABLET ORAL ONCE
OUTPATIENT
Start: 2023-08-14 | End: 2023-08-14

## 2023-08-07 RX ORDER — ACETAMINOPHEN 325 MG/1
650 TABLET ORAL
OUTPATIENT
Start: 2023-08-14

## 2023-08-07 RX ORDER — SODIUM CHLORIDE 0.9 % (FLUSH) 0.9 %
5-40 SYRINGE (ML) INJECTION PRN
OUTPATIENT
Start: 2023-08-14

## 2023-08-07 RX ORDER — DIPHENHYDRAMINE HYDROCHLORIDE 50 MG/ML
50 INJECTION INTRAMUSCULAR; INTRAVENOUS
OUTPATIENT
Start: 2023-08-14

## 2023-08-07 RX ORDER — ONDANSETRON 2 MG/ML
8 INJECTION INTRAMUSCULAR; INTRAVENOUS
OUTPATIENT
Start: 2023-08-14

## 2023-08-07 RX ORDER — EPINEPHRINE 1 MG/ML
0.3 INJECTION, SOLUTION INTRAMUSCULAR; SUBCUTANEOUS PRN
OUTPATIENT
Start: 2023-08-14

## 2023-08-07 RX ORDER — HEPARIN 100 UNIT/ML
500 SYRINGE INTRAVENOUS PRN
OUTPATIENT
Start: 2023-08-14

## 2023-08-07 RX ORDER — SODIUM CHLORIDE 9 MG/ML
5-250 INJECTION, SOLUTION INTRAVENOUS PRN
OUTPATIENT
Start: 2023-08-14

## 2023-08-07 RX ORDER — SODIUM CHLORIDE 9 MG/ML
INJECTION, SOLUTION INTRAVENOUS CONTINUOUS
OUTPATIENT
Start: 2023-08-14

## 2023-08-07 RX ORDER — DIPHENHYDRAMINE HCL 25 MG
25 TABLET ORAL ONCE
Status: DISCONTINUED | OUTPATIENT
Start: 2023-08-07 | End: 2023-08-08 | Stop reason: HOSPADM

## 2023-08-07 RX ADMIN — HEPARIN 500 UNITS: 100 SYRINGE at 12:47

## 2023-08-07 RX ADMIN — IMMUNE GLOBULIN (HUMAN) 5 G: 10 INJECTION INTRAVENOUS; SUBCUTANEOUS at 11:23

## 2023-08-07 RX ADMIN — IMMUNE GLOBULIN (HUMAN) 20 G: 10 INJECTION INTRAVENOUS; SUBCUTANEOUS at 11:58

## 2023-08-07 NOTE — DISCHARGE INSTRUCTIONS
ACTIVITY:  Continue usual care with your doctor. Call your doctor immediately if any severe problems or go to the nearest emergency room. I have been treated and hereby acknowledge receiving this instruction sheet. Next appointment: September 5th at 11 AM     Please call [phone] with any questions or concerns.

## 2023-08-07 NOTE — PROGRESS NOTES
1110: Patient arrived ambulatory for IVIG infusion. Patient rights and responsibilities offered to patient. Patient's mediport accessed using sterile technique. Patient states she took her pre-medications at home. 1123: IVIG infusion started. 1245: Infusion complete, patient tolerated well. Mediport de-accessed. AVS reviewed with patient, voiced understanding. Patient discharged ambulatory.                       _m___ Safety:       (Environmental)  [place] to environment  Ensure ID band is correct and in place/ allergy band as needed  Assess for fall risk  Initiate fall precautions as applicable (fall band, side rails, etc.)  Call light within reach  Bed in low position/ wheels locked    _m___ Pain:       Assess pain level and characteristics  Administer analgesics as ordered  Assess effectiveness of pain management and report to MD as needed    _m___ Knowledge Deficit:  Assess baseline knowledge  Provide teaching at level of understanding  Provide teaching via preferred learning method  Evaluate teaching effectiveness    _m___ Hemodynamic/Respiratory Status:       (Pre and Post Procedure Monitoring)  Assess/Monitor vital signs and LOC  Assess Baseline SpO2 prior to any sedation  Obtain weight/height  Assess vital signs/ LOC until patient meets discharge criteria  Monitor procedure site and notify MD of any issues    _m___ Infection-Risk of Central Venous Catheter:  Monitor for infection signs and symptoms (catheter site redness, temperature elevation, etc)  Assess for infection risks  Educate regarding infection prevention  Manage central venous catheter (flushes/ dressing changes per protocol)

## 2023-08-14 PROBLEM — E78.2 MIXED HYPERLIPIDEMIA: Status: ACTIVE | Noted: 2023-08-14

## 2023-08-14 PROBLEM — F32.9 MAJOR DEPRESSIVE DISORDER WITH SINGLE EPISODE: Status: ACTIVE | Noted: 2017-02-24

## 2023-08-14 PROBLEM — F41.1 ANXIETY STATE: Status: ACTIVE | Noted: 2023-08-14

## 2023-08-23 ENCOUNTER — OFFICE VISIT (OUTPATIENT)
Dept: CARDIOLOGY CLINIC | Age: 78
End: 2023-08-23
Payer: MEDICARE

## 2023-08-23 VITALS
SYSTOLIC BLOOD PRESSURE: 130 MMHG | HEART RATE: 63 BPM | WEIGHT: 232 LBS | DIASTOLIC BLOOD PRESSURE: 88 MMHG | BODY MASS INDEX: 37.28 KG/M2 | HEIGHT: 66 IN

## 2023-08-23 DIAGNOSIS — I42.0 DILATED CARDIOMYOPATHY (HCC): ICD-10-CM

## 2023-08-23 DIAGNOSIS — R00.2 PALPITATION: ICD-10-CM

## 2023-08-23 DIAGNOSIS — I10 PRIMARY HYPERTENSION: Primary | ICD-10-CM

## 2023-08-23 DIAGNOSIS — I49.3 PVC (PREMATURE VENTRICULAR CONTRACTION): ICD-10-CM

## 2023-08-23 DIAGNOSIS — E78.01 FAMILIAL HYPERCHOLESTEROLEMIA: ICD-10-CM

## 2023-08-23 PROCEDURE — 3075F SYST BP GE 130 - 139MM HG: CPT | Performed by: NUCLEAR MEDICINE

## 2023-08-23 PROCEDURE — 3079F DIAST BP 80-89 MM HG: CPT | Performed by: NUCLEAR MEDICINE

## 2023-08-23 PROCEDURE — G8400 PT W/DXA NO RESULTS DOC: HCPCS | Performed by: NUCLEAR MEDICINE

## 2023-08-23 PROCEDURE — 1036F TOBACCO NON-USER: CPT | Performed by: NUCLEAR MEDICINE

## 2023-08-23 PROCEDURE — 99214 OFFICE O/P EST MOD 30 MIN: CPT | Performed by: NUCLEAR MEDICINE

## 2023-08-23 PROCEDURE — 1090F PRES/ABSN URINE INCON ASSESS: CPT | Performed by: NUCLEAR MEDICINE

## 2023-08-23 PROCEDURE — G8427 DOCREV CUR MEDS BY ELIG CLIN: HCPCS | Performed by: NUCLEAR MEDICINE

## 2023-08-23 PROCEDURE — G8417 CALC BMI ABV UP PARAM F/U: HCPCS | Performed by: NUCLEAR MEDICINE

## 2023-08-23 PROCEDURE — 93000 ELECTROCARDIOGRAM COMPLETE: CPT | Performed by: NUCLEAR MEDICINE

## 2023-08-23 PROCEDURE — 1123F ACP DISCUSS/DSCN MKR DOCD: CPT | Performed by: NUCLEAR MEDICINE

## 2023-08-23 RX ORDER — ACETAMINOPHEN/CAFFEINE 500MG-65MG
2 TABLET ORAL EVERY 8 HOURS PRN
COMMUNITY

## 2023-08-23 RX ORDER — PANTOPRAZOLE SODIUM 40 MG/1
40 TABLET, DELAYED RELEASE ORAL DAILY
COMMUNITY

## 2023-08-23 RX ORDER — RIMEGEPANT SULFATE 75 MG/75MG
75 TABLET, ORALLY DISINTEGRATING ORAL DAILY PRN
COMMUNITY

## 2023-08-23 RX ORDER — POLYETHYLENE GLYCOL 3350 17 G/17G
17 POWDER, FOR SOLUTION ORAL DAILY
COMMUNITY

## 2023-08-23 NOTE — PROGRESS NOTES
[address] [facility]  [address] [place] [id number]  Dept: [phone]  Dept Fax: [phone]  Loc: [phone]    Visit Date: 8/23/2023    Pato Curiel is a 77 y.o. female who presents todayfor:  Chief Complaint   Patient presents with    6 Month Follow-Up    Cardiomyopathy    Atrial Fibrillation    Palpitations    Coronary Artery Disease    Shortness of Breath   Had PVC ablation   Felt better for a while then back to more symptoms  Fatigue   Dyspnea  Usually on home O2  Lung disease as well  Previous cath   Mild CAD and CMP   A fib is stable  BP seems stable  No dizziness  No syncope  On statins for hyperlipidemia       HPI:  HPI  Past Medical History:   Diagnosis Date    Anemia     Atrial fibrillation ([address]) 11/09/2017    CAD (coronary artery disease)     CHF (congestive heart failure) (HCC)     Chronic kidney disease     stage 3 kidney     COPD (chronic obstructive pulmonary disease) ([address])     DDD (degenerative disc disease), lumbar     Depression     Frequent PVCs 12/13/2017    GERD (gastroesophageal reflux disease)     History of blood transfusion     Hx of blood clots 09/2017    pulmonary emboli    Hyperlipidemia     Hypertension     sees Baki    Hyperthyroidism     Kidney disease     hemmelgarn    Movement disorder     DDD    Neuropathy     Obesity     Palpitations 01/10/2020    Pneumonia 2016    sepsis    Sleep apnea     usually wears cpap at night    Status post right and left heart catheterization     Type II or unspecified type diabetes mellitus without mention of complication, not stated as uncontrolled       Past Surgical History:   Procedure Laterality Date    ACHILLES TENDON SURGERY Bilateral     BLADDER SUSPENSION      CARDIAC SURGERY  2016    heart cath--[facility]    COLONOSCOPY Left 05/11/2018    COLONOSCOPY POLYPECTOMY SNARE/COLD BIOPSY performed by Isabelle Larios MD at [facility]    COLONOSCOPY N/A 08/04/2021

## 2023-08-29 ENCOUNTER — HOSPITAL ENCOUNTER (OUTPATIENT)
Dept: NON INVASIVE DIAGNOSTICS | Age: 78
Discharge: HOME | End: 2023-08-29
Attending: NUCLEAR MEDICINE
Payer: MEDICARE

## 2023-08-29 DIAGNOSIS — I49.3 PVC (PREMATURE VENTRICULAR CONTRACTION): ICD-10-CM

## 2023-08-29 DIAGNOSIS — I10 PRIMARY HYPERTENSION: ICD-10-CM

## 2023-08-29 DIAGNOSIS — R00.2 PALPITATION: ICD-10-CM

## 2023-08-29 DIAGNOSIS — E78.01 FAMILIAL HYPERCHOLESTEROLEMIA: ICD-10-CM

## 2023-08-29 DIAGNOSIS — I42.0 DILATED CARDIOMYOPATHY (HCC): ICD-10-CM

## 2023-08-29 LAB
LV EF: 45 %
LVEF MODALITY: NORMAL

## 2023-08-29 PROCEDURE — 93226 XTRNL ECG REC<48 HR SCAN A/R: CPT

## 2023-08-29 PROCEDURE — 93306 TTE W/DOPPLER COMPLETE: CPT

## 2023-08-29 PROCEDURE — 93225 XTRNL ECG REC<48 HRS REC: CPT

## 2023-08-29 NOTE — PROCEDURES
48 hour Holter monitor applied. Instructions given and patient had no further questions to this time.  Gabrielle Katz CET

## 2023-09-05 ENCOUNTER — HOSPITAL ENCOUNTER (OUTPATIENT)
Dept: NURSING | Age: 78
Discharge: HOME | End: 2023-09-05
Payer: MEDICARE

## 2023-09-05 VITALS
SYSTOLIC BLOOD PRESSURE: 156 MMHG | TEMPERATURE: 97 F | DIASTOLIC BLOOD PRESSURE: 69 MMHG | BODY MASS INDEX: 37.87 KG/M2 | RESPIRATION RATE: 16 BRPM | WEIGHT: 234.6 LBS | OXYGEN SATURATION: 95 %

## 2023-09-05 DIAGNOSIS — D83.9 COMMON VARIABLE IMMUNODEFICIENCY (HCC): Primary | ICD-10-CM

## 2023-09-05 PROCEDURE — 96365 THER/PROPH/DIAG IV INF INIT: CPT

## 2023-09-05 PROCEDURE — 6360000002 HC RX W HCPCS: Performed by: FAMILY MEDICINE

## 2023-09-05 PROCEDURE — 96366 THER/PROPH/DIAG IV INF ADDON: CPT

## 2023-09-05 PROCEDURE — 2580000003 HC RX 258: Performed by: FAMILY MEDICINE

## 2023-09-05 PROCEDURE — 96413 CHEMO IV INFUSION 1 HR: CPT

## 2023-09-05 PROCEDURE — 36591 DRAW BLOOD OFF VENOUS DEVICE: CPT

## 2023-09-05 PROCEDURE — 96415 CHEMO IV INFUSION ADDL HR: CPT

## 2023-09-05 RX ORDER — HEPARIN 100 UNIT/ML
500 SYRINGE INTRAVENOUS PRN
OUTPATIENT
Start: 2023-09-10

## 2023-09-05 RX ORDER — SODIUM CHLORIDE 9 MG/ML
INJECTION, SOLUTION INTRAVENOUS CONTINUOUS
OUTPATIENT
Start: 2023-09-10

## 2023-09-05 RX ORDER — SODIUM CHLORIDE 0.9 % (FLUSH) 0.9 %
5-40 SYRINGE (ML) INJECTION PRN
OUTPATIENT
Start: 2023-09-10

## 2023-09-05 RX ORDER — ALBUTEROL SULFATE 90 UG/1
4 AEROSOL, METERED RESPIRATORY (INHALATION) PRN
OUTPATIENT
Start: 2023-09-10

## 2023-09-05 RX ORDER — DIPHENHYDRAMINE HYDROCHLORIDE 50 MG/ML
50 INJECTION INTRAMUSCULAR; INTRAVENOUS
OUTPATIENT
Start: 2023-09-10

## 2023-09-05 RX ORDER — ACETAMINOPHEN 325 MG/1
650 TABLET ORAL
OUTPATIENT
Start: 2023-09-10

## 2023-09-05 RX ORDER — ACETAMINOPHEN 325 MG/1
650 TABLET ORAL ONCE
OUTPATIENT
Start: 2023-09-10 | End: 2023-09-10

## 2023-09-05 RX ORDER — ONDANSETRON 2 MG/ML
8 INJECTION INTRAMUSCULAR; INTRAVENOUS
OUTPATIENT
Start: 2023-09-10

## 2023-09-05 RX ORDER — HEPARIN 100 UNIT/ML
500 SYRINGE INTRAVENOUS PRN
Status: DISCONTINUED | OUTPATIENT
Start: 2023-09-05 | End: 2023-09-06 | Stop reason: HOSPADM

## 2023-09-05 RX ORDER — SODIUM CHLORIDE 0.9 % (FLUSH) 0.9 %
5-40 SYRINGE (ML) INJECTION PRN
Status: DISCONTINUED | OUTPATIENT
Start: 2023-09-05 | End: 2023-09-06 | Stop reason: HOSPADM

## 2023-09-05 RX ORDER — DIPHENHYDRAMINE HCL 25 MG
25 TABLET ORAL ONCE
Status: DISCONTINUED | OUTPATIENT
Start: 2023-09-05 | End: 2023-09-06 | Stop reason: HOSPADM

## 2023-09-05 RX ORDER — SODIUM CHLORIDE 9 MG/ML
5-250 INJECTION, SOLUTION INTRAVENOUS PRN
OUTPATIENT
Start: 2023-09-10

## 2023-09-05 RX ORDER — ACETAMINOPHEN 325 MG/1
650 TABLET ORAL ONCE
Status: DISCONTINUED | OUTPATIENT
Start: 2023-09-05 | End: 2023-09-06 | Stop reason: HOSPADM

## 2023-09-05 RX ORDER — DIPHENHYDRAMINE HCL 25 MG
25 TABLET ORAL ONCE
OUTPATIENT
Start: 2023-09-10 | End: 2023-09-10

## 2023-09-05 RX ORDER — MEPERIDINE HYDROCHLORIDE 25 MG/ML
12.5 INJECTION INTRAMUSCULAR; INTRAVENOUS; SUBCUTANEOUS PRN
OUTPATIENT
Start: 2023-09-10

## 2023-09-05 RX ORDER — EPINEPHRINE 1 MG/ML
0.3 INJECTION, SOLUTION INTRAMUSCULAR; SUBCUTANEOUS PRN
OUTPATIENT
Start: 2023-09-10

## 2023-09-05 RX ADMIN — HEPARIN 500 UNITS: 100 SYRINGE at 12:47

## 2023-09-05 RX ADMIN — IMMUNE GLOBULIN (HUMAN) 20 G: 10 INJECTION INTRAVENOUS; SUBCUTANEOUS at 11:45

## 2023-09-05 RX ADMIN — IMMUNE GLOBULIN (HUMAN) 5 G: 10 INJECTION INTRAVENOUS; SUBCUTANEOUS at 11:10

## 2023-09-05 RX ADMIN — SODIUM CHLORIDE, PRESERVATIVE FREE 10 ML: 5 INJECTION INTRAVENOUS at 12:47

## 2023-09-07 ENCOUNTER — HOSPITAL ENCOUNTER (OUTPATIENT)
Dept: NON INVASIVE DIAGNOSTICS | Age: 78
Discharge: HOME | End: 2023-09-07
Payer: MEDICARE

## 2023-09-07 ENCOUNTER — HOSPITAL ENCOUNTER (OUTPATIENT)
Dept: ULTRASOUND IMAGING | Age: 78
Discharge: HOME | End: 2023-09-07
Payer: MEDICARE

## 2023-09-07 DIAGNOSIS — N28.1 KIDNEY CYSTS: ICD-10-CM

## 2023-09-07 DIAGNOSIS — N18.31 STAGE 3A CHRONIC KIDNEY DISEASE (HCC): ICD-10-CM

## 2023-09-07 DIAGNOSIS — N28.9 RENAL LESION: Primary | ICD-10-CM

## 2023-09-07 PROCEDURE — 93226 XTRNL ECG REC<48 HR SCAN A/R: CPT

## 2023-09-07 PROCEDURE — 93225 XTRNL ECG REC<48 HRS REC: CPT

## 2023-09-07 PROCEDURE — 76770 US EXAM ABDO BACK WALL COMP: CPT

## 2023-09-07 NOTE — PROCEDURES
48 hour Holter Monitor was applied to patient. Patient was instructed to remove monitor on 09/09/2023 at  and return to  of hospital. The serial number on the Holter Monitor is [id number]. Patient instructed to mail monitor back if necessary.

## 2023-09-08 ENCOUNTER — TELEPHONE (OUTPATIENT)
Dept: NEPHROLOGY | Age: 78
End: 2023-09-08

## 2023-09-08 NOTE — TELEPHONE ENCOUNTER
Faxed order and note to [facility] giving them the scheduling number or to let me know and I will schedule since they will have to arrange for transportation

## 2023-09-08 NOTE — TELEPHONE ENCOUNTER
----- Message from Yasmeen Salcedo DO sent at 9/7/2023  4:11 PM EDT -----  Would like to get a CT with contrast to look at the cyst on her left kidney better. Order placed.    Hold lasix and losartan day of ct scan

## 2023-09-17 ENCOUNTER — APPOINTMENT (OUTPATIENT)
Dept: GENERAL RADIOLOGY | Age: 78
End: 2023-09-17
Payer: MEDICARE

## 2023-09-17 ENCOUNTER — HOSPITAL ENCOUNTER (EMERGENCY)
Age: 78
Discharge: HOME | End: 2023-09-17
Attending: STUDENT IN AN ORGANIZED HEALTH CARE EDUCATION/TRAINING PROGRAM
Payer: MEDICARE

## 2023-09-17 VITALS
HEART RATE: 64 BPM | SYSTOLIC BLOOD PRESSURE: 180 MMHG | TEMPERATURE: 98.2 F | BODY MASS INDEX: 38.09 KG/M2 | WEIGHT: 236 LBS | OXYGEN SATURATION: 97 % | RESPIRATION RATE: 16 BRPM | DIASTOLIC BLOOD PRESSURE: 99 MMHG

## 2023-09-17 DIAGNOSIS — R61 DIAPHORESIS: Primary | ICD-10-CM

## 2023-09-17 LAB
ALBUMIN SERPL BCG-MCNC: 4.2 G/DL (ref 3.5–5.1)
ALP SERPL-CCNC: 106 U/L (ref 38–126)
ALT SERPL W/O P-5'-P-CCNC: 14 U/L (ref 11–66)
ANION GAP SERPL CALC-SCNC: 13 MEQ/L (ref 8–16)
AST SERPL-CCNC: 22 U/L (ref 5–40)
BACTERIA URNS QL MICRO: ABNORMAL /HPF
BASOPHILS ABSOLUTE: 0.1 THOU/MM3 (ref 0–0.1)
BASOPHILS NFR BLD AUTO: 0.9 %
BILIRUB CONJ SERPL-MCNC: < 0.2 MG/DL (ref 0–0.3)
BILIRUB SERPL-MCNC: 0.5 MG/DL (ref 0.3–1.2)
BILIRUB UR QL STRIP.AUTO: NEGATIVE
BUN SERPL-MCNC: 21 MG/DL (ref 7–22)
CALCIUM SERPL-MCNC: 9.1 MG/DL (ref 8.5–10.5)
CASTS #/AREA URNS LPF: ABNORMAL /LPF
CASTS 2: ABNORMAL /LPF
CHARACTER UR: CLEAR
CHLORIDE SERPL-SCNC: 102 MEQ/L (ref 98–111)
CO2 SERPL-SCNC: 25 MEQ/L (ref 23–33)
COLOR: YELLOW
CREAT SERPL-MCNC: 1.3 MG/DL (ref 0.4–1.2)
CRYSTALS URNS MICRO: ABNORMAL
DEPRECATED RDW RBC AUTO: 44.9 FL (ref 35–45)
EOSINOPHIL NFR BLD AUTO: 2.7 %
EOSINOPHILS ABSOLUTE: 0.2 THOU/MM3 (ref 0–0.4)
EPITHELIAL CELLS, UA: ABNORMAL /HPF
ERYTHROCYTE [DISTWIDTH] IN BLOOD BY AUTOMATED COUNT: 13.2 % (ref 11.5–14.5)
FLUAV RNA RESP QL NAA+PROBE: NOT DETECTED
FLUBV RNA RESP QL NAA+PROBE: NOT DETECTED
GFR SERPL CREATININE-BSD FRML MDRD: 42 ML/MIN/1.73M2
GLUCOSE SERPL-MCNC: 212 MG/DL (ref 70–108)
GLUCOSE UR QL STRIP.AUTO: NEGATIVE MG/DL
HCT VFR BLD AUTO: 43.2 % (ref 37–47)
HGB BLD-MCNC: 13.9 GM/DL (ref 12–16)
HGB UR QL STRIP.AUTO: ABNORMAL
IMM GRANULOCYTES # BLD AUTO: 0.01 THOU/MM3 (ref 0–0.07)
IMM GRANULOCYTES NFR BLD AUTO: 0.2 %
KETONES UR QL STRIP.AUTO: NEGATIVE
LACTIC ACID, SEPSIS: 0.5 MMOL/L (ref 0.5–1.9)
LACTIC ACID, SEPSIS: 2.4 MMOL/L (ref 0.5–1.9)
LYMPHOCYTES ABSOLUTE: 1.2 THOU/MM3 (ref 1–4.8)
LYMPHOCYTES NFR BLD AUTO: 21.2 %
MCH RBC QN AUTO: 30.2 PG (ref 26–33)
MCHC RBC AUTO-ENTMCNC: 32.2 GM/DL (ref 32.2–35.5)
MCV RBC AUTO: 93.9 FL (ref 81–99)
MISCELLANEOUS 2: ABNORMAL
MONOCYTES ABSOLUTE: 0.5 THOU/MM3 (ref 0.4–1.3)
MONOCYTES NFR BLD AUTO: 8.6 %
NEUTROPHILS NFR BLD AUTO: 66.4 %
NITRITE UR QL STRIP: NEGATIVE
NRBC BLD AUTO-RTO: 0 /100 WBC
OSMOLALITY SERPL CALC.SUM OF ELEC: 288.7 MOSMOL/KG (ref 275–300)
PH UR STRIP.AUTO: 6 [PH] (ref 5–9)
PLATELET # BLD AUTO: 197 THOU/MM3 (ref 130–400)
PMV BLD AUTO: 9.6 FL (ref 9.4–12.4)
POTASSIUM SERPL-SCNC: 4.6 MEQ/L (ref 3.5–5.2)
PROT SERPL-MCNC: 7 G/DL (ref 6.1–8)
PROT UR STRIP.AUTO-MCNC: NEGATIVE MG/DL
RBC # BLD AUTO: 4.6 MILL/MM3 (ref 4.2–5.4)
RBC URINE: ABNORMAL /HPF
REASON FOR REJECTION: NORMAL
REJECTED TEST: NORMAL
RENAL EPI CELLS #/AREA URNS HPF: ABNORMAL /[HPF]
SARS-COV-2 RNA RESP QL NAA+PROBE: NOT DETECTED
SEGMENTED NEUTROPHILS ABSOLUTE COUNT: 3.7 THOU/MM3 (ref 1.8–7.7)
SODIUM SERPL-SCNC: 140 MEQ/L (ref 135–145)
SP GR UR REFRACT.AUTO: 1.01 (ref 1–1.03)
TROPONIN, HIGH SENSITIVITY: 15 NG/L (ref 0–12)
TROPONIN, HIGH SENSITIVITY: 17 NG/L (ref 0–12)
UROBILINOGEN, URINE: 0.2 EU/DL (ref 0–1)
WBC # BLD AUTO: 5.6 THOU/MM3 (ref 4.8–10.8)
WBC #/AREA URNS HPF: ABNORMAL /HPF
WBC #/AREA URNS HPF: ABNORMAL /[HPF]
YEAST LIKE FUNGI URNS QL MICRO: ABNORMAL

## 2023-09-17 PROCEDURE — 83605 ASSAY OF LACTIC ACID: CPT

## 2023-09-17 PROCEDURE — 99285 EMERGENCY DEPT VISIT HI MDM: CPT

## 2023-09-17 PROCEDURE — 85025 COMPLETE CBC W/AUTO DIFF WBC: CPT

## 2023-09-17 PROCEDURE — 71046 X-RAY EXAM CHEST 2 VIEWS: CPT

## 2023-09-17 PROCEDURE — 81001 URINALYSIS AUTO W/SCOPE: CPT

## 2023-09-17 PROCEDURE — 82248 BILIRUBIN DIRECT: CPT

## 2023-09-17 PROCEDURE — 96360 HYDRATION IV INFUSION INIT: CPT

## 2023-09-17 PROCEDURE — 87636 SARSCOV2 & INF A&B AMP PRB: CPT

## 2023-09-17 PROCEDURE — 6370000000 HC RX 637 (ALT 250 FOR IP)

## 2023-09-17 PROCEDURE — 93010 ELECTROCARDIOGRAM REPORT: CPT | Performed by: INTERNAL MEDICINE

## 2023-09-17 PROCEDURE — 96361 HYDRATE IV INFUSION ADD-ON: CPT

## 2023-09-17 PROCEDURE — 84484 ASSAY OF TROPONIN QUANT: CPT

## 2023-09-17 PROCEDURE — 93005 ELECTROCARDIOGRAM TRACING: CPT

## 2023-09-17 PROCEDURE — 2580000003 HC RX 258

## 2023-09-17 PROCEDURE — 36415 COLL VENOUS BLD VENIPUNCTURE: CPT

## 2023-09-17 PROCEDURE — 80053 COMPREHEN METABOLIC PANEL: CPT

## 2023-09-17 RX ORDER — 0.9 % SODIUM CHLORIDE 0.9 %
30 INTRAVENOUS SOLUTION INTRAVENOUS ONCE
Status: COMPLETED | OUTPATIENT
Start: 2023-09-17 | End: 2023-09-17

## 2023-09-17 RX ORDER — ASPIRIN 81 MG/1
324 TABLET, CHEWABLE ORAL ONCE
Status: COMPLETED | OUTPATIENT
Start: 2023-09-17 | End: 2023-09-17

## 2023-09-17 RX ADMIN — ASPIRIN 324 MG: 81 TABLET, CHEWABLE ORAL at 15:49

## 2023-09-17 RX ADMIN — SODIUM CHLORIDE 1779 ML: 9 INJECTION, SOLUTION INTRAVENOUS at 14:29

## 2023-09-17 NOTE — ED NOTES
Pt resting in bed. Pt to bathroom via WC. Urine sample sent to lab. Denies other needs. Call light in reach.       Katiana Leon RN  09/17/23 [phone]

## 2023-09-17 NOTE — ED PROVIDER NOTES
negative for any acute cardiopulmonary findings. UA was negative for bacteriuria. Initial lactic acid was 2.4, repeat 0.5 after fluid bolus. COVID 19/flu test was negative. Initial troponin was 17, repeat 15 patient had no complaint of chest pain and EKG did not demonstrate ST segment elevation. Patient was given 30 cc/KG NS bolus in the ED. Patient was discharged home with PCP follow-up. Patient was instructed to return to the emergency department if her symptoms worsened or did not resolve. Final diagnoses:   Diaphoresis     Condition: condition: stable  Dispo: Discharge to home      This transcription was electronically signed. Parts of this transcriptions may have been dictated by use of voice recognition software and electronically transcribed, and parts may have been transcribed with the assistance of an ED scribe. The transcription may contain errors not detected in proofreading. Please refer to my supervising physician's documentation if my documentation differs.     Electronically Signed: Venancio Hendrix DO PGY1, 09/18/23, 9:42 PM        Venancio Hendrix DO  Resident  09/17/23 2157       Venancio Hendrix DO  Resident  09/18/23 0113

## 2023-09-17 NOTE — ED NOTES
Pt to er. Pt c/o no appetite and excessive sweating since yesterday. Pt states has mild cough and sinus issues. Pt denies any pain, dizziness or SOB. Denies fevers. Pt is cool and clammy upon arrival. Pt a & o x 4. Resp regular. Call light in reach.       Katiana Leon RN  09/17/23 9813

## 2023-09-17 NOTE — ED NOTES
Pt oxygen taken off. Pt states she is only on oxygen sometimes when she feels SOB. Pt states able to go back without her oxygen. Dr. Zach Valenzuela notified and states pt able to go home wtihout oxygen.       Josephine Calzada, RN  09/17/23 43 [facility], RN  09/17/23 6340

## 2023-09-17 NOTE — DISCHARGE INSTRUCTIONS
Continue to drink ample fluids at home. Please follow up with your primary care doctor this week if symptoms continue. Please do not hestitate to return to the emergency department if symptoms worsen or you develop fever, chills, or chest pain.

## 2023-09-17 NOTE — ED NOTES
Pt lying in bed asleep. Resp regular. Denies needs. Call light in reach.       Portia Chen RN  09/17/23 9292

## 2023-09-20 LAB
EKG ATRIAL RATE: 80 BPM
EKG P AXIS: 26 DEGREES
EKG P-R INTERVAL: 162 MS
EKG Q-T INTERVAL: 376 MS
EKG QRS DURATION: 100 MS
EKG QTC CALCULATION (BAZETT): 433 MS
EKG R AXIS: -42 DEGREES
EKG T AXIS: 89 DEGREES
EKG VENTRICULAR RATE: 80 BPM

## 2023-10-02 ENCOUNTER — ANCILLARY ORDERS (OUTPATIENT)
Dept: NEPHROLOGY | Age: 78
End: 2023-10-02

## 2023-10-02 ENCOUNTER — HOSPITAL ENCOUNTER (OUTPATIENT)
Dept: NURSING | Age: 78
Discharge: HOME | End: 2023-10-02
Payer: MEDICARE

## 2023-10-02 ENCOUNTER — HOSPITAL ENCOUNTER (OUTPATIENT)
Dept: CT IMAGING | Age: 78
Discharge: HOME | End: 2023-10-02
Attending: INTERNAL MEDICINE
Payer: MEDICARE

## 2023-10-02 VITALS
DIASTOLIC BLOOD PRESSURE: 81 MMHG | RESPIRATION RATE: 16 BRPM | SYSTOLIC BLOOD PRESSURE: 168 MMHG | WEIGHT: 230 LBS | TEMPERATURE: 97.3 F | HEART RATE: 66 BPM | BODY MASS INDEX: 37.12 KG/M2 | OXYGEN SATURATION: 93 %

## 2023-10-02 DIAGNOSIS — D83.9 COMMON VARIABLE IMMUNODEFICIENCY (HCC): Primary | ICD-10-CM

## 2023-10-02 DIAGNOSIS — N28.9 RENAL LESION: ICD-10-CM

## 2023-10-02 PROCEDURE — 96366 THER/PROPH/DIAG IV INF ADDON: CPT

## 2023-10-02 PROCEDURE — 96415 CHEMO IV INFUSION ADDL HR: CPT

## 2023-10-02 PROCEDURE — 6360000002 HC RX W HCPCS: Performed by: FAMILY MEDICINE

## 2023-10-02 PROCEDURE — 74178 CT ABD&PLV WO CNTR FLWD CNTR: CPT

## 2023-10-02 PROCEDURE — 96413 CHEMO IV INFUSION 1 HR: CPT

## 2023-10-02 PROCEDURE — 6360000004 HC RX CONTRAST MEDICATION: Performed by: INTERNAL MEDICINE

## 2023-10-02 PROCEDURE — 96365 THER/PROPH/DIAG IV INF INIT: CPT

## 2023-10-02 PROCEDURE — 2580000003 HC RX 258: Performed by: FAMILY MEDICINE

## 2023-10-02 RX ORDER — ONDANSETRON 2 MG/ML
8 INJECTION INTRAMUSCULAR; INTRAVENOUS
Status: CANCELLED | OUTPATIENT
Start: 2023-10-10

## 2023-10-02 RX ORDER — ACETAMINOPHEN 325 MG/1
650 TABLET ORAL
Status: CANCELLED | OUTPATIENT
Start: 2023-10-10

## 2023-10-02 RX ORDER — SODIUM CHLORIDE 9 MG/ML
INJECTION, SOLUTION INTRAVENOUS CONTINUOUS
Status: CANCELLED | OUTPATIENT
Start: 2023-10-10

## 2023-10-02 RX ORDER — DIPHENHYDRAMINE HCL 25 MG
25 TABLET ORAL ONCE
Status: DISCONTINUED | OUTPATIENT
Start: 2023-10-02 | End: 2023-10-03 | Stop reason: HOSPADM

## 2023-10-02 RX ORDER — ALBUTEROL SULFATE 90 UG/1
4 AEROSOL, METERED RESPIRATORY (INHALATION) PRN
Status: CANCELLED | OUTPATIENT
Start: 2023-10-10

## 2023-10-02 RX ORDER — DIPHENHYDRAMINE HYDROCHLORIDE 50 MG/ML
50 INJECTION INTRAMUSCULAR; INTRAVENOUS
Status: CANCELLED | OUTPATIENT
Start: 2023-10-10

## 2023-10-02 RX ORDER — EPINEPHRINE 1 MG/ML
0.3 INJECTION, SOLUTION INTRAMUSCULAR; SUBCUTANEOUS PRN
Status: CANCELLED | OUTPATIENT
Start: 2023-10-10

## 2023-10-02 RX ORDER — SODIUM CHLORIDE 0.9 % (FLUSH) 0.9 %
5-40 SYRINGE (ML) INJECTION PRN
Status: CANCELLED | OUTPATIENT
Start: 2023-10-10

## 2023-10-02 RX ORDER — ACETAMINOPHEN 325 MG/1
650 TABLET ORAL ONCE
Status: DISCONTINUED | OUTPATIENT
Start: 2023-10-02 | End: 2023-10-03 | Stop reason: HOSPADM

## 2023-10-02 RX ORDER — DIPHENHYDRAMINE HCL 25 MG
25 TABLET ORAL ONCE
Status: CANCELLED | OUTPATIENT
Start: 2023-10-10 | End: 2023-10-10

## 2023-10-02 RX ORDER — SODIUM CHLORIDE 0.9 % (FLUSH) 0.9 %
5-40 SYRINGE (ML) INJECTION PRN
Status: DISCONTINUED | OUTPATIENT
Start: 2023-10-02 | End: 2023-10-03 | Stop reason: HOSPADM

## 2023-10-02 RX ORDER — SODIUM CHLORIDE 9 MG/ML
5-250 INJECTION, SOLUTION INTRAVENOUS PRN
Status: CANCELLED | OUTPATIENT
Start: 2023-10-10

## 2023-10-02 RX ORDER — HEPARIN 100 UNIT/ML
500 SYRINGE INTRAVENOUS PRN
Status: CANCELLED | OUTPATIENT
Start: 2023-10-10

## 2023-10-02 RX ORDER — HEPARIN 100 UNIT/ML
500 SYRINGE INTRAVENOUS PRN
Status: DISCONTINUED | OUTPATIENT
Start: 2023-10-02 | End: 2023-10-03 | Stop reason: HOSPADM

## 2023-10-02 RX ORDER — ACETAMINOPHEN 325 MG/1
650 TABLET ORAL ONCE
Status: CANCELLED | OUTPATIENT
Start: 2023-10-10 | End: 2023-10-10

## 2023-10-02 RX ORDER — MEPERIDINE HYDROCHLORIDE 25 MG/ML
12.5 INJECTION INTRAMUSCULAR; INTRAVENOUS; SUBCUTANEOUS PRN
Status: CANCELLED | OUTPATIENT
Start: 2023-10-10

## 2023-10-02 RX ADMIN — IOPAMIDOL 80 ML: 755 INJECTION, SOLUTION INTRAVENOUS at 13:32

## 2023-10-02 RX ADMIN — IMMUNE GLOBULIN (HUMAN) 20 G: 10 INJECTION INTRAVENOUS; SUBCUTANEOUS at 11:47

## 2023-10-02 RX ADMIN — HEPARIN 500 UNITS: 100 SYRINGE at 12:45

## 2023-10-02 RX ADMIN — SODIUM CHLORIDE, PRESERVATIVE FREE 10 ML: 5 INJECTION INTRAVENOUS at 12:45

## 2023-10-02 RX ADMIN — IMMUNE GLOBULIN (HUMAN) 5 G: 10 INJECTION INTRAVENOUS; SUBCUTANEOUS at 11:16

## 2023-10-02 NOTE — PROGRESS NOTES
1100- Patient arrived to [facility] ambulatory for IVIG. Patient denies changes since last infusion. Vitals stable. Mediport accessed using sterile technique. Call light placed within reach. PT RIGHTS AND RESPONSIBILITIES OFFERED TO PT.     1116- Infusion started. Vitals stable. Patient provided with drink and snack. Denies further needs. 1145- Infusion increased. Patient tolerated. Vitals stable. 1200- Infusion increased. Patient tolerated. Vitals stable. 1215- Infusion increased to max rate. Patient tolerated. Vitals stable. 1243- Infusion complete. Patient tolerated well with no issues. Vitals stable. Mediport de accessed using heparin per [facility]     1250- Discharge instructions reviewed with patient. Verbalized understanding with no further questions. AVS provided. Discharged ambulatory to [facility] in stable condition.            __M__ Safety:       (Environmental)  [place] to environment  Ensure ID band is correct and in place/ allergy band as needed  Assess for fall risk  Initiate fall precautions as applicable (fall band, side rails, etc.)  Call light within reach  Bed in low position/ wheels locked    __M__ Pain:       Assess pain level and characteristics  Administer analgesics as ordered  Assess effectiveness of pain management and report to MD as needed    __M__ Knowledge Deficit:  Assess baseline knowledge  Provide teaching at level of understanding  Provide teaching via preferred learning method  Evaluate teaching effectiveness    __M__ Hemodynamic/Respiratory Status:       (Pre and Post Procedure Monitoring)  Assess/Monitor vital signs and LOC  Assess Baseline SpO2 prior to any sedation  Obtain weight/height  Assess vital signs/ LOC until patient meets discharge criteria  Monitor procedure site and notify MD of any issues    __M__ Infection-Risk of Central Venous Catheter:  Monitor for infection signs and symptoms (catheter site redness, temperature elevation, etc)  Assess for infection risks  Educate regarding infection prevention  Manage central venous catheter (flushes/ dressing changes per protocol)

## 2023-10-02 NOTE — DISCHARGE INSTRUCTIONS
ACTIVITY:  Continue usual care with your doctor. Call your doctor immediately if any severe problems or go to the nearest emergency room. I have been treated and hereby acknowledge receiving this instruction sheet.                   Next IVIG infusion: Monday October 30 @ 11:00am

## 2023-10-07 RX ORDER — SODIUM CHLORIDE 9 MG/ML
5-250 INJECTION, SOLUTION INTRAVENOUS PRN
Status: CANCELLED | OUTPATIENT
Start: 2023-10-07

## 2023-10-07 RX ORDER — MEPERIDINE HYDROCHLORIDE 25 MG/ML
12.5 INJECTION INTRAMUSCULAR; INTRAVENOUS; SUBCUTANEOUS PRN
Status: CANCELLED | OUTPATIENT
Start: 2023-10-07

## 2023-10-07 RX ORDER — HEPARIN 100 UNIT/ML
500 SYRINGE INTRAVENOUS PRN
Status: CANCELLED | OUTPATIENT
Start: 2023-10-07

## 2023-10-07 RX ORDER — DIPHENHYDRAMINE HYDROCHLORIDE 50 MG/ML
50 INJECTION INTRAMUSCULAR; INTRAVENOUS
Status: CANCELLED | OUTPATIENT
Start: 2023-10-07

## 2023-10-07 RX ORDER — SODIUM CHLORIDE 0.9 % (FLUSH) 0.9 %
5-40 SYRINGE (ML) INJECTION PRN
Status: CANCELLED | OUTPATIENT
Start: 2023-10-07

## 2023-10-07 RX ORDER — SODIUM CHLORIDE 9 MG/ML
INJECTION, SOLUTION INTRAVENOUS CONTINUOUS
Status: CANCELLED | OUTPATIENT
Start: 2023-10-07

## 2023-10-07 RX ORDER — ACETAMINOPHEN 325 MG/1
650 TABLET ORAL
Status: CANCELLED | OUTPATIENT
Start: 2023-10-07

## 2023-10-07 RX ORDER — ALBUTEROL SULFATE 90 UG/1
4 AEROSOL, METERED RESPIRATORY (INHALATION) PRN
Status: CANCELLED | OUTPATIENT
Start: 2023-10-07

## 2023-10-07 RX ORDER — ACETAMINOPHEN 325 MG/1
650 TABLET ORAL ONCE
Status: CANCELLED | OUTPATIENT
Start: 2023-10-07 | End: 2023-10-07

## 2023-10-07 RX ORDER — ONDANSETRON 2 MG/ML
8 INJECTION INTRAMUSCULAR; INTRAVENOUS
Status: CANCELLED | OUTPATIENT
Start: 2023-10-07

## 2023-10-07 RX ORDER — EPINEPHRINE 1 MG/ML
0.3 INJECTION, SOLUTION, CONCENTRATE INTRAVENOUS PRN
Status: CANCELLED | OUTPATIENT
Start: 2023-10-07

## 2023-10-07 RX ORDER — DIPHENHYDRAMINE HCL 25 MG
25 TABLET ORAL ONCE
Status: CANCELLED | OUTPATIENT
Start: 2023-10-07 | End: 2023-10-07

## 2023-10-30 ENCOUNTER — HOSPITAL ENCOUNTER (OUTPATIENT)
Dept: NURSING | Age: 78
Discharge: HOME | End: 2023-10-30
Payer: MEDICARE

## 2023-10-30 VITALS
OXYGEN SATURATION: 98 % | WEIGHT: 229.28 LBS | TEMPERATURE: 97.9 F | DIASTOLIC BLOOD PRESSURE: 82 MMHG | RESPIRATION RATE: 18 BRPM | HEART RATE: 64 BPM | SYSTOLIC BLOOD PRESSURE: 180 MMHG | BODY MASS INDEX: 37.01 KG/M2

## 2023-10-30 DIAGNOSIS — D83.9 COMMON VARIABLE IMMUNODEFICIENCY (HCC): Primary | ICD-10-CM

## 2023-10-30 PROCEDURE — 6360000002 HC RX W HCPCS: Performed by: FAMILY MEDICINE

## 2023-10-30 PROCEDURE — 36591 DRAW BLOOD OFF VENOUS DEVICE: CPT

## 2023-10-30 PROCEDURE — 2580000003 HC RX 258: Performed by: FAMILY MEDICINE

## 2023-10-30 PROCEDURE — 96413 CHEMO IV INFUSION 1 HR: CPT

## 2023-10-30 PROCEDURE — 82784 ASSAY IGA/IGD/IGG/IGM EACH: CPT

## 2023-10-30 PROCEDURE — 96365 THER/PROPH/DIAG IV INF INIT: CPT

## 2023-10-30 RX ORDER — DIPHENHYDRAMINE HCL 25 MG
25 TABLET ORAL ONCE
OUTPATIENT
Start: 2023-11-27 | End: 2023-11-27

## 2023-10-30 RX ORDER — HEPARIN 100 UNIT/ML
500 SYRINGE INTRAVENOUS PRN
OUTPATIENT
Start: 2023-11-27

## 2023-10-30 RX ORDER — EPINEPHRINE 1 MG/ML
0.3 INJECTION, SOLUTION INTRAMUSCULAR; SUBCUTANEOUS PRN
OUTPATIENT
Start: 2023-11-27

## 2023-10-30 RX ORDER — SODIUM CHLORIDE 0.9 % (FLUSH) 0.9 %
5-40 SYRINGE (ML) INJECTION PRN
Status: DISCONTINUED | OUTPATIENT
Start: 2023-10-30 | End: 2023-10-31 | Stop reason: HOSPADM

## 2023-10-30 RX ORDER — ONDANSETRON 2 MG/ML
8 INJECTION INTRAMUSCULAR; INTRAVENOUS
OUTPATIENT
Start: 2023-11-27

## 2023-10-30 RX ORDER — SODIUM CHLORIDE 0.9 % (FLUSH) 0.9 %
5-40 SYRINGE (ML) INJECTION PRN
OUTPATIENT
Start: 2023-11-27

## 2023-10-30 RX ORDER — SODIUM CHLORIDE 9 MG/ML
5-250 INJECTION, SOLUTION INTRAVENOUS PRN
OUTPATIENT
Start: 2023-11-27

## 2023-10-30 RX ORDER — HEPARIN 100 UNIT/ML
500 SYRINGE INTRAVENOUS PRN
Status: DISCONTINUED | OUTPATIENT
Start: 2023-10-30 | End: 2023-10-31 | Stop reason: HOSPADM

## 2023-10-30 RX ORDER — ALBUTEROL SULFATE 90 UG/1
4 AEROSOL, METERED RESPIRATORY (INHALATION) PRN
OUTPATIENT
Start: 2023-11-27

## 2023-10-30 RX ORDER — ACETAMINOPHEN 325 MG/1
650 TABLET ORAL
OUTPATIENT
Start: 2023-11-27

## 2023-10-30 RX ORDER — DIPHENHYDRAMINE HCL 25 MG
25 TABLET ORAL ONCE
Status: DISCONTINUED | OUTPATIENT
Start: 2023-10-30 | End: 2023-10-31 | Stop reason: HOSPADM

## 2023-10-30 RX ORDER — MEPERIDINE HYDROCHLORIDE 25 MG/ML
12.5 INJECTION INTRAMUSCULAR; INTRAVENOUS; SUBCUTANEOUS PRN
OUTPATIENT
Start: 2023-11-27

## 2023-10-30 RX ORDER — ACETAMINOPHEN 325 MG/1
650 TABLET ORAL ONCE
OUTPATIENT
Start: 2023-11-27 | End: 2023-11-27

## 2023-10-30 RX ORDER — DIPHENHYDRAMINE HYDROCHLORIDE 50 MG/ML
50 INJECTION INTRAMUSCULAR; INTRAVENOUS
OUTPATIENT
Start: 2023-11-27

## 2023-10-30 RX ORDER — ACETAMINOPHEN 325 MG/1
650 TABLET ORAL ONCE
Status: DISCONTINUED | OUTPATIENT
Start: 2023-10-30 | End: 2023-10-31 | Stop reason: HOSPADM

## 2023-10-30 RX ORDER — SODIUM CHLORIDE 9 MG/ML
INJECTION, SOLUTION INTRAVENOUS CONTINUOUS
OUTPATIENT
Start: 2023-11-27

## 2023-10-30 RX ADMIN — IMMUNE GLOBULIN (HUMAN) 20 G: 10 INJECTION INTRAVENOUS; SUBCUTANEOUS at 12:23

## 2023-10-30 RX ADMIN — SODIUM CHLORIDE, PRESERVATIVE FREE 10 ML: 5 INJECTION INTRAVENOUS at 11:17

## 2023-10-30 RX ADMIN — IMMUNE GLOBULIN (HUMAN) 5 G: 10 INJECTION INTRAVENOUS; SUBCUTANEOUS at 11:53

## 2023-10-30 RX ADMIN — SODIUM CHLORIDE, PRESERVATIVE FREE 10 ML: 5 INJECTION INTRAVENOUS at 13:18

## 2023-10-30 RX ADMIN — HEPARIN 500 UNITS: 100 SYRINGE at 13:18

## 2023-10-30 ASSESSMENT — PAIN - FUNCTIONAL ASSESSMENT: PAIN_FUNCTIONAL_ASSESSMENT: 0-10

## 2023-10-30 ASSESSMENT — PAIN DESCRIPTION - DESCRIPTORS: DESCRIPTORS: ACHING

## 2023-10-30 NOTE — PROGRESS NOTES
1050 pt admitted to [facility] per  ambulation for an ivig infusion . Pt states she has not been sick recently. Pt also due for labwork. PT RIGHTS AND RESPONSIBILITIES OFFERED TO PT. PORT ACCESSED WITH BLOOD RETURN NOTED. FLUSHES. PT TOOK HER OWN PREMEDS AT HOME.   1153 infusion in progress for ivig   1225 no concerns noted. 1240 resting quielty at present. 1300 up to bathroom  1321 infusion completed pt mikki well. Discharge instructions given to patient. Verbalzie understanding. no concerns noted. 1330 discharge per ambulation to home.                          _m___ Safety:       (Environmental)  [place] to environment  Ensure ID band is correct and in place/ allergy band as needed  Assess for fall risk  Initiate fall precautions as applicable (fall band, side rails, etc.)  Call light within reach  Bed in low position/ wheels locked    __m__ Pain:       Assess pain level and characteristics  Administer analgesics as ordered  Assess effectiveness of pain management and report to MD as needed  m  ____ Knowledge Deficit:  Assess baseline knowledge  Provide teaching at level of understanding  Provide teaching via preferred learning method  Evaluate teaching effectiveness    __m__ Hemodynamic/Respiratory Status:       (Pre and Post Procedure Monitoring)  Assess/Monitor vital signs and LOC  Assess Baseline SpO2 prior to any sedation  Obtain weight/height  Assess vital signs/ LOC until patient meets discharge criteria  Monitor procedure site and notify MD of any issues    ___m_ Infection-Risk of Central Venous Catheter:  Monitor for infection signs and symptoms (catheter site redness, temperature elevation, etc)  Assess for infection risks  Educate regarding infection prevention  Manage central venous catheter (flushes/ dressing changes per protocol)

## 2023-10-30 NOTE — DISCHARGE INSTRUCTIONS
IVIG DISCHARGE INSTRUCTION SHEET        YOU ARE ADVISED TO CARRY OUT THE FOLLOWING INSTRUCTIONS:    DIET:  As tolerated    ACTIVITY:  As tolerated    OTHER:  Call your doctor or return to the nearest Emergency Room if:     A. Within 2 hrs:  Dizziness, trouble breathing, rash   B.  3 - 12 days after infusion:  Fever, rash, sore throat, swelling to hands/face, `        trouble breathing or swelling, joint pain, cough, or fatigue        Next ivig infusion is scheduled on: November 27th , Monday      at:    [address] [place] [address].    [phone]

## 2023-11-01 LAB
IGA SERPL-MCNC: 111 MG/DL (ref 70–400)
IGG SERPL-MCNC: 1046 MG/DL (ref 700–1600)
IGM SERPL-MCNC: 43 MG/DL (ref 40–230)

## 2023-11-27 ENCOUNTER — HOSPITAL ENCOUNTER (OUTPATIENT)
Dept: NURSING | Age: 78
Discharge: HOME | End: 2023-11-27
Payer: MEDICARE

## 2023-11-27 VITALS
OXYGEN SATURATION: 94 % | WEIGHT: 230 LBS | RESPIRATION RATE: 16 BRPM | SYSTOLIC BLOOD PRESSURE: 144 MMHG | BODY MASS INDEX: 37.12 KG/M2 | TEMPERATURE: 97 F | HEART RATE: 64 BPM | DIASTOLIC BLOOD PRESSURE: 67 MMHG

## 2023-11-27 DIAGNOSIS — D83.9 COMMON VARIABLE IMMUNODEFICIENCY (HCC): Primary | ICD-10-CM

## 2023-11-27 PROCEDURE — 2580000003 HC RX 258: Performed by: FAMILY MEDICINE

## 2023-11-27 PROCEDURE — 96365 THER/PROPH/DIAG IV INF INIT: CPT

## 2023-11-27 PROCEDURE — 96413 CHEMO IV INFUSION 1 HR: CPT

## 2023-11-27 PROCEDURE — 6360000002 HC RX W HCPCS: Performed by: FAMILY MEDICINE

## 2023-11-27 RX ORDER — ONDANSETRON 2 MG/ML
8 INJECTION INTRAMUSCULAR; INTRAVENOUS
OUTPATIENT
Start: 2023-12-25

## 2023-11-27 RX ORDER — HEPARIN 100 UNIT/ML
500 SYRINGE INTRAVENOUS PRN
Status: DISCONTINUED | OUTPATIENT
Start: 2023-11-27 | End: 2023-11-28 | Stop reason: HOSPADM

## 2023-11-27 RX ORDER — SODIUM CHLORIDE 9 MG/ML
5-250 INJECTION, SOLUTION INTRAVENOUS PRN
OUTPATIENT
Start: 2023-12-25

## 2023-11-27 RX ORDER — ACETAMINOPHEN 325 MG/1
650 TABLET ORAL ONCE
OUTPATIENT
Start: 2023-12-25 | End: 2023-12-25

## 2023-11-27 RX ORDER — SODIUM CHLORIDE 0.9 % (FLUSH) 0.9 %
5-40 SYRINGE (ML) INJECTION PRN
OUTPATIENT
Start: 2023-12-25

## 2023-11-27 RX ORDER — ACETAMINOPHEN 325 MG/1
650 TABLET ORAL
OUTPATIENT
Start: 2023-12-25

## 2023-11-27 RX ORDER — SODIUM CHLORIDE 0.9 % (FLUSH) 0.9 %
5-40 SYRINGE (ML) INJECTION PRN
Status: DISCONTINUED | OUTPATIENT
Start: 2023-11-27 | End: 2023-11-28 | Stop reason: HOSPADM

## 2023-11-27 RX ORDER — MEPERIDINE HYDROCHLORIDE 25 MG/ML
12.5 INJECTION INTRAMUSCULAR; INTRAVENOUS; SUBCUTANEOUS PRN
OUTPATIENT
Start: 2023-12-25

## 2023-11-27 RX ORDER — DIPHENHYDRAMINE HYDROCHLORIDE 50 MG/ML
50 INJECTION INTRAMUSCULAR; INTRAVENOUS
OUTPATIENT
Start: 2023-12-25

## 2023-11-27 RX ORDER — ACETAMINOPHEN 325 MG/1
650 TABLET ORAL ONCE
Status: DISCONTINUED | OUTPATIENT
Start: 2023-11-27 | End: 2023-11-28 | Stop reason: HOSPADM

## 2023-11-27 RX ORDER — DIPHENHYDRAMINE HCL 25 MG
25 TABLET ORAL ONCE
OUTPATIENT
Start: 2023-12-25 | End: 2023-12-25

## 2023-11-27 RX ORDER — ALBUTEROL SULFATE 90 UG/1
4 AEROSOL, METERED RESPIRATORY (INHALATION) PRN
OUTPATIENT
Start: 2023-12-25

## 2023-11-27 RX ORDER — DIPHENHYDRAMINE HCL 25 MG
25 TABLET ORAL ONCE
Status: DISCONTINUED | OUTPATIENT
Start: 2023-11-27 | End: 2023-11-28 | Stop reason: HOSPADM

## 2023-11-27 RX ORDER — EPINEPHRINE 1 MG/ML
0.3 INJECTION, SOLUTION INTRAMUSCULAR; SUBCUTANEOUS PRN
OUTPATIENT
Start: 2023-12-25

## 2023-11-27 RX ORDER — SODIUM CHLORIDE 9 MG/ML
INJECTION, SOLUTION INTRAVENOUS CONTINUOUS
OUTPATIENT
Start: 2023-12-25

## 2023-11-27 RX ORDER — HEPARIN 100 UNIT/ML
500 SYRINGE INTRAVENOUS PRN
OUTPATIENT
Start: 2023-12-25

## 2023-11-27 RX ADMIN — IMMUNE GLOBULIN (HUMAN) 20 G: 10 INJECTION INTRAVENOUS; SUBCUTANEOUS at 11:30

## 2023-11-27 RX ADMIN — SODIUM CHLORIDE, PRESERVATIVE FREE 10 ML: 5 INJECTION INTRAVENOUS at 10:50

## 2023-11-27 RX ADMIN — SODIUM CHLORIDE, PRESERVATIVE FREE 10 ML: 5 INJECTION INTRAVENOUS at 12:20

## 2023-11-27 RX ADMIN — HEPARIN 500 UNITS: 100 SYRINGE at 12:20

## 2023-11-27 RX ADMIN — IMMUNE GLOBULIN (HUMAN) 5 G: 10 INJECTION INTRAVENOUS; SUBCUTANEOUS at 10:59

## 2023-11-27 NOTE — PROGRESS NOTES
1040 pt admitted to [facility] per ambulation for a ivig infusion. Pt has mikki in the past.  Port accessed. PT RIGHTS AND RESPONSIBILITIES OFFERED TO PT. Resting quietly. 1100 drink taken. Infusion started. 1130 no issues noted. 1145 mikki infusion well. 1200 infusion continues. 1220 infusion completed. Pt mikki well. Port deaccessed. Flushed. Discharge instructions given to patient. Verbalize understanding.     1230 discharge per ambulation to home.                   __m__ Safety:       (Environmental)  [place] to environment  Ensure ID band is correct and in place/ allergy band as needed  Assess for fall risk  Initiate fall precautions as applicable (fall band, side rails, etc.)  Call light within reach  Bed in low position/ wheels locked    __m__ Pain:       Assess pain level and characteristics  Administer analgesics as ordered  Assess effectiveness of pain management and report to MD as needed    __m__ Knowledge Deficit:  Assess baseline knowledge  Provide teaching at level of understanding  Provide teaching via preferred learning method  Evaluate teaching effectiveness    __m__ Hemodynamic/Respiratory Status:       (Pre and Post Procedure Monitoring)  Assess/Monitor vital signs and LOC  Assess Baseline SpO2 prior to any sedation  Obtain weight/height  Assess vital signs/ LOC until patient meets discharge criteria  Monitor procedure site and notify MD of any issues    ___m_ Infection-Risk of Central Venous Catheter:  Monitor for infection signs and symptoms (catheter site redness, temperature elevation, etc)  Assess for infection risks  Educate regarding infection prevention  Manage central venous catheter (flushes/ dressing changes per protocol)

## 2023-11-27 NOTE — DISCHARGE INSTRUCTIONS
IVIG DISCHARGE INSTRUCTION SHEET        YOU ARE ADVISED TO CARRY OUT THE FOLLOWING INSTRUCTIONS:    DIET:  As tolerated    ACTIVITY:  As tolerated    OTHER:  Call your doctor or return to the nearest Emergency Room if:     A.   Within 2 hrs:  Dizziness, trouble breathing, rash   B.  3 - 12 days after infusion:  Fever, rash, sore throat, swelling to hands/face, `        trouble breathing or swelling, joint pain, cough, or fatigue        Next ivig infusion is scheduled on:  December 26th, Tuesday    at:   1100

## 2023-12-26 ENCOUNTER — HOSPITAL ENCOUNTER (OUTPATIENT)
Dept: NURSING | Age: 78
Discharge: HOME | End: 2023-12-26

## 2023-12-26 VITALS — WEIGHT: 235 LBS | BODY MASS INDEX: 37.93 KG/M2

## 2023-12-26 DIAGNOSIS — D83.9 COMMON VARIABLE IMMUNODEFICIENCY (HCC): Primary | ICD-10-CM

## 2023-12-26 RX ORDER — SODIUM CHLORIDE 0.9 % (FLUSH) 0.9 %
5-40 SYRINGE (ML) INJECTION PRN
OUTPATIENT
Start: 2024-01-23

## 2023-12-26 RX ORDER — DIPHENHYDRAMINE HCL 25 MG
25 TABLET ORAL ONCE
OUTPATIENT
Start: 2024-01-23 | End: 2024-01-23

## 2023-12-26 RX ORDER — ACETAMINOPHEN 325 MG/1
650 TABLET ORAL ONCE
OUTPATIENT
Start: 2024-01-23 | End: 2024-01-23

## 2023-12-26 RX ORDER — SODIUM CHLORIDE 9 MG/ML
INJECTION, SOLUTION INTRAVENOUS CONTINUOUS
OUTPATIENT
Start: 2024-01-23

## 2023-12-26 RX ORDER — SODIUM CHLORIDE 9 MG/ML
5-250 INJECTION, SOLUTION INTRAVENOUS PRN
OUTPATIENT
Start: 2024-01-23

## 2023-12-26 RX ORDER — ACETAMINOPHEN 325 MG/1
650 TABLET ORAL ONCE
Status: DISCONTINUED | OUTPATIENT
Start: 2023-12-26 | End: 2023-12-26

## 2023-12-26 RX ORDER — ALBUTEROL SULFATE 90 UG/1
4 AEROSOL, METERED RESPIRATORY (INHALATION) PRN
OUTPATIENT
Start: 2024-01-23

## 2023-12-26 RX ORDER — MEPERIDINE HYDROCHLORIDE 25 MG/ML
12.5 INJECTION INTRAMUSCULAR; INTRAVENOUS; SUBCUTANEOUS PRN
OUTPATIENT
Start: 2024-01-23

## 2023-12-26 RX ORDER — HEPARIN 100 UNIT/ML
500 SYRINGE INTRAVENOUS PRN
OUTPATIENT
Start: 2024-01-23

## 2023-12-26 RX ORDER — ONDANSETRON 2 MG/ML
8 INJECTION INTRAMUSCULAR; INTRAVENOUS
OUTPATIENT
Start: 2024-01-23

## 2023-12-26 RX ORDER — DIPHENHYDRAMINE HYDROCHLORIDE 50 MG/ML
50 INJECTION INTRAMUSCULAR; INTRAVENOUS
OUTPATIENT
Start: 2024-01-23

## 2023-12-26 RX ORDER — DIPHENHYDRAMINE HCL 25 MG
25 TABLET ORAL ONCE
Status: DISCONTINUED | OUTPATIENT
Start: 2023-12-26 | End: 2023-12-26

## 2023-12-26 RX ORDER — ACETAMINOPHEN 325 MG/1
650 TABLET ORAL
OUTPATIENT
Start: 2024-01-23

## 2023-12-26 RX ORDER — SODIUM CHLORIDE 0.9 % (FLUSH) 0.9 %
5-40 SYRINGE (ML) INJECTION PRN
Status: DISCONTINUED | OUTPATIENT
Start: 2023-12-26 | End: 2023-12-26

## 2023-12-26 RX ORDER — EPINEPHRINE 1 MG/ML
0.3 INJECTION, SOLUTION INTRAMUSCULAR; SUBCUTANEOUS PRN
OUTPATIENT
Start: 2024-01-23

## 2023-12-26 NOTE — DISCHARGE INSTRUCTIONS
ACTIVITY:  Continue usual care with your doctor. Call your doctor immediately if any severe problems or go to the nearest emergency room. Next IVIG infusion is scheduled for Tuesday January 23rd at 11:00 am.   Please call if you have any questions [phone]    I have been treated and hereby acknowledge receiving this instruction sheet.

## 2024-01-05 ENCOUNTER — HOSPITAL ENCOUNTER (OUTPATIENT)
Dept: NURSING | Age: 79
Discharge: HOME | End: 2024-01-05

## 2024-01-15 ENCOUNTER — HOSPITAL ENCOUNTER (OUTPATIENT)
Dept: NURSING | Age: 79
Discharge: HOME | End: 2024-01-15
Payer: MEDICARE

## 2024-01-15 VITALS
BODY MASS INDEX: 36.8 KG/M2 | DIASTOLIC BLOOD PRESSURE: 66 MMHG | RESPIRATION RATE: 18 BRPM | TEMPERATURE: 97 F | WEIGHT: 228 LBS | OXYGEN SATURATION: 95 % | SYSTOLIC BLOOD PRESSURE: 136 MMHG | HEART RATE: 60 BPM

## 2024-01-15 DIAGNOSIS — D83.9 COMMON VARIABLE IMMUNODEFICIENCY (HCC): Primary | ICD-10-CM

## 2024-01-15 PROCEDURE — 96413 CHEMO IV INFUSION 1 HR: CPT

## 2024-01-15 PROCEDURE — 96365 THER/PROPH/DIAG IV INF INIT: CPT

## 2024-01-15 PROCEDURE — 2580000003 HC RX 258: Performed by: FAMILY MEDICINE

## 2024-01-15 PROCEDURE — 6360000002 HC RX W HCPCS: Performed by: FAMILY MEDICINE

## 2024-01-15 PROCEDURE — 96366 THER/PROPH/DIAG IV INF ADDON: CPT

## 2024-01-15 PROCEDURE — 96415 CHEMO IV INFUSION ADDL HR: CPT

## 2024-01-15 RX ORDER — ALBUTEROL SULFATE 90 UG/1
4 AEROSOL, METERED RESPIRATORY (INHALATION) PRN
OUTPATIENT
Start: 2024-01-22

## 2024-01-15 RX ORDER — HEPARIN 100 UNIT/ML
500 SYRINGE INTRAVENOUS PRN
OUTPATIENT
Start: 2024-01-22

## 2024-01-15 RX ORDER — DIPHENHYDRAMINE HYDROCHLORIDE 50 MG/ML
50 INJECTION INTRAMUSCULAR; INTRAVENOUS
OUTPATIENT
Start: 2024-01-22

## 2024-01-15 RX ORDER — MEPERIDINE HYDROCHLORIDE 25 MG/ML
12.5 INJECTION INTRAMUSCULAR; INTRAVENOUS; SUBCUTANEOUS PRN
OUTPATIENT
Start: 2024-01-22

## 2024-01-15 RX ORDER — ONDANSETRON 2 MG/ML
8 INJECTION INTRAMUSCULAR; INTRAVENOUS
OUTPATIENT
Start: 2024-01-22

## 2024-01-15 RX ORDER — HEPARIN 100 UNIT/ML
500 SYRINGE INTRAVENOUS PRN
Status: DISCONTINUED | OUTPATIENT
Start: 2024-01-15 | End: 2024-01-16 | Stop reason: HOSPADM

## 2024-01-15 RX ORDER — SODIUM CHLORIDE 0.9 % (FLUSH) 0.9 %
5-40 SYRINGE (ML) INJECTION PRN
Status: DISCONTINUED | OUTPATIENT
Start: 2024-01-15 | End: 2024-01-16 | Stop reason: HOSPADM

## 2024-01-15 RX ORDER — DIPHENHYDRAMINE HCL 25 MG
25 TABLET ORAL ONCE
OUTPATIENT
Start: 2024-01-22 | End: 2024-01-22

## 2024-01-15 RX ORDER — SODIUM CHLORIDE 9 MG/ML
INJECTION, SOLUTION INTRAVENOUS CONTINUOUS
OUTPATIENT
Start: 2024-01-22

## 2024-01-15 RX ORDER — EPINEPHRINE 1 MG/ML
0.3 INJECTION, SOLUTION INTRAMUSCULAR; SUBCUTANEOUS PRN
OUTPATIENT
Start: 2024-01-22

## 2024-01-15 RX ORDER — ACETAMINOPHEN 325 MG/1
650 TABLET ORAL
OUTPATIENT
Start: 2024-01-22

## 2024-01-15 RX ORDER — ACETAMINOPHEN 325 MG/1
650 TABLET ORAL ONCE
OUTPATIENT
Start: 2024-01-22 | End: 2024-01-22

## 2024-01-15 RX ORDER — SODIUM CHLORIDE 0.9 % (FLUSH) 0.9 %
5-40 SYRINGE (ML) INJECTION PRN
OUTPATIENT
Start: 2024-01-22

## 2024-01-15 RX ORDER — SODIUM CHLORIDE 9 MG/ML
5-250 INJECTION, SOLUTION INTRAVENOUS PRN
OUTPATIENT
Start: 2024-01-22

## 2024-01-15 RX ADMIN — HEPARIN 500 UNITS: 100 SYRINGE at 12:32

## 2024-01-15 RX ADMIN — IMMUNE GLOBULIN (HUMAN) 5 G: 10 INJECTION INTRAVENOUS; SUBCUTANEOUS at 11:10

## 2024-01-15 RX ADMIN — IMMUNE GLOBULIN (HUMAN) 20 G: 10 INJECTION INTRAVENOUS; SUBCUTANEOUS at 11:39

## 2024-01-15 RX ADMIN — SODIUM CHLORIDE, PRESERVATIVE FREE 10 ML: 5 INJECTION INTRAVENOUS at 12:32

## 2024-01-15 ASSESSMENT — PAIN - FUNCTIONAL ASSESSMENT: PAIN_FUNCTIONAL_ASSESSMENT: NONE - DENIES PAIN

## 2024-01-15 NOTE — DISCHARGE INSTRUCTIONS
ACTIVITY:  Continue usual care with your doctor. Call your doctor immediately if any severe problems or go to the nearest emergency room.  Next IVIG infusion is scheduled for Monday February 12th at 10:00 am.   Please call if you have any questions [phone]    I have been treated and hereby acknowledge receiving this instruction sheet.

## 2024-01-15 NOTE — PROGRESS NOTES
_m___ Safety:       (Environmental)  [place] to environment  Ensure ID band is correct and in place/ allergy band as needed  Assess for fall risk  Initiate fall precautions as applicable (fall band, side rails, etc.)  Call light within reach  Bed in low position/ wheels locked    __m__ Pain:       Assess pain level and characteristics  Administer analgesics as ordered  Assess effectiveness of pain management and report to MD as needed    _m___ Knowledge Deficit:  Assess baseline knowledge  Provide teaching at level of understanding  Provide teaching via preferred learning method  Evaluate teaching effectiveness    __m__ Hemodynamic/Respiratory Status:       (Pre and Post Procedure Monitoring)  Assess/Monitor vital signs and LOC  Assess Baseline SpO2 prior to any sedation  Obtain weight/height  Assess vital signs/ LOC until patient meets discharge criteria  Monitor procedure site and notify MD of any issues    __m__ Infection-Risk of Central Venous Catheter:  Monitor for infection signs and symptoms (catheter site redness, temperature elevation, etc)  Assess for infection risks  Educate regarding infection prevention  Manage central venous catheter (flushes/ dressing changes per protocol)

## 2024-01-15 NOTE — PROGRESS NOTES
Patient being discharged in stable condition. Written and verbal instructions given to patient she voices no question or concerns.

## 2024-01-18 ENCOUNTER — TELEPHONE (OUTPATIENT)
Dept: CARDIOLOGY CLINIC | Age: 79
End: 2024-01-18

## 2024-01-18 NOTE — TELEPHONE ENCOUNTER
Pt is scheduled to see Dennise 3-5-24 for a 2 year post WATCHMAN.  Dennise is OOT that week.    Pt calling back to reschedule this appt.  Are you able to reschedule pt?

## 2024-01-31 ENCOUNTER — TELEPHONE (OUTPATIENT)
Dept: NEPHROLOGY | Age: 79
End: 2024-01-31

## 2024-01-31 ENCOUNTER — OFFICE VISIT (OUTPATIENT)
Dept: NEPHROLOGY | Age: 79
End: 2024-01-31
Payer: MEDICARE

## 2024-01-31 VITALS
SYSTOLIC BLOOD PRESSURE: 134 MMHG | DIASTOLIC BLOOD PRESSURE: 78 MMHG | HEART RATE: 66 BPM | BODY MASS INDEX: 37.77 KG/M2 | WEIGHT: 234 LBS | OXYGEN SATURATION: 96 %

## 2024-01-31 DIAGNOSIS — N18.31 CHRONIC KIDNEY DISEASE, STAGE 3A (HCC): Primary | ICD-10-CM

## 2024-01-31 PROCEDURE — 1036F TOBACCO NON-USER: CPT | Performed by: INTERNAL MEDICINE

## 2024-01-31 PROCEDURE — G8400 PT W/DXA NO RESULTS DOC: HCPCS | Performed by: INTERNAL MEDICINE

## 2024-01-31 PROCEDURE — G8484 FLU IMMUNIZE NO ADMIN: HCPCS | Performed by: INTERNAL MEDICINE

## 2024-01-31 PROCEDURE — G8417 CALC BMI ABV UP PARAM F/U: HCPCS | Performed by: INTERNAL MEDICINE

## 2024-01-31 PROCEDURE — 3075F SYST BP GE 130 - 139MM HG: CPT | Performed by: INTERNAL MEDICINE

## 2024-01-31 PROCEDURE — 3078F DIAST BP <80 MM HG: CPT | Performed by: INTERNAL MEDICINE

## 2024-01-31 PROCEDURE — G8427 DOCREV CUR MEDS BY ELIG CLIN: HCPCS | Performed by: INTERNAL MEDICINE

## 2024-01-31 PROCEDURE — 1090F PRES/ABSN URINE INCON ASSESS: CPT | Performed by: INTERNAL MEDICINE

## 2024-01-31 PROCEDURE — 99214 OFFICE O/P EST MOD 30 MIN: CPT | Performed by: INTERNAL MEDICINE

## 2024-01-31 PROCEDURE — 1123F ACP DISCUSS/DSCN MKR DOCD: CPT | Performed by: INTERNAL MEDICINE

## 2024-01-31 RX ORDER — GUAIFENESIN AND DEXTROMETHORPHAN HYDROBROMIDE 1200; 60 MG/1; MG/1
1 TABLET, EXTENDED RELEASE ORAL EVERY 12 HOURS PRN
COMMUNITY
Start: 2023-10-27

## 2024-01-31 NOTE — PROGRESS NOTES
albuminuria and by avoiding nephrotoxins such as NSAIDs and IV contrast.  2.Electrolytes:stable  3. DM - insulin requiring, no proteinuria  4. Hx UTIs  5. Hypothyroidism   6. HTN - controlled  7. Chronic systolic CHF: takes PRN lasix  8. B/L renal cyst. Stable. Hyperdense cyst on left. Will repeat a CT scan in October          Bloodwork and medications were reviewed and plan of care discussed with the patient.  Return to clinic in 9 months  or sooner if the need arises.      Shayla Su,   [facility]

## 2024-01-31 NOTE — TELEPHONE ENCOUNTER
Called [facility]. Spoke with Anny. Explained that we have not received Analy's labs that were to be drawn on 1/25/24. I spoke with a nurse on 1/24/24 who confirmed they would be drawn on 1/25/24 and I re faxed the orders.   She apologized but says that the labs were not done.

## 2024-02-12 ENCOUNTER — HOSPITAL ENCOUNTER (OUTPATIENT)
Dept: NURSING | Age: 79
Discharge: HOME | End: 2024-02-12
Payer: MEDICARE

## 2024-02-12 VITALS
WEIGHT: 231.4 LBS | RESPIRATION RATE: 16 BRPM | BODY MASS INDEX: 37.35 KG/M2 | DIASTOLIC BLOOD PRESSURE: 72 MMHG | HEART RATE: 66 BPM | TEMPERATURE: 97.7 F | OXYGEN SATURATION: 95 % | SYSTOLIC BLOOD PRESSURE: 159 MMHG

## 2024-02-12 DIAGNOSIS — D83.9 COMMON VARIABLE IMMUNODEFICIENCY (HCC): Primary | ICD-10-CM

## 2024-02-12 PROCEDURE — 6360000002 HC RX W HCPCS: Performed by: FAMILY MEDICINE

## 2024-02-12 PROCEDURE — 96365 THER/PROPH/DIAG IV INF INIT: CPT

## 2024-02-12 PROCEDURE — 96413 CHEMO IV INFUSION 1 HR: CPT

## 2024-02-12 PROCEDURE — 36591 DRAW BLOOD OFF VENOUS DEVICE: CPT

## 2024-02-12 PROCEDURE — 2580000003 HC RX 258: Performed by: FAMILY MEDICINE

## 2024-02-12 PROCEDURE — 82784 ASSAY IGA/IGD/IGG/IGM EACH: CPT

## 2024-02-12 RX ORDER — ALBUTEROL SULFATE 90 UG/1
4 AEROSOL, METERED RESPIRATORY (INHALATION) PRN
OUTPATIENT
Start: 2024-02-19

## 2024-02-12 RX ORDER — ACETAMINOPHEN 325 MG/1
650 TABLET ORAL ONCE
Status: DISCONTINUED | OUTPATIENT
Start: 2024-02-12 | End: 2024-02-13 | Stop reason: HOSPADM

## 2024-02-12 RX ORDER — ACETAMINOPHEN 325 MG/1
650 TABLET ORAL ONCE
OUTPATIENT
Start: 2024-02-19 | End: 2024-02-19

## 2024-02-12 RX ORDER — HEPARIN 100 UNIT/ML
500 SYRINGE INTRAVENOUS PRN
OUTPATIENT
Start: 2024-02-19

## 2024-02-12 RX ORDER — MEPERIDINE HYDROCHLORIDE 25 MG/ML
12.5 INJECTION INTRAMUSCULAR; INTRAVENOUS; SUBCUTANEOUS PRN
OUTPATIENT
Start: 2024-02-19

## 2024-02-12 RX ORDER — DIPHENHYDRAMINE HCL 25 MG
25 TABLET ORAL ONCE
Status: DISCONTINUED | OUTPATIENT
Start: 2024-02-12 | End: 2024-02-13 | Stop reason: HOSPADM

## 2024-02-12 RX ORDER — SODIUM CHLORIDE 0.9 % (FLUSH) 0.9 %
5-40 SYRINGE (ML) INJECTION PRN
OUTPATIENT
Start: 2024-02-19

## 2024-02-12 RX ORDER — HEPARIN 100 UNIT/ML
500 SYRINGE INTRAVENOUS PRN
Status: DISCONTINUED | OUTPATIENT
Start: 2024-02-12 | End: 2024-02-13 | Stop reason: HOSPADM

## 2024-02-12 RX ORDER — SODIUM CHLORIDE 9 MG/ML
INJECTION, SOLUTION INTRAVENOUS CONTINUOUS
OUTPATIENT
Start: 2024-02-19

## 2024-02-12 RX ORDER — SODIUM CHLORIDE 0.9 % (FLUSH) 0.9 %
5-40 SYRINGE (ML) INJECTION PRN
Status: DISCONTINUED | OUTPATIENT
Start: 2024-02-12 | End: 2024-02-13 | Stop reason: HOSPADM

## 2024-02-12 RX ORDER — DIPHENHYDRAMINE HCL 25 MG
25 TABLET ORAL ONCE
OUTPATIENT
Start: 2024-02-19 | End: 2024-02-19

## 2024-02-12 RX ORDER — EPINEPHRINE 1 MG/ML
0.3 INJECTION, SOLUTION INTRAMUSCULAR; SUBCUTANEOUS PRN
OUTPATIENT
Start: 2024-02-19

## 2024-02-12 RX ORDER — DIPHENHYDRAMINE HYDROCHLORIDE 50 MG/ML
50 INJECTION INTRAMUSCULAR; INTRAVENOUS
OUTPATIENT
Start: 2024-02-19

## 2024-02-12 RX ORDER — ACETAMINOPHEN 325 MG/1
650 TABLET ORAL
OUTPATIENT
Start: 2024-02-19

## 2024-02-12 RX ORDER — ONDANSETRON 2 MG/ML
8 INJECTION INTRAMUSCULAR; INTRAVENOUS
OUTPATIENT
Start: 2024-02-19

## 2024-02-12 RX ORDER — SODIUM CHLORIDE 9 MG/ML
5-250 INJECTION, SOLUTION INTRAVENOUS PRN
OUTPATIENT
Start: 2024-02-19

## 2024-02-12 RX ADMIN — SODIUM CHLORIDE, PRESERVATIVE FREE 10 ML: 5 INJECTION INTRAVENOUS at 10:30

## 2024-02-12 RX ADMIN — IMMUNE GLOBULIN (HUMAN) 20 G: 10 INJECTION INTRAVENOUS; SUBCUTANEOUS at 11:01

## 2024-02-12 RX ADMIN — SODIUM CHLORIDE, PRESERVATIVE FREE 10 ML: 5 INJECTION INTRAVENOUS at 11:53

## 2024-02-12 RX ADMIN — HEPARIN 500 UNITS: 100 SYRINGE at 11:53

## 2024-02-12 RX ADMIN — IMMUNE GLOBULIN (HUMAN) 5 G: 10 INJECTION INTRAVENOUS; SUBCUTANEOUS at 10:29

## 2024-02-12 NOTE — DISCHARGE INSTRUCTIONS
IVIG  DISCHARGE INSTRUCTION SHEET        YOU ARE ADVISED TO CARRY OUT THE FOLLOWING INSTRUCTIONS:    DIET:  As tolerated    ACTIVITY:  As tolerated    OTHER:  Call your doctor or return to the nearest Emergency Room if:     A.  Within 2 hrs:  Dizziness, trouble breathing, rash   B.  3 - 12 days after infusion:  Fever, rash, sore throat, swelling to hands/face, `        trouble breathing or swelling, joint pain, cough, or fatigue        Next IVIG infusion is scheduled on:  MARCH 11TH , MONDAY     at:  10 AM

## 2024-02-12 NOTE — PROGRESS NOTES
1005 pt admitted to [facility] per ambulation for an ivig infusion.  Pt denies being ill at present.  PT RIGHTS AND RESPONSIBILITIES OFFERED TO PT. Pt states she took her premeds at home.  Port accessed.  Lab sent.  Blood return sluggish.  Flushes easily.   1030 infusion in progress. Pop taken.  1100 rate increased.  No complaints noted.   1115 pt on phone at present.   1130 pt resting quietly in bed.  No concerns noted.   1155 infusion completed. Pt mikki well. Stable. Port deaccessed with no issues.  Discharge instructions given to patient. Verbalize understanding of home going instructions.    1210 pt discharged per ambulation to home.              __m__ Safety:       (Environmental)  [place] to environment  Ensure ID band is correct and in place/ allergy band as needed  Assess for fall risk  Initiate fall precautions as applicable (fall band, side rails, etc.)  Call light within reach  Bed in low position/ wheels locked    __m__ Pain:       Assess pain level and characteristics  Administer analgesics as ordered  Assess effectiveness of pain management and report to MD as needed    __m__ Knowledge Deficit:  Assess baseline knowledge  Provide teaching at level of understanding  Provide teaching via preferred learning method  Evaluate teaching effectiveness    __m__ Hemodynamic/Respiratory Status:       (Pre and Post Procedure Monitoring)  Assess/Monitor vital signs and LOC  Assess Baseline SpO2 prior to any sedation  Obtain weight/height  Assess vital signs/ LOC until patient meets discharge criteria  Monitor procedure site and notify MD of any issues    ___m_ Infection-Risk of Central Venous Catheter:  Monitor for infection signs and symptoms (catheter site redness, temperature elevation, etc)  Assess for infection risks  Educate regarding infection prevention  Manage central venous catheter (flushes/ dressing changes per protocol)

## 2024-02-13 LAB
IGA SERPL-MCNC: 115 MG/DL (ref 70–400)
IGG SERPL-MCNC: 1013 MG/DL (ref 700–1600)
IGM SERPL-MCNC: 52 MG/DL (ref 40–230)

## 2024-02-14 NOTE — PROGRESS NOTES
Patient admitted to room 5 for a IV infusion, vitals are stable. Patient offered rights and responsibilities.
Patient being discharged  in stable condition. Written and verbal instructions given to patient and family, they voice no questions are concerns.
Patients infusion complete, she tolerated well.
_m___ Safety:       (Environmental)   [place] to environment   Ensure ID band is correct and in place/ allergy band as needed   Assess for fall risk   Initiate fall precautions as applicable (fall band, side rails, etc.)   Call light within reach   Bed in low position/ wheels locked    ____ Pain:        Assess pain level and characteristics   Administer analgesics as ordered   Assess effectiveness of pain management and report to MD as needed    ____ Knowledge Deficit:   Assess baseline knowledge   Provide teaching at level of understanding   Provide teaching via preferred learning method   Evaluate teaching effectiveness    ____ Hemodynamic/Respiratory Status:       (Pre and Post Procedure Monitoring)   Assess/Monitor vital signs and LOC   Assess Baseline SpO2 prior to any sedation   Obtain weight/height   Assess vital signs/ LOC until patient meets discharge criteria   Monitor procedure site and notify MD of any issues    ____ Infection-Risk of Central Venous Catheter:   Monitor for infection signs and symptoms (catheter site redness, temperature elevation, etc)   Assess for infection risks   Educate regarding infection prevention   Manage central venous catheter (flushes/ dressing changes per protocol)
none

## 2024-02-20 ENCOUNTER — OFFICE VISIT (OUTPATIENT)
Dept: CARDIOLOGY CLINIC | Age: 79
End: 2024-02-20
Payer: MEDICARE

## 2024-02-20 VITALS
BODY MASS INDEX: 38.49 KG/M2 | HEART RATE: 82 BPM | SYSTOLIC BLOOD PRESSURE: 112 MMHG | DIASTOLIC BLOOD PRESSURE: 60 MMHG | WEIGHT: 231 LBS | HEIGHT: 65 IN

## 2024-02-20 DIAGNOSIS — I48.0 PAROXYSMAL ATRIAL FIBRILLATION (HCC): Primary | ICD-10-CM

## 2024-02-20 DIAGNOSIS — I42.0 DILATED CARDIOMYOPATHY (HCC): ICD-10-CM

## 2024-02-20 DIAGNOSIS — I10 PRIMARY HYPERTENSION: ICD-10-CM

## 2024-02-20 DIAGNOSIS — Z95.818 PRESENCE OF WATCHMAN LEFT ATRIAL APPENDAGE CLOSURE DEVICE: ICD-10-CM

## 2024-02-20 PROCEDURE — 3074F SYST BP LT 130 MM HG: CPT | Performed by: REGISTERED NURSE

## 2024-02-20 PROCEDURE — G8484 FLU IMMUNIZE NO ADMIN: HCPCS | Performed by: REGISTERED NURSE

## 2024-02-20 PROCEDURE — G8417 CALC BMI ABV UP PARAM F/U: HCPCS | Performed by: REGISTERED NURSE

## 2024-02-20 PROCEDURE — G8400 PT W/DXA NO RESULTS DOC: HCPCS | Performed by: REGISTERED NURSE

## 2024-02-20 PROCEDURE — 99213 OFFICE O/P EST LOW 20 MIN: CPT | Performed by: REGISTERED NURSE

## 2024-02-20 PROCEDURE — 1123F ACP DISCUSS/DSCN MKR DOCD: CPT | Performed by: REGISTERED NURSE

## 2024-02-20 PROCEDURE — 3078F DIAST BP <80 MM HG: CPT | Performed by: REGISTERED NURSE

## 2024-02-20 PROCEDURE — 1090F PRES/ABSN URINE INCON ASSESS: CPT | Performed by: REGISTERED NURSE

## 2024-02-20 PROCEDURE — G8427 DOCREV CUR MEDS BY ELIG CLIN: HCPCS | Performed by: REGISTERED NURSE

## 2024-02-20 PROCEDURE — 1036F TOBACCO NON-USER: CPT | Performed by: REGISTERED NURSE

## 2024-02-20 NOTE — PATIENT INSTRUCTIONS
Continue current medications as prescribed.    Continue the following:  Daily weights  Fluid restriction of 2 Liters per day  Limit sodium in diet to around 3529-4588 mg/day  Monitor BP  Activity as tolerated       Follow-up with your PCP as scheduled.    Follow-up with Dr. Trinh in 6 months  as scheduled or sooner if need.

## 2024-02-20 NOTE — PROGRESS NOTES
[facility] [place] [facility]  [facility]  [address]  [address]  [place] [place] [address]  Dept: [phone]  Dept Fax: [phone]  Loc: [phone]    Visit Date: 2/20/2024    Ms. Reyna is a 78 y.o. female  who presented for:  Chief Complaint   Patient presents with    Follow-up     6 month follow up         Cardiologist:  Dr. Trinh      Today's Visit   HPI: 78 y.o F with PMH PAF s/p WATCHMAN, prior PVC ablation, EF 45, nonobstructive CAD, COPD, HTN, HLD presents today for 6 month follow up. Doing well from cardiac standpoint- no chest pain, mild TREJO with activity that is at baseline; does use O2 at night and with exertion, no change to needs. No palpitatations, dizziness, syncope. Exercising several times a week. Takes her PRN lasix about twice a week. Compliant with meds.       Subjective     Review of Systems   Constitutional: Negative for chills and fever  HENT: Negative for congestion, sinus pressure, sneezing and sore throat.    Eyes: Negative for pain, discharge, redness and itching.   Respiratory: Negative for  increaesed shortness of breath, cough, or sputum production  Cardiac: Negative for chest pain, pressure, or palpitations  Gastrointestinal: Negative for blood in stool, constipation, diarrhea   Endocrine: Negative for cold intolerance, heat intolerance, polydipsia.  Genitourinary: Negative for dysuria, enuresis, flank pain and hematuria.   Musculoskeletal: Negative for arthralgias, joint swelling. Having neck pain- seeing [facility] soon for injections.  Neurological: Negative for numbness and headaches.   Psychiatric/Behavioral: Negative for agitation, confusion, decreased concentration and dysphoric mood.       Past Medical History:   Diagnosis Date    Anemia     Atrial fibrillation (HCC) 11/09/2017    CAD (coronary artery disease)     CHF (congestive heart failure) ([facility])     Chronic kidney disease     stage 3 kidney     COPD (chronic obstructive pulmonary

## 2024-02-21 NOTE — CARE COORDINATION
DISCHARGE BARRIERS  9/21/18, 9:48 AM    Reason for Referral:  \"from Ana Valdez\"  Mental Status:  Alert and oriented   Decision Making:  Makes her decisions with her family, daughter, Yunier Pennington is listed as her POA  Family/Social/Home Environment:  [facility]. She has a Medicaid bed hold and could return skilled under her Medicare benefit if meets criteria, spoke to Oh Keita with admissions  Current Services: All provided by [facility]  Current Equipment:  All provided by [facility]  Payment Source: Medicare and Medicaid  Concerns or Barriers to Discharge:  SW attempted to see patient but she was down for testing. SW tried to contact Katy, her daughter, but could not leave a voice mail message. SW left a message with her son, Jose Mack regarding plan for discharge  Collabrative List of ECF/HH were provided: N/A    Teach Back Method used with ECF staff regarding care plan and needs  ECF staff verbalize understanding of the plan of care and contribute to goal setting. Anticipated Needs/Discharge Plan:  ECF is able to accept patient at time of discharge.  Await return call from family    Electronically signed by JESSIE Cunningham on 9/21/2018 at 9:48 AM
no

## 2024-02-27 NOTE — PROGRESS NOTES
1038 Pt arrives to PACU, alert to voice. Resp easy and unlabored, placed on 4 L NC. Denies pain. 1050 Pt continues to deny pain, resp easy and unlabored. VSS    1100 Pt resting in bed with eyes closed. Resp easy and unlabored on RA. VSS. Denies pain. 1108 Pt meets criteria for discharge from PACU at this time.      1115 Report given to [facility]
Admitted to Same Day Surgery and oriented to the unit. Patient verbalized approval for first name, last initial and physician name on the unit white board. Fall and allergy band on patient.
Patient to operating room, no glasses, contacts, dentures, hearing aids, or jewelery. All personal items left in same day surgery room.
Pt returned to [facility] room 7. Vitals and assessment as charted. 0.9 infusing, @700ml to count from PACU. Pt has muffin and coffee. Family at the bedside. Pt and family verbalized understanding of discharge criteria and call light use. Call light in reach.
colonoscopy 2011  no prostate exam

## 2024-03-10 NOTE — PLAN OF CARE
Care plan reviewed with patient and family. Patient and family verbalize understanding of the plan of care and contribute to goal setting. Problem: Falls - Risk of:  Goal: Will remain free from falls  Description: Will remain free from falls  Outcome: Ongoing  Note: No falls noted this shift. Continue falling star program. Bed alarm on, bed in low position. Call light and personal belongings in reach. Patient uses call light appropriately. Goal: Absence of physical injury  Description: Absence of physical injury  Outcome: Ongoing  Note: No falls noted this shift. Continue falling star program. Bed alarm on, bed in low position. Call light and personal belongings in reach. Patient uses call light appropriately. Problem: Pain:  Description: Pain management should include both nonpharmacologic and pharmacologic interventions. Goal: Pain level will decrease  Description: Pain level will decrease  Outcome: Ongoing  Note: Patient rates headaches pain 8-10/10, improved with Fioricet. Monitoring, see MAR  Goal: Control of acute pain  Description: Control of acute pain  Outcome: Ongoing  Goal: Control of chronic pain  Description: Control of chronic pain  Outcome: Ongoing     Problem: Skin Integrity:  Goal: Absence of new skin breakdown  Description: Absence of new skin breakdown  Outcome: Ongoing  Note: No new signs or symptoms of skin breakdown noted this shift, encouraging patient to turn and reposition self in bed q2h       Problem: Discharge Planning:  Goal: Discharged to appropriate level of care  Description: Discharged to appropriate level of care  Outcome: Ongoing  Note: Plan to discharge back to [facility]     Problem:  Bowel Function - Altered:  Goal: Bowel elimination is within specified parameters  Description: Bowel elimination is within specified parameters  Outcome: Ongoing     Problem: Fluid Volume - Imbalance:  Goal: Will show no signs and symptoms of excessive bleeding  Description: Will show no signs and symptoms of excessive bleeding  Outcome: Ongoing  Note: 1 units PRBC this morning, VSS, EGD in am     Problem: Nausea/Vomiting:  Goal: Absence of nausea/vomiting  Description: Absence of nausea/vomiting  Outcome: Ongoing  Note: Nausea, Zofran given see MAR (E4) spontaneous

## 2024-03-11 ENCOUNTER — HOSPITAL ENCOUNTER (OUTPATIENT)
Dept: NURSING | Age: 79
Discharge: HOME | End: 2024-03-11
Payer: MEDICARE

## 2024-03-11 VITALS
OXYGEN SATURATION: 98 % | DIASTOLIC BLOOD PRESSURE: 85 MMHG | WEIGHT: 231 LBS | TEMPERATURE: 98.4 F | SYSTOLIC BLOOD PRESSURE: 133 MMHG | BODY MASS INDEX: 38.44 KG/M2 | HEART RATE: 61 BPM | RESPIRATION RATE: 16 BRPM

## 2024-03-11 DIAGNOSIS — D83.9 COMMON VARIABLE IMMUNODEFICIENCY (HCC): Primary | ICD-10-CM

## 2024-03-11 PROCEDURE — 2580000003 HC RX 258: Performed by: FAMILY MEDICINE

## 2024-03-11 PROCEDURE — 96413 CHEMO IV INFUSION 1 HR: CPT

## 2024-03-11 PROCEDURE — 96365 THER/PROPH/DIAG IV INF INIT: CPT

## 2024-03-11 PROCEDURE — 6360000002 HC RX W HCPCS: Performed by: FAMILY MEDICINE

## 2024-03-11 RX ORDER — SODIUM CHLORIDE 0.9 % (FLUSH) 0.9 %
5-40 SYRINGE (ML) INJECTION PRN
Status: DISCONTINUED | OUTPATIENT
Start: 2024-03-11 | End: 2024-03-12 | Stop reason: HOSPADM

## 2024-03-11 RX ORDER — ONDANSETRON 2 MG/ML
8 INJECTION INTRAMUSCULAR; INTRAVENOUS
OUTPATIENT
Start: 2024-04-08

## 2024-03-11 RX ORDER — EPINEPHRINE 1 MG/ML
0.3 INJECTION, SOLUTION INTRAMUSCULAR; SUBCUTANEOUS PRN
OUTPATIENT
Start: 2024-04-08

## 2024-03-11 RX ORDER — SODIUM CHLORIDE 0.9 % (FLUSH) 0.9 %
5-40 SYRINGE (ML) INJECTION PRN
OUTPATIENT
Start: 2024-04-08

## 2024-03-11 RX ORDER — SODIUM CHLORIDE 9 MG/ML
INJECTION, SOLUTION INTRAVENOUS CONTINUOUS
OUTPATIENT
Start: 2024-04-08

## 2024-03-11 RX ORDER — SODIUM CHLORIDE 9 MG/ML
5-250 INJECTION, SOLUTION INTRAVENOUS PRN
OUTPATIENT
Start: 2024-04-08

## 2024-03-11 RX ORDER — DIPHENHYDRAMINE HCL 25 MG
25 TABLET ORAL ONCE
OUTPATIENT
Start: 2024-04-08 | End: 2024-04-08

## 2024-03-11 RX ORDER — DIPHENHYDRAMINE HYDROCHLORIDE 50 MG/ML
50 INJECTION INTRAMUSCULAR; INTRAVENOUS
OUTPATIENT
Start: 2024-04-08

## 2024-03-11 RX ORDER — DIPHENHYDRAMINE HCL 25 MG
25 TABLET ORAL ONCE
Status: DISCONTINUED | OUTPATIENT
Start: 2024-03-11 | End: 2024-03-12 | Stop reason: HOSPADM

## 2024-03-11 RX ORDER — HEPARIN 100 UNIT/ML
500 SYRINGE INTRAVENOUS PRN
Status: DISCONTINUED | OUTPATIENT
Start: 2024-03-11 | End: 2024-03-12 | Stop reason: HOSPADM

## 2024-03-11 RX ORDER — MEPERIDINE HYDROCHLORIDE 25 MG/ML
12.5 INJECTION INTRAMUSCULAR; INTRAVENOUS; SUBCUTANEOUS PRN
OUTPATIENT
Start: 2024-04-08

## 2024-03-11 RX ORDER — ACETAMINOPHEN 325 MG/1
650 TABLET ORAL
OUTPATIENT
Start: 2024-04-08

## 2024-03-11 RX ORDER — HEPARIN 100 UNIT/ML
500 SYRINGE INTRAVENOUS PRN
OUTPATIENT
Start: 2024-04-08

## 2024-03-11 RX ORDER — ALBUTEROL SULFATE 90 UG/1
4 AEROSOL, METERED RESPIRATORY (INHALATION) PRN
OUTPATIENT
Start: 2024-04-08

## 2024-03-11 RX ORDER — ACETAMINOPHEN 325 MG/1
650 TABLET ORAL ONCE
Status: DISCONTINUED | OUTPATIENT
Start: 2024-03-11 | End: 2024-03-12 | Stop reason: HOSPADM

## 2024-03-11 RX ORDER — ACETAMINOPHEN 325 MG/1
650 TABLET ORAL ONCE
OUTPATIENT
Start: 2024-04-08 | End: 2024-04-08

## 2024-03-11 RX ADMIN — IMMUNE GLOBULIN (HUMAN) 20 G: 10 INJECTION INTRAVENOUS; SUBCUTANEOUS at 10:10

## 2024-03-11 RX ADMIN — SODIUM CHLORIDE, PRESERVATIVE FREE 10 ML: 5 INJECTION INTRAVENOUS at 09:38

## 2024-03-11 RX ADMIN — HEPARIN 500 UNITS: 100 SYRINGE at 11:05

## 2024-03-11 RX ADMIN — IMMUNE GLOBULIN (HUMAN) 5 G: 10 INJECTION INTRAVENOUS; SUBCUTANEOUS at 09:40

## 2024-03-11 RX ADMIN — SODIUM CHLORIDE, PRESERVATIVE FREE 10 ML: 5 INJECTION INTRAVENOUS at 11:05

## 2024-03-11 ASSESSMENT — PAIN - FUNCTIONAL ASSESSMENT: PAIN_FUNCTIONAL_ASSESSMENT: NONE - DENIES PAIN

## 2024-03-11 NOTE — DISCHARGE INSTRUCTIONS
Next appointment is scheduled for April 8 at 10:00    Outpatient nursing [phone]    ACTIVITY:  Continue usual care with your doctor. Call your doctor immediately if any severe problems or go to the nearest emergency room.      I have been treated and hereby acknowledge receiving this instruction sheet.

## 2024-03-11 NOTE — PROGRESS NOTES
0920 Patient arrived to [facility] ambulatory for IVIG infusion.  Oriented to room and call light  PT RIGHTS AND RESPONSIBILITIES OFFERED TO PT.  Patient states she took her pre-medications at home      0938 Port accessed using sterile technique.  Blood return noted.      0940 IVIG started and she denies complaints    1010 Medication infusing and she denies complaints    1025 Medication infusing and she denies complaints     1040 Medication infusing and she denies complaints    1104 Medication completed and she denies complaints.  Port de-accessed.  Discharge instructions given and explained and she denies questions.  Discharged ambulatory    _M___ Safety:       (Environmental)  [place] to environment  Ensure ID band is correct and in place/ allergy band as needed  Assess for fall risk  Initiate fall precautions as applicable (fall band, side rails, etc.)  Call light within reach  Bed in low position/ wheels locked    _M___ Pain:       Assess pain level and characteristics  Administer analgesics as ordered  Assess effectiveness of pain management and report to MD as needed    _M___ Knowledge Deficit:  Assess baseline knowledge  Provide teaching at level of understanding  Provide teaching via preferred learning method  Evaluate teaching effectiveness    _M___ Hemodynamic/Respiratory Status:       (Pre and Post Procedure Monitoring)  Assess/Monitor vital signs and LOC  Assess Baseline SpO2 prior to any sedation  Obtain weight/height  Assess vital signs/ LOC until patient meets discharge criteria  Monitor procedure site and notify MD of any issues    __M__ Infection-Risk of Central Venous Catheter:  Monitor for infection signs and symptoms (catheter site redness, temperature elevation, etc)  Assess for infection risks  Educate regarding infection prevention  Manage central venous catheter (flushes/ dressing changes per protocol)

## 2024-04-04 ENCOUNTER — APPOINTMENT (OUTPATIENT)
Dept: GENERAL RADIOLOGY | Age: 79
DRG: 313 | End: 2024-04-04
Payer: MEDICARE

## 2024-04-04 ENCOUNTER — APPOINTMENT (OUTPATIENT)
Dept: CT IMAGING | Age: 79
DRG: 313 | End: 2024-04-04
Payer: MEDICARE

## 2024-04-04 ENCOUNTER — HOSPITAL ENCOUNTER (INPATIENT)
Age: 79
LOS: 2 days | Discharge: HOME | DRG: 313 | End: 2024-04-07
Attending: EMERGENCY MEDICINE | Admitting: PHYSICIAN ASSISTANT
Payer: MEDICARE

## 2024-04-04 DIAGNOSIS — R07.9 CHEST PAIN, UNSPECIFIED TYPE: Primary | ICD-10-CM

## 2024-04-04 LAB
ANION GAP SERPL CALC-SCNC: 11 MEQ/L (ref 8–16)
BUN SERPL-MCNC: 27 MG/DL (ref 7–22)
CALCIUM SERPL-MCNC: 8.4 MG/DL (ref 8.5–10.5)
CHLORIDE SERPL-SCNC: 102 MEQ/L (ref 98–111)
CO2 SERPL-SCNC: 27 MEQ/L (ref 23–33)
CREAT SERPL-MCNC: 1.3 MG/DL (ref 0.4–1.2)
D DIMER PPP IA.FEU-MCNC: 1349 NG/ML FEU (ref 0–500)
DEPRECATED RDW RBC AUTO: 45.2 FL (ref 35–45)
ERYTHROCYTE [DISTWIDTH] IN BLOOD BY AUTOMATED COUNT: 13.3 % (ref 11.5–14.5)
GFR SERPL CREATININE-BSD FRML MDRD: 42 ML/MIN/1.73M2
GLUCOSE SERPL-MCNC: 142 MG/DL (ref 70–108)
HCT VFR BLD AUTO: 35.8 % (ref 37–47)
HGB BLD-MCNC: 11.9 GM/DL (ref 12–16)
MCH RBC QN AUTO: 31.2 PG (ref 26–33)
MCHC RBC AUTO-ENTMCNC: 33.2 GM/DL (ref 32.2–35.5)
MCV RBC AUTO: 93.7 FL (ref 81–99)
NT-PROBNP SERPL IA-MCNC: 320.4 PG/ML (ref 0–449)
OSMOLALITY SERPL CALC.SUM OF ELEC: 286.9 MOSMOL/KG (ref 275–300)
PLATELET # BLD AUTO: 190 THOU/MM3 (ref 130–400)
PMV BLD AUTO: 9.7 FL (ref 9.4–12.4)
POTASSIUM SERPL-SCNC: 4 MEQ/L (ref 3.5–5.2)
RBC # BLD AUTO: 3.82 MILL/MM3 (ref 4.2–5.4)
SODIUM SERPL-SCNC: 140 MEQ/L (ref 135–145)
TROPONIN, HIGH SENSITIVITY: 18 NG/L (ref 0–12)
WBC # BLD AUTO: 5.3 THOU/MM3 (ref 4.8–10.8)

## 2024-04-04 PROCEDURE — 93010 ELECTROCARDIOGRAM REPORT: CPT | Performed by: INTERNAL MEDICINE

## 2024-04-04 PROCEDURE — 93005 ELECTROCARDIOGRAM TRACING: CPT | Performed by: STUDENT IN AN ORGANIZED HEALTH CARE EDUCATION/TRAINING PROGRAM

## 2024-04-04 PROCEDURE — 80048 BASIC METABOLIC PNL TOTAL CA: CPT

## 2024-04-04 PROCEDURE — 84484 ASSAY OF TROPONIN QUANT: CPT

## 2024-04-04 PROCEDURE — 6360000004 HC RX CONTRAST MEDICATION: Performed by: EMERGENCY MEDICINE

## 2024-04-04 PROCEDURE — 99285 EMERGENCY DEPT VISIT HI MDM: CPT

## 2024-04-04 PROCEDURE — 85027 COMPLETE CBC AUTOMATED: CPT

## 2024-04-04 PROCEDURE — 71045 X-RAY EXAM CHEST 1 VIEW: CPT

## 2024-04-04 PROCEDURE — 71275 CT ANGIOGRAPHY CHEST: CPT

## 2024-04-04 PROCEDURE — 83880 ASSAY OF NATRIURETIC PEPTIDE: CPT

## 2024-04-04 PROCEDURE — 85379 FIBRIN DEGRADATION QUANT: CPT

## 2024-04-04 PROCEDURE — 6370000000 HC RX 637 (ALT 250 FOR IP): Performed by: STUDENT IN AN ORGANIZED HEALTH CARE EDUCATION/TRAINING PROGRAM

## 2024-04-04 RX ORDER — ASPIRIN 81 MG/1
243 TABLET, CHEWABLE ORAL ONCE
Status: COMPLETED | OUTPATIENT
Start: 2024-04-04 | End: 2024-04-04

## 2024-04-04 RX ADMIN — IOPAMIDOL 80 ML: 755 INJECTION, SOLUTION INTRAVENOUS at 23:58

## 2024-04-04 RX ADMIN — ASPIRIN 81 MG 243 MG: 81 TABLET ORAL at 22:47

## 2024-04-04 ASSESSMENT — PAIN SCALES - GENERAL: PAINLEVEL_OUTOF10: 1

## 2024-04-04 ASSESSMENT — PAIN DESCRIPTION - LOCATION: LOCATION: CHEST

## 2024-04-04 ASSESSMENT — PAIN - FUNCTIONAL ASSESSMENT: PAIN_FUNCTIONAL_ASSESSMENT: 0-10

## 2024-04-05 ENCOUNTER — APPOINTMENT (OUTPATIENT)
Age: 79
DRG: 313 | End: 2024-04-05
Attending: HOSPITALIST
Payer: MEDICARE

## 2024-04-05 LAB
ANION GAP SERPL CALC-SCNC: 11 MEQ/L (ref 8–16)
BUN SERPL-MCNC: 25 MG/DL (ref 7–22)
CALCIUM SERPL-MCNC: 8.2 MG/DL (ref 8.5–10.5)
CHLORIDE SERPL-SCNC: 103 MEQ/L (ref 98–111)
CO2 SERPL-SCNC: 27 MEQ/L (ref 23–33)
CREAT SERPL-MCNC: 1.3 MG/DL (ref 0.4–1.2)
DEPRECATED RDW RBC AUTO: 45.4 FL (ref 35–45)
ECHO AO ASC DIAM: 3.1 CM
ECHO AO ASCENDING AORTA INDEX: 1.47 CM/M2
ECHO AV CUSP MM: 2 CM
ECHO BSA: 2.19 M2
ECHO LA DIAMETER INDEX: 1.99 CM/M2
ECHO LA DIAMETER: 4.2 CM
ECHO LV EDV A2C: 56 ML
ECHO LV EDV NDEX A2C: 27 ML/M2
ECHO LV EJECTION FRACTION A2C: 46 %
ECHO LV ESV A2C: 30 ML
ECHO LV ESV INDEX A2C: 14 ML/M2
ECHO LV FRACTIONAL SHORTENING: 27 % (ref 28–44)
ECHO LV INTERNAL DIMENSION DIASTOLE INDEX: 2.46 CM/M2
ECHO LV INTERNAL DIMENSION DIASTOLIC: 5.2 CM (ref 3.9–5.3)
ECHO LV INTERNAL DIMENSION SYSTOLIC INDEX: 1.8 CM/M2
ECHO LV INTERNAL DIMENSION SYSTOLIC: 3.8 CM
ECHO LV IVSD: 1 CM (ref 0.6–0.9)
ECHO LV MASS 2D: 169 G (ref 67–162)
ECHO LV MASS INDEX 2D: 80.1 G/M2 (ref 43–95)
ECHO LV POSTERIOR WALL DIASTOLIC: 0.8 CM (ref 0.6–0.9)
ECHO LV RELATIVE WALL THICKNESS RATIO: 0.31
ECHO RV INTERNAL DIMENSION: 2.5 CM
ERYTHROCYTE [DISTWIDTH] IN BLOOD BY AUTOMATED COUNT: 13.2 % (ref 11.5–14.5)
GFR SERPL CREATININE-BSD FRML MDRD: 42 ML/MIN/1.73M2
GLUCOSE SERPL-MCNC: 137 MG/DL (ref 70–108)
HCT VFR BLD AUTO: 34.9 % (ref 37–47)
HGB BLD-MCNC: 11.6 GM/DL (ref 12–16)
MCH RBC QN AUTO: 31.4 PG (ref 26–33)
MCHC RBC AUTO-ENTMCNC: 33.2 GM/DL (ref 32.2–35.5)
MCV RBC AUTO: 94.6 FL (ref 81–99)
MRSA DNA SPEC QL NAA+PROBE: NEGATIVE
PLATELET # BLD AUTO: 189 THOU/MM3 (ref 130–400)
PMV BLD AUTO: 10.2 FL (ref 9.4–12.4)
POTASSIUM SERPL-SCNC: 3.6 MEQ/L (ref 3.5–5.2)
RBC # BLD AUTO: 3.69 MILL/MM3 (ref 4.2–5.4)
SODIUM SERPL-SCNC: 141 MEQ/L (ref 135–145)
T4 FREE SERPL-MCNC: 1.01 NG/DL (ref 0.93–1.68)
TROPONIN, HIGH SENSITIVITY: 12 NG/L (ref 0–12)
TROPONIN, HIGH SENSITIVITY: 18 NG/L (ref 0–12)
TROPONIN, HIGH SENSITIVITY: 18 NG/L (ref 0–12)
TSH SERPL DL<=0.005 MIU/L-ACNC: 1.34 UIU/ML (ref 0.4–4.2)
WBC # BLD AUTO: 4.3 THOU/MM3 (ref 4.8–10.8)

## 2024-04-05 PROCEDURE — 84443 ASSAY THYROID STIM HORMONE: CPT

## 2024-04-05 PROCEDURE — 80048 BASIC METABOLIC PNL TOTAL CA: CPT

## 2024-04-05 PROCEDURE — 87641 MR-STAPH DNA AMP PROBE: CPT

## 2024-04-05 PROCEDURE — 1200000003 HC TELEMETRY R&B

## 2024-04-05 PROCEDURE — 6370000000 HC RX 637 (ALT 250 FOR IP): Performed by: PHYSICIAN ASSISTANT

## 2024-04-05 PROCEDURE — 84484 ASSAY OF TROPONIN QUANT: CPT

## 2024-04-05 PROCEDURE — 2580000003 HC RX 258: Performed by: PHYSICIAN ASSISTANT

## 2024-04-05 PROCEDURE — 36591 DRAW BLOOD OFF VENOUS DEVICE: CPT

## 2024-04-05 PROCEDURE — 84439 ASSAY OF FREE THYROXINE: CPT

## 2024-04-05 PROCEDURE — 2580000003 HC RX 258

## 2024-04-05 PROCEDURE — 6360000002 HC RX W HCPCS: Performed by: PHYSICIAN ASSISTANT

## 2024-04-05 PROCEDURE — 93307 TTE W/O DOPPLER COMPLETE: CPT

## 2024-04-05 PROCEDURE — 99223 1ST HOSP IP/OBS HIGH 75: CPT | Performed by: INTERNAL MEDICINE

## 2024-04-05 PROCEDURE — 93307 TTE W/O DOPPLER COMPLETE: CPT | Performed by: INTERNAL MEDICINE

## 2024-04-05 PROCEDURE — 99223 1ST HOSP IP/OBS HIGH 75: CPT | Performed by: PHYSICIAN ASSISTANT

## 2024-04-05 PROCEDURE — 85027 COMPLETE CBC AUTOMATED: CPT

## 2024-04-05 PROCEDURE — 6360000002 HC RX W HCPCS

## 2024-04-05 RX ORDER — POTASSIUM CHLORIDE 20 MEQ/1
40 TABLET, EXTENDED RELEASE ORAL PRN
Status: DISCONTINUED | OUTPATIENT
Start: 2024-04-05 | End: 2024-04-07 | Stop reason: HOSPADM

## 2024-04-05 RX ORDER — ACETAMINOPHEN 325 MG/1
650 TABLET ORAL EVERY 6 HOURS PRN
Status: DISCONTINUED | OUTPATIENT
Start: 2024-04-05 | End: 2024-04-07 | Stop reason: HOSPADM

## 2024-04-05 RX ORDER — TRAZODONE HYDROCHLORIDE 100 MG/1
100 TABLET ORAL NIGHTLY
Status: DISCONTINUED | OUTPATIENT
Start: 2024-04-05 | End: 2024-04-07 | Stop reason: HOSPADM

## 2024-04-05 RX ORDER — METOPROLOL TARTRATE 1 MG/ML
5 INJECTION, SOLUTION INTRAVENOUS EVERY 5 MIN PRN
Status: CANCELLED | OUTPATIENT
Start: 2024-04-05 | End: 2024-04-05

## 2024-04-05 RX ORDER — QUINIDINE GLUCONATE 324 MG
324 TABLET, EXTENDED RELEASE ORAL 2 TIMES DAILY
Status: DISCONTINUED | OUTPATIENT
Start: 2024-04-05 | End: 2024-04-07 | Stop reason: HOSPADM

## 2024-04-05 RX ORDER — POLYETHYLENE GLYCOL 3350 17 G/17G
17 POWDER, FOR SOLUTION ORAL DAILY PRN
Status: DISCONTINUED | OUTPATIENT
Start: 2024-04-05 | End: 2024-04-07 | Stop reason: HOSPADM

## 2024-04-05 RX ORDER — SODIUM CHLORIDE 9 MG/ML
INJECTION, SOLUTION INTRAVENOUS PRN
Status: DISCONTINUED | OUTPATIENT
Start: 2024-04-05 | End: 2024-04-07 | Stop reason: HOSPADM

## 2024-04-05 RX ORDER — NITROGLYCERIN 0.4 MG/1
0.4 TABLET SUBLINGUAL EVERY 5 MIN PRN
Status: CANCELLED | OUTPATIENT
Start: 2024-04-05 | End: 2024-04-05

## 2024-04-05 RX ORDER — BUSPIRONE HYDROCHLORIDE 5 MG/1
5 TABLET ORAL 2 TIMES DAILY
Status: DISCONTINUED | OUTPATIENT
Start: 2024-04-05 | End: 2024-04-07 | Stop reason: HOSPADM

## 2024-04-05 RX ORDER — ONDANSETRON 4 MG/1
4 TABLET, ORALLY DISINTEGRATING ORAL EVERY 8 HOURS PRN
Status: DISCONTINUED | OUTPATIENT
Start: 2024-04-05 | End: 2024-04-07 | Stop reason: HOSPADM

## 2024-04-05 RX ORDER — LOSARTAN POTASSIUM 25 MG/1
25 TABLET ORAL DAILY
Status: DISCONTINUED | OUTPATIENT
Start: 2024-04-05 | End: 2024-04-07 | Stop reason: HOSPADM

## 2024-04-05 RX ORDER — SODIUM CHLORIDE 0.9 % (FLUSH) 0.9 %
5-40 SYRINGE (ML) INJECTION PRN
Status: CANCELLED | OUTPATIENT
Start: 2024-04-05 | End: 2024-04-05

## 2024-04-05 RX ORDER — REGADENOSON 0.08 MG/ML
0.4 INJECTION, SOLUTION INTRAVENOUS
Status: CANCELLED | OUTPATIENT
Start: 2024-04-05

## 2024-04-05 RX ORDER — PREGABALIN 75 MG/1
150 CAPSULE ORAL 3 TIMES DAILY
Status: DISCONTINUED | OUTPATIENT
Start: 2024-04-05 | End: 2024-04-07 | Stop reason: HOSPADM

## 2024-04-05 RX ORDER — SODIUM CHLORIDE 0.9 % (FLUSH) 0.9 %
5-40 SYRINGE (ML) INJECTION EVERY 12 HOURS SCHEDULED
Status: DISCONTINUED | OUTPATIENT
Start: 2024-04-05 | End: 2024-04-07 | Stop reason: HOSPADM

## 2024-04-05 RX ORDER — SODIUM CHLORIDE 9 MG/ML
500 INJECTION, SOLUTION INTRAVENOUS CONTINUOUS PRN
Status: CANCELLED | OUTPATIENT
Start: 2024-04-05 | End: 2024-04-05

## 2024-04-05 RX ORDER — ALBUTEROL SULFATE 90 UG/1
2 AEROSOL, METERED RESPIRATORY (INHALATION) PRN
Status: CANCELLED | OUTPATIENT
Start: 2024-04-05 | End: 2024-04-05

## 2024-04-05 RX ORDER — SODIUM CHLORIDE 0.9 % (FLUSH) 0.9 %
5-40 SYRINGE (ML) INJECTION PRN
Status: DISCONTINUED | OUTPATIENT
Start: 2024-04-05 | End: 2024-04-07 | Stop reason: HOSPADM

## 2024-04-05 RX ORDER — CITALOPRAM 40 MG/1
40 TABLET ORAL DAILY
Status: DISCONTINUED | OUTPATIENT
Start: 2024-04-05 | End: 2024-04-07 | Stop reason: HOSPADM

## 2024-04-05 RX ORDER — ATORVASTATIN CALCIUM 40 MG/1
40 TABLET, FILM COATED ORAL NIGHTLY
Status: DISCONTINUED | OUTPATIENT
Start: 2024-04-05 | End: 2024-04-07 | Stop reason: HOSPADM

## 2024-04-05 RX ORDER — FUROSEMIDE 20 MG/1
20 TABLET ORAL DAILY PRN
Status: DISCONTINUED | OUTPATIENT
Start: 2024-04-05 | End: 2024-04-07 | Stop reason: HOSPADM

## 2024-04-05 RX ORDER — ACETAMINOPHEN 650 MG/1
650 SUPPOSITORY RECTAL EVERY 6 HOURS PRN
Status: DISCONTINUED | OUTPATIENT
Start: 2024-04-05 | End: 2024-04-07 | Stop reason: HOSPADM

## 2024-04-05 RX ORDER — ATROPINE SULFATE 0.1 MG/ML
0.5 INJECTION INTRAVENOUS EVERY 5 MIN PRN
Status: CANCELLED | OUTPATIENT
Start: 2024-04-05 | End: 2024-04-05

## 2024-04-05 RX ORDER — ASPIRIN 81 MG/1
81 TABLET ORAL DAILY
Status: DISCONTINUED | OUTPATIENT
Start: 2024-04-05 | End: 2024-04-07 | Stop reason: HOSPADM

## 2024-04-05 RX ORDER — AMINOPHYLLINE 25 MG/ML
50 INJECTION, SOLUTION INTRAVENOUS PRN
Status: CANCELLED | OUTPATIENT
Start: 2024-04-05 | End: 2024-04-05

## 2024-04-05 RX ORDER — LEVOTHYROXINE SODIUM 0.07 MG/1
75 TABLET ORAL DAILY
Status: DISCONTINUED | OUTPATIENT
Start: 2024-04-05 | End: 2024-04-07 | Stop reason: HOSPADM

## 2024-04-05 RX ORDER — PANTOPRAZOLE SODIUM 40 MG/1
40 TABLET, DELAYED RELEASE ORAL
Status: DISCONTINUED | OUTPATIENT
Start: 2024-04-05 | End: 2024-04-07 | Stop reason: HOSPADM

## 2024-04-05 RX ORDER — MAGNESIUM SULFATE IN WATER 40 MG/ML
2000 INJECTION, SOLUTION INTRAVENOUS PRN
Status: DISCONTINUED | OUTPATIENT
Start: 2024-04-05 | End: 2024-04-07 | Stop reason: HOSPADM

## 2024-04-05 RX ORDER — CETIRIZINE HYDROCHLORIDE 10 MG/1
10 TABLET ORAL DAILY
Status: DISCONTINUED | OUTPATIENT
Start: 2024-04-05 | End: 2024-04-07 | Stop reason: HOSPADM

## 2024-04-05 RX ORDER — POTASSIUM CHLORIDE 7.45 MG/ML
10 INJECTION INTRAVENOUS PRN
Status: DISCONTINUED | OUTPATIENT
Start: 2024-04-05 | End: 2024-04-07 | Stop reason: HOSPADM

## 2024-04-05 RX ORDER — ENOXAPARIN SODIUM 100 MG/ML
40 INJECTION SUBCUTANEOUS DAILY
Status: DISCONTINUED | OUTPATIENT
Start: 2024-04-05 | End: 2024-04-05 | Stop reason: DRUGHIGH

## 2024-04-05 RX ORDER — DULOXETIN HYDROCHLORIDE 60 MG/1
120 CAPSULE, DELAYED RELEASE ORAL DAILY
Status: DISCONTINUED | OUTPATIENT
Start: 2024-04-05 | End: 2024-04-07 | Stop reason: HOSPADM

## 2024-04-05 RX ORDER — ONDANSETRON 2 MG/ML
4 INJECTION INTRAMUSCULAR; INTRAVENOUS EVERY 6 HOURS PRN
Status: DISCONTINUED | OUTPATIENT
Start: 2024-04-05 | End: 2024-04-07 | Stop reason: HOSPADM

## 2024-04-05 RX ORDER — ENOXAPARIN SODIUM 100 MG/ML
30 INJECTION SUBCUTANEOUS EVERY 12 HOURS
Status: DISCONTINUED | OUTPATIENT
Start: 2024-04-05 | End: 2024-04-07 | Stop reason: HOSPADM

## 2024-04-05 RX ORDER — TIZANIDINE 4 MG/1
2 TABLET ORAL 2 TIMES DAILY
Status: DISCONTINUED | OUTPATIENT
Start: 2024-04-05 | End: 2024-04-07 | Stop reason: HOSPADM

## 2024-04-05 RX ADMIN — Medication 360 MG: at 22:26

## 2024-04-05 RX ADMIN — PANTOPRAZOLE SODIUM 40 MG: 40 TABLET, DELAYED RELEASE ORAL at 06:26

## 2024-04-05 RX ADMIN — BUSPIRONE HYDROCHLORIDE 5 MG: 5 TABLET ORAL at 22:26

## 2024-04-05 RX ADMIN — TIZANIDINE 2 MG: 4 TABLET ORAL at 22:26

## 2024-04-05 RX ADMIN — LOSARTAN POTASSIUM 25 MG: 25 TABLET, FILM COATED ORAL at 09:31

## 2024-04-05 RX ADMIN — PREGABALIN 150 MG: 75 CAPSULE ORAL at 16:24

## 2024-04-05 RX ADMIN — BUSPIRONE HYDROCHLORIDE 5 MG: 5 TABLET ORAL at 09:31

## 2024-04-05 RX ADMIN — EMPAGLIFLOZIN 10 MG: 10 TABLET, FILM COATED ORAL at 09:31

## 2024-04-05 RX ADMIN — Medication 360 MG: at 09:31

## 2024-04-05 RX ADMIN — PREGABALIN 150 MG: 75 CAPSULE ORAL at 09:31

## 2024-04-05 RX ADMIN — SODIUM CHLORIDE, PRESERVATIVE FREE 10 ML: 5 INJECTION INTRAVENOUS at 09:39

## 2024-04-05 RX ADMIN — TIZANIDINE 2 MG: 4 TABLET ORAL at 09:32

## 2024-04-05 RX ADMIN — ASPIRIN 81 MG: 81 TABLET, COATED ORAL at 09:29

## 2024-04-05 RX ADMIN — METHYLPREDNISOLONE SODIUM SUCCINATE 40 MG: 40 INJECTION INTRAMUSCULAR; INTRAVENOUS at 18:28

## 2024-04-05 RX ADMIN — ACETAMINOPHEN 650 MG: 325 TABLET ORAL at 18:28

## 2024-04-05 RX ADMIN — ENOXAPARIN SODIUM 30 MG: 100 INJECTION SUBCUTANEOUS at 09:38

## 2024-04-05 RX ADMIN — METHYLPREDNISOLONE SODIUM SUCCINATE 40 MG: 40 INJECTION INTRAMUSCULAR; INTRAVENOUS at 23:55

## 2024-04-05 RX ADMIN — ATORVASTATIN CALCIUM 40 MG: 40 TABLET, FILM COATED ORAL at 22:29

## 2024-04-05 RX ADMIN — TRAZODONE HYDROCHLORIDE 100 MG: 100 TABLET ORAL at 22:31

## 2024-04-05 RX ADMIN — CETIRIZINE HYDROCHLORIDE 10 MG: 10 TABLET, FILM COATED ORAL at 09:32

## 2024-04-05 RX ADMIN — SODIUM CHLORIDE, PRESERVATIVE FREE 10 ML: 5 INJECTION INTRAVENOUS at 22:29

## 2024-04-05 RX ADMIN — LEVOTHYROXINE SODIUM 75 MCG: 0.07 TABLET ORAL at 06:26

## 2024-04-05 RX ADMIN — ENOXAPARIN SODIUM 30 MG: 100 INJECTION SUBCUTANEOUS at 22:25

## 2024-04-05 RX ADMIN — PREGABALIN 150 MG: 75 CAPSULE ORAL at 22:25

## 2024-04-05 RX ADMIN — DULOXETINE HYDROCHLORIDE 120 MG: 60 CAPSULE, DELAYED RELEASE ORAL at 09:31

## 2024-04-05 ASSESSMENT — HEART SCORE: ECG: NON-SPECIFC REPOLARIZATION DISTURBANCE/LBTB/PM

## 2024-04-05 ASSESSMENT — PAIN SCALES - GENERAL
PAINLEVEL_OUTOF10: 0
PAINLEVEL_OUTOF10: 1

## 2024-04-05 ASSESSMENT — PAIN DESCRIPTION - LOCATION: LOCATION: ARM

## 2024-04-05 ASSESSMENT — PAIN - FUNCTIONAL ASSESSMENT: PAIN_FUNCTIONAL_ASSESSMENT: NONE - DENIES PAIN

## 2024-04-05 NOTE — CARE COORDINATION
4/5/24, 9:42 AM EDT  Discharge Planning Evaluation  Social work consult received, patient from [facility].   Patient/Family preference is to return to [facility].    Would patient be willing to go to a skilled facility if needed: Yes.  Is there skilled care available at current facility: Yes.  Barriers to return to current living situation: None  Spoke with Johanna at the facility.  Patient bed hold: Yes  Anticipated transport plan: family  Patient's Healthcare Decision Maker: Named in Scanned ACP Document      Readmission Risk Low 0-14, Mod 15-19), High > 20: Readmission Risk Score: 15.6    Current PCP: Ras Howell MD  PCP verified by CM? Yes    Patient Orientation: Alert and Oriented    Patient Cognition: Alert  History Provided by: Patient    Advance Directives:      Code Status: Full Code   Patient's Primary Decision Maker is: Named in Scanned ACP Document    Primary Decision Maker: Henri DianeKaty - Child - [phone]     Discharge Planning:    Patient lives with: Friends Type of Home: Assisted living  Primary Care Giver: Other (Comment) (AL)  Patient Support Systems include: Children, Family Members   Current Financial resources:    Current community resources: Assisted Living  Current services prior to admission: C-pap, Home Infusion            Current DME:              Type of Home Care services:  IV Therapy    ADLS  Prior functional level: Bathing, Dressing, Toileting, Cooking, Housework, Shopping, Mobility, Assistance with the following:  Current functional level: Assistance with the following:, Bathing, Toileting, Dressing, Cooking, Housework, Shopping, Mobility    Family can provide assistance at DC:  Yes  Would you like Case Management to discuss the discharge plan with any other family members/significant others, and if so, who? No  Plans to Return to Present Housing: Yes  Other Identified Issues/Barriers to RETURNING to current housing: None  Potential Assistance needed at

## 2024-04-05 NOTE — ED NOTES
Pt sitting in bed peacefully. Pt has even and unlabored respirations. Pt has telemetry in place. Pt's questions were answered.

## 2024-04-05 NOTE — CARE COORDINATION
4/5/24, 7:20 AM EDT      DISCHARGE PLANNING EVALUATION    Analy Reyna  Admitted: 4/4/2024  Hospital Day: 0    [facility] Reason for admit: Chest pain [R07.9]  Chest pain, unspecified type [R07.9]    Past Medical History:   Diagnosis Date    Anemia     Atrial fibrillation (HCC) 11/09/2017    CAD (coronary artery disease)     CHF (congestive heart failure) (HCC)     Chronic kidney disease     stage 3 kidney     COPD (chronic obstructive pulmonary disease) (HCC)     DDD (degenerative disc disease), lumbar     Depression     Frequent PVCs 12/13/2017    GERD (gastroesophageal reflux disease)     History of blood transfusion     Hx of blood clots 09/2017    pulmonary emboli    Hyperlipidemia     Hypertension     sees Baki    Hyperthyroidism     Kidney disease     hemmelgarn    Movement disorder     DDD    Neuropathy     Obesity     Palpitations 01/10/2020    Pneumonia 2016    sepsis    Sleep apnea     usually wears cpap at night    Status post right and left heart catheterization     Type II or unspecified type diabetes mellitus without mention of complication, not stated as uncontrolled        Procedure:   4/4 CXR: Negative   4/5 CTA Chest W WO: No pulmonary embolism. 2. Incidental 5 mm nodule in the left upper lobe.   Barriers to Discharge: To ED with substernal chest pain radiating across her anterior chest into her bilateral shoulders. Hospitalist following. Telemetry. Creat 1.3. Troponin 18-12-18. D-Dimer 1349.00. Lovenox BID.     PCP: Ras Howell MD    Readmission Risk Low 0-14, Mod 15-19), High > 20: Readmission Risk Score: 15      Advance Directives:      Code Status: Full Code   Patient's Primary Decision Maker is: Named in Scanned ACP Document    Primary Decision Maker: Katy Zuniga - Child - [phone]    Patient Goals/Plan/Treatment Preferences: From [facility]. CM assessment deferred to .     Transportation/Food Security/Housekeeping Addressed: No issues

## 2024-04-05 NOTE — ED PROVIDER NOTES
[facility]  EMERGENCY DEPARTMENT ENCOUNTER          Pt Name: Analy Reyna  MRN: [id number]  Birthdate 1945  Date of evaluation: 4/4/2024  Emergency Physician: Chris Ybarra MD    CHIEF COMPLAINT       Chief Complaint   Patient presents with    Chest Pain     History obtained from the patient.      HISTORY OF PRESENT ILLNESS    HPI  Analy Reyna is a 78 y.o. female with past medical history of DM 2, CKD 3, hypertension, CAD, CHF, cardiomyopathy, hyperlipidemia who presents to the emergency department for evaluation of chest pain. Reports left sided chest pain while sorting books into boxes and lifting heavy boxes. Felt pressure like in nature, like pushing on her chest. Pain started about an hour PTA. Pain is non radiating. This was associated with diaphoresis and some shortness of breath. No palpitations. Felt anxious also. No h/o DVT/PE. Does not know if she has had worsening leg swelling. Has an occasional dry cough. +headachesDenies fevers, nausea, vomiting.   The patient has no other acute complaints at this time.          REVIEW OF SYSTEMS   Review of Systems    See HPI. 12 point ROS performed, pertinent positives listed above otherwise negative  PAST MEDICAL AND SURGICAL HISTORY     Past Medical History:   Diagnosis Date    Anemia     Atrial fibrillation (HCC) 11/09/2017    CAD (coronary artery disease)     CHF (congestive heart failure) (HCC)     Chronic kidney disease     stage 3 kidney     COPD (chronic obstructive pulmonary disease) (HCC)     DDD (degenerative disc disease), lumbar     Depression     Frequent PVCs 12/13/2017    GERD (gastroesophageal reflux disease)     History of blood transfusion     Hx of blood clots 09/2017    pulmonary emboli    Hyperlipidemia     Hypertension     sees Baki    Hyperthyroidism     Kidney disease     hemmelgarn    Movement disorder     DDD    Neuropathy     Obesity     Palpitations 01/10/2020    Pneumonia 2016    sepsis

## 2024-04-05 NOTE — H&P
degrees    R Axis -27 degrees    T Axis 48 degrees   CBC    Collection Time: 04/04/24 10:40 PM   Result Value Ref Range    WBC 5.3 4.8 - 10.8 thou/mm3    RBC 3.82 (L) 4.20 - 5.40 mill/mm3    Hemoglobin 11.9 (L) 12.0 - 16.0 gm/dl    Hematocrit 35.8 (L) 37.0 - 47.0 %    MCV 93.7 81.0 - 99.0 fL    MCH 31.2 26.0 - 33.0 pg    MCHC 33.2 32.2 - 35.5 gm/dl    RDW-CV 13.3 11.5 - 14.5 %    RDW-SD 45.2 (H) 35.0 - 45.0 fL    Platelets 190 130 - 400 thou/mm3    MPV 9.7 9.4 - 12.4 fL   Basic Metabolic Panel w/ Reflex to MG    Collection Time: 04/04/24 10:40 PM   Result Value Ref Range    Sodium 140 135 - 145 meq/L    Potassium reflex Magnesium 4.0 3.5 - 5.2 meq/L    Chloride 102 98 - 111 meq/L    CO2 27 23 - 33 meq/L    Glucose 142 (H) 70 - 108 mg/dL    BUN 27 (H) 7 - 22 mg/dL    Creatinine 1.3 (H) 0.4 - 1.2 mg/dL    Calcium 8.4 (L) 8.5 - 10.5 mg/dL   Troponin    Collection Time: 04/04/24 10:40 PM   Result Value Ref Range    Troponin, High Sensitivity 18 (H) 0 - 12 ng/L   Brain Natriuretic Peptide    Collection Time: 04/04/24 10:40 PM   Result Value Ref Range    Pro-.4 0.0 - 449.0 pg/mL   D-Dimer, Quantitative    Collection Time: 04/04/24 10:40 PM   Result Value Ref Range    D-Dimer, Quant 1349.00 (H) 0.00 - 500.00 ng/ml FEU   Anion Gap    Collection Time: 04/04/24 10:40 PM   Result Value Ref Range    Anion Gap 11.0 8.0 - 16.0 meq/L   Glomerular Filtration Rate, Estimated    Collection Time: 04/04/24 10:40 PM   Result Value Ref Range    Est, Glom Filt Rate 42 (A) >60 ml/min/1.73m2   Osmolality    Collection Time: 04/04/24 10:40 PM   Result Value Ref Range    Osmolality Calc 286.9 275.0 - 300.0 mOsmol/kg   Troponin    Collection Time: 04/05/24 12:25 AM   Result Value Ref Range    Troponin, High Sensitivity 12 0 - 12 ng/L         Vital Signs: T: 98.3F P: 57 RR: 18 B/P: 168/78: FiO2: 1L NC: O2 Sat:98%: I/O: No intake or output data in the 24 hours ending 04/05/24 0147      General:   no acute distress  HEENT:  normocephalic

## 2024-04-05 NOTE — ED NOTES
Pt to ED with chest pain. Pt stated it started in her home while cleaning her house. Pt reports that she attempted to lay down for aprox. One hour with no relief of symptoms. Pt triaged and an EKG taken.  notified of EKG results. Pt sitting in bed in the lowest position with telemetry placed and call light in reach.

## 2024-04-05 NOTE — PLAN OF CARE
Problem: Discharge Planning  Goal: Discharge to home or other facility with appropriate resources  4/5/2024 0943 by Bradly Vasquez, JESSIE  Outcome: Progressing

## 2024-04-05 NOTE — CONSULTS
[facility]'s  Consult    Patient's Name/Date of Birth: Analy Reyna / 1945 (78 y.o.)    Date: April 5, 2024     Referring Provider: Basia Trinidad MD    CHIEF COMPLAINT: Chest pain      HPI: This is a pleasant 78 y.o. female with PMHx Afib s/p Watchmen, CAD, cardiomyopathy, HFmrEF, CKD3, COPD, GERD, h/o PE (2017), HLD, HTN, T2DM, BRITTNY who presents with chest pain that began while moving heavy boxes in her apartment. Pain is left-sided, non-radiating, and associated with diaphoresis and SOB. Denies any palpitations, headaches, N/V/D. Pt states that pain began shortly after moving a particularly heavy box of books. Pain had improved after arrival to ED, and has been intermittent since. Also reported an episode of lightheadedness when standing up during that time, improved after sitting down and resting. Pt states she usually has this issue, and has to \"take it easy\" standing up. Unsure if she has been worked up for orthostatic hypotension. States pain will come about whether she is resting, moving her arms, and reports frequent association of the pain shortly after beginning to eat; denies heartburn/reflux-like symptoms. Pt reports that she has been experiencing frequent \"sweats\" for the past year or so, will come on randomly and last for a good while. Reports reproducible chest pain with palpation, but states the pain will also occur w/o palpation. D-dimer 1349, CTA chest in ED negative for PE, noted incidental 5mm pulmonary nodule in DUSTIN. Troponin OA 18 -> 12 -> 18. . EKG showing sinus bradycardia with rate of 57 bpm, nonspecific T wave flattening in leads I, III, aVL, V5, V6. HEART score = 7. Positive family history in mother (MI). Home medications include Losartan 25 mg daily, ASA, Lasix 20 mg PRN for leg swelling, and Lipitor 20 mg daily.     [facility] 3/2022: successful PCI of LA appendage with placement of Watchmen device    Echo: 8/2023 - EF 45%, mild global hypokinesis,

## 2024-04-05 NOTE — ED NOTES
Spoke to SIMONA Pressley to approve pt transport to [facility] in stable condition.

## 2024-04-06 ENCOUNTER — HOSPITAL ENCOUNTER (INPATIENT)
Age: 79
Discharge: HOME | DRG: 313 | End: 2024-04-08
Payer: MEDICARE

## 2024-04-06 ENCOUNTER — APPOINTMENT (OUTPATIENT)
Dept: NUCLEAR MEDICINE | Age: 79
DRG: 313 | End: 2024-04-06
Payer: MEDICARE

## 2024-04-06 LAB
ANION GAP SERPL CALC-SCNC: 12 MEQ/L (ref 8–16)
BUN SERPL-MCNC: 24 MG/DL (ref 7–22)
CALCIUM SERPL-MCNC: 8.7 MG/DL (ref 8.5–10.5)
CHLORIDE SERPL-SCNC: 102 MEQ/L (ref 98–111)
CO2 SERPL-SCNC: 27 MEQ/L (ref 23–33)
CREAT SERPL-MCNC: 1.1 MG/DL (ref 0.4–1.2)
DEPRECATED RDW RBC AUTO: 44.3 FL (ref 35–45)
ECHO BSA: 2.19 M2
ERYTHROCYTE [DISTWIDTH] IN BLOOD BY AUTOMATED COUNT: 12.9 % (ref 11.5–14.5)
GFR SERPL CREATININE-BSD FRML MDRD: 51 ML/MIN/1.73M2
GLUCOSE BLD STRIP.AUTO-MCNC: 397 MG/DL (ref 70–108)
GLUCOSE SERPL-MCNC: 207 MG/DL (ref 70–108)
HCT VFR BLD AUTO: 37.2 % (ref 37–47)
HGB BLD-MCNC: 12.2 GM/DL (ref 12–16)
MCH RBC QN AUTO: 30.8 PG (ref 26–33)
MCHC RBC AUTO-ENTMCNC: 32.8 GM/DL (ref 32.2–35.5)
MCV RBC AUTO: 93.9 FL (ref 81–99)
NUC STRESS EJECTION FRACTION: 56 %
PLATELET # BLD AUTO: 192 THOU/MM3 (ref 130–400)
PMV BLD AUTO: 9.9 FL (ref 9.4–12.4)
POTASSIUM SERPL-SCNC: 4.6 MEQ/L (ref 3.5–5.2)
RBC # BLD AUTO: 3.96 MILL/MM3 (ref 4.2–5.4)
SODIUM SERPL-SCNC: 141 MEQ/L (ref 135–145)
STRESS BASELINE DIAS BP: 55 MMHG
STRESS BASELINE HR: 60 BPM
STRESS BASELINE ST DEPRESSION: 0 MM
STRESS BASELINE SYS BP: 107 MMHG
STRESS ST DEPRESSION: 0 MM
STRESS STAGE 1 BP: NORMAL MMHG
STRESS STAGE 1 DURATION: 1 MIN:SEC
STRESS STAGE 1 HR: 67 BPM
STRESS STAGE 2 BP: NORMAL MMHG
STRESS STAGE 2 DURATION: 1 MIN:SEC
STRESS STAGE 2 HR: 76 BPM
STRESS STAGE 3 BP: NORMAL MMHG
STRESS STAGE 3 DURATION: 1 MIN:SEC
STRESS STAGE 3 HR: 73 BPM
STRESS STAGE RECOVERY 1 BP: NORMAL MMHG
STRESS STAGE RECOVERY 1 DURATION: 1 MIN:SEC
STRESS STAGE RECOVERY 1 HR: 72 BPM
STRESS STAGE RECOVERY 2 BP: NORMAL MMHG
STRESS STAGE RECOVERY 2 DURATION: 1 MIN:SEC
STRESS STAGE RECOVERY 2 HR: 71 BPM
STRESS STAGE RECOVERY 3 BP: NORMAL MMHG
STRESS STAGE RECOVERY 3 DURATION: 1 MIN:SEC
STRESS STAGE RECOVERY 3 HR: 72 BPM
STRESS STAGE RECOVERY 4 BP: NORMAL MMHG
STRESS STAGE RECOVERY 4 DURATION: 2 MIN:SEC
STRESS STAGE RECOVERY 4 HR: 69 BPM
STRESS TARGET HR: 141 BPM
TID: 1.1
WBC # BLD AUTO: 6.2 THOU/MM3 (ref 4.8–10.8)

## 2024-04-06 PROCEDURE — 93016 CV STRESS TEST SUPVJ ONLY: CPT | Performed by: INTERNAL MEDICINE

## 2024-04-06 PROCEDURE — 6360000002 HC RX W HCPCS

## 2024-04-06 PROCEDURE — 85027 COMPLETE CBC AUTOMATED: CPT

## 2024-04-06 PROCEDURE — A9500 TC99M SESTAMIBI: HCPCS | Performed by: NURSE PRACTITIONER

## 2024-04-06 PROCEDURE — 82948 REAGENT STRIP/BLOOD GLUCOSE: CPT

## 2024-04-06 PROCEDURE — 2580000003 HC RX 258: Performed by: PHYSICIAN ASSISTANT

## 2024-04-06 PROCEDURE — 99232 SBSQ HOSP IP/OBS MODERATE 35: CPT | Performed by: NURSE PRACTITIONER

## 2024-04-06 PROCEDURE — 80048 BASIC METABOLIC PNL TOTAL CA: CPT

## 2024-04-06 PROCEDURE — 6370000000 HC RX 637 (ALT 250 FOR IP)

## 2024-04-06 PROCEDURE — 99238 HOSP IP/OBS DSCHRG MGMT 30/<: CPT | Performed by: OPHTHALMOLOGY

## 2024-04-06 PROCEDURE — 6370000000 HC RX 637 (ALT 250 FOR IP): Performed by: PHYSICIAN ASSISTANT

## 2024-04-06 PROCEDURE — 1200000003 HC TELEMETRY R&B

## 2024-04-06 PROCEDURE — 78452 HT MUSCLE IMAGE SPECT MULT: CPT | Performed by: INTERNAL MEDICINE

## 2024-04-06 PROCEDURE — 93017 CV STRESS TEST TRACING ONLY: CPT

## 2024-04-06 PROCEDURE — 93005 ELECTROCARDIOGRAM TRACING: CPT | Performed by: OPHTHALMOLOGY

## 2024-04-06 PROCEDURE — 6370000000 HC RX 637 (ALT 250 FOR IP): Performed by: OPHTHALMOLOGY

## 2024-04-06 PROCEDURE — 78452 HT MUSCLE IMAGE SPECT MULT: CPT

## 2024-04-06 PROCEDURE — 6360000002 HC RX W HCPCS: Performed by: PHYSICIAN ASSISTANT

## 2024-04-06 PROCEDURE — 93018 CV STRESS TEST I&R ONLY: CPT | Performed by: INTERNAL MEDICINE

## 2024-04-06 PROCEDURE — 3430000000 HC RX DIAGNOSTIC RADIOPHARMACEUTICAL: Performed by: NURSE PRACTITIONER

## 2024-04-06 RX ORDER — REGADENOSON 0.08 MG/ML
0.4 INJECTION, SOLUTION INTRAVENOUS
Status: COMPLETED | OUTPATIENT
Start: 2024-04-06 | End: 2024-04-06

## 2024-04-06 RX ORDER — METHYLPREDNISOLONE 4 MG/1
TABLET ORAL
Qty: 1 KIT | Refills: 0 | Status: SHIPPED | OUTPATIENT
Start: 2024-04-06

## 2024-04-06 RX ORDER — INSULIN LISPRO 100 [IU]/ML
0-4 INJECTION, SOLUTION INTRAVENOUS; SUBCUTANEOUS NIGHTLY
Status: DISCONTINUED | OUTPATIENT
Start: 2024-04-06 | End: 2024-04-07 | Stop reason: HOSPADM

## 2024-04-06 RX ORDER — PREDNISONE 20 MG/1
20 TABLET ORAL ONCE
Status: COMPLETED | OUTPATIENT
Start: 2024-04-06 | End: 2024-04-06

## 2024-04-06 RX ORDER — INSULIN LISPRO 100 [IU]/ML
0-4 INJECTION, SOLUTION INTRAVENOUS; SUBCUTANEOUS
Status: DISCONTINUED | OUTPATIENT
Start: 2024-04-07 | End: 2024-04-07 | Stop reason: HOSPADM

## 2024-04-06 RX ORDER — DEXTROSE MONOHYDRATE 100 MG/ML
INJECTION, SOLUTION INTRAVENOUS CONTINUOUS PRN
Status: DISCONTINUED | OUTPATIENT
Start: 2024-04-06 | End: 2024-04-07 | Stop reason: HOSPADM

## 2024-04-06 RX ORDER — IBUPROFEN 400 MG/1
400 TABLET ORAL ONCE
Status: COMPLETED | OUTPATIENT
Start: 2024-04-06 | End: 2024-04-06

## 2024-04-06 RX ORDER — GLUCAGON 1 MG/ML
1 KIT INJECTION PRN
Status: DISCONTINUED | OUTPATIENT
Start: 2024-04-06 | End: 2024-04-07 | Stop reason: HOSPADM

## 2024-04-06 RX ORDER — TETRAKIS(2-METHOXYISOBUTYLISOCYANIDE)COPPER(I) TETRAFLUOROBORATE 1 MG/ML
29.7 INJECTION, POWDER, LYOPHILIZED, FOR SOLUTION INTRAVENOUS
Status: COMPLETED | OUTPATIENT
Start: 2024-04-06 | End: 2024-04-06

## 2024-04-06 RX ORDER — TETRAKIS(2-METHOXYISOBUTYLISOCYANIDE)COPPER(I) TETRAFLUOROBORATE 1 MG/ML
9 INJECTION, POWDER, LYOPHILIZED, FOR SOLUTION INTRAVENOUS
Status: COMPLETED | OUTPATIENT
Start: 2024-04-06 | End: 2024-04-06

## 2024-04-06 RX ADMIN — ENOXAPARIN SODIUM 30 MG: 100 INJECTION SUBCUTANEOUS at 09:22

## 2024-04-06 RX ADMIN — REGADENOSON 0.4 MG: 0.08 INJECTION, SOLUTION INTRAVENOUS at 13:13

## 2024-04-06 RX ADMIN — PREDNISONE 20 MG: 20 TABLET ORAL at 11:50

## 2024-04-06 RX ADMIN — INSULIN LISPRO 4 UNITS: 100 INJECTION, SOLUTION INTRAVENOUS; SUBCUTANEOUS at 20:19

## 2024-04-06 RX ADMIN — BUSPIRONE HYDROCHLORIDE 5 MG: 5 TABLET ORAL at 20:21

## 2024-04-06 RX ADMIN — SODIUM CHLORIDE, PRESERVATIVE FREE 10 ML: 5 INJECTION INTRAVENOUS at 09:23

## 2024-04-06 RX ADMIN — PREGABALIN 150 MG: 75 CAPSULE ORAL at 20:19

## 2024-04-06 RX ADMIN — LOSARTAN POTASSIUM 25 MG: 25 TABLET, FILM COATED ORAL at 09:22

## 2024-04-06 RX ADMIN — DULOXETINE HYDROCHLORIDE 120 MG: 60 CAPSULE, DELAYED RELEASE ORAL at 09:23

## 2024-04-06 RX ADMIN — IBUPROFEN 400 MG: 400 TABLET ORAL at 11:49

## 2024-04-06 RX ADMIN — ASPIRIN 81 MG: 81 TABLET, COATED ORAL at 09:23

## 2024-04-06 RX ADMIN — ATORVASTATIN CALCIUM 40 MG: 40 TABLET, FILM COATED ORAL at 20:21

## 2024-04-06 RX ADMIN — BUSPIRONE HYDROCHLORIDE 5 MG: 5 TABLET ORAL at 09:23

## 2024-04-06 RX ADMIN — CETIRIZINE HYDROCHLORIDE 10 MG: 10 TABLET, FILM COATED ORAL at 09:23

## 2024-04-06 RX ADMIN — Medication 9 MILLICURIE: at 12:22

## 2024-04-06 RX ADMIN — SODIUM CHLORIDE, PRESERVATIVE FREE 10 ML: 5 INJECTION INTRAVENOUS at 20:20

## 2024-04-06 RX ADMIN — EMPAGLIFLOZIN 10 MG: 10 TABLET, FILM COATED ORAL at 09:23

## 2024-04-06 RX ADMIN — PREGABALIN 150 MG: 75 CAPSULE ORAL at 09:22

## 2024-04-06 RX ADMIN — Medication 360 MG: at 09:23

## 2024-04-06 RX ADMIN — PREGABALIN 150 MG: 75 CAPSULE ORAL at 14:45

## 2024-04-06 RX ADMIN — PANTOPRAZOLE SODIUM 40 MG: 40 TABLET, DELAYED RELEASE ORAL at 08:25

## 2024-04-06 RX ADMIN — LEVOTHYROXINE SODIUM 75 MCG: 0.07 TABLET ORAL at 08:25

## 2024-04-06 RX ADMIN — TIZANIDINE 2 MG: 4 TABLET ORAL at 09:22

## 2024-04-06 RX ADMIN — ENOXAPARIN SODIUM 30 MG: 100 INJECTION SUBCUTANEOUS at 20:19

## 2024-04-06 RX ADMIN — CITALOPRAM 40 MG: 40 TABLET, FILM COATED ORAL at 09:23

## 2024-04-06 RX ADMIN — TRAZODONE HYDROCHLORIDE 100 MG: 100 TABLET ORAL at 22:11

## 2024-04-06 RX ADMIN — TIZANIDINE 2 MG: 4 TABLET ORAL at 20:21

## 2024-04-06 RX ADMIN — Medication 29.7 MILLICURIE: at 13:08

## 2024-04-06 RX ADMIN — Medication 360 MG: at 20:20

## 2024-04-06 NOTE — DISCHARGE SUMMARY
electronically signed by: Todd Torrez MD on    04/04/2024 10:51 PM          Hospital Course: Please see the H&P for detailed admission details. Patient clinical course has improved, Patient presents to the emergency department with substernal chest pain radiating across her anterior chest into her bilateral shoulders that started this afternoon when she was moving a large box of books around her apartment.  Patient states she had just packed up her apartment with a plan to move.  The patient reports her plans fell through and she needed to unpacked all of her belongings.  She was pushing large boxes of books around and developed anterior chest pain.  In the ED the patient is resting comfortably with mild shortness of breath intermittently.   Due to patient's elevated HEART score patient will be admitted for further evaluation of ACS.  Chest pain:   Appreciate cardiology input  Appears muscloskeletal  Stress test per cardiology:-ve  Will discharge on medrol dose pack if ok with cardiology:after stress test  CKD stage III:   At baseline, continue to monitor  Essential hypertension:   Continue home medications - lorsartan 25 mg daily, furosemide 20 mg daily prn,   IDDM II:   Confirm doses on long and short acting meal time insulins  ADA diet  Hypoglcycemic treatment orders  HFmrEF:   EF 45% ECHO 8/2023  Daily weights  GDMT - continue ARB, continue furosemide, beta is not appropriate at this time due to bradycardia,    Disposition: home    Condition: Stable    Time Spent: 25 minutes        Raul Norman MD, MD  Discharging Hospitalist

## 2024-04-07 VITALS
SYSTOLIC BLOOD PRESSURE: 147 MMHG | OXYGEN SATURATION: 100 % | WEIGHT: 217 LBS | RESPIRATION RATE: 20 BRPM | HEIGHT: 66 IN | DIASTOLIC BLOOD PRESSURE: 73 MMHG | TEMPERATURE: 98.8 F | BODY MASS INDEX: 34.87 KG/M2 | HEART RATE: 59 BPM

## 2024-04-07 LAB
ANION GAP SERPL CALC-SCNC: 11 MEQ/L (ref 8–16)
BUN SERPL-MCNC: 24 MG/DL (ref 7–22)
CALCIUM SERPL-MCNC: 8.4 MG/DL (ref 8.5–10.5)
CHLORIDE SERPL-SCNC: 100 MEQ/L (ref 98–111)
CO2 SERPL-SCNC: 29 MEQ/L (ref 23–33)
CREAT SERPL-MCNC: 1.1 MG/DL (ref 0.4–1.2)
DEPRECATED RDW RBC AUTO: 43.9 FL (ref 35–45)
ERYTHROCYTE [DISTWIDTH] IN BLOOD BY AUTOMATED COUNT: 13.1 % (ref 11.5–14.5)
GFR SERPL CREATININE-BSD FRML MDRD: 51 ML/MIN/1.73M2
GLUCOSE BLD STRIP.AUTO-MCNC: 116 MG/DL (ref 70–108)
GLUCOSE SERPL-MCNC: 126 MG/DL (ref 70–108)
HCT VFR BLD AUTO: 36.2 % (ref 37–47)
HGB BLD-MCNC: 11.8 GM/DL (ref 12–16)
MCH RBC QN AUTO: 30.3 PG (ref 26–33)
MCHC RBC AUTO-ENTMCNC: 32.6 GM/DL (ref 32.2–35.5)
MCV RBC AUTO: 93.1 FL (ref 81–99)
PLATELET # BLD AUTO: 190 THOU/MM3 (ref 130–400)
PMV BLD AUTO: 9.7 FL (ref 9.4–12.4)
POTASSIUM SERPL-SCNC: 3.7 MEQ/L (ref 3.5–5.2)
RBC # BLD AUTO: 3.89 MILL/MM3 (ref 4.2–5.4)
SODIUM SERPL-SCNC: 140 MEQ/L (ref 135–145)
WBC # BLD AUTO: 6.2 THOU/MM3 (ref 4.8–10.8)

## 2024-04-07 PROCEDURE — 99238 HOSP IP/OBS DSCHRG MGMT 30/<: CPT | Performed by: OPHTHALMOLOGY

## 2024-04-07 PROCEDURE — 85027 COMPLETE CBC AUTOMATED: CPT

## 2024-04-07 PROCEDURE — 80048 BASIC METABOLIC PNL TOTAL CA: CPT

## 2024-04-07 PROCEDURE — 93010 ELECTROCARDIOGRAM REPORT: CPT | Performed by: INTERNAL MEDICINE

## 2024-04-07 PROCEDURE — 2580000003 HC RX 258: Performed by: PHYSICIAN ASSISTANT

## 2024-04-07 PROCEDURE — 82948 REAGENT STRIP/BLOOD GLUCOSE: CPT

## 2024-04-07 PROCEDURE — 6370000000 HC RX 637 (ALT 250 FOR IP): Performed by: PHYSICIAN ASSISTANT

## 2024-04-07 PROCEDURE — 6360000002 HC RX W HCPCS: Performed by: OPHTHALMOLOGY

## 2024-04-07 PROCEDURE — 6360000002 HC RX W HCPCS: Performed by: PHYSICIAN ASSISTANT

## 2024-04-07 RX ORDER — HEPARIN 100 UNIT/ML
500 SYRINGE INTRAVENOUS PRN
Status: DISCONTINUED | OUTPATIENT
Start: 2024-04-07 | End: 2024-04-07 | Stop reason: HOSPADM

## 2024-04-07 RX ADMIN — LEVOTHYROXINE SODIUM 75 MCG: 0.07 TABLET ORAL at 06:22

## 2024-04-07 RX ADMIN — BUSPIRONE HYDROCHLORIDE 5 MG: 5 TABLET ORAL at 08:04

## 2024-04-07 RX ADMIN — DULOXETINE HYDROCHLORIDE 120 MG: 60 CAPSULE, DELAYED RELEASE ORAL at 08:05

## 2024-04-07 RX ADMIN — CITALOPRAM 40 MG: 40 TABLET, FILM COATED ORAL at 08:06

## 2024-04-07 RX ADMIN — ENOXAPARIN SODIUM 30 MG: 100 INJECTION SUBCUTANEOUS at 08:11

## 2024-04-07 RX ADMIN — SODIUM CHLORIDE, PRESERVATIVE FREE 10 ML: 5 INJECTION INTRAVENOUS at 08:03

## 2024-04-07 RX ADMIN — PREGABALIN 150 MG: 75 CAPSULE ORAL at 08:11

## 2024-04-07 RX ADMIN — PANTOPRAZOLE SODIUM 40 MG: 40 TABLET, DELAYED RELEASE ORAL at 06:20

## 2024-04-07 RX ADMIN — ASPIRIN 81 MG: 81 TABLET, COATED ORAL at 08:11

## 2024-04-07 RX ADMIN — Medication 360 MG: at 08:05

## 2024-04-07 RX ADMIN — TIZANIDINE 2 MG: 4 TABLET ORAL at 08:06

## 2024-04-07 RX ADMIN — LOSARTAN POTASSIUM 25 MG: 25 TABLET, FILM COATED ORAL at 08:06

## 2024-04-07 RX ADMIN — EMPAGLIFLOZIN 10 MG: 10 TABLET, FILM COATED ORAL at 08:05

## 2024-04-07 RX ADMIN — ACETAMINOPHEN 650 MG: 325 TABLET ORAL at 00:36

## 2024-04-07 RX ADMIN — HEPARIN 500 UNITS: 100 SYRINGE at 12:24

## 2024-04-07 ASSESSMENT — PAIN DESCRIPTION - LOCATION: LOCATION: NECK;SHOULDER

## 2024-04-07 ASSESSMENT — PAIN SCALES - GENERAL: PAINLEVEL_OUTOF10: 6

## 2024-04-07 ASSESSMENT — PAIN DESCRIPTION - ORIENTATION: ORIENTATION: RIGHT;LEFT

## 2024-04-07 ASSESSMENT — PAIN DESCRIPTION - DESCRIPTORS: DESCRIPTORS: ACHING;SORE

## 2024-04-07 NOTE — CARE COORDINATION
4/7/24, 8:27 AM EDT    Patient goals/plan/ treatment preferences discussed by  and .  Patient goals/plan/ treatment preferences reviewed with patient/ family.  Patient/ family verbalize understanding of discharge plan and are in agreement with goal/plan/treatment preferences.  Understanding was demonstrated using the teach back method.  AVS provided by RN at time of discharge, which includes all necessary medical information pertaining to the patients current course of illness, treatment, post-discharge goals of care, and treatment preferences.     Services At/After Discharge: Assisted Living [facility]    Patient to discharge today, back to [facility]. Family to transport at discharge.     RN made aware and discharge instructions placed on chart.        IMM Letter  IMM Letter given to Patient/Family/Significant other/Guardian/POA/by:: registration  IMM Letter date given:: 04/05/24  IMM Letter time given:: 0116

## 2024-04-07 NOTE — PROGRESS NOTES
Pharmacist Review and Automatic Dose Adjustment of Prophylactic Enoxaparin    *Review reason for admission/hospital problem list*    The reviewing pharmacist has made an adjustment to the ordered enoxaparin dose or converted to UFH per the approved [facility] protocol and table as identified below.        Analy Reyna is a 78 y.o. female.     Recent Labs     04/04/24  2240   CREATININE 1.3*       Estimated Creatinine Clearance: 43 mL/min (A) (based on SCr of 1.3 mg/dL (H)).    Recent Labs     04/04/24  2240   HGB 11.9*   HCT 35.8*        No results for input(s): \"INR\" in the last 72 hours.    Height:   Ht Readings from Last 1 Encounters:   04/05/24 1.676 m (5' 6\")     Weight:  Wt Readings from Last 1 Encounters:   04/05/24 103 kg (227 lb 1.6 oz)               Plan: Based upon the patient's weight and renal function, the ordered enoxaparin dose of 40 mg daily has been changed/converted to 30 mg twice daily.      Thank you,  Swati Carlos, [facility] ED  4/5/2024, 3:16 AM    
   04/05/24 1010   Encounter Summary   Encounter Overview/Reason  Spiritual/Emotional Needs   Service Provided For: Patient   Referral/Consult From: Multi-disciplinary team   Support System Children;Family members   Last Encounter  04/05/24  (Emotional distress)   Complexity of Encounter Moderate   Begin Time 1000   End Time  1010   Total Time Calculated 10 min   Spiritual/Emotional needs   Type Emotional Distress   Assessment/Intervention/Outcome   Assessment Coping   Intervention Active listening;Empowerment;Nurtured Hope;Prayer (assurance of)/Kirk;Sustaining Presence/Ministry of presence   Outcome Comfort;Coping;Encouraged     Assessment:  In my encounter with the 78 yr old patient, while rounding the 4K unit, I came to see the pt concerning emotional distress.  I provided spiritual care to patient through conversation, I also came to assess the patient's spiritual needs present.     The  also wanted to see if the pt would be interested in completing a Advance Directive since none was on file.     Interventions:  I provided prayer, emotional support and words of comfort.  provided a listening presence and encouraged pt to share their beliefs and how I can support them during their hospitalization.     The pt declined ACP conversation.     Outcomes:  The patient was encouraged and didn't share any further spiritual needs at this time.     Plan:  Chaplains will follow-up at a later time for assessment of any spiritual care needs present.  
Patient discharged at this time.  AVS reviewed with patient, including new medications, their uses and side effects.  Patient expressed understanding of medications using teach-back method.  All up coming appointments, if any, reviewed.  Tele and PIV removed.  Patient taken to car by myself via wheelchair, with all personal possessions in hand.   
Son exited room to state that he was upset about how long it has taken to discharge patient.  I have kept patient informed of the process each step (including advising her not to call her son until I suggested it to avoid a long wait) such as:  had to get an order for heparin flush to de access her port. Order has been placed.   Heparin in hand, resource nurse called to de access.  I called the resource RN again after this and she was able to come up right away.  
Q6H PRN  acetaminophen, 650 mg, Q6H PRN   Or  acetaminophen, 650 mg, Q6H PRN  polyethylene glycol, 17 g, Daily PRN        Diagnostics:      Stress:     Left Heart Cath:     Lab Data:    Cardiac Enzymes:  No results for input(s): \"CKTOTAL\", \"CKMB\", \"CKMBINDEX\", \"TROPONINI\" in the last 72 hours.    CBC:   Lab Results   Component Value Date/Time    WBC 6.2 04/06/2024 04:50 AM    RBC 3.96 04/06/2024 04:50 AM    HGB 12.2 04/06/2024 04:50 AM    HCT 37.2 04/06/2024 04:50 AM     04/06/2024 04:50 AM       CMP:    Lab Results   Component Value Date/Time     04/06/2024 04:50 AM    K 4.6 04/06/2024 04:50 AM     04/06/2024 04:50 AM    CO2 27 04/06/2024 04:50 AM    BUN 24 04/06/2024 04:50 AM    CREATININE 1.1 04/06/2024 04:50 AM    CREATININE 44.0 03/14/2019 12:00 AM    LABGLOM 51 04/06/2024 04:50 AM    LABGLOM 45 11/30/2023 08:42 AM    GLUCOSE 207 04/06/2024 04:50 AM    CALCIUM 8.7 04/06/2024 04:50 AM       Hepatic Function Panel:    Lab Results   Component Value Date/Time    ALKPHOS 106 09/17/2023 01:12 PM    ALT 14 09/17/2023 01:12 PM    AST 22 09/17/2023 01:12 PM    PROT 7.0 09/17/2023 01:12 PM    BILITOT 0.5 09/17/2023 01:12 PM    BILIDIR <0.2 09/17/2023 01:12 PM    LABALBU 4.2 09/17/2023 01:12 PM       Magnesium:    Lab Results   Component Value Date/Time    MG 1.6 07/26/2021 09:15 AM       PT/INR:    Lab Results   Component Value Date/Time    INR 0.98 03/02/2023 11:04 AM       HgBA1c:    Lab Results   Component Value Date/Time    LABA1C 6.7 01/10/2023 02:39 PM       FLP:    Lab Results   Component Value Date/Time    TRIG 77 03/23/2016 05:00 AM    HDL 38 01/13/2023 03:26 AM    LDLCALC 86 01/13/2023 03:26 AM       TSH:    Lab Results   Component Value Date/Time    TSH 1.340 04/05/2024 03:15 PM         Assessment:    Muscular chest pain     PAF s/p WATCHMAN in 2022  Frequent PVCs s/p PVC ablation   Nonobstructive CAD; last cath 2016  NICM; Chronic HFrEF 45%  HTN stable 
  Distant Breath Sounds: No   Heart - normal rate, regular rhythm, normal S1, S2, no murmurs, rubs, clicks or gallops   Abdomen - soft, not tender, nondistended, no masses or organomegaly   Obese: No; Protuberant: Bowel sounds heard  Neurological - A&O 3 , normal speech, no focal findings or movement disorder noted   Musculoskeletal - no joint tenderness, deformity or swelling   Extremities - peripheral pulses normal, no pedal edema, no clubbing or cyanosis  24 hour intake/output:  Intake/Output Summary (Last 24 hours) at 4/6/2024 1027  Last data filed at 4/5/2024 2229  Gross per 24 hour   Intake 910 ml   Output 0 ml   Net 910 ml     Assessment/Plan:    Principal Problem:    Chest pain  Resolved Problems:    * No resolved hospital problems. *       Code Status: Full Code    Raul Norman MD on 4/6/2024 at 10:27 AM  Rounding Hospitalist

## 2024-04-07 NOTE — PLAN OF CARE
Problem: Discharge Planning  Goal: Discharge to home or other facility with appropriate resources  Outcome: Progressing  Flowsheets (Taken 4/6/2024 2011)  Discharge to home or other facility with appropriate resources:   Identify barriers to discharge with patient and caregiver   Arrange for needed discharge resources and transportation as appropriate   Identify discharge learning needs (meds, wound care, etc)   Refer to discharge planning if patient needs post-hospital services based on physician order or complex needs related to functional status, cognitive ability or social support system     Problem: Safety - Adult  Goal: Free from fall injury  Outcome: Progressing     Problem: ABCDS Injury Assessment  Goal: Absence of physical injury  Outcome: Progressing     Problem: Chronic Conditions and Co-morbidities  Goal: Patient's chronic conditions and co-morbidity symptoms are monitored and maintained or improved  Outcome: Progressing  Flowsheets (Taken 4/6/2024 2011)  Care Plan - Patient's Chronic Conditions and Co-Morbidity Symptoms are Monitored and Maintained or Improved:   Monitor and assess patient's chronic conditions and comorbid symptoms for stability, deterioration, or improvement   Collaborate with multidisciplinary team to address chronic and comorbid conditions and prevent exacerbation or deterioration   Update acute care plan with appropriate goals if chronic or comorbid symptoms are exacerbated and prevent overall improvement and discharge     Problem: Respiratory - Adult  Goal: Achieves optimal ventilation and oxygenation  Outcome: Progressing     Problem: Cardiovascular - Adult  Goal: Maintains optimal cardiac output and hemodynamic stability  Outcome: Progressing  Goal: Absence of cardiac dysrhythmias or at baseline  Outcome: Progressing     Problem: Musculoskeletal - Adult  Goal: Return mobility to safest level of function  Outcome: Progressing  Goal: Maintain proper alignment of affected body

## 2024-04-07 NOTE — PLAN OF CARE
Problem: Discharge Planning  Goal: Discharge to home or other facility with appropriate resources  4/7/2024 1016 by Geena Escobedo RN  Outcome: Adequate for Discharge     Problem: Safety - Adult  Goal: Free from fall injury  4/7/2024 1016 by Geena Escobedo RN  Outcome: Adequate for Discharge     Problem: ABCDS Injury Assessment  Goal: Absence of physical injury  4/7/2024 1016 by Geena Escobedo RN  Outcome: Adequate for Discharge     Problem: Chronic Conditions and Co-morbidities  Goal: Patient's chronic conditions and co-morbidity symptoms are monitored and maintained or improved  4/7/2024 1016 by Geena Escobedo RN  Outcome: Adequate for Discharge     Problem: Respiratory - Adult  Goal: Achieves optimal ventilation and oxygenation  4/7/2024 1016 by Geena Escobedo RN  Outcome: Adequate for Discharge     Problem: Cardiovascular - Adult  Goal: Maintains optimal cardiac output and hemodynamic stability  4/7/2024 1016 by Geena Escobedo RN  Outcome: Adequate for Discharge     Problem: Cardiovascular - Adult  Goal: Absence of cardiac dysrhythmias or at baseline  4/7/2024 1016 by Geena Escobedo RN  Outcome: Adequate for Discharge     Problem: Musculoskeletal - Adult  Goal: Return mobility to safest level of function  4/7/2024 1016 by Geena Escobedo RN  Outcome: Adequate for Discharge     Problem: Musculoskeletal - Adult  Goal: Maintain proper alignment of affected body part  4/7/2024 1016 by Geena Escobedo RN  Outcome: Adequate for Discharge     Problem: Musculoskeletal - Adult  Goal: Return ADL status to a safe level of function  4/7/2024 1016 by Geena Escobedo RN  Outcome: Adequate for Discharge

## 2024-04-07 NOTE — DISCHARGE INSTR - COC
Continuity of Care Form    Patient Name: Analy Reyna   :  1945  MRN:  [id number]    Admit date:  2024  Discharge date:  ***    Code Status Order: Full Code   Advance Directives:     Admitting Physician:  No admitting provider for patient encounter.  PCP: Ras Howell MD    Discharging Nurse: ***  Discharging Hospital Unit/Room#: 4K-26/026-A  Discharging Unit Phone Number: ***    Emergency Contact:   Extended Emergency Contact Information  Primary Emergency Contact: MooneyLino   [facility]  Home Phone: [phone]  Relation: Child  Secondary Emergency Contact: Katy Zuniga  Address: [address] [place] [address]  Home Phone: [phone]  Relation: Child    Past Surgical History:  Past Surgical History:   Procedure Laterality Date    ACHILLES TENDON SURGERY Bilateral     BLADDER SUSPENSION      CARDIAC SURGERY  2016    heart cath--[facility]    COLONOSCOPY Left 2018    COLONOSCOPY POLYPECTOMY SNARE/COLD BIOPSY performed by James Herrera MD at [facility]    COLONOSCOPY N/A 2021    COLONOSCOPY performed by Leeanna Merino MD at [facility]    COLONOSCOPY  2021    Dr. Merino-- [facility]    COLONOSCOPY N/A 10/20/2022    COLONOSCOPY POLYPECTOMY SNARE/COLD BIOPSY performed by Leeanna Merino MD at [facility]    ENDOSCOPY, COLON, DIAGNOSTIC      GASTRIC FUNDOPLICATION      HAMMER TOE SURGERY Right     HEMORRHOID SURGERY N/A 2023    Anal Exam under Anesthesia, Anal/Rectal Condyloma excision/ Hemorrhoidectomy performed by Carlos Zaman MD at [facility] OR    HIATAL HERNIA REPAIR  2016    Laparoscopic Robotic with Nissen Fundoplication - Dr. Zaman    HYSTERECTOMY (CERVIX STATUS UNKNOWN)      IA OFFICE/OUTPT VISIT,PROCEDURE ONLY N/A 2018    EGD DIAGNOSTIC ONLY performed by Leeanna Merino MD at [facility]    ROTATOR CUFF REPAIR      right    TENDON RELEASE Left 2016

## 2024-04-08 LAB
EKG ATRIAL RATE: 50 BPM
EKG ATRIAL RATE: 58 BPM
EKG P AXIS: 20 DEGREES
EKG P AXIS: 22 DEGREES
EKG P-R INTERVAL: 160 MS
EKG P-R INTERVAL: 180 MS
EKG Q-T INTERVAL: 434 MS
EKG Q-T INTERVAL: 448 MS
EKG QRS DURATION: 102 MS
EKG QRS DURATION: 104 MS
EKG QTC CALCULATION (BAZETT): 395 MS
EKG QTC CALCULATION (BAZETT): 439 MS
EKG R AXIS: -27 DEGREES
EKG R AXIS: -31 DEGREES
EKG T AXIS: 48 DEGREES
EKG T AXIS: 73 DEGREES
EKG VENTRICULAR RATE: 50 BPM
EKG VENTRICULAR RATE: 58 BPM

## 2024-04-12 PROBLEM — J44.9 CHRONIC OBSTRUCTIVE PULMONARY DISEASE, UNSPECIFIED COPD TYPE (HCC): Status: ACTIVE | Noted: 2024-04-12

## 2024-04-12 PROBLEM — Z95.811 PRESENCE OF HEART ASSIST DEVICE (HCC): Status: ACTIVE | Noted: 2024-04-12

## 2024-04-15 ENCOUNTER — HOSPITAL ENCOUNTER (OUTPATIENT)
Dept: NURSING | Age: 79
Discharge: HOME | End: 2024-04-15
Payer: MEDICARE

## 2024-04-15 ENCOUNTER — HOSPITAL ENCOUNTER (OUTPATIENT)
Age: 79
Discharge: HOME | End: 2024-04-17
Attending: INTERNAL MEDICINE
Payer: MEDICARE

## 2024-04-15 VITALS
OXYGEN SATURATION: 93 % | SYSTOLIC BLOOD PRESSURE: 133 MMHG | HEART RATE: 64 BPM | DIASTOLIC BLOOD PRESSURE: 65 MMHG | RESPIRATION RATE: 18 BRPM | BODY MASS INDEX: 35.19 KG/M2 | TEMPERATURE: 97.6 F | WEIGHT: 218 LBS

## 2024-04-15 DIAGNOSIS — D83.9 COMMON VARIABLE IMMUNODEFICIENCY (HCC): Primary | ICD-10-CM

## 2024-04-15 DIAGNOSIS — I49.3 FREQUENT PVCS: ICD-10-CM

## 2024-04-15 PROCEDURE — 93225 XTRNL ECG REC<48 HRS REC: CPT

## 2024-04-15 PROCEDURE — 96366 THER/PROPH/DIAG IV INF ADDON: CPT

## 2024-04-15 PROCEDURE — 2580000003 HC RX 258: Performed by: FAMILY MEDICINE

## 2024-04-15 PROCEDURE — 96415 CHEMO IV INFUSION ADDL HR: CPT

## 2024-04-15 PROCEDURE — 96413 CHEMO IV INFUSION 1 HR: CPT

## 2024-04-15 PROCEDURE — 96365 THER/PROPH/DIAG IV INF INIT: CPT

## 2024-04-15 PROCEDURE — 6360000002 HC RX W HCPCS: Performed by: FAMILY MEDICINE

## 2024-04-15 RX ORDER — ALBUTEROL SULFATE 90 UG/1
4 AEROSOL, METERED RESPIRATORY (INHALATION) PRN
OUTPATIENT
Start: 2024-05-06

## 2024-04-15 RX ORDER — DIPHENHYDRAMINE HYDROCHLORIDE 50 MG/ML
50 INJECTION INTRAMUSCULAR; INTRAVENOUS
OUTPATIENT
Start: 2024-05-06

## 2024-04-15 RX ORDER — ACETAMINOPHEN 325 MG/1
650 TABLET ORAL
OUTPATIENT
Start: 2024-05-06

## 2024-04-15 RX ORDER — ONDANSETRON 2 MG/ML
8 INJECTION INTRAMUSCULAR; INTRAVENOUS
OUTPATIENT
Start: 2024-05-06

## 2024-04-15 RX ORDER — HEPARIN 100 UNIT/ML
500 SYRINGE INTRAVENOUS PRN
OUTPATIENT
Start: 2024-05-06

## 2024-04-15 RX ORDER — DIPHENHYDRAMINE HCL 25 MG
25 TABLET ORAL ONCE
Status: DISCONTINUED | OUTPATIENT
Start: 2024-04-15 | End: 2024-04-16 | Stop reason: HOSPADM

## 2024-04-15 RX ORDER — HEPARIN 100 UNIT/ML
500 SYRINGE INTRAVENOUS PRN
Status: DISCONTINUED | OUTPATIENT
Start: 2024-04-15 | End: 2024-04-16 | Stop reason: HOSPADM

## 2024-04-15 RX ORDER — ACETAMINOPHEN 325 MG/1
650 TABLET ORAL ONCE
Status: DISCONTINUED | OUTPATIENT
Start: 2024-04-15 | End: 2024-04-16 | Stop reason: HOSPADM

## 2024-04-15 RX ORDER — SODIUM CHLORIDE 9 MG/ML
INJECTION, SOLUTION INTRAVENOUS CONTINUOUS
OUTPATIENT
Start: 2024-05-06

## 2024-04-15 RX ORDER — MEPERIDINE HYDROCHLORIDE 25 MG/ML
12.5 INJECTION INTRAMUSCULAR; INTRAVENOUS; SUBCUTANEOUS PRN
OUTPATIENT
Start: 2024-05-06

## 2024-04-15 RX ORDER — SODIUM CHLORIDE 0.9 % (FLUSH) 0.9 %
5-40 SYRINGE (ML) INJECTION PRN
Status: DISCONTINUED | OUTPATIENT
Start: 2024-04-15 | End: 2024-04-16 | Stop reason: HOSPADM

## 2024-04-15 RX ORDER — DIPHENHYDRAMINE HCL 25 MG
25 TABLET ORAL ONCE
OUTPATIENT
Start: 2024-05-06 | End: 2024-05-06

## 2024-04-15 RX ORDER — SODIUM CHLORIDE 0.9 % (FLUSH) 0.9 %
5-40 SYRINGE (ML) INJECTION PRN
OUTPATIENT
Start: 2024-05-06

## 2024-04-15 RX ORDER — SODIUM CHLORIDE 9 MG/ML
5-250 INJECTION, SOLUTION INTRAVENOUS PRN
OUTPATIENT
Start: 2024-05-06

## 2024-04-15 RX ORDER — ACETAMINOPHEN 325 MG/1
650 TABLET ORAL ONCE
OUTPATIENT
Start: 2024-05-06 | End: 2024-05-06

## 2024-04-15 RX ORDER — EPINEPHRINE 1 MG/ML
0.3 INJECTION, SOLUTION INTRAMUSCULAR; SUBCUTANEOUS PRN
OUTPATIENT
Start: 2024-05-06

## 2024-04-15 RX ADMIN — HEPARIN 500 UNITS: 100 SYRINGE at 11:58

## 2024-04-15 RX ADMIN — SODIUM CHLORIDE, PRESERVATIVE FREE 10 ML: 5 INJECTION INTRAVENOUS at 11:58

## 2024-04-15 RX ADMIN — IMMUNE GLOBULIN (HUMAN) 20 G: 10 INJECTION INTRAVENOUS; SUBCUTANEOUS at 11:10

## 2024-04-15 RX ADMIN — IMMUNE GLOBULIN (HUMAN) 5 G: 10 INJECTION INTRAVENOUS; SUBCUTANEOUS at 10:35

## 2024-04-15 NOTE — PROGRESS NOTES
1015- Patient arrived to [facility] ambulatory for IVIG infusion. Patient denies changes since last infusion. Vitals stable. Mediport de accessed using sterile technique. Call light placed within reach. PT RIGHTS AND RESPONSIBILITIES OFFERED TO PT.    1035- Infusion started. Patient provided with drink and snack. Denies needs.     1105- Infusion increased. Patient tolerated. Vitals stable.     1120- Infusion increased. Patient tolerated. Vitals stable.     1135- Infusion increased to max rate. Patient tolerated. Vitals stable.     1156- Infusion complete. Patient tolerated well with no issues. Mediport de accessed per MAR. Vitals stable.     1200- Discharge instructions reviewed with patient. Verbalized understanding with no further questions. AVS provided. Discharged ambulatory to [facility] in stable condition.             __M__ Safety:       (Environmental)  [place] to environment  Ensure ID band is correct and in place/ allergy band as needed  Assess for fall risk  Initiate fall precautions as applicable (fall band, side rails, etc.)  Call light within reach  Bed in low position/ wheels locked    __M__ Pain:       Assess pain level and characteristics  Administer analgesics as ordered  Assess effectiveness of pain management and report to MD as needed    __M__ Knowledge Deficit:  Assess baseline knowledge  Provide teaching at level of understanding  Provide teaching via preferred learning method  Evaluate teaching effectiveness    __M__ Hemodynamic/Respiratory Status:       (Pre and Post Procedure Monitoring)  Assess/Monitor vital signs and LOC  Assess Baseline SpO2 prior to any sedation  Obtain weight/height  Assess vital signs/ LOC until patient meets discharge criteria  Monitor procedure site and notify MD of any issues    __M__ Infection-Risk of Central Venous Catheter:  Monitor for infection signs and symptoms (catheter site redness, temperature elevation, etc)  Assess for infection risks  Educate regarding infection

## 2024-04-15 NOTE — DISCHARGE INSTRUCTIONS
ACTIVITY:  Continue usual care with your doctor. Call your doctor immediately if any severe problems or go to the nearest emergency room.      I have been treated and hereby acknowledge receiving this instruction sheet.                Next IVIG infusion: May 13th 2024 @ 10:00am

## 2024-04-18 LAB
ACQUISITION DURATION: NORMAL S
AVERAGE HEART RATE: 80 BPM
HOOKUP DATE: NORMAL
HOOKUP TIME: NORMAL
MAX HEART RATE TIME/DATE: NORMAL
MAX HEART RATE: 137 BPM
MIN HEART RATE TIME/DATE: NORMAL
MIN HEART RATE: 52 BPM
NUMBER OF QRS COMPLEXES: NORMAL
NUMBER OF SUPRAVENTRICULAR COUPLETS: 3
NUMBER OF SUPRAVENTRICULAR ECTOPICS: 90
NUMBER OF SUPRAVENTRICULAR ISOLATED BEATS: 69
NUMBER OF VENTRICULAR BIGEMINAL CYCLES: 0
NUMBER OF VENTRICULAR COUPLETS: 0
NUMBER OF VENTRICULAR ECTOPICS: 242

## 2024-04-18 PROCEDURE — 93227 XTRNL ECG REC<48 HR R&I: CPT | Performed by: INTERNAL MEDICINE

## 2024-04-23 NOTE — PATIENT INSTRUCTIONS
You may receive a survey regarding the care you received during your visit.  Your input is valuable to us.  We encourage you to complete and return your survey.  We hope you will choose us in the future for your healthcare needs.    Thank you for choosing [facility]!    Your Medical Assistant Today:  Nina GUIDO      Your RN Today:  Yvonne GUIDO  Your Provider for Today: Dr. Bates  Ph. [phone]     Have a Great Day!

## 2024-04-24 ENCOUNTER — OFFICE VISIT (OUTPATIENT)
Dept: CARDIOLOGY CLINIC | Age: 79
End: 2024-04-24
Payer: MEDICARE

## 2024-04-24 VITALS
OXYGEN SATURATION: 92 % | DIASTOLIC BLOOD PRESSURE: 71 MMHG | WEIGHT: 232 LBS | HEART RATE: 93 BPM | BODY MASS INDEX: 37.28 KG/M2 | HEIGHT: 66 IN | SYSTOLIC BLOOD PRESSURE: 103 MMHG

## 2024-04-24 DIAGNOSIS — R42 VERTIGO: ICD-10-CM

## 2024-04-24 DIAGNOSIS — R42 DIZZINESS: ICD-10-CM

## 2024-04-24 DIAGNOSIS — I49.3 FREQUENT PVCS: Primary | ICD-10-CM

## 2024-04-24 PROCEDURE — 1090F PRES/ABSN URINE INCON ASSESS: CPT | Performed by: INTERNAL MEDICINE

## 2024-04-24 PROCEDURE — 1123F ACP DISCUSS/DSCN MKR DOCD: CPT | Performed by: INTERNAL MEDICINE

## 2024-04-24 PROCEDURE — 3078F DIAST BP <80 MM HG: CPT | Performed by: INTERNAL MEDICINE

## 2024-04-24 PROCEDURE — 1036F TOBACCO NON-USER: CPT | Performed by: INTERNAL MEDICINE

## 2024-04-24 PROCEDURE — 1111F DSCHRG MED/CURRENT MED MERGE: CPT | Performed by: INTERNAL MEDICINE

## 2024-04-24 PROCEDURE — G8427 DOCREV CUR MEDS BY ELIG CLIN: HCPCS | Performed by: INTERNAL MEDICINE

## 2024-04-24 PROCEDURE — G8417 CALC BMI ABV UP PARAM F/U: HCPCS | Performed by: INTERNAL MEDICINE

## 2024-04-24 PROCEDURE — 99214 OFFICE O/P EST MOD 30 MIN: CPT | Performed by: INTERNAL MEDICINE

## 2024-04-24 PROCEDURE — 3074F SYST BP LT 130 MM HG: CPT | Performed by: INTERNAL MEDICINE

## 2024-04-24 PROCEDURE — 93000 ELECTROCARDIOGRAM COMPLETE: CPT | Performed by: INTERNAL MEDICINE

## 2024-04-24 PROCEDURE — G8400 PT W/DXA NO RESULTS DOC: HCPCS | Performed by: INTERNAL MEDICINE

## 2024-04-24 NOTE — PROGRESS NOTES
participate in the care of your patient. Please call me if you have any questions.      **This report has been created using voice recognition software. It may contain minor errors which are inherent in voice recognition technology.**     Dianne Bates MD, MRCP, FACC, FHRS on 4/24/2024 at 8:25 AM

## 2024-05-08 ENCOUNTER — OFFICE VISIT (OUTPATIENT)
Dept: CARDIOLOGY CLINIC | Age: 79
End: 2024-05-08
Payer: MEDICARE

## 2024-05-08 VITALS
HEART RATE: 90 BPM | DIASTOLIC BLOOD PRESSURE: 88 MMHG | SYSTOLIC BLOOD PRESSURE: 142 MMHG | WEIGHT: 225 LBS | BODY MASS INDEX: 36.16 KG/M2 | HEIGHT: 66 IN

## 2024-05-08 DIAGNOSIS — Z95.818 PRESENCE OF WATCHMAN LEFT ATRIAL APPENDAGE CLOSURE DEVICE: Primary | ICD-10-CM

## 2024-05-08 DIAGNOSIS — I48.0 PAROXYSMAL ATRIAL FIBRILLATION (HCC): ICD-10-CM

## 2024-05-08 DIAGNOSIS — E66.01 SEVERE OBESITY (BMI 35.0-39.9) WITH COMORBIDITY (HCC): ICD-10-CM

## 2024-05-08 DIAGNOSIS — I10 PRIMARY HYPERTENSION: ICD-10-CM

## 2024-05-08 PROCEDURE — 99214 OFFICE O/P EST MOD 30 MIN: CPT | Performed by: NURSE PRACTITIONER

## 2024-05-08 PROCEDURE — G8427 DOCREV CUR MEDS BY ELIG CLIN: HCPCS | Performed by: NURSE PRACTITIONER

## 2024-05-08 PROCEDURE — 1036F TOBACCO NON-USER: CPT | Performed by: NURSE PRACTITIONER

## 2024-05-08 PROCEDURE — 1090F PRES/ABSN URINE INCON ASSESS: CPT | Performed by: NURSE PRACTITIONER

## 2024-05-08 PROCEDURE — G8417 CALC BMI ABV UP PARAM F/U: HCPCS | Performed by: NURSE PRACTITIONER

## 2024-05-08 PROCEDURE — 3079F DIAST BP 80-89 MM HG: CPT | Performed by: NURSE PRACTITIONER

## 2024-05-08 PROCEDURE — 1123F ACP DISCUSS/DSCN MKR DOCD: CPT | Performed by: NURSE PRACTITIONER

## 2024-05-08 PROCEDURE — G8400 PT W/DXA NO RESULTS DOC: HCPCS | Performed by: NURSE PRACTITIONER

## 2024-05-08 PROCEDURE — 3077F SYST BP >= 140 MM HG: CPT | Performed by: NURSE PRACTITIONER

## 2024-05-08 NOTE — PROGRESS NOTES
[facility] PHYSICIANS [place] SPECIALTY  [facility] [place] CARDIOLOGY  [address]  [address]  [facility] [address]  Dept: [phone]  Dept Fax: [phone]  Loc: [phone]    Visit Date: 5/8/2024    Primary Cardiologist: Dr. Trinh  Structural Heart: Audi Merino MD  EP: Dr. Bates    Ms. Reyna is a 78 y.o. female  who presented for: 2 year WATCHMAN follow-up    Chief Complaint   Patient presents with    Follow-up     2 year post watchman    Other     Denies any cardiac related symptoms at this time       HPI:   HPI   Last seen in office on 5/9/2023 per this writer for 1 year post WATCHMAN follow-up. Per office note:  Assessment/Plan   Paroxysmal Atrial Fibrillation, [id number] = 6  WATCHMAN 3/3/2022   - 1 year follow-up JAYLEN notes device in good position, no leaks or thrombi. EF stable at 35%.   - no stroke sx  PVC ablation 3/2/23 per Dr. Bates  HTN  CAD     Doing well - feeling better than has in long time. No anginal sx or evidence of decompensated HF. JAYLEN stable - no issues with device. No stroke sx.      Continue current medications as prescribed.   Stay as active as you can.    Eat heart healthy diet.    Follow-up with your PCP as scheduled.   Follow-up with Dr. Trinh on 10/23/23  as scheduled or sooner if need.    Follow-up with Dr. Merino for 2 year post-WATCHMAN evaluation - office will call to schedule the appointment.    Today's visit:   Subjective:  Having a lot of belching lately - no acid reflux  No stroke sx  No bleeding on ASA  Doing regular exercise   No chest discomfort or dyspnea  No swelling  Tolerating meds      Current Outpatient Medications:     methylPREDNISolone (MEDROL DOSEPACK) 4 MG tablet, Take by mouth., Disp: 1 kit, Rfl: 0    Dextromethorphan-guaiFENesin (MUCINEX DM MAXIMUM STRENGTH)  MG TB12, Take 1 tablet by mouth every 12 hours as needed, Disp: , Rfl:     Acetaminophen-Caffeine (EXCEDRIN TENSION HEADACHE) TABS, Take 2 tablets by mouth every 8 hours as needed for

## 2024-05-13 ENCOUNTER — HOSPITAL ENCOUNTER (OUTPATIENT)
Dept: NURSING | Age: 79
Discharge: HOME | End: 2024-05-13
Payer: MEDICARE

## 2024-05-13 VITALS
HEART RATE: 64 BPM | OXYGEN SATURATION: 92 % | SYSTOLIC BLOOD PRESSURE: 154 MMHG | DIASTOLIC BLOOD PRESSURE: 69 MMHG | RESPIRATION RATE: 18 BRPM | WEIGHT: 226 LBS | TEMPERATURE: 98.2 F | BODY MASS INDEX: 36.48 KG/M2

## 2024-05-13 DIAGNOSIS — D83.9 COMMON VARIABLE IMMUNODEFICIENCY (HCC): Primary | ICD-10-CM

## 2024-05-13 PROCEDURE — 96413 CHEMO IV INFUSION 1 HR: CPT

## 2024-05-13 PROCEDURE — 96415 CHEMO IV INFUSION ADDL HR: CPT

## 2024-05-13 PROCEDURE — 96366 THER/PROPH/DIAG IV INF ADDON: CPT

## 2024-05-13 PROCEDURE — 2580000003 HC RX 258: Performed by: FAMILY MEDICINE

## 2024-05-13 PROCEDURE — 6360000002 HC RX W HCPCS: Performed by: FAMILY MEDICINE

## 2024-05-13 PROCEDURE — 96365 THER/PROPH/DIAG IV INF INIT: CPT

## 2024-05-13 RX ORDER — ONDANSETRON 2 MG/ML
8 INJECTION INTRAMUSCULAR; INTRAVENOUS
OUTPATIENT
Start: 2024-06-10

## 2024-05-13 RX ORDER — ACETAMINOPHEN 325 MG/1
650 TABLET ORAL ONCE
OUTPATIENT
Start: 2024-06-10 | End: 2024-06-10

## 2024-05-13 RX ORDER — DIPHENHYDRAMINE HYDROCHLORIDE 50 MG/ML
50 INJECTION INTRAMUSCULAR; INTRAVENOUS
OUTPATIENT
Start: 2024-06-10

## 2024-05-13 RX ORDER — EPINEPHRINE 1 MG/ML
0.3 INJECTION, SOLUTION INTRAMUSCULAR; SUBCUTANEOUS PRN
OUTPATIENT
Start: 2024-06-10

## 2024-05-13 RX ORDER — ALBUTEROL SULFATE 90 UG/1
4 AEROSOL, METERED RESPIRATORY (INHALATION) PRN
OUTPATIENT
Start: 2024-06-10

## 2024-05-13 RX ORDER — SODIUM CHLORIDE 9 MG/ML
5-250 INJECTION, SOLUTION INTRAVENOUS PRN
OUTPATIENT
Start: 2024-06-10

## 2024-05-13 RX ORDER — SODIUM CHLORIDE 0.9 % (FLUSH) 0.9 %
5-40 SYRINGE (ML) INJECTION PRN
Status: DISCONTINUED | OUTPATIENT
Start: 2024-05-13 | End: 2024-05-14 | Stop reason: HOSPADM

## 2024-05-13 RX ORDER — ACETAMINOPHEN 325 MG/1
650 TABLET ORAL ONCE
Status: DISCONTINUED | OUTPATIENT
Start: 2024-05-13 | End: 2024-05-14 | Stop reason: HOSPADM

## 2024-05-13 RX ORDER — SODIUM CHLORIDE 9 MG/ML
INJECTION, SOLUTION INTRAVENOUS CONTINUOUS
OUTPATIENT
Start: 2024-06-10

## 2024-05-13 RX ORDER — ACETAMINOPHEN 325 MG/1
650 TABLET ORAL
OUTPATIENT
Start: 2024-06-10

## 2024-05-13 RX ORDER — DIPHENHYDRAMINE HCL 25 MG
25 TABLET ORAL ONCE
OUTPATIENT
Start: 2024-06-10 | End: 2024-06-10

## 2024-05-13 RX ORDER — DIPHENHYDRAMINE HCL 25 MG
25 TABLET ORAL ONCE
Status: DISCONTINUED | OUTPATIENT
Start: 2024-05-13 | End: 2024-05-14 | Stop reason: HOSPADM

## 2024-05-13 RX ORDER — SODIUM CHLORIDE 0.9 % (FLUSH) 0.9 %
5-40 SYRINGE (ML) INJECTION PRN
OUTPATIENT
Start: 2024-06-10

## 2024-05-13 RX ORDER — HEPARIN 100 UNIT/ML
500 SYRINGE INTRAVENOUS PRN
Status: DISCONTINUED | OUTPATIENT
Start: 2024-05-13 | End: 2024-05-14 | Stop reason: HOSPADM

## 2024-05-13 RX ORDER — MEPERIDINE HYDROCHLORIDE 25 MG/ML
12.5 INJECTION INTRAMUSCULAR; INTRAVENOUS; SUBCUTANEOUS PRN
OUTPATIENT
Start: 2024-06-10

## 2024-05-13 RX ORDER — HEPARIN 100 UNIT/ML
500 SYRINGE INTRAVENOUS PRN
OUTPATIENT
Start: 2024-06-10

## 2024-05-13 RX ADMIN — IMMUNE GLOBULIN (HUMAN) 20 G: 10 INJECTION INTRAVENOUS; SUBCUTANEOUS at 11:03

## 2024-05-13 RX ADMIN — IMMUNE GLOBULIN (HUMAN) 5 G: 10 INJECTION INTRAVENOUS; SUBCUTANEOUS at 10:28

## 2024-05-13 RX ADMIN — SODIUM CHLORIDE, PRESERVATIVE FREE 10 ML: 5 INJECTION INTRAVENOUS at 12:04

## 2024-05-13 RX ADMIN — HEPARIN 500 UNITS: 100 SYRINGE at 12:02

## 2024-05-13 ASSESSMENT — PAIN DESCRIPTION - DESCRIPTORS: DESCRIPTORS: ACHING;SHARP

## 2024-05-13 ASSESSMENT — PAIN SCALES - GENERAL: PAINLEVEL_OUTOF10: 3

## 2024-05-13 ASSESSMENT — PAIN DESCRIPTION - ORIENTATION: ORIENTATION: RIGHT;LEFT

## 2024-05-13 NOTE — PROGRESS NOTES
0945   Analy ambulated into room for ivig infusion. Pt rights and responsibilities offered to her. Infusion explained and questions were answered. Infusion began and call light within reach. She took her pre meds before she got here today.     1045  Analy has no needs at this time. Tolerating infusion. Call light within reach.     1130  Analy ambulated into restroom and tolerated well. Infusion infusing with no needs at this time. Call light within reach.    1202  ivig infusion complete and Analy tolerated well. D/c instructions explained and Analy verbalized understanding. Analy ambulated out per self for d/c today.             _m___ Safety:       (Environmental)  [place] to environment  Ensure ID band is correct and in place/ allergy band as needed  Assess for fall risk  Initiate fall precautions as applicable (fall band, side rails, etc.)  Call light within reach  Bed in low position/ wheels locked    m____ Pain:       Assess pain level and characteristics  Administer analgesics as ordered  Assess effectiveness of pain management and report to MD as needed    _m___ Knowledge Deficit:  Assess baseline knowledge  Provide teaching at level of understanding  Provide teaching via preferred learning method  Evaluate teaching effectiveness    _m___ Hemodynamic/Respiratory Status:       (Pre and Post Procedure Monitoring)  Assess/Monitor vital signs and LOC  Assess Baseline SpO2 prior to any sedation  Obtain weight/height  Assess vital signs/ LOC until patient meets discharge criteria  Monitor procedure site and notify MD of any issues    _m___ Infection-Risk of Central Venous Catheter:  Monitor for infection signs and symptoms (catheter site redness, temperature elevation, etc)  Assess for infection risks  Educate regarding infection prevention  Manage central venous catheter (flushes/ dressing changes per protocol)

## 2024-05-13 NOTE — DISCHARGE INSTRUCTIONS
ACTIVITY:  Continue usual care with your doctor. Call your doctor immediately if any severe problems or go to the nearest emergency room.      I have been treated and hereby acknowledge receiving this instruction sheet.        Next appointment: June 10th at 10 AM    Please call [phone] with any questions or concerns

## 2024-05-21 ENCOUNTER — HOSPITAL ENCOUNTER (EMERGENCY)
Age: 79
Discharge: HOME | End: 2024-05-21
Attending: EMERGENCY MEDICINE
Payer: MEDICARE

## 2024-05-21 ENCOUNTER — APPOINTMENT (OUTPATIENT)
Dept: GENERAL RADIOLOGY | Age: 79
End: 2024-05-21
Payer: MEDICARE

## 2024-05-21 VITALS
BODY MASS INDEX: 35.2 KG/M2 | DIASTOLIC BLOOD PRESSURE: 83 MMHG | HEART RATE: 58 BPM | OXYGEN SATURATION: 96 % | SYSTOLIC BLOOD PRESSURE: 138 MMHG | HEIGHT: 66 IN | RESPIRATION RATE: 13 BRPM | TEMPERATURE: 97.6 F | WEIGHT: 219 LBS

## 2024-05-21 DIAGNOSIS — E86.0 DEHYDRATION: ICD-10-CM

## 2024-05-21 DIAGNOSIS — Z79.899 POLYPHARMACY: ICD-10-CM

## 2024-05-21 DIAGNOSIS — R42 LIGHTHEADEDNESS: Primary | ICD-10-CM

## 2024-05-21 LAB
ALBUMIN SERPL BCG-MCNC: 3.7 G/DL (ref 3.5–5.1)
ALP SERPL-CCNC: 133 U/L (ref 38–126)
ALT SERPL W/O P-5'-P-CCNC: 12 U/L (ref 11–66)
ANION GAP SERPL CALC-SCNC: 7 MEQ/L (ref 8–16)
AST SERPL-CCNC: 23 U/L (ref 5–40)
BACTERIA URNS QL MICRO: ABNORMAL /HPF
BASOPHILS ABSOLUTE: 0.1 THOU/MM3 (ref 0–0.1)
BASOPHILS NFR BLD AUTO: 1.1 %
BILIRUB CONJ SERPL-MCNC: < 0.2 MG/DL (ref 0–0.3)
BILIRUB SERPL-MCNC: 0.2 MG/DL (ref 0.3–1.2)
BILIRUB UR QL STRIP.AUTO: NEGATIVE
BUN SERPL-MCNC: 30 MG/DL (ref 7–22)
CALCIUM SERPL-MCNC: 9.3 MG/DL (ref 8.5–10.5)
CASTS #/AREA URNS LPF: ABNORMAL /LPF
CASTS 2: ABNORMAL /LPF
CHARACTER UR: CLEAR
CHLORIDE SERPL-SCNC: 100 MEQ/L (ref 98–111)
CO2 SERPL-SCNC: 31 MEQ/L (ref 23–33)
COLOR: YELLOW
CREAT SERPL-MCNC: 1.7 MG/DL (ref 0.4–1.2)
CRYSTALS URNS MICRO: ABNORMAL
DEPRECATED RDW RBC AUTO: 45.3 FL (ref 35–45)
EKG ATRIAL RATE: 55 BPM
EKG P AXIS: 18 DEGREES
EKG P-R INTERVAL: 170 MS
EKG Q-T INTERVAL: 442 MS
EKG QRS DURATION: 104 MS
EKG QTC CALCULATION (BAZETT): 422 MS
EKG R AXIS: -30 DEGREES
EKG T AXIS: 66 DEGREES
EKG VENTRICULAR RATE: 55 BPM
EOSINOPHIL NFR BLD AUTO: 1.7 %
EOSINOPHILS ABSOLUTE: 0.1 THOU/MM3 (ref 0–0.4)
EPITHELIAL CELLS, UA: ABNORMAL /HPF
ERYTHROCYTE [DISTWIDTH] IN BLOOD BY AUTOMATED COUNT: 13.2 % (ref 11.5–14.5)
GFR SERPL CREATININE-BSD FRML MDRD: 30 ML/MIN/1.73M2
GLUCOSE SERPL-MCNC: 134 MG/DL (ref 70–108)
GLUCOSE UR QL STRIP.AUTO: NEGATIVE MG/DL
HCT VFR BLD AUTO: 35.9 % (ref 37–47)
HGB BLD-MCNC: 11.7 GM/DL (ref 12–16)
HGB UR QL STRIP.AUTO: NEGATIVE
IMM GRANULOCYTES # BLD AUTO: 0.01 THOU/MM3 (ref 0–0.07)
IMM GRANULOCYTES NFR BLD AUTO: 0.2 %
KETONES UR QL STRIP.AUTO: NEGATIVE
LYMPHOCYTES ABSOLUTE: 1.4 THOU/MM3 (ref 1–4.8)
LYMPHOCYTES NFR BLD AUTO: 30.3 %
MAGNESIUM SERPL-MCNC: 2.7 MG/DL (ref 1.6–2.4)
MCH RBC QN AUTO: 30.9 PG (ref 26–33)
MCHC RBC AUTO-ENTMCNC: 32.6 GM/DL (ref 32.2–35.5)
MCV RBC AUTO: 94.7 FL (ref 81–99)
MISCELLANEOUS 2: ABNORMAL
MONOCYTES ABSOLUTE: 0.5 THOU/MM3 (ref 0.4–1.3)
MONOCYTES NFR BLD AUTO: 11.2 %
NEUTROPHILS ABSOLUTE: 2.6 THOU/MM3 (ref 1.8–7.7)
NEUTROPHILS NFR BLD AUTO: 55.5 %
NITRITE UR QL STRIP: NEGATIVE
NRBC BLD AUTO-RTO: 0 /100 WBC
NT-PROBNP SERPL IA-MCNC: 320.2 PG/ML (ref 0–449)
OSMOLALITY SERPL CALC.SUM OF ELEC: 283.8 MOSMOL/KG (ref 275–300)
PH UR STRIP.AUTO: 8.5 [PH] (ref 5–9)
PLATELET # BLD AUTO: 181 THOU/MM3 (ref 130–400)
PMV BLD AUTO: 10.1 FL (ref 9.4–12.4)
POTASSIUM SERPL-SCNC: 4.5 MEQ/L (ref 3.5–5.2)
PROT SERPL-MCNC: 6.9 G/DL (ref 6.1–8)
PROT UR STRIP.AUTO-MCNC: NEGATIVE MG/DL
RBC # BLD AUTO: 3.79 MILL/MM3 (ref 4.2–5.4)
RBC URINE: ABNORMAL /HPF
RENAL EPI CELLS #/AREA URNS HPF: ABNORMAL /[HPF]
SODIUM SERPL-SCNC: 138 MEQ/L (ref 135–145)
SP GR UR REFRACT.AUTO: 1.01 (ref 1–1.03)
T4 FREE SERPL-MCNC: 1.02 NG/DL (ref 0.93–1.68)
TROPONIN, HIGH SENSITIVITY: 20 NG/L (ref 0–12)
TROPONIN, HIGH SENSITIVITY: 20 NG/L (ref 0–12)
TSH SERPL DL<=0.005 MIU/L-ACNC: 2.22 UIU/ML (ref 0.4–4.2)
UROBILINOGEN, URINE: 0.2 EU/DL (ref 0–1)
WBC # BLD AUTO: 4.7 THOU/MM3 (ref 4.8–10.8)
WBC #/AREA URNS HPF: ABNORMAL /HPF
WBC #/AREA URNS HPF: ABNORMAL /[HPF]
YEAST LIKE FUNGI URNS QL MICRO: ABNORMAL

## 2024-05-21 PROCEDURE — 80048 BASIC METABOLIC PNL TOTAL CA: CPT

## 2024-05-21 PROCEDURE — 96360 HYDRATION IV INFUSION INIT: CPT

## 2024-05-21 PROCEDURE — 80076 HEPATIC FUNCTION PANEL: CPT

## 2024-05-21 PROCEDURE — 83880 ASSAY OF NATRIURETIC PEPTIDE: CPT

## 2024-05-21 PROCEDURE — 93010 ELECTROCARDIOGRAM REPORT: CPT | Performed by: INTERNAL MEDICINE

## 2024-05-21 PROCEDURE — 84484 ASSAY OF TROPONIN QUANT: CPT

## 2024-05-21 PROCEDURE — 93005 ELECTROCARDIOGRAM TRACING: CPT | Performed by: EMERGENCY MEDICINE

## 2024-05-21 PROCEDURE — 99285 EMERGENCY DEPT VISIT HI MDM: CPT

## 2024-05-21 PROCEDURE — 2580000003 HC RX 258: Performed by: EMERGENCY MEDICINE

## 2024-05-21 PROCEDURE — 36415 COLL VENOUS BLD VENIPUNCTURE: CPT

## 2024-05-21 PROCEDURE — 96361 HYDRATE IV INFUSION ADD-ON: CPT

## 2024-05-21 PROCEDURE — 81001 URINALYSIS AUTO W/SCOPE: CPT

## 2024-05-21 PROCEDURE — 83735 ASSAY OF MAGNESIUM: CPT

## 2024-05-21 PROCEDURE — 85025 COMPLETE CBC W/AUTO DIFF WBC: CPT

## 2024-05-21 PROCEDURE — 71045 X-RAY EXAM CHEST 1 VIEW: CPT

## 2024-05-21 PROCEDURE — 84443 ASSAY THYROID STIM HORMONE: CPT

## 2024-05-21 PROCEDURE — 84439 ASSAY OF FREE THYROXINE: CPT

## 2024-05-21 RX ORDER — 0.9 % SODIUM CHLORIDE 0.9 %
1000 INTRAVENOUS SOLUTION INTRAVENOUS ONCE
Status: COMPLETED | OUTPATIENT
Start: 2024-05-21 | End: 2024-05-21

## 2024-05-21 RX ADMIN — SODIUM CHLORIDE 1000 ML: 9 INJECTION, SOLUTION INTRAVENOUS at 00:58

## 2024-05-21 ASSESSMENT — PAIN DESCRIPTION - LOCATION: LOCATION: BACK

## 2024-05-21 ASSESSMENT — PAIN DESCRIPTION - FREQUENCY: FREQUENCY: CONTINUOUS

## 2024-05-21 ASSESSMENT — PAIN SCALES - GENERAL
PAINLEVEL_OUTOF10: 5

## 2024-05-21 ASSESSMENT — PAIN - FUNCTIONAL ASSESSMENT
PAIN_FUNCTIONAL_ASSESSMENT: 0-10
PAIN_FUNCTIONAL_ASSESSMENT: NONE - DENIES PAIN
PAIN_FUNCTIONAL_ASSESSMENT: 0-10
PAIN_FUNCTIONAL_ASSESSMENT: 0-10

## 2024-05-21 ASSESSMENT — PAIN DESCRIPTION - ORIENTATION: ORIENTATION: MID;LOWER

## 2024-05-21 ASSESSMENT — PAIN DESCRIPTION - ONSET: ONSET: ON-GOING

## 2024-05-21 NOTE — ED TRIAGE NOTES
Pt presents to the ED via [facility] with c/y dizziness and hypotension that the pt states states started after dinner tonight. Pt states she has a hx of hypertension so it is very unusual the her blood pressure is low. Pt states the nurse at [facility] stated her blood pressure was 70/40. Pt states she is not feeling dizzy right now. EKG complete.

## 2024-05-21 NOTE — ED PROVIDER NOTES
[facility] EMERGENCY DEPT      EMERGENCY MEDICINE     Pt Name: Analy Reyna  MRN: [id number]  Birthdate 1945  Date of evaluation: 5/21/2024  Provider: AVNI AUSTIN MD    CHIEF COMPLAINT       Chief Complaint   Patient presents with    Hypotension    Dizziness     HISTORY OF PRESENT ILLNESS   Analy Reyna is a pleasant 78 y.o. female who presents to the emergency department from from nursing home, brought in by EMS for evaluation of dizziness and low blood pressure.  Patient states that she has been feeling dizzy.  She states that she has had increased fatigue.  She has had trouble sleeping and the last 2 days they have increased her trazodone because she was not falling asleep until 2 AM.  She states that yesterday she finally fell asleep around 2 AM and when they woke her up to see if she went to go to breakfast she said she went to sleep in.  She states that she slept until 2 PM.  Today when she was at dinner she felt lightheaded.  She took her blood pressure and it was low for her.  She stated it was 90/50.  She called the nurse who retook her blood pressure a few times with the lowest being 70/40.  She states that she has been drinking a significant amount of water and has had 64 ounces of water per day for the last several days.  She denies any chest pain or difficulty breathing.  Patient states that she feels as if her mouth is dry but the lightheadedness has gone away.    PASTMEDICAL HISTORY     Past Medical History:   Diagnosis Date    Anemia     Atrial fibrillation (HCC) 11/09/2017    CAD (coronary artery disease)     CHF (congestive heart failure) (HCC)     Chronic kidney disease     stage 3 kidney     COPD (chronic obstructive pulmonary disease) (HCC)     DDD (degenerative disc disease), lumbar     Depression     Frequent PVCs 12/13/2017    GERD (gastroesophageal reflux disease)     History of blood transfusion     Hx of blood clots 09/2017    pulmonary emboli    Hyperlipidemia

## 2024-05-21 NOTE — ED NOTES
Patient resting in bed. Respirations easy and unlabored. No distress noted. Call light within reach. Pt connected to [employer] at this time. Denies any further needs.

## 2024-05-21 NOTE — DISCHARGE INSTRUCTIONS
You were seen for lightheadedness and concern for low blood pressure.  You had evidence of mild dehydration.  Some of the lightheadedness may also have been secondary to medications.  Trazodone can cause dizziness and lightheadedness.  Your blood pressure improved with IV hydration.  No evidence of ischemia or infection were found.  Please continue home medications as prescribed.  Follow-up with your primary care physician as needed.  If you develop worsening dizziness, uncontrolled vomiting, chest pain, or any other concerning symptoms please return to emergency room for reevaluation.

## 2024-06-10 ENCOUNTER — HOSPITAL ENCOUNTER (OUTPATIENT)
Dept: NURSING | Age: 79
Discharge: HOME | End: 2024-06-10

## 2024-06-10 NOTE — PROGRESS NOTES
0900 message left  on [employer] phone.   1130 [facility] called .  Talked with [employer]  No ride today.  Infusion rescheduled.

## 2024-06-13 ENCOUNTER — HOSPITAL ENCOUNTER (OUTPATIENT)
Dept: NURSING | Age: 79
Discharge: HOME | End: 2024-06-13
Payer: MEDICARE

## 2024-06-13 VITALS
TEMPERATURE: 97.2 F | SYSTOLIC BLOOD PRESSURE: 157 MMHG | WEIGHT: 222 LBS | OXYGEN SATURATION: 93 % | DIASTOLIC BLOOD PRESSURE: 75 MMHG | RESPIRATION RATE: 18 BRPM | BODY MASS INDEX: 35.83 KG/M2 | HEART RATE: 55 BPM

## 2024-06-13 DIAGNOSIS — D83.9 COMMON VARIABLE IMMUNODEFICIENCY (HCC): Primary | ICD-10-CM

## 2024-06-13 LAB — IGG SERPL-MCNC: 890 MG/DL (ref 700–1600)

## 2024-06-13 PROCEDURE — 96415 CHEMO IV INFUSION ADDL HR: CPT

## 2024-06-13 PROCEDURE — 82784 ASSAY IGA/IGD/IGG/IGM EACH: CPT

## 2024-06-13 PROCEDURE — 6370000000 HC RX 637 (ALT 250 FOR IP): Performed by: FAMILY MEDICINE

## 2024-06-13 PROCEDURE — 6360000002 HC RX W HCPCS: Performed by: FAMILY MEDICINE

## 2024-06-13 PROCEDURE — 96413 CHEMO IV INFUSION 1 HR: CPT

## 2024-06-13 PROCEDURE — 96365 THER/PROPH/DIAG IV INF INIT: CPT

## 2024-06-13 PROCEDURE — 36591 DRAW BLOOD OFF VENOUS DEVICE: CPT

## 2024-06-13 PROCEDURE — 96366 THER/PROPH/DIAG IV INF ADDON: CPT

## 2024-06-13 PROCEDURE — 2580000003 HC RX 258: Performed by: FAMILY MEDICINE

## 2024-06-13 RX ORDER — MEPERIDINE HYDROCHLORIDE 25 MG/ML
12.5 INJECTION INTRAMUSCULAR; INTRAVENOUS; SUBCUTANEOUS PRN
OUTPATIENT
Start: 2024-07-11

## 2024-06-13 RX ORDER — ONDANSETRON 2 MG/ML
8 INJECTION INTRAMUSCULAR; INTRAVENOUS
OUTPATIENT
Start: 2024-07-11

## 2024-06-13 RX ORDER — SODIUM CHLORIDE 0.9 % (FLUSH) 0.9 %
5-40 SYRINGE (ML) INJECTION PRN
Status: DISCONTINUED | OUTPATIENT
Start: 2024-06-13 | End: 2024-06-14 | Stop reason: HOSPADM

## 2024-06-13 RX ORDER — DIPHENHYDRAMINE HCL 25 MG
25 TABLET ORAL ONCE
OUTPATIENT
Start: 2024-07-11 | End: 2024-07-11

## 2024-06-13 RX ORDER — DIPHENHYDRAMINE HCL 25 MG
25 TABLET ORAL ONCE
Status: COMPLETED | OUTPATIENT
Start: 2024-06-13 | End: 2024-06-13

## 2024-06-13 RX ORDER — ALBUTEROL SULFATE 90 UG/1
4 AEROSOL, METERED RESPIRATORY (INHALATION) PRN
OUTPATIENT
Start: 2024-07-11

## 2024-06-13 RX ORDER — DIPHENHYDRAMINE HYDROCHLORIDE 50 MG/ML
50 INJECTION INTRAMUSCULAR; INTRAVENOUS
OUTPATIENT
Start: 2024-07-11

## 2024-06-13 RX ORDER — HEPARIN 100 UNIT/ML
500 SYRINGE INTRAVENOUS PRN
Status: DISCONTINUED | OUTPATIENT
Start: 2024-06-13 | End: 2024-06-14 | Stop reason: HOSPADM

## 2024-06-13 RX ORDER — EPINEPHRINE 1 MG/ML
0.3 INJECTION, SOLUTION INTRAMUSCULAR; SUBCUTANEOUS PRN
OUTPATIENT
Start: 2024-07-11

## 2024-06-13 RX ORDER — HEPARIN 100 UNIT/ML
500 SYRINGE INTRAVENOUS PRN
OUTPATIENT
Start: 2024-07-11

## 2024-06-13 RX ORDER — SODIUM CHLORIDE 0.9 % (FLUSH) 0.9 %
5-40 SYRINGE (ML) INJECTION PRN
OUTPATIENT
Start: 2024-07-11

## 2024-06-13 RX ORDER — ACETAMINOPHEN 325 MG/1
650 TABLET ORAL
OUTPATIENT
Start: 2024-07-11

## 2024-06-13 RX ORDER — ACETAMINOPHEN 325 MG/1
650 TABLET ORAL ONCE
OUTPATIENT
Start: 2024-07-11 | End: 2024-07-11

## 2024-06-13 RX ORDER — SODIUM CHLORIDE 9 MG/ML
5-250 INJECTION, SOLUTION INTRAVENOUS PRN
OUTPATIENT
Start: 2024-07-11

## 2024-06-13 RX ORDER — ACETAMINOPHEN 325 MG/1
650 TABLET ORAL ONCE
Status: COMPLETED | OUTPATIENT
Start: 2024-06-13 | End: 2024-06-13

## 2024-06-13 RX ORDER — SODIUM CHLORIDE 9 MG/ML
INJECTION, SOLUTION INTRAVENOUS CONTINUOUS
OUTPATIENT
Start: 2024-07-11

## 2024-06-13 RX ADMIN — SODIUM CHLORIDE, PRESERVATIVE FREE 10 ML: 5 INJECTION INTRAVENOUS at 10:26

## 2024-06-13 RX ADMIN — HEPARIN 500 UNITS: 100 SYRINGE at 11:56

## 2024-06-13 RX ADMIN — DIPHENHYDRAMINE HCL 25 MG: 25 TABLET ORAL at 09:00

## 2024-06-13 RX ADMIN — IMMUNE GLOBULIN (HUMAN) 20 G: 10 INJECTION INTRAVENOUS; SUBCUTANEOUS at 11:05

## 2024-06-13 RX ADMIN — SODIUM CHLORIDE, PRESERVATIVE FREE 10 ML: 5 INJECTION INTRAVENOUS at 11:56

## 2024-06-13 RX ADMIN — IMMUNE GLOBULIN (HUMAN) 5 G: 10 INJECTION INTRAVENOUS; SUBCUTANEOUS at 10:25

## 2024-06-13 ASSESSMENT — PAIN SCALES - GENERAL: PAINLEVEL_OUTOF10: 4

## 2024-06-13 NOTE — PROGRESS NOTES
0950- [facility] to [facility] amb[facility] for IVIG infusion. Patient denies changes since last infusion. Vitals stable. Mediport de accessed using sterile technique and lab work obtained per order. Patient stated she took pre meds at home before she got here. Call light placed within reach. PT RIGHTS AND RESPONSIBILITIES OFFERED TO PT.     1025- Infusion started. Patient provided with drink and snack. Denies needs.    1105- Infusion increased. Patient tolerated. Vitals stable.     1120- Infusion increased. Patient tolerated. Vitals stable.     1135- Infusion increased to max rate. Vitals stable. Patient tolerated.     1155- Infusion complete. Patient tolerated well with no issues. Vitals stable. Mediport de accessed using heparin per MAR.     1200- Discharge instructions reviewed with patient. Verbalized understanding with no further questions. AVS provided. Discharged ambulatory to [facility] in stable condition with daughter.           __M__ Safety:       (Environmental)  [place] to environment  Ensure ID band is correct and in place/ allergy band as needed  Assess for fall risk  Initiate fall precautions as applicable (fall band, side rails, etc.)  Call light within reach  Bed in low position/ wheels locked    __M__ Pain:       Assess pain level and characteristics  Administer analgesics as ordered  Assess effectiveness of pain management and report to MD as needed    __M__ Knowledge Deficit:  Assess baseline knowledge  Provide teaching at level of understanding  Provide teaching via preferred learning method  Evaluate teaching effectiveness    __M__ Hemodynamic/Respiratory Status:       (Pre and Post Procedure Monitoring)  Assess/Monitor vital signs and LOC  Assess Baseline SpO2 prior to any sedation  Obtain weight/height  Assess vital signs/ LOC until patient meets discharge criteria  Monitor procedure site and notify MD of any issues    __M__ Infection-Risk of Central Venous Catheter:  Monitor for infection signs and

## 2024-06-13 NOTE — DISCHARGE INSTRUCTIONS
ACTIVITY:  Continue usual care with your doctor. Call your doctor immediately if any severe problems or go to the nearest emergency room.      I have been treated and hereby acknowledge receiving this instruction sheet.                  Next IVIG infusion: Monday July 15th @ 10:00am

## 2024-07-15 ENCOUNTER — HOSPITAL ENCOUNTER (OUTPATIENT)
Dept: NURSING | Age: 79
Discharge: HOME | End: 2024-07-15
Payer: MEDICARE

## 2024-07-15 VITALS
DIASTOLIC BLOOD PRESSURE: 63 MMHG | RESPIRATION RATE: 16 BRPM | OXYGEN SATURATION: 92 % | HEART RATE: 54 BPM | SYSTOLIC BLOOD PRESSURE: 140 MMHG | BODY MASS INDEX: 35.67 KG/M2 | TEMPERATURE: 98.3 F | WEIGHT: 221 LBS

## 2024-07-15 DIAGNOSIS — D83.9 COMMON VARIABLE IMMUNODEFICIENCY (HCC): Primary | ICD-10-CM

## 2024-07-15 PROCEDURE — 96366 THER/PROPH/DIAG IV INF ADDON: CPT

## 2024-07-15 PROCEDURE — 6360000002 HC RX W HCPCS: Performed by: FAMILY MEDICINE

## 2024-07-15 PROCEDURE — 96365 THER/PROPH/DIAG IV INF INIT: CPT

## 2024-07-15 PROCEDURE — 2580000003 HC RX 258: Performed by: FAMILY MEDICINE

## 2024-07-15 PROCEDURE — 96413 CHEMO IV INFUSION 1 HR: CPT

## 2024-07-15 PROCEDURE — 96415 CHEMO IV INFUSION ADDL HR: CPT

## 2024-07-15 RX ORDER — HEPARIN 100 UNIT/ML
500 SYRINGE INTRAVENOUS PRN
OUTPATIENT
Start: 2024-08-05

## 2024-07-15 RX ORDER — MEPERIDINE HYDROCHLORIDE 25 MG/ML
12.5 INJECTION INTRAMUSCULAR; INTRAVENOUS; SUBCUTANEOUS PRN
OUTPATIENT
Start: 2024-08-05

## 2024-07-15 RX ORDER — ACETAMINOPHEN 325 MG/1
650 TABLET ORAL ONCE
OUTPATIENT
Start: 2024-08-05 | End: 2024-08-05

## 2024-07-15 RX ORDER — ONDANSETRON 2 MG/ML
8 INJECTION INTRAMUSCULAR; INTRAVENOUS
OUTPATIENT
Start: 2024-08-05

## 2024-07-15 RX ORDER — SODIUM CHLORIDE 0.9 % (FLUSH) 0.9 %
5-40 SYRINGE (ML) INJECTION PRN
Status: DISCONTINUED | OUTPATIENT
Start: 2024-07-15 | End: 2024-07-16 | Stop reason: HOSPADM

## 2024-07-15 RX ORDER — EPINEPHRINE 1 MG/ML
0.3 INJECTION, SOLUTION INTRAMUSCULAR; SUBCUTANEOUS PRN
OUTPATIENT
Start: 2024-08-05

## 2024-07-15 RX ORDER — ACETAMINOPHEN 325 MG/1
650 TABLET ORAL ONCE
Status: DISCONTINUED | OUTPATIENT
Start: 2024-07-15 | End: 2024-07-16 | Stop reason: HOSPADM

## 2024-07-15 RX ORDER — DIPHENHYDRAMINE HCL 25 MG
25 TABLET ORAL ONCE
OUTPATIENT
Start: 2024-08-05 | End: 2024-08-05

## 2024-07-15 RX ORDER — DIPHENHYDRAMINE HCL 25 MG
25 TABLET ORAL ONCE
Status: DISCONTINUED | OUTPATIENT
Start: 2024-07-15 | End: 2024-07-16 | Stop reason: HOSPADM

## 2024-07-15 RX ORDER — SODIUM CHLORIDE 9 MG/ML
5-250 INJECTION, SOLUTION INTRAVENOUS PRN
OUTPATIENT
Start: 2024-08-05

## 2024-07-15 RX ORDER — ACETAMINOPHEN 325 MG/1
650 TABLET ORAL
OUTPATIENT
Start: 2024-08-05

## 2024-07-15 RX ORDER — ALBUTEROL SULFATE 90 UG/1
4 AEROSOL, METERED RESPIRATORY (INHALATION) PRN
OUTPATIENT
Start: 2024-08-05

## 2024-07-15 RX ORDER — SODIUM CHLORIDE 0.9 % (FLUSH) 0.9 %
5-40 SYRINGE (ML) INJECTION PRN
OUTPATIENT
Start: 2024-08-05

## 2024-07-15 RX ORDER — SODIUM CHLORIDE 9 MG/ML
INJECTION, SOLUTION INTRAVENOUS CONTINUOUS
OUTPATIENT
Start: 2024-08-05

## 2024-07-15 RX ORDER — HEPARIN 100 UNIT/ML
500 SYRINGE INTRAVENOUS PRN
Status: DISCONTINUED | OUTPATIENT
Start: 2024-07-15 | End: 2024-07-16 | Stop reason: HOSPADM

## 2024-07-15 RX ORDER — DIPHENHYDRAMINE HYDROCHLORIDE 50 MG/ML
50 INJECTION INTRAMUSCULAR; INTRAVENOUS
OUTPATIENT
Start: 2024-08-05

## 2024-07-15 RX ADMIN — IMMUNE GLOBULIN (HUMAN) 20 G: 10 INJECTION INTRAVENOUS; SUBCUTANEOUS at 10:42

## 2024-07-15 RX ADMIN — IMMUNE GLOBULIN (HUMAN) 5 G: 10 INJECTION INTRAVENOUS; SUBCUTANEOUS at 10:12

## 2024-07-15 RX ADMIN — SODIUM CHLORIDE, PRESERVATIVE FREE 10 ML: 5 INJECTION INTRAVENOUS at 11:49

## 2024-07-15 RX ADMIN — HEPARIN 500 UNITS: 100 SYRINGE at 11:49

## 2024-07-15 RX ADMIN — SODIUM CHLORIDE, PRESERVATIVE FREE 10 ML: 5 INJECTION INTRAVENOUS at 10:12

## 2024-07-15 ASSESSMENT — PAIN SCALES - GENERAL: PAINLEVEL_OUTOF10: 6

## 2024-07-15 ASSESSMENT — PAIN DESCRIPTION - LOCATION: LOCATION: SHOULDER

## 2024-07-15 ASSESSMENT — PAIN DESCRIPTION - DESCRIPTORS: DESCRIPTORS: ACHING

## 2024-07-15 ASSESSMENT — PAIN DESCRIPTION - PAIN TYPE: TYPE: CHRONIC PAIN

## 2024-07-15 NOTE — PROGRESS NOTES
1000 Patient ambulatory to [facility] for IVIG infusion. Patient verbalizes understanding of infusion. PT RIGHTS AND RESPONSIBILITIES OFFERED TO PT.    1010 IVIG infusion started.     1040 Rate increase on IVIG. Patient resting in bed. Denies any complaints.     1055 Infusion increased. Patient denies any needs.     1110 Infusion at max rate. Patient denies any complaints.     1152 IVIG infusion complete. Patient tolerated well.    1200 AVS reviewed with patient. Verbalizes understanding. Patient left ambulatory to discharge lobby.            _M___ Safety:       (Environmental)  [place] to environment  Ensure ID band is correct and in place/ allergy band as needed  Assess for fall risk  Initiate fall precautions as applicable (fall band, side rails, etc.)  Call light within reach  Bed in low position/ wheels locked    _M___ Pain:       Assess pain level and characteristics  Administer analgesics as ordered  Assess effectiveness of pain management and report to MD as needed    _M___ Knowledge Deficit:  Assess baseline knowledge  Provide teaching at level of understanding  Provide teaching via preferred learning method  Evaluate teaching effectiveness    _M___ Hemodynamic/Respiratory Status:       (Pre and Post Procedure Monitoring)  Assess/Monitor vital signs and LOC  Assess Baseline SpO2 prior to any sedation  Obtain weight/height  Assess vital signs/ LOC until patient meets discharge criteria  Monitor procedure site and notify MD of any issues    _M___ Infection-Risk of Central Venous Catheter:  Monitor for infection signs and symptoms (catheter site redness, temperature elevation, etc)  Assess for infection risks  Educate regarding infection prevention  Manage central venous catheter (flushes/ dressing changes per protocol)

## 2024-08-01 ENCOUNTER — APPOINTMENT (OUTPATIENT)
Dept: GENERAL RADIOLOGY | Age: 79
End: 2024-08-01
Payer: MEDICARE

## 2024-08-01 ENCOUNTER — TELEPHONE (OUTPATIENT)
Dept: ONCOLOGY | Age: 79
End: 2024-08-01

## 2024-08-01 ENCOUNTER — APPOINTMENT (OUTPATIENT)
Dept: CT IMAGING | Age: 79
End: 2024-08-01
Payer: MEDICARE

## 2024-08-01 ENCOUNTER — HOSPITAL ENCOUNTER (EMERGENCY)
Age: 79
Discharge: HOME | End: 2024-08-01
Attending: EMERGENCY MEDICINE
Payer: MEDICARE

## 2024-08-01 VITALS
OXYGEN SATURATION: 96 % | SYSTOLIC BLOOD PRESSURE: 143 MMHG | RESPIRATION RATE: 14 BRPM | HEART RATE: 56 BPM | WEIGHT: 223.55 LBS | DIASTOLIC BLOOD PRESSURE: 65 MMHG | BODY MASS INDEX: 36.08 KG/M2 | TEMPERATURE: 97.6 F

## 2024-08-01 DIAGNOSIS — R06.02 SHORTNESS OF BREATH: Primary | ICD-10-CM

## 2024-08-01 DIAGNOSIS — M89.9 BONE LESION: ICD-10-CM

## 2024-08-01 DIAGNOSIS — R29.898 BILATERAL ARM WEAKNESS: ICD-10-CM

## 2024-08-01 DIAGNOSIS — R53.1 GENERALIZED WEAKNESS: ICD-10-CM

## 2024-08-01 LAB
ALBUMIN SERPL BCG-MCNC: 3.8 G/DL (ref 3.5–5.1)
ALP SERPL-CCNC: 175 U/L (ref 38–126)
ALT SERPL W/O P-5'-P-CCNC: 9 U/L (ref 11–66)
ANION GAP SERPL CALC-SCNC: 10 MEQ/L (ref 8–16)
AST SERPL-CCNC: 19 U/L (ref 5–40)
BACTERIA: ABNORMAL
BASOPHILS ABSOLUTE: 0.1 THOU/MM3 (ref 0–0.1)
BASOPHILS NFR BLD AUTO: 1.3 %
BILIRUB CONJ SERPL-MCNC: < 0.1 MG/DL (ref 0.1–13.8)
BILIRUB SERPL-MCNC: 0.2 MG/DL (ref 0.3–1.2)
BILIRUB UR QL STRIP: NEGATIVE
BUN SERPL-MCNC: 27 MG/DL (ref 7–22)
CALCIUM SERPL-MCNC: 8.7 MG/DL (ref 8.5–10.5)
CASTS #/AREA URNS LPF: ABNORMAL /LPF
CASTS #/AREA URNS LPF: ABNORMAL /LPF
CHARACTER UR: CLEAR
CHARCOAL URNS QL MICRO: ABNORMAL
CHLORIDE SERPL-SCNC: 104 MEQ/L (ref 98–111)
CO2 SERPL-SCNC: 29 MEQ/L (ref 23–33)
COLOR UR: YELLOW
CREAT SERPL-MCNC: 1.5 MG/DL (ref 0.4–1.2)
CRYSTALS URNS QL MICRO: ABNORMAL
DEPRECATED RDW RBC AUTO: 48 FL (ref 35–45)
EKG ATRIAL RATE: 58 BPM
EKG ATRIAL RATE: 59 BPM
EKG P AXIS: 19 DEGREES
EKG P AXIS: 32 DEGREES
EKG P-R INTERVAL: 154 MS
EKG P-R INTERVAL: 166 MS
EKG Q-T INTERVAL: 446 MS
EKG Q-T INTERVAL: 464 MS
EKG QRS DURATION: 104 MS
EKG QRS DURATION: 114 MS
EKG QTC CALCULATION (BAZETT): 441 MS
EKG QTC CALCULATION (BAZETT): 455 MS
EKG R AXIS: -30 DEGREES
EKG R AXIS: -4 DEGREES
EKG T AXIS: 92 DEGREES
EKG T AXIS: 99 DEGREES
EKG VENTRICULAR RATE: 58 BPM
EKG VENTRICULAR RATE: 59 BPM
EOSINOPHIL NFR BLD AUTO: 4 %
EOSINOPHILS ABSOLUTE: 0.2 THOU/MM3 (ref 0–0.4)
EPITHELIAL CELLS, UA: ABNORMAL /HPF
ERYTHROCYTE [DISTWIDTH] IN BLOOD BY AUTOMATED COUNT: 13.6 % (ref 11.5–14.5)
FLUAV RNA RESP QL NAA+PROBE: NOT DETECTED
FLUBV RNA RESP QL NAA+PROBE: NOT DETECTED
GFR SERPL CREATININE-BSD FRML MDRD: 35 ML/MIN/1.73M2
GLUCOSE BLD STRIP.AUTO-MCNC: 112 MG/DL (ref 70–108)
GLUCOSE SERPL-MCNC: 115 MG/DL (ref 70–108)
GLUCOSE UR QL STRIP.AUTO: NEGATIVE MG/DL
HCT VFR BLD AUTO: 36.1 % (ref 37–47)
HGB BLD-MCNC: 11.5 GM/DL (ref 12–16)
HGB UR QL STRIP.AUTO: NEGATIVE
IMM GRANULOCYTES # BLD AUTO: 0.01 THOU/MM3 (ref 0–0.07)
IMM GRANULOCYTES NFR BLD AUTO: 0.2 %
KETONES UR QL STRIP.AUTO: NEGATIVE
LEUKOCYTE ESTERASE UR QL STRIP.AUTO: ABNORMAL
LIPASE SERPL-CCNC: 20.3 U/L (ref 5.6–51.3)
LYMPHOCYTES ABSOLUTE: 2 THOU/MM3 (ref 1–4.8)
LYMPHOCYTES NFR BLD AUTO: 41.2 %
MAGNESIUM SERPL-MCNC: 2.2 MG/DL (ref 1.6–2.4)
MCH RBC QN AUTO: 30.6 PG (ref 26–33)
MCHC RBC AUTO-ENTMCNC: 31.9 GM/DL (ref 32.2–35.5)
MCV RBC AUTO: 96 FL (ref 81–99)
MONOCYTES ABSOLUTE: 0.7 THOU/MM3 (ref 0.4–1.3)
MONOCYTES NFR BLD AUTO: 14.4 %
NEUTROPHILS ABSOLUTE: 1.9 THOU/MM3 (ref 1.8–7.7)
NEUTROPHILS NFR BLD AUTO: 38.9 %
NITRITE UR QL STRIP.AUTO: NEGATIVE
NRBC BLD AUTO-RTO: 0 /100 WBC
NT-PROBNP SERPL IA-MCNC: 408.4 PG/ML (ref 0–449)
OSMOLALITY SERPL CALC.SUM OF ELEC: 291 MOSMOL/KG (ref 275–300)
PH UR STRIP.AUTO: 5.5 [PH] (ref 5–9)
PLATELET # BLD AUTO: 197 THOU/MM3 (ref 130–400)
PMV BLD AUTO: 10.1 FL (ref 9.4–12.4)
POTASSIUM SERPL-SCNC: 4.2 MEQ/L (ref 3.5–5.2)
PROT SERPL-MCNC: 6.3 G/DL (ref 6.1–8)
PROT UR STRIP.AUTO-MCNC: NEGATIVE MG/DL
RBC # BLD AUTO: 3.76 MILL/MM3 (ref 4.2–5.4)
RBC #/AREA URNS HPF: ABNORMAL /HPF
RENAL EPI CELLS #/AREA URNS HPF: ABNORMAL /[HPF]
SARS-COV-2 RNA RESP QL NAA+PROBE: NOT DETECTED
SODIUM SERPL-SCNC: 143 MEQ/L (ref 135–145)
SP GR UR REFRACT.AUTO: 1.01 (ref 1–1.03)
T4 FREE SERPL-MCNC: 1.07 NG/DL (ref 0.93–1.68)
TROPONIN, HIGH SENSITIVITY: 21 NG/L (ref 0–12)
TSH SERPL DL<=0.005 MIU/L-ACNC: 3.59 UIU/ML (ref 0.4–4.2)
UROBILINOGEN UR QL STRIP.AUTO: 1 EU/DL (ref 0–1)
WBC # BLD AUTO: 4.8 THOU/MM3 (ref 4.8–10.8)
WBC #/AREA URNS HPF: ABNORMAL /HPF
YEAST LIKE FUNGI URNS QL MICRO: ABNORMAL

## 2024-08-01 PROCEDURE — 71275 CT ANGIOGRAPHY CHEST: CPT

## 2024-08-01 PROCEDURE — 84439 ASSAY OF FREE THYROXINE: CPT

## 2024-08-01 PROCEDURE — 2580000003 HC RX 258

## 2024-08-01 PROCEDURE — 71045 X-RAY EXAM CHEST 1 VIEW: CPT

## 2024-08-01 PROCEDURE — 93005 ELECTROCARDIOGRAM TRACING: CPT

## 2024-08-01 PROCEDURE — 82948 REAGENT STRIP/BLOOD GLUCOSE: CPT

## 2024-08-01 PROCEDURE — 84443 ASSAY THYROID STIM HORMONE: CPT

## 2024-08-01 PROCEDURE — 84484 ASSAY OF TROPONIN QUANT: CPT

## 2024-08-01 PROCEDURE — 6370000000 HC RX 637 (ALT 250 FOR IP)

## 2024-08-01 PROCEDURE — 6360000004 HC RX CONTRAST MEDICATION: Performed by: EMERGENCY MEDICINE

## 2024-08-01 PROCEDURE — 83880 ASSAY OF NATRIURETIC PEPTIDE: CPT

## 2024-08-01 PROCEDURE — 99285 EMERGENCY DEPT VISIT HI MDM: CPT

## 2024-08-01 PROCEDURE — 36415 COLL VENOUS BLD VENIPUNCTURE: CPT

## 2024-08-01 PROCEDURE — 81001 URINALYSIS AUTO W/SCOPE: CPT

## 2024-08-01 PROCEDURE — 83735 ASSAY OF MAGNESIUM: CPT

## 2024-08-01 PROCEDURE — 93005 ELECTROCARDIOGRAM TRACING: CPT | Performed by: EMERGENCY MEDICINE

## 2024-08-01 PROCEDURE — 80053 COMPREHEN METABOLIC PANEL: CPT

## 2024-08-01 PROCEDURE — 82248 BILIRUBIN DIRECT: CPT

## 2024-08-01 PROCEDURE — 70450 CT HEAD/BRAIN W/O DYE: CPT

## 2024-08-01 PROCEDURE — 85025 COMPLETE CBC W/AUTO DIFF WBC: CPT

## 2024-08-01 PROCEDURE — 87636 SARSCOV2 & INF A&B AMP PRB: CPT

## 2024-08-01 PROCEDURE — 83690 ASSAY OF LIPASE: CPT

## 2024-08-01 PROCEDURE — 72125 CT NECK SPINE W/O DYE: CPT

## 2024-08-01 PROCEDURE — 76376 3D RENDER W/INTRP POSTPROCES: CPT

## 2024-08-01 RX ORDER — 0.9 % SODIUM CHLORIDE 0.9 %
500 INTRAVENOUS SOLUTION INTRAVENOUS ONCE
Status: COMPLETED | OUTPATIENT
Start: 2024-08-01 | End: 2024-08-01

## 2024-08-01 RX ORDER — ACETAMINOPHEN 500 MG
1000 TABLET ORAL ONCE
Status: COMPLETED | OUTPATIENT
Start: 2024-08-01 | End: 2024-08-01

## 2024-08-01 RX ADMIN — ACETAMINOPHEN 1000 MG: 500 TABLET ORAL at 13:17

## 2024-08-01 RX ADMIN — SODIUM CHLORIDE 500 ML: 9 INJECTION, SOLUTION INTRAVENOUS at 06:57

## 2024-08-01 RX ADMIN — IOPAMIDOL 80 ML: 755 INJECTION, SOLUTION INTRAVENOUS at 08:19

## 2024-08-01 ASSESSMENT — PAIN DESCRIPTION - LOCATION: LOCATION: NECK

## 2024-08-01 ASSESSMENT — PAIN SCALES - GENERAL: PAINLEVEL_OUTOF10: 2

## 2024-08-01 ASSESSMENT — PAIN - FUNCTIONAL ASSESSMENT: PAIN_FUNCTIONAL_ASSESSMENT: 0-10

## 2024-08-01 NOTE — DISCHARGE INSTRUCTIONS
You were seen in the ED today for shortness of breath as well as generalized weakness.  Multiple bony lesions seen throughout the cervical and thoracic vertebrae.  Please follow-up with cancer center for outpatient PET scan as well as PCP for further workup.    Return to the ED for any worsening symptoms, including but not limited to worsening shortness of breath, chest pain, abdominal pain, intractable nausea/vomiting, or for any additional concerns.

## 2024-08-01 NOTE — ED TRIAGE NOTES
Patient presents to ED by EMS from [facility] with c/o generalized weakness, neck pain, and delayed responses. EMS states that the patient woke up around midnight with c/o of neck pain and then when the nurse went into see her at 0500 she noticed that the patient had delayed responses and slow movements with bilateral extremities. EKG completed in triage. Blood sugar 112.

## 2024-08-01 NOTE — ED NOTES
Patient resting comfortably in bed with grandson at bedside. Coffee provided as requested. Denies any needs. Call light in reach.

## 2024-08-01 NOTE — ED PROVIDER NOTES
I performed a history and physical examination of the patient and discussed management with the resident. I reviewed the resident’s note and agree with the documented findings and plan of care. Any areas of disagreement are noted on the chart. I was personally present for the key portions of any procedures. I have documented in the chart those procedures where I was not present during the key portions. I have reviewed the emergency nurses triage note. I agree with the chief complaint, past medical history, past surgical history, allergies, medications, social and family history as documented unless otherwise noted below. Documentation of the HPI, Physical Exam and Medical Decision Making performed by medical students or scribes is based on my personal performance of the HPI, PE and MDM. For Phys Assistant/ Nurse Practitioner cases/documentation I have personally evaluated this patient and have completed at least one if not all key elements of the E/M (history, physical exam, and MDM). My findings are as noted below.    In other words, I personally saw and examined the patient I have reviewed and agreed with the resident findings including all diagnostic interpretations and treatment plans as written.  I was present for the key portion of any procedures performed and the inclusive time noted in any critical care statement.    Patient presents today for generalized weakness.  This is an 79-year-old female coming to us from the nursing home.  She states that they woke her up to give her thyroid medication she had a difficult time raising her arms.  Patient states she is able to move her arms and move her legs now.  She is able to hold them up off the cot.  Patient states she has been feeling generally weak for over 1 weeks.  Patient states she has not had any chest pain shortness of breath abdominal pain or changes in bowel or bladder habits.  She has just noted generalized fatigue.  Patient does have complaints of 
GERD (gastroesophageal reflux disease)     History of blood transfusion     Hx of blood clots 09/2017    pulmonary emboli    Hyperlipidemia     Hypertension     sees Baki    Hyperthyroidism     Kidney disease     hemmelgarn    Movement disorder     DDD    Neuropathy     Obesity     Palpitations 01/10/2020    Pneumonia 2016    sepsis    Sleep apnea     usually wears cpap at night    Status post right and left heart catheterization     Type II or unspecified type diabetes mellitus without mention of complication, not stated as uncontrolled      Past Surgical History:   Procedure Laterality Date    ACHILLES TENDON SURGERY Bilateral     BLADDER SUSPENSION      CARDIAC SURGERY  2016    heart cath--[facility]    COLONOSCOPY Left 05/11/2018    COLONOSCOPY POLYPECTOMY SNARE/COLD BIOPSY performed by James Herrera MD at [facility]    COLONOSCOPY N/A 08/04/2021    COLONOSCOPY performed by Leeanna Merino MD at [facility]    COLONOSCOPY  08/04/2021    Dr. Merino-- [facility]    COLONOSCOPY N/A 10/20/2022    COLONOSCOPY POLYPECTOMY SNARE/COLD BIOPSY performed by Leeanna Merino MD at [facility]    ENDOSCOPY, COLON, DIAGNOSTIC      GASTRIC FUNDOPLICATION      HAMMER TOE SURGERY Right     HEMORRHOID SURGERY N/A 7/6/2023    Anal Exam under Anesthesia, Anal/Rectal Condyloma excision/ Hemorrhoidectomy performed by Carlos Zaman MD at [facility]    HIATAL HERNIA REPAIR  09/06/2016    Laparoscopic Robotic with Nissen Fundoplication - Dr. Zaman    HYSTERECTOMY (CERVIX STATUS UNKNOWN)      DE OFFICE/OUTPT VISIT,PROCEDURE ONLY N/A 09/21/2018    EGD DIAGNOSTIC ONLY performed by Leeanna Merino MD at [facility]    ROTATOR CUFF REPAIR      right    TENDON RELEASE Left 08/09/2016    left foot    TRANSESOPHAGEAL ECHOCARDIOGRAM N/A 04/20/2022    TRANSESOPHAGEAL ECHOCARDIOGRAM performed by Neema Kearney MD at [facility]    TRANSESOPHAGEAL ECHOCARDIOGRAM N/A 03/20/2023    TRANSESOPHAGEAL ECHOCARDIOGRAM performed by Neema VILLARREAL

## 2024-08-01 NOTE — TELEPHONE ENCOUNTER
Patient has not been seen in clinic since June 2020 for thrombocytopenia. She was in ED today and found to have new lytic lesions in the bones on CT cervical spine, suspicious for metastatic disease or multiple myeloma.     Recommend clinic appointment to re-establish care and begin work-up.

## 2024-08-05 ENCOUNTER — TELEPHONE (OUTPATIENT)
Dept: NEPHROLOGY | Age: 79
End: 2024-08-05

## 2024-08-05 ENCOUNTER — HOSPITAL ENCOUNTER (OUTPATIENT)
Dept: INFUSION THERAPY | Age: 79
Discharge: HOME | End: 2024-08-05
Payer: MEDICARE

## 2024-08-05 ENCOUNTER — OFFICE VISIT (OUTPATIENT)
Dept: ONCOLOGY | Age: 79
End: 2024-08-05
Payer: MEDICARE

## 2024-08-05 VITALS
HEIGHT: 66 IN | SYSTOLIC BLOOD PRESSURE: 157 MMHG | RESPIRATION RATE: 18 BRPM | DIASTOLIC BLOOD PRESSURE: 73 MMHG | HEART RATE: 60 BPM | OXYGEN SATURATION: 94 % | BODY MASS INDEX: 35.48 KG/M2 | WEIGHT: 220.8 LBS | TEMPERATURE: 99.1 F

## 2024-08-05 VITALS
TEMPERATURE: 99.1 F | HEART RATE: 60 BPM | RESPIRATION RATE: 18 BRPM | DIASTOLIC BLOOD PRESSURE: 73 MMHG | SYSTOLIC BLOOD PRESSURE: 157 MMHG | OXYGEN SATURATION: 94 %

## 2024-08-05 DIAGNOSIS — M89.9 LYTIC LESION OF BONE ON X-RAY: Primary | ICD-10-CM

## 2024-08-05 DIAGNOSIS — Z12.31 VISIT FOR SCREENING MAMMOGRAM: ICD-10-CM

## 2024-08-05 DIAGNOSIS — M89.9 LYTIC LESION OF BONE ON X-RAY: ICD-10-CM

## 2024-08-05 PROCEDURE — 84165 PROTEIN E-PHORESIS SERUM: CPT

## 2024-08-05 PROCEDURE — G8417 CALC BMI ABV UP PARAM F/U: HCPCS | Performed by: PHYSICIAN ASSISTANT

## 2024-08-05 PROCEDURE — 84155 ASSAY OF PROTEIN SERUM: CPT

## 2024-08-05 PROCEDURE — 99205 OFFICE O/P NEW HI 60 MIN: CPT | Performed by: PHYSICIAN ASSISTANT

## 2024-08-05 PROCEDURE — 1036F TOBACCO NON-USER: CPT | Performed by: PHYSICIAN ASSISTANT

## 2024-08-05 PROCEDURE — 1123F ACP DISCUSS/DSCN MKR DOCD: CPT | Performed by: PHYSICIAN ASSISTANT

## 2024-08-05 PROCEDURE — 3078F DIAST BP <80 MM HG: CPT | Performed by: PHYSICIAN ASSISTANT

## 2024-08-05 PROCEDURE — 83521 IG LIGHT CHAINS FREE EACH: CPT

## 2024-08-05 PROCEDURE — 82784 ASSAY IGA/IGD/IGG/IGM EACH: CPT

## 2024-08-05 PROCEDURE — G8427 DOCREV CUR MEDS BY ELIG CLIN: HCPCS | Performed by: PHYSICIAN ASSISTANT

## 2024-08-05 PROCEDURE — 3077F SYST BP >= 140 MM HG: CPT | Performed by: PHYSICIAN ASSISTANT

## 2024-08-05 PROCEDURE — G8400 PT W/DXA NO RESULTS DOC: HCPCS | Performed by: PHYSICIAN ASSISTANT

## 2024-08-05 PROCEDURE — 36415 COLL VENOUS BLD VENIPUNCTURE: CPT

## 2024-08-05 PROCEDURE — 1090F PRES/ABSN URINE INCON ASSESS: CPT | Performed by: PHYSICIAN ASSISTANT

## 2024-08-05 PROCEDURE — 86334 IMMUNOFIX E-PHORESIS SERUM: CPT

## 2024-08-05 PROCEDURE — 99211 OFF/OP EST MAY X REQ PHY/QHP: CPT

## 2024-08-05 PROCEDURE — 82232 ASSAY OF BETA-2 PROTEIN: CPT

## 2024-08-05 NOTE — PROGRESS NOTES
08/01/2024 06:10 AM    CO2 29 08/01/2024 06:10 AM    BUN 27 08/01/2024 06:10 AM    CREATININE 1.5 08/01/2024 06:10 AM    CREATININE 44.0 03/14/2019 12:00 AM    GLUCOSE 115 08/01/2024 06:10 AM    CALCIUM 8.7 08/01/2024 06:10 AM      LFT:   Lab Results   Component Value Date    ALT 9 (L) 08/01/2024    AST 19 08/01/2024    ALKPHOS 175 (H) 08/01/2024    BILITOT 0.2 (L) 08/01/2024      Details    Reading Physician Reading Date Result Priority   Phong Stewart MD  [phone] 8/1/2024      Narrative & Impression  1 view chest x-ray     Comparison: CR/SR - XR CHEST PORTABLE - 05/21/2024 01:08 AM EDT     Findings:  Low lung volumes.  No consolidation or effusion.  Asymmetric right hemidiaphragm elevation.  Similar prominent/enlarged cardiac silhouette.  Similar position of the left chest port.  No acute fracture.     IMPRESSION:  1. No acute findings.     This document has been electronically signed by: Phong Stewart MD on   08/01/2024 06:57 AM           Specimen Collected: 08/01/24 05:57 EDT Last Resulted: 08/01/24 06:57 EDT           Details    Reading Physician Reading Date Result Priority   Terrie Hernadez MD  [phone] 8/1/2024      Narrative & Impression  PROCEDURE: CT HEAD WO CONTRAST     CLINICAL INFORMATION: Fatigue and worsening SOB.     COMPARISON: Head CT 6/9/2019.     TECHNIQUE: Noncontrast 5 mm axial images were obtained through the brain.  Sagittal and coronal reconstructions were obtained. .     All CT scans at this facility use dose modulation, iterative reconstruction,  and/or weight-based dosing when appropriate to reduce radiation dose to as low  as reasonably achievable.     FINDINGS:     There is mild global volume loss which has developed since the prior exam.     There is no hemorrhage. There are no intra-or extra-axial collections.  There is  no hydrocephalus, midline shift or mass effect.  The gray-white matter  differentiation is preserved.      There is some mucosal thickening in the

## 2024-08-05 NOTE — PATIENT INSTRUCTIONS
Will obtain MRI of brain, cervical spine, thoracic spine, lumbar spine for further evaluation of lytic bone lesions and new onset headaches, worsening back pain  **Will require evaluation/clearance by Nephrology, Dr. Su for MRI contrast   Will obtain NM bone scan   Will obtain mammogram  Will obtain lab work today  Return to clinic with MD once MRIs and bone scan completed   Please call for questions or concerns.     **Please obtain records from Dr. Ron at [facility] - patient had ablation approximately 2 weeks ago

## 2024-08-05 NOTE — TELEPHONE ENCOUNTER
I have routed oncology note to you they are wanting clearance for CT abd pelvis, NM bone scan MRI lumbar and cervical and brain. Had BMP done today not seeing it in the chart yet.

## 2024-08-06 ENCOUNTER — SOCIAL WORK (OUTPATIENT)
Dept: INFUSION THERAPY | Age: 79
End: 2024-08-06

## 2024-08-06 LAB
IGA SERPL-MCNC: 121 MG/DL (ref 70–400)
IGG SERPL-MCNC: 1118 MG/DL (ref 700–1600)
IGM SERPL-MCNC: 52 MG/DL (ref 40–230)

## 2024-08-06 NOTE — PROGRESS NOTES
Oncology Social Work    Date: 8/6/2024  Time: 1:21 PM  Name: Analy Reyna  MRN: [id number]     Contact Type: Follow-up    Note:   Situation: This staff called Analy Reyna via phone support to introduce myself as her Oncology Social Worker.     Background:  Analy was referred to this staff by the Med ONC Dept of the Cancer Center. This staff was calling to review her outstanding concerns identified on her coping thermometer.     Assessment: This staff had the opportunity to speak with Analy. She appeared very pleasant as we discussed the purpose of the call was to provide her with education regarding the services provided by the . She had a couple outstanding concerns and shared that she is coping well at this time.   - While reviewing Analy's Advance Directive that is filed, she agrees to having her niece (Katy) as her POA. She also wanted to make sure her code status now reflects \"Full Code\". She explained she talked with her children and they would like her to at least be given a chance to stay alive. 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 She did qualify the use of a vent is to not be long-term. She shared she told her children they are to be reasonable and if the prognosis is not favorable to remove her from the vent.   - Analy explained she had a recent fall and that has her troubled. Although she has been in [facility] for 4+ years, she is still not happy there because she can't cook anything on her own. Analy seemed alert and well aware of her surroundings during this conversation, therefore her care choices are correct an accurately depicted based on our conversation.  - No community referrals were placed at this time because. Her referral services may be reconsidered should her needs regarding assistance change.    Recommendation: Follow-up will be initiated by Analy based on need.  provided her with my contact information and

## 2024-08-07 LAB
B2 MICROGLOB SERPL-MCNC: 4.1 MG/L
KAPPA/LAMBDA FREE LIGHT CHAINS: NORMAL

## 2024-08-08 ENCOUNTER — HOSPITAL ENCOUNTER (OUTPATIENT)
Dept: NUCLEAR MEDICINE | Age: 79
Discharge: HOME | End: 2024-08-08
Payer: MEDICARE

## 2024-08-08 DIAGNOSIS — M89.9 LYTIC LESION OF BONE ON X-RAY: ICD-10-CM

## 2024-08-08 PROCEDURE — 78306 BONE IMAGING WHOLE BODY: CPT | Performed by: PHYSICIAN ASSISTANT

## 2024-08-08 PROCEDURE — A9503 TC99M MEDRONATE: HCPCS | Performed by: PHYSICIAN ASSISTANT

## 2024-08-08 PROCEDURE — 3430000000 HC RX DIAGNOSTIC RADIOPHARMACEUTICAL: Performed by: PHYSICIAN ASSISTANT

## 2024-08-08 RX ORDER — TC 99M MEDRONATE 20 MG/10ML
27.7 INJECTION, POWDER, LYOPHILIZED, FOR SOLUTION INTRAVENOUS
Status: COMPLETED | OUTPATIENT
Start: 2024-08-08 | End: 2024-08-08

## 2024-08-08 RX ADMIN — TC 99M MEDRONATE 27.7 MILLICURIE: 20 INJECTION, POWDER, LYOPHILIZED, FOR SOLUTION INTRAVENOUS at 10:00

## 2024-08-09 LAB — IMMUNOFIXATION WITH QUANT: NORMAL

## 2024-08-12 ENCOUNTER — HOSPITAL ENCOUNTER (OUTPATIENT)
Dept: NURSING | Age: 79
Discharge: HOME | End: 2024-08-12
Payer: MEDICARE

## 2024-08-12 VITALS
OXYGEN SATURATION: 94 % | DIASTOLIC BLOOD PRESSURE: 64 MMHG | TEMPERATURE: 98.1 F | HEART RATE: 62 BPM | WEIGHT: 228 LBS | SYSTOLIC BLOOD PRESSURE: 130 MMHG | BODY MASS INDEX: 36.8 KG/M2 | RESPIRATION RATE: 18 BRPM

## 2024-08-12 DIAGNOSIS — D83.9 COMMON VARIABLE IMMUNODEFICIENCY (HCC): Primary | ICD-10-CM

## 2024-08-12 PROCEDURE — 96365 THER/PROPH/DIAG IV INF INIT: CPT

## 2024-08-12 PROCEDURE — 2580000003 HC RX 258: Performed by: FAMILY MEDICINE

## 2024-08-12 PROCEDURE — 96413 CHEMO IV INFUSION 1 HR: CPT

## 2024-08-12 PROCEDURE — 96415 CHEMO IV INFUSION ADDL HR: CPT

## 2024-08-12 PROCEDURE — 6360000002 HC RX W HCPCS: Performed by: FAMILY MEDICINE

## 2024-08-12 RX ORDER — ALBUTEROL SULFATE 90 UG/1
4 AEROSOL, METERED RESPIRATORY (INHALATION) PRN
OUTPATIENT
Start: 2024-09-09

## 2024-08-12 RX ORDER — HEPARIN 100 UNIT/ML
500 SYRINGE INTRAVENOUS PRN
OUTPATIENT
Start: 2024-09-09

## 2024-08-12 RX ORDER — EPINEPHRINE 1 MG/ML
0.3 INJECTION, SOLUTION INTRAMUSCULAR; SUBCUTANEOUS PRN
OUTPATIENT
Start: 2024-09-09

## 2024-08-12 RX ORDER — DIPHENHYDRAMINE HCL 25 MG
25 TABLET ORAL ONCE
OUTPATIENT
Start: 2024-09-09 | End: 2024-09-09

## 2024-08-12 RX ORDER — SODIUM CHLORIDE 0.9 % (FLUSH) 0.9 %
5-40 SYRINGE (ML) INJECTION PRN
Status: DISCONTINUED | OUTPATIENT
Start: 2024-08-12 | End: 2024-08-13 | Stop reason: HOSPADM

## 2024-08-12 RX ORDER — ONDANSETRON 2 MG/ML
8 INJECTION INTRAMUSCULAR; INTRAVENOUS
OUTPATIENT
Start: 2024-09-09

## 2024-08-12 RX ORDER — DIPHENHYDRAMINE HCL 25 MG
25 TABLET ORAL ONCE
Status: DISCONTINUED | OUTPATIENT
Start: 2024-08-12 | End: 2024-08-13 | Stop reason: HOSPADM

## 2024-08-12 RX ORDER — ACETAMINOPHEN 325 MG/1
650 TABLET ORAL
OUTPATIENT
Start: 2024-09-09

## 2024-08-12 RX ORDER — HEPARIN 100 UNIT/ML
500 SYRINGE INTRAVENOUS PRN
Status: DISCONTINUED | OUTPATIENT
Start: 2024-08-12 | End: 2024-08-13 | Stop reason: HOSPADM

## 2024-08-12 RX ORDER — MEPERIDINE HYDROCHLORIDE 25 MG/ML
12.5 INJECTION INTRAMUSCULAR; INTRAVENOUS; SUBCUTANEOUS PRN
OUTPATIENT
Start: 2024-09-09

## 2024-08-12 RX ORDER — SODIUM CHLORIDE 9 MG/ML
5-250 INJECTION, SOLUTION INTRAVENOUS PRN
OUTPATIENT
Start: 2024-09-09

## 2024-08-12 RX ORDER — ACETAMINOPHEN 325 MG/1
650 TABLET ORAL ONCE
Status: DISCONTINUED | OUTPATIENT
Start: 2024-08-12 | End: 2024-08-13 | Stop reason: HOSPADM

## 2024-08-12 RX ORDER — SODIUM CHLORIDE 9 MG/ML
INJECTION, SOLUTION INTRAVENOUS CONTINUOUS
OUTPATIENT
Start: 2024-09-09

## 2024-08-12 RX ORDER — SODIUM CHLORIDE 0.9 % (FLUSH) 0.9 %
5-40 SYRINGE (ML) INJECTION PRN
OUTPATIENT
Start: 2024-09-09

## 2024-08-12 RX ORDER — DIPHENHYDRAMINE HYDROCHLORIDE 50 MG/ML
50 INJECTION INTRAMUSCULAR; INTRAVENOUS
OUTPATIENT
Start: 2024-09-09

## 2024-08-12 RX ORDER — ACETAMINOPHEN 325 MG/1
650 TABLET ORAL ONCE
OUTPATIENT
Start: 2024-09-09 | End: 2024-09-09

## 2024-08-12 RX ADMIN — HEPARIN 500 UNITS: 100 SYRINGE at 10:53

## 2024-08-12 RX ADMIN — SODIUM CHLORIDE, PRESERVATIVE FREE 10 ML: 5 INJECTION INTRAVENOUS at 10:53

## 2024-08-12 RX ADMIN — IMMUNE GLOBULIN (HUMAN) 5 G: 10 INJECTION INTRAVENOUS; SUBCUTANEOUS at 09:33

## 2024-08-12 RX ADMIN — IMMUNE GLOBULIN (HUMAN) 20 G: 10 INJECTION INTRAVENOUS; SUBCUTANEOUS at 10:01

## 2024-08-12 ASSESSMENT — PAIN - FUNCTIONAL ASSESSMENT: PAIN_FUNCTIONAL_ASSESSMENT: 0-10

## 2024-08-12 NOTE — PROGRESS NOTES
Patient admitted to room SDS 2, vitals are stable. Patient offered rights and responsibilities.     __M__ Safety:       (Environmental)  [place] to environment  Ensure ID band is correct and in place/ allergy band as needed  Assess for fall risk  Initiate fall precautions as applicable (fall band, side rails, etc.)  Call light within reach  Bed in low position/ wheels locked    _M___ Pain:       Assess pain level and characteristics  Administer analgesics as ordered  Assess effectiveness of pain management and report to MD as needed    _M___ Knowledge Deficit:  Assess baseline knowledge  Provide teaching at level of understanding  Provide teaching via preferred learning method  Evaluate teaching effectiveness    ___M_ Hemodynamic/Respiratory Status:       (Pre and Post Procedure Monitoring)  Assess/Monitor vital signs and LOC  Assess Baseline SpO2 prior to any sedation  Obtain weight/height  Assess vital signs/ LOC until patient meets discharge criteria  Monitor procedure site and notify MD of any issues    __M__ Infection-Risk of Central Venous Catheter:  Monitor for infection signs and symptoms (catheter site redness, temperature elevation, etc)  Assess for infection risks  Educate regarding infection prevention  Manage central venous catheter (flushes/ dressing changes per protocol)

## 2024-08-12 NOTE — DISCHARGE INSTRUCTIONS
ACTIVITY:  Continue usual care with your doctor. Call your doctor immediately if any severe problems or go to the nearest emergency room.  Next IVIG infusion is scheduled for Monday September 9th at 9:00 am.   Please call if you have any questions [phone].    I have been treated and hereby acknowledge receiving this instruction sheet.

## 2024-08-14 ENCOUNTER — HOSPITAL ENCOUNTER (OUTPATIENT)
Dept: MRI IMAGING | Age: 79
Discharge: HOME | End: 2024-08-27
Payer: MEDICARE

## 2024-08-14 ENCOUNTER — HOSPITAL ENCOUNTER (OUTPATIENT)
Dept: MRI IMAGING | Age: 79
Discharge: HOME | End: 2024-08-14
Payer: MEDICARE

## 2024-08-14 DIAGNOSIS — M89.9 LYTIC LESION OF BONE ON X-RAY: ICD-10-CM

## 2024-08-14 PROCEDURE — 72157 MRI CHEST SPINE W/O & W/DYE: CPT

## 2024-08-14 PROCEDURE — 72158 MRI LUMBAR SPINE W/O & W/DYE: CPT

## 2024-08-14 PROCEDURE — 70553 MRI BRAIN STEM W/O & W/DYE: CPT

## 2024-08-14 PROCEDURE — 72156 MRI NECK SPINE W/O & W/DYE: CPT

## 2024-08-14 PROCEDURE — 6360000004 HC RX CONTRAST MEDICATION: Performed by: PHYSICIAN ASSISTANT

## 2024-08-14 PROCEDURE — A9579 GAD-BASE MR CONTRAST NOS,1ML: HCPCS | Performed by: PHYSICIAN ASSISTANT

## 2024-08-14 RX ADMIN — GADOTERIDOL 15 ML: 279.3 INJECTION, SOLUTION INTRAVENOUS at 09:20

## 2024-08-23 NOTE — PROGRESS NOTES
Take 1 capsule by mouth 3 times daily as needed      acetaminophen (TYLENOL) 325 MG tablet Take 2 tablets by mouth every 4 hours as needed for Pain 120 tablet 3    levothyroxine (SYNTHROID) 75 MCG tablet Take 1 tablet by mouth Daily      Dextromethorphan-guaiFENesin (MUCINEX DM MAXIMUM STRENGTH)  MG TB12 Take 1 tablet by mouth every 12 hours as needed (Patient not taking: Reported on 8/26/2024)      loperamide (IMODIUM) 2 MG capsule  (Patient not taking: Reported on 8/26/2024)      Ondansetron HCl (ZOFRAN PO) Take by mouth (Patient not taking: Reported on 8/26/2024)       No current facility-administered medications for this visit.       Objective:   /62 (Site: Right Upper Arm, Position: Sitting)   Pulse 63   Temp 97.5 °F (36.4 °C) (Infrared)   Resp 20   Ht 1.651 m (5' 5\")   Wt 103.4 kg (227 lb 14.4 oz)   SpO2 91%   BMI 37.92 kg/m²     PHYSICAL EXAM  Constitutional: Oriented and cooperative. Appears well-developed and well-nourished. No distress. Weak and hoarse voice noted  HENT:   Head: Normocephalic and atraumatic.   Right Ear:  External ear normal. Ear canal narrow with non obstructive cerumen to medial canal removed without complication. Tympanic membrane intact with thickened appearance. No visible fluid noted however difficult d/t tortuous canal and thickened TM. Slightly reduced light reflex. No erythema or otorrhea.  Left Ear:  External ear with scant eczematous appearance. Ear canal narrow and fully obstructed with dry cerumen removed with right angle ball tip under handheld magnification without complication. Tympanic membrane intact; thickened with slightly reduced light reflex. No erythema or otorrhea.  Nose:  External nose normal. Nasal mucosa moist, no lesions/masses noted. Septum midline anteriorly. Turbinates pink . No nasal discharge.  Mouth/Throat:  Fair dentition. Oral cavity mucosa normal, no masses or lesions noted. Oropharynx is clear and moist. Hard and soft palate  symmetrical and intact. Mallampati 1  Eyes:  Pupils are equal, round, and reactive to light. Conjunctivae and EOM are normal.   Neck:  Normal range of motion. Neck supple. No JVD present. No tracheal deviation present. No thyromegaly present. No cervical lymphadenopathy or neck masses/lesion noted with palpation. Parotid and submandibular glands normal and symmetric with palpation.  Cardiovascular:  Normal rate.   Pulmonary/Chest:  Effort normal. No stridor or stertor. No respiratory distress.   Musculoskeletal:  Normal range of motion. No edema or lymphadenopathy.  Neurological:  Alert and answers questions appropriately, cooperative with exam.   Cranial nerve II-XII grossly intact.  Skin:  Skin is warm. No erythema.   Psychiatric:  Normal mood and affect. Behavior is normal.     Data:    All of the past medical history, past surgical history, family history, social history, allergies and current medications were reviewed. This includes notes from referring provider(s) and associated labs/imaging.      Assessment/Plan:      ICD-10-CM    1. Impacted cerumen, left ear  H61.22       2. Tinnitus of both ears  H93.13 Audiometry with tympanometry      3. Hoarseness of voice  R49.0       4. Imbalance  R26.89            Improved left ear concerns with cerumen removal today. Difficult assessment of middle ear pathology due to thickened TM's and narrow ear canals bilaterally. Bilateral tinnitus reported by patient. Recommend proceeding with audiogram. Patient notes hoarseness ongoing for more than half of her life; no concerning symptoms noted at this time. Recommend follow up with Dr. Ivey for consideration of flexible scope and assessment of patients vocal concerns. Audiogram to be completed in interim or same day as follow up. Advised patient for utilization of sweet oil for external ear dryness/itchiness. No concerns for vertigo like dizziness addressed by patient today; she declines any concerns today regarding

## 2024-08-26 ENCOUNTER — OFFICE VISIT (OUTPATIENT)
Dept: ENT CLINIC | Age: 79
End: 2024-08-26
Payer: MEDICARE

## 2024-08-26 VITALS
OXYGEN SATURATION: 91 % | HEART RATE: 63 BPM | DIASTOLIC BLOOD PRESSURE: 62 MMHG | RESPIRATION RATE: 20 BRPM | HEIGHT: 65 IN | TEMPERATURE: 97.5 F | BODY MASS INDEX: 37.97 KG/M2 | WEIGHT: 227.9 LBS | SYSTOLIC BLOOD PRESSURE: 130 MMHG

## 2024-08-26 DIAGNOSIS — H93.13 TINNITUS OF BOTH EARS: ICD-10-CM

## 2024-08-26 DIAGNOSIS — R26.89 IMBALANCE: ICD-10-CM

## 2024-08-26 DIAGNOSIS — H61.22 IMPACTED CERUMEN, LEFT EAR: Primary | ICD-10-CM

## 2024-08-26 DIAGNOSIS — R49.0 HOARSENESS OF VOICE: ICD-10-CM

## 2024-08-26 PROCEDURE — 1036F TOBACCO NON-USER: CPT | Performed by: REGISTERED NURSE

## 2024-08-26 PROCEDURE — G8400 PT W/DXA NO RESULTS DOC: HCPCS | Performed by: REGISTERED NURSE

## 2024-08-26 PROCEDURE — 3078F DIAST BP <80 MM HG: CPT | Performed by: REGISTERED NURSE

## 2024-08-26 PROCEDURE — G8417 CALC BMI ABV UP PARAM F/U: HCPCS | Performed by: REGISTERED NURSE

## 2024-08-26 PROCEDURE — 99203 OFFICE O/P NEW LOW 30 MIN: CPT | Performed by: REGISTERED NURSE

## 2024-08-26 PROCEDURE — 1090F PRES/ABSN URINE INCON ASSESS: CPT | Performed by: REGISTERED NURSE

## 2024-08-26 PROCEDURE — 69210 REMOVE IMPACTED EAR WAX UNI: CPT | Performed by: REGISTERED NURSE

## 2024-08-26 PROCEDURE — 1123F ACP DISCUSS/DSCN MKR DOCD: CPT | Performed by: REGISTERED NURSE

## 2024-08-26 PROCEDURE — G8427 DOCREV CUR MEDS BY ELIG CLIN: HCPCS | Performed by: REGISTERED NURSE

## 2024-08-26 PROCEDURE — 3075F SYST BP GE 130 - 139MM HG: CPT | Performed by: REGISTERED NURSE

## 2024-08-27 ENCOUNTER — APPOINTMENT (OUTPATIENT)
Dept: MRI IMAGING | Age: 79
End: 2024-08-27
Payer: MEDICARE

## 2024-08-27 ENCOUNTER — HOSPITAL ENCOUNTER (OUTPATIENT)
Dept: CT IMAGING | Age: 79
Discharge: HOME | End: 2024-08-27
Payer: MEDICARE

## 2024-08-27 DIAGNOSIS — M89.9 LYTIC LESION OF BONE ON X-RAY: ICD-10-CM

## 2024-08-27 LAB — POC CREATININE WHOLE BLOOD: 1.4 MG/DL (ref 0.5–1.2)

## 2024-08-27 PROCEDURE — 82565 ASSAY OF CREATININE: CPT

## 2024-08-27 PROCEDURE — 6360000004 HC RX CONTRAST MEDICATION: Performed by: PHYSICIAN ASSISTANT

## 2024-08-27 PROCEDURE — 74177 CT ABD & PELVIS W/CONTRAST: CPT

## 2024-08-27 RX ORDER — IOPAMIDOL 755 MG/ML
80 INJECTION, SOLUTION INTRAVASCULAR
Status: COMPLETED | OUTPATIENT
Start: 2024-08-27 | End: 2024-08-27

## 2024-08-27 RX ADMIN — IOPAMIDOL 80 ML: 755 INJECTION, SOLUTION INTRAVENOUS at 13:50

## 2024-09-03 ENCOUNTER — HOSPITAL ENCOUNTER (OUTPATIENT)
Dept: INFUSION THERAPY | Age: 79
Discharge: HOME | End: 2024-09-03
Payer: MEDICARE

## 2024-09-03 ENCOUNTER — TELEPHONE (OUTPATIENT)
Dept: NEPHROLOGY | Age: 79
End: 2024-09-03

## 2024-09-03 ENCOUNTER — OFFICE VISIT (OUTPATIENT)
Dept: ONCOLOGY | Age: 79
End: 2024-09-03
Payer: MEDICARE

## 2024-09-03 VITALS
OXYGEN SATURATION: 93 % | TEMPERATURE: 98.2 F | DIASTOLIC BLOOD PRESSURE: 74 MMHG | HEART RATE: 58 BPM | HEIGHT: 65 IN | BODY MASS INDEX: 37.65 KG/M2 | WEIGHT: 226 LBS | RESPIRATION RATE: 16 BRPM | SYSTOLIC BLOOD PRESSURE: 136 MMHG

## 2024-09-03 VITALS
RESPIRATION RATE: 16 BRPM | DIASTOLIC BLOOD PRESSURE: 74 MMHG | TEMPERATURE: 98.2 F | HEART RATE: 58 BPM | SYSTOLIC BLOOD PRESSURE: 136 MMHG

## 2024-09-03 DIAGNOSIS — M89.9 LYTIC LESION OF BONE ON X-RAY: Primary | ICD-10-CM

## 2024-09-03 DIAGNOSIS — C41.2: ICD-10-CM

## 2024-09-03 DIAGNOSIS — C80.1 MALIGNANT NEOPLASM METASTATIC TO VERTEBRAL COLUMN WITH UNKNOWN PRIMARY SITE (HCC): ICD-10-CM

## 2024-09-03 DIAGNOSIS — C79.51 METASTATIC CANCER TO SPINE (HCC): ICD-10-CM

## 2024-09-03 DIAGNOSIS — C79.51 MALIGNANT NEOPLASM METASTATIC TO VERTEBRAL COLUMN WITH UNKNOWN PRIMARY SITE (HCC): ICD-10-CM

## 2024-09-03 DIAGNOSIS — C80.1 CANCER OF UNKNOWN ORIGIN (HCC): ICD-10-CM

## 2024-09-03 DIAGNOSIS — C80.1 MALIGNANT NEOPLASM METASTATIC TO LUMBAR SPINE WITH UNKNOWN PRIMARY SITE (HCC): ICD-10-CM

## 2024-09-03 DIAGNOSIS — C79.51 MALIGNANT NEOPLASM METASTATIC TO THORACIC VERTEBRAL COLUMN WITH UNKNOWN PRIMARY SITE (HCC): ICD-10-CM

## 2024-09-03 DIAGNOSIS — R59.0 INGUINAL LYMPHADENOPATHY: ICD-10-CM

## 2024-09-03 DIAGNOSIS — R93.89 ABNORMAL FINDING ON CT SCAN: ICD-10-CM

## 2024-09-03 DIAGNOSIS — C79.51 MALIGNANT NEOPLASM METASTATIC TO LUMBAR SPINE WITH UNKNOWN PRIMARY SITE (HCC): ICD-10-CM

## 2024-09-03 DIAGNOSIS — C80.1 MALIGNANCY (HCC): ICD-10-CM

## 2024-09-03 DIAGNOSIS — M89.9 LYTIC LESION OF BONE ON X-RAY: ICD-10-CM

## 2024-09-03 DIAGNOSIS — C80.1 MALIGNANT NEOPLASM METASTATIC TO THORACIC VERTEBRAL COLUMN WITH UNKNOWN PRIMARY SITE (HCC): ICD-10-CM

## 2024-09-03 DIAGNOSIS — C79.51 SECONDARY MALIGNANCY OF VERTEBRAL COLUMN (HCC): ICD-10-CM

## 2024-09-03 LAB — CEA SERPL-MCNC: 4.8 NG/ML (ref 0–5)

## 2024-09-03 PROCEDURE — 82378 CARCINOEMBRYONIC ANTIGEN: CPT

## 2024-09-03 PROCEDURE — 86304 IMMUNOASSAY TUMOR CA 125: CPT

## 2024-09-03 PROCEDURE — 36415 COLL VENOUS BLD VENIPUNCTURE: CPT

## 2024-09-03 PROCEDURE — 86300 IMMUNOASSAY TUMOR CA 15-3: CPT

## 2024-09-03 PROCEDURE — 1036F TOBACCO NON-USER: CPT | Performed by: INTERNAL MEDICINE

## 2024-09-03 PROCEDURE — G8427 DOCREV CUR MEDS BY ELIG CLIN: HCPCS | Performed by: INTERNAL MEDICINE

## 2024-09-03 PROCEDURE — 1090F PRES/ABSN URINE INCON ASSESS: CPT | Performed by: INTERNAL MEDICINE

## 2024-09-03 PROCEDURE — G8400 PT W/DXA NO RESULTS DOC: HCPCS | Performed by: INTERNAL MEDICINE

## 2024-09-03 PROCEDURE — G8417 CALC BMI ABV UP PARAM F/U: HCPCS | Performed by: INTERNAL MEDICINE

## 2024-09-03 PROCEDURE — 3075F SYST BP GE 130 - 139MM HG: CPT | Performed by: INTERNAL MEDICINE

## 2024-09-03 PROCEDURE — 99211 OFF/OP EST MAY X REQ PHY/QHP: CPT

## 2024-09-03 PROCEDURE — 3078F DIAST BP <80 MM HG: CPT | Performed by: INTERNAL MEDICINE

## 2024-09-03 PROCEDURE — 99214 OFFICE O/P EST MOD 30 MIN: CPT | Performed by: INTERNAL MEDICINE

## 2024-09-03 PROCEDURE — 1123F ACP DISCUSS/DSCN MKR DOCD: CPT | Performed by: INTERNAL MEDICINE

## 2024-09-03 PROCEDURE — 86301 IMMUNOASSAY TUMOR CA 19-9: CPT

## 2024-09-03 NOTE — PATIENT INSTRUCTIONS
-Please schedule the PET/CT as soon as possible.  -Plan to have the patient return to clinic in 2 weeks after PET/CT scan.

## 2024-09-03 NOTE — PROGRESS NOTES
[facility] [address], [address]  [facility] [address]  Dept: [phone]  Dept Fax: [phone] Loc: [phone]      Visit Date:9/3/2024     Analy Reyna is a 79 y.o. female who presents today for:   Chief Complaint   Patient presents with    Results     CT abd/pelvis 8/27, MRI brain, spine 8/14, bone scan 8/8        HPI:   Analy Reyna is a 79 y.o. female referred to Hematology/Oncology clinic for evaluation of new lytic lesions in the bone.       The patient was previously seen in clinic in June 2020 for thrombocytopenia. Per consult note at that time, she had CBC completed on November 28, 2019 which showed a platelet count of 112,000.  She was referred for further evaluation.  Her white blood cell count was 3.1, hemoglobin 11.4, hematocrit 34.5.  Per chart review, the patient had platelet check on 5/7/2020 with platelet count 151,000, on 5/2/2019 platelet count 152,000. In November 2019 she had folic acid which was normal, B12 and TSH were within normal limits. The patient follows with Dr. Su for CKD and kidney function has been stable ranging 1.2 to 1.4. Upon review of prior CBCs the patient has had multiple since 2016. In June 2017 her platelet count was 92,000 noted during hospitalization for weakness, bradycardia. In June 2018 her platelet count was 74,000 during hospitalization for AMS and found to be hypoxic. At time of lab work in November 2019 she was being treated for acute left otitis externa.     Intermittent thrombocytopenia was noted during episodes of acute illness/inflammation. She was recommended follow up with PCP and have CBC every 6 months. Per review of EMR, platelet count has been within normal limits since 2018.     Her past medical history includes CKD, COPD with chronic respiratory failure, Atrial fibrillation chronically on Eliquis, HTN, and hiatal hernia.      8/5/2024:The patient was recommended follow up in clinic following ED visit on

## 2024-09-04 ENCOUNTER — HOSPITAL ENCOUNTER (OUTPATIENT)
Dept: WOMENS IMAGING | Age: 79
Discharge: HOME | End: 2024-09-04
Attending: RADIOLOGY

## 2024-09-04 ENCOUNTER — HOSPITAL ENCOUNTER (OUTPATIENT)
Dept: WOMENS IMAGING | Age: 79
Discharge: HOME | End: 2024-09-04
Payer: MEDICARE

## 2024-09-04 DIAGNOSIS — Z00.6 ENCOUNTER FOR EXAMINATION FOR NORMAL COMPARISON OR CONTROL IN CLINICAL RESEARCH PROGRAM: ICD-10-CM

## 2024-09-04 DIAGNOSIS — Z12.31 VISIT FOR SCREENING MAMMOGRAM: ICD-10-CM

## 2024-09-04 PROCEDURE — 77063 BREAST TOMOSYNTHESIS BI: CPT

## 2024-09-04 NOTE — TELEPHONE ENCOUNTER
I left a message for Analy. She does not need to get Ct of ab and pelvis repeated in October. I cancelled the order and the appointment.

## 2024-09-06 ENCOUNTER — HOSPITAL ENCOUNTER (OUTPATIENT)
Dept: WOMENS IMAGING | Age: 79
Discharge: HOME | End: 2024-09-06
Attending: RADIOLOGY
Payer: MEDICARE

## 2024-09-06 ENCOUNTER — TELEPHONE (OUTPATIENT)
Dept: ONCOLOGY | Age: 79
End: 2024-09-06

## 2024-09-06 DIAGNOSIS — R22.31 AXILLARY MASS, RIGHT: ICD-10-CM

## 2024-09-06 DIAGNOSIS — R92.8 ABNORMAL MAMMOGRAM: ICD-10-CM

## 2024-09-06 LAB
CANCER AG125 SERPL-ACNC: 72 U/ML (ref 0–38)
CANCER AG15-3 SERPL-ACNC: 396 U/ML (ref 0–31)
CANCER AG19-9 SERPL-ACNC: 7 U/ML (ref 0–35)
CANCER AG27-29 SERPL-ACNC: 453 U/ML (ref 0–38)

## 2024-09-06 PROCEDURE — 76642 ULTRASOUND BREAST LIMITED: CPT

## 2024-09-09 ENCOUNTER — HOSPITAL ENCOUNTER (OUTPATIENT)
Dept: NURSING | Age: 79
Discharge: HOME | End: 2024-09-09
Payer: MEDICARE

## 2024-09-09 ENCOUNTER — TELEPHONE (OUTPATIENT)
Dept: ONCOLOGY | Age: 79
End: 2024-09-09

## 2024-09-09 VITALS
WEIGHT: 227 LBS | RESPIRATION RATE: 18 BRPM | OXYGEN SATURATION: 96 % | TEMPERATURE: 97.5 F | BODY MASS INDEX: 37.77 KG/M2 | SYSTOLIC BLOOD PRESSURE: 130 MMHG | DIASTOLIC BLOOD PRESSURE: 63 MMHG | HEART RATE: 70 BPM

## 2024-09-09 DIAGNOSIS — D83.9 COMMON VARIABLE IMMUNODEFICIENCY (HCC): Primary | ICD-10-CM

## 2024-09-09 PROCEDURE — 96413 CHEMO IV INFUSION 1 HR: CPT

## 2024-09-09 PROCEDURE — 96365 THER/PROPH/DIAG IV INF INIT: CPT

## 2024-09-09 PROCEDURE — 6360000002 HC RX W HCPCS: Performed by: FAMILY MEDICINE

## 2024-09-09 PROCEDURE — 2580000003 HC RX 258: Performed by: FAMILY MEDICINE

## 2024-09-09 RX ORDER — SODIUM CHLORIDE 9 MG/ML
5-250 INJECTION, SOLUTION INTRAVENOUS PRN
Status: CANCELLED | OUTPATIENT
Start: 2024-10-07

## 2024-09-09 RX ORDER — EPINEPHRINE 1 MG/ML
0.3 INJECTION, SOLUTION INTRAMUSCULAR; SUBCUTANEOUS PRN
Status: CANCELLED | OUTPATIENT
Start: 2024-10-07

## 2024-09-09 RX ORDER — ACETAMINOPHEN 325 MG/1
650 TABLET ORAL ONCE
Status: CANCELLED | OUTPATIENT
Start: 2024-10-07 | End: 2024-10-07

## 2024-09-09 RX ORDER — DIPHENHYDRAMINE HYDROCHLORIDE 50 MG/ML
50 INJECTION INTRAMUSCULAR; INTRAVENOUS
Status: CANCELLED | OUTPATIENT
Start: 2024-10-07

## 2024-09-09 RX ORDER — MEPERIDINE HYDROCHLORIDE 25 MG/ML
12.5 INJECTION INTRAMUSCULAR; INTRAVENOUS; SUBCUTANEOUS PRN
Status: CANCELLED | OUTPATIENT
Start: 2024-10-07

## 2024-09-09 RX ORDER — ACETAMINOPHEN 325 MG/1
650 TABLET ORAL ONCE
Status: DISCONTINUED | OUTPATIENT
Start: 2024-09-09 | End: 2024-09-10 | Stop reason: HOSPADM

## 2024-09-09 RX ORDER — ALBUTEROL SULFATE 90 UG/1
4 INHALANT RESPIRATORY (INHALATION) PRN
Status: CANCELLED | OUTPATIENT
Start: 2024-10-07

## 2024-09-09 RX ORDER — ONDANSETRON 2 MG/ML
8 INJECTION INTRAMUSCULAR; INTRAVENOUS
Status: CANCELLED | OUTPATIENT
Start: 2024-10-07

## 2024-09-09 RX ORDER — DIPHENHYDRAMINE HCL 25 MG
25 TABLET ORAL ONCE
Status: DISCONTINUED | OUTPATIENT
Start: 2024-09-09 | End: 2024-09-10 | Stop reason: HOSPADM

## 2024-09-09 RX ORDER — ACETAMINOPHEN 325 MG/1
650 TABLET ORAL
Status: CANCELLED | OUTPATIENT
Start: 2024-10-07

## 2024-09-09 RX ORDER — DIPHENHYDRAMINE HCL 25 MG
25 TABLET ORAL ONCE
Status: CANCELLED | OUTPATIENT
Start: 2024-10-07 | End: 2024-10-07

## 2024-09-09 RX ORDER — HEPARIN 100 UNIT/ML
500 SYRINGE INTRAVENOUS PRN
Status: DISCONTINUED | OUTPATIENT
Start: 2024-09-09 | End: 2024-09-10 | Stop reason: HOSPADM

## 2024-09-09 RX ORDER — SODIUM CHLORIDE 0.9 % (FLUSH) 0.9 %
5-40 SYRINGE (ML) INJECTION PRN
Status: DISCONTINUED | OUTPATIENT
Start: 2024-09-09 | End: 2024-09-10 | Stop reason: HOSPADM

## 2024-09-09 RX ORDER — SODIUM CHLORIDE 0.9 % (FLUSH) 0.9 %
5-40 SYRINGE (ML) INJECTION PRN
Status: CANCELLED | OUTPATIENT
Start: 2024-10-07

## 2024-09-09 RX ORDER — HEPARIN 100 UNIT/ML
500 SYRINGE INTRAVENOUS PRN
Status: CANCELLED | OUTPATIENT
Start: 2024-10-07

## 2024-09-09 RX ORDER — SODIUM CHLORIDE 9 MG/ML
INJECTION, SOLUTION INTRAVENOUS CONTINUOUS
Status: CANCELLED | OUTPATIENT
Start: 2024-10-07

## 2024-09-09 RX ADMIN — IMMUNE GLOBULIN (HUMAN) 5 G: 10 INJECTION INTRAVENOUS; SUBCUTANEOUS at 09:26

## 2024-09-09 RX ADMIN — SODIUM CHLORIDE, PRESERVATIVE FREE 10 ML: 5 INJECTION INTRAVENOUS at 09:25

## 2024-09-09 RX ADMIN — HEPARIN 500 UNITS: 100 SYRINGE at 11:03

## 2024-09-09 RX ADMIN — IMMUNE GLOBULIN (HUMAN) 20 G: 10 INJECTION INTRAVENOUS; SUBCUTANEOUS at 09:56

## 2024-09-09 RX ADMIN — SODIUM CHLORIDE, PRESERVATIVE FREE 10 ML: 5 INJECTION INTRAVENOUS at 11:03

## 2024-09-12 ENCOUNTER — HOSPITAL ENCOUNTER (OUTPATIENT)
Dept: WOMENS IMAGING | Age: 79
Discharge: HOME | End: 2024-09-12
Payer: MEDICARE

## 2024-09-12 DIAGNOSIS — N63.15 MASS OVERLAPPING MULTIPLE QUADRANTS OF RIGHT BREAST: ICD-10-CM

## 2024-09-12 DIAGNOSIS — R59.9 ENLARGED LYMPH NODE: ICD-10-CM

## 2024-09-12 DIAGNOSIS — R59.0 ENLARGED LYMPH NODES IN ARMPIT: ICD-10-CM

## 2024-09-12 PROCEDURE — 76942 ECHO GUIDE FOR BIOPSY: CPT

## 2024-09-12 PROCEDURE — 88305 TISSUE EXAM BY PATHOLOGIST: CPT

## 2024-09-12 PROCEDURE — 88342 IMHCHEM/IMCYTCHM 1ST ANTB: CPT

## 2024-09-12 PROCEDURE — 19083 BX BREAST 1ST LESION US IMAG: CPT

## 2024-09-12 PROCEDURE — 88360 TUMOR IMMUNOHISTOCHEM/MANUAL: CPT

## 2024-09-12 PROCEDURE — 77065 DX MAMMO INCL CAD UNI: CPT

## 2024-09-13 ENCOUNTER — CLINICAL DOCUMENTATION (OUTPATIENT)
Dept: WOMENS IMAGING | Age: 79
End: 2024-09-13

## 2024-09-18 RX ORDER — EPINEPHRINE 1 MG/ML
0.3 INJECTION, SOLUTION, CONCENTRATE INTRAVENOUS PRN
OUTPATIENT
Start: 2024-10-07

## 2024-09-18 RX ORDER — ACETAMINOPHEN 325 MG/1
650 TABLET ORAL ONCE
OUTPATIENT
Start: 2024-10-07 | End: 2024-10-07

## 2024-09-18 RX ORDER — DIPHENHYDRAMINE HCL 25 MG
25 TABLET ORAL ONCE
OUTPATIENT
Start: 2024-10-07 | End: 2024-10-07

## 2024-09-18 RX ORDER — HEPARIN 100 UNIT/ML
500 SYRINGE INTRAVENOUS PRN
OUTPATIENT
Start: 2024-10-07

## 2024-09-18 RX ORDER — SODIUM CHLORIDE 0.9 % (FLUSH) 0.9 %
5-40 SYRINGE (ML) INJECTION PRN
OUTPATIENT
Start: 2024-10-07

## 2024-09-18 RX ORDER — SODIUM CHLORIDE 9 MG/ML
INJECTION, SOLUTION INTRAVENOUS CONTINUOUS
OUTPATIENT
Start: 2024-10-07

## 2024-09-18 RX ORDER — SODIUM CHLORIDE 9 MG/ML
5-250 INJECTION, SOLUTION INTRAVENOUS PRN
OUTPATIENT
Start: 2024-10-07

## 2024-09-18 RX ORDER — DIPHENHYDRAMINE HYDROCHLORIDE 50 MG/ML
50 INJECTION INTRAMUSCULAR; INTRAVENOUS
OUTPATIENT
Start: 2024-10-07

## 2024-09-19 ENCOUNTER — HOSPITAL ENCOUNTER (OUTPATIENT)
Dept: INFUSION THERAPY | Age: 79
Discharge: HOME | End: 2024-09-19
Payer: MEDICARE

## 2024-09-19 ENCOUNTER — CLINICAL DOCUMENTATION (OUTPATIENT)
Dept: INFUSION THERAPY | Age: 79
End: 2024-09-19

## 2024-09-19 ENCOUNTER — CLINICAL DOCUMENTATION (OUTPATIENT)
Dept: CASE MANAGEMENT | Age: 79
End: 2024-09-19

## 2024-09-19 ENCOUNTER — OFFICE VISIT (OUTPATIENT)
Dept: ONCOLOGY | Age: 79
End: 2024-09-19
Payer: MEDICARE

## 2024-09-19 VITALS
SYSTOLIC BLOOD PRESSURE: 124 MMHG | RESPIRATION RATE: 16 BRPM | TEMPERATURE: 97.5 F | OXYGEN SATURATION: 96 % | HEART RATE: 75 BPM | DIASTOLIC BLOOD PRESSURE: 60 MMHG

## 2024-09-19 VITALS
RESPIRATION RATE: 16 BRPM | TEMPERATURE: 97.5 F | WEIGHT: 224.8 LBS | OXYGEN SATURATION: 96 % | SYSTOLIC BLOOD PRESSURE: 124 MMHG | HEIGHT: 65 IN | DIASTOLIC BLOOD PRESSURE: 60 MMHG | HEART RATE: 75 BPM | BODY MASS INDEX: 37.45 KG/M2

## 2024-09-19 DIAGNOSIS — C50.911 CARCINOMA OF RIGHT BREAST, ESTROGEN RECEPTOR POSITIVE, STAGE 4 (HCC): ICD-10-CM

## 2024-09-19 DIAGNOSIS — Z17.0 CARCINOMA OF RIGHT BREAST, ESTROGEN RECEPTOR POSITIVE, STAGE 4 (HCC): ICD-10-CM

## 2024-09-19 DIAGNOSIS — C50.911 CARCINOMA OF RIGHT BREAST, ESTROGEN RECEPTOR POSITIVE, STAGE 4 (HCC): Primary | ICD-10-CM

## 2024-09-19 DIAGNOSIS — Z51.81 ENCOUNTER FOR MONITORING AROMATASE INHIBITOR THERAPY: ICD-10-CM

## 2024-09-19 DIAGNOSIS — Z79.811 ENCOUNTER FOR MONITORING AROMATASE INHIBITOR THERAPY: ICD-10-CM

## 2024-09-19 DIAGNOSIS — Z51.11 ENCOUNTER FOR ANTINEOPLASTIC CHEMOTHERAPY: ICD-10-CM

## 2024-09-19 DIAGNOSIS — Z17.0 CARCINOMA OF RIGHT BREAST, ESTROGEN RECEPTOR POSITIVE, STAGE 4 (HCC): Primary | ICD-10-CM

## 2024-09-19 LAB
25(OH)D3 SERPL-MCNC: 63 NG/ML (ref 30–100)
ALBUMIN SERPL BCG-MCNC: 4.1 G/DL (ref 3.5–5.1)
ALP SERPL-CCNC: 202 U/L (ref 38–126)
ALT SERPL W/O P-5'-P-CCNC: 8 U/L (ref 11–66)
ANION GAP SERPL CALC-SCNC: 14 MEQ/L (ref 8–16)
AST SERPL-CCNC: 26 U/L (ref 5–40)
BASOPHILS ABSOLUTE: 0.1 THOU/MM3 (ref 0–0.1)
BASOPHILS NFR BLD AUTO: 1.5 %
BILIRUB CONJ SERPL-MCNC: < 0.1 MG/DL (ref 0.1–13.8)
BILIRUB SERPL-MCNC: 0.2 MG/DL (ref 0.3–1.2)
BUN SERPL-MCNC: 29 MG/DL (ref 7–22)
CALCIUM SERPL-MCNC: 9.3 MG/DL (ref 8.5–10.5)
CHLORIDE SERPL-SCNC: 103 MEQ/L (ref 98–111)
CO2 SERPL-SCNC: 25 MEQ/L (ref 23–33)
CREAT SERPL-MCNC: 1.1 MG/DL (ref 0.4–1.2)
DEPRECATED RDW RBC AUTO: 45.5 FL (ref 35–45)
EOSINOPHIL NFR BLD AUTO: 2.5 %
EOSINOPHILS ABSOLUTE: 0.1 THOU/MM3 (ref 0–0.4)
ERYTHROCYTE [DISTWIDTH] IN BLOOD BY AUTOMATED COUNT: 13.3 % (ref 11.5–14.5)
GFR SERPL CREATININE-BSD FRML MDRD: 51 ML/MIN/1.73M2
GLUCOSE SERPL-MCNC: 198 MG/DL (ref 70–108)
HCT VFR BLD AUTO: 35.6 % (ref 37–47)
HGB BLD-MCNC: 11.3 GM/DL (ref 12–16)
IMM GRANULOCYTES # BLD AUTO: 0.01 THOU/MM3 (ref 0–0.07)
IMM GRANULOCYTES NFR BLD AUTO: 0.2 %
LYMPHOCYTES ABSOLUTE: 1.2 THOU/MM3 (ref 1–4.8)
LYMPHOCYTES NFR BLD AUTO: 25 %
MCH RBC QN AUTO: 29.9 PG (ref 26–33)
MCHC RBC AUTO-ENTMCNC: 31.7 GM/DL (ref 32.2–35.5)
MCV RBC AUTO: 94.2 FL (ref 81–99)
MONOCYTES ABSOLUTE: 0.5 THOU/MM3 (ref 0.4–1.3)
MONOCYTES NFR BLD AUTO: 10.4 %
NEUTROPHILS ABSOLUTE: 2.9 THOU/MM3 (ref 1.8–7.7)
NEUTROPHILS NFR BLD AUTO: 60.4 %
NRBC BLD AUTO-RTO: 0 /100 WBC
PLATELET # BLD AUTO: 254 THOU/MM3 (ref 130–400)
PMV BLD AUTO: 9.6 FL (ref 9.4–12.4)
POTASSIUM SERPL-SCNC: 4.7 MEQ/L (ref 3.5–5.2)
PROT SERPL-MCNC: 7.2 G/DL (ref 6.1–8)
RBC # BLD AUTO: 3.78 MILL/MM3 (ref 4.2–5.4)
SODIUM SERPL-SCNC: 142 MEQ/L (ref 135–145)
WBC # BLD AUTO: 4.8 THOU/MM3 (ref 4.8–10.8)

## 2024-09-19 PROCEDURE — 85025 COMPLETE CBC W/AUTO DIFF WBC: CPT

## 2024-09-19 PROCEDURE — G8417 CALC BMI ABV UP PARAM F/U: HCPCS | Performed by: INTERNAL MEDICINE

## 2024-09-19 PROCEDURE — 99215 OFFICE O/P EST HI 40 MIN: CPT | Performed by: INTERNAL MEDICINE

## 2024-09-19 PROCEDURE — 3078F DIAST BP <80 MM HG: CPT | Performed by: INTERNAL MEDICINE

## 2024-09-19 PROCEDURE — 1123F ACP DISCUSS/DSCN MKR DOCD: CPT | Performed by: INTERNAL MEDICINE

## 2024-09-19 PROCEDURE — 36415 COLL VENOUS BLD VENIPUNCTURE: CPT

## 2024-09-19 PROCEDURE — 3074F SYST BP LT 130 MM HG: CPT | Performed by: INTERNAL MEDICINE

## 2024-09-19 PROCEDURE — 82306 VITAMIN D 25 HYDROXY: CPT

## 2024-09-19 PROCEDURE — 99211 OFF/OP EST MAY X REQ PHY/QHP: CPT

## 2024-09-19 PROCEDURE — 1036F TOBACCO NON-USER: CPT | Performed by: INTERNAL MEDICINE

## 2024-09-19 PROCEDURE — 80053 COMPREHEN METABOLIC PANEL: CPT

## 2024-09-19 PROCEDURE — G8400 PT W/DXA NO RESULTS DOC: HCPCS | Performed by: INTERNAL MEDICINE

## 2024-09-19 PROCEDURE — 1090F PRES/ABSN URINE INCON ASSESS: CPT | Performed by: INTERNAL MEDICINE

## 2024-09-19 PROCEDURE — 82248 BILIRUBIN DIRECT: CPT

## 2024-09-19 PROCEDURE — G8427 DOCREV CUR MEDS BY ELIG CLIN: HCPCS | Performed by: INTERNAL MEDICINE

## 2024-09-19 RX ORDER — ANASTROZOLE 1 MG/1
1 TABLET ORAL DAILY
Qty: 30 TABLET | Refills: 3 | Status: SHIPPED | OUTPATIENT
Start: 2024-09-19

## 2024-09-19 RX ORDER — ONDANSETRON 4 MG/1
4 TABLET, ORALLY DISINTEGRATING ORAL EVERY 8 HOURS PRN
Qty: 270 TABLET | Refills: 1 | Status: SHIPPED | OUTPATIENT
Start: 2024-09-19 | End: 2025-03-18

## 2024-09-19 RX ORDER — LOPERAMIDE HYDROCHLORIDE 2 MG/1
4 TABLET ORAL 4 TIMES DAILY PRN
Qty: 240 TABLET | Refills: 5 | Status: SHIPPED | OUTPATIENT
Start: 2024-09-19 | End: 2025-03-18

## 2024-09-20 ENCOUNTER — HOSPITAL ENCOUNTER (OUTPATIENT)
Dept: CT IMAGING | Age: 79
Discharge: HOME | End: 2024-09-20
Attending: RADIOLOGY

## 2024-09-20 ENCOUNTER — CLINICAL DOCUMENTATION (OUTPATIENT)
Dept: CASE MANAGEMENT | Age: 79
End: 2024-09-20

## 2024-09-20 ENCOUNTER — TELEPHONE (OUTPATIENT)
Dept: CASE MANAGEMENT | Age: 79
End: 2024-09-20

## 2024-09-20 DIAGNOSIS — Z80.3 FAMILY HISTORY OF BREAST CANCER IN FIRST DEGREE RELATIVE: ICD-10-CM

## 2024-09-20 DIAGNOSIS — Z00.6 EXAMINATION FOR NORMAL COMPARISON FOR CLINICAL RESEARCH: ICD-10-CM

## 2024-09-20 DIAGNOSIS — C50.911 CARCINOMA OF RIGHT BREAST, ESTROGEN RECEPTOR POSITIVE, STAGE 4 (HCC): Primary | ICD-10-CM

## 2024-09-20 DIAGNOSIS — Z84.81 FAMILY HISTORY OF BRCA GENE MUTATION: ICD-10-CM

## 2024-09-20 DIAGNOSIS — Z17.0 CARCINOMA OF RIGHT BREAST, ESTROGEN RECEPTOR POSITIVE, STAGE 4 (HCC): Primary | ICD-10-CM

## 2024-09-20 DIAGNOSIS — C79.51 METASTATIC CANCER TO SPINE (HCC): ICD-10-CM

## 2024-09-20 DIAGNOSIS — Z17.0 STAGE 4 CARCINOMA OF BREAST, ER+, RIGHT (HCC): Primary | ICD-10-CM

## 2024-09-20 DIAGNOSIS — C50.911 STAGE 4 CARCINOMA OF BREAST, ER+, RIGHT (HCC): Primary | ICD-10-CM

## 2024-09-23 ENCOUNTER — HOSPITAL ENCOUNTER (OUTPATIENT)
Age: 79
Discharge: HOME | End: 2024-09-23
Payer: MEDICARE

## 2024-09-23 ENCOUNTER — HOSPITAL ENCOUNTER (OUTPATIENT)
Dept: WOMENS IMAGING | Age: 79
Discharge: HOME | End: 2024-09-23
Attending: INTERNAL MEDICINE
Payer: MEDICARE

## 2024-09-23 DIAGNOSIS — Z51.81 ENCOUNTER FOR MONITORING AROMATASE INHIBITOR THERAPY: ICD-10-CM

## 2024-09-23 DIAGNOSIS — Z17.0 CARCINOMA OF RIGHT BREAST, ESTROGEN RECEPTOR POSITIVE, STAGE 4 (HCC): ICD-10-CM

## 2024-09-23 DIAGNOSIS — Z79.811 ENCOUNTER FOR MONITORING AROMATASE INHIBITOR THERAPY: ICD-10-CM

## 2024-09-23 DIAGNOSIS — Z51.11 ENCOUNTER FOR ANTINEOPLASTIC CHEMOTHERAPY: ICD-10-CM

## 2024-09-23 DIAGNOSIS — C50.911 CARCINOMA OF RIGHT BREAST, ESTROGEN RECEPTOR POSITIVE, STAGE 4 (HCC): ICD-10-CM

## 2024-09-23 LAB
EKG ATRIAL RATE: 60 BPM
EKG P AXIS: 7 DEGREES
EKG P-R INTERVAL: 164 MS
EKG Q-T INTERVAL: 428 MS
EKG QRS DURATION: 110 MS
EKG QTC CALCULATION (BAZETT): 428 MS
EKG R AXIS: -32 DEGREES
EKG T AXIS: 71 DEGREES
EKG VENTRICULAR RATE: 60 BPM

## 2024-09-23 PROCEDURE — 77080 DXA BONE DENSITY AXIAL: CPT

## 2024-09-23 PROCEDURE — 93005 ELECTROCARDIOGRAM TRACING: CPT

## 2024-09-23 PROCEDURE — 93010 ELECTROCARDIOGRAM REPORT: CPT | Performed by: NUCLEAR MEDICINE

## 2024-09-24 ENCOUNTER — TELEPHONE (OUTPATIENT)
Dept: CASE MANAGEMENT | Age: 79
End: 2024-09-24

## 2024-09-25 ENCOUNTER — TELEPHONE (OUTPATIENT)
Dept: CASE MANAGEMENT | Age: 79
End: 2024-09-25

## 2024-09-26 ENCOUNTER — CLINICAL DOCUMENTATION (OUTPATIENT)
Dept: CASE MANAGEMENT | Age: 79
End: 2024-09-26

## 2024-09-27 ENCOUNTER — INITIAL CONSULT (OUTPATIENT)
Dept: ONCOLOGY | Age: 79
End: 2024-09-27

## 2024-09-27 ENCOUNTER — HOSPITAL ENCOUNTER (OUTPATIENT)
Dept: INFUSION THERAPY | Age: 79
Discharge: HOME | End: 2024-09-27
Payer: MEDICARE

## 2024-09-27 VITALS
HEART RATE: 55 BPM | WEIGHT: 225.6 LBS | DIASTOLIC BLOOD PRESSURE: 61 MMHG | TEMPERATURE: 98.5 F | OXYGEN SATURATION: 96 % | HEIGHT: 65 IN | RESPIRATION RATE: 16 BRPM | SYSTOLIC BLOOD PRESSURE: 131 MMHG | BODY MASS INDEX: 37.59 KG/M2

## 2024-09-27 VITALS
SYSTOLIC BLOOD PRESSURE: 131 MMHG | OXYGEN SATURATION: 96 % | DIASTOLIC BLOOD PRESSURE: 61 MMHG | HEART RATE: 55 BPM | RESPIRATION RATE: 16 BRPM | TEMPERATURE: 98.5 F

## 2024-09-27 DIAGNOSIS — Z17.0 CARCINOMA OF RIGHT BREAST, ESTROGEN RECEPTOR POSITIVE, STAGE 4 (HCC): Primary | ICD-10-CM

## 2024-09-27 DIAGNOSIS — C50.911 CARCINOMA OF RIGHT BREAST, ESTROGEN RECEPTOR POSITIVE, STAGE 4 (HCC): ICD-10-CM

## 2024-09-27 DIAGNOSIS — Z79.811 ENCOUNTER FOR MONITORING AROMATASE INHIBITOR THERAPY: ICD-10-CM

## 2024-09-27 DIAGNOSIS — Z51.81 ENCOUNTER FOR MONITORING AROMATASE INHIBITOR THERAPY: ICD-10-CM

## 2024-09-27 DIAGNOSIS — Z17.0 CARCINOMA OF RIGHT BREAST, ESTROGEN RECEPTOR POSITIVE, STAGE 4 (HCC): ICD-10-CM

## 2024-09-27 DIAGNOSIS — C50.911 CARCINOMA OF RIGHT BREAST, ESTROGEN RECEPTOR POSITIVE, STAGE 4 (HCC): Primary | ICD-10-CM

## 2024-09-27 LAB
ALBUMIN SERPL BCG-MCNC: 3.9 G/DL (ref 3.5–5.1)
ALP SERPL-CCNC: 192 U/L (ref 38–126)
ALT SERPL W/O P-5'-P-CCNC: 9 U/L (ref 11–66)
AST SERPL-CCNC: 19 U/L (ref 5–40)
BASOPHILS ABSOLUTE: 0 THOU/MM3 (ref 0–0.1)
BASOPHILS NFR BLD AUTO: 1 % (ref 0–3)
BILIRUB CONJ SERPL-MCNC: 0.2 MG/DL (ref 0.1–13.8)
BILIRUB SERPL-MCNC: 0.4 MG/DL (ref 0.3–1.2)
BUN BLDP-MCNC: 20 MG/DL (ref 8–26)
CHLORIDE BLD-SCNC: 104 MEQ/L (ref 98–109)
CREAT BLD-MCNC: 0.9 MG/DL (ref 0.5–1.2)
EOSINOPHIL NFR BLD AUTO: 3 % (ref 0–4)
EOSINOPHILS ABSOLUTE: 0.2 THOU/MM3 (ref 0–0.4)
ERYTHROCYTE [DISTWIDTH] IN BLOOD BY AUTOMATED COUNT: 13.3 % (ref 11.5–14.5)
GFR SERPL CREATININE-BSD FRML MDRD: 65 ML/MIN/1.73M2
GLUCOSE BLD-MCNC: 127 MG/DL (ref 70–108)
HCT VFR BLD AUTO: 35.1 % (ref 37–47)
HGB BLD-MCNC: 11.2 GM/DL (ref 12–16)
IMMATURE GRANULOCYTES %: 0 %
IMMATURE GRANULOCYTES ABSOLUTE: 0.01 THOU/MM3 (ref 0–0.07)
IONIZED CALCIUM, WHOLE BLOOD: 1.18 MMOL/L (ref 1.12–1.32)
LYMPHOCYTES ABSOLUTE: 1.2 THOU/MM3 (ref 1–4.8)
LYMPHOCYTES NFR BLD AUTO: 22 % (ref 15–47)
MCH RBC QN AUTO: 30 PG (ref 26–33)
MCHC RBC AUTO-ENTMCNC: 31.9 GM/DL (ref 32.2–35.5)
MCV RBC AUTO: 94 FL (ref 81–99)
MONOCYTES ABSOLUTE: 0.5 THOU/MM3 (ref 0.4–1.3)
MONOCYTES NFR BLD AUTO: 9 % (ref 0–12)
NEUTROPHILS ABSOLUTE: 3.6 THOU/MM3 (ref 1.8–7.7)
NEUTROPHILS NFR BLD AUTO: 65 % (ref 43–75)
PLATELET # BLD AUTO: 174 THOU/MM3 (ref 130–400)
PMV BLD AUTO: 8.6 FL (ref 9.4–12.4)
POTASSIUM BLD-SCNC: 4.8 MEQ/L (ref 3.5–4.9)
PROT SERPL-MCNC: 6.7 G/DL (ref 6.1–8)
RBC # BLD AUTO: 3.73 MILL/MM3 (ref 4.2–5.4)
SODIUM BLD-SCNC: 141 MEQ/L (ref 138–146)
TOTAL CO2, WHOLE BLOOD: 31 MEQ/L (ref 23–33)
WBC # BLD AUTO: 5.6 THOU/MM3 (ref 4.8–10.8)

## 2024-09-27 PROCEDURE — 36415 COLL VENOUS BLD VENIPUNCTURE: CPT

## 2024-09-27 PROCEDURE — 80076 HEPATIC FUNCTION PANEL: CPT

## 2024-09-27 PROCEDURE — 99211 OFF/OP EST MAY X REQ PHY/QHP: CPT

## 2024-09-27 PROCEDURE — 80047 BASIC METABLC PNL IONIZED CA: CPT

## 2024-09-27 PROCEDURE — 85025 COMPLETE CBC W/AUTO DIFF WBC: CPT

## 2024-09-30 ENCOUNTER — TELEPHONE (OUTPATIENT)
Dept: SPIRITUAL SERVICES | Facility: CLINIC | Age: 79
End: 2024-09-30

## 2024-09-30 ENCOUNTER — OFFICE VISIT (OUTPATIENT)
Dept: CARDIOLOGY CLINIC | Age: 79
End: 2024-09-30
Payer: MEDICARE

## 2024-09-30 VITALS
HEIGHT: 65 IN | BODY MASS INDEX: 37.49 KG/M2 | HEART RATE: 80 BPM | WEIGHT: 225 LBS | SYSTOLIC BLOOD PRESSURE: 132 MMHG | DIASTOLIC BLOOD PRESSURE: 64 MMHG

## 2024-09-30 DIAGNOSIS — I42.0 DILATED CARDIOMYOPATHY (HCC): ICD-10-CM

## 2024-09-30 DIAGNOSIS — I10 PRIMARY HYPERTENSION: ICD-10-CM

## 2024-09-30 DIAGNOSIS — I49.3 PVC (PREMATURE VENTRICULAR CONTRACTION): Primary | ICD-10-CM

## 2024-09-30 PROCEDURE — G8427 DOCREV CUR MEDS BY ELIG CLIN: HCPCS | Performed by: NUCLEAR MEDICINE

## 2024-09-30 PROCEDURE — 3078F DIAST BP <80 MM HG: CPT | Performed by: NUCLEAR MEDICINE

## 2024-09-30 PROCEDURE — 1090F PRES/ABSN URINE INCON ASSESS: CPT | Performed by: NUCLEAR MEDICINE

## 2024-09-30 PROCEDURE — G8399 PT W/DXA RESULTS DOCUMENT: HCPCS | Performed by: NUCLEAR MEDICINE

## 2024-09-30 PROCEDURE — 1036F TOBACCO NON-USER: CPT | Performed by: NUCLEAR MEDICINE

## 2024-09-30 PROCEDURE — 99213 OFFICE O/P EST LOW 20 MIN: CPT | Performed by: NUCLEAR MEDICINE

## 2024-09-30 PROCEDURE — G8417 CALC BMI ABV UP PARAM F/U: HCPCS | Performed by: NUCLEAR MEDICINE

## 2024-09-30 PROCEDURE — 3075F SYST BP GE 130 - 139MM HG: CPT | Performed by: NUCLEAR MEDICINE

## 2024-09-30 PROCEDURE — 1123F ACP DISCUSS/DSCN MKR DOCD: CPT | Performed by: NUCLEAR MEDICINE

## 2024-09-30 RX ORDER — INSULIN ASPART INJECTION 100 [IU]/ML
INJECTION, SOLUTION SUBCUTANEOUS
COMMUNITY

## 2024-09-30 NOTE — FLOWSHEET NOTE
09/30/24 1524   Encounter Summary   Encounter Overview/Reason Attempted Encounter   Service Provided For Patient not available   Referral/Consult From Multi-disciplinary team   Last Encounter  09/30/24   Begin Time 1515   End Time  1520   Total Time Calculated 5 min   Assessment/Intervention/Outcome   Assessment Unable to assess     This is an attempted spiritual health encounter over the phone as a part of routine cancer support. Patient, Analy, was unavailable at this time.  left a voicemail that included introduction of self and  services.  will follow-up, as able, and will remain available to support patient/family, PRN.       darwin Escalona M.Div     Spiritual Care Department  [address] [address]  [phone]

## 2024-09-30 NOTE — PROGRESS NOTES
Patient here for check up.  Patient complains of SOB and left leg swelling.   
[facility] [place] [facility]  [facility] CARDIOLOGY  [address]  [address]  [facility] [address]  Dept: [phone]  Dept Fax: [phone]  Loc: [phone]    Visit Date: 9/30/2024    Analy Reyna is a 79 y.o. female who presents todayfor:  Chief Complaint   Patient presents with    Check-Up    Palpitations    Cardiomyopathy    Hypertension   Know mild CAD and CMP   Limited by the lungs  Mainly SOB no chest pain   Limited patient  PVC ablation before  Seems stable   No dizziness  No syncope  BP is stable         HPI:  HPI  Past Medical History:   Diagnosis Date    Anemia     Atrial fibrillation (HCC) 11/09/2017    Breast cancer, stage 4, right (HCC) 09/12/2024    ER+ ME- HER-    CAD (coronary artery disease)     CHF (congestive heart failure) (HCC)     Chronic kidney disease     stage 3 kidney     COPD (chronic obstructive pulmonary disease) (HCC)     DDD (degenerative disc disease), lumbar     Depression     Frequent PVCs 12/13/2017    GERD (gastroesophageal reflux disease)     History of blood transfusion     Hx of blood clots 09/2017    pulmonary emboli    Hyperlipidemia     Hypertension     sees Baki    Hyperthyroidism     Kidney disease     hemmelgarn    Movement disorder     DDD    Neuropathy     Obesity     Palpitations 01/10/2020    Pneumonia 2016    sepsis    Sleep apnea     usually wears cpap at night    Status post right and left heart catheterization     Type II or unspecified type diabetes mellitus without mention of complication, not stated as uncontrolled       Past Surgical History:   Procedure Laterality Date    ACHILLES TENDON SURGERY Bilateral     BLADDER SUSPENSION      CARDIAC SURGERY  2016    heart cath--[facility]    COLONOSCOPY Left 05/11/2018    COLONOSCOPY POLYPECTOMY SNARE/COLD BIOPSY performed by James Herrera MD at [facility]    COLONOSCOPY N/A 08/04/2021    COLONOSCOPY performed by Leeanna Merino MD at [facility]    COLONOSCOPY  08/04/2021     
- - -

## 2024-10-02 ENCOUNTER — SOCIAL WORK (OUTPATIENT)
Dept: RADIATION ONCOLOGY | Age: 79
End: 2024-10-02

## 2024-10-02 ENCOUNTER — HOSPITAL ENCOUNTER (OUTPATIENT)
Dept: RADIATION ONCOLOGY | Age: 79
Discharge: HOME | End: 2024-10-02
Payer: MEDICARE

## 2024-10-02 VITALS
TEMPERATURE: 98 F | HEIGHT: 65 IN | BODY MASS INDEX: 37.65 KG/M2 | DIASTOLIC BLOOD PRESSURE: 51 MMHG | SYSTOLIC BLOOD PRESSURE: 104 MMHG | WEIGHT: 226 LBS | RESPIRATION RATE: 18 BRPM | HEART RATE: 79 BPM | OXYGEN SATURATION: 93 %

## 2024-10-02 PROBLEM — C50.111 MALIGNANT NEOPLASM OF CENTRAL PORTION OF RIGHT BREAST IN FEMALE, ESTROGEN RECEPTOR POSITIVE (HCC): Status: ACTIVE | Noted: 2024-10-02

## 2024-10-02 PROBLEM — C79.52 SECONDARY MALIGNANT NEOPLASM OF BONE AND BONE MARROW (HCC): Status: ACTIVE | Noted: 2024-10-02

## 2024-10-02 PROBLEM — Z17.0 MALIGNANT NEOPLASM OF CENTRAL PORTION OF RIGHT BREAST IN FEMALE, ESTROGEN RECEPTOR POSITIVE (HCC): Status: ACTIVE | Noted: 2024-10-02

## 2024-10-02 PROBLEM — C79.51 SECONDARY MALIGNANT NEOPLASM OF BONE AND BONE MARROW (HCC): Status: ACTIVE | Noted: 2024-10-02

## 2024-10-02 PROCEDURE — 99202 OFFICE O/P NEW SF 15 MIN: CPT | Performed by: RADIOLOGY

## 2024-10-02 ASSESSMENT — ENCOUNTER SYMPTOMS
COUGH: 1
BACK PAIN: 1
NAUSEA: 1
BLOOD IN STOOL: 0
ABDOMINAL PAIN: 0
SHORTNESS OF BREATH: 1
TROUBLE SWALLOWING: 0

## 2024-10-02 ASSESSMENT — PAIN DESCRIPTION - LOCATION: LOCATION: BACK

## 2024-10-02 ASSESSMENT — PAIN DESCRIPTION - ORIENTATION: ORIENTATION: LOWER

## 2024-10-02 ASSESSMENT — PAIN SCALES - GENERAL: PAINLEVEL_OUTOF10: 4

## 2024-10-02 NOTE — PROGRESS NOTES
stability.    8.  Chronic changes, as detailed above.             US right breast 9/6/2024:  FINDINGS:     There is a hypoechoic spiculated mass within the right breast inferior central  region near 6:00 4 cm from the nipple measuring 1.6 x 1.2 x 1.7 cm. This  correlates with the mammographic finding from prior examination dated 9/4/2024.     There is a spiculated mass with indistinct margins demonstrated within the right  axilla correlating with the palpable mass and the mammographic finding from  9/4/2024. This mass measures 2.7 x 2.5 x 2.8 cm.      There is an adjacent enlarged lymph node within the right axilla with cortical  thickness measuring 8.1 mm. There is an additional adjacent prominent lymph node  abutting this mass within the right axilla with a cortical thickness measuring  2.7 mm.     IMPRESSION:  1. There is a hypoechoic spiculated mass within the right breast inferior  central region near 6:00 4 cm from the nipple measuring 1.6 x 1.2 x 1.7 cm. This  correlates with the mammographic finding from prior examination dated 9/4/2024.  This is highly suggestive of malignancy. Recommend ultrasound-guided biopsy.     2. There is a spiculated mass with indistinct margins demonstrated within the  right axilla correlating with the palpable mass and the mammographic finding  from 9/4/2024. This mass measures 2.7 x 2.5 x 2.8 cm. This is highly suggestive  of malignancy. Biopsy of this palpable mass within the right axilla is deferred  at this time unless it would change clinical management.     3. There is an adjacent enlarged lymph node within the right axilla with  cortical thickness measuring 8.1 mm. There is an additional adjacent prominent  lymph node abutting this mass within the right axilla with a cortical thickness  measuring 2.7 mm. These are also concerning for possible malignancy. However,  biopsy of these additional prominent lymph nodes is deferred at this time.       Mammogram bilateral

## 2024-10-02 NOTE — PROGRESS NOTES
Oncology Social Work    Date: 10/2/2024  Time: 3:46 PM  Name: Analy Reyna  MRN: [id number]     Contact Type: Follow-up    Note:   Situation: This staff called Analy Reyna via phone support to introduce myself as her Oncology Social Worker.     Background:  Analy was referred to this staff by the Radiation Dept since she had her recent appointment here. This staff was calling to review her Distress Thermometer completed during that visit.     Assessment: This staff had the opportunity to speak with Analy while her daughter (Katy, NARDACHERELLE) was present. Analy seemed pleasant as we discussed the call's purpose was to educate her about the services provided by the . 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 She is planning to obtain radiation treatment but no chemo at this time.   - Analy was asked about reviewing her Code Status (DNR-CCA) and Living Will that is filed. Analy shared that she doesn't want anything aggressive done as Katy spoke in the background stating she wanted her to have CPR and ventilation. Analy expressed hesitancy stating to me, she knows and understands that level of care and doesn't want it, although her daughter indicated otherwise. Analy shared with me, she has discussed several times with her children what she does and doesn't want, but they aren't listening. This staff suggested we review her Special Instruction one of the times she comes in for treatment. Then she can use this as a guideline to discuss with all of them. If she needs me to document something specific I am happy to assist. She felt that was a fair compromise. She again state, \"I don't want to be on a vent and I know the CPR will hurt me - so I'm just not sure.\" I assured her we could discuss ways in which to present to her children so they know what to do when that time comes.   - No community referrals were placed now because she is too early to know or

## 2024-10-04 ENCOUNTER — HOSPITAL ENCOUNTER (OUTPATIENT)
Dept: INTERVENTIONAL RADIOLOGY/VASCULAR | Age: 79
Discharge: HOME | End: 2024-10-04
Payer: MEDICARE

## 2024-10-04 ENCOUNTER — TELEPHONE (OUTPATIENT)
Dept: ONCOLOGY | Age: 79
End: 2024-10-04

## 2024-10-04 VITALS
DIASTOLIC BLOOD PRESSURE: 58 MMHG | WEIGHT: 225 LBS | TEMPERATURE: 97.8 F | HEIGHT: 65 IN | RESPIRATION RATE: 12 BRPM | BODY MASS INDEX: 37.49 KG/M2 | HEART RATE: 50 BPM | OXYGEN SATURATION: 92 % | SYSTOLIC BLOOD PRESSURE: 127 MMHG

## 2024-10-04 DIAGNOSIS — Z17.0 CARCINOMA OF RIGHT BREAST, ESTROGEN RECEPTOR POSITIVE, STAGE 4 (HCC): ICD-10-CM

## 2024-10-04 DIAGNOSIS — C50.911 CARCINOMA OF RIGHT BREAST, ESTROGEN RECEPTOR POSITIVE, STAGE 4 (HCC): ICD-10-CM

## 2024-10-04 LAB
DEPRECATED RDW RBC AUTO: 46.4 FL (ref 35–45)
ERYTHROCYTE [DISTWIDTH] IN BLOOD BY AUTOMATED COUNT: 13.6 % (ref 11.5–14.5)
HCT VFR BLD AUTO: 34.7 % (ref 37–47)
HGB BLD-MCNC: 11.1 GM/DL (ref 12–16)
MCH RBC QN AUTO: 29.8 PG (ref 26–33)
MCHC RBC AUTO-ENTMCNC: 32 GM/DL (ref 32.2–35.5)
MCV RBC AUTO: 93 FL (ref 81–99)
PLATELET # BLD AUTO: 160 THOU/MM3 (ref 130–400)
PMV BLD AUTO: 9.6 FL (ref 9.4–12.4)
RBC # BLD AUTO: 3.73 MILL/MM3 (ref 4.2–5.4)
WBC # BLD AUTO: 4.4 THOU/MM3 (ref 4.8–10.8)

## 2024-10-04 PROCEDURE — 2580000003 HC RX 258: Performed by: RADIOLOGY

## 2024-10-04 PROCEDURE — 85027 COMPLETE CBC AUTOMATED: CPT

## 2024-10-04 PROCEDURE — 20225 BONE BIOPSY TROCAR/NDL DEEP: CPT

## 2024-10-04 PROCEDURE — 6370000000 HC RX 637 (ALT 250 FOR IP): Performed by: RADIOLOGY

## 2024-10-04 PROCEDURE — 77002 NEEDLE LOCALIZATION BY XRAY: CPT

## 2024-10-04 PROCEDURE — C1830 POWER BONE MARROW BX NEEDLE: HCPCS

## 2024-10-04 PROCEDURE — 6360000002 HC RX W HCPCS: Performed by: RADIOLOGY

## 2024-10-04 RX ORDER — SODIUM CHLORIDE 450 MG/100ML
INJECTION, SOLUTION INTRAVENOUS CONTINUOUS
Status: DISCONTINUED | OUTPATIENT
Start: 2024-10-04 | End: 2024-10-05 | Stop reason: HOSPADM

## 2024-10-04 RX ORDER — HEPARIN SODIUM (PORCINE) LOCK FLUSH IV SOLN 100 UNIT/ML 100 UNIT/ML
500 SOLUTION INTRAVENOUS PRN
Status: DISCONTINUED | OUTPATIENT
Start: 2024-10-04 | End: 2024-10-05 | Stop reason: HOSPADM

## 2024-10-04 RX ORDER — NEOMYCIN/BACITRACIN/POLYMYXINB 3.5-400-5K
OINTMENT (GRAM) TOPICAL PRN
Status: COMPLETED | OUTPATIENT
Start: 2024-10-04 | End: 2024-10-04

## 2024-10-04 RX ORDER — FENTANYL CITRATE 50 UG/ML
50 INJECTION, SOLUTION INTRAMUSCULAR; INTRAVENOUS ONCE
Status: DISCONTINUED | OUTPATIENT
Start: 2024-10-04 | End: 2024-10-05 | Stop reason: HOSPADM

## 2024-10-04 RX ORDER — FENTANYL CITRATE 50 UG/ML
INJECTION, SOLUTION INTRAMUSCULAR; INTRAVENOUS PRN
Status: COMPLETED | OUTPATIENT
Start: 2024-10-04 | End: 2024-10-04

## 2024-10-04 RX ORDER — MIDAZOLAM HYDROCHLORIDE 1 MG/ML
INJECTION INTRAMUSCULAR; INTRAVENOUS PRN
Status: COMPLETED | OUTPATIENT
Start: 2024-10-04 | End: 2024-10-04

## 2024-10-04 RX ORDER — MIDAZOLAM HYDROCHLORIDE 1 MG/ML
1 INJECTION INTRAMUSCULAR; INTRAVENOUS ONCE
Status: DISCONTINUED | OUTPATIENT
Start: 2024-10-04 | End: 2024-10-05 | Stop reason: HOSPADM

## 2024-10-04 RX ADMIN — MIDAZOLAM 1 MG: 1 INJECTION INTRAMUSCULAR; INTRAVENOUS at 08:31

## 2024-10-04 RX ADMIN — FENTANYL CITRATE 50 MCG: 50 INJECTION, SOLUTION INTRAMUSCULAR; INTRAVENOUS at 08:31

## 2024-10-04 RX ADMIN — BACITRACIN ZINC, NEOMYCIN SULFATE, AND POLYMYXIN B SULFATE 1 EACH: 400; 3.5; 5 OINTMENT TOPICAL at 08:43

## 2024-10-04 RX ADMIN — HEPARIN 500 UNITS: 100 SYRINGE at 09:55

## 2024-10-04 RX ADMIN — SODIUM CHLORIDE: 4.5 INJECTION, SOLUTION INTRAVENOUS at 07:09

## 2024-10-04 ASSESSMENT — PAIN SCALES - GENERAL: PAINLEVEL_OUTOF10: 3

## 2024-10-04 ASSESSMENT — PAIN DESCRIPTION - ORIENTATION: ORIENTATION: LOWER;RIGHT;LEFT

## 2024-10-04 ASSESSMENT — PAIN DESCRIPTION - LOCATION: LOCATION: BACK;SHOULDER

## 2024-10-04 NOTE — TELEPHONE ENCOUNTER
Received call from outpatient nursing patient is scheduled to get IVIG ( ordered by Dr. Howell ) hey are checking that it is okay for patient to receive her IVIG with breast cancer diagnosis and our treatment plan. Please advise.

## 2024-10-04 NOTE — PROGRESS NOTES
Per Dr. Awada- ok to receive IVIG- no contraindications. Patient scheuduled for next IVIG infusion on Monday 10/7/24.

## 2024-10-04 NOTE — OP NOTE
Department of Radiology  Post Procedure Progress Note      Pre-Procedure Diagnosis:  breast cancer , bone mets     Procedure Performed:  bone biopsy L4     Anesthesia: local / versed and fentanyl    Findings: successful    Immediate Complications:  None    Estimated Blood Loss: minimal    SEE DICTATED PROCEDURE NOTE FOR COMPLETE DETAILS.    Marcos Whittaker MD   10/4/2024 8:42 AM

## 2024-10-04 NOTE — DISCHARGE INSTRUCTIONS
POST BIOPSY DISCHARGE INSTRUCTION SHEET    DIET:  As tolerated    ACTIVITY:  Rest at home on sofa, bed or recliner today.  Bathroom privileges only today.  Limit any exertion (pushing or pulling) today.  No lifting for 3 days.  No driving today.  Check biopsy site frequently today.  Resume any blood thinners in 24 hours.  Keep band aid clean & dry - replace as needed, may remove in 48 hours.    RETURN TO NEAREST EMERGENCY ROOM IF YOU HAVE ANY OF THE FOLLOWING:  Sign of bleeding, swelling, drainage from biopsy site or severe pain (slight discomfort to be expected) around biopsy site.  Repeated nausea/vomiting/abdominal pain.  Elevated temperature above 101 degrees.  Shortness of breath.  Chest pain.    Keep scheduled appointment with your physician.  If sedation given, follow post sedation instruction sheet.      SEDATION/ANALGESIA INFORMATION HOME GOING ADVICE    Review the following information with the patient prior to the procedure.  Sedation/agalgesia is used during short medical procedures under controlled supervision.  The medication will produce a strong relaxation.  You will be able to hear, speak and follow instructions, but you memory and alertness will be decreased.  You will be able to swallow and breathe on your own.  During sedation/analgesia you blood pressure, hear and breathing will be watched closely.  After the procedure, you may not remember what was said or done.    You may have the following effects from the medication.  Drowsiness, dizziness, sleepiness or confusion.  Difficulty remembering or delayed reaction times.  Loss of fine muscle control or difficulty with your balance especially while walking.  Difficulty focusing or blurred vision.  You may not be aware of slight changes in your behavior and/or your reaction time because of the medication used during the procedure.  Therefore you should follow these instructions.  Have someone responsible help you with your care.  Do not drive for 24

## 2024-10-04 NOTE — PROGRESS NOTES
0630: Patient arrived ambulatory for bone biopsy. Patient has been NPO since last night. Patient has held aspirin for 5 days. Patient rights and responsibilities offered to patient. Procedure explained to patient- voiced understanding.   Mediport accessed using sterile technique.   Blood work collected and sent to lab per order.  IV hydration started.                    _m___ Safety:       (Environmental)  [place] to environment  Ensure ID band is correct and in place/ allergy band as needed  Assess for fall risk  Initiate fall precautions as applicable (fall band, side rails, etc.)  Call light within reach  Bed in low position/ wheels locked    _m___ Pain:       Assess pain level and characteristics  Administer analgesics as ordered  Assess effectiveness of pain management and report to MD as needed    _m___ Knowledge Deficit:  Assess baseline knowledge  Provide teaching at level of understanding  Provide teaching via preferred learning method  Evaluate teaching effectiveness    _m___ Hemodynamic/Respiratory Status:       (Pre and Post Procedure Monitoring)  Assess/Monitor vital signs and LOC  Assess Baseline SpO2 prior to any sedation  Obtain weight/height  Assess vital signs/ LOC until patient meets discharge criteria  Monitor procedure site and notify MD of any issues    _m___ Infection-Risk of Central Venous Catheter:  Monitor for infection signs and symptoms (catheter site redness, temperature elevation, etc)  Assess for infection risks  Educate regarding infection prevention  Manage central venous catheter (flushes/ dressing changes per protocol)

## 2024-10-04 NOTE — PROGRESS NOTES
0800 Patient received in IR for bone biopsy. No family present with patient.   0812 This procedure has been fully reviewed with the patient and written informed consent has been obtained.  0834 Procedure started with Dr. Whittaker.  0839 Procedure completed; patient tolerated well. Dressing to lower back; no bleeding noted.  0848 Patient on cart; comfort ensured.  0849 Patient taken to OPN via transport. Pt alert and oriented x3; follows commands. Skin pink, warm, and dry. Respirations easy, regular, and nonlabored. Report called to Anny WOOD in OPN.

## 2024-10-04 NOTE — PROGRESS NOTES
0850: Patient back from procedure- tolerated well. No pain noted. Dressing clean, dry, intact. No redness or edema noted around site. Vitals as charted.     0915: No pain noted. Dressing clean, dry, intact. No redness or edema noted around site.Vitals as charted. Patient provided with snack and beverage.    0930: No pain noted. Dressing clean, dry, intact. No redness or edema noted around site. Vitals as charted.     0945: No pain noted. Dressing clean, dry, intact. No redness or edema noted around site. Patient used restroom- no bleeding noted.    1000: No pain noted. Dressing clean, dry, intact. No redness or edema noted around site. AVS reviewed with patient, voiced understanding. Patient discharged in wheelchair.

## 2024-10-04 NOTE — H&P
Formulation and discussion of sedation / procedure plans, risks, benefits, side effects and alternatives with patient and/or responsible adult completed.    History and Physical reviewed and unchanged.    Electronically signed by Marcos Whittaker MD on 10/4/24 at 7:59 AM EDT

## 2024-10-04 NOTE — H&P
[facility]  Sedation/Analgesia History & Physical    Pt Name: Analy Reyna  MRN: [id number]  YOB: 1945  Provider Performing Procedure: Marcos Whittaker MD, MD  Primary Care Physician: Ras Howell MD    Formulation and discussion of sedation / procedure plans, risks, benefits, side effects and alternatives with patient and/or responsible adult completed.    PRE-PROCEDURE   DNR-CCA/DNR-CC []Yes [x]No  Brief History/Pre-Procedure Diagnosis: bone mets           MEDICAL HISTORY  []CAD/Valve  []Liver Disease  []Lung Disease []Diabetes  []Hypertension []Renal Disease  []Additional information:       has a past medical history of Anemia, Atrial fibrillation (HCC), Breast cancer, stage 4, right (HCC), CAD (coronary artery disease), CHF (congestive heart failure) (HCC), Chronic kidney disease, COPD (chronic obstructive pulmonary disease) (HCC), DDD (degenerative disc disease), lumbar, Depression, Frequent PVCs, GERD (gastroesophageal reflux disease), History of blood transfusion, Hx of blood clots, Hyperlipidemia, Hypertension, Hyperthyroidism, Kidney disease, Movement disorder, Neuropathy, Obesity, Palpitations, Pneumonia, Sleep apnea, Status post right and left heart catheterization, and Type II or unspecified type diabetes mellitus without mention of complication, not stated as uncontrolled.    SURGICAL HISTORY   has a past surgical history that includes Rotator cuff repair; Tubal ligation; Hysterectomy (1980); bladder suspension; tendon release (Left, 08/09/2016); Achilles tendon surgery (Bilateral); Cardiac surgery (2016); Hammer toe surgery (Right); hiatal hernia repair (09/06/2016); Tunneled venous port placement (11/06/2017); Upper gastrointestinal endoscopy (Left, 05/10/2018); Colonoscopy (Left, 05/11/2018); Endoscopy, colon, diagnostic; Gastric fundoplication; pr office/outpt visit,procedure only (N/A, 09/21/2018); Upper gastrointestinal endoscopy (Left, 07/25/2021);

## 2024-10-07 ENCOUNTER — HOSPITAL ENCOUNTER (OUTPATIENT)
Dept: NURSING | Age: 79
Discharge: HOME | End: 2024-10-07
Payer: MEDICARE

## 2024-10-07 ENCOUNTER — HOSPITAL ENCOUNTER (OUTPATIENT)
Dept: RADIATION ONCOLOGY | Age: 79
Discharge: HOME | End: 2024-10-07
Payer: MEDICARE

## 2024-10-07 ENCOUNTER — HOSPITAL ENCOUNTER (OUTPATIENT)
Dept: CT IMAGING | Age: 79
Discharge: HOME | End: 2024-10-07
Payer: MEDICARE

## 2024-10-07 VITALS
RESPIRATION RATE: 16 BRPM | WEIGHT: 225 LBS | SYSTOLIC BLOOD PRESSURE: 120 MMHG | BODY MASS INDEX: 38.02 KG/M2 | TEMPERATURE: 96.9 F | HEART RATE: 50 BPM | DIASTOLIC BLOOD PRESSURE: 57 MMHG | OXYGEN SATURATION: 94 %

## 2024-10-07 DIAGNOSIS — C79.51 SECONDARY MALIGNANT NEOPLASM OF BONE (HCC): ICD-10-CM

## 2024-10-07 DIAGNOSIS — D83.9 COMMON VARIABLE IMMUNODEFICIENCY (HCC): Primary | ICD-10-CM

## 2024-10-07 PROCEDURE — 77332 RADIATION TREATMENT AID(S): CPT | Performed by: RADIOLOGY

## 2024-10-07 PROCEDURE — 6360000002 HC RX W HCPCS: Performed by: FAMILY MEDICINE

## 2024-10-07 PROCEDURE — 3209999900 CT GUIDE RADIATION THERAPY NO CHARGE

## 2024-10-07 PROCEDURE — 96365 THER/PROPH/DIAG IV INF INIT: CPT

## 2024-10-07 PROCEDURE — 6370000000 HC RX 637 (ALT 250 FOR IP): Performed by: FAMILY MEDICINE

## 2024-10-07 PROCEDURE — 96366 THER/PROPH/DIAG IV INF ADDON: CPT

## 2024-10-07 PROCEDURE — 77334 RADIATION TREATMENT AID(S): CPT | Performed by: RADIOLOGY

## 2024-10-07 PROCEDURE — 96415 CHEMO IV INFUSION ADDL HR: CPT

## 2024-10-07 PROCEDURE — 2580000003 HC RX 258: Performed by: FAMILY MEDICINE

## 2024-10-07 PROCEDURE — 77290 THER RAD SIMULAJ FIELD CPLX: CPT | Performed by: RADIOLOGY

## 2024-10-07 PROCEDURE — 96413 CHEMO IV INFUSION 1 HR: CPT

## 2024-10-07 RX ORDER — HEPARIN 100 UNIT/ML
500 SYRINGE INTRAVENOUS PRN
OUTPATIENT
Start: 2024-11-04

## 2024-10-07 RX ORDER — SODIUM CHLORIDE 0.9 % (FLUSH) 0.9 %
5-40 SYRINGE (ML) INJECTION PRN
OUTPATIENT
Start: 2024-11-04

## 2024-10-07 RX ORDER — SODIUM CHLORIDE 9 MG/ML
INJECTION, SOLUTION INTRAVENOUS CONTINUOUS
OUTPATIENT
Start: 2024-11-04

## 2024-10-07 RX ORDER — SODIUM CHLORIDE 9 MG/ML
5-250 INJECTION, SOLUTION INTRAVENOUS PRN
OUTPATIENT
Start: 2024-11-04

## 2024-10-07 RX ORDER — EPINEPHRINE 1 MG/ML
0.3 INJECTION, SOLUTION INTRAMUSCULAR; SUBCUTANEOUS PRN
OUTPATIENT
Start: 2024-11-04

## 2024-10-07 RX ORDER — DIPHENHYDRAMINE HYDROCHLORIDE 50 MG/ML
50 INJECTION INTRAMUSCULAR; INTRAVENOUS
OUTPATIENT
Start: 2024-11-04

## 2024-10-07 RX ORDER — SODIUM CHLORIDE 0.9 % (FLUSH) 0.9 %
5-40 SYRINGE (ML) INJECTION PRN
Status: DISCONTINUED | OUTPATIENT
Start: 2024-10-07 | End: 2024-10-08 | Stop reason: HOSPADM

## 2024-10-07 RX ORDER — ACETAMINOPHEN 325 MG/1
650 TABLET ORAL ONCE
Status: COMPLETED | OUTPATIENT
Start: 2024-10-07 | End: 2024-10-07

## 2024-10-07 RX ORDER — HEPARIN 100 UNIT/ML
500 SYRINGE INTRAVENOUS PRN
Status: DISCONTINUED | OUTPATIENT
Start: 2024-10-07 | End: 2024-10-08 | Stop reason: HOSPADM

## 2024-10-07 RX ORDER — DIPHENHYDRAMINE HCL 25 MG
25 TABLET ORAL ONCE
Status: COMPLETED | OUTPATIENT
Start: 2024-10-07 | End: 2024-10-07

## 2024-10-07 RX ORDER — DIPHENHYDRAMINE HCL 25 MG
25 TABLET ORAL ONCE
OUTPATIENT
Start: 2024-11-04 | End: 2024-11-04

## 2024-10-07 RX ORDER — ACETAMINOPHEN 325 MG/1
650 TABLET ORAL ONCE
OUTPATIENT
Start: 2024-11-04 | End: 2024-11-04

## 2024-10-07 RX ADMIN — HEPARIN 500 UNITS: 100 SYRINGE at 11:18

## 2024-10-07 RX ADMIN — IMMUNE GLOBULIN (HUMAN) 5 G: 10 INJECTION INTRAVENOUS; SUBCUTANEOUS at 09:48

## 2024-10-07 RX ADMIN — SODIUM CHLORIDE, PRESERVATIVE FREE 10 ML: 5 INJECTION INTRAVENOUS at 11:18

## 2024-10-07 RX ADMIN — IMMUNE GLOBULIN (HUMAN) 20 G: 10 INJECTION INTRAVENOUS; SUBCUTANEOUS at 10:22

## 2024-10-07 NOTE — DISCHARGE INSTRUCTIONS
ACTIVITY:  Continue usual care with your doctor. Call your doctor immediately if any severe problems or go to the nearest emergency room.      I have been treated and hereby acknowledge receiving this instruction sheet.                  Next IVIG infusion: November 4th @ 9:00am

## 2024-10-07 NOTE — PROGRESS NOTES
0900- Patient arrived to [facility] ambulatory for IVIG infusion. Patient denies changes since last infusion. Vitals stable. Mediport accessed using sterile technique and lab work obtained per order. Patient stated she took pre meds at home before she got here. Call light placed within reach. PT RIGHTS AND RESPONSIBILITIES OFFERED TO PT.     0950- Infusion started. Patient denies needs.      1020- Infusion increased. Patient tolerated. Vitals stable.      1035- Infusion increased. Patient tolerated. Vitals stable.      1050- Infusion increased to max rate. Patient tolerated. Vitals stable.      1118- Infusion complete. Patient tolerated well with no issues. Vitals stable.      1118- Mediport flushed and de accessed using heparin per MAR.      1125- Discharge instructions reviewed with patient. Verbalized understanding with no further questions. AVS provided. Discharged ambulatory to [facility] in stable condition.               __M__ Safety:       (Environmental)  [place] to environment  Ensure ID band is correct and in place/ allergy band as needed  Assess for fall risk  Initiate fall precautions as applicable (fall band, side rails, etc.)  Call light within reach  Bed in low position/ wheels locked    __M__ Pain:       Assess pain level and characteristics  Administer analgesics as ordered  Assess effectiveness of pain management and report to MD as needed    __M__ Knowledge Deficit:  Assess baseline knowledge  Provide teaching at level of understanding  Provide teaching via preferred learning method  Evaluate teaching effectiveness    __M__ Hemodynamic/Respiratory Status:       (Pre and Post Procedure Monitoring)  Assess/Monitor vital signs and LOC  Assess Baseline SpO2 prior to any sedation  Obtain weight/height  Assess vital signs/ LOC until patient meets discharge criteria  Monitor procedure site and notify MD of any issues    __M__ Infection-Risk of Central Venous Catheter:  Monitor for infection signs and symptoms

## 2024-10-11 DIAGNOSIS — Z51.11 ENCOUNTER FOR CHEMOTHERAPY MANAGEMENT: ICD-10-CM

## 2024-10-11 DIAGNOSIS — C50.911 CARCINOMA OF RIGHT BREAST, ESTROGEN RECEPTOR POSITIVE, STAGE 4 (HCC): Primary | ICD-10-CM

## 2024-10-11 DIAGNOSIS — Z17.0 CARCINOMA OF RIGHT BREAST, ESTROGEN RECEPTOR POSITIVE, STAGE 4 (HCC): Primary | ICD-10-CM

## 2024-10-11 PROCEDURE — 77334 RADIATION TREATMENT AID(S): CPT | Performed by: RADIOLOGY

## 2024-10-11 PROCEDURE — 77307 TELETHX ISODOSE PLAN CPLX: CPT | Performed by: RADIOLOGY

## 2024-10-11 PROCEDURE — 77321 SPECIAL TELETX PORT PLAN: CPT | Performed by: RADIOLOGY

## 2024-10-14 ENCOUNTER — TELEPHONE (OUTPATIENT)
Dept: SPIRITUAL SERVICES | Facility: CLINIC | Age: 79
End: 2024-10-14

## 2024-10-14 ENCOUNTER — HOSPITAL ENCOUNTER (OUTPATIENT)
Dept: INFUSION THERAPY | Age: 79
Discharge: HOME | End: 2024-10-14
Payer: MEDICARE

## 2024-10-14 ENCOUNTER — OFFICE VISIT (OUTPATIENT)
Dept: ONCOLOGY | Age: 79
End: 2024-10-14
Payer: MEDICARE

## 2024-10-14 VITALS
DIASTOLIC BLOOD PRESSURE: 72 MMHG | TEMPERATURE: 98.5 F | OXYGEN SATURATION: 93 % | HEART RATE: 66 BPM | SYSTOLIC BLOOD PRESSURE: 134 MMHG | RESPIRATION RATE: 16 BRPM

## 2024-10-14 VITALS
RESPIRATION RATE: 16 BRPM | HEART RATE: 66 BPM | OXYGEN SATURATION: 93 % | DIASTOLIC BLOOD PRESSURE: 72 MMHG | SYSTOLIC BLOOD PRESSURE: 134 MMHG | TEMPERATURE: 98.5 F

## 2024-10-14 DIAGNOSIS — Z17.0 CARCINOMA OF RIGHT BREAST, ESTROGEN RECEPTOR POSITIVE, STAGE 4 (HCC): ICD-10-CM

## 2024-10-14 DIAGNOSIS — Z51.11 ENCOUNTER FOR ANTINEOPLASTIC CHEMOTHERAPY: ICD-10-CM

## 2024-10-14 DIAGNOSIS — Z51.11 ENCOUNTER FOR CHEMOTHERAPY MANAGEMENT: ICD-10-CM

## 2024-10-14 DIAGNOSIS — C50.911 CARCINOMA OF RIGHT BREAST, ESTROGEN RECEPTOR POSITIVE, STAGE 4 (HCC): ICD-10-CM

## 2024-10-14 DIAGNOSIS — Z17.0 CARCINOMA OF RIGHT BREAST, ESTROGEN RECEPTOR POSITIVE, STAGE 4 (HCC): Primary | ICD-10-CM

## 2024-10-14 DIAGNOSIS — C79.51 CANCER, METASTATIC TO BONE (HCC): ICD-10-CM

## 2024-10-14 DIAGNOSIS — C50.911 CARCINOMA OF RIGHT BREAST, ESTROGEN RECEPTOR POSITIVE, STAGE 4 (HCC): Primary | ICD-10-CM

## 2024-10-14 LAB
ALBUMIN SERPL BCG-MCNC: 4 G/DL (ref 3.5–5.1)
ALP SERPL-CCNC: 193 U/L (ref 38–126)
ALT SERPL W/O P-5'-P-CCNC: 12 U/L (ref 11–66)
AST SERPL-CCNC: 21 U/L (ref 5–40)
BASOPHILS ABSOLUTE: 0 THOU/MM3 (ref 0–0.1)
BASOPHILS NFR BLD AUTO: 2 % (ref 0–3)
BILIRUB CONJ SERPL-MCNC: < 0.1 MG/DL (ref 0.1–13.8)
BILIRUB SERPL-MCNC: 0.2 MG/DL (ref 0.3–1.2)
BUN BLDP-MCNC: 32 MG/DL (ref 8–26)
CHLORIDE BLD-SCNC: 105 MEQ/L (ref 98–109)
CREAT BLD-MCNC: 1.6 MG/DL (ref 0.5–1.2)
EOSINOPHIL NFR BLD AUTO: 2 % (ref 0–4)
EOSINOPHILS ABSOLUTE: 0.1 THOU/MM3 (ref 0–0.4)
ERYTHROCYTE [DISTWIDTH] IN BLOOD BY AUTOMATED COUNT: 13.6 % (ref 11.5–14.5)
GFR SERPL CREATININE-BSD FRML MDRD: 32 ML/MIN/1.73M2
GLUCOSE BLD-MCNC: 198 MG/DL (ref 70–108)
HCT VFR BLD AUTO: 32.7 % (ref 37–47)
HGB BLD-MCNC: 10.4 GM/DL (ref 12–16)
IMMATURE GRANULOCYTES %: 0 %
IMMATURE GRANULOCYTES ABSOLUTE: 0 THOU/MM3 (ref 0–0.07)
IONIZED CALCIUM, WHOLE BLOOD: 1.14 MMOL/L (ref 1.12–1.32)
LYMPHOCYTES ABSOLUTE: 0.8 THOU/MM3 (ref 1–4.8)
LYMPHOCYTES NFR BLD AUTO: 33 % (ref 15–47)
MCH RBC QN AUTO: 30.3 PG (ref 26–33)
MCHC RBC AUTO-ENTMCNC: 31.8 GM/DL (ref 32.2–35.5)
MCV RBC AUTO: 95 FL (ref 81–99)
MONOCYTES ABSOLUTE: 0.2 THOU/MM3 (ref 0.4–1.3)
MONOCYTES NFR BLD AUTO: 8 % (ref 0–12)
NEUTROPHILS ABSOLUTE: 1.3 THOU/MM3 (ref 1.8–7.7)
NEUTROPHILS NFR BLD AUTO: 55 % (ref 43–75)
PLATELET # BLD AUTO: 104 THOU/MM3 (ref 130–400)
PMV BLD AUTO: 9 FL (ref 9.4–12.4)
POTASSIUM BLD-SCNC: 5 MEQ/L (ref 3.5–4.9)
PROT SERPL-MCNC: 6.7 G/DL (ref 6.1–8)
RBC # BLD AUTO: 3.43 MILL/MM3 (ref 4.2–5.4)
SODIUM BLD-SCNC: 139 MEQ/L (ref 138–146)
TOTAL CO2, WHOLE BLOOD: 29 MEQ/L (ref 23–33)
WBC # BLD AUTO: 2.4 THOU/MM3 (ref 4.8–10.8)

## 2024-10-14 PROCEDURE — 85025 COMPLETE CBC W/AUTO DIFF WBC: CPT

## 2024-10-14 PROCEDURE — 1123F ACP DISCUSS/DSCN MKR DOCD: CPT | Performed by: INTERNAL MEDICINE

## 2024-10-14 PROCEDURE — 3075F SYST BP GE 130 - 139MM HG: CPT | Performed by: INTERNAL MEDICINE

## 2024-10-14 PROCEDURE — 3078F DIAST BP <80 MM HG: CPT | Performed by: INTERNAL MEDICINE

## 2024-10-14 PROCEDURE — 36415 COLL VENOUS BLD VENIPUNCTURE: CPT

## 2024-10-14 PROCEDURE — 99214 OFFICE O/P EST MOD 30 MIN: CPT | Performed by: INTERNAL MEDICINE

## 2024-10-14 PROCEDURE — 80047 BASIC METABLC PNL IONIZED CA: CPT

## 2024-10-14 PROCEDURE — 99211 OFF/OP EST MAY X REQ PHY/QHP: CPT

## 2024-10-14 PROCEDURE — 80076 HEPATIC FUNCTION PANEL: CPT

## 2024-10-14 RX ORDER — DIPHENHYDRAMINE HYDROCHLORIDE 50 MG/ML
50 INJECTION INTRAMUSCULAR; INTRAVENOUS
OUTPATIENT
Start: 2024-10-28

## 2024-10-14 RX ORDER — FAMOTIDINE 10 MG/ML
20 INJECTION, SOLUTION INTRAVENOUS
OUTPATIENT
Start: 2024-10-28

## 2024-10-14 RX ORDER — SODIUM CHLORIDE 9 MG/ML
INJECTION, SOLUTION INTRAVENOUS CONTINUOUS
OUTPATIENT
Start: 2024-10-28

## 2024-10-14 RX ORDER — ALBUTEROL SULFATE 90 UG/1
4 INHALANT RESPIRATORY (INHALATION) PRN
OUTPATIENT
Start: 2024-10-28

## 2024-10-14 RX ORDER — ONDANSETRON 2 MG/ML
8 INJECTION INTRAMUSCULAR; INTRAVENOUS
OUTPATIENT
Start: 2024-10-28

## 2024-10-14 RX ORDER — EPINEPHRINE 1 MG/ML
0.3 INJECTION, SOLUTION, CONCENTRATE INTRAVENOUS PRN
OUTPATIENT
Start: 2024-10-28

## 2024-10-14 RX ORDER — ACETAMINOPHEN 325 MG/1
650 TABLET ORAL
OUTPATIENT
Start: 2024-10-28

## 2024-10-14 RX ORDER — RIBOCICLIB 200 MG/1
TABLET, FILM COATED ORAL
Qty: 63 EACH | Refills: 0 | Status: ACTIVE | OUTPATIENT
Start: 2024-10-14

## 2024-10-14 NOTE — FLOWSHEET NOTE
10/14/24 1013   Encounter Summary   Encounter Overview/Reason Initial Encounter   Service Provided For Patient   Referral/Consult From Multi-disciplinary team   Support System Children   Last Encounter  10/14/24   Complexity of Encounter Low   Begin Time 1000   End Time  1016   Total Time Calculated 16 min   Spiritual/Emotional needs   Type Spiritual Support   Assessment/Intervention/Outcome   Assessment Anxious;Coping;Interrupted family processes;Powerlessness   Intervention Active listening;Discussed belief system/Judaism practices/sadaf;Discussed illness injury and it’s impact;Explored/Affirmed feelings, thoughts, concerns;Explored Coping Skills/Resources;Prayer (assurance of)/[place]   Outcome Comfort;Engaged in conversation;Expressed feelings, needs, and concerns;Expressed Gratitude;Receptive     This is a spiritual health encounter over the phone as a part of routine outpatient oncology support. Patient, Analy, shared about her cancer treatments and her overall feeling that she is doing okay and feels well supported practically and emotionally by her children. Patient processed her hope that her children will be able to understand her treatment decisions and expressed some of the difficulty in sharing in her preferences. Patient shared that her sadaf is getting her through this time as she trusts that God is in control.   provided empathic listening and reflection as well as prayer and information regarding ongoing  services and availability.      team will remain available to support patient/family, PRN.     Chaplain Margo Escalona M.Div     Spiritual Care Department  [address] [address]  [phone]

## 2024-10-14 NOTE — PROGRESS NOTES
above workup results.  -Unfortunately, she has stage IV ER+/AR-/HER2- breast cancer with extensive metastasis.  -Educated her on the standard of care therapy per NCCN guidelines include Aromatase inhibitor + CDK4/6 inhibitor (ribociclib, abemaciclib, palbociclib)  -Ribociclib is the preferred CDK4/6 inhibitor given that in MONALEESA-2 phase 3 randomized controlled trial, Ribociclib + endocrine therapy showed OS benefit in the first-line setting.  -Therefore, I informed the patient about the plan to start Ribociclib plus Anastrozole until disease progression or unacceptable toxicities.  -Patient expressed her understanding and agreement to proceed with the treatment plan.  -I explained to her the treatment regimen including schedule, potential side effects and ways of management.  -I prescribed Anastrozole 1 mg daily and asked the patient to start it as soon as possible.   -I also prescribed Ribociclib 600 mg 21 days on/7 days off and informed the patient about the plan to have a formal chemotherapy teach prior to treatment initiation.  -My plan is to have the patient return to clinic on C1D14 for reevaluation, blood work, EKG and intensive toxicity assessment.      9/30/24: C1D1 of Arimidex plus Robiciclib.     10/4/24: L4 vertebral body bone biopsy confirmed metastatic ductal carcinoma of breast origin. The neoplastic cells are positive for GATA3, CK7, and ER (100%).     10/14/24: Returns to clinic for follow-up on C1D15.    -Overall tolerating treatment with no significant side effects except for hot flashes and mild nausea.  In addition, she did report intermittent episode of blurred vision towards the end of the day which she attributes to her eyelid problem.  She is following with ophthalmology for this issue.  -Her lab work showed, as expected, findings of worsening leukopenia/neutropenia (ANC 1.3) as well as creatinine/BUN elevation.  However, she does not meet criteria for dose reduction as ANC remains >1

## 2024-10-14 NOTE — PATIENT INSTRUCTIONS
-Prescribed Ribociclib.  -EKG ordered.  -We'll order Xgeva. Please obtain insurance authorization.  -Patient to return to clinic in 2 weeks for blood work and re-evaluation.

## 2024-10-16 DIAGNOSIS — Z51.11 ENCOUNTER FOR ANTINEOPLASTIC CHEMOTHERAPY: ICD-10-CM

## 2024-10-16 DIAGNOSIS — C50.911 CARCINOMA OF RIGHT BREAST, ESTROGEN RECEPTOR POSITIVE, STAGE 4 (HCC): ICD-10-CM

## 2024-10-16 DIAGNOSIS — Z17.0 CARCINOMA OF RIGHT BREAST, ESTROGEN RECEPTOR POSITIVE, STAGE 4 (HCC): ICD-10-CM

## 2024-10-16 RX ORDER — RIBOCICLIB 200 MG/1
TABLET, FILM COATED ORAL
Refills: 0 | OUTPATIENT
Start: 2024-10-16

## 2024-10-21 ENCOUNTER — HOSPITAL ENCOUNTER (OUTPATIENT)
Dept: RADIATION ONCOLOGY | Age: 79
End: 2024-10-21
Payer: MEDICARE

## 2024-10-22 ENCOUNTER — HOSPITAL ENCOUNTER (OUTPATIENT)
Dept: RADIATION ONCOLOGY | Age: 79
Discharge: HOME | End: 2024-10-22
Payer: MEDICARE

## 2024-10-22 PROCEDURE — 77412 RADIATION TX DELIVERY LVL 3: CPT | Performed by: RADIOLOGY

## 2024-10-22 PROCEDURE — 77280 THER RAD SIMULAJ FIELD SMPL: CPT | Performed by: RADIOLOGY

## 2024-10-23 ENCOUNTER — HOSPITAL ENCOUNTER (OUTPATIENT)
Dept: RADIATION ONCOLOGY | Age: 79
Discharge: HOME | End: 2024-10-23
Payer: MEDICARE

## 2024-10-23 PROCEDURE — 77412 RADIATION TX DELIVERY LVL 3: CPT | Performed by: RADIOLOGY

## 2024-10-23 PROCEDURE — 77387 GUIDANCE FOR RADJ TX DLVR: CPT | Performed by: RADIOLOGY

## 2024-10-24 ENCOUNTER — HOSPITAL ENCOUNTER (OUTPATIENT)
Dept: RADIATION ONCOLOGY | Age: 79
Discharge: HOME | End: 2024-10-24
Payer: MEDICARE

## 2024-10-24 VITALS
DIASTOLIC BLOOD PRESSURE: 60 MMHG | SYSTOLIC BLOOD PRESSURE: 132 MMHG | WEIGHT: 233.47 LBS | OXYGEN SATURATION: 93 % | BODY MASS INDEX: 39.46 KG/M2 | TEMPERATURE: 98.2 F | HEART RATE: 73 BPM

## 2024-10-24 PROCEDURE — 77387 GUIDANCE FOR RADJ TX DLVR: CPT | Performed by: RADIOLOGY

## 2024-10-24 PROCEDURE — 77412 RADIATION TX DELIVERY LVL 3: CPT | Performed by: RADIOLOGY

## 2024-10-24 NOTE — PROGRESS NOTES
Medications   Medication Sig Dispense Refill    Ribociclib Succ, 600 MG Dose, (KISQALI, 600 MG DOSE,) 200 MG TBPK TAKE 600 MG (3 TABLETS) BY MOUTH ONCE DAILY FOR 21 DAYS, THEN 7 DAYS OFF (28-DAY CYCLE) 63 each 0    Insulin Aspart, w/Niacinamide, (FIASP FLEXTOUCH) 100 UNIT/ML SOPN Inject into the skin      aspirin-acetaminophen-caffeine (EXCEDRIN MIGRAINE) 250-250-65 MG per tablet Take 1 tablet by mouth every 6 hours as needed for Headaches      anastrozole (ARIMIDEX) 1 MG tablet Take 1 tablet by mouth daily 30 tablet 3    ondansetron (ZOFRAN-ODT) 4 MG disintegrating tablet Take 1 tablet by mouth every 8 hours as needed for Nausea or Vomiting 270 tablet 1    loperamide (IMODIUM A-D) 2 MG tablet Take 2 tablets by mouth 4 times daily as needed for Diarrhea 240 tablet 5    Dextromethorphan-guaiFENesin (MUCINEX DM MAXIMUM STRENGTH)  MG TB12 Take 1 tablet by mouth every 12 hours as needed      polyethylene glycol (MIRALAX) 17 GM/SCOOP powder Take 17 g by mouth daily      Rimegepant Sulfate (NURTEC) 75 MG TBDP Take 75 mg by mouth daily as needed      pantoprazole (PROTONIX) 40 MG tablet Take 1 tablet by mouth daily      Insulin Glargine (BASAGLAR KWIKPEN SC) Inject 8 Units into the skin      busPIRone (BUSPAR) 5 MG tablet Take 1 tablet by mouth 2 times daily      losartan (COZAAR) 25 MG tablet Take 1 tablet by mouth daily 90 tablet 1    diphenhydrAMINE (BENADRYL) 25 MG capsule Take 1 capsule by mouth every 6 hours as needed for Itching      hydrOXYzine HCl (ATARAX) 10 MG tablet Take 1 tablet by mouth 3 times daily as needed for Itching      aspirin 81 MG EC tablet Take 1 tablet by mouth daily for 30 doses 30 tablet 3    acetaminophen-codeine (TYLENOL #3) 300-30 MG per tablet 1 tablet every 6 hours as needed.      Simethicone 125 MG CAPS Take by mouth 4 times daily as needed (Gas)      Ondansetron HCl (ZOFRAN PO) Take by mouth      pregabalin (LYRICA) 150 MG capsule Take 1 capsule by mouth in the morning, at noon,

## 2024-10-25 ENCOUNTER — HOSPITAL ENCOUNTER (OUTPATIENT)
Dept: RADIATION ONCOLOGY | Age: 79
Discharge: HOME | End: 2024-10-25
Payer: MEDICARE

## 2024-10-25 PROCEDURE — 77412 RADIATION TX DELIVERY LVL 3: CPT | Performed by: RADIOLOGY

## 2024-10-25 PROCEDURE — 77387 GUIDANCE FOR RADJ TX DLVR: CPT | Performed by: RADIOLOGY

## 2024-10-28 ENCOUNTER — HOSPITAL ENCOUNTER (OUTPATIENT)
Dept: INFUSION THERAPY | Age: 79
Discharge: HOME | End: 2024-10-28
Payer: MEDICARE

## 2024-10-28 ENCOUNTER — HOSPITAL ENCOUNTER (OUTPATIENT)
Dept: RADIATION ONCOLOGY | Age: 79
Discharge: HOME | End: 2024-10-28
Payer: MEDICARE

## 2024-10-28 ENCOUNTER — OFFICE VISIT (OUTPATIENT)
Dept: ONCOLOGY | Age: 79
End: 2024-10-28
Payer: MEDICARE

## 2024-10-28 VITALS
OXYGEN SATURATION: 94 % | TEMPERATURE: 98.7 F | HEART RATE: 64 BPM | DIASTOLIC BLOOD PRESSURE: 78 MMHG | WEIGHT: 233.2 LBS | HEIGHT: 64 IN | SYSTOLIC BLOOD PRESSURE: 170 MMHG | BODY MASS INDEX: 39.81 KG/M2 | RESPIRATION RATE: 18 BRPM

## 2024-10-28 VITALS
OXYGEN SATURATION: 94 % | SYSTOLIC BLOOD PRESSURE: 170 MMHG | RESPIRATION RATE: 18 BRPM | HEART RATE: 64 BPM | DIASTOLIC BLOOD PRESSURE: 78 MMHG | TEMPERATURE: 98.7 F

## 2024-10-28 DIAGNOSIS — Z17.0 CARCINOMA OF RIGHT BREAST, ESTROGEN RECEPTOR POSITIVE, STAGE 4 (HCC): ICD-10-CM

## 2024-10-28 DIAGNOSIS — Z51.11 ENCOUNTER FOR CHEMOTHERAPY MANAGEMENT: Primary | ICD-10-CM

## 2024-10-28 DIAGNOSIS — C50.911 CARCINOMA OF RIGHT BREAST, ESTROGEN RECEPTOR POSITIVE, STAGE 4 (HCC): ICD-10-CM

## 2024-10-28 DIAGNOSIS — C79.51 CANCER, METASTATIC TO BONE (HCC): ICD-10-CM

## 2024-10-28 LAB
ALBUMIN SERPL BCG-MCNC: 4.1 G/DL (ref 3.5–5.1)
ALP SERPL-CCNC: 194 U/L (ref 38–126)
ALT SERPL W/O P-5'-P-CCNC: 9 U/L (ref 11–66)
AST SERPL-CCNC: 17 U/L (ref 5–40)
BASOPHILS ABSOLUTE: 0 THOU/MM3 (ref 0–0.1)
BASOPHILS NFR BLD AUTO: 1 % (ref 0–3)
BILIRUB CONJ SERPL-MCNC: < 0.1 MG/DL (ref 0.1–13.8)
BILIRUB SERPL-MCNC: 0.3 MG/DL (ref 0.3–1.2)
BUN BLDP-MCNC: 23 MG/DL (ref 8–26)
CHLORIDE BLD-SCNC: 105 MEQ/L (ref 98–109)
CREAT BLD-MCNC: 1.2 MG/DL (ref 0.5–1.2)
EOSINOPHIL NFR BLD AUTO: 2 % (ref 0–4)
EOSINOPHILS ABSOLUTE: 0.1 THOU/MM3 (ref 0–0.4)
ERYTHROCYTE [DISTWIDTH] IN BLOOD BY AUTOMATED COUNT: 15.8 % (ref 11.5–14.5)
GFR SERPL CREATININE-BSD FRML MDRD: 46 ML/MIN/1.73M2
GLUCOSE BLD-MCNC: 88 MG/DL (ref 70–108)
HCT VFR BLD AUTO: 33.4 % (ref 37–47)
HGB BLD-MCNC: 10.6 GM/DL (ref 12–16)
IMMATURE GRANULOCYTES %: 0 %
IMMATURE GRANULOCYTES ABSOLUTE: 0 THOU/MM3 (ref 0–0.07)
IONIZED CALCIUM, WHOLE BLOOD: 1.19 MMOL/L (ref 1.12–1.32)
LYMPHOCYTES ABSOLUTE: 1.1 THOU/MM3 (ref 1–4.8)
LYMPHOCYTES NFR BLD AUTO: 38 % (ref 15–47)
MAGNESIUM SERPL-MCNC: 1.7 MG/DL (ref 1.6–2.4)
MCH RBC QN AUTO: 30.7 PG (ref 26–33)
MCHC RBC AUTO-ENTMCNC: 31.7 GM/DL (ref 32.2–35.5)
MCV RBC AUTO: 97 FL (ref 81–99)
MONOCYTES ABSOLUTE: 0.5 THOU/MM3 (ref 0.4–1.3)
MONOCYTES NFR BLD AUTO: 17 % (ref 0–12)
NEUTROPHILS ABSOLUTE: 1.1 THOU/MM3 (ref 1.8–7.7)
NEUTROPHILS NFR BLD AUTO: 41 % (ref 43–75)
PHOSPHATE SERPL-MCNC: 3.7 MG/DL (ref 2.4–4.7)
PLATELET # BLD AUTO: 168 THOU/MM3 (ref 130–400)
PMV BLD AUTO: 8.3 FL (ref 9.4–12.4)
POTASSIUM BLD-SCNC: 4.7 MEQ/L (ref 3.5–4.9)
PROT SERPL-MCNC: 6.8 G/DL (ref 6.1–8)
RBC # BLD AUTO: 3.45 MILL/MM3 (ref 4.2–5.4)
SODIUM BLD-SCNC: 140 MEQ/L (ref 138–146)
TOTAL CO2, WHOLE BLOOD: 32 MEQ/L (ref 23–33)
WBC # BLD AUTO: 2.8 THOU/MM3 (ref 4.8–10.8)

## 2024-10-28 PROCEDURE — 85025 COMPLETE CBC W/AUTO DIFF WBC: CPT

## 2024-10-28 PROCEDURE — 99214 OFFICE O/P EST MOD 30 MIN: CPT | Performed by: INTERNAL MEDICINE

## 2024-10-28 PROCEDURE — 3078F DIAST BP <80 MM HG: CPT | Performed by: INTERNAL MEDICINE

## 2024-10-28 PROCEDURE — 3077F SYST BP >= 140 MM HG: CPT | Performed by: INTERNAL MEDICINE

## 2024-10-28 PROCEDURE — 77387 GUIDANCE FOR RADJ TX DLVR: CPT | Performed by: RADIOLOGY

## 2024-10-28 PROCEDURE — 36415 COLL VENOUS BLD VENIPUNCTURE: CPT

## 2024-10-28 PROCEDURE — 80047 BASIC METABLC PNL IONIZED CA: CPT

## 2024-10-28 PROCEDURE — G6002 STEREOSCOPIC X-RAY GUIDANCE: HCPCS | Performed by: RADIOLOGY

## 2024-10-28 PROCEDURE — 1159F MED LIST DOCD IN RCRD: CPT | Performed by: INTERNAL MEDICINE

## 2024-10-28 PROCEDURE — 83735 ASSAY OF MAGNESIUM: CPT

## 2024-10-28 PROCEDURE — 84100 ASSAY OF PHOSPHORUS: CPT

## 2024-10-28 PROCEDURE — 1126F AMNT PAIN NOTED NONE PRSNT: CPT | Performed by: INTERNAL MEDICINE

## 2024-10-28 PROCEDURE — 80076 HEPATIC FUNCTION PANEL: CPT

## 2024-10-28 PROCEDURE — 1123F ACP DISCUSS/DSCN MKR DOCD: CPT | Performed by: INTERNAL MEDICINE

## 2024-10-28 PROCEDURE — 77412 RADIATION TX DELIVERY LVL 3: CPT | Performed by: RADIOLOGY

## 2024-10-28 PROCEDURE — 99211 OFF/OP EST MAY X REQ PHY/QHP: CPT

## 2024-10-28 NOTE — PROGRESS NOTES
[facility] [address], [address]  [facility] [address]  Dept: [phone]  Dept Fax: [phone] Loc: [phone]      Visit Date:10/28/2024     Analy Reyna is a 79 y.o. female who presents today for:   Chief Complaint   Patient presents with    Follow-up     Carcinoma of right breast, estrogen receptor positive, stage 4 (HCC)        HPI:   Analy Reyna is a 79 y.o. female referred to Hematology/Oncology clinic for evaluation of new lytic lesions in the bone.     The patient was previously seen in clinic in June 2020 for thrombocytopenia. Per consult note at that time, she had CBC completed on November 28, 2019 which showed a platelet count of 112,000.  She was referred for further evaluation.  Her white blood cell count was 3.1, hemoglobin 11.4, hematocrit 34.5.  Per chart review, the patient had platelet check on 5/7/2020 with platelet count 151,000, on 5/2/2019 platelet count 152,000. In November 2019 she had folic acid which was normal, B12 and TSH were within normal limits. The patient follows with Dr. Su for CKD and kidney function has been stable ranging 1.2 to 1.4. Upon review of prior CBCs the patient has had multiple since 2016. In June 2017 her platelet count was 92,000 noted during hospitalization for weakness, bradycardia. In June 2018 her platelet count was 74,000 during hospitalization for AMS and found to be hypoxic. At time of lab work in November 2019 she was being treated for acute left otitis externa.     Intermittent thrombocytopenia was noted during episodes of acute illness/inflammation. She was recommended follow up with PCP and have CBC every 6 months. Per review of EMR, platelet count has been within normal limits since 2018.     Her past medical history includes CKD, COPD with chronic respiratory failure, Atrial fibrillation chronically on Eliquis, HTN, and hiatal hernia.      8/1/2024:The patient was recommended follow up in clinic  none

## 2024-10-28 NOTE — PATIENT INSTRUCTIONS
-EKG ordered.  -Blood work to be done on 11/11/2024 including CBC, BMP and Hepatic Function Panel.  -Return to clinic in 4 weeks with repeat blood work and for re-evaluation.

## 2024-10-28 NOTE — DISCHARGE INSTRUCTIONS
PATIENT DISCHARGE INSTRUCTIONS    Remember that side effects present at the end of your treatments will improve within a few weeks after the last treatment.  Eat well balanced meals even though your treatments are finished.  This will help speed the healing process. Continue any special diets prescribed to control side effects until these side effects have been resolved.  Get plenty of rest.  If you have experienced fatigue and/or weakness, this may continue for several weeks after your last treatment.  Continue with your daily activities according to the way you feel.  Continue to be gentle with your skin.  Follow your present skin care instructions until your follow-up visit.  IF YOU DEVELOP ANY CHANGES IN YOUR SKIN IN THE AREA TREATED WITH RADIATION, PLEASE CALL THE RADIATION ONCOLOGY NURSE -[phone].  Protect your skin from any injury and avoid direct sun exposure in the treatment area.  The skin in the treated area may always be more sensitive than the rest of your skin.  Always use SPF 30 or higher sun block if you will be in the sun and cannot avoid exposure.  Please contact your referring physician for a follow-up appointment in addition to your Radiation Oncology appointment.  Presence of pain:   Medication Taper: {Yes or No Statement:38083}    See Instructions Dated: ***  Follow up orders: ***

## 2024-10-29 ENCOUNTER — HOSPITAL ENCOUNTER (OUTPATIENT)
Dept: RADIATION ONCOLOGY | Age: 79
Discharge: HOME | End: 2024-10-29
Payer: MEDICARE

## 2024-10-29 PROCEDURE — 77387 GUIDANCE FOR RADJ TX DLVR: CPT | Performed by: RADIOLOGY

## 2024-10-29 PROCEDURE — 77412 RADIATION TX DELIVERY LVL 3: CPT | Performed by: RADIOLOGY

## 2024-10-30 ENCOUNTER — HOSPITAL ENCOUNTER (OUTPATIENT)
Dept: RADIATION ONCOLOGY | Age: 79
Discharge: HOME | End: 2024-10-30
Payer: MEDICARE

## 2024-10-30 ENCOUNTER — OFFICE VISIT (OUTPATIENT)
Dept: NEPHROLOGY | Age: 79
End: 2024-10-30
Payer: MEDICARE

## 2024-10-30 VITALS
WEIGHT: 233 LBS | SYSTOLIC BLOOD PRESSURE: 118 MMHG | OXYGEN SATURATION: 98 % | DIASTOLIC BLOOD PRESSURE: 76 MMHG | HEART RATE: 62 BPM | BODY MASS INDEX: 39.39 KG/M2

## 2024-10-30 DIAGNOSIS — N18.31 CHRONIC KIDNEY DISEASE, STAGE 3A (HCC): Primary | ICD-10-CM

## 2024-10-30 LAB
A/G RATIO: 1.7
ALBUMIN: 3.5 G/DL
ALP BLD-CCNC: 151 U/L
ALT SERPL-CCNC: 7 U/L
AST SERPL-CCNC: 13 U/L
BASOPHILS ABSOLUTE: 2.5 /ΜL
BASOPHILS RELATIVE PERCENT: 60 %
BILIRUB SERPL-MCNC: 0.3 MG/DL (ref 0.1–1.4)
BILIRUBIN DIRECT: 0.1 MG/DL
BILIRUBIN, INDIRECT: 0.2
BUN BLDV-MCNC: 24 MG/DL
CALCIUM SERPL-MCNC: 8.4 MG/DL
CHLORIDE BLD-SCNC: 105 MMOL/L
CO2: 26 MMOL/L
CREAT SERPL-MCNC: 1.34 MG/DL
CREATININE URINE: 99 MG/DL
EGFR: 40
EOSINOPHILS ABSOLUTE: 2 /ΜL
EOSINOPHILS RELATIVE PERCENT: 48 %
GLOBULIN: 2.1
GLUCOSE BLD-MCNC: 180 MG/DL
HCT VFR BLD CALC: 31.2 % (ref 36–46)
HEMOGLOBIN: 9.3 G/DL (ref 12–16)
LYMPHOCYTES ABSOLUTE: 43 /ΜL
LYMPHOCYTES RELATIVE PERCENT: 1032 %
MCH RBC QN AUTO: 29.7 PG
MCHC RBC AUTO-ENTMCNC: 29.8 G/DL
MCV RBC AUTO: 99.7 FL
MICROALBUMIN/CREAT 24H UR: 0.3 MG/DL
MICROALBUMIN/CREAT UR-RTO: 3 MG/G
MONOCYTES ABSOLUTE: 18.4 /ΜL
MONOCYTES RELATIVE PERCENT: 442 %
NEUTROPHILS ABSOLUTE: 34.1 /ΜL
NEUTROPHILS RELATIVE PERCENT: 818 %
PHOSPHORUS: 4.4 MG/DL
PLATELET # BLD: 182 K/ΜL
PMV BLD AUTO: 9.3 FL
POTASSIUM SERPL-SCNC: 4.7 MMOL/L
PTH INTACT: 68
RBC # BLD: 3.13 10^6/ΜL
SODIUM BLD-SCNC: 141 MMOL/L
TOTAL PROTEIN: 5.6 G/DL (ref 6.4–8.2)
VITAMIN D 25-HYDROXY: 48
VITAMIN D2, 25 HYDROXY: NORMAL
VITAMIN D3,25 HYDROXY: NORMAL
WBC # BLD: 2.4 10^3/ML

## 2024-10-30 PROCEDURE — 3074F SYST BP LT 130 MM HG: CPT | Performed by: INTERNAL MEDICINE

## 2024-10-30 PROCEDURE — 1159F MED LIST DOCD IN RCRD: CPT | Performed by: INTERNAL MEDICINE

## 2024-10-30 PROCEDURE — 77387 GUIDANCE FOR RADJ TX DLVR: CPT | Performed by: RADIOLOGY

## 2024-10-30 PROCEDURE — 77412 RADIATION TX DELIVERY LVL 3: CPT | Performed by: RADIOLOGY

## 2024-10-30 PROCEDURE — 3078F DIAST BP <80 MM HG: CPT | Performed by: INTERNAL MEDICINE

## 2024-10-30 PROCEDURE — 99214 OFFICE O/P EST MOD 30 MIN: CPT | Performed by: INTERNAL MEDICINE

## 2024-10-30 PROCEDURE — 1123F ACP DISCUSS/DSCN MKR DOCD: CPT | Performed by: INTERNAL MEDICINE

## 2024-10-30 NOTE — PROGRESS NOTES
[facility]    [address] [place] [address], [address]   [place] [address]  [phone]  Progress Note  10/30/2024 10:40 AM      Pt Name:    Analy Reyna  YOB: 1945  Primary Care Physician:  Ras Howell MD     Chief Complaint:   Chief Complaint   Patient presents with    Follow-up     CKD III        History of Present Illness:   This is a follow-up visit for CKD 3 .      Comorbidities include DM > 40 years, Afib s/p WATCHMAN, s/p ablation, GI Bleed, hypothyroidism, COPD, HFrEF (45%)  chronic hypoxic respiratory failure on Home O2, history of immune deficiency receiving IVIG, BRITTNY, history of hiatal hernia, B/L renal cysts.      Since last visit was diagnosed with metastatic breast CA.   Undergoing chemotherapy and radiation.  Just diagnosed end of August.   Overall doing ok. Tolerating the treatment ok.   Has had some leg swelling. Taking lasix a little more frequently a few times a week.          Pertinent items are noted in HPI.         Past History:  Past Medical History:   Diagnosis Date    Anemia     Atrial fibrillation (HCC) 11/09/2017    Breast cancer, stage 4, right (HCC) 09/12/2024    ER+ OK- HER-    CAD (coronary artery disease)     CHF (congestive heart failure) (HCC)     Chronic kidney disease     stage 3 kidney     COPD (chronic obstructive pulmonary disease) (HCC)     DDD (degenerative disc disease), lumbar     Depression     Frequent PVCs 12/13/2017    GERD (gastroesophageal reflux disease)     History of blood transfusion     Hx of blood clots 09/2017    pulmonary emboli    Hyperlipidemia     Hypertension     sees Baki    Hyperthyroidism     Kidney disease     hemmelgarn    Movement disorder     DDD    Neuropathy     Obesity     Palpitations 01/10/2020    Pneumonia 2016    sepsis    Sleep apnea     usually wears cpap at night    Status post right and left heart catheterization     Type II or unspecified type diabetes mellitus without mention of

## 2024-10-31 ENCOUNTER — HOSPITAL ENCOUNTER (OUTPATIENT)
Dept: RADIATION ONCOLOGY | Age: 79
Discharge: HOME | End: 2024-10-31
Payer: MEDICARE

## 2024-10-31 ENCOUNTER — CLINICAL DOCUMENTATION (OUTPATIENT)
Dept: INFUSION THERAPY | Age: 79
End: 2024-10-31

## 2024-10-31 VITALS
HEART RATE: 59 BPM | SYSTOLIC BLOOD PRESSURE: 135 MMHG | WEIGHT: 235.45 LBS | RESPIRATION RATE: 16 BRPM | BODY MASS INDEX: 39.81 KG/M2 | OXYGEN SATURATION: 93 % | DIASTOLIC BLOOD PRESSURE: 62 MMHG | TEMPERATURE: 98 F

## 2024-10-31 PROCEDURE — 77387 GUIDANCE FOR RADJ TX DLVR: CPT | Performed by: RADIOLOGY

## 2024-10-31 PROCEDURE — 77412 RADIATION TX DELIVERY LVL 3: CPT | Performed by: RADIOLOGY

## 2024-10-31 NOTE — PROGRESS NOTES
[facility]          [address], [address]        [place] [address]        O: [phone]        F: [phone]       [place]            Dr. Stewart Marquez MD MS          Dr. Rose Salas MD PhD    ON TREATMENT VISIT (OTV) NOTE     Date of Service: 10/31/2024  Patient ID: Analy Reyna   : 1945  MRN: [id number]   Acct Number: [id number]     RADIATION ONCOLOGY ATTENDING:  Rose Salas PhD MD    DIAGNOSIS:   Cancer Staging   Malignant neoplasm of central portion of right breast in female, estrogen receptor positive (HCC)  Staging form: Breast, AJCC 8th Edition  - Clinical: Stage IV (cT1c, cN1, cM1, G2, ER+, [place]-, HER2-) - Signed by Rohan Swanson PA on 10/3/2024      Treatment Area: Bone    Current Total Dose(cGy): 2400  Current Fraction: 8/10  Final/Cumulative Rx. Dose (cGy): 3000    Patient was seen today for weekly visit.     Wt Readings from Last 3 Encounters:   10/31/24 106.8 kg (235 lb 7.2 oz)   10/30/24 105.7 kg (233 lb)   10/28/24 105.8 kg (233 lb 3.2 oz)       /62   Pulse 59   Temp 98 °F (36.7 °C) (Infrared)   Resp 16   Wt 106.8 kg (235 lb 7.2 oz)   SpO2 93%   BMI 39.81 kg/m²     Lab Results   Component Value Date    WBC 2.8 (L) 10/28/2024    HGB 10.6 (L) 10/28/2024    HCT 33.4 (L) 10/28/2024     10/28/2024       Comfort Alteration  Fatigue:Able to perform daily activities with rest periods    Pain Location: Denies  Pain Intensity (Current): 0 No Pain  Pain Treatment: OTC Medications  Pain Relief: Pain relieved 100%    Emotional Alteration:   Coping: effective    Nutritional Alteration  Anorexia: Loss of appetite but able to eat normal portions of food  Nausea: 1-2 episodes of nausea in 24 hours - taking nausea medication occasionally. Has been an ongoing/intermittent issue even before treatment  Vomiting: No vomiting     Skin Alteration   Skin reaction: No changes noted    Additional

## 2024-11-01 ENCOUNTER — HOSPITAL ENCOUNTER (OUTPATIENT)
Dept: RADIATION ONCOLOGY | Age: 79
Discharge: HOME | End: 2024-11-01
Payer: MEDICARE

## 2024-11-01 PROCEDURE — 77412 RADIATION TX DELIVERY LVL 3: CPT | Performed by: RADIOLOGY

## 2024-11-01 PROCEDURE — 77387 GUIDANCE FOR RADJ TX DLVR: CPT | Performed by: RADIOLOGY

## 2024-11-04 ENCOUNTER — HOSPITAL ENCOUNTER (OUTPATIENT)
Dept: INFUSION THERAPY | Age: 79
Discharge: HOME | End: 2024-11-04
Payer: MEDICARE

## 2024-11-04 ENCOUNTER — HOSPITAL ENCOUNTER (OUTPATIENT)
Dept: NURSING | Age: 79
Discharge: HOME | End: 2024-11-04

## 2024-11-04 ENCOUNTER — HOSPITAL ENCOUNTER (OUTPATIENT)
Dept: RADIATION ONCOLOGY | Age: 79
Discharge: HOME | End: 2024-11-04
Payer: MEDICARE

## 2024-11-04 VITALS
HEART RATE: 62 BPM | TEMPERATURE: 98.1 F | RESPIRATION RATE: 18 BRPM | OXYGEN SATURATION: 94 % | DIASTOLIC BLOOD PRESSURE: 82 MMHG | BODY MASS INDEX: 39.88 KG/M2 | WEIGHT: 233.6 LBS | HEIGHT: 64 IN | SYSTOLIC BLOOD PRESSURE: 187 MMHG

## 2024-11-04 DIAGNOSIS — C79.51 CANCER, METASTATIC TO BONE (HCC): ICD-10-CM

## 2024-11-04 DIAGNOSIS — Z17.0 CARCINOMA OF RIGHT BREAST, ESTROGEN RECEPTOR POSITIVE, STAGE 4 (HCC): ICD-10-CM

## 2024-11-04 DIAGNOSIS — C50.911 CARCINOMA OF RIGHT BREAST, ESTROGEN RECEPTOR POSITIVE, STAGE 4 (HCC): ICD-10-CM

## 2024-11-04 DIAGNOSIS — D83.9 COMMON VARIABLE IMMUNODEFICIENCY (HCC): Primary | ICD-10-CM

## 2024-11-04 DIAGNOSIS — Z51.11 ENCOUNTER FOR CHEMOTHERAPY MANAGEMENT: ICD-10-CM

## 2024-11-04 LAB
ALBUMIN SERPL BCG-MCNC: 3.8 G/DL (ref 3.5–5.1)
ALP SERPL-CCNC: 164 U/L (ref 38–126)
ALT SERPL W/O P-5'-P-CCNC: 8 U/L (ref 11–66)
AST SERPL-CCNC: 14 U/L (ref 5–40)
BASOPHILS ABSOLUTE: 0.1 THOU/MM3 (ref 0–0.1)
BASOPHILS NFR BLD AUTO: 2 % (ref 0–3)
BILIRUB CONJ SERPL-MCNC: < 0.1 MG/DL (ref 0.1–13.8)
BILIRUB SERPL-MCNC: 0.3 MG/DL (ref 0.3–1.2)
BUN BLDP-MCNC: 22 MG/DL (ref 8–26)
CHLORIDE BLD-SCNC: 107 MEQ/L (ref 98–109)
CREAT BLD-MCNC: 1.2 MG/DL (ref 0.5–1.2)
EOSINOPHIL NFR BLD AUTO: 3 % (ref 0–4)
EOSINOPHILS ABSOLUTE: 0.1 THOU/MM3 (ref 0–0.4)
ERYTHROCYTE [DISTWIDTH] IN BLOOD BY AUTOMATED COUNT: 16.6 % (ref 11.5–14.5)
GFR SERPL CREATININE-BSD FRML MDRD: 46 ML/MIN/1.73M2
GLUCOSE BLD-MCNC: 170 MG/DL (ref 70–108)
HCT VFR BLD AUTO: 32.6 % (ref 37–47)
HGB BLD-MCNC: 10.4 GM/DL (ref 12–16)
IMMATURE GRANULOCYTES %: 0 %
IMMATURE GRANULOCYTES ABSOLUTE: 0.01 THOU/MM3 (ref 0–0.07)
IONIZED CALCIUM, WHOLE BLOOD: 1.14 MMOL/L (ref 1.12–1.32)
LYMPHOCYTES ABSOLUTE: 0.6 THOU/MM3 (ref 1–4.8)
LYMPHOCYTES NFR BLD AUTO: 22 % (ref 15–47)
MCH RBC QN AUTO: 31.2 PG (ref 26–33)
MCHC RBC AUTO-ENTMCNC: 31.9 GM/DL (ref 32.2–35.5)
MCV RBC AUTO: 98 FL (ref 81–99)
MONOCYTES ABSOLUTE: 0.2 THOU/MM3 (ref 0.4–1.3)
MONOCYTES NFR BLD AUTO: 9 % (ref 0–12)
NEUTROPHILS ABSOLUTE: 1.6 THOU/MM3 (ref 1.8–7.7)
NEUTROPHILS NFR BLD AUTO: 62 % (ref 43–75)
PLATELET # BLD AUTO: 179 THOU/MM3 (ref 130–400)
PMV BLD AUTO: 9.3 FL (ref 9.4–12.4)
POTASSIUM BLD-SCNC: 4.8 MEQ/L (ref 3.5–4.9)
PROT SERPL-MCNC: 6.3 G/DL (ref 6.1–8)
RBC # BLD AUTO: 3.33 MILL/MM3 (ref 4.2–5.4)
SODIUM BLD-SCNC: 140 MEQ/L (ref 138–146)
TOTAL CO2, WHOLE BLOOD: 27 MEQ/L (ref 23–33)
WBC # BLD AUTO: 2.6 THOU/MM3 (ref 4.8–10.8)

## 2024-11-04 PROCEDURE — 96365 THER/PROPH/DIAG IV INF INIT: CPT

## 2024-11-04 PROCEDURE — 36591 DRAW BLOOD OFF VENOUS DEVICE: CPT

## 2024-11-04 PROCEDURE — 6370000000 HC RX 637 (ALT 250 FOR IP): Performed by: FAMILY MEDICINE

## 2024-11-04 PROCEDURE — 96366 THER/PROPH/DIAG IV INF ADDON: CPT

## 2024-11-04 PROCEDURE — 77387 GUIDANCE FOR RADJ TX DLVR: CPT | Performed by: RADIOLOGY

## 2024-11-04 PROCEDURE — 36415 COLL VENOUS BLD VENIPUNCTURE: CPT

## 2024-11-04 PROCEDURE — 6360000002 HC RX W HCPCS: Performed by: INTERNAL MEDICINE

## 2024-11-04 PROCEDURE — 85025 COMPLETE CBC W/AUTO DIFF WBC: CPT

## 2024-11-04 PROCEDURE — 6360000002 HC RX W HCPCS: Performed by: FAMILY MEDICINE

## 2024-11-04 PROCEDURE — 77412 RADIATION TX DELIVERY LVL 3: CPT | Performed by: RADIOLOGY

## 2024-11-04 PROCEDURE — 80047 BASIC METABLC PNL IONIZED CA: CPT

## 2024-11-04 PROCEDURE — 80076 HEPATIC FUNCTION PANEL: CPT

## 2024-11-04 PROCEDURE — 96372 THER/PROPH/DIAG INJ SC/IM: CPT

## 2024-11-04 PROCEDURE — 2580000003 HC RX 258: Performed by: FAMILY MEDICINE

## 2024-11-04 RX ORDER — EPINEPHRINE 1 MG/ML
0.3 INJECTION, SOLUTION INTRAMUSCULAR; SUBCUTANEOUS PRN
OUTPATIENT
Start: 2024-12-02

## 2024-11-04 RX ORDER — SODIUM CHLORIDE 9 MG/ML
INJECTION, SOLUTION INTRAVENOUS CONTINUOUS
OUTPATIENT
Start: 2024-12-02

## 2024-11-04 RX ORDER — DIPHENHYDRAMINE HYDROCHLORIDE 50 MG/ML
50 INJECTION INTRAMUSCULAR; INTRAVENOUS
OUTPATIENT
Start: 2024-11-25

## 2024-11-04 RX ORDER — SODIUM CHLORIDE 0.9 % (FLUSH) 0.9 %
5-40 SYRINGE (ML) INJECTION PRN
OUTPATIENT
Start: 2024-12-02

## 2024-11-04 RX ORDER — ACETAMINOPHEN 325 MG/1
650 TABLET ORAL ONCE
OUTPATIENT
Start: 2024-12-02 | End: 2024-12-02

## 2024-11-04 RX ORDER — ONDANSETRON 2 MG/ML
8 INJECTION INTRAMUSCULAR; INTRAVENOUS
OUTPATIENT
Start: 2024-11-25

## 2024-11-04 RX ORDER — SODIUM CHLORIDE 9 MG/ML
INJECTION, SOLUTION INTRAVENOUS CONTINUOUS
OUTPATIENT
Start: 2024-11-25

## 2024-11-04 RX ORDER — ACETAMINOPHEN 325 MG/1
650 TABLET ORAL
OUTPATIENT
Start: 2024-11-25

## 2024-11-04 RX ORDER — SODIUM CHLORIDE 9 MG/ML
5-250 INJECTION, SOLUTION INTRAVENOUS PRN
OUTPATIENT
Start: 2024-12-02

## 2024-11-04 RX ORDER — HEPARIN 100 UNIT/ML
500 SYRINGE INTRAVENOUS PRN
Status: DISCONTINUED | OUTPATIENT
Start: 2024-11-04 | End: 2024-11-05 | Stop reason: HOSPADM

## 2024-11-04 RX ORDER — SODIUM CHLORIDE 9 MG/ML
5-250 INJECTION, SOLUTION INTRAVENOUS PRN
Status: DISCONTINUED | OUTPATIENT
Start: 2024-11-04 | End: 2024-11-05 | Stop reason: HOSPADM

## 2024-11-04 RX ORDER — ACETAMINOPHEN 325 MG/1
650 TABLET ORAL ONCE
Status: COMPLETED | OUTPATIENT
Start: 2024-11-04 | End: 2024-11-04

## 2024-11-04 RX ORDER — ALBUTEROL SULFATE 90 UG/1
4 INHALANT RESPIRATORY (INHALATION) PRN
OUTPATIENT
Start: 2024-11-25

## 2024-11-04 RX ORDER — HEPARIN 100 UNIT/ML
500 SYRINGE INTRAVENOUS PRN
OUTPATIENT
Start: 2024-12-02

## 2024-11-04 RX ORDER — EPINEPHRINE 1 MG/ML
0.3 INJECTION, SOLUTION INTRAMUSCULAR; SUBCUTANEOUS PRN
OUTPATIENT
Start: 2024-11-25

## 2024-11-04 RX ORDER — DIPHENHYDRAMINE HCL 25 MG
25 TABLET ORAL ONCE
Status: COMPLETED | OUTPATIENT
Start: 2024-11-04 | End: 2024-11-04

## 2024-11-04 RX ORDER — DIPHENHYDRAMINE HYDROCHLORIDE 50 MG/ML
50 INJECTION INTRAMUSCULAR; INTRAVENOUS
OUTPATIENT
Start: 2024-12-02

## 2024-11-04 RX ORDER — SODIUM CHLORIDE 0.9 % (FLUSH) 0.9 %
5-40 SYRINGE (ML) INJECTION PRN
Status: DISCONTINUED | OUTPATIENT
Start: 2024-11-04 | End: 2024-11-05 | Stop reason: HOSPADM

## 2024-11-04 RX ORDER — DIPHENHYDRAMINE HCL 25 MG
25 TABLET ORAL ONCE
OUTPATIENT
Start: 2024-12-02 | End: 2024-12-02

## 2024-11-04 RX ADMIN — HEPARIN 500 UNITS: 100 SYRINGE at 12:24

## 2024-11-04 RX ADMIN — IMMUNE GLOBULIN (HUMAN) 5 G: 10 INJECTION INTRAVENOUS; SUBCUTANEOUS at 12:01

## 2024-11-04 RX ADMIN — SODIUM CHLORIDE, PRESERVATIVE FREE 10 ML: 5 INJECTION INTRAVENOUS at 12:24

## 2024-11-04 RX ADMIN — DENOSUMAB 120 MG: 120 INJECTION SUBCUTANEOUS at 12:16

## 2024-11-04 RX ADMIN — DIPHENHYDRAMINE HYDROCHLORIDE 25 MG: 25 TABLET ORAL at 10:35

## 2024-11-04 RX ADMIN — IMMUNE GLOBULIN (HUMAN) 20 G: 10 INJECTION INTRAVENOUS; SUBCUTANEOUS at 10:40

## 2024-11-04 RX ADMIN — ACETAMINOPHEN 650 MG: 325 TABLET ORAL at 10:35

## 2024-11-04 NOTE — PLAN OF CARE
Problem: Chronic Conditions and Co-morbidities  Goal: Patient's chronic conditions and co-morbidity symptoms are monitored and maintained or improved  Outcome: Adequate for Discharge  Flowsheets (Taken 11/4/2024 1431)  Care Plan - Patient's Chronic Conditions and Co-Morbidity Symptoms are Monitored and Maintained or Improved:   Monitor and assess patient's chronic conditions and comorbid symptoms for stability, deterioration, or improvement   Collaborate with multidisciplinary team to address chronic and comorbid conditions and prevent exacerbation or deterioration  Note: Patient verbalizes understanding to verbal information given on IVIG,action and possible side effects. Aware to call MD if develop complications.        Problem: Discharge Planning  Goal: Discharge to home or other facility with appropriate resources  Outcome: Adequate for Discharge  Flowsheets (Taken 11/4/2024 1431)  Discharge to home or other facility with appropriate resources: Identify barriers to discharge with patient and caregiver     Problem: Safety - Adult  Goal: Free from fall injury  Outcome: Adequate for Discharge  Flowsheets (Taken 11/4/2024 1431)  Free From Fall Injury: Instruct family/caregiver on patient safety     Problem: Infection - Adult  Goal: Absence of infection at discharge  Outcome: Adequate for Discharge  Flowsheets (Taken 11/4/2024 1431)  Absence of infection at discharge:   Assess and monitor for signs and symptoms of infection   Monitor all insertion sites i.e., indwelling lines, tubes and drains   Monitor lab/diagnostic results  Note: Mediport site with no redness or warmth. Skin over port site intact with no signs of breakdown noted. Patient verbalizes signs/symptoms of port infection and when to notify the physician.     Care plan reviewed with patient. Patient verbalizes understanding of the plan of care and contributes to goal setting.

## 2024-11-04 NOTE — PROGRESS NOTES
Patient tolerated IVIG/XGeva without any complications.  Denies dizziness, lightheadedness, acute nausea or vomiting, headache, heart palpitations, rash/itching or increased SOB.    Last vital signs  BP (!) 187/82   Pulse 62   Temp 98.1 °F (36.7 °C) (Oral)   Resp 18   Ht 1.638 m (5' 4.49\")   Wt 106 kg (233 lb 9.6 oz)   SpO2 94%   BMI 39.49 kg/m²     Patient instructed if they experience any of the above symptoms following today's visit, he/she is to notify the Physician or go to the Emergency Dept.    Discharge instructions given to patient, Verbalizes understanding. Ambulated off unit per self in stable condition with all belongings.

## 2024-11-04 NOTE — DISCHARGE INSTRUCTIONS
Please contact your Oncologist if you have any questions regarding the IVIG/XGeva that you received today.    You are instructed to call the office or go to the Emergency Dept. If you experience any of the following symptoms:    Dizziness/lightheadedness   Acute nausea or vomiting-not relieved by medications  Headaches-not relieved by medications  New chest pain or pressure  New rash /itching  New shortness of breath  Fever,chills or signs or symptoms of infection    Make sure you are drinking 48 to 64 ounces of water daily-if you are unable to drink fluids let us know right away.

## 2024-11-06 ENCOUNTER — APPOINTMENT (OUTPATIENT)
Dept: INTERVENTIONAL RADIOLOGY/VASCULAR | Age: 79
End: 2024-11-06
Attending: EMERGENCY MEDICINE
Payer: MEDICARE

## 2024-11-06 ENCOUNTER — HOSPITAL ENCOUNTER (EMERGENCY)
Age: 79
Discharge: HOME | End: 2024-11-06
Attending: EMERGENCY MEDICINE
Payer: MEDICARE

## 2024-11-06 ENCOUNTER — APPOINTMENT (OUTPATIENT)
Dept: CT IMAGING | Age: 79
End: 2024-11-06
Payer: MEDICARE

## 2024-11-06 VITALS
OXYGEN SATURATION: 95 % | TEMPERATURE: 99.2 F | DIASTOLIC BLOOD PRESSURE: 77 MMHG | HEART RATE: 80 BPM | HEIGHT: 64 IN | RESPIRATION RATE: 22 BRPM | WEIGHT: 233.6 LBS | BODY MASS INDEX: 39.88 KG/M2 | SYSTOLIC BLOOD PRESSURE: 142 MMHG

## 2024-11-06 DIAGNOSIS — I82.411 ACUTE DEEP VEIN THROMBOSIS (DVT) OF FEMORAL VEIN OF RIGHT LOWER EXTREMITY (HCC): Primary | ICD-10-CM

## 2024-11-06 DIAGNOSIS — I82.462 ACUTE DEEP VEIN THROMBOSIS (DVT) OF CALF MUSCLE VEIN OF LEFT LOWER EXTREMITY (HCC): ICD-10-CM

## 2024-11-06 LAB
ANION GAP SERPL CALC-SCNC: 13 MEQ/L (ref 8–16)
BASOPHILS ABSOLUTE: 0.1 THOU/MM3 (ref 0–0.1)
BASOPHILS NFR BLD AUTO: 2.6 %
BUN SERPL-MCNC: 18 MG/DL (ref 7–22)
CALCIUM SERPL-MCNC: 7.6 MG/DL (ref 8.5–10.5)
CHLORIDE SERPL-SCNC: 104 MEQ/L (ref 98–111)
CO2 SERPL-SCNC: 24 MEQ/L (ref 23–33)
CREAT SERPL-MCNC: 1.3 MG/DL (ref 0.4–1.2)
DEPRECATED RDW RBC AUTO: 56.9 FL (ref 35–45)
ECHO BSA: 2.19 M2
EOSINOPHIL NFR BLD AUTO: 2.6 %
EOSINOPHILS ABSOLUTE: 0.1 THOU/MM3 (ref 0–0.4)
ERYTHROCYTE [DISTWIDTH] IN BLOOD BY AUTOMATED COUNT: 16.4 % (ref 11.5–14.5)
GFR SERPL CREATININE-BSD FRML MDRD: 41 ML/MIN/1.73M2
GLUCOSE SERPL-MCNC: 72 MG/DL (ref 70–108)
HCT VFR BLD AUTO: 32.8 % (ref 37–47)
HGB BLD-MCNC: 10.5 GM/DL (ref 12–16)
IMM GRANULOCYTES # BLD AUTO: 0.01 THOU/MM3 (ref 0–0.07)
IMM GRANULOCYTES NFR BLD AUTO: 0.4 %
LYMPHOCYTES ABSOLUTE: 0.7 THOU/MM3 (ref 1–4.8)
LYMPHOCYTES NFR BLD AUTO: 28.1 %
MCH RBC QN AUTO: 30.6 PG (ref 26–33)
MCHC RBC AUTO-ENTMCNC: 32 GM/DL (ref 32.2–35.5)
MCV RBC AUTO: 95.6 FL (ref 81–99)
MONOCYTES ABSOLUTE: 0.3 THOU/MM3 (ref 0.4–1.3)
MONOCYTES NFR BLD AUTO: 14.5 %
NEUTROPHILS ABSOLUTE: 1.2 THOU/MM3 (ref 1.8–7.7)
NEUTROPHILS NFR BLD AUTO: 51.8 %
NRBC BLD AUTO-RTO: 0 /100 WBC
OSMOLALITY SERPL CALC.SUM OF ELEC: 281.7 MOSMOL/KG (ref 275–300)
PLATELET # BLD AUTO: 174 THOU/MM3 (ref 130–400)
PMV BLD AUTO: 9.1 FL (ref 9.4–12.4)
POTASSIUM SERPL-SCNC: 4.1 MEQ/L (ref 3.5–5.2)
RBC # BLD AUTO: 3.43 MILL/MM3 (ref 4.2–5.4)
SODIUM SERPL-SCNC: 141 MEQ/L (ref 135–145)
WBC # BLD AUTO: 2.4 THOU/MM3 (ref 4.8–10.8)

## 2024-11-06 PROCEDURE — 36415 COLL VENOUS BLD VENIPUNCTURE: CPT

## 2024-11-06 PROCEDURE — 99285 EMERGENCY DEPT VISIT HI MDM: CPT

## 2024-11-06 PROCEDURE — 93970 EXTREMITY STUDY: CPT

## 2024-11-06 PROCEDURE — 85025 COMPLETE CBC W/AUTO DIFF WBC: CPT

## 2024-11-06 PROCEDURE — 6360000004 HC RX CONTRAST MEDICATION: Performed by: EMERGENCY MEDICINE

## 2024-11-06 PROCEDURE — 80048 BASIC METABOLIC PNL TOTAL CA: CPT

## 2024-11-06 PROCEDURE — 71275 CT ANGIOGRAPHY CHEST: CPT

## 2024-11-06 RX ORDER — IOPAMIDOL 755 MG/ML
80 INJECTION, SOLUTION INTRAVASCULAR
Status: COMPLETED | OUTPATIENT
Start: 2024-11-06 | End: 2024-11-06

## 2024-11-06 RX ORDER — HEPARIN 100 UNIT/ML
300 SYRINGE INTRAVENOUS ONCE
Status: DISCONTINUED | OUTPATIENT
Start: 2024-11-06 | End: 2024-11-06 | Stop reason: HOSPADM

## 2024-11-06 RX ADMIN — IOPAMIDOL 80 ML: 755 INJECTION, SOLUTION INTRAVENOUS at 15:22

## 2024-11-06 ASSESSMENT — PAIN - FUNCTIONAL ASSESSMENT: PAIN_FUNCTIONAL_ASSESSMENT: 0-10

## 2024-11-06 ASSESSMENT — PAIN SCALES - GENERAL: PAINLEVEL_OUTOF10: 0

## 2024-11-06 NOTE — ED TRIAGE NOTES
Patient presents to ER with complaints of bilateral leg swelling that patient noticed today. Patient reports left lower leg pain upon standing.

## 2024-11-06 NOTE — ED PROVIDER NOTES
09/21/2018); Upper gastrointestinal endoscopy (Left, 07/25/2021); Colonoscopy (N/A, 08/04/2021); Colonoscopy (08/04/2021); transesophageal echocardiogram (N/A, 04/20/2022); Colonoscopy (N/A, 10/20/2022); transesophageal echocardiogram (N/A, 03/20/2023); Hemorrhoid surgery (N/A, 07/06/2023); Ovary removal (Left, 1980); MOSES NEEDLE BIOPSY LYMPH NODE SUPERFICIAL (9/12/2024); MOSES US GUIDED BREAST BIOPSY W LOC DEVICE 1ST LESION RIGHT (Right, 9/12/2024); and IR BIOPSY DEEP BONE (10/4/2024).    CURRENT MEDICATIONS       Discharge Medication List as of 11/6/2024  4:23 PM        CONTINUE these medications which have NOT CHANGED    Details   Ribociclib Succ, 600 MG Dose, (KISQALI, 600 MG DOSE,) 200 MG TBPK TAKE 600 MG (3 TABLETS) BY MOUTH ONCE DAILY FOR 21 DAYS, THEN 7 DAYS OFF (28-DAY CYCLE), Disp-63 each, R-0Normal      Insulin Aspart, w/Niacinamide, (FIASP FLEXTOUCH) 100 UNIT/ML SOPN Inject into the skinHistorical Med      aspirin-acetaminophen-caffeine (EXCEDRIN MIGRAINE) 250-250-65 MG per tablet Take 1 tablet by mouth every 6 hours as needed for HeadachesHistorical Med      anastrozole (ARIMIDEX) 1 MG tablet Take 1 tablet by mouth daily, Disp-30 tablet, R-3Normal      ondansetron (ZOFRAN-ODT) 4 MG disintegrating tablet Take 1 tablet by mouth every 8 hours as needed for Nausea or Vomiting, Disp-270 tablet, R-1Normal      loperamide (IMODIUM A-D) 2 MG tablet Take 2 tablets by mouth 4 times daily as needed for Diarrhea, Disp-240 tablet, R-5Normal      Dextromethorphan-guaiFENesin (MUCINEX DM MAXIMUM STRENGTH)  MG TB12 Take 1 tablet by mouth every 12 hours as neededHistorical Med      polyethylene glycol (MIRALAX) 17 GM/SCOOP powder Take 17 g by mouth dailyHistorical Med      Rimegepant Sulfate (NURTEC) 75 MG TBDP Take 75 mg by mouth daily as neededHistorical Med      pantoprazole (PROTONIX) 40 MG tablet Take 1 tablet by mouth dailyHistorical Med      Insulin Glargine (BASAGLAR KWIKPEN SC) Inject 8 Units into the

## 2024-11-06 NOTE — PROGRESS NOTES
in addition to chronic spine changes. MRI revealed right parietal calvarium lesion. Mammogram confirmed spiculated mass inferior central right breast with mass in the right axilla (likely lymph node). Biopsy confirmed invasive ductal carcinoma, grade 2, ER+ breast cancer. PET was then completed and confirmed lytic osseous lesions, right axillary soft tissue mass, hypodense lesion of the spleen, focal activity of the right hilum, right inguinal soft tissue mass/adenopathy, focal omental nodularity with FDG activity. She was subsequently started on Arimidex and ribociclib by medical oncology and referred to radiation oncology for consideration of palliative radiation. She reports pain between her shoulder blades that started in the last ~6 months. She reports chronic low back pain for years, which has reportedly been stable. She does report more recent development of recurrent headache. She described the headache left posterior head/parietal region but seems to radiate anteriorly to the left frontal region and to the right parietal region.   -Analy completed the prescribed radiation treatment with minimal radiation induced symptoms. Her main concern was mild, intermittent nausea. This was manageable with Zofran and was an issue even prior to starting radiation reportedly.     Pain Ratin/10  ECOG Performance Status: 0      Treatment Tolerance/Response:   Analy Reyna completed treatment as planned with minimal side effects related to radiation.       Systemic Therapy: Concurrent chemotherapy and concurrent ADT      Follow Up Plan:   Continue routine skin care for at least 2 weeks post-treatment then wean as tolerated.  Monitor for new/worsening areas of pain. Will consider additional imaging PRN  Continue care with medical oncology  Follow up with radiation oncology in ~4-6 weeks for post-treatment check. Analy Reyna was encouraged to contact the office sooner with new/worsening symptoms or other concerns in

## 2024-11-13 DIAGNOSIS — Z17.0 CARCINOMA OF RIGHT BREAST, ESTROGEN RECEPTOR POSITIVE, STAGE 4 (HCC): ICD-10-CM

## 2024-11-13 DIAGNOSIS — C50.911 CARCINOMA OF RIGHT BREAST, ESTROGEN RECEPTOR POSITIVE, STAGE 4 (HCC): ICD-10-CM

## 2024-11-13 DIAGNOSIS — Z51.11 ENCOUNTER FOR ANTINEOPLASTIC CHEMOTHERAPY: ICD-10-CM

## 2024-11-13 RX ORDER — RIBOCICLIB 200 MG/1
TABLET, FILM COATED ORAL
Qty: 63 EACH | Refills: 11 | Status: ACTIVE | OUTPATIENT
Start: 2024-11-13 | End: 2025-11-13

## 2024-11-22 ENCOUNTER — HOSPITAL ENCOUNTER (OUTPATIENT)
Dept: RADIATION ONCOLOGY | Age: 79
Discharge: HOME | End: 2024-11-22
Payer: MEDICARE

## 2024-11-22 VITALS
DIASTOLIC BLOOD PRESSURE: 69 MMHG | SYSTOLIC BLOOD PRESSURE: 115 MMHG | WEIGHT: 222 LBS | TEMPERATURE: 97.4 F | BODY MASS INDEX: 38.11 KG/M2 | HEART RATE: 84 BPM | RESPIRATION RATE: 16 BRPM | OXYGEN SATURATION: 93 %

## 2024-11-22 PROCEDURE — 99024 POSTOP FOLLOW-UP VISIT: CPT | Performed by: PHYSICIAN ASSISTANT

## 2024-11-22 PROCEDURE — 99212 OFFICE O/P EST SF 10 MIN: CPT | Performed by: PHYSICIAN ASSISTANT

## 2024-11-22 ASSESSMENT — ENCOUNTER SYMPTOMS
SHORTNESS OF BREATH: 0
CHEST TIGHTNESS: 0

## 2024-11-22 NOTE — PROGRESS NOTES
[facility]  [address] [place] [id number]  Phone: [phone]   Toll Free: [phone]   Fax: [phone]    RADIATION ONCOLOGY FOLLOW UP REPORT    PATIENT NAME:  Analy Reyna              : 1945  MEDICAL RECORD NO: [id number]    LOCATION: Radiation Oncology  CSN NO: [id number]      PROVIDER:  Drs. H. Awada (Med/Onc); TIAGO Howell (PCP)      DATE OF SERVICE: 2024      FOLLOW UP PHYSICIANS:  Drs. H. Awada (Med/Onc); TIAGO Howell (PCP)      Oncology History   Malignant neoplasm of central portion of right breast in female, estrogen receptor positive (HCC)   10/3/2024 -  Cancer Staged    Staging form: Breast, AJCC 8th Edition  - Clinical: Stage IV (cT1c, cN1, cM1, G2, ER+, [place]-, HER2-)          DIAGNOSIS: C79.51 - Secondary malignant neoplasm of bone  C50.111 -- Malignant neoplasm of the central right Female breast;  invasive ductal carcinoma Grade 2 ; cT1c cN1 M1, Stage 4; ER (+, strong, 100%); WV (negative, <1%); HER2 (negative); Ki-67 25%   Date of Diagnosis: 2024      ASSESSMENT:  Analy Reyna is doing well in her recovery from radiation therapy. she reports no residual radiation side effects at this time. She reports significant improvement in her back pain and denies interval development of new pain/headaches. She reports alopecia on the right parietal scalp.  She does report ongoing fatigue, but this has been ongoing for \"a long time.\" She reports a persistent cough the last few weeks which she attributes to recent \"chest cold\" with residual bronchitis. There is no evidence of unexpected radiation complications or persistent/recurrent disease on exam.         PLAN:  Discussed recent CTA results concerning for worsened metastatic disease, but she denies any new/worsening pains. She reports other than feeling fatigued, she overall feels well. Will defer timing/modality of restaging imaging to medical oncology. She could be  Lab orders placed    Jamie Dinero-ELIAS Partida  [facility]

## 2024-11-25 ENCOUNTER — HOSPITAL ENCOUNTER (OUTPATIENT)
Dept: INFUSION THERAPY | Age: 79
Discharge: HOME | End: 2024-11-25
Payer: MEDICARE

## 2024-11-25 ENCOUNTER — OFFICE VISIT (OUTPATIENT)
Dept: ONCOLOGY | Age: 79
End: 2024-11-25
Payer: MEDICARE

## 2024-11-25 VITALS
HEART RATE: 93 BPM | HEIGHT: 64 IN | TEMPERATURE: 98.7 F | DIASTOLIC BLOOD PRESSURE: 66 MMHG | WEIGHT: 213.4 LBS | OXYGEN SATURATION: 96 % | RESPIRATION RATE: 18 BRPM | SYSTOLIC BLOOD PRESSURE: 121 MMHG | BODY MASS INDEX: 36.43 KG/M2

## 2024-11-25 VITALS
OXYGEN SATURATION: 96 % | TEMPERATURE: 98.7 F | HEART RATE: 93 BPM | BODY MASS INDEX: 36.43 KG/M2 | SYSTOLIC BLOOD PRESSURE: 121 MMHG | WEIGHT: 213.4 LBS | RESPIRATION RATE: 18 BRPM | DIASTOLIC BLOOD PRESSURE: 66 MMHG | HEIGHT: 64 IN

## 2024-11-25 DIAGNOSIS — C50.911 CARCINOMA OF RIGHT BREAST, ESTROGEN RECEPTOR POSITIVE, STAGE 4 (HCC): Primary | ICD-10-CM

## 2024-11-25 DIAGNOSIS — Z79.811 ENCOUNTER FOR MONITORING AROMATASE INHIBITOR THERAPY: ICD-10-CM

## 2024-11-25 DIAGNOSIS — I82.411 DEEP VEIN THROMBOSIS (DVT) OF FEMORAL VEIN OF RIGHT LOWER EXTREMITY, UNSPECIFIED CHRONICITY (HCC): ICD-10-CM

## 2024-11-25 DIAGNOSIS — C79.51 METASTASIS TO BONE (HCC): ICD-10-CM

## 2024-11-25 DIAGNOSIS — C50.911 CARCINOMA OF RIGHT BREAST, ESTROGEN RECEPTOR POSITIVE, STAGE 4 (HCC): ICD-10-CM

## 2024-11-25 DIAGNOSIS — Z51.81 ENCOUNTER FOR MONITORING AROMATASE INHIBITOR THERAPY: ICD-10-CM

## 2024-11-25 DIAGNOSIS — Z51.11 ENCOUNTER FOR ANTINEOPLASTIC CHEMOTHERAPY: ICD-10-CM

## 2024-11-25 DIAGNOSIS — Z51.11 ENCOUNTER FOR CHEMOTHERAPY MANAGEMENT: ICD-10-CM

## 2024-11-25 DIAGNOSIS — Z17.0 CARCINOMA OF RIGHT BREAST, ESTROGEN RECEPTOR POSITIVE, STAGE 4 (HCC): Primary | ICD-10-CM

## 2024-11-25 DIAGNOSIS — Z17.0 CARCINOMA OF RIGHT BREAST, ESTROGEN RECEPTOR POSITIVE, STAGE 4 (HCC): ICD-10-CM

## 2024-11-25 LAB
ALBUMIN SERPL BCG-MCNC: 4.2 G/DL (ref 3.5–5.1)
ALP SERPL-CCNC: 165 U/L (ref 38–126)
ALT SERPL W/O P-5'-P-CCNC: 10 U/L (ref 11–66)
AST SERPL-CCNC: 20 U/L (ref 5–40)
BASOPHILS ABSOLUTE: 0.1 THOU/MM3 (ref 0–0.1)
BASOPHILS NFR BLD AUTO: 3 % (ref 0–3)
BILIRUB CONJ SERPL-MCNC: < 0.1 MG/DL (ref 0.1–13.8)
BILIRUB SERPL-MCNC: 0.2 MG/DL (ref 0.3–1.2)
BUN BLDP-MCNC: 37 MG/DL (ref 8–26)
CHLORIDE BLD-SCNC: 105 MEQ/L (ref 98–109)
CREAT BLD-MCNC: 1.3 MG/DL (ref 0.5–1.2)
EOSINOPHIL NFR BLD AUTO: 4 % (ref 0–4)
EOSINOPHILS ABSOLUTE: 0.1 THOU/MM3 (ref 0–0.4)
ERYTHROCYTE [DISTWIDTH] IN BLOOD BY AUTOMATED COUNT: 16.4 % (ref 11.5–14.5)
GFR SERPL CREATININE-BSD FRML MDRD: 41 ML/MIN/1.73M2
GLUCOSE BLD-MCNC: 146 MG/DL (ref 70–108)
HCT VFR BLD AUTO: 33.5 % (ref 37–47)
HGB BLD-MCNC: 11 GM/DL (ref 12–16)
IMMATURE GRANULOCYTES %: 0 %
IMMATURE GRANULOCYTES ABSOLUTE: 0 THOU/MM3 (ref 0–0.07)
IONIZED CALCIUM, WHOLE BLOOD: 1.14 MMOL/L (ref 1.12–1.32)
LYMPHOCYTES ABSOLUTE: 0.9 THOU/MM3 (ref 1–4.8)
LYMPHOCYTES NFR BLD AUTO: 27 % (ref 15–47)
MAGNESIUM SERPL-MCNC: 2.2 MG/DL (ref 1.6–2.4)
MCH RBC QN AUTO: 31.5 PG (ref 26–33)
MCHC RBC AUTO-ENTMCNC: 32.8 GM/DL (ref 32.2–35.5)
MCV RBC AUTO: 96 FL (ref 81–99)
MONOCYTES ABSOLUTE: 0.5 THOU/MM3 (ref 0.4–1.3)
MONOCYTES NFR BLD AUTO: 13 % (ref 0–12)
NEUTROPHILS ABSOLUTE: 1.8 THOU/MM3 (ref 1.8–7.7)
NEUTROPHILS NFR BLD AUTO: 53 % (ref 43–75)
PHOSPHATE SERPL-MCNC: 3.1 MG/DL (ref 2.4–4.7)
PLATELET # BLD AUTO: 215 THOU/MM3 (ref 130–400)
PMV BLD AUTO: 8.5 FL (ref 9.4–12.4)
POTASSIUM BLD-SCNC: 4.6 MEQ/L (ref 3.5–4.9)
PROT SERPL-MCNC: 7.3 G/DL (ref 6.1–8)
RBC # BLD AUTO: 3.49 MILL/MM3 (ref 4.2–5.4)
SODIUM BLD-SCNC: 138 MEQ/L (ref 138–146)
TOTAL CO2, WHOLE BLOOD: 26 MEQ/L (ref 23–33)
WBC # BLD AUTO: 3.4 THOU/MM3 (ref 4.8–10.8)

## 2024-11-25 PROCEDURE — 3074F SYST BP LT 130 MM HG: CPT | Performed by: INTERNAL MEDICINE

## 2024-11-25 PROCEDURE — 85025 COMPLETE CBC W/AUTO DIFF WBC: CPT

## 2024-11-25 PROCEDURE — 80076 HEPATIC FUNCTION PANEL: CPT

## 2024-11-25 PROCEDURE — 1126F AMNT PAIN NOTED NONE PRSNT: CPT | Performed by: INTERNAL MEDICINE

## 2024-11-25 PROCEDURE — 80047 BASIC METABLC PNL IONIZED CA: CPT

## 2024-11-25 PROCEDURE — 1159F MED LIST DOCD IN RCRD: CPT | Performed by: INTERNAL MEDICINE

## 2024-11-25 PROCEDURE — 1123F ACP DISCUSS/DSCN MKR DOCD: CPT | Performed by: INTERNAL MEDICINE

## 2024-11-25 PROCEDURE — 83735 ASSAY OF MAGNESIUM: CPT

## 2024-11-25 PROCEDURE — 3078F DIAST BP <80 MM HG: CPT | Performed by: INTERNAL MEDICINE

## 2024-11-25 PROCEDURE — 99214 OFFICE O/P EST MOD 30 MIN: CPT | Performed by: INTERNAL MEDICINE

## 2024-11-25 PROCEDURE — 99211 OFF/OP EST MAY X REQ PHY/QHP: CPT

## 2024-11-25 PROCEDURE — 84100 ASSAY OF PHOSPHORUS: CPT

## 2024-11-25 PROCEDURE — 36415 COLL VENOUS BLD VENIPUNCTURE: CPT

## 2024-11-25 NOTE — PATIENT INSTRUCTIONS
-Please schedule PET/CT scan on 12/19/2024.  -Return to clinic in 4 weeks for re-evaluation, blood work, and treatment recommendations.

## 2024-11-25 NOTE — PROGRESS NOTES
bone density.  -Patient instructed to take Calcium (1200 mg)/Vitamin D (1000 units) daily.  -Dental clearance was obtained, therefore we will proceed with Xgeva monthly.  -Radiation oncology consulted for consideration of palliative radiation (mainly for the skull lesion as patient is having headaches and for her back pain). Plan to proceed with palliative radiation to T5 and skull lesion.  Rad Onc discussed 10 fractions vs 5 fractions vs 1 fraction regimens. Patient/daughters opted to pursue the 10 fraction regimen.  Cumulative dose 3000 cGy.  -Completed palliative RT with symptomatic improvement.    #Cancer supportive care  -Patient minimally symptomatic with headaches and back pain.  -Consulted radiation oncology to assess need for skull lesion and/or bone palliative radiation.  -PRN Zofran for anticipated chemotherapy-induced nausea/vomiting.    -PRN Imodium for anticipated chemotherapy-induced diarrhea.  -We will consider referral to palliative care if warranted.    #Genetic counseling  -Patient has expressed her interest to undergo germline testing.  -We will facilitate the process and obtain necessary testing.    #Pulmonary Nodules  -CTA of chest  - 7 mm left upper lobe pulmonary nodule appears more prominent (previously measures 4-5 mm 4/4/2024) but likely still too small to be characterized by PET. A 3 month noncontrast CT recommended. Due for CT of chest November 2024.  -PET/CT scan confirmed scattered small pulmonary nodules measuring up to 8 mm (anterior left upper lobe) do   not meet criteria for viable neoplasm.  -We will continue to monitor    #DVT  -Ultrasound DVT (11/6/24) showed right femoral thrombus.  -Started on therapeutic Eliquis.  Denies any bleeding side effects.  -Educated the patient given active risk factor of malignancy, we will continue anticoagulation as long as no bleeding complications.    All patient questions answered. Pt voiced understanding. Patient agreed with treatment plan.

## 2024-12-03 ENCOUNTER — HOSPITAL ENCOUNTER (OUTPATIENT)
Dept: INFUSION THERAPY | Age: 79
Discharge: HOME | End: 2024-12-03
Payer: MEDICARE

## 2024-12-03 VITALS
BODY MASS INDEX: 37.74 KG/M2 | DIASTOLIC BLOOD PRESSURE: 55 MMHG | HEART RATE: 61 BPM | TEMPERATURE: 98.5 F | RESPIRATION RATE: 16 BRPM | WEIGHT: 220 LBS | SYSTOLIC BLOOD PRESSURE: 122 MMHG | OXYGEN SATURATION: 95 %

## 2024-12-03 DIAGNOSIS — C79.51 METASTASIS TO BONE (HCC): ICD-10-CM

## 2024-12-03 DIAGNOSIS — D83.9 COMMON VARIABLE IMMUNODEFICIENCY (HCC): Primary | ICD-10-CM

## 2024-12-03 DIAGNOSIS — C50.911 CARCINOMA OF RIGHT BREAST, ESTROGEN RECEPTOR POSITIVE, STAGE 4 (HCC): ICD-10-CM

## 2024-12-03 DIAGNOSIS — Z17.0 CARCINOMA OF RIGHT BREAST, ESTROGEN RECEPTOR POSITIVE, STAGE 4 (HCC): ICD-10-CM

## 2024-12-03 PROCEDURE — 96372 THER/PROPH/DIAG INJ SC/IM: CPT

## 2024-12-03 PROCEDURE — 2580000003 HC RX 258: Performed by: FAMILY MEDICINE

## 2024-12-03 PROCEDURE — 6370000000 HC RX 637 (ALT 250 FOR IP): Performed by: FAMILY MEDICINE

## 2024-12-03 PROCEDURE — 6360000002 HC RX W HCPCS: Performed by: INTERNAL MEDICINE

## 2024-12-03 PROCEDURE — 6360000002 HC RX W HCPCS: Performed by: FAMILY MEDICINE

## 2024-12-03 PROCEDURE — 96366 THER/PROPH/DIAG IV INF ADDON: CPT

## 2024-12-03 PROCEDURE — 96365 THER/PROPH/DIAG IV INF INIT: CPT

## 2024-12-03 RX ORDER — ACETAMINOPHEN 325 MG/1
650 TABLET ORAL ONCE
OUTPATIENT
Start: 2024-12-31 | End: 2024-12-31

## 2024-12-03 RX ORDER — ACETAMINOPHEN 325 MG/1
650 TABLET ORAL
OUTPATIENT
Start: 2024-12-23

## 2024-12-03 RX ORDER — SODIUM CHLORIDE 9 MG/ML
INJECTION, SOLUTION INTRAVENOUS CONTINUOUS
OUTPATIENT
Start: 2024-12-23

## 2024-12-03 RX ORDER — ACETAMINOPHEN 325 MG/1
650 TABLET ORAL ONCE
Status: COMPLETED | OUTPATIENT
Start: 2024-12-03 | End: 2024-12-03

## 2024-12-03 RX ORDER — DIPHENHYDRAMINE HCL 25 MG
25 TABLET ORAL ONCE
Status: COMPLETED | OUTPATIENT
Start: 2024-12-03 | End: 2024-12-03

## 2024-12-03 RX ORDER — ALBUTEROL SULFATE 90 UG/1
4 INHALANT RESPIRATORY (INHALATION) PRN
OUTPATIENT
Start: 2024-12-23

## 2024-12-03 RX ORDER — SODIUM CHLORIDE 0.9 % (FLUSH) 0.9 %
5-40 SYRINGE (ML) INJECTION PRN
OUTPATIENT
Start: 2024-12-31

## 2024-12-03 RX ORDER — ONDANSETRON 2 MG/ML
8 INJECTION INTRAMUSCULAR; INTRAVENOUS
OUTPATIENT
Start: 2024-12-23

## 2024-12-03 RX ORDER — HYDROCORTISONE SODIUM SUCCINATE 100 MG/2ML
100 INJECTION INTRAMUSCULAR; INTRAVENOUS
OUTPATIENT
Start: 2024-12-31

## 2024-12-03 RX ORDER — HEPARIN 100 UNIT/ML
500 SYRINGE INTRAVENOUS PRN
OUTPATIENT
Start: 2024-12-31

## 2024-12-03 RX ORDER — SODIUM CHLORIDE 9 MG/ML
INJECTION, SOLUTION INTRAVENOUS CONTINUOUS
OUTPATIENT
Start: 2024-12-31

## 2024-12-03 RX ORDER — DIPHENHYDRAMINE HCL 25 MG
25 TABLET ORAL ONCE
OUTPATIENT
Start: 2024-12-31 | End: 2024-12-31

## 2024-12-03 RX ORDER — EPINEPHRINE 1 MG/ML
0.3 INJECTION, SOLUTION INTRAMUSCULAR; SUBCUTANEOUS PRN
OUTPATIENT
Start: 2024-12-23

## 2024-12-03 RX ORDER — SODIUM CHLORIDE 9 MG/ML
5-250 INJECTION, SOLUTION INTRAVENOUS PRN
OUTPATIENT
Start: 2024-12-31

## 2024-12-03 RX ORDER — HYDROCORTISONE SODIUM SUCCINATE 100 MG/2ML
100 INJECTION INTRAMUSCULAR; INTRAVENOUS
OUTPATIENT
Start: 2024-12-23

## 2024-12-03 RX ORDER — EPINEPHRINE 1 MG/ML
0.3 INJECTION, SOLUTION INTRAMUSCULAR; SUBCUTANEOUS PRN
OUTPATIENT
Start: 2024-12-31

## 2024-12-03 RX ORDER — DIPHENHYDRAMINE HYDROCHLORIDE 50 MG/ML
50 INJECTION INTRAMUSCULAR; INTRAVENOUS
OUTPATIENT
Start: 2024-12-31

## 2024-12-03 RX ORDER — DIPHENHYDRAMINE HYDROCHLORIDE 50 MG/ML
50 INJECTION INTRAMUSCULAR; INTRAVENOUS
OUTPATIENT
Start: 2024-12-23

## 2024-12-03 RX ORDER — SODIUM CHLORIDE 0.9 % (FLUSH) 0.9 %
5-40 SYRINGE (ML) INJECTION PRN
Status: DISCONTINUED | OUTPATIENT
Start: 2024-12-03 | End: 2024-12-04 | Stop reason: HOSPADM

## 2024-12-03 RX ORDER — HEPARIN 100 UNIT/ML
500 SYRINGE INTRAVENOUS PRN
Status: DISCONTINUED | OUTPATIENT
Start: 2024-12-03 | End: 2024-12-04 | Stop reason: HOSPADM

## 2024-12-03 RX ADMIN — HEPARIN 500 UNITS: 100 SYRINGE at 11:40

## 2024-12-03 RX ADMIN — ACETAMINOPHEN 650 MG: 325 TABLET ORAL at 09:34

## 2024-12-03 RX ADMIN — DIPHENHYDRAMINE HYDROCHLORIDE 25 MG: 25 TABLET ORAL at 09:34

## 2024-12-03 RX ADMIN — IMMUNE GLOBULIN (HUMAN) 5 G: 10 INJECTION INTRAVENOUS; SUBCUTANEOUS at 11:09

## 2024-12-03 RX ADMIN — SODIUM CHLORIDE, PRESERVATIVE FREE 10 ML: 5 INJECTION INTRAVENOUS at 09:28

## 2024-12-03 RX ADMIN — SODIUM CHLORIDE, PRESERVATIVE FREE 10 ML: 5 INJECTION INTRAVENOUS at 11:40

## 2024-12-03 RX ADMIN — IMMUNE GLOBULIN (HUMAN) 20 G: 10 INJECTION INTRAVENOUS; SUBCUTANEOUS at 09:49

## 2024-12-03 RX ADMIN — DENOSUMAB 120 MG: 120 INJECTION SUBCUTANEOUS at 11:38

## 2024-12-03 NOTE — PLAN OF CARE
Problem: Chronic Conditions and Co-morbidities  Goal: Patient's chronic conditions and co-morbidity symptoms are monitored and maintained or improved  Outcome: Adequate for Discharge  Flowsheets (Taken 12/3/2024 1506)  Care Plan - Patient's Chronic Conditions and Co-Morbidity Symptoms are Monitored and Maintained or Improved:   Collaborate with multidisciplinary team to address chronic and comorbid conditions and prevent exacerbation or deterioration   Monitor and assess patient's chronic conditions and comorbid symptoms for stability, deterioration, or improvement  Note: Patient verbalizes understanding to verbal information given on IVIG,action and possible side effects. Aware to call MD if develop complications.        Problem: Discharge Planning  Goal: Discharge to home or other facility with appropriate resources  Outcome: Adequate for Discharge  Flowsheets (Taken 12/3/2024 1506)  Discharge to home or other facility with appropriate resources:   Identify discharge learning needs (meds, wound care, etc)   Identify barriers to discharge with patient and caregiver  Note: Verbalize understanding of discharge instructions, follow up appointments, and when to call Physician.Discuss understanding of discharge instructions, follow up appointments and when to call Physician.        Problem: Safety - Adult  Goal: Free from fall injury  Outcome: Adequate for Discharge  Flowsheets (Taken 12/3/2024 1506)  Free From Fall Injury:   Instruct family/caregiver on patient safety   Based on caregiver fall risk screen, instruct family/caregiver to ask for assistance with transferring infant if caregiver noted to have fall risk factors  Note: Free from falls while in [facility].P. Oncology.Discussed the need to use the call light for assistance when getting up to ambulate.      Care plan reviewed with patient. Patient verbalizes understanding of the plan of care and contributes to goal setting.

## 2024-12-03 NOTE — PROGRESS NOTES
Patient tolerated IVIG and xgeva without any complications. Discharge instructions given to patient-verbalizes understanding. Ambulated off unit per self with belongings.

## 2024-12-04 NOTE — TELEPHONE ENCOUNTER
Form out for signature.
Pt of Dr. Chantel Garces last seen 10/17/22 is scheduled for anal exam under anesthesia and excision anal condyloma on 1/5/23 with Dr. Nguyen Orozco. Can she be cleared for surgery?
Rojelio.
5

## 2024-12-10 ENCOUNTER — HOSPITAL ENCOUNTER (OUTPATIENT)
Dept: AUDIOLOGY | Age: 79
Discharge: HOME | End: 2024-12-10
Payer: MEDICARE

## 2024-12-10 PROCEDURE — 92557 COMPREHENSIVE HEARING TEST: CPT | Performed by: AUDIOLOGIST

## 2024-12-10 PROCEDURE — 92567 TYMPANOMETRY: CPT | Performed by: AUDIOLOGIST

## 2024-12-10 NOTE — PROGRESS NOTES
to previous results.      RECOMMENDATION(S):   1- Follow up with Dr. Ivey as scheduled regarding these results and any further testing or treatment recommendations.  2- Repeat testing as medically indicated or annually to monitor for progression, sooner with any significant changes or new concerns.  3- Binaural hearing aids are recommended pending medical clearance and patient motivation. Patient encouraged to contact her insurance to locate a hearing aid provider that is in network for her insurance.        PREVIOUS AUDIOGRAMS:

## 2024-12-19 ENCOUNTER — HOSPITAL ENCOUNTER (OUTPATIENT)
Dept: PET IMAGING | Age: 79
Discharge: HOME | End: 2024-12-19
Attending: INTERNAL MEDICINE
Payer: MEDICARE

## 2024-12-19 DIAGNOSIS — Z17.0 CARCINOMA OF RIGHT BREAST, ESTROGEN RECEPTOR POSITIVE, STAGE 4 (HCC): ICD-10-CM

## 2024-12-19 DIAGNOSIS — C50.911 CARCINOMA OF RIGHT BREAST, ESTROGEN RECEPTOR POSITIVE, STAGE 4 (HCC): ICD-10-CM

## 2024-12-19 PROCEDURE — A9609 HC RX DIAGNOSTIC RADIOPHARMACEUTICAL: HCPCS | Performed by: INTERNAL MEDICINE

## 2024-12-19 PROCEDURE — 3430000000 HC RX DIAGNOSTIC RADIOPHARMACEUTICAL: Performed by: INTERNAL MEDICINE

## 2024-12-19 PROCEDURE — 78815 PET IMAGE W/CT SKULL-THIGH: CPT

## 2024-12-19 RX ORDER — FLUDEOXYGLUCOSE F 18 200 MCI/ML
10.1 INJECTION, SOLUTION INTRAVENOUS
Status: COMPLETED | OUTPATIENT
Start: 2024-12-19 | End: 2024-12-19

## 2024-12-19 RX ADMIN — FLUDEOXYGLUCOSE F 18 10.1 MILLICURIE: 200 INJECTION, SOLUTION INTRAVENOUS at 07:13

## 2025-01-02 ENCOUNTER — HOSPITAL ENCOUNTER (OUTPATIENT)
Dept: INFUSION THERAPY | Age: 80
Discharge: HOME | End: 2025-01-02
Payer: MEDICARE

## 2025-01-02 ENCOUNTER — OFFICE VISIT (OUTPATIENT)
Dept: ONCOLOGY | Age: 80
End: 2025-01-02
Payer: MEDICARE

## 2025-01-02 VITALS
DIASTOLIC BLOOD PRESSURE: 75 MMHG | RESPIRATION RATE: 16 BRPM | SYSTOLIC BLOOD PRESSURE: 176 MMHG | HEART RATE: 89 BPM | OXYGEN SATURATION: 98 % | TEMPERATURE: 97.8 F

## 2025-01-02 VITALS
OXYGEN SATURATION: 98 % | RESPIRATION RATE: 16 BRPM | HEART RATE: 89 BPM | TEMPERATURE: 97.8 F | SYSTOLIC BLOOD PRESSURE: 176 MMHG | DIASTOLIC BLOOD PRESSURE: 75 MMHG

## 2025-01-02 DIAGNOSIS — C50.911 CARCINOMA OF RIGHT BREAST, ESTROGEN RECEPTOR POSITIVE, STAGE 4 (HCC): ICD-10-CM

## 2025-01-02 DIAGNOSIS — Z17.0 CARCINOMA OF RIGHT BREAST, ESTROGEN RECEPTOR POSITIVE, STAGE 4 (HCC): ICD-10-CM

## 2025-01-02 DIAGNOSIS — D64.9 ANEMIA, UNSPECIFIED TYPE: ICD-10-CM

## 2025-01-02 DIAGNOSIS — D83.9 COMMON VARIABLE IMMUNODEFICIENCY (HCC): ICD-10-CM

## 2025-01-02 DIAGNOSIS — Z51.11 ENCOUNTER FOR CHEMOTHERAPY MANAGEMENT: ICD-10-CM

## 2025-01-02 DIAGNOSIS — C79.51 METASTASIS TO BONE (HCC): ICD-10-CM

## 2025-01-02 DIAGNOSIS — Z51.11 ENCOUNTER FOR CHEMOTHERAPY MANAGEMENT: Primary | ICD-10-CM

## 2025-01-02 DIAGNOSIS — D64.9 ANEMIA, UNSPECIFIED TYPE: Primary | ICD-10-CM

## 2025-01-02 LAB
ABO: NORMAL
ALBUMIN SERPL BCG-MCNC: 3.7 G/DL (ref 3.5–5.1)
ALP SERPL-CCNC: 96 U/L (ref 38–126)
ALT SERPL W/O P-5'-P-CCNC: 7 U/L (ref 11–66)
ANTI-COMPLEMENT: NORMAL
ANTIBODY SCREEN: NORMAL
AST SERPL-CCNC: 18 U/L (ref 5–40)
BILIRUB CONJ SERPL-MCNC: < 0.1 MG/DL (ref 0.1–13.8)
BILIRUB SERPL-MCNC: 0.2 MG/DL (ref 0.3–1.2)
BUN BLDP-MCNC: 25 MG/DL (ref 8–26)
CHLORIDE BLD-SCNC: 109 MEQ/L (ref 98–109)
CREAT BLD-MCNC: 1.8 MG/DL (ref 0.5–1.2)
DAT IGG: NORMAL
DAT POLY-SP REAG RBC QL: NORMAL
FERRITIN SERPL IA-MCNC: 267 NG/ML (ref 10–291)
FOLATE SERPL-MCNC: 13.6 NG/ML (ref 4.8–24.2)
GFR SERPL CREATININE-BSD FRML MDRD: 28 ML/MIN/1.73M2
GLUCOSE BLD-MCNC: 179 MG/DL (ref 70–108)
HGB RETIC QN AUTO: 33.5 PG (ref 28.2–35.7)
IMM RETICS NFR: 28.9 % (ref 3–15.9)
IONIZED CALCIUM, WHOLE BLOOD: 1.1 MMOL/L (ref 1.12–1.32)
IRON SATN MFR SERPL: 23 % (ref 20–50)
IRON SERPL-MCNC: 57 UG/DL (ref 50–170)
LDH SERPL L TO P-CCNC: 184 U/L (ref 100–190)
MAGNESIUM SERPL-MCNC: 2 MG/DL (ref 1.6–2.4)
PHOSPHATE SERPL-MCNC: 2.8 MG/DL (ref 2.4–4.7)
POTASSIUM BLD-SCNC: 5.6 MEQ/L (ref 3.5–4.9)
PROT SERPL-MCNC: 6.1 G/DL (ref 6.1–8)
RETICS # AUTO: 111 THOU/MM3 (ref 20–115)
RETICS/RBC NFR AUTO: 5.6 % (ref 0.5–2)
RH FACTOR: NORMAL
SODIUM BLD-SCNC: 140 MEQ/L (ref 138–146)
TIBC SERPL-MCNC: 251 UG/DL (ref 171–450)
TOTAL CO2, WHOLE BLOOD: 21 MEQ/L (ref 23–33)
VIT B12 SERPL-MCNC: 381 PG/ML (ref 211–911)

## 2025-01-02 PROCEDURE — 83550 IRON BINDING TEST: CPT

## 2025-01-02 PROCEDURE — 88184 FLOWCYTOMETRY/ TC 1 MARKER: CPT

## 2025-01-02 PROCEDURE — 3077F SYST BP >= 140 MM HG: CPT | Performed by: INTERNAL MEDICINE

## 2025-01-02 PROCEDURE — 82525 ASSAY OF COPPER: CPT

## 2025-01-02 PROCEDURE — 96523 IRRIG DRUG DELIVERY DEVICE: CPT

## 2025-01-02 PROCEDURE — 82784 ASSAY IGA/IGD/IGG/IGM EACH: CPT

## 2025-01-02 PROCEDURE — 86900 BLOOD TYPING SEROLOGIC ABO: CPT

## 2025-01-02 PROCEDURE — 99214 OFFICE O/P EST MOD 30 MIN: CPT | Performed by: INTERNAL MEDICINE

## 2025-01-02 PROCEDURE — 82607 VITAMIN B-12: CPT

## 2025-01-02 PROCEDURE — 84100 ASSAY OF PHOSPHORUS: CPT

## 2025-01-02 PROCEDURE — 83540 ASSAY OF IRON: CPT

## 2025-01-02 PROCEDURE — 83010 ASSAY OF HAPTOGLOBIN QUANT: CPT

## 2025-01-02 PROCEDURE — 82232 ASSAY OF BETA-2 PROTEIN: CPT

## 2025-01-02 PROCEDURE — 85025 COMPLETE CBC W/AUTO DIFF WBC: CPT

## 2025-01-02 PROCEDURE — 36591 DRAW BLOOD OFF VENOUS DEVICE: CPT

## 2025-01-02 PROCEDURE — 80076 HEPATIC FUNCTION PANEL: CPT

## 2025-01-02 PROCEDURE — 1123F ACP DISCUSS/DSCN MKR DOCD: CPT | Performed by: INTERNAL MEDICINE

## 2025-01-02 PROCEDURE — 86923 COMPATIBILITY TEST ELECTRIC: CPT

## 2025-01-02 PROCEDURE — 85046 RETICYTE/HGB CONCENTRATE: CPT

## 2025-01-02 PROCEDURE — 84155 ASSAY OF PROTEIN SERUM: CPT

## 2025-01-02 PROCEDURE — 83883 ASSAY NEPHELOMETRY NOT SPEC: CPT

## 2025-01-02 PROCEDURE — 88185 FLOWCYTOMETRY/TC ADD-ON: CPT

## 2025-01-02 PROCEDURE — 82746 ASSAY OF FOLIC ACID SERUM: CPT

## 2025-01-02 PROCEDURE — 2500000003 HC RX 250 WO HCPCS: Performed by: FAMILY MEDICINE

## 2025-01-02 PROCEDURE — 3078F DIAST BP <80 MM HG: CPT | Performed by: INTERNAL MEDICINE

## 2025-01-02 PROCEDURE — 83615 LACTATE (LD) (LDH) ENZYME: CPT

## 2025-01-02 PROCEDURE — 84165 PROTEIN E-PHORESIS SERUM: CPT

## 2025-01-02 PROCEDURE — 1159F MED LIST DOCD IN RCRD: CPT | Performed by: INTERNAL MEDICINE

## 2025-01-02 PROCEDURE — 86901 BLOOD TYPING SEROLOGIC RH(D): CPT

## 2025-01-02 PROCEDURE — 86850 RBC ANTIBODY SCREEN: CPT

## 2025-01-02 PROCEDURE — 99211 OFF/OP EST MAY X REQ PHY/QHP: CPT

## 2025-01-02 PROCEDURE — 86880 COOMBS TEST DIRECT: CPT

## 2025-01-02 PROCEDURE — 86334 IMMUNOFIX E-PHORESIS SERUM: CPT

## 2025-01-02 PROCEDURE — 80047 BASIC METABLC PNL IONIZED CA: CPT

## 2025-01-02 PROCEDURE — 6360000002 HC RX W HCPCS: Performed by: FAMILY MEDICINE

## 2025-01-02 PROCEDURE — 83735 ASSAY OF MAGNESIUM: CPT

## 2025-01-02 PROCEDURE — 82728 ASSAY OF FERRITIN: CPT

## 2025-01-02 RX ORDER — SODIUM CHLORIDE 9 MG/ML
25 INJECTION, SOLUTION INTRAVENOUS PRN
Status: CANCELLED | OUTPATIENT
Start: 2025-01-03

## 2025-01-02 RX ORDER — DIPHENHYDRAMINE HYDROCHLORIDE 50 MG/ML
50 INJECTION INTRAMUSCULAR; INTRAVENOUS
Status: CANCELLED | OUTPATIENT
Start: 2025-01-03

## 2025-01-02 RX ORDER — BENZONATATE 200 MG/1
200 CAPSULE ORAL 3 TIMES DAILY PRN
COMMUNITY
Start: 2024-12-28

## 2025-01-02 RX ORDER — EPINEPHRINE 1 MG/ML
0.3 INJECTION, SOLUTION INTRAMUSCULAR; SUBCUTANEOUS PRN
Status: CANCELLED | OUTPATIENT
Start: 2025-01-03

## 2025-01-02 RX ORDER — ONDANSETRON 2 MG/ML
8 INJECTION INTRAMUSCULAR; INTRAVENOUS
Status: CANCELLED | OUTPATIENT
Start: 2025-01-02

## 2025-01-02 RX ORDER — SODIUM CHLORIDE 9 MG/ML
INJECTION, SOLUTION INTRAVENOUS CONTINUOUS
Status: CANCELLED | OUTPATIENT
Start: 2025-01-03

## 2025-01-02 RX ORDER — HYDROCORTISONE SODIUM SUCCINATE 100 MG/2ML
100 INJECTION INTRAMUSCULAR; INTRAVENOUS
OUTPATIENT
Start: 2025-01-30

## 2025-01-02 RX ORDER — EPINEPHRINE 1 MG/ML
0.3 INJECTION, SOLUTION INTRAMUSCULAR; SUBCUTANEOUS PRN
OUTPATIENT
Start: 2025-01-30

## 2025-01-02 RX ORDER — SODIUM CHLORIDE 0.9 % (FLUSH) 0.9 %
5-40 SYRINGE (ML) INJECTION PRN
Status: CANCELLED | OUTPATIENT
Start: 2025-01-03

## 2025-01-02 RX ORDER — ACETAMINOPHEN 325 MG/1
650 TABLET ORAL ONCE
OUTPATIENT
Start: 2025-01-30 | End: 2025-01-30

## 2025-01-02 RX ORDER — ONDANSETRON 2 MG/ML
8 INJECTION INTRAMUSCULAR; INTRAVENOUS
Status: CANCELLED | OUTPATIENT
Start: 2025-01-03

## 2025-01-02 RX ORDER — ALBUTEROL SULFATE 90 UG/1
4 INHALANT RESPIRATORY (INHALATION) PRN
Status: CANCELLED | OUTPATIENT
Start: 2025-01-03

## 2025-01-02 RX ORDER — HYDROCORTISONE SODIUM SUCCINATE 100 MG/2ML
100 INJECTION INTRAMUSCULAR; INTRAVENOUS
Status: CANCELLED | OUTPATIENT
Start: 2025-01-03

## 2025-01-02 RX ORDER — ALBUTEROL SULFATE 90 UG/1
4 INHALANT RESPIRATORY (INHALATION) PRN
Status: CANCELLED | OUTPATIENT
Start: 2025-01-02

## 2025-01-02 RX ORDER — SODIUM CHLORIDE 9 MG/ML
20 INJECTION, SOLUTION INTRAVENOUS CONTINUOUS
Status: CANCELLED | OUTPATIENT
Start: 2025-01-02

## 2025-01-02 RX ORDER — HYDROCORTISONE SODIUM SUCCINATE 100 MG/2ML
100 INJECTION INTRAMUSCULAR; INTRAVENOUS
Status: CANCELLED | OUTPATIENT
Start: 2025-01-02

## 2025-01-02 RX ORDER — ACETAMINOPHEN 325 MG/1
650 TABLET ORAL
Status: CANCELLED | OUTPATIENT
Start: 2025-01-03

## 2025-01-02 RX ORDER — HEPARIN 100 UNIT/ML
500 SYRINGE INTRAVENOUS PRN
Status: DISCONTINUED | OUTPATIENT
Start: 2025-01-02 | End: 2025-01-03 | Stop reason: HOSPADM

## 2025-01-02 RX ORDER — EPINEPHRINE 1 MG/ML
0.3 INJECTION, SOLUTION, CONCENTRATE INTRAVENOUS PRN
Status: CANCELLED | OUTPATIENT
Start: 2025-01-03

## 2025-01-02 RX ORDER — DIPHENHYDRAMINE HYDROCHLORIDE 50 MG/ML
50 INJECTION INTRAMUSCULAR; INTRAVENOUS
OUTPATIENT
Start: 2025-01-30

## 2025-01-02 RX ORDER — FAMOTIDINE 10 MG/ML
20 INJECTION, SOLUTION INTRAVENOUS
Status: CANCELLED | OUTPATIENT
Start: 2025-01-03

## 2025-01-02 RX ORDER — SODIUM CHLORIDE 9 MG/ML
INJECTION, SOLUTION INTRAVENOUS CONTINUOUS
OUTPATIENT
Start: 2025-01-30

## 2025-01-02 RX ORDER — EPINEPHRINE 1 MG/ML
0.3 INJECTION, SOLUTION, CONCENTRATE INTRAVENOUS PRN
Status: CANCELLED | OUTPATIENT
Start: 2025-01-02

## 2025-01-02 RX ORDER — SODIUM CHLORIDE 9 MG/ML
20 INJECTION, SOLUTION INTRAVENOUS CONTINUOUS
Status: CANCELLED | OUTPATIENT
Start: 2025-01-03

## 2025-01-02 RX ORDER — SODIUM CHLORIDE 9 MG/ML
25 INJECTION, SOLUTION INTRAVENOUS PRN
Status: CANCELLED | OUTPATIENT
Start: 2025-01-02

## 2025-01-02 RX ORDER — SODIUM CHLORIDE 9 MG/ML
INJECTION, SOLUTION INTRAVENOUS CONTINUOUS
Status: CANCELLED | OUTPATIENT
Start: 2025-01-02

## 2025-01-02 RX ORDER — DIPHENHYDRAMINE HYDROCHLORIDE 50 MG/ML
50 INJECTION INTRAMUSCULAR; INTRAVENOUS
Status: CANCELLED | OUTPATIENT
Start: 2025-01-02

## 2025-01-02 RX ORDER — SODIUM CHLORIDE 0.9 % (FLUSH) 0.9 %
5-40 SYRINGE (ML) INJECTION PRN
Status: DISCONTINUED | OUTPATIENT
Start: 2025-01-02 | End: 2025-01-03 | Stop reason: HOSPADM

## 2025-01-02 RX ORDER — SODIUM CHLORIDE 0.9 % (FLUSH) 0.9 %
5-40 SYRINGE (ML) INJECTION PRN
Status: CANCELLED | OUTPATIENT
Start: 2025-01-02

## 2025-01-02 RX ORDER — HEPARIN 100 UNIT/ML
500 SYRINGE INTRAVENOUS PRN
OUTPATIENT
Start: 2025-01-30

## 2025-01-02 RX ORDER — SODIUM CHLORIDE 9 MG/ML
5-250 INJECTION, SOLUTION INTRAVENOUS PRN
OUTPATIENT
Start: 2025-01-30

## 2025-01-02 RX ORDER — SODIUM CHLORIDE 0.9 % (FLUSH) 0.9 %
5-40 SYRINGE (ML) INJECTION PRN
OUTPATIENT
Start: 2025-01-30

## 2025-01-02 RX ORDER — ACETAMINOPHEN 325 MG/1
650 TABLET ORAL
Status: CANCELLED | OUTPATIENT
Start: 2025-01-02

## 2025-01-02 RX ORDER — FAMOTIDINE 10 MG/ML
20 INJECTION, SOLUTION INTRAVENOUS
Status: CANCELLED | OUTPATIENT
Start: 2025-01-02

## 2025-01-02 RX ORDER — DIPHENHYDRAMINE HCL 25 MG
25 TABLET ORAL ONCE
OUTPATIENT
Start: 2025-01-30 | End: 2025-01-30

## 2025-01-02 RX ADMIN — SODIUM CHLORIDE, PRESERVATIVE FREE 30 ML: 5 INJECTION INTRAVENOUS at 09:47

## 2025-01-02 RX ADMIN — HEPARIN 500 UNITS: 100 SYRINGE at 12:37

## 2025-01-02 NOTE — PROGRESS NOTES
Patient tolerated labs from [facility]. Patient to return to clinic tomorrow on 1/3/25 for 2 units PRBC. Type and Cross obtained and sent. Electronically signed by Gilma Desai RN on 1/2/2025 at 12:41 PM

## 2025-01-02 NOTE — PROGRESS NOTES
[facility] [address], [address]  [facility] [address]  Dept: [phone]  Dept Fax: [phone] Loc: [phone]      Visit Date:1/2/2025     Analy Reyna is a 79 y.o. female who presents today for:   Chief Complaint   Patient presents with    Follow-up     Carcinoma of right breast, estrogen receptor positive, stage 4 (HCC        HPI:   Analy Reyna is a 79 y.o. female referred to Hematology/Oncology clinic for evaluation of new lytic lesions in the bone.     The patient was previously seen in clinic in June 2020 for thrombocytopenia. Per consult note at that time, she had CBC completed on November 28, 2019 which showed a platelet count of 112,000.  She was referred for further evaluation.  Her white blood cell count was 3.1, hemoglobin 11.4, hematocrit 34.5.  Per chart review, the patient had platelet check on 5/7/2020 with platelet count 151,000, on 5/2/2019 platelet count 152,000. In November 2019 she had folic acid which was normal, B12 and TSH were within normal limits. The patient follows with Dr. Su for CKD and kidney function has been stable ranging 1.2 to 1.4. Upon review of prior CBCs the patient has had multiple since 2016. In June 2017 her platelet count was 92,000 noted during hospitalization for weakness, bradycardia. In June 2018 her platelet count was 74,000 during hospitalization for AMS and found to be hypoxic. At time of lab work in November 2019 she was being treated for acute left otitis externa.     Intermittent thrombocytopenia was noted during episodes of acute illness/inflammation. She was recommended follow up with PCP and have CBC every 6 months. Per review of EMR, platelet count has been within normal limits since 2018.     Her past medical history includes CKD, COPD with chronic respiratory failure, Atrial fibrillation chronically on Eliquis, HTN, and hiatal hernia.      8/1/2024:The patient was recommended follow up in clinic following

## 2025-01-02 NOTE — PATIENT INSTRUCTIONS
-Please hold Ribociclib for this cycle as you're having GI evaluation due to concerns for GI bleed.  -Can continue taking Arimidex daily.  -We will obtain PET/CT comparison by our radiologist.  -Please hold Eliquis until cleared by GI to resume it.  -Proceed with blood transfusion (2 units).  -Return to clinic for weekly CBC and possible transfusion.  -Return to clinic in 4 weeks for reevaluation and blood work.

## 2025-01-03 ENCOUNTER — HOSPITAL ENCOUNTER (OUTPATIENT)
Dept: INFUSION THERAPY | Age: 80
Discharge: HOME | End: 2025-01-03
Payer: MEDICARE

## 2025-01-03 VITALS
RESPIRATION RATE: 16 BRPM | OXYGEN SATURATION: 97 % | SYSTOLIC BLOOD PRESSURE: 137 MMHG | TEMPERATURE: 98.5 F | DIASTOLIC BLOOD PRESSURE: 65 MMHG | HEART RATE: 66 BPM

## 2025-01-03 DIAGNOSIS — C50.911 CARCINOMA OF RIGHT BREAST, ESTROGEN RECEPTOR POSITIVE, STAGE 4 (HCC): ICD-10-CM

## 2025-01-03 DIAGNOSIS — Z17.0 CARCINOMA OF RIGHT BREAST, ESTROGEN RECEPTOR POSITIVE, STAGE 4 (HCC): ICD-10-CM

## 2025-01-03 DIAGNOSIS — C79.51 METASTASIS TO BONE (HCC): ICD-10-CM

## 2025-01-03 DIAGNOSIS — D64.9 ANEMIA, UNSPECIFIED TYPE: Primary | ICD-10-CM

## 2025-01-03 LAB
B2 MICROGLOB SERPL-MCNC: 3.3 MG/L
BASOPHILS ABSOLUTE: 0.1 THOU/MM3 (ref 0–0.1)
BASOPHILS NFR BLD AUTO: 2 % (ref 0–3)
EOSINOPHIL NFR BLD AUTO: 5 % (ref 0–4)
EOSINOPHILS ABSOLUTE: 0.2 THOU/MM3 (ref 0–0.4)
ERYTHROCYTE [DISTWIDTH] IN BLOOD BY AUTOMATED COUNT: 18 % (ref 11.5–14.5)
FREE KAPPA/LAMBDA RATIO: 0.55 (ref 0.22–1.74)
HAPTOGLOB SERPL-MCNC: 242 MG/DL (ref 30–200)
HCT VFR BLD AUTO: 22.1 % (ref 37–47)
HGB BLD-MCNC: 6.8 GM/DL (ref 12–16)
IGA SERPL-MCNC: 134 MG/DL (ref 70–400)
IGG SERPL-MCNC: 948 MG/DL (ref 700–1600)
IGM SERPL-MCNC: 45 MG/DL (ref 40–230)
IMMATURE GRANULOCYTES %: 1 %
IMMATURE GRANULOCYTES ABSOLUTE: 0.02 THOU/MM3 (ref 0–0.07)
KAPPA LC FREE SER-MCNC: 26.5 MG/L
LAMBDA LC FREE SERPL-MCNC: 48 MG/L (ref 4.2–27.7)
LYMPHOCYTES ABSOLUTE: 0.6 THOU/MM3 (ref 1–4.8)
LYMPHOCYTES NFR BLD AUTO: 17 % (ref 15–47)
MCH RBC QN AUTO: 32.5 PG (ref 26–33)
MCHC RBC AUTO-ENTMCNC: 30.8 GM/DL (ref 32.2–35.5)
MCV RBC AUTO: 106 FL (ref 81–99)
MONOCYTES ABSOLUTE: 0.3 THOU/MM3 (ref 0.4–1.3)
MONOCYTES NFR BLD AUTO: 9 % (ref 0–12)
NEUTROPHILS ABSOLUTE: 2.3 THOU/MM3 (ref 1.8–7.7)
NEUTROPHILS NFR BLD AUTO: 67 % (ref 43–75)
PATHOLOGIST REVIEW: ABNORMAL
PLATELET # BLD AUTO: 263 THOU/MM3 (ref 130–400)
PMV BLD AUTO: 9.8 FL (ref 9.4–12.4)
RBC # BLD AUTO: 2.09 MILL/MM3 (ref 4.2–5.4)
REVIEWED BY: NORMAL
SMEAR REVIEW: NORMAL
WBC # BLD AUTO: 3.5 THOU/MM3 (ref 4.8–10.8)

## 2025-01-03 PROCEDURE — 36430 TRANSFUSION BLD/BLD COMPNT: CPT

## 2025-01-03 PROCEDURE — 6360000002 HC RX W HCPCS: Performed by: INTERNAL MEDICINE

## 2025-01-03 PROCEDURE — 2580000003 HC RX 258: Performed by: INTERNAL MEDICINE

## 2025-01-03 PROCEDURE — 2500000003 HC RX 250 WO HCPCS: Performed by: INTERNAL MEDICINE

## 2025-01-03 PROCEDURE — P9016 RBC LEUKOCYTES REDUCED: HCPCS

## 2025-01-03 RX ORDER — ACETAMINOPHEN 325 MG/1
650 TABLET ORAL
OUTPATIENT
Start: 2025-01-03

## 2025-01-03 RX ORDER — ONDANSETRON 2 MG/ML
8 INJECTION INTRAMUSCULAR; INTRAVENOUS
OUTPATIENT
Start: 2025-01-03

## 2025-01-03 RX ORDER — SODIUM CHLORIDE 9 MG/ML
25 INJECTION, SOLUTION INTRAVENOUS PRN
OUTPATIENT
Start: 2025-01-03

## 2025-01-03 RX ORDER — HYDROCORTISONE SODIUM SUCCINATE 100 MG/2ML
100 INJECTION INTRAMUSCULAR; INTRAVENOUS
OUTPATIENT
Start: 2025-01-03

## 2025-01-03 RX ORDER — SODIUM CHLORIDE 0.9 % (FLUSH) 0.9 %
5-40 SYRINGE (ML) INJECTION PRN
OUTPATIENT
Start: 2025-01-03

## 2025-01-03 RX ORDER — EPINEPHRINE 1 MG/ML
0.3 INJECTION, SOLUTION INTRAMUSCULAR; SUBCUTANEOUS PRN
OUTPATIENT
Start: 2025-01-03

## 2025-01-03 RX ORDER — HEPARIN 100 UNIT/ML
500 SYRINGE INTRAVENOUS PRN
Status: DISCONTINUED | OUTPATIENT
Start: 2025-01-03 | End: 2025-01-04 | Stop reason: HOSPADM

## 2025-01-03 RX ORDER — SODIUM CHLORIDE 9 MG/ML
20 INJECTION, SOLUTION INTRAVENOUS CONTINUOUS
OUTPATIENT
Start: 2025-01-03

## 2025-01-03 RX ORDER — DIPHENHYDRAMINE HYDROCHLORIDE 50 MG/ML
50 INJECTION INTRAMUSCULAR; INTRAVENOUS
OUTPATIENT
Start: 2025-01-03

## 2025-01-03 RX ORDER — SODIUM CHLORIDE 9 MG/ML
INJECTION, SOLUTION INTRAVENOUS CONTINUOUS
OUTPATIENT
Start: 2025-01-03

## 2025-01-03 RX ORDER — SODIUM CHLORIDE 9 MG/ML
20 INJECTION, SOLUTION INTRAVENOUS CONTINUOUS
Status: DISCONTINUED | OUTPATIENT
Start: 2025-01-03 | End: 2025-01-04 | Stop reason: HOSPADM

## 2025-01-03 RX ORDER — ALBUTEROL SULFATE 90 UG/1
4 INHALANT RESPIRATORY (INHALATION) PRN
OUTPATIENT
Start: 2025-01-03

## 2025-01-03 RX ORDER — SODIUM CHLORIDE 0.9 % (FLUSH) 0.9 %
5-40 SYRINGE (ML) INJECTION PRN
Status: DISCONTINUED | OUTPATIENT
Start: 2025-01-03 | End: 2025-01-04 | Stop reason: HOSPADM

## 2025-01-03 RX ADMIN — SODIUM CHLORIDE, PRESERVATIVE FREE 10 ML: 5 INJECTION INTRAVENOUS at 09:15

## 2025-01-03 RX ADMIN — HEPARIN 500 UNITS: 100 SYRINGE at 15:08

## 2025-01-03 RX ADMIN — SODIUM CHLORIDE, PRESERVATIVE FREE 10 ML: 5 INJECTION INTRAVENOUS at 15:08

## 2025-01-03 RX ADMIN — SODIUM CHLORIDE 20 ML/HR: 9 INJECTION, SOLUTION INTRAVENOUS at 09:15

## 2025-01-03 RX ADMIN — SODIUM CHLORIDE 20 ML/HR: 9 INJECTION, SOLUTION INTRAVENOUS at 12:33

## 2025-01-03 NOTE — DISCHARGE INSTRUCTIONS
Patient tolerated transfusion of 2 units of packed red blood cells without any complications.    Patient kept for 20 minutes observation post transfusion.  Patient denies any fever/chills, pain on the sides of the torso or back, shortness of breath, difficulty swallowing, itchy skin, rash, nausea, vomiting, or blood in the urine or dark colored-urine.Denies any dizziness, lightheadedness, acute nausea or vomiting, headache, chest pain/pressure, rash/itching, or an increase in shortness of breath.    Patient instructed, although extremely rare, delayed reactions can occur days or weeks after the transfusion such as anemia, low-graded fever, jaundice, low blood pressure, wheezing, anxiety, or red-colored urine.    Patient instructed if they experience any of the above symptoms following today's transfusion, she is to notify their physician immediately or go to the emergency department.

## 2025-01-03 NOTE — PLAN OF CARE
Problem: Chronic Conditions and Co-morbidities  Goal: Patient's chronic conditions and co-morbidity symptoms are monitored and maintained or improved  Outcome: Adequate for Discharge  Flowsheets (Taken 1/3/2025 0908)  Care Plan - Patient's Chronic Conditions and Co-Morbidity Symptoms are Monitored and Maintained or Improved:   Collaborate with multidisciplinary team to address chronic and comorbid conditions and prevent exacerbation or deterioration   Monitor and assess patient's chronic conditions and comorbid symptoms for stability, deterioration, or improvement  Note: Patient verbalizes understanding to verbal information given on blood transfusion,action and possible side effects. Aware to call MD if develop complications.        Problem: Discharge Planning  Goal: Discharge to home or other facility with appropriate resources  Outcome: Adequate for Discharge  Flowsheets (Taken 1/3/2025 0908)  Discharge to home or other facility with appropriate resources:   Identify barriers to discharge with patient and caregiver   Identify discharge learning needs (meds, wound care, etc)  Note: Verbalize understanding of discharge instructions, follow up appointments, and when to call Physician.Discuss understanding of discharge instructions, follow up appointments and when to call Physician.        Problem: Safety - Adult  Goal: Free from fall injury  Outcome: Adequate for Discharge  Flowsheets (Taken 1/3/2025 0908)  Free From Fall Injury:   Instruct family/caregiver on patient safety   Based on caregiver fall risk screen, instruct family/caregiver to ask for assistance with transferring infant if caregiver noted to have fall risk factors  Note: Free from falls while in O.P. Oncology.Discussed the need to use the call light for assistance when getting up to ambulate.      Care plan reviewed with patient. Patient verbalizes understanding of the plan of care and contributes to goal setting.

## 2025-01-03 NOTE — PROGRESS NOTES
Patient tolerated transfusion of 2 units of packed red blood cells without any complications.    Patient kept for 20 minutes observation post transfusion.  Patient denies any fever/chills, pain on the sides of the torso or back, shortness of breath, difficulty swallowing, itchy skin, rash, nausea, vomiting, or blood in the urine or dark colored-urine.Denies any dizziness, lightheadedness, acute nausea or vomiting, headache, chest pain/pressure, rash/itching, or an increase in shortness of breath.    Patient instructed, although extremely rare, delayed reactions can occur days or weeks after the transfusion such as anemia, low-graded fever, jaundice, low blood pressure, wheezing, anxiety, or red-colored urine.    Last vital signs:   /65   Pulse 66   Temp 98.5 °F (36.9 °C) (Oral)   Resp 16   SpO2 97%     Patient instructed if they experience any of the above symptoms following today's transfusion, she is to notify their physician immediately or go to the emergency department.    Discharge instructions given to patient. Verbalizes understanding. Ambulated off unit per self, with belongings.

## 2025-01-05 LAB
COPPER SERPL-MCNC: 117.9 UG/DL (ref 80–155)
LEUK/LYMPH PHENOTYPING WB: NORMAL

## 2025-01-06 LAB — IMMUNOFIXATION WITH QUANT: NORMAL

## 2025-01-06 NOTE — TELEPHONE ENCOUNTER
For this encounter, please see note attached to flowsheet.     [facility]     Spiritual Care Department  [id number] [place] [address] [phone]

## 2025-01-06 NOTE — TELEPHONE ENCOUNTER
For this encounter, please see note attached to flowsheet.     [facility]     Spiritual Care Department  [id number] [place] [address] [phone]

## 2025-01-09 ENCOUNTER — HOSPITAL ENCOUNTER (OUTPATIENT)
Dept: INFUSION THERAPY | Age: 80
Discharge: HOME | End: 2025-01-09
Payer: MEDICARE

## 2025-01-09 VITALS
HEART RATE: 65 BPM | HEIGHT: 64 IN | RESPIRATION RATE: 16 BRPM | TEMPERATURE: 98.7 F | SYSTOLIC BLOOD PRESSURE: 132 MMHG | WEIGHT: 218.2 LBS | OXYGEN SATURATION: 92 % | BODY MASS INDEX: 37.25 KG/M2 | DIASTOLIC BLOOD PRESSURE: 60 MMHG

## 2025-01-09 DIAGNOSIS — C50.911 CARCINOMA OF RIGHT BREAST, ESTROGEN RECEPTOR POSITIVE, STAGE 4 (HCC): Primary | ICD-10-CM

## 2025-01-09 DIAGNOSIS — Z17.0 CARCINOMA OF RIGHT BREAST, ESTROGEN RECEPTOR POSITIVE, STAGE 4 (HCC): Primary | ICD-10-CM

## 2025-01-09 DIAGNOSIS — D64.9 ANEMIA, UNSPECIFIED TYPE: Primary | ICD-10-CM

## 2025-01-09 DIAGNOSIS — C79.51 CANCER, METASTATIC TO BONE (HCC): ICD-10-CM

## 2025-01-09 DIAGNOSIS — D64.9 ANEMIA, UNSPECIFIED TYPE: ICD-10-CM

## 2025-01-09 DIAGNOSIS — C50.911 CARCINOMA OF RIGHT BREAST, ESTROGEN RECEPTOR POSITIVE, STAGE 4 (HCC): ICD-10-CM

## 2025-01-09 DIAGNOSIS — Z17.0 CARCINOMA OF RIGHT BREAST, ESTROGEN RECEPTOR POSITIVE, STAGE 4 (HCC): ICD-10-CM

## 2025-01-09 LAB
BASOPHILS ABSOLUTE: 0.1 THOU/MM3 (ref 0–0.1)
BASOPHILS NFR BLD AUTO: 2 % (ref 0–3)
BUN BLDP-MCNC: 27 MG/DL (ref 8–26)
CHLORIDE BLD-SCNC: 108 MEQ/L (ref 98–109)
CREAT BLD-MCNC: 1.5 MG/DL (ref 0.5–1.2)
EOSINOPHIL NFR BLD AUTO: 4 % (ref 0–4)
EOSINOPHILS ABSOLUTE: 0.1 THOU/MM3 (ref 0–0.4)
ERYTHROCYTE [DISTWIDTH] IN BLOOD BY AUTOMATED COUNT: 17.4 % (ref 11.5–14.5)
GFR SERPL CREATININE-BSD FRML MDRD: 35 ML/MIN/1.73M2
GLUCOSE BLD-MCNC: 132 MG/DL (ref 70–108)
HCT VFR BLD AUTO: 26.6 % (ref 37–47)
HGB BLD-MCNC: 8.5 GM/DL (ref 12–16)
IMMATURE GRANULOCYTES %: 0 %
IMMATURE GRANULOCYTES ABSOLUTE: 0 THOU/MM3 (ref 0–0.07)
IONIZED CALCIUM, WHOLE BLOOD: 1.09 MMOL/L (ref 1.12–1.32)
LYMPHOCYTES ABSOLUTE: 0.5 THOU/MM3 (ref 1–4.8)
LYMPHOCYTES NFR BLD AUTO: 19 % (ref 15–47)
MCH RBC QN AUTO: 33.9 PG (ref 26–33)
MCHC RBC AUTO-ENTMCNC: 32 GM/DL (ref 32.2–35.5)
MCV RBC AUTO: 106 FL (ref 81–99)
MONOCYTES ABSOLUTE: 0.4 THOU/MM3 (ref 0.4–1.3)
MONOCYTES NFR BLD AUTO: 13 % (ref 0–12)
NEUTROPHILS ABSOLUTE: 1.8 THOU/MM3 (ref 1.8–7.7)
NEUTROPHILS NFR BLD AUTO: 63 % (ref 43–75)
PLATELET # BLD AUTO: 168 THOU/MM3 (ref 130–400)
PMV BLD AUTO: 9.2 FL (ref 9.4–12.4)
POTASSIUM BLD-SCNC: 4.8 MEQ/L (ref 3.5–4.9)
RBC # BLD AUTO: 2.51 MILL/MM3 (ref 4.2–5.4)
REASON FOR REJECTION: NORMAL
REJECTED TEST: NORMAL
SODIUM BLD-SCNC: 139 MEQ/L (ref 138–146)
TOTAL CO2, WHOLE BLOOD: 25 MEQ/L (ref 23–33)
WBC # BLD AUTO: 2.9 THOU/MM3 (ref 4.8–10.8)

## 2025-01-09 PROCEDURE — 85025 COMPLETE CBC W/AUTO DIFF WBC: CPT

## 2025-01-09 PROCEDURE — 80047 BASIC METABLC PNL IONIZED CA: CPT

## 2025-01-09 PROCEDURE — 6360000002 HC RX W HCPCS: Performed by: INTERNAL MEDICINE

## 2025-01-09 PROCEDURE — 36591 DRAW BLOOD OFF VENOUS DEVICE: CPT

## 2025-01-09 PROCEDURE — 2500000003 HC RX 250 WO HCPCS: Performed by: INTERNAL MEDICINE

## 2025-01-09 RX ORDER — ACETAMINOPHEN 325 MG/1
650 TABLET ORAL
Status: CANCELLED | OUTPATIENT
Start: 2025-01-09

## 2025-01-09 RX ORDER — DIPHENHYDRAMINE HYDROCHLORIDE 50 MG/ML
50 INJECTION INTRAMUSCULAR; INTRAVENOUS
Status: CANCELLED | OUTPATIENT
Start: 2025-01-09

## 2025-01-09 RX ORDER — SODIUM CHLORIDE 0.9 % (FLUSH) 0.9 %
5-40 SYRINGE (ML) INJECTION PRN
Status: CANCELLED | OUTPATIENT
Start: 2025-01-09

## 2025-01-09 RX ORDER — SODIUM CHLORIDE 9 MG/ML
25 INJECTION, SOLUTION INTRAVENOUS PRN
Status: CANCELLED | OUTPATIENT
Start: 2025-01-09

## 2025-01-09 RX ORDER — EPINEPHRINE 1 MG/ML
0.3 INJECTION, SOLUTION INTRAMUSCULAR; SUBCUTANEOUS PRN
Status: CANCELLED | OUTPATIENT
Start: 2025-01-09

## 2025-01-09 RX ORDER — HEPARIN 100 UNIT/ML
500 SYRINGE INTRAVENOUS PRN
Status: CANCELLED | OUTPATIENT
Start: 2025-01-09

## 2025-01-09 RX ORDER — SODIUM CHLORIDE 9 MG/ML
INJECTION, SOLUTION INTRAVENOUS CONTINUOUS
Status: CANCELLED | OUTPATIENT
Start: 2025-01-09

## 2025-01-09 RX ORDER — ONDANSETRON 2 MG/ML
8 INJECTION INTRAMUSCULAR; INTRAVENOUS
Status: CANCELLED | OUTPATIENT
Start: 2025-01-09

## 2025-01-09 RX ORDER — HYDROCORTISONE SODIUM SUCCINATE 100 MG/2ML
100 INJECTION INTRAMUSCULAR; INTRAVENOUS
Status: CANCELLED | OUTPATIENT
Start: 2025-01-09

## 2025-01-09 RX ORDER — ALBUTEROL SULFATE 90 UG/1
4 INHALANT RESPIRATORY (INHALATION) PRN
Status: CANCELLED | OUTPATIENT
Start: 2025-01-09

## 2025-01-09 RX ORDER — SODIUM CHLORIDE 0.9 % (FLUSH) 0.9 %
5-40 SYRINGE (ML) INJECTION PRN
Status: DISCONTINUED | OUTPATIENT
Start: 2025-01-09 | End: 2025-01-10 | Stop reason: HOSPADM

## 2025-01-09 RX ORDER — HEPARIN 100 UNIT/ML
500 SYRINGE INTRAVENOUS PRN
Status: DISCONTINUED | OUTPATIENT
Start: 2025-01-09 | End: 2025-01-10 | Stop reason: HOSPADM

## 2025-01-09 RX ADMIN — SODIUM CHLORIDE, PRESERVATIVE FREE 30 ML: 5 INJECTION INTRAVENOUS at 10:35

## 2025-01-09 RX ADMIN — Medication 500 UNITS: at 10:56

## 2025-01-09 NOTE — DISCHARGE INSTRUCTIONS
Please contact your Oncologist if you have any questions regarding the  that you received today.   Please start taking an over the counter calcium 1200mg  (divide dose)  daily

## 2025-01-09 NOTE — PLAN OF CARE
Problem: Chronic Conditions and Co-morbidities  Goal: Patient's chronic conditions and co-morbidity symptoms are monitored and maintained or improved  Outcome: Adequate for Discharge  Flowsheets (Taken 1/9/2025 1523)  Care Plan - Patient's Chronic Conditions and Co-Morbidity Symptoms are Monitored and Maintained or Improved:   Collaborate with multidisciplinary team to address chronic and comorbid conditions and prevent exacerbation or deterioration   Monitor and assess patient's chronic conditions and comorbid symptoms for stability, deterioration, or improvement  Note: Patient verbalizes understanding to verbal information given on labs and need to start calcium supplementation,action and possible side effects. Aware to call MD if develop complications.      Problem: Discharge Planning  Goal: Discharge to home or other facility with appropriate resources  Outcome: Adequate for Discharge  Flowsheets (Taken 1/9/2025 1523)  Discharge to home or other facility with appropriate resources:   Identify barriers to discharge with patient and caregiver   Identify discharge learning needs (meds, wound care, etc)  Note: Verbalized understanding of discharge instructions, follow ups and when to call doctor     Problem: Safety - Adult  Goal: Free from fall injury  Outcome: Adequate for Discharge  Flowsheets (Taken 1/9/2025 1523)  Free From Fall Injury:   Based on caregiver fall risk screen, instruct family/caregiver to ask for assistance with transferring infant if caregiver noted to have fall risk factors   Instruct family/caregiver on patient safety  Note: Free from falls while in infusion center. Verbalized understanding of fall prevention and to ask for assistance with ambulation

## 2025-01-09 NOTE — PROGRESS NOTES
Patient instructed to call GI today and let them know that blood thinners have been on hold due to GI bleed and to ask when can be resumed.Patient instructed to take over the counter calcium supplement. Instructed that labs indicated no IVIG or blood products. Patient instructed that calcium too low for Xgeva, will recheck with next doctor visit after starting calcium supplements. Patient and family member verbalized understanding. Discharged in satisfactory condition.

## 2025-01-14 ENCOUNTER — HOSPITAL ENCOUNTER (OUTPATIENT)
Dept: INFUSION THERAPY | Age: 80
Discharge: HOME | End: 2025-01-14
Payer: MEDICARE

## 2025-01-14 VITALS
SYSTOLIC BLOOD PRESSURE: 121 MMHG | DIASTOLIC BLOOD PRESSURE: 59 MMHG | OXYGEN SATURATION: 91 % | TEMPERATURE: 98.2 F | HEART RATE: 68 BPM | RESPIRATION RATE: 16 BRPM

## 2025-01-14 DIAGNOSIS — C79.52 SECONDARY MALIGNANT NEOPLASM OF BONE AND BONE MARROW (HCC): ICD-10-CM

## 2025-01-14 DIAGNOSIS — C79.51 METASTASIS TO BONE (HCC): ICD-10-CM

## 2025-01-14 DIAGNOSIS — Z17.0 MALIGNANT NEOPLASM OF CENTRAL PORTION OF RIGHT BREAST IN FEMALE, ESTROGEN RECEPTOR POSITIVE (HCC): ICD-10-CM

## 2025-01-14 DIAGNOSIS — C50.911 CARCINOMA OF RIGHT BREAST, ESTROGEN RECEPTOR POSITIVE, STAGE 4 (HCC): Primary | ICD-10-CM

## 2025-01-14 DIAGNOSIS — D64.9 ANEMIA, UNSPECIFIED TYPE: ICD-10-CM

## 2025-01-14 DIAGNOSIS — C79.51 SECONDARY MALIGNANT NEOPLASM OF BONE AND BONE MARROW (HCC): ICD-10-CM

## 2025-01-14 DIAGNOSIS — C50.111 MALIGNANT NEOPLASM OF CENTRAL PORTION OF RIGHT BREAST IN FEMALE, ESTROGEN RECEPTOR POSITIVE (HCC): ICD-10-CM

## 2025-01-14 DIAGNOSIS — Z17.0 CARCINOMA OF RIGHT BREAST, ESTROGEN RECEPTOR POSITIVE, STAGE 4 (HCC): Primary | ICD-10-CM

## 2025-01-14 LAB
BASOPHILS ABSOLUTE: 0.1 THOU/MM3 (ref 0–0.1)
BASOPHILS NFR BLD AUTO: 2 % (ref 0–3)
EOSINOPHIL NFR BLD AUTO: 4 % (ref 0–4)
EOSINOPHILS ABSOLUTE: 0.1 THOU/MM3 (ref 0–0.4)
ERYTHROCYTE [DISTWIDTH] IN BLOOD BY AUTOMATED COUNT: 16.3 % (ref 11.5–14.5)
HCT VFR BLD AUTO: 26 % (ref 37–47)
HGB BLD-MCNC: 8.1 GM/DL (ref 12–16)
IMMATURE GRANULOCYTES %: 0 %
IMMATURE GRANULOCYTES ABSOLUTE: 0 THOU/MM3 (ref 0–0.07)
LYMPHOCYTES ABSOLUTE: 0.6 THOU/MM3 (ref 1–4.8)
LYMPHOCYTES NFR BLD AUTO: 18 % (ref 15–47)
MCH RBC QN AUTO: 33.6 PG (ref 26–33)
MCHC RBC AUTO-ENTMCNC: 31.2 GM/DL (ref 32.2–35.5)
MCV RBC AUTO: 108 FL (ref 81–99)
MONOCYTES ABSOLUTE: 0.6 THOU/MM3 (ref 0.4–1.3)
MONOCYTES NFR BLD AUTO: 16 % (ref 0–12)
NEUTROPHILS ABSOLUTE: 2.2 THOU/MM3 (ref 1.8–7.7)
NEUTROPHILS NFR BLD AUTO: 61 % (ref 43–75)
PLATELET # BLD AUTO: 130 THOU/MM3 (ref 130–400)
PMV BLD AUTO: 9.8 FL (ref 9.4–12.4)
RBC # BLD AUTO: 2.41 MILL/MM3 (ref 4.2–5.4)
WBC # BLD AUTO: 3.5 THOU/MM3 (ref 4.8–10.8)

## 2025-01-14 PROCEDURE — 2500000003 HC RX 250 WO HCPCS: Performed by: INTERNAL MEDICINE

## 2025-01-14 PROCEDURE — 85025 COMPLETE CBC W/AUTO DIFF WBC: CPT

## 2025-01-14 PROCEDURE — 36591 DRAW BLOOD OFF VENOUS DEVICE: CPT

## 2025-01-14 PROCEDURE — 6360000002 HC RX W HCPCS: Performed by: INTERNAL MEDICINE

## 2025-01-14 RX ORDER — EPINEPHRINE 1 MG/ML
0.3 INJECTION, SOLUTION INTRAMUSCULAR; SUBCUTANEOUS PRN
OUTPATIENT
Start: 2025-01-14

## 2025-01-14 RX ORDER — SODIUM CHLORIDE 0.9 % (FLUSH) 0.9 %
5-40 SYRINGE (ML) INJECTION PRN
OUTPATIENT
Start: 2025-01-14

## 2025-01-14 RX ORDER — HEPARIN 100 UNIT/ML
500 SYRINGE INTRAVENOUS PRN
Status: DISCONTINUED | OUTPATIENT
Start: 2025-01-14 | End: 2025-01-15 | Stop reason: HOSPADM

## 2025-01-14 RX ORDER — HYDROCORTISONE SODIUM SUCCINATE 100 MG/2ML
100 INJECTION INTRAMUSCULAR; INTRAVENOUS
OUTPATIENT
Start: 2025-01-14

## 2025-01-14 RX ORDER — HEPARIN 100 UNIT/ML
500 SYRINGE INTRAVENOUS PRN
OUTPATIENT
Start: 2025-01-14

## 2025-01-14 RX ORDER — SODIUM CHLORIDE 9 MG/ML
INJECTION, SOLUTION INTRAVENOUS CONTINUOUS
OUTPATIENT
Start: 2025-01-14

## 2025-01-14 RX ORDER — ALBUTEROL SULFATE 90 UG/1
4 INHALANT RESPIRATORY (INHALATION) PRN
OUTPATIENT
Start: 2025-01-14

## 2025-01-14 RX ORDER — ONDANSETRON 2 MG/ML
8 INJECTION INTRAMUSCULAR; INTRAVENOUS
OUTPATIENT
Start: 2025-01-14

## 2025-01-14 RX ORDER — SODIUM CHLORIDE 0.9 % (FLUSH) 0.9 %
5-40 SYRINGE (ML) INJECTION PRN
Status: DISCONTINUED | OUTPATIENT
Start: 2025-01-14 | End: 2025-01-15 | Stop reason: HOSPADM

## 2025-01-14 RX ORDER — DIPHENHYDRAMINE HYDROCHLORIDE 50 MG/ML
50 INJECTION INTRAMUSCULAR; INTRAVENOUS
OUTPATIENT
Start: 2025-01-14

## 2025-01-14 RX ORDER — SODIUM CHLORIDE 9 MG/ML
25 INJECTION, SOLUTION INTRAVENOUS PRN
OUTPATIENT
Start: 2025-01-14

## 2025-01-14 RX ORDER — ACETAMINOPHEN 325 MG/1
650 TABLET ORAL
OUTPATIENT
Start: 2025-01-14

## 2025-01-14 RX ADMIN — Medication 500 UNITS: at 11:14

## 2025-01-14 RX ADMIN — SODIUM CHLORIDE, PRESERVATIVE FREE 30 ML: 5 INJECTION INTRAVENOUS at 10:43

## 2025-01-14 ASSESSMENT — PAIN SCALES - GENERAL: PAINLEVEL_OUTOF10: 0

## 2025-01-14 NOTE — PROGRESS NOTES
Patient tolerated line/lab without any complications.    Last vital signs:   BP (!) 121/59   Pulse 68   Temp 98.2 °F (36.8 °C) (Oral)   Resp 16   SpO2 91%     Patient instructed if experience any symptoms following today's infusion, she is to notify MD immediately or go to the emergency department.    Discharge instructions given to patient. Verbalizes understanding. Ambulated off unit per self, accompanied by son, with belongings.

## 2025-01-14 NOTE — PLAN OF CARE
Problem: Chronic Conditions and Co-morbidities  Goal: Patient's chronic conditions and co-morbidity symptoms are monitored and maintained or improved  Outcome: Adequate for Discharge  Flowsheets (Taken 1/14/2025 1035)  Care Plan - Patient's Chronic Conditions and Co-Morbidity Symptoms are Monitored and Maintained or Improved: Monitor and assess patient's chronic conditions and comorbid symptoms for stability, deterioration, or improvement  Note: Patient educated over blood product transfusion protocol:    Problem: Discharge Planning  Goal: Discharge to home or other facility with appropriate resources  Outcome: Adequate for Discharge  Flowsheets (Taken 1/14/2025 1035)  Discharge to home or other facility with appropriate resources: Identify barriers to discharge with patient and caregiver     Problem: Safety - Adult  Goal: Free from fall injury  Outcome: Adequate for Discharge  Flowsheets (Taken 1/14/2025 1035)  Free From Fall Injury: Instruct family/caregiver on patient safety     Problem: Infection - Adult  Goal: Absence of infection at discharge  Outcome: Adequate for Discharge  Flowsheets (Taken 1/14/2025 1035)  Absence of infection at discharge: Monitor all insertion sites i.e., indwelling lines, tubes and drains  Note: Mediport site with no redness or warmth. Skin over port site intact with no signs of breakdown noted. Patient verbalizes signs/symptoms of port infection and when to notify the physician.

## 2025-01-17 DIAGNOSIS — Z17.0 CARCINOMA OF RIGHT BREAST, ESTROGEN RECEPTOR POSITIVE, STAGE 4 (HCC): Primary | ICD-10-CM

## 2025-01-17 DIAGNOSIS — C50.911 CARCINOMA OF RIGHT BREAST, ESTROGEN RECEPTOR POSITIVE, STAGE 4 (HCC): Primary | ICD-10-CM

## 2025-01-20 ENCOUNTER — HOSPITAL ENCOUNTER (OUTPATIENT)
Dept: INFUSION THERAPY | Age: 80
End: 2025-01-20

## 2025-01-27 ENCOUNTER — HOSPITAL ENCOUNTER (OUTPATIENT)
Dept: INFUSION THERAPY | Age: 80
Discharge: HOME | End: 2025-01-27
Payer: MEDICARE

## 2025-01-27 ENCOUNTER — OFFICE VISIT (OUTPATIENT)
Dept: ONCOLOGY | Age: 80
End: 2025-01-27
Payer: MEDICARE

## 2025-01-27 VITALS
SYSTOLIC BLOOD PRESSURE: 113 MMHG | HEIGHT: 64 IN | DIASTOLIC BLOOD PRESSURE: 56 MMHG | RESPIRATION RATE: 16 BRPM | OXYGEN SATURATION: 93 % | BODY MASS INDEX: 37.32 KG/M2 | HEART RATE: 75 BPM | TEMPERATURE: 98.7 F | WEIGHT: 218.6 LBS

## 2025-01-27 VITALS
WEIGHT: 218.6 LBS | DIASTOLIC BLOOD PRESSURE: 58 MMHG | OXYGEN SATURATION: 95 % | HEART RATE: 67 BPM | TEMPERATURE: 98.6 F | RESPIRATION RATE: 18 BRPM | BODY MASS INDEX: 37.32 KG/M2 | SYSTOLIC BLOOD PRESSURE: 115 MMHG | HEIGHT: 64 IN

## 2025-01-27 DIAGNOSIS — Z51.11 ENCOUNTER FOR CHEMOTHERAPY MANAGEMENT: ICD-10-CM

## 2025-01-27 DIAGNOSIS — Z79.811 ENCOUNTER FOR MONITORING AROMATASE INHIBITOR THERAPY: ICD-10-CM

## 2025-01-27 DIAGNOSIS — Z51.81 ENCOUNTER FOR MONITORING AROMATASE INHIBITOR THERAPY: ICD-10-CM

## 2025-01-27 DIAGNOSIS — D64.9 ANEMIA, UNSPECIFIED TYPE: ICD-10-CM

## 2025-01-27 DIAGNOSIS — C50.911 CARCINOMA OF RIGHT BREAST, ESTROGEN RECEPTOR POSITIVE, STAGE 4 (HCC): Primary | ICD-10-CM

## 2025-01-27 DIAGNOSIS — Z17.0 CARCINOMA OF RIGHT BREAST, ESTROGEN RECEPTOR POSITIVE, STAGE 4 (HCC): Primary | ICD-10-CM

## 2025-01-27 DIAGNOSIS — D83.9 COMMON VARIABLE IMMUNODEFICIENCY (HCC): ICD-10-CM

## 2025-01-27 LAB
BASOPHILS ABSOLUTE: 0.1 THOU/MM3 (ref 0–0.1)
BASOPHILS NFR BLD AUTO: 1 % (ref 0–3)
BUN BLDP-MCNC: 28 MG/DL (ref 8–26)
CHLORIDE BLD-SCNC: 106 MEQ/L (ref 98–109)
CREAT BLD-MCNC: 1.3 MG/DL (ref 0.5–1.2)
EOSINOPHIL NFR BLD AUTO: 3 % (ref 0–4)
EOSINOPHILS ABSOLUTE: 0.2 THOU/MM3 (ref 0–0.4)
ERYTHROCYTE [DISTWIDTH] IN BLOOD BY AUTOMATED COUNT: 14.9 % (ref 11.5–14.5)
GFR SERPL CREATININE-BSD FRML MDRD: 41 ML/MIN/1.73M2
GLUCOSE BLD-MCNC: 101 MG/DL (ref 70–108)
HCT VFR BLD AUTO: 27 % (ref 37–47)
HGB BLD-MCNC: 8.5 GM/DL (ref 12–16)
IMMATURE GRANULOCYTES %: 0 %
IMMATURE GRANULOCYTES ABSOLUTE: 0.02 THOU/MM3 (ref 0–0.07)
IONIZED CALCIUM, WHOLE BLOOD: 1.1 MMOL/L (ref 1.12–1.32)
LYMPHOCYTES ABSOLUTE: 1 THOU/MM3 (ref 1–4.8)
LYMPHOCYTES NFR BLD AUTO: 16 % (ref 15–47)
MCH RBC QN AUTO: 32.1 PG (ref 26–33)
MCHC RBC AUTO-ENTMCNC: 31.5 GM/DL (ref 32.2–35.5)
MCV RBC AUTO: 102 FL (ref 81–99)
MONOCYTES ABSOLUTE: 0.7 THOU/MM3 (ref 0.4–1.3)
MONOCYTES NFR BLD AUTO: 10 % (ref 0–12)
NEUTROPHILS ABSOLUTE: 4.6 THOU/MM3 (ref 1.8–7.7)
NEUTROPHILS NFR BLD AUTO: 70 % (ref 43–75)
PLATELET # BLD AUTO: 253 THOU/MM3 (ref 130–400)
PMV BLD AUTO: 9.1 FL (ref 9.4–12.4)
POTASSIUM BLD-SCNC: 4.7 MEQ/L (ref 3.5–4.9)
RBC # BLD AUTO: 2.65 MILL/MM3 (ref 4.2–5.4)
SODIUM BLD-SCNC: 138 MEQ/L (ref 138–146)
TOTAL CO2, WHOLE BLOOD: 24 MEQ/L (ref 23–33)
WBC # BLD AUTO: 6.5 THOU/MM3 (ref 4.8–10.8)

## 2025-01-27 PROCEDURE — 6360000002 HC RX W HCPCS: Performed by: FAMILY MEDICINE

## 2025-01-27 PROCEDURE — 3074F SYST BP LT 130 MM HG: CPT | Performed by: INTERNAL MEDICINE

## 2025-01-27 PROCEDURE — 99211 OFF/OP EST MAY X REQ PHY/QHP: CPT

## 2025-01-27 PROCEDURE — 6370000000 HC RX 637 (ALT 250 FOR IP): Performed by: FAMILY MEDICINE

## 2025-01-27 PROCEDURE — 99214 OFFICE O/P EST MOD 30 MIN: CPT | Performed by: INTERNAL MEDICINE

## 2025-01-27 PROCEDURE — 2500000003 HC RX 250 WO HCPCS: Performed by: INTERNAL MEDICINE

## 2025-01-27 PROCEDURE — 2580000003 HC RX 258: Performed by: FAMILY MEDICINE

## 2025-01-27 PROCEDURE — 6360000002 HC RX W HCPCS: Performed by: INTERNAL MEDICINE

## 2025-01-27 PROCEDURE — 96366 THER/PROPH/DIAG IV INF ADDON: CPT

## 2025-01-27 PROCEDURE — 1159F MED LIST DOCD IN RCRD: CPT | Performed by: INTERNAL MEDICINE

## 2025-01-27 PROCEDURE — 85025 COMPLETE CBC W/AUTO DIFF WBC: CPT

## 2025-01-27 PROCEDURE — 80047 BASIC METABLC PNL IONIZED CA: CPT

## 2025-01-27 PROCEDURE — 3078F DIAST BP <80 MM HG: CPT | Performed by: INTERNAL MEDICINE

## 2025-01-27 PROCEDURE — 1123F ACP DISCUSS/DSCN MKR DOCD: CPT | Performed by: INTERNAL MEDICINE

## 2025-01-27 PROCEDURE — 96365 THER/PROPH/DIAG IV INF INIT: CPT

## 2025-01-27 RX ORDER — ALBUTEROL SULFATE 90 UG/1
4 INHALANT RESPIRATORY (INHALATION) PRN
OUTPATIENT
Start: 2025-01-27

## 2025-01-27 RX ORDER — SODIUM CHLORIDE 9 MG/ML
25 INJECTION, SOLUTION INTRAVENOUS PRN
OUTPATIENT
Start: 2025-01-27

## 2025-01-27 RX ORDER — DIPHENHYDRAMINE HYDROCHLORIDE 50 MG/ML
50 INJECTION INTRAMUSCULAR; INTRAVENOUS
OUTPATIENT
Start: 2025-02-24

## 2025-01-27 RX ORDER — ONDANSETRON 2 MG/ML
8 INJECTION INTRAMUSCULAR; INTRAVENOUS
OUTPATIENT
Start: 2025-01-27

## 2025-01-27 RX ORDER — SODIUM CHLORIDE 9 MG/ML
5-250 INJECTION, SOLUTION INTRAVENOUS PRN
Status: DISCONTINUED | OUTPATIENT
Start: 2025-01-27 | End: 2025-01-28 | Stop reason: HOSPADM

## 2025-01-27 RX ORDER — SODIUM CHLORIDE 9 MG/ML
INJECTION, SOLUTION INTRAVENOUS CONTINUOUS
OUTPATIENT
Start: 2025-02-24

## 2025-01-27 RX ORDER — DIPHENHYDRAMINE HCL 25 MG
25 TABLET ORAL ONCE
Status: COMPLETED | OUTPATIENT
Start: 2025-01-27 | End: 2025-01-27

## 2025-01-27 RX ORDER — SODIUM CHLORIDE 0.9 % (FLUSH) 0.9 %
5-40 SYRINGE (ML) INJECTION PRN
Status: DISCONTINUED | OUTPATIENT
Start: 2025-01-27 | End: 2025-01-28 | Stop reason: HOSPADM

## 2025-01-27 RX ORDER — DIPHENHYDRAMINE HYDROCHLORIDE 50 MG/ML
50 INJECTION INTRAMUSCULAR; INTRAVENOUS
OUTPATIENT
Start: 2025-01-27

## 2025-01-27 RX ORDER — SODIUM CHLORIDE 9 MG/ML
INJECTION, SOLUTION INTRAVENOUS CONTINUOUS
OUTPATIENT
Start: 2025-01-27

## 2025-01-27 RX ORDER — SODIUM CHLORIDE 0.9 % (FLUSH) 0.9 %
5-40 SYRINGE (ML) INJECTION PRN
OUTPATIENT
Start: 2025-01-27

## 2025-01-27 RX ORDER — SODIUM CHLORIDE 0.9 % (FLUSH) 0.9 %
5-40 SYRINGE (ML) INJECTION PRN
OUTPATIENT
Start: 2025-02-24

## 2025-01-27 RX ORDER — EPINEPHRINE 1 MG/ML
0.3 INJECTION, SOLUTION INTRAMUSCULAR; SUBCUTANEOUS PRN
OUTPATIENT
Start: 2025-02-24

## 2025-01-27 RX ORDER — HYDROCORTISONE SODIUM SUCCINATE 100 MG/2ML
100 INJECTION INTRAMUSCULAR; INTRAVENOUS
OUTPATIENT
Start: 2025-01-27

## 2025-01-27 RX ORDER — ACETAMINOPHEN 325 MG/1
650 TABLET ORAL ONCE
Status: COMPLETED | OUTPATIENT
Start: 2025-01-27 | End: 2025-01-27

## 2025-01-27 RX ORDER — HYDROCORTISONE SODIUM SUCCINATE 100 MG/2ML
100 INJECTION INTRAMUSCULAR; INTRAVENOUS
OUTPATIENT
Start: 2025-02-24

## 2025-01-27 RX ORDER — HEPARIN 100 UNIT/ML
500 SYRINGE INTRAVENOUS PRN
OUTPATIENT
Start: 2025-01-27

## 2025-01-27 RX ORDER — EPINEPHRINE 1 MG/ML
0.3 INJECTION, SOLUTION INTRAMUSCULAR; SUBCUTANEOUS PRN
OUTPATIENT
Start: 2025-01-27

## 2025-01-27 RX ORDER — HEPARIN 100 UNIT/ML
500 SYRINGE INTRAVENOUS PRN
Status: DISCONTINUED | OUTPATIENT
Start: 2025-01-27 | End: 2025-01-28 | Stop reason: HOSPADM

## 2025-01-27 RX ORDER — HEPARIN 100 UNIT/ML
500 SYRINGE INTRAVENOUS PRN
OUTPATIENT
Start: 2025-02-24

## 2025-01-27 RX ORDER — SODIUM CHLORIDE 9 MG/ML
5-250 INJECTION, SOLUTION INTRAVENOUS PRN
OUTPATIENT
Start: 2025-02-24

## 2025-01-27 RX ORDER — DIPHENHYDRAMINE HCL 25 MG
25 TABLET ORAL ONCE
OUTPATIENT
Start: 2025-02-24 | End: 2025-02-24

## 2025-01-27 RX ORDER — ACETAMINOPHEN 325 MG/1
650 TABLET ORAL
OUTPATIENT
Start: 2025-01-27

## 2025-01-27 RX ORDER — ACETAMINOPHEN 325 MG/1
650 TABLET ORAL ONCE
OUTPATIENT
Start: 2025-02-24 | End: 2025-02-24

## 2025-01-27 RX ADMIN — ACETAMINOPHEN 650 MG: 325 TABLET ORAL at 14:44

## 2025-01-27 RX ADMIN — SODIUM CHLORIDE, PRESERVATIVE FREE 30 ML: 5 INJECTION INTRAVENOUS at 14:41

## 2025-01-27 RX ADMIN — SODIUM CHLORIDE 20 ML/HR: 9 INJECTION, SOLUTION INTRAVENOUS at 14:45

## 2025-01-27 RX ADMIN — IMMUNE GLOBULIN (HUMAN) 20 G: 10 INJECTION INTRAVENOUS; SUBCUTANEOUS at 14:53

## 2025-01-27 RX ADMIN — HEPARIN 500 UNITS: 100 SYRINGE at 16:32

## 2025-01-27 RX ADMIN — SODIUM CHLORIDE, PRESERVATIVE FREE 10 ML: 5 INJECTION INTRAVENOUS at 16:32

## 2025-01-27 RX ADMIN — DIPHENHYDRAMINE HYDROCHLORIDE 25 MG: 25 TABLET ORAL at 14:45

## 2025-01-27 RX ADMIN — IMMUNE GLOBULIN (HUMAN) 5 G: 10 INJECTION INTRAVENOUS; SUBCUTANEOUS at 16:14

## 2025-01-27 RX ADMIN — SODIUM CHLORIDE, PRESERVATIVE FREE 30 ML: 5 INJECTION INTRAVENOUS at 14:08

## 2025-01-27 ASSESSMENT — PAIN SCALES - GENERAL: PAINLEVEL_OUTOF10: 4

## 2025-01-27 ASSESSMENT — PAIN DESCRIPTION - ORIENTATION: ORIENTATION: MID;LOWER

## 2025-01-27 ASSESSMENT — PAIN - FUNCTIONAL ASSESSMENT: PAIN_FUNCTIONAL_ASSESSMENT: ACTIVITIES ARE NOT PREVENTED

## 2025-01-27 ASSESSMENT — PAIN DESCRIPTION - PAIN TYPE: TYPE: CHRONIC PAIN

## 2025-01-27 ASSESSMENT — PAIN DESCRIPTION - FREQUENCY: FREQUENCY: INTERMITTENT

## 2025-01-27 ASSESSMENT — PAIN DESCRIPTION - LOCATION: LOCATION: BACK

## 2025-01-27 ASSESSMENT — PAIN DESCRIPTION - DESCRIPTORS: DESCRIPTORS: ACHING

## 2025-01-27 ASSESSMENT — PAIN DESCRIPTION - ONSET: ONSET: ON-GOING

## 2025-01-27 NOTE — PROGRESS NOTES
Patient tolerated IVIG without any complications.     No Xgeva given due to ionized calcium level 1.10. Patient encouraged to start taking calcium at home.    Denies dizziness, lightheadedness, acute nausea or vomiting, headache, heart palpitations, rash/itching or increased SOB.     Last vital signs:   BP (!) 115/58   Pulse 67   Temp 98.6 °F (37 °C) (Oral)   Resp 18   Ht 1.626 m (5' 4\")   Wt 99.2 kg (218 lb 9.6 oz)   SpO2 95%   BMI 37.52 kg/m²     Patient instructed if they experience any of the above symptoms following today's visit, he/she is to notify the physician immediately or go to the Emergency Department.    Discharge instructions given to patient. Verbalizes understanding. Ambulated off unit per self in stable condition with belongings.

## 2025-01-27 NOTE — PLAN OF CARE
Problem: Discharge Planning  Goal: Discharge to home or other facility with appropriate resources  Outcome: Adequate for Discharge  Flowsheets (Taken 1/27/2025 1740)  Discharge to home or other facility with appropriate resources:   Identify barriers to discharge with patient and caregiver   Identify discharge learning needs (meds, wound care, etc)  Note: Verbalize understanding of discharge instructions, follow up appointments, and when to call Physician.       Problem: Safety - Adult  Goal: Free from fall injury  Outcome: Adequate for Discharge  Flowsheets (Taken 1/27/2025 1740)  Free From Fall Injury: Instruct family/caregiver on patient safety  Note: Free from falls while in O.P. Oncology.       Problem: Chronic Conditions and Co-morbidities  Goal: Patient's chronic conditions and co-morbidity symptoms are monitored and maintained or improved  Outcome: Adequate for Discharge  Flowsheets (Taken 1/27/2025 1740)  Care Plan - Patient's Chronic Conditions and Co-Morbidity Symptoms are Monitored and Maintained or Improved:   Monitor and assess patient's chronic conditions and comorbid symptoms for stability, deterioration, or improvement   Collaborate with multidisciplinary team to address chronic and comorbid conditions and prevent exacerbation or deterioration  Note: Patient verbalizes understanding to verbal information given on IVIG. Aware to call MD if develop complications.       Problem: Infection - Adult  Goal: Absence of infection at discharge  Outcome: Adequate for Discharge  Flowsheets (Taken 1/27/2025 1740)  Absence of infection at discharge:   Assess and monitor for signs and symptoms of infection   Monitor lab/diagnostic results   Monitor all insertion sites i.e., indwelling lines, tubes and drains   Administer medications as ordered   Instruct and encourage patient and family to use good hand hygiene technique  Note: Mediport site with no redness or warmth. Skin over port site intact with no signs of

## 2025-01-27 NOTE — PROGRESS NOTES
Spiritual Health History and Assessment/Progress Note  [facility]    Initial Encounter,  ,  ,      Name: Analy Reyna MRN: [id number]    Age: 79 y.o.     Sex: female   Language: English   Orthodox: Anglican   <principal problem not specified>     Date: 1/27/2025            Total Time Calculated: 25 min              Spiritual Assessment began in [facility]        Referral/Consult From: Rounding   Encounter Overview/Reason: Initial Encounter  Service Provided For: Patient and family together    Natasha, Belief, Meaning:   Patient identifies as spiritual, is connected with a natasha tradition or spiritual practice, and has beliefs or practices that help with coping during difficult times  Family/Friends No family/friends present      Importance and Influence:  Patient has spiritual/personal beliefs that influence decisions regarding their health  Family/Friends have spiritual/personal beliefs that influence decisions regarding the patient's health    Community:  Patient is connected with a spiritual community  Family/Friends are connected with a spiritual community:    Assessment and Plan of Care:   Pt is an 79 yr old woman, son was sitting down but excused himself to stretch his legs, pt was reading a book but thankful for  visit. Through active listening pt shared \"found out in Aug she had lung cancer that had metastasized into her bone, pt shared she has three kids one in [place] (son) one in [place] and one in [place]. Pt requested prayer for granddaughter who lives in [place], she has cut off communication with her and mother, and it breaks pt heart. So I prayed for a restoration of the relationship, and strength for the pt to endure treatment.  Patient Interventions include: Provided sacramental/Taoist ritual  Family/Friends Interventions include: Provided sacramental/Taoist ritual    Patient Plan of Care: Spiritual Care available upon further

## 2025-01-27 NOTE — PATIENT INSTRUCTIONS
-Start taking Ribociclib (Kisqali) again as prescribed.  -Continue Arimidex daily.  -Restart Eliquis.  Please hold if you start noticing any bleeding.  -Proceed with IVIG today.  -Proceed with Xgeva today if BMP shows normal calcium levels.  -We will obtain weekly CBC at the infusion center (patient would prefer to have it done every Monday).  We will transfuse blood if hemoglobin drops.  -Return to the clinic in 4 weeks for reevaluation and follow-up.

## 2025-01-27 NOTE — PROGRESS NOTES
humeri, proximal femurs) skeleton which meet criteria for viable neoplasm (SUV measure up to 7.7, proximal right humerus). Left adrenal gland nodule (measures 2.9 cm, SUV 5.0). Focal activity at the right hilum (SUV 4.2) meets criteria for viable neoplasm.   Soft tissue nodule of the right inguinal region (measures 2.5, SUV 14.2).  Reticulonodular induration of the omentum with focal nodule of the anterior abdominal cavity on image (measuring 1.8 cm, SUV 6.5).      9/19/24: Educated the patient on above workup results.  -Unfortunately, she has stage IV ER+/MS-/HER2- breast cancer with extensive metastasis.  -Educated her on the standard of care therapy per NCCN guidelines include Aromatase inhibitor + CDK4/6 inhibitor (ribociclib, abemaciclib, palbociclib)  -Ribociclib is the preferred CDK4/6 inhibitor given that in MONALEESA-2 phase 3 randomized controlled trial, Ribociclib + endocrine therapy showed OS benefit in the first-line setting.  -Therefore, I informed the patient about the plan to start Ribociclib plus Anastrozole until disease progression or unacceptable toxicities.  -Patient expressed her understanding and agreement to proceed with the treatment plan.  -I explained to her the treatment regimen including schedule, potential side effects and ways of management.  -I prescribed Anastrozole 1 mg daily and asked the patient to start it as soon as possible.   -I also prescribed Ribociclib 600 mg 21 days on/7 days off and informed the patient about the plan to have a formal chemotherapy teach prior to treatment initiation.  -My plan is to have the patient return to clinic on C1D14 for reevaluation, blood work, EKG and intensive toxicity assessment.    9/30/24 - Ongoing: C1D1 of Arimidex plus Robiciclib (1st line of Palliative Therapy).     10/4/24: L4 vertebral body bone biopsy confirmed metastatic ductal carcinoma of breast origin. The neoplastic cells are positive for GATA3, CK7, and ER (100%).     -As I

## 2025-01-27 NOTE — DISCHARGE INSTRUCTIONS
Please contact your Oncologist if you have any questions regarding the IVIG that you received today.      Please call if you experience any of the the following symptoms after today's therapy / notify MD immediately or go to the Emergency Department.    *dizziness/lightheadedness  *acute nausea/vomiting - not relieved with medication  *headache - not relieved from Tylenol/pain medication  *chest pain/pressure  *rash/itching  *shortness of breath    Drink fluids - 48-64 ounces of fluids daily.    Please call if you develop fever/chills/signs or symptoms of an infection or you are unable to drink fluids.      Provider Instructions:      Start taking Calcium - recommended 600mg twice a day

## 2025-01-29 NOTE — PROGRESS NOTES
[facility]OMEN[facility] US GUID NDL BIOPSY RIGHT Right 2024    [facility] US GUID NDL BIOPSY RIGHT 2024 [facility] [place]    OVARY REMOVAL Left     [facility]     ID OFFICE/OUTPT VISIT,PROCEDURE ONLY N/A 2018    EGD DIAGNOSTIC ONLY performed by Leeanna Merino MD at [facility]    ROTATOR CUFF REPAIR      right    TENDON RELEASE Left 2016    left foot    TRANSESOPHAGEAL ECHOCARDIOGRAM N/A 2022    TRANSESOPHAGEAL ECHOCARDIOGRAM performed by Neema Kearney MD at [facility]    TRANSESOPHAGEAL ECHOCARDIOGRAM N/A 2023    TRANSESOPHAGEAL ECHOCARDIOGRAM performed by Neema Kearney MD at [facility]    TUBAL LIGATION      TUNNELED VENOUS PORT PLACEMENT  2017    UPPER GASTROINTESTINAL ENDOSCOPY Left 05/10/2018    EGD BIOPSY performed by James Herrera MD at [facility]    UPPER GASTROINTESTINAL ENDOSCOPY Left 2021    EGD BIOPSY performed by Micah Alcala MD at [facility]     Family History   Problem Relation Age of Onset    Breast Cancer Mother 68    Heart Disease Mother     High Blood Pressure Mother     Diabetes Mother     Vision Loss Mother     Stroke Mother     COPD Father     Diabetes Father     COPD Brother     Lung Cancer Brother     BRCA 1 Positive Niece         Had double mastectomy     Social History     Tobacco Use    Smoking status: Former     Current packs/day: 0.00     Average packs/day: 1 pack/day for 30.0 years (30.0 ttl pk-yrs)     Types: Cigarettes     Start date: 5/10/1976     Quit date: 5/10/2006     Years since quittin.7    Smokeless tobacco: Never   Substance Use Topics    Alcohol use: No      Current Outpatient Medications   Medication Sig Dispense Refill    benzonatate (TESSALON) 200 MG capsule Take 1 capsule by mouth 3 times daily as needed for Cough for cough      apixaban (ELIQUIS) 5 MG TABS tablet Take 1 tablet by mouth 2 times daily 180 tablet 3    Ribociclib Succ, 600 MG Dose,

## 2025-01-30 ENCOUNTER — OFFICE VISIT (OUTPATIENT)
Dept: CARDIOLOGY CLINIC | Age: 80
End: 2025-01-30
Payer: MEDICARE

## 2025-01-30 VITALS
HEART RATE: 84 BPM | SYSTOLIC BLOOD PRESSURE: 134 MMHG | HEIGHT: 65 IN | WEIGHT: 221 LBS | BODY MASS INDEX: 36.82 KG/M2 | DIASTOLIC BLOOD PRESSURE: 70 MMHG

## 2025-01-30 DIAGNOSIS — C50.911 CARCINOMA OF RIGHT BREAST, ESTROGEN RECEPTOR POSITIVE, STAGE 4 (HCC): ICD-10-CM

## 2025-01-30 DIAGNOSIS — I48.0 PAF (PAROXYSMAL ATRIAL FIBRILLATION) (HCC): Primary | ICD-10-CM

## 2025-01-30 DIAGNOSIS — Z51.81 ENCOUNTER FOR MONITORING AROMATASE INHIBITOR THERAPY: ICD-10-CM

## 2025-01-30 DIAGNOSIS — Z79.811 ENCOUNTER FOR MONITORING AROMATASE INHIBITOR THERAPY: ICD-10-CM

## 2025-01-30 DIAGNOSIS — Z17.0 CARCINOMA OF RIGHT BREAST, ESTROGEN RECEPTOR POSITIVE, STAGE 4 (HCC): ICD-10-CM

## 2025-01-30 DIAGNOSIS — I10 ESSENTIAL HYPERTENSION: ICD-10-CM

## 2025-01-30 DIAGNOSIS — Z51.11 ENCOUNTER FOR ANTINEOPLASTIC CHEMOTHERAPY: ICD-10-CM

## 2025-01-30 PROCEDURE — 1123F ACP DISCUSS/DSCN MKR DOCD: CPT | Performed by: STUDENT IN AN ORGANIZED HEALTH CARE EDUCATION/TRAINING PROGRAM

## 2025-01-30 PROCEDURE — 3078F DIAST BP <80 MM HG: CPT | Performed by: STUDENT IN AN ORGANIZED HEALTH CARE EDUCATION/TRAINING PROGRAM

## 2025-01-30 PROCEDURE — 99214 OFFICE O/P EST MOD 30 MIN: CPT | Performed by: STUDENT IN AN ORGANIZED HEALTH CARE EDUCATION/TRAINING PROGRAM

## 2025-01-30 PROCEDURE — 1159F MED LIST DOCD IN RCRD: CPT | Performed by: STUDENT IN AN ORGANIZED HEALTH CARE EDUCATION/TRAINING PROGRAM

## 2025-01-30 PROCEDURE — 3075F SYST BP GE 130 - 139MM HG: CPT | Performed by: STUDENT IN AN ORGANIZED HEALTH CARE EDUCATION/TRAINING PROGRAM

## 2025-01-30 RX ORDER — ANASTROZOLE 1 MG/1
1 TABLET ORAL DAILY
Qty: 30 TABLET | Refills: 3 | Status: SHIPPED | OUTPATIENT
Start: 2025-01-30

## 2025-02-03 ENCOUNTER — HOSPITAL ENCOUNTER (OUTPATIENT)
Dept: INFUSION THERAPY | Age: 80
Discharge: HOME | End: 2025-02-03
Payer: MEDICARE

## 2025-02-03 VITALS
DIASTOLIC BLOOD PRESSURE: 64 MMHG | WEIGHT: 222.6 LBS | TEMPERATURE: 98.7 F | RESPIRATION RATE: 18 BRPM | BODY MASS INDEX: 37.09 KG/M2 | OXYGEN SATURATION: 94 % | HEIGHT: 65 IN | SYSTOLIC BLOOD PRESSURE: 135 MMHG | HEART RATE: 56 BPM

## 2025-02-03 DIAGNOSIS — C79.51 METASTASIS TO BONE (HCC): ICD-10-CM

## 2025-02-03 DIAGNOSIS — Z17.0 CARCINOMA OF RIGHT BREAST, ESTROGEN RECEPTOR POSITIVE, STAGE 4 (HCC): Primary | ICD-10-CM

## 2025-02-03 DIAGNOSIS — Z51.11 ENCOUNTER FOR CHEMOTHERAPY MANAGEMENT: ICD-10-CM

## 2025-02-03 DIAGNOSIS — C50.911 CARCINOMA OF RIGHT BREAST, ESTROGEN RECEPTOR POSITIVE, STAGE 4 (HCC): Primary | ICD-10-CM

## 2025-02-03 LAB
ALBUMIN SERPL BCG-MCNC: 3.7 G/DL (ref 3.5–5.1)
ALP SERPL-CCNC: 90 U/L (ref 38–126)
ALT SERPL W/O P-5'-P-CCNC: 8 U/L (ref 11–66)
AST SERPL-CCNC: 14 U/L (ref 5–40)
BASOPHILS ABSOLUTE: 0 THOU/MM3 (ref 0–0.1)
BASOPHILS NFR BLD AUTO: 1 % (ref 0–3)
BILIRUB CONJ SERPL-MCNC: < 0.1 MG/DL (ref 0.1–13.8)
BILIRUB SERPL-MCNC: 0.2 MG/DL (ref 0.3–1.2)
BUN BLDP-MCNC: 36 MG/DL (ref 8–26)
CHLORIDE BLD-SCNC: 107 MEQ/L (ref 98–109)
CREAT BLD-MCNC: 1.3 MG/DL (ref 0.5–1.2)
EOSINOPHIL NFR BLD AUTO: 9 % (ref 0–4)
EOSINOPHILS ABSOLUTE: 0.3 THOU/MM3 (ref 0–0.4)
ERYTHROCYTE [DISTWIDTH] IN BLOOD BY AUTOMATED COUNT: 14.7 % (ref 11.5–14.5)
GFR SERPL CREATININE-BSD FRML MDRD: 41 ML/MIN/1.73M2
GLUCOSE BLD-MCNC: 134 MG/DL (ref 70–108)
HCT VFR BLD AUTO: 28.2 % (ref 37–47)
HGB BLD-MCNC: 8.8 GM/DL (ref 12–16)
IMMATURE GRANULOCYTES %: 0 %
IMMATURE GRANULOCYTES ABSOLUTE: 0.01 THOU/MM3 (ref 0–0.07)
IONIZED CALCIUM, WHOLE BLOOD: 1.13 MMOL/L (ref 1.12–1.32)
LYMPHOCYTES ABSOLUTE: 0.6 THOU/MM3 (ref 1–4.8)
LYMPHOCYTES NFR BLD AUTO: 18 % (ref 15–47)
MCH RBC QN AUTO: 31.4 PG (ref 26–33)
MCHC RBC AUTO-ENTMCNC: 31.2 GM/DL (ref 32.2–35.5)
MCV RBC AUTO: 101 FL (ref 81–99)
MONOCYTES ABSOLUTE: 0.2 THOU/MM3 (ref 0.4–1.3)
MONOCYTES NFR BLD AUTO: 7 % (ref 0–12)
NEUTROPHILS ABSOLUTE: 2 THOU/MM3 (ref 1.8–7.7)
NEUTROPHILS NFR BLD AUTO: 65 % (ref 43–75)
PLATELET # BLD AUTO: 300 THOU/MM3 (ref 130–400)
PMV BLD AUTO: 9 FL (ref 9.4–12.4)
POTASSIUM BLD-SCNC: 4.6 MEQ/L (ref 3.5–4.9)
PROT SERPL-MCNC: 6.9 G/DL (ref 6.1–8)
RBC # BLD AUTO: 2.8 MILL/MM3 (ref 4.2–5.4)
SODIUM BLD-SCNC: 140 MEQ/L (ref 138–146)
TOTAL CO2, WHOLE BLOOD: 28 MEQ/L (ref 23–33)
WBC # BLD AUTO: 3.1 THOU/MM3 (ref 4.8–10.8)

## 2025-02-03 PROCEDURE — 85025 COMPLETE CBC W/AUTO DIFF WBC: CPT

## 2025-02-03 PROCEDURE — 80076 HEPATIC FUNCTION PANEL: CPT

## 2025-02-03 PROCEDURE — 2500000003 HC RX 250 WO HCPCS: Performed by: INTERNAL MEDICINE

## 2025-02-03 PROCEDURE — 36591 DRAW BLOOD OFF VENOUS DEVICE: CPT

## 2025-02-03 PROCEDURE — 96372 THER/PROPH/DIAG INJ SC/IM: CPT

## 2025-02-03 PROCEDURE — 6360000002 HC RX W HCPCS: Performed by: INTERNAL MEDICINE

## 2025-02-03 PROCEDURE — 80047 BASIC METABLC PNL IONIZED CA: CPT

## 2025-02-03 RX ORDER — ONDANSETRON 2 MG/ML
8 INJECTION INTRAMUSCULAR; INTRAVENOUS
OUTPATIENT
Start: 2025-02-20

## 2025-02-03 RX ORDER — DIPHENHYDRAMINE HYDROCHLORIDE 50 MG/ML
50 INJECTION INTRAMUSCULAR; INTRAVENOUS
OUTPATIENT
Start: 2025-02-20

## 2025-02-03 RX ORDER — EPINEPHRINE 1 MG/ML
0.3 INJECTION, SOLUTION INTRAMUSCULAR; SUBCUTANEOUS PRN
Status: CANCELLED | OUTPATIENT
Start: 2025-02-03

## 2025-02-03 RX ORDER — ACETAMINOPHEN 325 MG/1
650 TABLET ORAL
OUTPATIENT
Start: 2025-02-20

## 2025-02-03 RX ORDER — HYDROCORTISONE SODIUM SUCCINATE 100 MG/2ML
100 INJECTION INTRAMUSCULAR; INTRAVENOUS
Status: CANCELLED | OUTPATIENT
Start: 2025-02-03

## 2025-02-03 RX ORDER — DIPHENHYDRAMINE HYDROCHLORIDE 50 MG/ML
50 INJECTION INTRAMUSCULAR; INTRAVENOUS
Status: CANCELLED | OUTPATIENT
Start: 2025-02-03

## 2025-02-03 RX ORDER — HEPARIN 100 UNIT/ML
500 SYRINGE INTRAVENOUS PRN
Status: DISCONTINUED | OUTPATIENT
Start: 2025-02-03 | End: 2025-02-04 | Stop reason: HOSPADM

## 2025-02-03 RX ORDER — HYDROCORTISONE SODIUM SUCCINATE 100 MG/2ML
100 INJECTION INTRAMUSCULAR; INTRAVENOUS
OUTPATIENT
Start: 2025-02-20

## 2025-02-03 RX ORDER — ONDANSETRON 2 MG/ML
8 INJECTION INTRAMUSCULAR; INTRAVENOUS
Status: CANCELLED | OUTPATIENT
Start: 2025-02-03

## 2025-02-03 RX ORDER — SODIUM CHLORIDE 9 MG/ML
25 INJECTION, SOLUTION INTRAVENOUS PRN
Status: CANCELLED | OUTPATIENT
Start: 2025-02-03

## 2025-02-03 RX ORDER — EPINEPHRINE 1 MG/ML
0.3 INJECTION, SOLUTION INTRAMUSCULAR; SUBCUTANEOUS PRN
OUTPATIENT
Start: 2025-02-20

## 2025-02-03 RX ORDER — CALCIUM CARBONATE 500(1250)
600 TABLET ORAL 2 TIMES DAILY
COMMUNITY

## 2025-02-03 RX ORDER — SODIUM CHLORIDE 9 MG/ML
INJECTION, SOLUTION INTRAVENOUS CONTINUOUS
OUTPATIENT
Start: 2025-02-20

## 2025-02-03 RX ORDER — SODIUM CHLORIDE 0.9 % (FLUSH) 0.9 %
5-40 SYRINGE (ML) INJECTION PRN
Status: DISCONTINUED | OUTPATIENT
Start: 2025-02-03 | End: 2025-02-04 | Stop reason: HOSPADM

## 2025-02-03 RX ORDER — HEPARIN 100 UNIT/ML
500 SYRINGE INTRAVENOUS PRN
Status: CANCELLED | OUTPATIENT
Start: 2025-02-03

## 2025-02-03 RX ORDER — ACETAMINOPHEN 325 MG/1
650 TABLET ORAL
Status: CANCELLED | OUTPATIENT
Start: 2025-02-03

## 2025-02-03 RX ORDER — SODIUM CHLORIDE 0.9 % (FLUSH) 0.9 %
5-40 SYRINGE (ML) INJECTION PRN
Status: CANCELLED | OUTPATIENT
Start: 2025-02-03

## 2025-02-03 RX ORDER — ALBUTEROL SULFATE 90 UG/1
4 INHALANT RESPIRATORY (INHALATION) PRN
OUTPATIENT
Start: 2025-02-20

## 2025-02-03 RX ORDER — ALBUTEROL SULFATE 90 UG/1
4 INHALANT RESPIRATORY (INHALATION) PRN
Status: CANCELLED | OUTPATIENT
Start: 2025-02-03

## 2025-02-03 RX ORDER — SODIUM CHLORIDE 9 MG/ML
INJECTION, SOLUTION INTRAVENOUS CONTINUOUS
Status: CANCELLED | OUTPATIENT
Start: 2025-02-03

## 2025-02-03 RX ADMIN — SODIUM CHLORIDE, PRESERVATIVE FREE 30 ML: 5 INJECTION INTRAVENOUS at 08:16

## 2025-02-03 RX ADMIN — DENOSUMAB 120 MG: 120 INJECTION SUBCUTANEOUS at 08:41

## 2025-02-03 RX ADMIN — HEPARIN 500 UNITS: 100 SYRINGE at 08:33

## 2025-02-03 RX ADMIN — SODIUM CHLORIDE, PRESERVATIVE FREE 10 ML: 5 INJECTION INTRAVENOUS at 08:33

## 2025-02-03 NOTE — DISCHARGE INSTRUCTIONS
Please contact your Oncologist if you have any questions regarding the Xgeva injection that you received today.      Please call if you experience any of the the following symptoms after today's therapy / notify MD immediately or go to the Emergency Department.    *dizziness/lightheadedness  *acute nausea/vomiting - not relieved with medication  *headache - not relieved from Tylenol/pain medication  *chest pain/pressure  *rash/itching  *shortness of breath    Drink fluids - 48-64 ounces of fluids daily.    Please call if you develop fever/chills/signs or symptoms of an infection or you are unable to drink fluids.

## 2025-02-03 NOTE — PLAN OF CARE
Problem: Discharge Planning  Goal: Discharge to home or other facility with appropriate resources  Outcome: Adequate for Discharge  Flowsheets (Taken 2/3/2025 0822)  Discharge to home or other facility with appropriate resources:   Identify barriers to discharge with patient and caregiver   Identify discharge learning needs (meds, wound care, etc)  Note: Verbalize understanding of discharge instructions, follow up appointments, and when to call Physician.       Problem: Safety - Adult  Goal: Free from fall injury  Outcome: Adequate for Discharge  Flowsheets (Taken 2/3/2025 0822)  Free From Fall Injury: Instruct family/caregiver on patient safety  Note: Free from falls while in O.P. Oncology.       Problem: Chronic Conditions and Co-morbidities  Goal: Patient's chronic conditions and co-morbidity symptoms are monitored and maintained or improved  Outcome: Adequate for Discharge  Flowsheets (Taken 2/3/2025 0822)  Care Plan - Patient's Chronic Conditions and Co-Morbidity Symptoms are Monitored and Maintained or Improved:   Monitor and assess patient's chronic conditions and comorbid symptoms for stability, deterioration, or improvement   Collaborate with multidisciplinary team to address chronic and comorbid conditions and prevent exacerbation or deterioration  Note: Patient verbalizes understanding to verbal information given on lab draw via Mediport and Xgeva. Aware to call MD if develop complications.       Problem: Infection - Adult  Goal: Absence of infection at discharge  Outcome: Adequate for Discharge  Flowsheets (Taken 2/3/2025 0822)  Absence of infection at discharge:   Assess and monitor for signs and symptoms of infection   Monitor lab/diagnostic results   Monitor all insertion sites i.e., indwelling lines, tubes and drains   Administer medications as ordered   Instruct and encourage patient and family to use good hand hygiene technique  Note: Mediport site with no redness or warmth. Skin over port site

## 2025-02-03 NOTE — PROGRESS NOTES
Patient tolerated Xgeva injection and lab draw via Mediport without any complications. No blood transfusion needed today for Hgb 8.8. Discharge instructions given to patient. Verbalizes understanding. Ambulated off unit per self with belongings.

## 2025-02-10 ENCOUNTER — HOSPITAL ENCOUNTER (OUTPATIENT)
Dept: INFUSION THERAPY | Age: 80
Discharge: HOME | End: 2025-02-10
Payer: MEDICARE

## 2025-02-10 VITALS
DIASTOLIC BLOOD PRESSURE: 66 MMHG | BODY MASS INDEX: 36.18 KG/M2 | RESPIRATION RATE: 16 BRPM | SYSTOLIC BLOOD PRESSURE: 140 MMHG | HEART RATE: 69 BPM | HEIGHT: 65 IN | WEIGHT: 217.13 LBS | OXYGEN SATURATION: 95 % | TEMPERATURE: 98 F

## 2025-02-10 DIAGNOSIS — Z51.11 ENCOUNTER FOR CHEMOTHERAPY MANAGEMENT: ICD-10-CM

## 2025-02-10 DIAGNOSIS — Z17.0 CARCINOMA OF RIGHT BREAST, ESTROGEN RECEPTOR POSITIVE, STAGE 4 (HCC): Primary | ICD-10-CM

## 2025-02-10 DIAGNOSIS — C50.911 CARCINOMA OF RIGHT BREAST, ESTROGEN RECEPTOR POSITIVE, STAGE 4 (HCC): Primary | ICD-10-CM

## 2025-02-10 DIAGNOSIS — D64.9 ANEMIA, UNSPECIFIED TYPE: ICD-10-CM

## 2025-02-10 LAB
BASOPHILS ABSOLUTE: 0 THOU/MM3 (ref 0–0.1)
BASOPHILS NFR BLD AUTO: 2 % (ref 0–3)
EOSINOPHIL NFR BLD AUTO: 4 % (ref 0–4)
EOSINOPHILS ABSOLUTE: 0.1 THOU/MM3 (ref 0–0.4)
ERYTHROCYTE [DISTWIDTH] IN BLOOD BY AUTOMATED COUNT: 15.4 % (ref 11.5–14.5)
HCT VFR BLD AUTO: 27.8 % (ref 37–47)
HGB BLD-MCNC: 8.9 GM/DL (ref 12–16)
IMMATURE GRANULOCYTES %: 0 %
IMMATURE GRANULOCYTES ABSOLUTE: 0.01 THOU/MM3 (ref 0–0.07)
LYMPHOCYTES ABSOLUTE: 0.4 THOU/MM3 (ref 1–4.8)
LYMPHOCYTES NFR BLD AUTO: 18 % (ref 15–47)
MCH RBC QN AUTO: 32 PG (ref 26–33)
MCHC RBC AUTO-ENTMCNC: 32 GM/DL (ref 32.2–35.5)
MCV RBC AUTO: 100 FL (ref 81–99)
MONOCYTES ABSOLUTE: 0.2 THOU/MM3 (ref 0.4–1.3)
MONOCYTES NFR BLD AUTO: 9 % (ref 0–12)
NEUTROPHILS ABSOLUTE: 1.6 THOU/MM3 (ref 1.8–7.7)
NEUTROPHILS NFR BLD AUTO: 67 % (ref 43–75)
PLATELET # BLD AUTO: 151 THOU/MM3 (ref 130–400)
PMV BLD AUTO: 9 FL (ref 9.4–12.4)
RBC # BLD AUTO: 2.78 MILL/MM3 (ref 4.2–5.4)
WBC # BLD AUTO: 2.4 THOU/MM3 (ref 4.8–10.8)

## 2025-02-10 PROCEDURE — 99211 OFF/OP EST MAY X REQ PHY/QHP: CPT

## 2025-02-10 PROCEDURE — 2500000003 HC RX 250 WO HCPCS: Performed by: INTERNAL MEDICINE

## 2025-02-10 PROCEDURE — 36591 DRAW BLOOD OFF VENOUS DEVICE: CPT

## 2025-02-10 PROCEDURE — 6360000002 HC RX W HCPCS: Performed by: INTERNAL MEDICINE

## 2025-02-10 PROCEDURE — 85025 COMPLETE CBC W/AUTO DIFF WBC: CPT

## 2025-02-10 RX ORDER — ALBUTEROL SULFATE 90 UG/1
4 INHALANT RESPIRATORY (INHALATION) PRN
OUTPATIENT
Start: 2025-02-10

## 2025-02-10 RX ORDER — DIPHENHYDRAMINE HYDROCHLORIDE 50 MG/ML
50 INJECTION INTRAMUSCULAR; INTRAVENOUS
OUTPATIENT
Start: 2025-02-10

## 2025-02-10 RX ORDER — SODIUM CHLORIDE 9 MG/ML
25 INJECTION, SOLUTION INTRAVENOUS PRN
OUTPATIENT
Start: 2025-02-10

## 2025-02-10 RX ORDER — HEPARIN 100 UNIT/ML
500 SYRINGE INTRAVENOUS PRN
OUTPATIENT
Start: 2025-02-10

## 2025-02-10 RX ORDER — SODIUM CHLORIDE 0.9 % (FLUSH) 0.9 %
5-40 SYRINGE (ML) INJECTION PRN
Status: DISCONTINUED | OUTPATIENT
Start: 2025-02-10 | End: 2025-02-11 | Stop reason: HOSPADM

## 2025-02-10 RX ORDER — ONDANSETRON 2 MG/ML
8 INJECTION INTRAMUSCULAR; INTRAVENOUS
OUTPATIENT
Start: 2025-02-10

## 2025-02-10 RX ORDER — SODIUM CHLORIDE 0.9 % (FLUSH) 0.9 %
5-40 SYRINGE (ML) INJECTION PRN
OUTPATIENT
Start: 2025-02-10

## 2025-02-10 RX ORDER — SODIUM CHLORIDE 9 MG/ML
INJECTION, SOLUTION INTRAVENOUS CONTINUOUS
OUTPATIENT
Start: 2025-02-10

## 2025-02-10 RX ORDER — HYDROCORTISONE SODIUM SUCCINATE 100 MG/2ML
100 INJECTION INTRAMUSCULAR; INTRAVENOUS
OUTPATIENT
Start: 2025-02-10

## 2025-02-10 RX ORDER — EPINEPHRINE 1 MG/ML
0.3 INJECTION, SOLUTION INTRAMUSCULAR; SUBCUTANEOUS PRN
OUTPATIENT
Start: 2025-02-10

## 2025-02-10 RX ORDER — ACETAMINOPHEN 325 MG/1
650 TABLET ORAL
OUTPATIENT
Start: 2025-02-10

## 2025-02-10 RX ORDER — HEPARIN 100 UNIT/ML
500 SYRINGE INTRAVENOUS PRN
Status: DISCONTINUED | OUTPATIENT
Start: 2025-02-10 | End: 2025-02-11 | Stop reason: HOSPADM

## 2025-02-10 RX ADMIN — HEPARIN 500 UNITS: 100 SYRINGE at 10:07

## 2025-02-10 RX ADMIN — SODIUM CHLORIDE, PRESERVATIVE FREE 30 ML: 5 INJECTION INTRAVENOUS at 09:41

## 2025-02-10 NOTE — PLAN OF CARE
Problem: Discharge Planning  Goal: Discharge to home or other facility with appropriate resources  Outcome: Progressing     Problem: Safety - Adult  Goal: Free from fall injury  Outcome: Progressing     Problem: Chronic Conditions and Co-morbidities  Goal: Patient's chronic conditions and co-morbidity symptoms are monitored and maintained or improved  Outcome: Progressing  Flowsheets (Taken 2/10/2025 2238)  Care Plan - Patient's Chronic Conditions and Co-Morbidity Symptoms are Monitored and Maintained or Improved: Monitor and assess patient's chronic conditions and comorbid symptoms for stability, deterioration, or improvement  Note: Discussed indications for labs   Care plan reviewed with patient.  Patient verbalizes understanding of the plan of care and contributes to goal setting.

## 2025-02-10 NOTE — PATIENT INSTRUCTIONS
Continue current medications as prescribed.    Stay as active as you can.     Eat heart healthy diet.     Follow-up with your PCP as scheduled.    Follow-up with      as scheduled or sooner if need.     
DISCHARGE

## 2025-02-10 NOTE — PROGRESS NOTES
No transfusion needed today. Patient to return in 2 weeks for IVIG infusion and xgeva injection. Patient discharged with belongings. Electronically signed by Gilma Desai RN on 2/10/2025 at 10:10 AM

## 2025-02-18 ENCOUNTER — APPOINTMENT (OUTPATIENT)
Dept: GENERAL RADIOLOGY | Age: 80
DRG: 543 | End: 2025-02-18
Payer: MEDICARE

## 2025-02-18 ENCOUNTER — APPOINTMENT (OUTPATIENT)
Dept: ULTRASOUND IMAGING | Age: 80
DRG: 543 | End: 2025-02-18
Payer: MEDICARE

## 2025-02-18 ENCOUNTER — HOSPITAL ENCOUNTER (INPATIENT)
Age: 80
DRG: 543 | End: 2025-02-18
Attending: EMERGENCY MEDICINE | Admitting: NURSE PRACTITIONER
Payer: MEDICARE

## 2025-02-18 ENCOUNTER — APPOINTMENT (OUTPATIENT)
Dept: CT IMAGING | Age: 80
DRG: 543 | End: 2025-02-18
Payer: MEDICARE

## 2025-02-18 DIAGNOSIS — C79.51 METASTASIS TO BONE (HCC): ICD-10-CM

## 2025-02-18 DIAGNOSIS — C50.911 CARCINOMA OF RIGHT BREAST, ESTROGEN RECEPTOR POSITIVE, STAGE 4 (HCC): ICD-10-CM

## 2025-02-18 DIAGNOSIS — J44.9 CHRONIC OBSTRUCTIVE PULMONARY DISEASE, UNSPECIFIED COPD TYPE (HCC): ICD-10-CM

## 2025-02-18 DIAGNOSIS — C50.111 MALIGNANT NEOPLASM OF CENTRAL PORTION OF RIGHT BREAST IN FEMALE, ESTROGEN RECEPTOR POSITIVE (HCC): ICD-10-CM

## 2025-02-18 DIAGNOSIS — Z17.0 CARCINOMA OF RIGHT BREAST, ESTROGEN RECEPTOR POSITIVE, STAGE 4 (HCC): ICD-10-CM

## 2025-02-18 DIAGNOSIS — Z51.5 PALLIATIVE CARE PATIENT: ICD-10-CM

## 2025-02-18 DIAGNOSIS — C79.51 SECONDARY MALIGNANT NEOPLASM OF BONE AND BONE MARROW (HCC): ICD-10-CM

## 2025-02-18 DIAGNOSIS — C79.52 SECONDARY MALIGNANT NEOPLASM OF BONE AND BONE MARROW (HCC): ICD-10-CM

## 2025-02-18 DIAGNOSIS — R10.11 ABDOMINAL PAIN, RIGHT UPPER QUADRANT: Primary | ICD-10-CM

## 2025-02-18 DIAGNOSIS — R07.9 CHEST PAIN, UNSPECIFIED TYPE: ICD-10-CM

## 2025-02-18 DIAGNOSIS — Z17.0 MALIGNANT NEOPLASM OF CENTRAL PORTION OF RIGHT BREAST IN FEMALE, ESTROGEN RECEPTOR POSITIVE (HCC): ICD-10-CM

## 2025-02-18 LAB
ALBUMIN SERPL BCG-MCNC: 3.9 G/DL (ref 3.5–5.1)
ALP SERPL-CCNC: 82 U/L (ref 38–126)
ALT SERPL W/O P-5'-P-CCNC: 6 U/L (ref 11–66)
ANION GAP SERPL CALC-SCNC: 14 MEQ/L (ref 8–16)
AST SERPL-CCNC: 18 U/L (ref 5–40)
BASOPHILS ABSOLUTE: 0.1 THOU/MM3 (ref 0–0.1)
BASOPHILS NFR BLD AUTO: 1.7 %
BILIRUB CONJ SERPL-MCNC: < 0.1 MG/DL (ref 0–0.2)
BILIRUB SERPL-MCNC: 0.3 MG/DL (ref 0.3–1.2)
BILIRUB UR QL STRIP.AUTO: NEGATIVE
BUN SERPL-MCNC: 31 MG/DL (ref 7–22)
CALCIUM SERPL-MCNC: 8.7 MG/DL (ref 8.2–9.6)
CHARACTER UR: CLEAR
CHLORIDE SERPL-SCNC: 98 MEQ/L (ref 98–111)
CO2 SERPL-SCNC: 26 MEQ/L (ref 23–33)
COLOR, UA: YELLOW
CREAT SERPL-MCNC: 1.5 MG/DL (ref 0.4–1.2)
DEPRECATED RDW RBC AUTO: 58.4 FL (ref 35–45)
EKG ATRIAL RATE: 93 BPM
EKG P AXIS: 29 DEGREES
EKG P-R INTERVAL: 156 MS
EKG Q-T INTERVAL: 356 MS
EKG QRS DURATION: 92 MS
EKG QTC CALCULATION (BAZETT): 442 MS
EKG R AXIS: -43 DEGREES
EKG T AXIS: 75 DEGREES
EKG VENTRICULAR RATE: 93 BPM
EOSINOPHIL NFR BLD AUTO: 2.7 %
EOSINOPHILS ABSOLUTE: 0.1 THOU/MM3 (ref 0–0.4)
ERYTHROCYTE [DISTWIDTH] IN BLOOD BY AUTOMATED COUNT: 16.2 % (ref 11.5–14.5)
GFR SERPL CREATININE-BSD FRML MDRD: 35 ML/MIN/1.73M2
GLUCOSE BLD STRIP.AUTO-MCNC: 111 MG/DL (ref 70–108)
GLUCOSE SERPL-MCNC: 123 MG/DL (ref 70–108)
GLUCOSE UR QL STRIP.AUTO: NEGATIVE MG/DL
HCT VFR BLD AUTO: 30.3 % (ref 37–47)
HGB BLD-MCNC: 9.6 GM/DL (ref 12–16)
HGB UR QL STRIP.AUTO: NEGATIVE
IMM GRANULOCYTES # BLD AUTO: 0.01 THOU/MM3 (ref 0–0.07)
IMM GRANULOCYTES NFR BLD AUTO: 0.3 %
KETONES UR QL STRIP.AUTO: NEGATIVE
LIPASE SERPL-CCNC: 11.7 U/L (ref 5.6–51.3)
LYMPHOCYTES ABSOLUTE: 0.6 THOU/MM3 (ref 1–4.8)
LYMPHOCYTES NFR BLD AUTO: 20.1 %
MCH RBC QN AUTO: 31.4 PG (ref 26–33)
MCHC RBC AUTO-ENTMCNC: 31.7 GM/DL (ref 32.2–35.5)
MCV RBC AUTO: 99 FL (ref 81–99)
MONOCYTES ABSOLUTE: 0.2 THOU/MM3 (ref 0.4–1.3)
MONOCYTES NFR BLD AUTO: 6.4 %
NEUTROPHILS ABSOLUTE: 2.1 THOU/MM3 (ref 1.8–7.7)
NEUTROPHILS NFR BLD AUTO: 68.8 %
NITRITE UR QL STRIP: NEGATIVE
NRBC BLD AUTO-RTO: 0 /100 WBC
NT-PROBNP SERPL IA-MCNC: 572.1 PG/ML (ref 0–449)
OSMOLALITY SERPL CALC.SUM OF ELEC: 283.6 MOSMOL/KG (ref 275–300)
PH UR STRIP.AUTO: 6 [PH] (ref 5–9)
PLATELET # BLD AUTO: 136 THOU/MM3 (ref 130–400)
PMV BLD AUTO: 9.5 FL (ref 9.4–12.4)
POTASSIUM SERPL-SCNC: 4.9 MEQ/L (ref 3.5–5.2)
PROCALCITONIN SERPL IA-MCNC: 0.07 NG/ML (ref 0.01–0.09)
PROT SERPL-MCNC: 6.8 G/DL (ref 6.1–8)
PROT UR STRIP.AUTO-MCNC: NEGATIVE MG/DL
RBC # BLD AUTO: 3.06 MILL/MM3 (ref 4.2–5.4)
SODIUM SERPL-SCNC: 138 MEQ/L (ref 135–145)
SP GR UR REFRACT.AUTO: 1.02 (ref 1–1.03)
TROPONIN, HIGH SENSITIVITY: 17 NG/L (ref 0–12)
TROPONIN, HIGH SENSITIVITY: 23 NG/L (ref 0–12)
UROBILINOGEN, URINE: 0.2 EU/DL (ref 0–1)
WBC # BLD AUTO: 3 THOU/MM3 (ref 4.8–10.8)
WBC #/AREA URNS HPF: NEGATIVE /[HPF]

## 2025-02-18 PROCEDURE — 71045 X-RAY EXAM CHEST 1 VIEW: CPT

## 2025-02-18 PROCEDURE — 6360000002 HC RX W HCPCS: Performed by: NURSE PRACTITIONER

## 2025-02-18 PROCEDURE — 6370000000 HC RX 637 (ALT 250 FOR IP): Performed by: NURSE PRACTITIONER

## 2025-02-18 PROCEDURE — 2580000003 HC RX 258: Performed by: NURSE PRACTITIONER

## 2025-02-18 PROCEDURE — 96374 THER/PROPH/DIAG INJ IV PUSH: CPT

## 2025-02-18 PROCEDURE — 76705 ECHO EXAM OF ABDOMEN: CPT

## 2025-02-18 PROCEDURE — 87636 SARSCOV2 & INF A&B AMP PRB: CPT

## 2025-02-18 PROCEDURE — 1200000003 HC TELEMETRY R&B

## 2025-02-18 PROCEDURE — 6360000004 HC RX CONTRAST MEDICATION: Performed by: EMERGENCY MEDICINE

## 2025-02-18 PROCEDURE — 93010 ELECTROCARDIOGRAM REPORT: CPT | Performed by: INTERNAL MEDICINE

## 2025-02-18 PROCEDURE — 83880 ASSAY OF NATRIURETIC PEPTIDE: CPT

## 2025-02-18 PROCEDURE — 99285 EMERGENCY DEPT VISIT HI MDM: CPT

## 2025-02-18 PROCEDURE — 36415 COLL VENOUS BLD VENIPUNCTURE: CPT

## 2025-02-18 PROCEDURE — 71275 CT ANGIOGRAPHY CHEST: CPT

## 2025-02-18 PROCEDURE — 82948 REAGENT STRIP/BLOOD GLUCOSE: CPT

## 2025-02-18 PROCEDURE — 2500000003 HC RX 250 WO HCPCS: Performed by: NURSE PRACTITIONER

## 2025-02-18 PROCEDURE — 83690 ASSAY OF LIPASE: CPT

## 2025-02-18 PROCEDURE — 81003 URINALYSIS AUTO W/O SCOPE: CPT

## 2025-02-18 PROCEDURE — 80048 BASIC METABOLIC PNL TOTAL CA: CPT

## 2025-02-18 PROCEDURE — 84145 PROCALCITONIN (PCT): CPT

## 2025-02-18 PROCEDURE — 74177 CT ABD & PELVIS W/CONTRAST: CPT

## 2025-02-18 PROCEDURE — 93005 ELECTROCARDIOGRAM TRACING: CPT | Performed by: EMERGENCY MEDICINE

## 2025-02-18 PROCEDURE — 6360000002 HC RX W HCPCS: Performed by: EMERGENCY MEDICINE

## 2025-02-18 PROCEDURE — 80076 HEPATIC FUNCTION PANEL: CPT

## 2025-02-18 PROCEDURE — 84484 ASSAY OF TROPONIN QUANT: CPT

## 2025-02-18 PROCEDURE — 85025 COMPLETE CBC W/AUTO DIFF WBC: CPT

## 2025-02-18 RX ORDER — SODIUM CHLORIDE 0.9 % (FLUSH) 0.9 %
5-40 SYRINGE (ML) INJECTION PRN
Status: ACTIVE | OUTPATIENT
Start: 2025-02-18

## 2025-02-18 RX ORDER — MORPHINE SULFATE 2 MG/ML
2 INJECTION, SOLUTION INTRAMUSCULAR; INTRAVENOUS EVERY 4 HOURS PRN
Status: DISCONTINUED | OUTPATIENT
Start: 2025-02-18 | End: 2025-02-22

## 2025-02-18 RX ORDER — LOSARTAN POTASSIUM 25 MG/1
25 TABLET ORAL DAILY
Status: DISPENSED | OUTPATIENT
Start: 2025-02-18

## 2025-02-18 RX ORDER — PANTOPRAZOLE SODIUM 40 MG/10ML
40 INJECTION, POWDER, LYOPHILIZED, FOR SOLUTION INTRAVENOUS DAILY
Status: DISCONTINUED | OUTPATIENT
Start: 2025-02-18 | End: 2025-02-20

## 2025-02-18 RX ORDER — SODIUM CHLORIDE 0.9 % (FLUSH) 0.9 %
5-40 SYRINGE (ML) INJECTION EVERY 12 HOURS SCHEDULED
Status: ACTIVE | OUTPATIENT
Start: 2025-02-18

## 2025-02-18 RX ORDER — INSULIN LISPRO 100 [IU]/ML
0-4 INJECTION, SOLUTION INTRAVENOUS; SUBCUTANEOUS
Status: DISPENSED | OUTPATIENT
Start: 2025-02-18

## 2025-02-18 RX ORDER — SODIUM CHLORIDE 9 MG/ML
INJECTION, SOLUTION INTRAVENOUS PRN
Status: ACTIVE | OUTPATIENT
Start: 2025-02-18

## 2025-02-18 RX ORDER — ALBUTEROL SULFATE 0.83 MG/ML
2.5 SOLUTION RESPIRATORY (INHALATION)
Status: DISCONTINUED | OUTPATIENT
Start: 2025-02-19 | End: 2025-02-22

## 2025-02-18 RX ORDER — ISOSORBIDE MONONITRATE 10 MG/1
15 TABLET ORAL 2 TIMES DAILY
Status: ON HOLD | COMMUNITY

## 2025-02-18 RX ORDER — GLUCAGON 1 MG/ML
1 KIT INJECTION PRN
Status: ACTIVE | OUTPATIENT
Start: 2025-02-18

## 2025-02-18 RX ORDER — PANTOPRAZOLE SODIUM 40 MG/1
40 TABLET, DELAYED RELEASE ORAL DAILY
Status: DISCONTINUED | OUTPATIENT
Start: 2025-02-18 | End: 2025-02-20

## 2025-02-18 RX ORDER — INSULIN GLARGINE 100 [IU]/ML
8 INJECTION, SOLUTION SUBCUTANEOUS NIGHTLY
Status: DISPENSED | OUTPATIENT
Start: 2025-02-18

## 2025-02-18 RX ORDER — ATORVASTATIN CALCIUM 20 MG/1
20 TABLET, FILM COATED ORAL NIGHTLY
Status: DISPENSED | OUTPATIENT
Start: 2025-02-18

## 2025-02-18 RX ORDER — FUROSEMIDE 20 MG/1
20 TABLET ORAL DAILY PRN
Status: ACTIVE | OUTPATIENT
Start: 2025-02-18

## 2025-02-18 RX ORDER — CYCLOBENZAPRINE HCL 10 MG
10 TABLET ORAL 2 TIMES DAILY PRN
Status: ON HOLD | COMMUNITY

## 2025-02-18 RX ORDER — IOPAMIDOL 755 MG/ML
80 INJECTION, SOLUTION INTRAVASCULAR
Status: COMPLETED | OUTPATIENT
Start: 2025-02-18 | End: 2025-02-18

## 2025-02-18 RX ORDER — SODIUM CHLORIDE, SODIUM LACTATE, POTASSIUM CHLORIDE, CALCIUM CHLORIDE 600; 310; 30; 20 MG/100ML; MG/100ML; MG/100ML; MG/100ML
INJECTION, SOLUTION INTRAVENOUS CONTINUOUS
Status: ACTIVE | OUTPATIENT
Start: 2025-02-18 | End: 2025-02-19

## 2025-02-18 RX ORDER — LEVOTHYROXINE SODIUM 75 UG/1
75 TABLET ORAL DAILY
Status: DISPENSED | OUTPATIENT
Start: 2025-02-19

## 2025-02-18 RX ORDER — BUSPIRONE HYDROCHLORIDE 10 MG/1
10 TABLET ORAL 2 TIMES DAILY
Status: DISPENSED | OUTPATIENT
Start: 2025-02-18

## 2025-02-18 RX ORDER — METRONIDAZOLE 500 MG/100ML
500 INJECTION, SOLUTION INTRAVENOUS EVERY 8 HOURS
Status: DISCONTINUED | OUTPATIENT
Start: 2025-02-18 | End: 2025-02-21

## 2025-02-18 RX ORDER — ALBUTEROL SULFATE 0.83 MG/ML
2.5 SOLUTION RESPIRATORY (INHALATION) EVERY 4 HOURS PRN
Status: DISCONTINUED | OUTPATIENT
Start: 2025-02-18 | End: 2025-02-22

## 2025-02-18 RX ORDER — DEXTROSE MONOHYDRATE 100 MG/ML
INJECTION, SOLUTION INTRAVENOUS CONTINUOUS PRN
Status: ACTIVE | OUTPATIENT
Start: 2025-02-18

## 2025-02-18 RX ADMIN — SODIUM CHLORIDE, POTASSIUM CHLORIDE, SODIUM LACTATE AND CALCIUM CHLORIDE: 600; 310; 30; 20 INJECTION, SOLUTION INTRAVENOUS at 23:25

## 2025-02-18 RX ADMIN — HYDROMORPHONE HYDROCHLORIDE 0.5 MG: 1 INJECTION, SOLUTION INTRAMUSCULAR; INTRAVENOUS; SUBCUTANEOUS at 15:18

## 2025-02-18 RX ADMIN — SODIUM CHLORIDE, PRESERVATIVE FREE 10 ML: 5 INJECTION INTRAVENOUS at 20:55

## 2025-02-18 RX ADMIN — INSULIN GLARGINE 8 UNITS: 100 INJECTION, SOLUTION SUBCUTANEOUS at 21:00

## 2025-02-18 RX ADMIN — MORPHINE SULFATE 2 MG: 2 INJECTION, SOLUTION INTRAMUSCULAR; INTRAVENOUS at 20:57

## 2025-02-18 RX ADMIN — WATER 1000 MG: 1 INJECTION INTRAMUSCULAR; INTRAVENOUS; SUBCUTANEOUS at 23:21

## 2025-02-18 RX ADMIN — METRONIDAZOLE 500 MG: 500 INJECTION, SOLUTION INTRAVENOUS at 23:25

## 2025-02-18 RX ADMIN — PANTOPRAZOLE SODIUM 40 MG: 40 INJECTION, POWDER, FOR SOLUTION INTRAVENOUS at 23:17

## 2025-02-18 RX ADMIN — IOPAMIDOL 80 ML: 755 INJECTION, SOLUTION INTRAVENOUS at 15:42

## 2025-02-18 ASSESSMENT — PAIN SCALES - GENERAL
PAINLEVEL_OUTOF10: 5
PAINLEVEL_OUTOF10: 3
PAINLEVEL_OUTOF10: 8
PAINLEVEL_OUTOF10: 5

## 2025-02-18 ASSESSMENT — PAIN - FUNCTIONAL ASSESSMENT
PAIN_FUNCTIONAL_ASSESSMENT: 0-10
PAIN_FUNCTIONAL_ASSESSMENT: ACTIVITIES ARE NOT PREVENTED
PAIN_FUNCTIONAL_ASSESSMENT: 0-10

## 2025-02-18 ASSESSMENT — PAIN DESCRIPTION - LOCATION
LOCATION: CHEST

## 2025-02-18 ASSESSMENT — PAIN DESCRIPTION - ORIENTATION: ORIENTATION: MID

## 2025-02-18 ASSESSMENT — PAIN DESCRIPTION - PAIN TYPE: TYPE: CHRONIC PAIN

## 2025-02-18 ASSESSMENT — PAIN DESCRIPTION - DESCRIPTORS: DESCRIPTORS: ACHING

## 2025-02-18 ASSESSMENT — PAIN DESCRIPTION - ONSET: ONSET: ON-GOING

## 2025-02-18 ASSESSMENT — PAIN DESCRIPTION - FREQUENCY: FREQUENCY: INTERMITTENT

## 2025-02-18 NOTE — ED NOTES
PT placed on nasal canula. PT states she wears 1-2 L NC at home when needed. PT resting in bed with family at bedside.

## 2025-02-18 NOTE — H&P
Hospitalist History & Physical    Patient:  Analy Reyna    Unit/Bed:30/030A  YOB: 1945  MRN: [id number]   Acct: [id number]   PCP: Ras Howell MD    Date of Service: Pt seen/examined on 02/18/25 and admitted to Inpatient with expected LOS greater than two midnights due to medical therapy.     Assessment/Plan:  Atypical right chest/right upper quadrant pain   Suspicion for acalculous cholecystitis versus progression of bony mets that involves humeral head and ribs  -CTAP and CTA chest: negative for PE, small pericardial effusion, trace B pleural effusions, extensive sclerotic bony metastatic disease that has progressed,  nonspecific gallbladder distention. Possible gallbladder wall thickening. No radiopaque gallstones or pericholecystic inflammation observed. (see full reports).  -LFTs with no significant abnormalities. Will check lipase  -US GB: distended gallbaldder without evidence of acute cholecystitis  -suspected acalculous cholecystitis. Per ED provider, d/w general surgery Dr Zaman. Will consult. HIDA scan recommended.   -will start empiric Ceftriaxone and Flagyl   -MIVF  -IV PPI  -IV analgesia tonight.  Consider oral pain regimen/pain management consultation if GB w/u negative     Leukopenia  -likely due to chemotherapy    Nontrending troponin elevation  -HsT 23/17  -EKG with no acute ischemic changes  -chest pain is pleuritic/Msk in nature, constant with intermittent \"shooting\" pain, worse with deep inspiration and cough. Not c/w ACS    Hypoxemia  COPD on 1-2L NC O2 at baseline as needed, 3L with exertion, 4L bleed-in CPAP at HS, not in acute exacerbation  Cough, ongoing  -SpO2 marginal 88% on room air, currently satisfactory on 2.5L NC O2  -patient reports wet cough ongoing x 3-4 months. Not on bronchodilator therapy outpatient. Endorses occasional dyspnea. No wheezing on exam  -suspect hypoxia worse due to patient not taking deep breaths (exacerbates  pleuritic chest pain)  -CPAP at HS  -IS/acapella  -titrate O2 to maintain SpO2 > 88%      Other significant medical history (reviewed and stable unless otherwise stated):  CAD  Cardiomyopathy   -follows with Dr Kearney  -. TTE 1/20/2025 EF 45-50%  -trace BLE edema on exam, small pericardial effusion, trace B pleural effusions on imaging. Does not appear to be decompensated  -hold Lasix  PAF s/p Watchman  HTN hold losartan  CKD stage 3b, sCr near recent baseline 1.3-1.5. Follows with Dr Su  Macrocytic Anemia hgb stable  NIDDM Continue HS Lantus + accuchecks with low scale corrective coverage, hypoglycemia protocol, carb controlled diet when diet resumed  Breast cancer with bone metastasis dx October 2024. On chemotherapy/IVIG  H/o DVT on Eliquis  H/o GIB in January, cleared by GI to resume Eliquis        -Continue other supportive care and resume home medication regimen unless contraindicated.    -VTE Prophylaxis: [] Lovenox / [] Heparin / [] SCDs / [] Already on Systemic Anticoagulation / [x] None-SCDs only pending GB HOLD home dose Eliquis  -Diet: clears tonight, NPO after MN    Code Status: Total support d/w patient and verified per AL documentation    =======================================================================    SUBJECTIVE    Chief Complaint: chest pain    History of Present Illness:  Analy Reyna is a 79 y.o. female with PMHx that is significant for: CAD, PAF, HTN, CKD, anemia, NIDDM, breast cancer with bone mets on chemotherapy, DVT, GIB  who presented to [facility] from home with chief complaint of right sided mid sternal constant chest pain that goes down to RUQ abdomen and around into her back, up into her neck and shoulders with intermittent sharp shooting pain exacerbated by cough/deep inspiration that awoke her from sleep this morning. The patient states she has never had pain like this before. Denies associated palpitations, diaphoresis. She has wet cough she states has been

## 2025-02-18 NOTE — ED TRIAGE NOTES
PT to the ED withy chest pain that goes around both sides into her back and up into her neck and shoulders. PT states pain is constant but occasionally becomes sharp. PT states she woke up with the pain today. PT states that she has breast cancer and is undergoing treatment. PT states she has also had a dry cough for about 6 months.

## 2025-02-18 NOTE — ED NOTES
PT medicated per MAR for pain. PT taken to the restroom by wheelchair. PT tolerated well. PT resting back In bed. VS assessed. Call light in reach.

## 2025-02-19 ENCOUNTER — APPOINTMENT (OUTPATIENT)
Dept: NUCLEAR MEDICINE | Age: 80
DRG: 543 | End: 2025-02-19
Payer: MEDICARE

## 2025-02-19 LAB
ALBUMIN SERPL BCG-MCNC: 3.8 G/DL (ref 3.5–5.1)
ALP SERPL-CCNC: 74 U/L (ref 38–126)
ALT SERPL W/O P-5'-P-CCNC: 6 U/L (ref 11–66)
ANION GAP SERPL CALC-SCNC: 19 MEQ/L (ref 8–16)
AST SERPL-CCNC: 16 U/L (ref 5–40)
BASOPHILS ABSOLUTE: 0 THOU/MM3 (ref 0–0.1)
BASOPHILS NFR BLD AUTO: 1.4 %
BILIRUB CONJ SERPL-MCNC: < 0.1 MG/DL (ref 0–0.2)
BILIRUB SERPL-MCNC: 0.4 MG/DL (ref 0.3–1.2)
BUN SERPL-MCNC: 24 MG/DL (ref 7–22)
CALCIUM SERPL-MCNC: 8.3 MG/DL (ref 8.2–9.6)
CHLORIDE SERPL-SCNC: 100 MEQ/L (ref 98–111)
CO2 SERPL-SCNC: 23 MEQ/L (ref 23–33)
CREAT SERPL-MCNC: 1.2 MG/DL (ref 0.4–1.2)
DEPRECATED RDW RBC AUTO: 59.8 FL (ref 35–45)
EOSINOPHIL NFR BLD AUTO: 1.8 %
EOSINOPHILS ABSOLUTE: 0.1 THOU/MM3 (ref 0–0.4)
ERYTHROCYTE [DISTWIDTH] IN BLOOD BY AUTOMATED COUNT: 16.5 % (ref 11.5–14.5)
FLUAV RNA RESP QL NAA+PROBE: NOT DETECTED
FLUBV RNA RESP QL NAA+PROBE: NOT DETECTED
GFR SERPL CREATININE-BSD FRML MDRD: 46 ML/MIN/1.73M2
GLUCOSE BLD STRIP.AUTO-MCNC: 103 MG/DL (ref 70–108)
GLUCOSE BLD STRIP.AUTO-MCNC: 158 MG/DL (ref 70–108)
GLUCOSE BLD STRIP.AUTO-MCNC: 164 MG/DL (ref 70–108)
GLUCOSE SERPL-MCNC: 97 MG/DL (ref 70–108)
HCT VFR BLD AUTO: 29.2 % (ref 37–47)
HGB BLD-MCNC: 9.1 GM/DL (ref 12–16)
IMM GRANULOCYTES # BLD AUTO: 0.01 THOU/MM3 (ref 0–0.07)
IMM GRANULOCYTES NFR BLD AUTO: 0.4 %
LYMPHOCYTES ABSOLUTE: 0.6 THOU/MM3 (ref 1–4.8)
LYMPHOCYTES NFR BLD AUTO: 20.9 %
MCH RBC QN AUTO: 31 PG (ref 26–33)
MCHC RBC AUTO-ENTMCNC: 31.2 GM/DL (ref 32.2–35.5)
MCV RBC AUTO: 99.3 FL (ref 81–99)
MONOCYTES ABSOLUTE: 0.3 THOU/MM3 (ref 0.4–1.3)
MONOCYTES NFR BLD AUTO: 9 %
NEUTROPHILS ABSOLUTE: 1.9 THOU/MM3 (ref 1.8–7.7)
NEUTROPHILS NFR BLD AUTO: 66.5 %
NRBC BLD AUTO-RTO: 0 /100 WBC
PLATELET # BLD AUTO: 127 THOU/MM3 (ref 130–400)
PMV BLD AUTO: 9.8 FL (ref 9.4–12.4)
POTASSIUM SERPL-SCNC: 4.8 MEQ/L (ref 3.5–5.2)
PROT SERPL-MCNC: 6.6 G/DL (ref 6.1–8)
RBC # BLD AUTO: 2.94 MILL/MM3 (ref 4.2–5.4)
SARS-COV-2 RNA RESP QL NAA+PROBE: NOT DETECTED
SODIUM SERPL-SCNC: 142 MEQ/L (ref 135–145)
WBC # BLD AUTO: 2.8 THOU/MM3 (ref 4.8–10.8)

## 2025-02-19 PROCEDURE — 2580000003 HC RX 258: Performed by: NURSE PRACTITIONER

## 2025-02-19 PROCEDURE — 2700000000 HC OXYGEN THERAPY PER DAY

## 2025-02-19 PROCEDURE — 2500000003 HC RX 250 WO HCPCS: Performed by: NURSE PRACTITIONER

## 2025-02-19 PROCEDURE — 6360000002 HC RX W HCPCS: Performed by: NURSE PRACTITIONER

## 2025-02-19 PROCEDURE — 94640 AIRWAY INHALATION TREATMENT: CPT

## 2025-02-19 PROCEDURE — 82948 REAGENT STRIP/BLOOD GLUCOSE: CPT

## 2025-02-19 PROCEDURE — 36415 COLL VENOUS BLD VENIPUNCTURE: CPT

## 2025-02-19 PROCEDURE — 99233 SBSQ HOSP IP/OBS HIGH 50: CPT | Performed by: STUDENT IN AN ORGANIZED HEALTH CARE EDUCATION/TRAINING PROGRAM

## 2025-02-19 PROCEDURE — 1200000003 HC TELEMETRY R&B

## 2025-02-19 PROCEDURE — 6370000000 HC RX 637 (ALT 250 FOR IP)

## 2025-02-19 PROCEDURE — 80076 HEPATIC FUNCTION PANEL: CPT

## 2025-02-19 PROCEDURE — 80048 BASIC METABOLIC PNL TOTAL CA: CPT

## 2025-02-19 PROCEDURE — A9537 TC99M MEBROFENIN: HCPCS | Performed by: NURSE PRACTITIONER

## 2025-02-19 PROCEDURE — 99232 SBSQ HOSP IP/OBS MODERATE 35: CPT | Performed by: SURGERY

## 2025-02-19 PROCEDURE — 3430000000 HC RX DIAGNOSTIC RADIOPHARMACEUTICAL: Performed by: NURSE PRACTITIONER

## 2025-02-19 PROCEDURE — 1200000000 HC SEMI PRIVATE

## 2025-02-19 PROCEDURE — 78227 HEPATOBIL SYST IMAGE W/DRUG: CPT

## 2025-02-19 PROCEDURE — 6370000000 HC RX 637 (ALT 250 FOR IP): Performed by: NURSE PRACTITIONER

## 2025-02-19 PROCEDURE — APPSS30 APP SPLIT SHARED TIME 16-30 MINUTES

## 2025-02-19 PROCEDURE — 85025 COMPLETE CBC W/AUTO DIFF WBC: CPT

## 2025-02-19 RX ORDER — ACETAMINOPHEN 325 MG/1
650 TABLET ORAL EVERY 4 HOURS PRN
Status: DISPENSED | OUTPATIENT
Start: 2025-02-19

## 2025-02-19 RX ADMIN — WATER 1000 MG: 1 INJECTION INTRAMUSCULAR; INTRAVENOUS; SUBCUTANEOUS at 21:52

## 2025-02-19 RX ADMIN — Medication 9.1 MILLICURIE: at 10:13

## 2025-02-19 RX ADMIN — ALBUTEROL SULFATE 2.5 MG: 2.5 SOLUTION RESPIRATORY (INHALATION) at 19:31

## 2025-02-19 RX ADMIN — METRONIDAZOLE 500 MG: 500 INJECTION, SOLUTION INTRAVENOUS at 21:56

## 2025-02-19 RX ADMIN — SODIUM CHLORIDE, PRESERVATIVE FREE 10 ML: 5 INJECTION INTRAVENOUS at 21:56

## 2025-02-19 RX ADMIN — INSULIN GLARGINE 8 UNITS: 100 INJECTION, SOLUTION SUBCUTANEOUS at 21:53

## 2025-02-19 RX ADMIN — MORPHINE SULFATE 2 MG: 2 INJECTION, SOLUTION INTRAMUSCULAR; INTRAVENOUS at 03:30

## 2025-02-19 RX ADMIN — PANTOPRAZOLE SODIUM 40 MG: 40 INJECTION, POWDER, FOR SOLUTION INTRAVENOUS at 08:05

## 2025-02-19 RX ADMIN — ACETAMINOPHEN 650 MG: 325 TABLET ORAL at 21:30

## 2025-02-19 RX ADMIN — SODIUM CHLORIDE, POTASSIUM CHLORIDE, SODIUM LACTATE AND CALCIUM CHLORIDE: 600; 310; 30; 20 INJECTION, SOLUTION INTRAVENOUS at 17:20

## 2025-02-19 RX ADMIN — METRONIDAZOLE 500 MG: 500 INJECTION, SOLUTION INTRAVENOUS at 06:17

## 2025-02-19 RX ADMIN — SODIUM CHLORIDE 2 MCG: 9 INJECTION, SOLUTION INTRAVENOUS at 09:22

## 2025-02-19 RX ADMIN — MORPHINE SULFATE 2 MG: 2 INJECTION, SOLUTION INTRAMUSCULAR; INTRAVENOUS at 18:59

## 2025-02-19 RX ADMIN — MORPHINE SULFATE 2 MG: 2 INJECTION, SOLUTION INTRAMUSCULAR; INTRAVENOUS at 13:50

## 2025-02-19 RX ADMIN — METRONIDAZOLE 500 MG: 500 INJECTION, SOLUTION INTRAVENOUS at 14:54

## 2025-02-19 RX ADMIN — SODIUM CHLORIDE, PRESERVATIVE FREE 10 ML: 5 INJECTION INTRAVENOUS at 08:08

## 2025-02-19 ASSESSMENT — PAIN DESCRIPTION - ORIENTATION
ORIENTATION: UPPER
ORIENTATION: MID
ORIENTATION: MID;UPPER
ORIENTATION: UPPER

## 2025-02-19 ASSESSMENT — PAIN DESCRIPTION - DESCRIPTORS
DESCRIPTORS: SPASM
DESCRIPTORS: THROBBING;STABBING
DESCRIPTORS: ACHING;DISCOMFORT

## 2025-02-19 ASSESSMENT — PAIN SCALES - GENERAL
PAINLEVEL_OUTOF10: 5
PAINLEVEL_OUTOF10: 8
PAINLEVEL_OUTOF10: 10
PAINLEVEL_OUTOF10: 2
PAINLEVEL_OUTOF10: 5
PAINLEVEL_OUTOF10: 5
PAINLEVEL_OUTOF10: 4
PAINLEVEL_OUTOF10: 3

## 2025-02-19 ASSESSMENT — PAIN DESCRIPTION - LOCATION
LOCATION: ABDOMEN

## 2025-02-19 ASSESSMENT — PAIN SCALES - WONG BAKER: WONGBAKER_NUMERICALRESPONSE: HURTS A LITTLE BIT

## 2025-02-19 NOTE — CARE COORDINATION
treatment preferences, and shares the quality data associated with the providers?  Yes     Post-acute (PAC) provider list was provided to patient. Patient was informed of their freedom to choose PAC provider. Discussed and offered to show the patient the relevant PAC Providers quality and resource use measures on Medicare Compare web site via computer based on patient's goals of care and treatment preferences. Questions regarding selection process were answered.   Pt declined list   2/19/25, 3:15 PM EST  Discharge Planning Evaluation  Social work consult received, patient from [facility] assisted living .   Patient/Family preference is to return to [facility], confirmed with patient and family.    Would patient be willing to go to a skilled facility if needed: not anticipating need for snf .  Is there skilled care available at current facility: no.  Barriers to return to current living situation: AL anticipates patient can return at current level of care   Spoke with RN at the facility.  Patient bed hold: yes  Anticipated transport plan: ambulette   Patient's Healthcare Decision Maker: Named in Scanned ACP Document

## 2025-02-19 NOTE — RT PROTOCOL NOTE
RT Inhaler-Nebulizer Bronchodilator Protocol Note    There is a bronchodilator order in the chart from a provider indicating to follow the RT Bronchodilator Protocol and there is an “Initiate RT Inhaler-Nebulizer Bronchodilator Protocol” order as well (see protocol at bottom of note).    CXR Findings:  XR CHEST PORTABLE    Result Date: 2/18/2025  1. Minimal left lower lung atelectasis/infiltrate. 2. Mild stable cardiomegaly. **This report has been created using voice recognition software. It may contain minor errors which are inherent in voice recognition technology.** Electronically signed by Dr. Walker Allan      The findings from the last RT Protocol Assessment were as follows:   History Pulmonary Disease: Chronic pulmonary disease  Respiratory Pattern: Dyspnea on exertion or RR 21-25 bpm  Breath Sounds: Slightly diminished and/or crackles  Cough: Strong, spontaneous, non-productive  Indication for Bronchodilator Therapy: Decreased or absent breath sounds  Bronchodilator Assessment Score: 6    Aerosolized bronchodilator medication orders have been revised according to the RT Inhaler-Nebulizer Bronchodilator Protocol below.    Respiratory Therapist to perform RT Therapy Protocol Assessment initially then follow the protocol.  Repeat RT Therapy Protocol Assessment PRN for score 0-3 or on second treatment, BID, and PRN for scores above 3.    No Indications - adjust the frequency to every 6 hours PRN wheezing or bronchospasm, if no treatments needed after 48 hours then discontinue using Per Protocol order mode.     If indication present, adjust the RT bronchodilator orders based on the Bronchodilator Assessment Score as indicated below.  Use Inhaler orders unless patient has one or more of the following: on home nebulizer, not able to hold breath for 10 seconds, is not alert and oriented, cannot activate and use MDI correctly, or respiratory rate 25 breaths per minute or more, then use the equivalent nebulizer  order(s) with same Frequency and PRN reasons based on the score.  If a patient is on this medication at home then do not decrease Frequency below that used at home.    0-3 - enter or revise RT bronchodilator order(s) to equivalent RT Bronchodilator order with Frequency of every 4 hours PRN for wheezing or increased work of breathing using Per Protocol order mode.        4-6 - enter or revise RT Bronchodilator order(s) to two equivalent RT bronchodilator orders with one order with BID Frequency and one order with Frequency of every 4 hours PRN wheezing or increased work of breathing using Per Protocol order mode.        7-10 - enter or revise RT Bronchodilator order(s) to two equivalent RT bronchodilator orders with one order with TID Frequency and one order with Frequency of every 4 hours PRN wheezing or increased work of breathing using Per Protocol order mode.       11-13 - enter or revise RT Bronchodilator order(s) to one equivalent RT bronchodilator order with QID Frequency and an Albuterol order with Frequency of every 4 hours PRN wheezing or increased work of breathing using Per Protocol order mode.      Greater than 13 - enter or revise RT Bronchodilator order(s) to one equivalent RT bronchodilator order with every 4 hours Frequency and an Albuterol order with Frequency of every 2 hours PRN wheezing or increased work of breathing using Per Protocol order mode.     RT to enter RT Home Evaluation for COPD & MDI Assessment order using Per Protocol order mode.    Electronically signed by Callie Escamilla RCP on 2/18/2025 at 10:21 PM

## 2025-02-19 NOTE — ED NOTES
ED to inpatient nurses report      Chief Complaint:  Chief Complaint   Patient presents with    Chest Pain     Present to ED from: nursing home    MOA:     LOC: alert and orientated to name, place, date  Mobility: Requires assistance * 1  Oxygen Baseline: 1-2 L NC during day, CPAP at night    Current needs required: 2 L NC     Code Status:   Full Code    What abnormal results were found and what did you give/do to treat them? See results  Any procedures or intervention occur? Supplemental O2     Mental Status:  Level of Consciousness: Alert (0)    Psych Assessment:        Vitals:  Patient Vitals for the past 24 hrs:   BP Temp Temp src Pulse Resp SpO2 Height Weight   02/18/25 1912 126/87 -- -- 92 23 93 % -- --   02/18/25 1637 -- -- -- 85 20 92 % -- --   02/18/25 1518 131/61 -- -- 95 22 96 % -- --   02/18/25 1354 107/66 -- -- 80 24 91 % -- --   02/18/25 1352 -- -- -- -- -- (!) 88 % -- --   02/18/25 1312 118/77 99.3 °F (37.4 °C) Oral 89 20 90 % 1.651 m (5' 5\") 99.8 kg (220 lb)        LDAs:   Peripheral IV 02/18/25 Left Antecubital (Active)   Site Assessment Clean, dry & intact 02/18/25 1318   Line Status Normal saline locked;Flushed 02/18/25 1318   Phlebitis Assessment No symptoms 02/18/25 1318   Infiltration Assessment 0 02/18/25 1318       Ambulatory Status:  No data recorded    Diagnosis:  DISPOSITION Admitted 02/18/2025 06:26:05 PM   Final diagnoses:   Abdominal pain, right upper quadrant   Chest pain, unspecified type        Consults:  IP CONSULT TO GENERAL SURGERY     Pain Score:  Pain Assessment  Pain Assessment: 0-10  Pain Level: 8  Patient's Stated Pain Goal: 3  Pain Location: Chest    C-SSRS:   Risk of Suicide: No Risk    Sepsis Screening:       [place] Fall Risk:       Swallow Screening        Preferred Language:   English      ALLERGIES     Wellbutrin [bupropion], Bactrim [sulfamethoxazole-trimethoprim], and Silicone    SURGICAL HISTORY       Past Surgical History:   Procedure Laterality Date    ACHILLES

## 2025-02-19 NOTE — ED NOTES
Patient transported to [facility] in stable condition. Spoke to floor prior to transport.

## 2025-02-19 NOTE — PROGRESS NOTES
Pharmacy Renal Adjustment    Pharmacy renally adjusted the following medication(s) per P&T approved policy: apixaban    Recent Labs     02/18/25  1315   BUN 31*   CREATININE 1.5*     eGFR:   Recent Labs     02/18/25  1315   LABGLOM 35*     Estimated Creatinine Clearance: 35 mL/min (A) (based on SCr of 1.5 mg/dL (H)).    Assessment:  CKD stage 3    Plan:   Decrease apixaban from 5mg BID to 2.5mg BID - currently on hold    Please call pharmacy with any questions.    Leonila López RP, BCPS, BCGP  2/18/2025     9:35 PM

## 2025-02-19 NOTE — PROGRESS NOTES
Hospitalist Progress Note    Patient:  Analy Reyna      Unit/Bed:6A-04/004-A    YOB: 1945    MRN: [id number]       Acct: [id number]     PCP: Ras Howell MD    Date of Admission: 2/18/2025    Assessment/Plan:    Right lower quadrant pain: Suspicious for a calculus cholecystitis vs progression of bony metastasis involving femoral head and ribs.  CT A/P and chest were negative for PE, but showed small pericardial effusion, trace bilateral pleural effusion, extensive sclerotic bony metastatic disease has progressed, nonspecific GB distention with possible GB wall thickening.  RUQ US distended GB without evidence of cholecystitis.  LFTs and lipase WNL started on Rocephin and metronidazole for empiric antibiotic coverage.  On aggressive IVF, started IV PPI and analgesia.  General surgery consulted.  Leukopenia: On chemotherapy.  Low suspicion for concomitant infection.  Pro-Leonard WNL.  On empiric antibiotic coverage for suspected intra-abdominal process, see above.  Monitor WBC with daily CBC.  Will de-escalate antibiotics as appropriate.  Breast cancer: Carcinoma right breast ER positive stage IV, follows outpatient with Dr. Awada, hematology/oncology.  Imaging significant for metastatic process with 5 L4 vertebral biopsy confirmed metastatic ductal carcinoma of breast origin.  COPD: Per chart review.   Not in exacerbation. No formal PFTs.  On baseline home O2, see below.  No home bronchodilator therapy.  Started on albuterol treatment as needed.  Advised outpatient pulmonology follow-up  Chronic hypoxic respiratory failure: On baseline 1-2 L at rest, 3 L with exertion and 4 L bleeding on CPAP at home.  Monitor SpO2, maintain SpO2 > 90%.  Continue CPAP at night.  Repeat CXR and check ABG if respiratory status worsens  Nonobstructive CAD: Last [facility] with mid LAD 20% stenosis 3/2016.  Follows outpatient with Dr. Trinh, cardiology.  On ARB, isosorbide mononitrate, but no ASA or  shoulders with intermittent sharp shooting pain exacerbated by cough/deep inspiration that awoke her from sleep this morning. The patient states she has never had pain like this before. Denies associated palpitations, diaphoresis. She has wet cough she states has been ongoing for 3-4 months, occasionally productive with clear mucus, denies hemoptysis. Reports chronic occasional dyspnea, currently worse than baseline. Throughout interview she will occasionally grimace c/o sharp shooting catching pain. Denies fever, chills, sore throat, nasal congestion, abdominal pain, nausea, constipation, diarrhea, dysuria. States she has not been eating or drinking well due to poor appetite.     Initial ED evaluation/course remarkable for: afebrile, VSS, marginal hypoxia with improvement on 2.5L NC (home baseline O2 1-2L as needed. Significant lab findings include: leukopenia, anemia and creat near baseline. LFTs with no significant abnormalities. Significant imaging findings include: CTAP and CTA chest: negative for PE, small pericardial effusion, trace B pleural effusions, extensive sclerotic bony metastatic disease that has progressed,  nonspecific gallbladder distention. Possible gallbladder wall thickening. No radiopaque gallstones or pericholecystic inflammation observed. (see full reports).   Initial EKG personally reviewed by this provider shows Sinus rhythm with occasional Premature ventricular complexes   Patient received dilaudid. ED provider d/w Dr Zaman for consultation. Recommend HIDA scan  Hospitalization advised for continued evaluation and medical management of above diagnoses. \"    Subjective (past 24 hours):   No significant events overnight.  Patient was down for HIDA scan earlier today.  Per RN no acute complaints.  Otherwise hemodynamically stable, vitals WNL.  Neurosurgery to evaluate pending results.    ROS (12 point review of systems completed.  Pertinent positives noted. Otherwise ROS is

## 2025-02-19 NOTE — CARE COORDINATION
Case Management Assessment Initial Evaluation    Date/Time of Evaluation: 2025 7:18 AM  Assessment Completed by: Geena Pereira RN    If patient is discharged prior to next notation, then this note serves as note for discharge by case management.    Patient Name: Analy Reyna                   YOB: 1945  Diagnosis: Abdominal pain, right upper quadrant [R10.11]  Right upper quadrant abdominal pain [R10.11]  Chest pain, unspecified type [R07.9]                   Date / Time: 2025  1:08 PM  Location: [facility]     Patient Admission Status: Inpatient   Readmission Risk Low 0-14, Mod 15-19), High > 20: Readmission Risk Score: 19.4    Current PCP: Ras Howell MD  Health Care Decision Makers:   Primary Decision Maker: Katy Álvarez [facility] - [phone]    Additional Case Management Notes: From ED, diabetes management, General Surgery consult, telemetry, pain and nausea control, nebs, IV Rocephin, IVF, IV Flagyl, IV Protonix.    Procedures: none    Imagin/18 CXR 1. Minimal left lower lung atelectasis/infiltrate.   2. Mild stable cardiomegaly.      CTA Chest W WO Contrast 1. No filling defects are identified within the pulmonary arterial vasculature   to suggest the presence of pulmonary embolus. A small pericardial effusion is   now visualized. Trace bilateral pleural effusions are observed. Mild   consolidation at the left lung base is nonspecific and can be clinically   correlated for pneumonia.     2. The extensive sclerotic bony metastatic disease has progressed with   representative measurements provided in the discussion.     3. Nonspecific gallbladder distention. Possible gallbladder wall thickening. No   radiopaque gallstones or pericholecystic inflammation observed. Correlate with   liver function tests and gallbladder ultrasound if clinically indicated.     4. Mild residual fecal material seen throughout the colon suggesting fecal   stasis. No bowel  obstruction. Chronic findings are discussed.     2/18 CT Abdomen Pelvis W IV Contrast 1. No filling defects are identified within the pulmonary arterial vasculature   to suggest the presence of pulmonary embolus. A small pericardial effusion is   now visualized. Trace bilateral pleural effusions are observed. Mild   consolidation at the left lung base is nonspecific and can be clinically   correlated for pneumonia.     2. The extensive sclerotic bony metastatic disease has progressed with   representative measurements provided in the discussion.     3. Nonspecific gallbladder distention. Possible gallbladder wall thickening. No   radiopaque gallstones or pericholecystic inflammation observed. Correlate with   liver function tests and gallbladder ultrasound if clinically indicated.     4. Mild residual fecal material seen throughout the colon suggesting fecal   stasis. No bowel obstruction. Chronic findings are discussed.     2/18 US Gallbladder Distended gallbladder without evidence of acute cholecystitis.     Patient Goals/Plan/Treatment Preferences: Analy currently resides at [facility]. Assessment deferred to . Refer to SW note.

## 2025-02-20 LAB
ANION GAP SERPL CALC-SCNC: 16 MEQ/L (ref 8–16)
BUN SERPL-MCNC: 14 MG/DL (ref 7–22)
CALCIUM SERPL-MCNC: 7.9 MG/DL (ref 8.2–9.6)
CHLORIDE SERPL-SCNC: 103 MEQ/L (ref 98–111)
CO2 SERPL-SCNC: 24 MEQ/L (ref 23–33)
CREAT SERPL-MCNC: 0.9 MG/DL (ref 0.4–1.2)
DEPRECATED RDW RBC AUTO: 59.2 FL (ref 35–45)
ERYTHROCYTE [DISTWIDTH] IN BLOOD BY AUTOMATED COUNT: 16.1 % (ref 11.5–14.5)
GFR SERPL CREATININE-BSD FRML MDRD: 64 ML/MIN/1.73M2
GLUCOSE BLD STRIP.AUTO-MCNC: 105 MG/DL (ref 70–108)
GLUCOSE BLD STRIP.AUTO-MCNC: 140 MG/DL (ref 70–108)
GLUCOSE BLD STRIP.AUTO-MCNC: 143 MG/DL (ref 70–108)
GLUCOSE BLD STRIP.AUTO-MCNC: 182 MG/DL (ref 70–108)
GLUCOSE SERPL-MCNC: 98 MG/DL (ref 70–108)
HCT VFR BLD AUTO: 27.2 % (ref 37–47)
HGB BLD-MCNC: 8.5 GM/DL (ref 12–16)
MCH RBC QN AUTO: 30.9 PG (ref 26–33)
MCHC RBC AUTO-ENTMCNC: 31.3 GM/DL (ref 32.2–35.5)
MCV RBC AUTO: 98.9 FL (ref 81–99)
PLATELET # BLD AUTO: 125 THOU/MM3 (ref 130–400)
PMV BLD AUTO: 9.9 FL (ref 9.4–12.4)
POTASSIUM SERPL-SCNC: 4.2 MEQ/L (ref 3.5–5.2)
RBC # BLD AUTO: 2.75 MILL/MM3 (ref 4.2–5.4)
SODIUM SERPL-SCNC: 143 MEQ/L (ref 135–145)
WBC # BLD AUTO: 2.2 THOU/MM3 (ref 4.8–10.8)

## 2025-02-20 PROCEDURE — 6370000000 HC RX 637 (ALT 250 FOR IP)

## 2025-02-20 PROCEDURE — 94761 N-INVAS EAR/PLS OXIMETRY MLT: CPT

## 2025-02-20 PROCEDURE — APPSS30 APP SPLIT SHARED TIME 16-30 MINUTES

## 2025-02-20 PROCEDURE — 6360000002 HC RX W HCPCS: Performed by: NURSE PRACTITIONER

## 2025-02-20 PROCEDURE — 99233 SBSQ HOSP IP/OBS HIGH 50: CPT | Performed by: STUDENT IN AN ORGANIZED HEALTH CARE EDUCATION/TRAINING PROGRAM

## 2025-02-20 PROCEDURE — 2700000000 HC OXYGEN THERAPY PER DAY

## 2025-02-20 PROCEDURE — 82948 REAGENT STRIP/BLOOD GLUCOSE: CPT

## 2025-02-20 PROCEDURE — 94669 MECHANICAL CHEST WALL OSCILL: CPT

## 2025-02-20 PROCEDURE — 99231 SBSQ HOSP IP/OBS SF/LOW 25: CPT | Performed by: SURGERY

## 2025-02-20 PROCEDURE — 6370000000 HC RX 637 (ALT 250 FOR IP): Performed by: NURSE PRACTITIONER

## 2025-02-20 PROCEDURE — 2500000003 HC RX 250 WO HCPCS: Performed by: NURSE PRACTITIONER

## 2025-02-20 PROCEDURE — 6370000000 HC RX 637 (ALT 250 FOR IP): Performed by: STUDENT IN AN ORGANIZED HEALTH CARE EDUCATION/TRAINING PROGRAM

## 2025-02-20 PROCEDURE — 36415 COLL VENOUS BLD VENIPUNCTURE: CPT

## 2025-02-20 PROCEDURE — 1200000000 HC SEMI PRIVATE

## 2025-02-20 PROCEDURE — 94640 AIRWAY INHALATION TREATMENT: CPT

## 2025-02-20 PROCEDURE — 80048 BASIC METABOLIC PNL TOTAL CA: CPT

## 2025-02-20 PROCEDURE — 85027 COMPLETE CBC AUTOMATED: CPT

## 2025-02-20 RX ORDER — SUCRALFATE 1 G/1
1 TABLET ORAL
Status: DISPENSED | OUTPATIENT
Start: 2025-02-20

## 2025-02-20 RX ORDER — PANTOPRAZOLE SODIUM 40 MG/1
40 TABLET, DELAYED RELEASE ORAL
Status: DISPENSED | OUTPATIENT
Start: 2025-02-20

## 2025-02-20 RX ORDER — HYDROXYZINE HYDROCHLORIDE 10 MG/1
10 TABLET, FILM COATED ORAL 3 TIMES DAILY
Status: DISPENSED | OUTPATIENT
Start: 2025-02-20

## 2025-02-20 RX ADMIN — METRONIDAZOLE 500 MG: 500 INJECTION, SOLUTION INTRAVENOUS at 05:40

## 2025-02-20 RX ADMIN — PANTOPRAZOLE SODIUM 40 MG: 40 TABLET, DELAYED RELEASE ORAL at 16:09

## 2025-02-20 RX ADMIN — MORPHINE SULFATE 2 MG: 2 INJECTION, SOLUTION INTRAMUSCULAR; INTRAVENOUS at 00:51

## 2025-02-20 RX ADMIN — MORPHINE SULFATE 2 MG: 2 INJECTION, SOLUTION INTRAMUSCULAR; INTRAVENOUS at 05:42

## 2025-02-20 RX ADMIN — ALBUTEROL SULFATE 2.5 MG: 2.5 SOLUTION RESPIRATORY (INHALATION) at 20:24

## 2025-02-20 RX ADMIN — BUSPIRONE HYDROCHLORIDE 10 MG: 10 TABLET ORAL at 20:20

## 2025-02-20 RX ADMIN — SUCRALFATE 1 G: 1 TABLET ORAL at 10:03

## 2025-02-20 RX ADMIN — MORPHINE SULFATE 2 MG: 2 INJECTION, SOLUTION INTRAMUSCULAR; INTRAVENOUS at 10:25

## 2025-02-20 RX ADMIN — SUCRALFATE 1 G: 1 TABLET ORAL at 17:09

## 2025-02-20 RX ADMIN — INSULIN GLARGINE 8 UNITS: 100 INJECTION, SOLUTION SUBCUTANEOUS at 20:20

## 2025-02-20 RX ADMIN — ATORVASTATIN CALCIUM 20 MG: 20 TABLET, FILM COATED ORAL at 20:20

## 2025-02-20 RX ADMIN — SODIUM CHLORIDE, PRESERVATIVE FREE 10 ML: 5 INJECTION INTRAVENOUS at 20:30

## 2025-02-20 RX ADMIN — HYDROXYZINE HYDROCHLORIDE 10 MG: 10 TABLET ORAL at 20:20

## 2025-02-20 RX ADMIN — MORPHINE SULFATE 2 MG: 2 INJECTION, SOLUTION INTRAMUSCULAR; INTRAVENOUS at 20:23

## 2025-02-20 RX ADMIN — WATER 1000 MG: 1 INJECTION INTRAMUSCULAR; INTRAVENOUS; SUBCUTANEOUS at 20:27

## 2025-02-20 RX ADMIN — MORPHINE SULFATE 2 MG: 2 INJECTION, SOLUTION INTRAMUSCULAR; INTRAVENOUS at 15:07

## 2025-02-20 RX ADMIN — LIDOCAINE HYDROCHLORIDE: 20 SOLUTION ORAL at 10:00

## 2025-02-20 RX ADMIN — GUAIFENESIN, DEXTROMETHORPHAN HBR 1 TABLET: 600; 30 TABLET ORAL at 14:27

## 2025-02-20 RX ADMIN — PANTOPRAZOLE SODIUM 40 MG: 40 INJECTION, POWDER, FOR SOLUTION INTRAVENOUS at 08:07

## 2025-02-20 RX ADMIN — METRONIDAZOLE 500 MG: 500 INJECTION, SOLUTION INTRAVENOUS at 14:26

## 2025-02-20 RX ADMIN — INSULIN LISPRO 1 UNITS: 100 INJECTION, SOLUTION INTRAVENOUS; SUBCUTANEOUS at 20:20

## 2025-02-20 RX ADMIN — SUCRALFATE 1 G: 1 TABLET ORAL at 20:20

## 2025-02-20 RX ADMIN — ALBUTEROL SULFATE 2.5 MG: 2.5 SOLUTION RESPIRATORY (INHALATION) at 07:59

## 2025-02-20 RX ADMIN — HYDROXYZINE HYDROCHLORIDE 10 MG: 10 TABLET ORAL at 15:28

## 2025-02-20 RX ADMIN — METRONIDAZOLE 500 MG: 500 INJECTION, SOLUTION INTRAVENOUS at 22:22

## 2025-02-20 ASSESSMENT — PAIN SCALES - GENERAL
PAINLEVEL_OUTOF10: 9
PAINLEVEL_OUTOF10: 0
PAINLEVEL_OUTOF10: 10
PAINLEVEL_OUTOF10: 8
PAINLEVEL_OUTOF10: 9
PAINLEVEL_OUTOF10: 8
PAINLEVEL_OUTOF10: 8
PAINLEVEL_OUTOF10: 10

## 2025-02-20 ASSESSMENT — ENCOUNTER SYMPTOMS
VOMITING: 0
ABDOMINAL DISTENTION: 0
CONSTIPATION: 1
TROUBLE SWALLOWING: 0
WHEEZING: 0
EYES NEGATIVE: 1
DIARRHEA: 0
RESPIRATORY NEGATIVE: 1
SHORTNESS OF BREATH: 0
NAUSEA: 0
ANAL BLEEDING: 0
BLOOD IN STOOL: 0
ABDOMINAL PAIN: 1
COUGH: 0
RECTAL PAIN: 0
ALLERGIC/IMMUNOLOGIC NEGATIVE: 1

## 2025-02-20 ASSESSMENT — PAIN DESCRIPTION - ONSET: ONSET: ON-GOING

## 2025-02-20 ASSESSMENT — PAIN DESCRIPTION - LOCATION
LOCATION: BREAST
LOCATION: ABDOMEN
LOCATION: BACK;CHEST
LOCATION: ABDOMEN

## 2025-02-20 ASSESSMENT — PAIN DESCRIPTION - FREQUENCY: FREQUENCY: INTERMITTENT

## 2025-02-20 ASSESSMENT — PAIN - FUNCTIONAL ASSESSMENT
PAIN_FUNCTIONAL_ASSESSMENT: ACTIVITIES ARE NOT PREVENTED

## 2025-02-20 ASSESSMENT — PAIN DESCRIPTION - PAIN TYPE: TYPE: ACUTE PAIN;CHRONIC PAIN

## 2025-02-20 ASSESSMENT — PAIN DESCRIPTION - ORIENTATION
ORIENTATION: MID
ORIENTATION: LEFT
ORIENTATION: MID

## 2025-02-20 ASSESSMENT — PAIN DESCRIPTION - DESCRIPTORS
DESCRIPTORS: SHARP
DESCRIPTORS: SHARP
DESCRIPTORS: ACHING;DISCOMFORT

## 2025-02-20 NOTE — PROGRESS NOTES
Hospitalist Progress Note    Patient:  Analy Reyna      Unit/Bed:6A-04/004-A    YOB: 1945    MRN: [id number]       Acct: [id number]     PCP: Ras Howell MD    Date of Admission: 2/18/2025    Assessment/Plan:    Abdominal pain: Possible gastritis vs pleurisy. Initial concerns for a calculus cholecystitis vs progression of bony metastasis involving femoral head and ribs.  Endorses epigastric/R>L lower abdominal pain. CT A/P and chest were negative for PE, but showed small pericardial effusion, trace bilateral pleural effusion, extensive sclerotic bony metastatic disease has progressed, nonspecific GB distention with possible GB wall thickening.  RUQ US distended GB without evidence of cholecystitis.  LFTs and lipase WNL started on Rocephin and metronidazole for empiric antibiotic coverage.  On aggressive IVF, started IV PPI and analgesia.  General surgery no surgical intervention.  2/20 trial GI cocktail, may require escalation of GI meds, and outpatient evaluation.   Leukopenia: On chemotherapy.  Low suspicion for concomitant infection.  Pro-Leonard WNL.  On empiric antibiotic coverage for suspected intra-abdominal process, see above.  Monitor WBC with daily CBC.  Will de-escalate antibiotics as appropriate.  Breast cancer: Carcinoma right breast ER positive stage IV, follows outpatient with Dr. Awada, hematology/oncology.  Imaging significant for metastatic process with 5 L4 vertebral biopsy confirmed metastatic ductal carcinoma of breast origin.  COPD: Per chart review.   Not in exacerbation. No formal PFTs.  On baseline home O2, see below.  No home bronchodilator therapy.  Started on albuterol treatment as needed.  Advised outpatient pulmonology follow-up  Chronic hypoxic respiratory failure: On baseline 1-2 L at rest, 3 L with exertion and 4 L bleeding on CPAP at home.  Monitor SpO2, maintain SpO2 > 90%.  Continue CPAP at night.  Repeat CXR and check ABG if respiratory status  worsens  Nonobstructive CAD: Last [facility] with mid LAD 20% stenosis 3/2016.  Follows outpatient with Dr. Trinh, cardiology.  On ARB, isosorbide mononitrate, but no ASA or BB.  Chronic HFmrEF, NYHA III: NICM.  Not exacerbation.  Last ECHO EF 45%, mild global hypokinesis, no valvular abnormalities 4/2024.  On no home GDMT.  Follows outpatient with Dr. Trinh, cardiology.  2 g sodium and 2 L fluid restriction.  Strict I&O's.  Monitor daily weights.  Paroxysmal atrial fibrillation: [id number] 6. S/P watchman.  Not on rate/rhythm control.  On Eliquis.  Initial EKG not A-fib.  Placed on continuous telemetry.  Monitor.  History of PVCs: S/p ablation therapy.  Not on BB  HTN: On losartan 25 mg p.o. daily with holding parameters.  Currently held.  Monitor BP  HLD: On Lipitor 20 mg p.o. nightly  History of DVT: Likely unprovoked in setting of malignancy, see above for on Eliquis.  CKD III: Baseline CR 1.3-1.5.  Follows outpatient with Dr. Su, nephrology.  Limit nephrotoxic agent.  Monitor renal function with daily BP.  IDDMII: Associated diabetic neuropathy.  Last HgbA1c 6.7 1/2023.  Home antidiabetic regimen includes lispro glargine.  Started on Lantus 8 units subcu nightly and low-dose sliding scale with Accuchecks.  Monitor blood glucose with daily BMP.  Adjust insulin accordingly.  Hypoglycemic protocol initiated.  Continue home pregabalin 150 mg p.o. 3 times daily  Hypothyroidism: Last TSH 3.590 8/2024.  On Synthroid 25 mcg p.o. daily  GERD: Started pantoprazole 40 mg IV. daily  MDD/MIKE: On BuSpar 10 mg p.o. twice daily        Expected discharge date:  TBD    Disposition:    [] Home       [] TCU       [] Rehab       [] Psych       [] SNF       [] Long Term Care Facility       [] Other-    Chief Complaint: Chest pain    Hospital Course:   Per HPI, \"Analy Reyna is a 79 y.o. female with PMHx that is significant for: CAD, PAF, HTN, CKD, anemia, NIDDM, breast cancer with bone mets on chemotherapy, DVT, GIB  who

## 2025-02-20 NOTE — CONSULTS
Sleep apnea     usually wears cpap at night    Status post right and left heart catheterization     Type II or unspecified type diabetes mellitus without mention of complication, not stated as uncontrolled        Home Medications:  Prior to Admission medications    Medication Sig Start Date End Date Taking? Authorizing Provider   isosorbide mononitrate (ISMO;MONOKET) 10 MG tablet Take 1.5 tablets by mouth 2 times daily   Yes Maria E Queen MD   INSULIN LISPRO PROT & LISPRO SC Inject 6 Units into the skin in the morning, at noon, in the evening, and at bedtime   Yes Maria E Queen MD   anastrozole (ARIMIDEX) 1 MG tablet Take 1 tablet by mouth daily 1/30/25  Yes Awada, Hassan, MD   benzonatate (TESSALON) 200 MG capsule Take 1 capsule by mouth 3 times daily as needed for Cough for cough 12/28/24  Yes Maria E Queen MD   apixaban (ELIQUIS) 5 MG TABS tablet Take 1 tablet by mouth 2 times daily 11/25/24  Yes Awada, Hassan, MD   ondansetron (ZOFRAN-ODT) 4 MG disintegrating tablet Take 1 tablet by mouth every 8 hours as needed for Nausea or Vomiting 9/19/24 3/18/25 Yes Awada, Hassan, MD   loperamide (IMODIUM A-D) 2 MG tablet Take 2 tablets by mouth 4 times daily as needed for Diarrhea  Patient taking differently: Take 2 tablets by mouth as needed for Diarrhea 9/19/24 3/18/25 Yes Awada, Hassan, MD   Dextromethorphan-guaiFENesin (MUCINEX DM MAXIMUM STRENGTH)  MG TB12 Take 1 tablet by mouth every 12 hours as needed 10/27/23  Yes Maria E Queen MD   polyethylene glycol (MIRALAX) 17 GM/SCOOP powder Take 17 g by mouth daily   Yes Maria E Queen MD   Rimegepant Sulfate (NURTEC) 75 MG TBDP Take 75 mg by mouth daily as needed   Yes Maria E Queen MD   pantoprazole (PROTONIX) 40 MG tablet Take 1 tablet by mouth daily   Yes Maria E Queen MD   Insulin Glargine (BASAGLAR KWIKPEN SC) Inject 8 Units into the skin   Yes Maria E Queen MD   busPIRone (BUSPAR) 5 MG tablet  pulses.      Heart sounds: Normal heart sounds. No murmur heard.  Pulmonary:      Effort: Pulmonary effort is normal. No respiratory distress.      Breath sounds: Normal breath sounds. No stridor. No wheezing, rhonchi or rales.   Chest:      Chest wall: No tenderness.   Abdominal:      General: Abdomen is flat. Bowel sounds are normal. There is no distension.      Palpations: Abdomen is soft. There is no mass.      Tenderness: There is abdominal tenderness (LUQ, epigastric). There is no guarding or rebound.      Hernia: No hernia is present.   Skin:     General: Skin is warm and dry.      Capillary Refill: Capillary refill takes less than 2 seconds.   Neurological:      Mental Status: She is alert and oriented to person, place, and time.   Psychiatric:         Mood and Affect: Mood normal.         Behavior: Behavior normal.         Thought Content: Thought content normal.         Judgment: Judgment normal.           Labs:   Recent Labs     02/20/25  0540   WBC 2.2*   HGB 8.5*   HCT 27.2*   *     Recent Labs     02/20/25  0540      K 4.2      CO2 24   BUN 14   CREATININE 0.9   CALCIUM 7.9*     Recent Labs     02/19/25  0606   AST 16   ALT 6*   BILIDIR <0.1   BILITOT 0.4   ALKPHOS 74     No results for input(s): \"INR\" in the last 72 hours.    Radiology:   2/19/25 NM HIDA scan  FINDINGS: There is normal hepatic uptake of radiotracer. Activity is present in  the gallbladder by 11 minutes. There is activity in the common bile duct and  small bowel prior to Kinevac administration.     Following Kinevac administration, the calculated gallbladder ejection fraction  is 87% (normal is 35% or greater). Ejection fraction in the prior study was 97%.     IMPRESSION:  1. Patent cystic and common bile ducts without evidence of acute cholecystitis.  2. Normal gallbladder ejection fraction.    2/18/25 CTA Chest  FINDINGS:  Pulmonary arteries: No filling defects are identified within the pulmonary  arterial

## 2025-02-20 NOTE — PROGRESS NOTES
Spiritual Health History and Assessment/Progress Note  [facility]    (P) Initial Encounter,  ,  ,      Name: Analy Reyna MRN: [id number]    Age: 79 y.o.     Sex: female   Language: English   Druze: Faith   Abdominal pain, right upper quadrant     Date: 2/20/2025            Total Time Calculated: (P) 5 min              Spiritual Assessment began in [facility] 6A PEDI/MED SURG        Referral/Consult From: (P) Rounding   Encounter Overview/Reason: (P) Initial Encounter  Service Provided For: (P) Patient    Natasha, Belief, Meaning:   Patient identifies as spiritual, is connected with a natasha tradition or spiritual practice, and has beliefs or practices that help with coping during difficult times  Family/Friends No family/friends present      Importance and Influence:  Patient has spiritual/personal beliefs that influence decisions regarding their health  Family/Friends No family/friends present    Community:  Patient is connected with a spiritual community  Family/Friends No family/friends present    Assessment and Plan of Care:     Patient Interventions include: Facilitated expression of thoughts and feelings and Provided sacramental/Restorationism ritual  Family/Friends Interventions include: No family/friends present    Patient Plan of Care: Spiritual Care available upon further referral  Family/Friends Plan of Care: No family/friends present    Electronically signed by Chaplain Theresa on 2/20/2025 at 9:32 AM

## 2025-02-20 NOTE — PROGRESS NOTES
reviewed.  BP elevated.  Updated by nursing staff. Denies chest discomfort or dyspnea. No N/V; (+) belching, flatus.  Was on clear liquid diet.  Okay for regular diet from GI standpoint.  Pain semi-controlled with analgesia.  Patient states she still having 6/10 abdominal pain.  She says her symptoms are basically the same as yesterday.  No bowel movement today.  Up with assistance.  Discussed no surgical intervention with patient.  Patient verbalized understanding.  All questions answered.  No new concerns.  CURRENT MEDICATIONS   Scheduled Meds:   sucralfate  1 g Oral 4x Daily AC & HS    pantoprazole  40 mg Oral BID AC    sodium chloride flush  5-40 mL IntraVENous 2 times per day    [Held by provider] apixaban  2.5 mg Oral BID    atorvastatin  20 mg Oral Nightly    busPIRone  10 mg Oral BID    losartan  25 mg Oral Daily    levothyroxine  75 mcg Oral Daily    insulin lispro  0-4 Units SubCUTAneous 4x Daily AC & HS    insulin glargine  8 Units SubCUTAneous Nightly    metroNIDAZOLE  500 mg IntraVENous Q8H    cefTRIAXone (ROCEPHIN) IV  1,000 mg IntraVENous Q24H    albuterol  2.5 mg Nebulization BID RT     Continuous Infusions:   sodium chloride      dextrose       PRN Meds:.acetaminophen, sodium chloride flush, sodium chloride, [Held by provider] furosemide, glucose, dextrose bolus **OR** dextrose bolus, glucagon (rDNA), dextrose, morphine, albuterol  OBJECTIVE   CURRENT VITALS:  height is 1.651 m (5' 5\") and weight is 99.8 kg (220 lb). Her oral temperature is 98.7 °F (37.1 °C). Her blood pressure is 148/68 (abnormal) and her pulse is 82. Her respiration is 16 and oxygen saturation is 91%.   Temperature Range (24h):Temp: 98.7 °F (37.1 °C) Temp  Av.5 °F (36.9 °C)  Min: 98.2 °F (36.8 °C)  Max: 98.8 °F (37.1 °C)  BP Range (24h): Systolic (24hrs), Av , Min:94 , Max:149     Diastolic (24hrs), Av, Min:57, Max:103    Pulse Range (24h): Pulse  Av  Min: 81  Max: 94  Respiration Range (24h): Resp  Av.9   \"TROPONINT\" in the last 72 hours.     RADIOLOGY   No new imaging    Total time spent in care of patient:  20 minutes collectively between subjective/objective examination, chart review, documentation, clinical reasoning and discussion with attending regarding plan/interval changes. More than 50% of the time involved with counseling, education and coordinating care.      Electronically signed by Jennifer Garcia PA-C on 2/20/2025 at 12:16 PM      Patient seen and examined independently by me 2/20/2025  Total time personally spent on this patient encounter was 27 minutes which includes :  Preparing to see the patient( reviewing tests and chart)  Obtaining and reviewing separately obtained history  Performing a medically appropriate examination and evaluation  Ordering medications, tests, or procedures  Counseling and educating the patient/family/caregiver  Care coordination  Referring and communicating with other healthcare professionals  Documenting clinical information in the EHR  Independent interpretation of results and communicating the results to patient and care team  This includes a direct physical exam as well as all the other encounter activities described above.   Time may be discontiguous.   Time does not include procedures.    Please see our orders that were directed and approved by me if there are any new ones for the updated patient care plan.    Above discussed and I agree with documentation and orders placed by Jennifer FIGUEROA    See any additional comments if needed below for any other updated orders and plans.    --HIDA scan within normal limits.  Gallbladder ultrasound essentially normal.  Recommending GI service for evaluation.  No surgical intervention needed from my standpoint.  Continue supportive care.  Surgical service is signing off.  Call if need to reevaluate while patient still in house.    Electronically signed by Carlos Zaman MD on 2/20/25 at 3:29 PM EST

## 2025-02-20 NOTE — PALLIATIVE CARE
Initial Evaluation        Patient:   Analy Reyna  YOB: 1945  Age:  79 y.o.  Room:  [address] [place] [address]  MRN:  [id number]   Acct: [id number]    Date of Admission:  2/18/2025  1:08 PM  Date of Service:  2/20/2025  Completed By:  Roxana Dubon RN        Reason for Palliative Care Evaluation:-   Goals of Care and Symptom Management     Current Concerns   cancer related pain, shortness of breath, decreased appetite, constipation, and anxiety     Palliative Performance Scale   50%  Mainly sit/lie; Extensive disease; Can't do any work; Considerable assist; intake normal or reduced; LOC full/confusion     History    PMH includes anemia, afib s/p watchman, stage 4 breast CA with mets to bone, CAD, cardiomyopathy, CHF, CKD, COPD, DDD, depression, DM, DVT, GERD, HLD, HTN, sleep apnea. Per chart review, patient had sudden onset abdominal pain No evidence of cholecystitis. No indication for surgical intervention and patient a poor surgical candidate. No interventions from GI at this time, stating likely cancer related pain. Palliative care to assist with symptom management/pain control.      Goals of Care Discussions and Plan         Advance Care Planning   Goals of Care/Advance Care Planning (ACP) Conversation    Date of Conversation: 02/20/25    Individuals present for the conversation: Patient     ACP documents on file prior to discussion:  -Living Will  -Portable DNR    Previously completed document/s not on file:  Healthcare power of  document was completed previously but it is not available for review. This writer requested a copy of the document for patient's chart.     Healthcare Power of /Healthcare Surrogate Decision Makers:    Katy Álvarez (daughter/POA): [phone]    Lino Mooney (son)    Aminta Orellana (daughter)    Conversation Summary:     1138: Patient resting in bed. Palliative care introduced, patient agreeable to conversation. Patient states, \"I haven't cried at all this

## 2025-02-20 NOTE — CARE COORDINATION
2/20/25, 8:42 AM EST    DISCHARGE PLANNING EVALUATION    Planning return to [facility] living at discharge.

## 2025-02-21 PROBLEM — Z51.5 PALLIATIVE CARE PATIENT: Status: ACTIVE | Noted: 2025-02-21

## 2025-02-21 LAB
ANION GAP SERPL CALC-SCNC: 18 MEQ/L (ref 8–16)
BUN SERPL-MCNC: 11 MG/DL (ref 7–22)
CALCIUM SERPL-MCNC: 8 MG/DL (ref 8.2–9.6)
CHLORIDE SERPL-SCNC: 101 MEQ/L (ref 98–111)
CO2 SERPL-SCNC: 23 MEQ/L (ref 23–33)
CREAT SERPL-MCNC: 0.8 MG/DL (ref 0.4–1.2)
DEPRECATED RDW RBC AUTO: 57.1 FL (ref 35–45)
ERYTHROCYTE [DISTWIDTH] IN BLOOD BY AUTOMATED COUNT: 16.1 % (ref 11.5–14.5)
GFR SERPL CREATININE-BSD FRML MDRD: 74 ML/MIN/1.73M2
GLUCOSE BLD STRIP.AUTO-MCNC: 129 MG/DL (ref 70–108)
GLUCOSE BLD STRIP.AUTO-MCNC: 145 MG/DL (ref 70–108)
GLUCOSE BLD STRIP.AUTO-MCNC: 154 MG/DL (ref 70–108)
GLUCOSE BLD STRIP.AUTO-MCNC: 198 MG/DL (ref 70–108)
GLUCOSE SERPL-MCNC: 136 MG/DL (ref 70–108)
HCT VFR BLD AUTO: 26.7 % (ref 37–47)
HGB BLD-MCNC: 8.6 GM/DL (ref 12–16)
MCH RBC QN AUTO: 31.3 PG (ref 26–33)
MCHC RBC AUTO-ENTMCNC: 32.2 GM/DL (ref 32.2–35.5)
MCV RBC AUTO: 97.1 FL (ref 81–99)
PLATELET # BLD AUTO: 134 THOU/MM3 (ref 130–400)
PMV BLD AUTO: 9.9 FL (ref 9.4–12.4)
POTASSIUM SERPL-SCNC: 4.1 MEQ/L (ref 3.5–5.2)
RBC # BLD AUTO: 2.75 MILL/MM3 (ref 4.2–5.4)
SODIUM SERPL-SCNC: 142 MEQ/L (ref 135–145)
WBC # BLD AUTO: 2.6 THOU/MM3 (ref 4.8–10.8)

## 2025-02-21 PROCEDURE — 6370000000 HC RX 637 (ALT 250 FOR IP): Performed by: NURSE PRACTITIONER

## 2025-02-21 PROCEDURE — 85027 COMPLETE CBC AUTOMATED: CPT

## 2025-02-21 PROCEDURE — 6370000000 HC RX 637 (ALT 250 FOR IP)

## 2025-02-21 PROCEDURE — 6360000002 HC RX W HCPCS: Performed by: NURSE PRACTITIONER

## 2025-02-21 PROCEDURE — 80048 BASIC METABOLIC PNL TOTAL CA: CPT

## 2025-02-21 PROCEDURE — 1200000000 HC SEMI PRIVATE

## 2025-02-21 PROCEDURE — 99232 SBSQ HOSP IP/OBS MODERATE 35: CPT | Performed by: PHYSICIAN ASSISTANT

## 2025-02-21 PROCEDURE — 2500000003 HC RX 250 WO HCPCS: Performed by: NURSE PRACTITIONER

## 2025-02-21 PROCEDURE — 99223 1ST HOSP IP/OBS HIGH 75: CPT

## 2025-02-21 PROCEDURE — 94669 MECHANICAL CHEST WALL OSCILL: CPT

## 2025-02-21 PROCEDURE — 6370000000 HC RX 637 (ALT 250 FOR IP): Performed by: STUDENT IN AN ORGANIZED HEALTH CARE EDUCATION/TRAINING PROGRAM

## 2025-02-21 PROCEDURE — 94640 AIRWAY INHALATION TREATMENT: CPT

## 2025-02-21 PROCEDURE — 6370000000 HC RX 637 (ALT 250 FOR IP): Performed by: PHYSICIAN ASSISTANT

## 2025-02-21 PROCEDURE — 82948 REAGENT STRIP/BLOOD GLUCOSE: CPT

## 2025-02-21 PROCEDURE — 2700000000 HC OXYGEN THERAPY PER DAY

## 2025-02-21 PROCEDURE — 36415 COLL VENOUS BLD VENIPUNCTURE: CPT

## 2025-02-21 RX ORDER — OXYCODONE HYDROCHLORIDE 5 MG/1
10 TABLET ORAL EVERY 4 HOURS PRN
Status: DISPENSED | OUTPATIENT
Start: 2025-02-21

## 2025-02-21 RX ORDER — OXYCODONE HYDROCHLORIDE 5 MG/1
5 TABLET ORAL EVERY 4 HOURS PRN
Status: DISCONTINUED | OUTPATIENT
Start: 2025-02-21 | End: 2025-02-21

## 2025-02-21 RX ADMIN — SODIUM CHLORIDE, PRESERVATIVE FREE 10 ML: 5 INJECTION INTRAVENOUS at 07:56

## 2025-02-21 RX ADMIN — HYDROXYZINE HYDROCHLORIDE 10 MG: 10 TABLET ORAL at 13:26

## 2025-02-21 RX ADMIN — SODIUM CHLORIDE, PRESERVATIVE FREE 10 ML: 5 INJECTION INTRAVENOUS at 20:14

## 2025-02-21 RX ADMIN — SODIUM CHLORIDE, PRESERVATIVE FREE 10 ML: 5 INJECTION INTRAVENOUS at 13:18

## 2025-02-21 RX ADMIN — MORPHINE SULFATE 2 MG: 2 INJECTION, SOLUTION INTRAMUSCULAR; INTRAVENOUS at 17:11

## 2025-02-21 RX ADMIN — BUSPIRONE HYDROCHLORIDE 10 MG: 10 TABLET ORAL at 20:14

## 2025-02-21 RX ADMIN — OXYCODONE HYDROCHLORIDE 10 MG: 5 TABLET ORAL at 20:04

## 2025-02-21 RX ADMIN — METRONIDAZOLE 500 MG: 500 INJECTION, SOLUTION INTRAVENOUS at 06:25

## 2025-02-21 RX ADMIN — BUSPIRONE HYDROCHLORIDE 10 MG: 10 TABLET ORAL at 08:04

## 2025-02-21 RX ADMIN — MORPHINE SULFATE 2 MG: 2 INJECTION, SOLUTION INTRAMUSCULAR; INTRAVENOUS at 13:18

## 2025-02-21 RX ADMIN — INSULIN GLARGINE 8 UNITS: 100 INJECTION, SOLUTION SUBCUTANEOUS at 20:18

## 2025-02-21 RX ADMIN — METRONIDAZOLE 500 MG: 500 INJECTION, SOLUTION INTRAVENOUS at 13:34

## 2025-02-21 RX ADMIN — PANTOPRAZOLE SODIUM 40 MG: 40 TABLET, DELAYED RELEASE ORAL at 07:54

## 2025-02-21 RX ADMIN — PANTOPRAZOLE SODIUM 40 MG: 40 TABLET, DELAYED RELEASE ORAL at 15:58

## 2025-02-21 RX ADMIN — HYDROXYZINE HYDROCHLORIDE 10 MG: 10 TABLET ORAL at 20:14

## 2025-02-21 RX ADMIN — ALBUTEROL SULFATE 2.5 MG: 2.5 SOLUTION RESPIRATORY (INHALATION) at 09:24

## 2025-02-21 RX ADMIN — MORPHINE SULFATE 2 MG: 2 INJECTION, SOLUTION INTRAMUSCULAR; INTRAVENOUS at 22:13

## 2025-02-21 RX ADMIN — APIXABAN 5 MG: 5 TABLET, FILM COATED ORAL at 20:14

## 2025-02-21 RX ADMIN — MORPHINE SULFATE 2 MG: 2 INJECTION, SOLUTION INTRAMUSCULAR; INTRAVENOUS at 07:54

## 2025-02-21 RX ADMIN — INSULIN LISPRO 1 UNITS: 100 INJECTION, SOLUTION INTRAVENOUS; SUBCUTANEOUS at 12:00

## 2025-02-21 RX ADMIN — OXYCODONE HYDROCHLORIDE 5 MG: 5 TABLET ORAL at 11:02

## 2025-02-21 RX ADMIN — LOSARTAN POTASSIUM 25 MG: 25 TABLET, FILM COATED ORAL at 08:11

## 2025-02-21 RX ADMIN — OXYCODONE HYDROCHLORIDE 5 MG: 5 TABLET ORAL at 15:58

## 2025-02-21 RX ADMIN — HYDROXYZINE HYDROCHLORIDE 10 MG: 10 TABLET ORAL at 08:04

## 2025-02-21 RX ADMIN — LEVOTHYROXINE SODIUM 75 MCG: 0.07 TABLET ORAL at 08:04

## 2025-02-21 ASSESSMENT — PAIN DESCRIPTION - ORIENTATION
ORIENTATION: MID
ORIENTATION: MID
ORIENTATION: RIGHT;LOWER
ORIENTATION: RIGHT
ORIENTATION: MID;LOWER
ORIENTATION: MID
ORIENTATION: MID

## 2025-02-21 ASSESSMENT — PAIN DESCRIPTION - DESCRIPTORS
DESCRIPTORS: ACHING;SORE
DESCRIPTORS: ACHING;SORE
DESCRIPTORS: ACHING
DESCRIPTORS: ACHING;THROBBING
DESCRIPTORS: ACHING
DESCRIPTORS: ACHING;SORE;SHARP

## 2025-02-21 ASSESSMENT — PAIN SCALES - GENERAL
PAINLEVEL_OUTOF10: 7
PAINLEVEL_OUTOF10: 4
PAINLEVEL_OUTOF10: 8
PAINLEVEL_OUTOF10: 7
PAINLEVEL_OUTOF10: 10

## 2025-02-21 ASSESSMENT — PAIN DESCRIPTION - LOCATION
LOCATION: ABDOMEN
LOCATION: ABDOMEN
LOCATION: ABDOMEN;BACK
LOCATION: ABDOMEN

## 2025-02-21 ASSESSMENT — PAIN SCALES - WONG BAKER
WONGBAKER_NUMERICALRESPONSE: HURTS LITTLE MORE
WONGBAKER_NUMERICALRESPONSE: HURTS LITTLE MORE
WONGBAKER_NUMERICALRESPONSE: NO HURT

## 2025-02-21 ASSESSMENT — PAIN - FUNCTIONAL ASSESSMENT
PAIN_FUNCTIONAL_ASSESSMENT: PREVENTS OR INTERFERES WITH MANY ACTIVE NOT PASSIVE ACTIVITIES
PAIN_FUNCTIONAL_ASSESSMENT: PREVENTS OR INTERFERES WITH MANY ACTIVE NOT PASSIVE ACTIVITIES
PAIN_FUNCTIONAL_ASSESSMENT: ACTIVITIES ARE NOT PREVENTED
PAIN_FUNCTIONAL_ASSESSMENT: PREVENTS OR INTERFERES SOME ACTIVE ACTIVITIES AND ADLS

## 2025-02-21 NOTE — CONSULTS
Provider Consult Note        Patient:   Analy Reyna  YOB: 1945  Age:  79 y.o.  Room:  [address] [place] [address]-  MRN:  [id number]   Acct: [id number]  PCP: Ras Howell MD    Date of Admission:  2/18/2025  1:08 PM  Date of Service:  2/21/2025    Reason for Consult: Goals of Care             Subjective   Chief Complaint:-   Chest Pain       History Obtained From:-  Patient, Electronic Medical Record, Patient's primary nurse, and Palliative Care nurse    History of Present Illness:-                   Analy Reyna is a 79 y.o. female who  has a past medical history of Anemia, Atrial fibrillation (HCC), Breast cancer, stage 4, right (HCC), CAD (coronary artery disease), CHF (congestive heart failure) (HCC), Chronic kidney disease, COPD (chronic obstructive pulmonary disease) (HCC), DDD (degenerative disc disease), lumbar, Depression, Frequent PVCs, GERD (gastroesophageal reflux disease), History of blood transfusion, Hx of blood clots, Hyperlipidemia, Hypertension, Hyperthyroidism, Kidney disease, Movement disorder, Neuropathy, Obesity, Palpitations, Pneumonia, Sleep apnea, Status post right and left heart catheterization, and Type II or unspecified type diabetes mellitus without mention of complication, not stated as uncontrolled. They present to the hospital with Chest Pain   and are admitted for Abdominal pain, right upper quadrant.  The patient initially presented to the emergency department on August 1, 2024 with complaints of generalized weakness, neck pain.  CT C-spine showing new lytic lesions in the bones most pronounced at C3 and T1 levels for metastatic disease for metastatic disease or multiple myeloma.  CTA chest showing a 7 mm left upper lobe pulmonary nodule appearing more prominent than prior scans but too small to be characterized by a PET scan.  August 5 myeloma blood work was negative.  August 8, 2024 a whole-body bone scan was performed showing  frequently.  Will increase her oxycodone to 10 mg every 4 hours as needed to help get her off of the morphine.         Principal Problem:    Abdominal pain, right upper quadrant  Active Problems:    HFrEF (heart failure with reduced ejection fraction) (HCC)    Carcinoma of right breast, estrogen receptor positive, stage 4 (HCC)    Metastasis to bone (HCC)  Resolved Problems:    * No resolved hospital problems. *       Continue current plan of care for acute and chronic conditions per primary and specialist teams  Start Oxycodone IR 10 mg every 4 hours as needed for uncontrolled pain  Continue Morphine IV 2 mg every 4 hours as needed for uncontrolled pain  Please give IV medications only if oral medications are not effective at controlling symptoms  There must be at least one hour between giving oral immediate release opioids and the next dose of either IV or oral immediate release opioid  Will refer to Palliative Care Clinic on discharge  Palliative Care will continue to follow the patient while they are hospitalized   Please Perfect[employer]erve or call the Palliative Care team with any questions or concerns  PDMP reviewed    CURRENT CODE STATUS:  Full Code No additional code details     Case reviewed and discussed with Dr. Kuo    Parts of this note may have been dictated by use of voice recognition software and electronically transcribed. The note may contain errors not detected in proofreading    Electronically signed by BRIAN Clemente CNP on 2/21/2025 at 7:40 PM           Palliative Care Office: [phone]

## 2025-02-21 NOTE — PROGRESS NOTES
Hospitalist Progress Note    Patient:  Analy Reyna      Unit/Bed:6A-04/004-A    YOB: 1945    MRN: [id number]       Acct: [id number]     PCP: Ras Howell MD    Date of Admission: 2/18/2025    Assessment/Plan:    Abdominal pain-suspected secondary to metastatic disease-persistent  General Surgery was consulted for initial concerns of calculus cholecystitis versus progression of bony metastasis  General surgery team is signed off.  No surgical intervention recommended at this time  Gastroenterology also consulted and has signed off-recently had EGD 1/20/2025 which was negative.  Continue PPI twice daily and okay for regular diet  Dietitian consulted due to poor oral intake and decreased appetite   CT A/P and chest were negative for PE, but showed small pericardial effusion, trace bilateral pleural effusion, extensive sclerotic bony metastatic disease has progressed, nonspecific GB distention with possible GB wall thickening.    RUQ US distended GB without evidence of cholecystitis.    HIDA scan performed with normal EF and no evidence of acute cholecystitis  LFTs and lipase WNL   Was initiated on Rocephin and metronidazole for empiric antibiotic coverage of GI pathogen's-will discontinue at this time as no infectious etiology likely   Palliative care consulted and initiated on oxycodone 5 mg every 4 hours-recommend follow-up on outpatient as well for continued management of symptoms  Continue bowel regimen as constipation can exacerbate abdominal pain  Palliative care following  Start bowel regimen of miralax and sennakot daily       Leukopenia:   On chemotherapy.  Low suspicion for concomitant infection.  Pro-Leonard WNL.    .Monitor WBC with daily CBC.    Breast cancer: Carcinoma right breast ER positive stage IV, follows outpatient with Dr. Awada  Imaging significant for metastatic process with 5 L4 vertebral biopsy confirmed metastatic ductal carcinoma of breast origin.  Patient

## 2025-02-22 ENCOUNTER — APPOINTMENT (OUTPATIENT)
Dept: GENERAL RADIOLOGY | Age: 80
DRG: 543 | End: 2025-02-22
Payer: MEDICARE

## 2025-02-22 LAB
ANION GAP SERPL CALC-SCNC: 14 MEQ/L (ref 8–16)
BUN SERPL-MCNC: 13 MG/DL (ref 7–22)
CALCIUM SERPL-MCNC: 8.2 MG/DL (ref 8.2–9.6)
CHLORIDE SERPL-SCNC: 104 MEQ/L (ref 98–111)
CO2 SERPL-SCNC: 24 MEQ/L (ref 23–33)
CREAT SERPL-MCNC: 1 MG/DL (ref 0.4–1.2)
DEPRECATED RDW RBC AUTO: 56.7 FL (ref 35–45)
ERYTHROCYTE [DISTWIDTH] IN BLOOD BY AUTOMATED COUNT: 16 % (ref 11.5–14.5)
GFR SERPL CREATININE-BSD FRML MDRD: 57 ML/MIN/1.73M2
GLUCOSE BLD STRIP.AUTO-MCNC: 102 MG/DL (ref 70–108)
GLUCOSE BLD STRIP.AUTO-MCNC: 80 MG/DL (ref 70–108)
GLUCOSE BLD STRIP.AUTO-MCNC: 93 MG/DL (ref 70–108)
GLUCOSE BLD STRIP.AUTO-MCNC: 99 MG/DL (ref 70–108)
GLUCOSE SERPL-MCNC: 92 MG/DL (ref 70–108)
HCT VFR BLD AUTO: 28.6 % (ref 37–47)
HGB BLD-MCNC: 9.2 GM/DL (ref 12–16)
LIPASE SERPL-CCNC: 15.8 U/L (ref 5.6–51.3)
MCH RBC QN AUTO: 31.1 PG (ref 26–33)
MCHC RBC AUTO-ENTMCNC: 32.2 GM/DL (ref 32.2–35.5)
MCV RBC AUTO: 96.6 FL (ref 81–99)
PLATELET # BLD AUTO: 165 THOU/MM3 (ref 130–400)
PMV BLD AUTO: 9.4 FL (ref 9.4–12.4)
POTASSIUM SERPL-SCNC: 3.7 MEQ/L (ref 3.5–5.2)
RBC # BLD AUTO: 2.96 MILL/MM3 (ref 4.2–5.4)
SODIUM SERPL-SCNC: 142 MEQ/L (ref 135–145)
WBC # BLD AUTO: 2.7 THOU/MM3 (ref 4.8–10.8)

## 2025-02-22 PROCEDURE — 6370000000 HC RX 637 (ALT 250 FOR IP)

## 2025-02-22 PROCEDURE — 1200000000 HC SEMI PRIVATE

## 2025-02-22 PROCEDURE — 85027 COMPLETE CBC AUTOMATED: CPT

## 2025-02-22 PROCEDURE — 6370000000 HC RX 637 (ALT 250 FOR IP): Performed by: PHYSICIAN ASSISTANT

## 2025-02-22 PROCEDURE — 6360000002 HC RX W HCPCS: Performed by: NURSE PRACTITIONER

## 2025-02-22 PROCEDURE — 80048 BASIC METABOLIC PNL TOTAL CA: CPT

## 2025-02-22 PROCEDURE — 36415 COLL VENOUS BLD VENIPUNCTURE: CPT

## 2025-02-22 PROCEDURE — 82948 REAGENT STRIP/BLOOD GLUCOSE: CPT

## 2025-02-22 PROCEDURE — 2500000003 HC RX 250 WO HCPCS: Performed by: NURSE PRACTITIONER

## 2025-02-22 PROCEDURE — 6370000000 HC RX 637 (ALT 250 FOR IP): Performed by: STUDENT IN AN ORGANIZED HEALTH CARE EDUCATION/TRAINING PROGRAM

## 2025-02-22 PROCEDURE — 74018 RADEX ABDOMEN 1 VIEW: CPT

## 2025-02-22 PROCEDURE — 99233 SBSQ HOSP IP/OBS HIGH 50: CPT | Performed by: PHYSICIAN ASSISTANT

## 2025-02-22 PROCEDURE — 83690 ASSAY OF LIPASE: CPT

## 2025-02-22 RX ORDER — LEVALBUTEROL INHALATION SOLUTION 1.25 MG/3ML
1.25 SOLUTION RESPIRATORY (INHALATION) EVERY 8 HOURS PRN
Status: ACTIVE | OUTPATIENT
Start: 2025-02-22

## 2025-02-22 RX ORDER — SENNA AND DOCUSATE SODIUM 50; 8.6 MG/1; MG/1
1 TABLET, FILM COATED ORAL 2 TIMES DAILY
Status: DISPENSED | OUTPATIENT
Start: 2025-02-22

## 2025-02-22 RX ORDER — POLYETHYLENE GLYCOL 3350 17 G/17G
17 POWDER, FOR SOLUTION ORAL DAILY
Status: DISPENSED | OUTPATIENT
Start: 2025-02-22

## 2025-02-22 RX ORDER — LORAZEPAM 0.5 MG/1
0.5 TABLET ORAL EVERY 4 HOURS PRN
Status: DISPENSED | OUTPATIENT
Start: 2025-02-22

## 2025-02-22 RX ADMIN — ACETAMINOPHEN 650 MG: 325 TABLET ORAL at 07:59

## 2025-02-22 RX ADMIN — OXYCODONE HYDROCHLORIDE 10 MG: 5 TABLET ORAL at 14:51

## 2025-02-22 RX ADMIN — MORPHINE SULFATE 2 MG: 2 INJECTION, SOLUTION INTRAMUSCULAR; INTRAVENOUS at 05:51

## 2025-02-22 RX ADMIN — HYDROXYZINE HYDROCHLORIDE 10 MG: 10 TABLET ORAL at 14:46

## 2025-02-22 RX ADMIN — PANTOPRAZOLE SODIUM 40 MG: 40 TABLET, DELAYED RELEASE ORAL at 05:51

## 2025-02-22 RX ADMIN — ACETAMINOPHEN 650 MG: 325 TABLET ORAL at 14:51

## 2025-02-22 RX ADMIN — OXYCODONE HYDROCHLORIDE 10 MG: 5 TABLET ORAL at 09:39

## 2025-02-22 RX ADMIN — POLYETHYLENE GLYCOL 3350 17 G: 17 POWDER, FOR SOLUTION ORAL at 14:46

## 2025-02-22 RX ADMIN — ATORVASTATIN CALCIUM 20 MG: 20 TABLET, FILM COATED ORAL at 20:48

## 2025-02-22 RX ADMIN — OXYCODONE HYDROCHLORIDE 10 MG: 5 TABLET ORAL at 01:09

## 2025-02-22 RX ADMIN — SODIUM CHLORIDE, PRESERVATIVE FREE 10 ML: 5 INJECTION INTRAVENOUS at 20:51

## 2025-02-22 RX ADMIN — SENNOSIDES, DOCUSATE SODIUM 1 TABLET: 50; 8.6 TABLET, FILM COATED ORAL at 20:48

## 2025-02-22 RX ADMIN — BUSPIRONE HYDROCHLORIDE 10 MG: 10 TABLET ORAL at 20:48

## 2025-02-22 RX ADMIN — APIXABAN 5 MG: 5 TABLET, FILM COATED ORAL at 20:48

## 2025-02-22 RX ADMIN — APIXABAN 5 MG: 5 TABLET, FILM COATED ORAL at 07:48

## 2025-02-22 RX ADMIN — HYDROXYZINE HYDROCHLORIDE 10 MG: 10 TABLET ORAL at 07:47

## 2025-02-22 RX ADMIN — OXYCODONE HYDROCHLORIDE 10 MG: 5 TABLET ORAL at 20:47

## 2025-02-22 RX ADMIN — PANTOPRAZOLE SODIUM 40 MG: 40 TABLET, DELAYED RELEASE ORAL at 16:09

## 2025-02-22 RX ADMIN — LORAZEPAM 0.5 MG: 0.5 TABLET ORAL at 10:59

## 2025-02-22 RX ADMIN — HYDROXYZINE HYDROCHLORIDE 10 MG: 10 TABLET ORAL at 20:48

## 2025-02-22 RX ADMIN — BUSPIRONE HYDROCHLORIDE 10 MG: 10 TABLET ORAL at 07:48

## 2025-02-22 RX ADMIN — LOSARTAN POTASSIUM 25 MG: 25 TABLET, FILM COATED ORAL at 07:49

## 2025-02-22 RX ADMIN — SODIUM CHLORIDE, PRESERVATIVE FREE 10 ML: 5 INJECTION INTRAVENOUS at 07:53

## 2025-02-22 RX ADMIN — SENNOSIDES, DOCUSATE SODIUM 1 TABLET: 50; 8.6 TABLET, FILM COATED ORAL at 14:46

## 2025-02-22 ASSESSMENT — PAIN SCALES - GENERAL
PAINLEVEL_OUTOF10: 10
PAINLEVEL_OUTOF10: 7
PAINLEVEL_OUTOF10: 7
PAINLEVEL_OUTOF10: 10
PAINLEVEL_OUTOF10: 5
PAINLEVEL_OUTOF10: 8
PAINLEVEL_OUTOF10: 2
PAINLEVEL_OUTOF10: 5
PAINLEVEL_OUTOF10: 6
PAINLEVEL_OUTOF10: 8
PAINLEVEL_OUTOF10: 10

## 2025-02-22 ASSESSMENT — PAIN DESCRIPTION - PAIN TYPE: TYPE: ACUTE PAIN

## 2025-02-22 ASSESSMENT — PAIN - FUNCTIONAL ASSESSMENT: PAIN_FUNCTIONAL_ASSESSMENT: ACTIVITIES ARE NOT PREVENTED

## 2025-02-22 ASSESSMENT — PAIN DESCRIPTION - ORIENTATION
ORIENTATION: MID
ORIENTATION: MID

## 2025-02-22 ASSESSMENT — PAIN DESCRIPTION - LOCATION
LOCATION: ABDOMEN
LOCATION: ABDOMEN
LOCATION: HAND
LOCATION: ABDOMEN
LOCATION: HEAD

## 2025-02-22 ASSESSMENT — PAIN DESCRIPTION - DESCRIPTORS
DESCRIPTORS: ACHING
DESCRIPTORS: ACHING

## 2025-02-22 ASSESSMENT — PAIN SCALES - WONG BAKER: WONGBAKER_NUMERICALRESPONSE: HURTS LITTLE MORE

## 2025-02-23 VITALS
OXYGEN SATURATION: 92 % | HEART RATE: 75 BPM | DIASTOLIC BLOOD PRESSURE: 49 MMHG | RESPIRATION RATE: 16 BRPM | WEIGHT: 220 LBS | HEIGHT: 65 IN | SYSTOLIC BLOOD PRESSURE: 127 MMHG | BODY MASS INDEX: 36.65 KG/M2 | TEMPERATURE: 98.4 F

## 2025-02-23 LAB
GLUCOSE BLD STRIP.AUTO-MCNC: 127 MG/DL (ref 70–108)
GLUCOSE BLD STRIP.AUTO-MCNC: 162 MG/DL (ref 70–108)
GLUCOSE BLD STRIP.AUTO-MCNC: 84 MG/DL (ref 70–108)
GLUCOSE BLD STRIP.AUTO-MCNC: 88 MG/DL (ref 70–108)

## 2025-02-23 PROCEDURE — 6370000000 HC RX 637 (ALT 250 FOR IP)

## 2025-02-23 PROCEDURE — 6370000000 HC RX 637 (ALT 250 FOR IP): Performed by: NURSE PRACTITIONER

## 2025-02-23 PROCEDURE — 6370000000 HC RX 637 (ALT 250 FOR IP): Performed by: PHYSICIAN ASSISTANT

## 2025-02-23 PROCEDURE — 1200000000 HC SEMI PRIVATE

## 2025-02-23 PROCEDURE — 82948 REAGENT STRIP/BLOOD GLUCOSE: CPT

## 2025-02-23 PROCEDURE — 99232 SBSQ HOSP IP/OBS MODERATE 35: CPT | Performed by: PHYSICIAN ASSISTANT

## 2025-02-23 PROCEDURE — 2500000003 HC RX 250 WO HCPCS: Performed by: NURSE PRACTITIONER

## 2025-02-23 PROCEDURE — 6370000000 HC RX 637 (ALT 250 FOR IP): Performed by: STUDENT IN AN ORGANIZED HEALTH CARE EDUCATION/TRAINING PROGRAM

## 2025-02-23 RX ORDER — HEPARIN SODIUM (PORCINE) LOCK FLUSH IV SOLN 100 UNIT/ML 100 UNIT/ML
500 SOLUTION INTRAVENOUS PRN
Status: DISPENSED | OUTPATIENT
Start: 2025-02-23

## 2025-02-23 RX ORDER — ONDANSETRON 4 MG/1
8 TABLET, ORALLY DISINTEGRATING ORAL EVERY 6 HOURS PRN
Status: DISPENSED | OUTPATIENT
Start: 2025-02-23

## 2025-02-23 RX ADMIN — PANTOPRAZOLE SODIUM 40 MG: 40 TABLET, DELAYED RELEASE ORAL at 06:11

## 2025-02-23 RX ADMIN — BUSPIRONE HYDROCHLORIDE 10 MG: 10 TABLET ORAL at 07:26

## 2025-02-23 RX ADMIN — OXYCODONE HYDROCHLORIDE 10 MG: 5 TABLET ORAL at 11:52

## 2025-02-23 RX ADMIN — ONDANSETRON 8 MG: 4 TABLET, ORALLY DISINTEGRATING ORAL at 07:32

## 2025-02-23 RX ADMIN — ACETAMINOPHEN 650 MG: 325 TABLET ORAL at 17:50

## 2025-02-23 RX ADMIN — OXYCODONE HYDROCHLORIDE 10 MG: 5 TABLET ORAL at 21:56

## 2025-02-23 RX ADMIN — HYDROXYZINE HYDROCHLORIDE 10 MG: 10 TABLET ORAL at 20:37

## 2025-02-23 RX ADMIN — BUSPIRONE HYDROCHLORIDE 10 MG: 10 TABLET ORAL at 20:37

## 2025-02-23 RX ADMIN — HYDROXYZINE HYDROCHLORIDE 10 MG: 10 TABLET ORAL at 07:25

## 2025-02-23 RX ADMIN — APIXABAN 5 MG: 5 TABLET, FILM COATED ORAL at 20:38

## 2025-02-23 RX ADMIN — LEVOTHYROXINE SODIUM 75 MCG: 0.07 TABLET ORAL at 06:11

## 2025-02-23 RX ADMIN — ACETAMINOPHEN 650 MG: 325 TABLET ORAL at 11:52

## 2025-02-23 RX ADMIN — ACETAMINOPHEN 650 MG: 325 TABLET ORAL at 07:24

## 2025-02-23 RX ADMIN — OXYCODONE HYDROCHLORIDE 10 MG: 5 TABLET ORAL at 17:50

## 2025-02-23 RX ADMIN — SENNOSIDES, DOCUSATE SODIUM 1 TABLET: 50; 8.6 TABLET, FILM COATED ORAL at 07:25

## 2025-02-23 RX ADMIN — HYDROXYZINE HYDROCHLORIDE 10 MG: 10 TABLET ORAL at 14:09

## 2025-02-23 RX ADMIN — LOSARTAN POTASSIUM 25 MG: 25 TABLET, FILM COATED ORAL at 07:25

## 2025-02-23 RX ADMIN — SENNOSIDES, DOCUSATE SODIUM 1 TABLET: 50; 8.6 TABLET, FILM COATED ORAL at 20:38

## 2025-02-23 RX ADMIN — LORAZEPAM 0.5 MG: 0.5 TABLET ORAL at 04:05

## 2025-02-23 RX ADMIN — OXYCODONE HYDROCHLORIDE 10 MG: 5 TABLET ORAL at 00:47

## 2025-02-23 RX ADMIN — LORAZEPAM 0.5 MG: 0.5 TABLET ORAL at 18:27

## 2025-02-23 RX ADMIN — ATORVASTATIN CALCIUM 20 MG: 20 TABLET, FILM COATED ORAL at 20:37

## 2025-02-23 RX ADMIN — INSULIN GLARGINE 8 UNITS: 100 INJECTION, SOLUTION SUBCUTANEOUS at 20:38

## 2025-02-23 RX ADMIN — POLYETHYLENE GLYCOL 3350 17 G: 17 POWDER, FOR SOLUTION ORAL at 07:26

## 2025-02-23 RX ADMIN — PANTOPRAZOLE SODIUM 40 MG: 40 TABLET, DELAYED RELEASE ORAL at 16:22

## 2025-02-23 RX ADMIN — SODIUM CHLORIDE, PRESERVATIVE FREE 10 ML: 5 INJECTION INTRAVENOUS at 07:33

## 2025-02-23 RX ADMIN — APIXABAN 5 MG: 5 TABLET, FILM COATED ORAL at 07:26

## 2025-02-23 RX ADMIN — OXYCODONE HYDROCHLORIDE 10 MG: 5 TABLET ORAL at 07:24

## 2025-02-23 ASSESSMENT — PAIN SCALES - GENERAL
PAINLEVEL_OUTOF10: 5
PAINLEVEL_OUTOF10: 10
PAINLEVEL_OUTOF10: 10
PAINLEVEL_OUTOF10: 7
PAINLEVEL_OUTOF10: 10
PAINLEVEL_OUTOF10: 10
PAINLEVEL_OUTOF10: 4
PAINLEVEL_OUTOF10: 10
PAINLEVEL_OUTOF10: 5
PAINLEVEL_OUTOF10: 10
PAINLEVEL_OUTOF10: 0

## 2025-02-23 ASSESSMENT — PAIN DESCRIPTION - LOCATION
LOCATION: ABDOMEN

## 2025-02-23 ASSESSMENT — PAIN DESCRIPTION - ORIENTATION
ORIENTATION: MID
ORIENTATION: MID;RIGHT;UPPER
ORIENTATION: MID

## 2025-02-23 ASSESSMENT — PAIN DESCRIPTION - DESCRIPTORS
DESCRIPTORS: ACHING;CRAMPING
DESCRIPTORS: ACHING;CRAMPING
DESCRIPTORS: ACHING;DISCOMFORT;SORE

## 2025-02-23 NOTE — PROGRESS NOTES
Soft, significantly tender to palpation in the epigastric and periumbilical region. Hypoactive bowel sounds   Musculoskeletal: passive and active ROM x 4 extremities.  Skin: Skin color, texture, turgor normal.  No rashes or lesions.  Neurologic:  Neurovascularly intact without any focal sensory/motor deficits. Cranial nerves: II-XII intact, grossly non-focal.  Psychiatric: Alert and oriented, thought content appropriate, normal insight tearful  Capillary Refill: Brisk,< 3 seconds   Peripheral Pulses: +2 palpable, equal bilaterally       Labs:   Recent Labs     02/21/25  0620 02/22/25  0600   WBC 2.6* 2.7*   HGB 8.6* 9.2*   HCT 26.7* 28.6*    165     Recent Labs     02/21/25  0620 02/22/25  0600    142   K 4.1 3.7    104   CO2 23 24   BUN 11 13   CREATININE 0.8 1.0   CALCIUM 8.0* 8.2     No results for input(s): \"AST\", \"ALT\", \"BILIDIR\", \"BILITOT\", \"ALKPHOS\" in the last 72 hours.    No results for input(s): \"INR\" in the last 72 hours.  No results for input(s): \"CKTOTAL\", \"TROPONINI\" in the last 72 hours.    Microbiology:      Urinalysis:      Lab Results   Component Value Date/Time    NITRU NEGATIVE 02/18/2025 03:26 PM    WBCUA  01/16/2025 05:42 PM    BACTERIA 2+ 01/16/2025 05:42 PM    RBCUA 11-20 01/16/2025 05:42 PM    BLOODU NEGATIVE 02/18/2025 03:26 PM    GLUCOSEU NEGATIVE 02/18/2025 03:26 PM       Radiology:  XR ABDOMEN (KUB) (SINGLE AP VIEW)   Final Result      Nonobstructive bowel gas pattern.            **This report has been created using voice recognition software. It may contain   minor errors which are inherent in voice recognition technology.**         Electronically signed by Dr. Walker PARKER HEPATOBILIARY SCAN W EJECTION FRACTION   Final Result   1. Patent cystic and common bile ducts without evidence of acute cholecystitis.   2. Normal gallbladder ejection fraction.            **This report has been created using voice recognition software. It may contain   minor

## 2025-02-23 NOTE — PROGRESS NOTES
Hospitalist Progress Note    Patient:  Analy Reyna      Unit/Bed:6A-04/004-A    YOB: 1945    MRN: [id number]       Acct: [id number]     PCP: Ras Howell MD    Date of Admission: 2/18/2025    Assessment/Plan:    Abdominal pain-suspected secondary to metastatic disease-persistent  General Surgery was consulted for initial concerns of calculus cholecystitis versus progression of bony metastasis  General surgery team is signed off.  No surgical intervention recommended at this time  Gastroenterology also consulted and has signed off-recently had EGD 1/20/2025 which was negative.  Continue PPI twice daily and okay for regular diet  Dietitian consulted due to poor oral intake and decreased appetite   CT A/P and chest were negative for PE, but showed small pericardial effusion, trace bilateral pleural effusion, extensive sclerotic bony metastatic disease has progressed, nonspecific GB distention with possible GB wall thickening.    RUQ US distended GB without evidence of cholecystitis.    HIDA scan performed with normal EF and no evidence of acute cholecystitis  LFTs and lipase WNL   Was initiated on Rocephin and metronidazole for empiric antibiotic coverage of GI pathogen's-will discontinue at this time as no infectious etiology likely   Palliative care consulted and initiated on oxycodone 5 mg every 4 hours-recommend follow-up on outpatient as well for continued management of symptoms  Continue bowel regimen as constipation can exacerbate abdominal pain  Palliative care following  Has been utilizing IV morphine every 4-5 hours in addition to oral oxycodone.  Discussed with patient that we are unable to discharge her with IV morphine therefore we will need to move forward and transitioning to oral so she can return to assisted living.  Lengthy discussion regarding her goals of care and discussed with possibly hospice was a better option for her for symptom control given the

## 2025-02-24 ENCOUNTER — HOSPITAL ENCOUNTER (OUTPATIENT)
Dept: INFUSION THERAPY | Age: 80
End: 2025-02-24

## 2025-02-24 VITALS
HEIGHT: 65 IN | BODY MASS INDEX: 36.65 KG/M2 | WEIGHT: 220 LBS | HEART RATE: 78 BPM | OXYGEN SATURATION: 95 % | TEMPERATURE: 98.3 F | DIASTOLIC BLOOD PRESSURE: 48 MMHG | SYSTOLIC BLOOD PRESSURE: 131 MMHG | RESPIRATION RATE: 16 BRPM

## 2025-02-24 LAB
GLUCOSE BLD STRIP.AUTO-MCNC: 131 MG/DL (ref 70–108)
GLUCOSE BLD STRIP.AUTO-MCNC: 147 MG/DL (ref 70–108)

## 2025-02-24 PROCEDURE — 6370000000 HC RX 637 (ALT 250 FOR IP)

## 2025-02-24 PROCEDURE — 6360000002 HC RX W HCPCS: Performed by: PHYSICIAN ASSISTANT

## 2025-02-24 PROCEDURE — 6370000000 HC RX 637 (ALT 250 FOR IP): Performed by: STUDENT IN AN ORGANIZED HEALTH CARE EDUCATION/TRAINING PROGRAM

## 2025-02-24 PROCEDURE — 99239 HOSP IP/OBS DSCHRG MGMT >30: CPT | Performed by: PHYSICIAN ASSISTANT

## 2025-02-24 PROCEDURE — 6370000000 HC RX 637 (ALT 250 FOR IP): Performed by: PHYSICIAN ASSISTANT

## 2025-02-24 PROCEDURE — 99233 SBSQ HOSP IP/OBS HIGH 50: CPT

## 2025-02-24 PROCEDURE — 82948 REAGENT STRIP/BLOOD GLUCOSE: CPT

## 2025-02-24 PROCEDURE — 94669 MECHANICAL CHEST WALL OSCILL: CPT

## 2025-02-24 RX ORDER — ONDANSETRON 8 MG/1
8 TABLET, ORALLY DISINTEGRATING ORAL EVERY 6 HOURS PRN
Qty: 30 TABLET | Refills: 1 | Status: SHIPPED | OUTPATIENT
Start: 2025-02-24

## 2025-02-24 RX ORDER — LORAZEPAM 0.5 MG/1
0.5 TABLET ORAL EVERY 4 HOURS PRN
Qty: 180 TABLET | Refills: 0 | Status: SHIPPED | OUTPATIENT
Start: 2025-02-24 | End: 2025-03-26

## 2025-02-24 RX ORDER — SUCRALFATE 1 G/1
1 TABLET ORAL
Qty: 120 TABLET | Refills: 3 | Status: SHIPPED | OUTPATIENT
Start: 2025-02-24

## 2025-02-24 RX ORDER — OXYCODONE HYDROCHLORIDE 10 MG/1
10 TABLET ORAL EVERY 4 HOURS PRN
Qty: 180 TABLET | Refills: 0 | Status: SHIPPED | OUTPATIENT
Start: 2025-02-24 | End: 2025-03-26

## 2025-02-24 RX ORDER — LEVALBUTEROL INHALATION SOLUTION 1.25 MG/3ML
1.25 SOLUTION RESPIRATORY (INHALATION) EVERY 8 HOURS PRN
Qty: 90 ML | Refills: 0 | Status: SHIPPED | OUTPATIENT
Start: 2025-02-24

## 2025-02-24 RX ORDER — SENNA AND DOCUSATE SODIUM 50; 8.6 MG/1; MG/1
1 TABLET, FILM COATED ORAL 2 TIMES DAILY
Qty: 60 TABLET | Refills: 0 | Status: SHIPPED | OUTPATIENT
Start: 2025-02-24

## 2025-02-24 RX ADMIN — LORAZEPAM 0.5 MG: 0.5 TABLET ORAL at 10:02

## 2025-02-24 RX ADMIN — SUCRALFATE 1 G: 1 TABLET ORAL at 09:29

## 2025-02-24 RX ADMIN — SENNOSIDES, DOCUSATE SODIUM 1 TABLET: 50; 8.6 TABLET, FILM COATED ORAL at 09:29

## 2025-02-24 RX ADMIN — LEVOTHYROXINE SODIUM 75 MCG: 0.07 TABLET ORAL at 09:29

## 2025-02-24 RX ADMIN — LOSARTAN POTASSIUM 25 MG: 25 TABLET, FILM COATED ORAL at 09:29

## 2025-02-24 RX ADMIN — APIXABAN 5 MG: 5 TABLET, FILM COATED ORAL at 09:29

## 2025-02-24 RX ADMIN — LORAZEPAM 0.5 MG: 0.5 TABLET ORAL at 03:53

## 2025-02-24 RX ADMIN — HYDROXYZINE HYDROCHLORIDE 10 MG: 10 TABLET ORAL at 09:29

## 2025-02-24 RX ADMIN — OXYCODONE HYDROCHLORIDE 10 MG: 5 TABLET ORAL at 03:43

## 2025-02-24 RX ADMIN — HEPARIN 500 UNITS: 100 SYRINGE at 13:08

## 2025-02-24 RX ADMIN — PANTOPRAZOLE SODIUM 40 MG: 40 TABLET, DELAYED RELEASE ORAL at 09:29

## 2025-02-24 RX ADMIN — OXYCODONE HYDROCHLORIDE 10 MG: 5 TABLET ORAL at 10:02

## 2025-02-24 RX ADMIN — POLYETHYLENE GLYCOL 3350 17 G: 17 POWDER, FOR SOLUTION ORAL at 09:31

## 2025-02-24 RX ADMIN — BUSPIRONE HYDROCHLORIDE 10 MG: 10 TABLET ORAL at 09:29

## 2025-02-24 ASSESSMENT — PAIN SCALES - GENERAL
PAINLEVEL_OUTOF10: 7
PAINLEVEL_OUTOF10: 8
PAINLEVEL_OUTOF10: 8
PAINLEVEL_OUTOF10: 7
PAINLEVEL_OUTOF10: 8
PAINLEVEL_OUTOF10: 6
PAINLEVEL_OUTOF10: 8
PAINLEVEL_OUTOF10: 7
PAINLEVEL_OUTOF10: 8
PAINLEVEL_OUTOF10: 6

## 2025-02-24 ASSESSMENT — PAIN DESCRIPTION - ORIENTATION
ORIENTATION: MID;UPPER
ORIENTATION: MID;UPPER

## 2025-02-24 ASSESSMENT — PAIN DESCRIPTION - DESCRIPTORS
DESCRIPTORS: ACHING;BURNING;SORE
DESCRIPTORS: ACHING;CRAMPING;SORE

## 2025-02-24 ASSESSMENT — PAIN DESCRIPTION - LOCATION
LOCATION: ABDOMEN
LOCATION: ABDOMEN

## 2025-02-24 ASSESSMENT — PAIN - FUNCTIONAL ASSESSMENT: PAIN_FUNCTIONAL_ASSESSMENT: ACTIVITIES ARE NOT PREVENTED

## 2025-02-24 NOTE — PLAN OF CARE
Problem: Chronic Conditions and Co-morbidities  Goal: Patient's chronic conditions and co-morbidity symptoms are monitored and maintained or improved  2/20/2025 1022 by Janeen Alejandre, RN  Outcome: Progressing  Flowsheets (Taken 2/20/2025 1022)  Care Plan - Patient's Chronic Conditions and Co-Morbidity Symptoms are Monitored and Maintained or Improved:   Monitor and assess patient's chronic conditions and comorbid symptoms for stability, deterioration, or improvement   Collaborate with multidisciplinary team to address chronic and comorbid conditions and prevent exacerbation or deterioration   Update acute care plan with appropriate goals if chronic or comorbid symptoms are exacerbated and prevent overall improvement and discharge     Problem: Discharge Planning  Goal: Discharge to home or other facility with appropriate resources  2/20/2025 1022 by Janeen Alejandre, RN  Outcome: Progressing  Flowsheets (Taken 2/20/2025 1022)  Discharge to home or other facility with appropriate resources:   Identify barriers to discharge with patient and caregiver   Identify discharge learning needs (meds, wound care, etc)   Arrange for needed discharge resources and transportation as appropriate   Arrange for interpreters to assist at discharge as needed     Problem: Pain  Goal: Verbalizes/displays adequate comfort level or baseline comfort level  2/20/2025 1022 by Janeen Alejandre, RN  Outcome: Progressing  Flowsheets (Taken 2/20/2025 1022)  Verbalizes/displays adequate comfort level or baseline comfort level:   Encourage patient to monitor pain and request assistance   Administer analgesics based on type and severity of pain and evaluate response   Assess pain using appropriate pain scale   Implement non-pharmacological measures as appropriate and evaluate response     Problem: Safety - Adult  Goal: Free from fall injury  2/20/2025 1022 by Janeen Alejandre, RN  Outcome: Progressing  Flowsheets (Taken 2/20/2025 
  Problem: Chronic Conditions and Co-morbidities  Goal: Patient's chronic conditions and co-morbidity symptoms are monitored and maintained or improved  2/21/2025 1041 by Gina Kaufman RN  Outcome: Progressing  Flowsheets (Taken 2/21/2025 1041)  Care Plan - Patient's Chronic Conditions and Co-Morbidity Symptoms are Monitored and Maintained or Improved: Monitor and assess patient's chronic conditions and comorbid symptoms for stability, deterioration, or improvement  2/21/2025 0443 by Whitney Mclean RN  Outcome: Progressing     Problem: Discharge Planning  Goal: Discharge to home or other facility with appropriate resources  2/21/2025 1041 by Gina Kaufman RN  Outcome: Progressing  Flowsheets (Taken 2/20/2025 1022 by Janeen Alejandre, RN)  Discharge to home or other facility with appropriate resources:   Identify barriers to discharge with patient and caregiver   Identify discharge learning needs (meds, wound care, etc)   Arrange for needed discharge resources and transportation as appropriate   Arrange for interpreters to assist at discharge as needed  2/21/2025 0443 by Whitney Mclean RN  Outcome: Progressing     Problem: Pain  Goal: Verbalizes/displays adequate comfort level or baseline comfort level  2/21/2025 1041 by Gina Kaufman RN  Outcome: Progressing  Flowsheets (Taken 2/20/2025 1022 by Janeen Alejandre, RN)  Verbalizes/displays adequate comfort level or baseline comfort level:   Encourage patient to monitor pain and request assistance   Administer analgesics based on type and severity of pain and evaluate response   Assess pain using appropriate pain scale   Implement non-pharmacological measures as appropriate and evaluate response  2/21/2025 0443 by Whitney Mclean RN  Outcome: Progressing     Problem: Safety - Adult  Goal: Free from fall injury  2/21/2025 1041 by Gina Kaufman RN  Outcome: Progressing  Flowsheets (Taken 2/20/2025 1022 by Janeen Alejandre 
  Problem: Chronic Conditions and Co-morbidities  Goal: Patient's chronic conditions and co-morbidity symptoms are monitored and maintained or improved  2/22/2025 1042 by Sharita Parker  Outcome: Progressing  Flowsheets (Taken 2/22/2025 1042)  Care Plan - Patient's Chronic Conditions and Co-Morbidity Symptoms are Monitored and Maintained or Improved:   Monitor and assess patient's chronic conditions and comorbid symptoms for stability, deterioration, or improvement   Collaborate with multidisciplinary team to address chronic and comorbid conditions and prevent exacerbation or deterioration   Update acute care plan with appropriate goals if chronic or comorbid symptoms are exacerbated and prevent overall improvement and discharge  2/22/2025 1041 by Sharita Parker   Problem: Discharge Planning  Goal: Discharge to home or other facility with appropriate resources  2/22/2025 1042 by Sharita Parker  Outcome: Progressing  Flowsheets (Taken 2/22/2025 1042)  Discharge to home or other facility with appropriate resources:   Identify barriers to discharge with patient and caregiver   Arrange for needed discharge resources and transportation as appropriate   Identify discharge learning needs (meds, wound care, etc)   Refer to discharge planning if patient needs post-hospital services based on physician order or complex needs related to functional status, cognitive ability or social support system  2/22/2025 1041 by Sharita Parker  Problem: Pain  Goal: Verbalizes/displays adequate comfort level or baseline comfort level  2/22/2025 1042 by Sharita Parker  Outcome: Progressing  Flowsheets (Taken 2/22/2025 1042)  Verbalizes/displays adequate comfort level or baseline comfort level:   Encourage patient to monitor pain and request assistance   Assess pain using appropriate pain scale   Administer analgesics based on type and severity of pain and evaluate response   Implement non-pharmacological measures as appropriate and evaluate 
  Problem: Chronic Conditions and Co-morbidities  Goal: Patient's chronic conditions and co-morbidity symptoms are monitored and maintained or improved  Outcome: Progressing     Problem: Discharge Planning  Goal: Discharge to home or other facility with appropriate resources  Outcome: Progressing     Problem: Pain  Goal: Verbalizes/displays adequate comfort level or baseline comfort level  Outcome: Progressing     Problem: Safety - Adult  Goal: Free from fall injury  Outcome: Progressing     Problem: Respiratory - Adult  Goal: Achieves optimal ventilation and oxygenation  Outcome: Progressing     
  Problem: Chronic Conditions and Co-morbidities  Goal: Patient's chronic conditions and co-morbidity symptoms are monitored and maintained or improved  Outcome: Progressing  Flowsheets (Taken 2/18/2025 2038)  Care Plan - Patient's Chronic Conditions and Co-Morbidity Symptoms are Monitored and Maintained or Improved:   Monitor and assess patient's chronic conditions and comorbid symptoms for stability, deterioration, or improvement   Collaborate with multidisciplinary team to address chronic and comorbid conditions and prevent exacerbation or deterioration   Update acute care plan with appropriate goals if chronic or comorbid symptoms are exacerbated and prevent overall improvement and discharge     Problem: Discharge Planning  Goal: Discharge to home or other facility with appropriate resources  Outcome: Progressing  Flowsheets  Taken 2/19/2025 0046 by Dulce Torres RN  Discharge to home or other facility with appropriate resources:   Identify barriers to discharge with patient and caregiver   Identify discharge learning needs (meds, wound care, etc)   Arrange for needed discharge resources and transportation as appropriate    Problem: Pain  Goal: Verbalizes/displays adequate comfort level or baseline comfort level  Outcome: Progressing  Flowsheets (Taken 2/19/2025 0046)  Verbalizes/displays adequate comfort level or baseline comfort level:   Encourage patient to monitor pain and request assistance   Administer analgesics based on type and severity of pain and evaluate response   Assess pain using appropriate pain scale   Implement non-pharmacological measures as appropriate and evaluate response     Problem: Safety - Adult  Goal: Free from fall injury  Outcome: Progressing  Flowsheets (Taken 2/19/2025 0046)  Free From Fall Injury: Instruct family/caregiver on patient safety     
  Problem: Chronic Conditions and Co-morbidities  Goal: Patient's chronic conditions and co-morbidity symptoms are monitored and maintained or improved  Outcome: Progressing  Flowsheets (Taken 2/19/2025 2149)  Care Plan - Patient's Chronic Conditions and Co-Morbidity Symptoms are Monitored and Maintained or Improved:   Monitor and assess patient's chronic conditions and comorbid symptoms for stability, deterioration, or improvement   Collaborate with multidisciplinary team to address chronic and comorbid conditions and prevent exacerbation or deterioration   Update acute care plan with appropriate goals if chronic or comorbid symptoms are exacerbated and prevent overall improvement and discharge     Problem: Discharge Planning  Goal: Discharge to home or other facility with appropriate resources  2/19/2025 2307 by Dulce Torres RN  Outcome: Progressing  Flowsheets (Taken 2/19/2025 2149)  Discharge to home or other facility with appropriate resources:   Identify barriers to discharge with patient and caregiver   Arrange for needed discharge resources and transportation as appropriate   Identify discharge learning needs (meds, wound care, etc)     Problem: Pain  Goal: Verbalizes/displays adequate comfort level or baseline comfort level  Outcome: Progressing  Flowsheets (Taken 2/19/2025 2307)  Verbalizes/displays adequate comfort level or baseline comfort level:   Encourage patient to monitor pain and request assistance   Administer analgesics based on type and severity of pain and evaluate response   Assess pain using appropriate pain scale   Implement non-pharmacological measures as appropriate and evaluate response     Problem: Safety - Adult  Goal: Free from fall injury  Outcome: Progressing  Flowsheets (Taken 2/19/2025 2307)  Free From Fall Injury: Instruct family/caregiver on patient safety     
  Problem: Chronic Conditions and Co-morbidities  Goal: Patient's chronic conditions and co-morbidity symptoms are monitored and maintained or improved  Outcome: Progressing  Flowsheets (Taken 2/22/2025 0157)  Care Plan - Patient's Chronic Conditions and Co-Morbidity Symptoms are Monitored and Maintained or Improved: Monitor and assess patient's chronic conditions and comorbid symptoms for stability, deterioration, or improvement     Problem: Discharge Planning  Goal: Discharge to home or other facility with appropriate resources  Outcome: Progressing  Flowsheets (Taken 2/22/2025 0157)  Discharge to home or other facility with appropriate resources: Identify barriers to discharge with patient and caregiver     Problem: Pain  Goal: Verbalizes/displays adequate comfort level or baseline comfort level  Outcome: Progressing  Flowsheets (Taken 2/22/2025 0157)  Verbalizes/displays adequate comfort level or baseline comfort level: Encourage patient to monitor pain and request assistance     Problem: Safety - Adult  Goal: Free from fall injury  Outcome: Progressing  Flowsheets (Taken 2/22/2025 0157)  Free From Fall Injury: Instruct family/caregiver on patient safety     Problem: Respiratory - Adult  Goal: Achieves optimal ventilation and oxygenation  Outcome: Progressing  Flowsheets (Taken 2/22/2025 0157)  Achieves optimal ventilation and oxygenation: Assess for changes in respiratory status     
  Problem: Chronic Conditions and Co-morbidities  Goal: Patient's chronic conditions and co-morbidity symptoms are monitored and maintained or improved  Outcome: Progressing  Flowsheets (Taken 2/23/2025 1058)  Care Plan - Patient's Chronic Conditions and Co-Morbidity Symptoms are Monitored and Maintained or Improved:   Monitor and assess patient's chronic conditions and comorbid symptoms for stability, deterioration, or improvement   Collaborate with multidisciplinary team to address chronic and comorbid conditions and prevent exacerbation or deterioration   Update acute care plan with appropriate goals if chronic or comorbid symptoms are exacerbated and prevent overall improvement and discharge     Problem: Discharge Planning  Goal: Discharge to home or other facility with appropriate resources  Outcome: Progressing  Flowsheets (Taken 2/23/2025 1058)  Discharge to home or other facility with appropriate resources:   Identify barriers to discharge with patient and caregiver   Arrange for needed discharge resources and transportation as appropriate   Identify discharge learning needs (meds, wound care, etc)   Refer to discharge planning if patient needs post-hospital services based on physician order or complex needs related to functional status, cognitive ability or social support system     Problem: Pain  Goal: Verbalizes/displays adequate comfort level or baseline comfort level  Outcome: Progressing  Flowsheets (Taken 2/23/2025 1058)  Verbalizes/displays adequate comfort level or baseline comfort level:   Encourage patient to monitor pain and request assistance   Assess pain using appropriate pain scale   Administer analgesics based on type and severity of pain and evaluate response   Implement non-pharmacological measures as appropriate and evaluate response   Consider cultural and social influences on pain and pain management   Notify Licensed Independent Practitioner if interventions unsuccessful or patient 
  Problem: Respiratory - Adult  Goal: Achieves optimal ventilation and oxygenation  Outcome: Progressing   Continue therapy as ordered to achieve and maintain clear breath sounds and improve aeration.  
Plans return to assisted living   
ensure adequate hydration   Administer ordered medications as needed   Nutrition consult to assist patient with appropriate food choices  2/22/2025 1042 by Sharita Parker  Outcome: Progressing  Flowsheets (Taken 2/22/2025 1042)  Maintains or returns to baseline bowel function:   Assess bowel function   Encourage mobilization and activity   Encourage oral fluids to ensure adequate hydration   Administer ordered medications as needed   Nutrition consult to assist patient with appropriate food choices  Goal: Maintains adequate nutritional intake  2/22/2025 1042 by Sharita Parker  Outcome: Progressing  Flowsheets (Taken 2/22/2025 1042)  Maintains adequate nutritional intake:   Monitor percentage of each meal consumed   Assist with meals as needed   Obtain nutritional consult as needed   Monitor intake and output, weight and lab values   Identify factors contributing to decreased intake, treat as appropriate     
returns to baseline bowel function:   Assess bowel function   Administer IV fluids as ordered to ensure adequate hydration   Encourage mobilization and activity   Encourage oral fluids to ensure adequate hydration   Administer ordered medications as needed     Problem: Gastrointestinal - Adult  Goal: Maintains adequate nutritional intake  2/23/2025 2245 by Cathryn Liao RN  Outcome: Progressing  Flowsheets (Taken 2/23/2025 2245)  Maintains adequate nutritional intake:   Monitor percentage of each meal consumed   Obtain nutritional consult as needed   Monitor intake and output, weight and lab values   Identify factors contributing to decreased intake, treat as appropriate

## 2025-02-24 NOTE — CARE COORDINATION
2/24/25, 10:28 AM EST    DISCHARGE PLANNING EVALUATION    Spoke with [facility] staff, confirmed plan for return to assisted living today.  Anticipate family transport.     2/24/25, 10:29 AM EST    Patient goals/plan/ treatment preferences discussed by  and .  Patient goals/plan/ treatment preferences reviewed with patient/ family.  Patient/ family verbalize understanding of discharge plan and are in agreement with goal/plan/treatment preferences.  Understanding was demonstrated using the teach back method.  AVS provided by RN at time of discharge, which includes all necessary medical information pertaining to the patients current course of illness, treatment, post-discharge goals of care, and treatment preferences.     Services At/After Discharge: Assisted Living

## 2025-02-24 NOTE — PROGRESS NOTES
reviewed    CURRENT CODE STATUS:  Full Code No additional code details     Case reviewed and discussed with Dr. Kuo    Parts of this note may have been dictated by use of voice recognition software and electronically transcribed. The note may contain errors not detected in proofreading    Electronically signed by BRIAN Clemente CNP on 2/24/2025 at 9:13 AM           Palliative Care Office: [phone]

## 2025-02-24 NOTE — DISCHARGE SUMMARY
Hospital Medicine Discharge Summary      Patient Identification:   Analy Reyna   : 1945  MRN: [id number]   Account: [id number]      Patient's PCP: Ras Howell MD    Admit Date: 2025     Discharge Date:   2025    Admitting Physician: BRIAN Kim - CNP     Discharging Physician Assistant: Aminah Bansal PA-C     Discharge Diagnoses with Hospital Course:    Abdominal pain-suspected secondary to metastatic disease-persistent  General Surgery was consulted for initial concerns of calculus cholecystitis versus progression of bony metastasis-no surgical interval recommended  Gastroenterology also consulted and has signed off-recently had EGD 2025 which was negative.  Continue PPI twice daily and regular diet  Dietitian consulted due to poor oral intake and decreased appetite   CT A/P and chest were negative for PE, but showed small pericardial effusion, trace bilateral pleural effusion, extensive sclerotic bony metastatic disease has progressed, nonspecific GB distention with possible GB wall thickening.    RUQ US distended GB without evidence of cholecystitis.    HIDA scan performed with normal EF and no evidence of acute cholecystitis  LFTs and lipase WNL   Was initiated on Rocephin and metronidazole for empiric antibiotic coverage of GI pathogen's-discontinued as no infectious etiology likely  Continue bowel regimen as constipation can exacerbate abdominal pain  Palliative care was consulted   Has been utilizing IV morphine every 4-5 hours in addition to oral oxycodone.  Discussed with patient that we are unable to discharge her with IV morphine therefore we will need to move forward and transitioning to oral so she can return to assisted living.  Lengthy discussion regarding her goals of care and discussed with possibly hospice was a better option for her for symptom control given the progression of her disease.  Patient was very tearful and states she

## 2025-02-25 ENCOUNTER — CARE COORDINATION (OUTPATIENT)
Dept: CARE COORDINATION | Age: 80
End: 2025-02-25

## 2025-02-25 DIAGNOSIS — R10.11 ABDOMINAL PAIN, RIGHT UPPER QUADRANT: Primary | ICD-10-CM

## 2025-02-25 PROCEDURE — 1111F DSCHRG MED/CURRENT MED MERGE: CPT | Performed by: FAMILY MEDICINE

## 2025-02-25 NOTE — CARE COORDINATION
Care Transitions Note    Initial Call - Call within 2 business days of discharge: Yes    Patient Current Location:  Home: [address]  [place] [address]    Care Transition Nurse contacted the  AL nurse  by telephone to perform post hospital discharge assessment, verified name and  as identifiers. Provided introduction to self, and explanation of the Care Transition Nurse role.     Patient: Analy Reyna    Patient : 1945   MRN: [id number]    Reason for Admission: CP, Abdominal pain RUQ  Discharge Date: 25  RURS: Readmission Risk Score: 20.8      Last Discharge Facility       Date Complaint Diagnosis Description Type Department Provider    25 Chest Pain Abdominal pain, right upper quadrant ... ED to Hosp-Admission (Discharged) (ADMITTED) Aminah Sepulveda PA-C; RAYMOND Naidu.            Was this an external facility discharge? No    Additional needs identified to be addressed with provider   No needs identified             Method of communication with provider: none.    Patients top risk factors for readmission: functional physical ability, medical condition-abdominal pain RUQ, CHF, AFIB, COPD, HTN, HLD, COPD, Chronic hypoxic respiratory failure, CKD, GERD, DM, and polypharmacy    Interventions to address risk factors:   Education: discharge instructions, hospital follow up appts  Review of patient management of conditions/medications:      Care Summary Note: Contacted pt for initial care transition follow up.  Pt resides at [facility].  Spoke with pt's nurse Anny.  She reports pt still c/o abdominal pain.  AL nurse just gave her an Oxycodone 10 mg.  Anny states she discussed hospice with pt and pt plans to talk to her family about this.  She denies pt having any c/o nausea/vomiting, chest pain/discomfort, dizziness/lightheadedness.   Reports shortness of breath at baseline.  Using oxygen only when sleeping.  Reports pt not eating but told AL nurse

## 2025-03-03 NOTE — ED PROVIDER NOTES
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    Rectal lesion K62.9    Anxiety state F41.1    Mixed hyperlipidemia E78.2    Major depressive disorder with single episode F32.9    Presence of heart assist device (HCC) Z95.811    Chronic obstructive pulmonary disease, unspecified COPD type (HCC) J44.9    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, C79.52    Malignant neoplasm of central portion of right breast in female, estrogen receptor positive (HCC) C50.111, Z17.0    Carcinoma of right breast, estrogen receptor positive, stage 4 (HCC) C50.911, Z17.0    Metastasis to bone (HCC) C79.51    Abdominal pain, right upper quadrant R10.11    Palliative care patient Z51.5     SURGICAL HISTORY       Past Surgical History:   Procedure Laterality Date    ACHILLES TENDON SURGERY Bilateral     BLADDER SUSPENSION      CARDIAC SURGERY  2016    heart cath--[facility]    COLONOSCOPY Left 05/11/2018    COLONOSCOPY POLYPECTOMY SNARE/COLD BIOPSY performed by James Herrera MD at [facility]    COLONOSCOPY N/A 08/04/2021    COLONOSCOPY performed by Leeanna Merino MD at [facility]    COLONOSCOPY  08/04/2021    Dr. Merino-- [facility]    COLONOSCOPY N/A 10/20/2022    COLONOSCOPY POLYPECTOMY SNARE/COLD BIOPSY performed by Leeanna Merino MD at [facility]    ENDOSCOPY, COLON, DIAGNOSTIC      GASTRIC FUNDOPLICATION      HAMMER TOE SURGERY Right     HEMORRHOID SURGERY N/A 07/06/2023    Anal Exam under Anesthesia, Anal/Rectal

## 2025-03-04 ENCOUNTER — CARE COORDINATION (OUTPATIENT)
Dept: CARE COORDINATION | Age: 80
End: 2025-03-04

## 2025-03-04 NOTE — CARE COORDINATION
PCP referred to [facility] [phone] and they admitted her to their care 2/27/25.   Program close per protocol.

## 2025-03-06 ENCOUNTER — TELEPHONE (OUTPATIENT)
Dept: CARDIOLOGY CLINIC | Age: 80
End: 2025-03-06

## 2025-03-06 NOTE — TELEPHONE ENCOUNTER
Called Jen Tejada to r/s May appt for 1 yr post Watch[employer]. Nurse informed me this patient is now on hospice care and to cancel appt.

## 2025-03-12 ENCOUNTER — TELEPHONE (OUTPATIENT)
Dept: CASE MANAGEMENT | Age: 80
End: 2025-03-12

## 2025-03-12 ENCOUNTER — TELEPHONE (OUTPATIENT)
Dept: ONCOLOGY | Age: 80
End: 2025-03-12

## 2025-03-12 ENCOUNTER — HOSPITAL ENCOUNTER (OUTPATIENT)
Dept: INFUSION THERAPY | Age: 80
End: 2025-03-12

## 2025-03-12 DIAGNOSIS — Z17.0 CARCINOMA OF RIGHT BREAST, ESTROGEN RECEPTOR POSITIVE, STAGE 4: Primary | ICD-10-CM

## 2025-03-12 DIAGNOSIS — C50.911 CARCINOMA OF RIGHT BREAST, ESTROGEN RECEPTOR POSITIVE, STAGE 4: Primary | ICD-10-CM

## 2025-03-12 NOTE — TELEPHONE ENCOUNTER
Spoke with Gilma from [facility] who states pt was admitted into their center on 02/27/25.   Her office visit/tx will need cx.     Thank you!

## 2025-03-12 NOTE — TELEPHONE ENCOUNTER
Name: Analy Reyna  : 1945  MRN: [id number]    Oncology Navigation Follow-Up Note    Contact Type:  Telephone    Notes: Patient admitted to [facility] . Attempted to reach daughter Katy to offer emotional support and see if patient or family have questions or concerns. No answer, no voicemail to leave message.     Electronically signed by Gail Nicole RN on 3/12/2025 at 1:05 PM

## 2025-03-13 ENCOUNTER — SOCIAL WORK (OUTPATIENT)
Dept: INFUSION THERAPY | Age: 80
End: 2025-03-13

## 2025-03-13 NOTE — PROGRESS NOTES
Oncology Social Work    Date: 3/13/2025  Time: 9:29 AM  Name: Analy Reyna  MRN: [id number]     Contact Type: Follow-up    Note:   Situation: This staff was contacted by Analy Reyna 's Nurse Navigator regarding her level of care transfer    Background: She had a previous encounter with this staff. Today's conversation focused on offering her and the family support regarding her care choice to utilize her hospice benefit rather than continuing with aggressive care.     Assessment: This staff had the opportunity to speak with Lino (Analy's son) who appeared pleasant as we discussed the situation at hand. He explained they are just trying to get things in order to help make the process a bit easier on everyone. This staff offered supportive services regarding the End of Life process. Lino was provided my contact information should any of the family want more information, or is in need of comfort conversation as they journey through this process with their mom.  - Additional education regarding the services provided by the SW were explained during our conversation.     Recommendation: This staff will remain available for assistance and support as needed. My direct phone number was provided for further contact..    Sumaya Mcdonald, MSW, LSW, ACHP-IRVIN  Oncology Social Worker       supervision

## (undated) DEVICE — SOLUTION SURG PREP SCRUB POV IOD 7.5% 4 OZ

## (undated) DEVICE — TRAP POLYP ETRAP

## (undated) DEVICE — TUBING IV STOPCOCK 48 CM 3 W

## (undated) DEVICE — DISSECTOR ENDO L13CM CVD JAW CRDLSS SONICISION

## (undated) DEVICE — CANISTER, RIGID, 2000CC: Brand: MEDLINE INDUSTRIES, INC.

## (undated) DEVICE — CONMED SCOPE SAVER BITE BLOCK, 20X27 MM: Brand: SCOPE SAVER

## (undated) DEVICE — GLOVE ORTHO 8   MSG9480

## (undated) DEVICE — SET ADMIN 25ML L117IN PMP MOD CK VLV RLER CLMP 2 SMRTSITE

## (undated) DEVICE — Device: Brand: DEFENDO VALVE AND CONNECTOR KIT

## (undated) DEVICE — PREP SOL PVP IODINE 4%  4 OZ/BTL

## (undated) DEVICE — GLOVE ORANGE PI 7   MSG9070

## (undated) DEVICE — BREAST HERNIA: Brand: MEDLINE INDUSTRIES, INC.

## (undated) DEVICE — SURGIFOAM SPNG SZ 100

## (undated) DEVICE — FORCEPS BX L240CM JAW DIA3.2MM L CAP W/ NDL MIC MESH TOOTH

## (undated) DEVICE — Device

## (undated) DEVICE — SPONGE GZ W4XL4IN COT 12 PLY TYP VII WVN C FLD DSGN STERILE

## (undated) DEVICE — BIOGUARD A/W CLEANING ADAPTER

## (undated) DEVICE — SNARE POLYP SM W13MMXL240CM SHTH DIA2.4MM OVL FLX DISP

## (undated) DEVICE — SOLUTION IV 1000ML 0.45% SOD CHL PH 5 INJ USP VIAFLX PLAS

## (undated) DEVICE — PAD,ABDOMINAL,5"X9",ST,LF,25/BX: Brand: MEDLINE INDUSTRIES, INC.

## (undated) DEVICE — ENDO KIT: Brand: MEDLINE INDUSTRIES, INC.

## (undated) DEVICE — SUTURE CHROMIC GUT SZ 2-0 L27IN ABSRB BRN L26MM CT-2 1/2 883H

## (undated) DEVICE — SET LNR RED GRN W/ BASE CLEANASCOPE

## (undated) DEVICE — LINER SUCT CANSTR 1500CC SEMI RIG W/ POR HYDROPHOBIC SHUT

## (undated) DEVICE — FORCEP RAD JAW W/NEEDLE 160CM

## (undated) DEVICE — PENCIL SMK EVAC ALL IN 1 DSGN ENH VISIBILITY IMPROVED AIR

## (undated) DEVICE — PREMIUM DRY TRAY LF: Brand: MEDLINE INDUSTRIES, INC.

## (undated) DEVICE — GLOVE ORANGE PI 8   MSG9080

## (undated) DEVICE — SUTURE VCRL + SZ 0 L27IN ABSRB VLT L36MM CT-1 1/2 CIR VCPB260H